# Patient Record
Sex: FEMALE | Race: OTHER | HISPANIC OR LATINO | URBAN - METROPOLITAN AREA
[De-identification: names, ages, dates, MRNs, and addresses within clinical notes are randomized per-mention and may not be internally consistent; named-entity substitution may affect disease eponyms.]

---

## 2018-09-01 ENCOUNTER — OUTPATIENT (OUTPATIENT)
Dept: OUTPATIENT SERVICES | Facility: HOSPITAL | Age: 63
LOS: 1 days | End: 2018-09-01
Payer: MEDICAID

## 2018-09-01 DIAGNOSIS — I77.0 ARTERIOVENOUS FISTULA, ACQUIRED: Chronic | ICD-10-CM

## 2018-09-01 DIAGNOSIS — Z90.49 ACQUIRED ABSENCE OF OTHER SPECIFIED PARTS OF DIGESTIVE TRACT: Chronic | ICD-10-CM

## 2018-09-01 PROCEDURE — G9001: CPT

## 2018-09-25 ENCOUNTER — INPATIENT (INPATIENT)
Facility: HOSPITAL | Age: 63
LOS: 1 days | Discharge: HOME | DRG: 640 | End: 2018-09-27
Attending: HOSPITALIST | Admitting: HOSPITALIST
Payer: COMMERCIAL

## 2018-09-25 VITALS
SYSTOLIC BLOOD PRESSURE: 204 MMHG | RESPIRATION RATE: 26 BRPM | OXYGEN SATURATION: 89 % | HEART RATE: 75 BPM | DIASTOLIC BLOOD PRESSURE: 84 MMHG | TEMPERATURE: 98 F | WEIGHT: 199.96 LBS | HEIGHT: 66 IN

## 2018-09-25 DIAGNOSIS — I77.0 ARTERIOVENOUS FISTULA, ACQUIRED: Chronic | ICD-10-CM

## 2018-09-25 DIAGNOSIS — I50.9 HEART FAILURE, UNSPECIFIED: ICD-10-CM

## 2018-09-25 DIAGNOSIS — Z90.49 ACQUIRED ABSENCE OF OTHER SPECIFIED PARTS OF DIGESTIVE TRACT: Chronic | ICD-10-CM

## 2018-09-25 LAB
ALBUMIN SERPL ELPH-MCNC: 4.3 G/DL — SIGNIFICANT CHANGE UP (ref 3.3–5.2)
ALBUMIN SERPL ELPH-MCNC: 4.5 G/DL — SIGNIFICANT CHANGE UP (ref 3.3–5.2)
ALP SERPL-CCNC: 110 U/L — SIGNIFICANT CHANGE UP (ref 40–120)
ALP SERPL-CCNC: 138 U/L — HIGH (ref 40–120)
ALT FLD-CCNC: 10 U/L — SIGNIFICANT CHANGE UP
ALT FLD-CCNC: 11 U/L — SIGNIFICANT CHANGE UP
ANION GAP SERPL CALC-SCNC: 21 MMOL/L — HIGH (ref 5–17)
ANION GAP SERPL CALC-SCNC: 21 MMOL/L — HIGH (ref 5–17)
ANION GAP SERPL CALC-SCNC: 22 MMOL/L — HIGH (ref 5–17)
APTT BLD: 33.6 SEC — SIGNIFICANT CHANGE UP (ref 27.5–37.4)
APTT BLD: 35.3 SEC — SIGNIFICANT CHANGE UP (ref 27.5–37.4)
AST SERPL-CCNC: 16 U/L — SIGNIFICANT CHANGE UP
AST SERPL-CCNC: 16 U/L — SIGNIFICANT CHANGE UP
BASE EXCESS BLDV CALC-SCNC: 0.1 MMOL/L — SIGNIFICANT CHANGE UP (ref -2–2)
BASOPHILS # BLD AUTO: 0 K/UL — SIGNIFICANT CHANGE UP (ref 0–0.2)
BASOPHILS # BLD AUTO: 0.1 K/UL — SIGNIFICANT CHANGE UP (ref 0–0.2)
BASOPHILS NFR BLD AUTO: 0.4 % — SIGNIFICANT CHANGE UP (ref 0–2)
BASOPHILS NFR BLD AUTO: 0.4 % — SIGNIFICANT CHANGE UP (ref 0–2)
BILIRUB SERPL-MCNC: 0.6 MG/DL — SIGNIFICANT CHANGE UP (ref 0.4–2)
BILIRUB SERPL-MCNC: 0.7 MG/DL — SIGNIFICANT CHANGE UP (ref 0.4–2)
BLD GP AB SCN SERPL QL: SIGNIFICANT CHANGE UP
BUN SERPL-MCNC: 22 MG/DL — HIGH (ref 8–20)
BUN SERPL-MCNC: 48 MG/DL — HIGH (ref 8–20)
BUN SERPL-MCNC: 52 MG/DL — HIGH (ref 8–20)
CALCIUM SERPL-MCNC: 10.1 MG/DL — SIGNIFICANT CHANGE UP (ref 8.6–10.2)
CALCIUM SERPL-MCNC: 10.7 MG/DL — HIGH (ref 8.6–10.2)
CALCIUM SERPL-MCNC: 11 MG/DL — HIGH (ref 8.6–10.2)
CHLORIDE SERPL-SCNC: 89 MMOL/L — LOW (ref 98–107)
CHLORIDE SERPL-SCNC: 97 MMOL/L — LOW (ref 98–107)
CHLORIDE SERPL-SCNC: 97 MMOL/L — LOW (ref 98–107)
CK SERPL-CCNC: 34 U/L — SIGNIFICANT CHANGE UP (ref 25–170)
CO2 SERPL-SCNC: 21 MMOL/L — LOW (ref 22–29)
CO2 SERPL-SCNC: 21 MMOL/L — LOW (ref 22–29)
CO2 SERPL-SCNC: 24 MMOL/L — SIGNIFICANT CHANGE UP (ref 22–29)
CREAT SERPL-MCNC: 4.7 MG/DL — HIGH (ref 0.5–1.3)
CREAT SERPL-MCNC: 7.72 MG/DL — HIGH (ref 0.5–1.3)
CREAT SERPL-MCNC: 8.13 MG/DL — HIGH (ref 0.5–1.3)
EOSINOPHIL # BLD AUTO: 0.1 K/UL — SIGNIFICANT CHANGE UP (ref 0–0.5)
EOSINOPHIL # BLD AUTO: 0.1 K/UL — SIGNIFICANT CHANGE UP (ref 0–0.5)
EOSINOPHIL NFR BLD AUTO: 0.9 % — SIGNIFICANT CHANGE UP (ref 0–6)
EOSINOPHIL NFR BLD AUTO: 1.3 % — SIGNIFICANT CHANGE UP (ref 0–6)
GAS PNL BLDV: SIGNIFICANT CHANGE UP
GLUCOSE BLDC GLUCOMTR-MCNC: 129 MG/DL — HIGH (ref 70–99)
GLUCOSE BLDC GLUCOMTR-MCNC: 153 MG/DL — HIGH (ref 70–99)
GLUCOSE BLDC GLUCOMTR-MCNC: 182 MG/DL — HIGH (ref 70–99)
GLUCOSE BLDC GLUCOMTR-MCNC: 203 MG/DL — HIGH (ref 70–99)
GLUCOSE SERPL-MCNC: 148 MG/DL — HIGH (ref 70–115)
GLUCOSE SERPL-MCNC: 179 MG/DL — HIGH (ref 70–115)
GLUCOSE SERPL-MCNC: 211 MG/DL — HIGH (ref 70–115)
HCO3 BLDV-SCNC: 24 MMOL/L — SIGNIFICANT CHANGE UP (ref 20–26)
HCT VFR BLD CALC: 37.1 % — SIGNIFICANT CHANGE UP (ref 37–47)
HCT VFR BLD CALC: 37.4 % — SIGNIFICANT CHANGE UP (ref 37–47)
HCT VFR BLD CALC: 40.7 % — SIGNIFICANT CHANGE UP (ref 37–47)
HGB BLD-MCNC: 11.3 G/DL — LOW (ref 12–16)
HGB BLD-MCNC: 11.4 G/DL — LOW (ref 12–16)
HGB BLD-MCNC: 12.2 G/DL — SIGNIFICANT CHANGE UP (ref 12–16)
INR BLD: 1.02 RATIO — SIGNIFICANT CHANGE UP (ref 0.88–1.16)
INR BLD: 1.09 RATIO — SIGNIFICANT CHANGE UP (ref 0.88–1.16)
LACTATE BLDV-MCNC: 1.8 MMOL/L — SIGNIFICANT CHANGE UP (ref 0.5–2)
LYMPHOCYTES # BLD AUTO: 1.2 K/UL — SIGNIFICANT CHANGE UP (ref 1–4.8)
LYMPHOCYTES # BLD AUTO: 1.6 K/UL — SIGNIFICANT CHANGE UP (ref 1–4.8)
LYMPHOCYTES # BLD AUTO: 16.2 % — LOW (ref 20–55)
LYMPHOCYTES # BLD AUTO: 7.9 % — LOW (ref 20–55)
MAGNESIUM SERPL-MCNC: 2.4 MG/DL — SIGNIFICANT CHANGE UP (ref 1.6–2.6)
MAGNESIUM SERPL-MCNC: 3.1 MG/DL — HIGH (ref 1.6–2.6)
MCHC RBC-ENTMCNC: 28.3 PG — SIGNIFICANT CHANGE UP (ref 27–31)
MCHC RBC-ENTMCNC: 28.5 PG — SIGNIFICANT CHANGE UP (ref 27–31)
MCHC RBC-ENTMCNC: 28.5 PG — SIGNIFICANT CHANGE UP (ref 27–31)
MCHC RBC-ENTMCNC: 30 G/DL — LOW (ref 32–36)
MCHC RBC-ENTMCNC: 30.5 G/DL — LOW (ref 32–36)
MCHC RBC-ENTMCNC: 30.5 G/DL — LOW (ref 32–36)
MCV RBC AUTO: 92.8 FL — SIGNIFICANT CHANGE UP (ref 81–99)
MCV RBC AUTO: 93.5 FL — SIGNIFICANT CHANGE UP (ref 81–99)
MCV RBC AUTO: 95.1 FL — SIGNIFICANT CHANGE UP (ref 81–99)
MONOCYTES # BLD AUTO: 0.8 K/UL — SIGNIFICANT CHANGE UP (ref 0–0.8)
MONOCYTES # BLD AUTO: 0.8 K/UL — SIGNIFICANT CHANGE UP (ref 0–0.8)
MONOCYTES NFR BLD AUTO: 5.2 % — SIGNIFICANT CHANGE UP (ref 3–10)
MONOCYTES NFR BLD AUTO: 8 % — SIGNIFICANT CHANGE UP (ref 3–10)
NEUTROPHILS # BLD AUTO: 13.6 K/UL — HIGH (ref 1.8–8)
NEUTROPHILS # BLD AUTO: 7.2 K/UL — SIGNIFICANT CHANGE UP (ref 1.8–8)
NEUTROPHILS NFR BLD AUTO: 74 % — HIGH (ref 37–73)
NEUTROPHILS NFR BLD AUTO: 85.3 % — HIGH (ref 37–73)
PCO2 BLDV: 38 MMHG — SIGNIFICANT CHANGE UP (ref 35–50)
PH BLDV: 7.42 — SIGNIFICANT CHANGE UP (ref 7.32–7.43)
PHOSPHATE SERPL-MCNC: 2.7 MG/DL — SIGNIFICANT CHANGE UP (ref 2.4–4.7)
PHOSPHATE SERPL-MCNC: 4.1 MG/DL — SIGNIFICANT CHANGE UP (ref 2.4–4.7)
PLATELET # BLD AUTO: 270 K/UL — SIGNIFICANT CHANGE UP (ref 150–400)
PLATELET # BLD AUTO: 294 K/UL — SIGNIFICANT CHANGE UP (ref 150–400)
PLATELET # BLD AUTO: 324 K/UL — SIGNIFICANT CHANGE UP (ref 150–400)
PO2 BLDV: 53 MMHG — HIGH (ref 25–45)
POTASSIUM SERPL-MCNC: 4.3 MMOL/L — SIGNIFICANT CHANGE UP (ref 3.5–5.3)
POTASSIUM SERPL-MCNC: 6.2 MMOL/L — CRITICAL HIGH (ref 3.5–5.3)
POTASSIUM SERPL-MCNC: 6.2 MMOL/L — CRITICAL HIGH (ref 3.5–5.3)
POTASSIUM SERPL-SCNC: 4.3 MMOL/L — SIGNIFICANT CHANGE UP (ref 3.5–5.3)
POTASSIUM SERPL-SCNC: 6.2 MMOL/L — CRITICAL HIGH (ref 3.5–5.3)
POTASSIUM SERPL-SCNC: 6.2 MMOL/L — CRITICAL HIGH (ref 3.5–5.3)
PROT SERPL-MCNC: 7.9 G/DL — SIGNIFICANT CHANGE UP (ref 6.6–8.7)
PROT SERPL-MCNC: 8.1 G/DL — SIGNIFICANT CHANGE UP (ref 6.6–8.7)
PROTHROM AB SERPL-ACNC: 11.2 SEC — SIGNIFICANT CHANGE UP (ref 9.8–12.7)
PROTHROM AB SERPL-ACNC: 12 SEC — SIGNIFICANT CHANGE UP (ref 9.8–12.7)
RBC # BLD: 3.97 M/UL — LOW (ref 4.4–5.2)
RBC # BLD: 4.03 M/UL — LOW (ref 4.4–5.2)
RBC # BLD: 4.28 M/UL — LOW (ref 4.4–5.2)
RBC # FLD: 16 % — HIGH (ref 11–15.6)
SAO2 % BLDV: 87 % — SIGNIFICANT CHANGE UP
SODIUM SERPL-SCNC: 134 MMOL/L — LOW (ref 135–145)
SODIUM SERPL-SCNC: 139 MMOL/L — SIGNIFICANT CHANGE UP (ref 135–145)
SODIUM SERPL-SCNC: 140 MMOL/L — SIGNIFICANT CHANGE UP (ref 135–145)
TROPONIN T SERPL-MCNC: 0.04 NG/ML — SIGNIFICANT CHANGE UP (ref 0–0.06)
TROPONIN T SERPL-MCNC: 0.05 NG/ML — SIGNIFICANT CHANGE UP (ref 0–0.06)
TSH SERPL-MCNC: 1.53 UIU/ML — SIGNIFICANT CHANGE UP (ref 0.27–4.2)
TYPE + AB SCN PNL BLD: SIGNIFICANT CHANGE UP
WBC # BLD: 13.4 K/UL — HIGH (ref 4.8–10.8)
WBC # BLD: 15.9 K/UL — HIGH (ref 4.8–10.8)
WBC # BLD: 9.8 K/UL — SIGNIFICANT CHANGE UP (ref 4.8–10.8)
WBC # FLD AUTO: 13.4 K/UL — HIGH (ref 4.8–10.8)
WBC # FLD AUTO: 15.9 K/UL — HIGH (ref 4.8–10.8)
WBC # FLD AUTO: 9.8 K/UL — SIGNIFICANT CHANGE UP (ref 4.8–10.8)

## 2018-09-25 PROCEDURE — 71045 X-RAY EXAM CHEST 1 VIEW: CPT | Mod: 26

## 2018-09-25 PROCEDURE — 12345: CPT | Mod: NC

## 2018-09-25 PROCEDURE — 99223 1ST HOSP IP/OBS HIGH 75: CPT | Mod: 25

## 2018-09-25 PROCEDURE — 71045 X-RAY EXAM CHEST 1 VIEW: CPT | Mod: 26,77

## 2018-09-25 PROCEDURE — 93010 ELECTROCARDIOGRAM REPORT: CPT | Mod: 77

## 2018-09-25 PROCEDURE — 93010 ELECTROCARDIOGRAM REPORT: CPT

## 2018-09-25 PROCEDURE — 99223 1ST HOSP IP/OBS HIGH 75: CPT

## 2018-09-25 PROCEDURE — 90937 HEMODIALYSIS REPEATED EVAL: CPT

## 2018-09-25 PROCEDURE — 99291 CRITICAL CARE FIRST HOUR: CPT

## 2018-09-25 RX ORDER — ASPIRIN/CALCIUM CARB/MAGNESIUM 324 MG
81 TABLET ORAL DAILY
Qty: 0 | Refills: 0 | Status: DISCONTINUED | OUTPATIENT
Start: 2018-09-25 | End: 2018-09-27

## 2018-09-25 RX ORDER — ASPIRIN/CALCIUM CARB/MAGNESIUM 324 MG
81 TABLET ORAL ONCE
Qty: 0 | Refills: 0 | Status: COMPLETED | OUTPATIENT
Start: 2018-09-25 | End: 2018-09-25

## 2018-09-25 RX ORDER — FUROSEMIDE 40 MG
80 TABLET ORAL ONCE
Qty: 0 | Refills: 0 | Status: COMPLETED | OUTPATIENT
Start: 2018-09-25 | End: 2018-09-25

## 2018-09-25 RX ORDER — PANTOPRAZOLE SODIUM 20 MG/1
40 TABLET, DELAYED RELEASE ORAL
Qty: 0 | Refills: 0 | Status: DISCONTINUED | OUTPATIENT
Start: 2018-09-25 | End: 2018-09-27

## 2018-09-25 RX ORDER — GLUCAGON INJECTION, SOLUTION 0.5 MG/.1ML
1 INJECTION, SOLUTION SUBCUTANEOUS ONCE
Qty: 0 | Refills: 0 | Status: DISCONTINUED | OUTPATIENT
Start: 2018-09-25 | End: 2018-09-27

## 2018-09-25 RX ORDER — INSULIN LISPRO 100/ML
VIAL (ML) SUBCUTANEOUS
Qty: 0 | Refills: 0 | Status: DISCONTINUED | OUTPATIENT
Start: 2018-09-25 | End: 2018-09-27

## 2018-09-25 RX ORDER — HEPARIN SODIUM 5000 [USP'U]/ML
INJECTION INTRAVENOUS; SUBCUTANEOUS
Qty: 25000 | Refills: 0 | Status: DISCONTINUED | OUTPATIENT
Start: 2018-09-25 | End: 2018-09-26

## 2018-09-25 RX ORDER — SODIUM POLYSTYRENE SULFONATE 4.1 MEQ/G
15 POWDER, FOR SUSPENSION ORAL ONCE
Qty: 0 | Refills: 0 | Status: COMPLETED | OUTPATIENT
Start: 2018-09-25 | End: 2018-09-25

## 2018-09-25 RX ORDER — SODIUM CHLORIDE 9 MG/ML
500 INJECTION INTRAMUSCULAR; INTRAVENOUS; SUBCUTANEOUS ONCE
Qty: 0 | Refills: 0 | Status: COMPLETED | OUTPATIENT
Start: 2018-09-25 | End: 2018-09-25

## 2018-09-25 RX ORDER — IPRATROPIUM/ALBUTEROL SULFATE 18-103MCG
3 AEROSOL WITH ADAPTER (GRAM) INHALATION EVERY 6 HOURS
Qty: 0 | Refills: 0 | Status: DISCONTINUED | OUTPATIENT
Start: 2018-09-25 | End: 2018-09-27

## 2018-09-25 RX ORDER — HYDRALAZINE HCL 50 MG
10 TABLET ORAL EVERY 6 HOURS
Qty: 0 | Refills: 0 | Status: DISCONTINUED | OUTPATIENT
Start: 2018-09-25 | End: 2018-09-25

## 2018-09-25 RX ORDER — DEXTROSE 50 % IN WATER 50 %
15 SYRINGE (ML) INTRAVENOUS ONCE
Qty: 0 | Refills: 0 | Status: DISCONTINUED | OUTPATIENT
Start: 2018-09-25 | End: 2018-09-27

## 2018-09-25 RX ORDER — ALBUTEROL 90 UG/1
2.5 AEROSOL, METERED ORAL ONCE
Qty: 0 | Refills: 0 | Status: COMPLETED | OUTPATIENT
Start: 2018-09-25 | End: 2018-09-25

## 2018-09-25 RX ORDER — LABETALOL HCL 100 MG
10 TABLET ORAL ONCE
Qty: 0 | Refills: 0 | Status: COMPLETED | OUTPATIENT
Start: 2018-09-25 | End: 2018-09-25

## 2018-09-25 RX ORDER — NITROGLYCERIN 6.5 MG
1 CAPSULE, EXTENDED RELEASE ORAL ONCE
Qty: 0 | Refills: 0 | Status: COMPLETED | OUTPATIENT
Start: 2018-09-25 | End: 2018-09-25

## 2018-09-25 RX ORDER — HEPARIN SODIUM 5000 [USP'U]/ML
5000 INJECTION INTRAVENOUS; SUBCUTANEOUS EVERY 8 HOURS
Qty: 0 | Refills: 0 | Status: DISCONTINUED | OUTPATIENT
Start: 2018-09-25 | End: 2018-09-25

## 2018-09-25 RX ORDER — SODIUM CHLORIDE 9 MG/ML
1000 INJECTION, SOLUTION INTRAVENOUS
Qty: 0 | Refills: 0 | Status: DISCONTINUED | OUTPATIENT
Start: 2018-09-25 | End: 2018-09-27

## 2018-09-25 RX ORDER — HYDRALAZINE HCL 50 MG
100 TABLET ORAL EVERY 8 HOURS
Qty: 0 | Refills: 0 | Status: DISCONTINUED | OUTPATIENT
Start: 2018-09-25 | End: 2018-09-27

## 2018-09-25 RX ORDER — ALPRAZOLAM 0.25 MG
0.25 TABLET ORAL THREE TIMES A DAY
Qty: 0 | Refills: 0 | Status: DISCONTINUED | OUTPATIENT
Start: 2018-09-25 | End: 2018-09-27

## 2018-09-25 RX ORDER — CALCIUM ACETATE 667 MG
667 TABLET ORAL
Qty: 0 | Refills: 0 | Status: DISCONTINUED | OUTPATIENT
Start: 2018-09-25 | End: 2018-09-27

## 2018-09-25 RX ORDER — LEVOTHYROXINE SODIUM 125 MCG
75 TABLET ORAL DAILY
Qty: 0 | Refills: 0 | Status: DISCONTINUED | OUTPATIENT
Start: 2018-09-25 | End: 2018-09-27

## 2018-09-25 RX ORDER — DEXTROSE 50 % IN WATER 50 %
12.5 SYRINGE (ML) INTRAVENOUS ONCE
Qty: 0 | Refills: 0 | Status: DISCONTINUED | OUTPATIENT
Start: 2018-09-25 | End: 2018-09-27

## 2018-09-25 RX ORDER — DEXTROSE 50 % IN WATER 50 %
25 SYRINGE (ML) INTRAVENOUS ONCE
Qty: 0 | Refills: 0 | Status: DISCONTINUED | OUTPATIENT
Start: 2018-09-25 | End: 2018-09-27

## 2018-09-25 RX ORDER — SIMVASTATIN 20 MG/1
20 TABLET, FILM COATED ORAL AT BEDTIME
Qty: 0 | Refills: 0 | Status: DISCONTINUED | OUTPATIENT
Start: 2018-09-25 | End: 2018-09-27

## 2018-09-25 RX ORDER — INSULIN HUMAN 100 [IU]/ML
10 INJECTION, SOLUTION SUBCUTANEOUS ONCE
Qty: 0 | Refills: 0 | Status: COMPLETED | OUTPATIENT
Start: 2018-09-25 | End: 2018-09-25

## 2018-09-25 RX ORDER — DEXTROSE 50 % IN WATER 50 %
50 SYRINGE (ML) INTRAVENOUS ONCE
Qty: 0 | Refills: 0 | Status: COMPLETED | OUTPATIENT
Start: 2018-09-25 | End: 2018-09-25

## 2018-09-25 RX ORDER — METOPROLOL TARTRATE 50 MG
5 TABLET ORAL ONCE
Qty: 0 | Refills: 0 | Status: COMPLETED | OUTPATIENT
Start: 2018-09-25 | End: 2018-09-25

## 2018-09-25 RX ORDER — METOCLOPRAMIDE HCL 10 MG
5 TABLET ORAL
Qty: 0 | Refills: 0 | Status: DISCONTINUED | OUTPATIENT
Start: 2018-09-25 | End: 2018-09-27

## 2018-09-25 RX ADMIN — Medication 0.1 MILLIGRAM(S): at 05:48

## 2018-09-25 RX ADMIN — Medication 3 MILLILITER(S): at 09:38

## 2018-09-25 RX ADMIN — Medication 80 MILLIGRAM(S): at 02:12

## 2018-09-25 RX ADMIN — Medication 81 MILLIGRAM(S): at 00:49

## 2018-09-25 RX ADMIN — PANTOPRAZOLE SODIUM 40 MILLIGRAM(S): 20 TABLET, DELAYED RELEASE ORAL at 08:29

## 2018-09-25 RX ADMIN — Medication 100 MILLIGRAM(S): at 21:46

## 2018-09-25 RX ADMIN — Medication 100 MILLIGRAM(S): at 05:48

## 2018-09-25 RX ADMIN — Medication 3 MILLILITER(S): at 20:57

## 2018-09-25 RX ADMIN — Medication 1 INCH(S): at 00:45

## 2018-09-25 RX ADMIN — ALBUTEROL 2.5 MILLIGRAM(S): 90 AEROSOL, METERED ORAL at 02:12

## 2018-09-25 RX ADMIN — Medication 0.1 MILLIGRAM(S): at 21:46

## 2018-09-25 RX ADMIN — Medication 10 MILLIGRAM(S): at 17:27

## 2018-09-25 RX ADMIN — Medication 667 MILLIGRAM(S): at 13:03

## 2018-09-25 RX ADMIN — Medication 2: at 08:30

## 2018-09-25 RX ADMIN — Medication 10 MILLIGRAM(S): at 08:32

## 2018-09-25 RX ADMIN — Medication 5 MILLIGRAM(S): at 19:58

## 2018-09-25 RX ADMIN — Medication 5 MILLIGRAM(S): at 17:27

## 2018-09-25 RX ADMIN — SODIUM POLYSTYRENE SULFONATE 15 GRAM(S): 4.1 POWDER, FOR SUSPENSION ORAL at 08:53

## 2018-09-25 RX ADMIN — SODIUM CHLORIDE 2000 MILLILITER(S): 9 INJECTION INTRAMUSCULAR; INTRAVENOUS; SUBCUTANEOUS at 17:48

## 2018-09-25 RX ADMIN — Medication 5 MILLIGRAM(S): at 05:46

## 2018-09-25 RX ADMIN — INSULIN HUMAN 10 UNIT(S): 100 INJECTION, SOLUTION SUBCUTANEOUS at 02:12

## 2018-09-25 RX ADMIN — Medication 667 MILLIGRAM(S): at 23:50

## 2018-09-25 RX ADMIN — Medication 667 MILLIGRAM(S): at 08:33

## 2018-09-25 RX ADMIN — HEPARIN SODIUM 1700 UNIT(S)/HR: 5000 INJECTION INTRAVENOUS; SUBCUTANEOUS at 22:21

## 2018-09-25 RX ADMIN — Medication 667 MILLIGRAM(S): at 17:27

## 2018-09-25 RX ADMIN — HEPARIN SODIUM 5000 UNIT(S): 5000 INJECTION INTRAVENOUS; SUBCUTANEOUS at 05:47

## 2018-09-25 RX ADMIN — Medication 50 MILLILITER(S): at 02:11

## 2018-09-25 RX ADMIN — SIMVASTATIN 20 MILLIGRAM(S): 20 TABLET, FILM COATED ORAL at 21:46

## 2018-09-25 RX ADMIN — Medication 10 MILLIGRAM(S): at 01:27

## 2018-09-25 RX ADMIN — Medication 75 MICROGRAM(S): at 05:47

## 2018-09-25 RX ADMIN — Medication 2: at 17:26

## 2018-09-25 NOTE — PROGRESS NOTE ADULT - SUBJECTIVE AND OBJECTIVE BOX
KIM LEE    [id number]    62y      Female    CC: sob    INTERVAL HPI/OVERNIGHT EVENTS:  Sob improved since last night. O2 was titrated off and currently on room air saturating 97%. K is still 6.2. discussed with nephrology Dr Yates for urgent HD. Denied chest pain, sob.    HPI:  Pt poor historian, unable to provide meaningful history, history obtained from pt's daughter at bedside and EMR. Briefly she is 63 y/o female with PMHx of ESRD on HD T/T/S last HD on Saturday, DM, HTN, Hypothyroidism, presented to ED with c/o SOB for 1 day. Per daughter Pt has last HD done on last Saturday and since then Pt having more liquid intake and she started having difficulty breathing and feeling congested and EMS was called. Per EMS reports Pt was hypoxic to 89% and is not home O2 dependent. Pt was due to HD today at 5AM but she decided to come to ED for SOB. she had chest pain on admission but no pain currently.  Pt denied any other complaints, no fever, chills, nausea, vomiting, headache, dizziness. (9/25)    REVIEW OF SYSTEMS:    CONSTITUTIONAL: No fever, weight loss, or fatigue  RESPIRATORY: No cough, wheezing, hemoptysis; No shortness of breath  CARDIOVASCULAR: No chest pain, palpitations  GASTROINTESTINAL: No abdominal or epigastric pain. No nausea, vomiting  NEUROLOGICAL: No headaches, memory loss, loss of strength.  MISCELLANEOUS:      Vital Signs Last 24 Hrs  T(C): 37.1 (25 Sep 2018 10:50), Max: 37.1 (25 Sep 2018 10:50)  T(F): 98.7 (25 Sep 2018 10:50), Max: 98.7 (25 Sep 2018 10:50)  HR: 68 (25 Sep 2018 10:50) (46 - 75)  BP: 163/72 (25 Sep 2018 10:50) (159/69 - 204/84)  BP(mean): --  RR: 18 (25 Sep 2018 10:50) (18 - 28)  SpO2: 99% (25 Sep 2018 10:50) (89% - 100%)    PHYSICAL EXAM:    GENERAL: NAD, well-groomed, left AV fistula  HEENT: PERRL, +EOMI  NECK: soft, Supple, No JVD,   CHEST/LUNG: Clear to auscultation bilaterally; No wheezing  HEART: S1S2+, bradycardiac  ABDOMEN: Soft, Nontender, Nondistended; Bowel sounds present  EXTREMITIES:  2+ Peripheral Pulses, No clubbing, cyanosis, or edema  SKIN: No rashes or lesions  NEURO: AAOX3, no focal deficits, no motor or  sensory loss        LABS:                        11.3   13.4  )-----------( 294      ( 25 Sep 2018 10:06 )             37.1     09-25    139  |  97<L>  |  52.0<H>  ----------------------------<  148<H>  6.2<HH>   |  21.0<L>  |  8.13<H>    Ca    10.7<H>      25 Sep 2018 10:06  Phos  4.1     09-25  Mg     3.1     09-25    TPro  8.1  /  Alb  4.5  /  TBili  0.6  /  DBili  x   /  AST  16  /  ALT  11  /  AlkPhos  138<H>  09-25    PT/INR - ( 25 Sep 2018 00:56 )   PT: 11.2 sec;   INR: 1.02 ratio         PTT - ( 25 Sep 2018 00:56 )  PTT:35.3 sec        MEDICATIONS  (STANDING):  ALBUTerol/ipratropium for Nebulization 3 milliLiter(s) Nebulizer every 6 hours  calcium acetate 667 milliGRAM(s) Oral four times a day with meals  cloNIDine 0.1 milliGRAM(s) Oral three times a day  dextrose 5%. 1000 milliLiter(s) (50 mL/Hr) IV Continuous <Continuous>  dextrose 50% Injectable 12.5 Gram(s) IV Push once  dextrose 50% Injectable 25 Gram(s) IV Push once  dextrose 50% Injectable 25 Gram(s) IV Push once  heparin  Injectable 5000 Unit(s) SubCutaneous every 8 hours  hydrALAZINE 100 milliGRAM(s) Oral every 8 hours  hydrALAZINE Injectable 10 milliGRAM(s) IV Push every 6 hours  insulin lispro (HumaLOG) corrective regimen sliding scale   SubCutaneous three times a day before meals  levothyroxine 75 MICROGram(s) Oral daily  metoclopramide 5 milliGRAM(s) Oral two times a day  pantoprazole    Tablet 40 milliGRAM(s) Oral before breakfast  simvastatin 20 milliGRAM(s) Oral at bedtime    MEDICATIONS  (PRN):  ALPRAZolam 0.25 milliGRAM(s) Oral three times a day PRN Anxiety  dextrose 40% Gel 15 Gram(s) Oral once PRN Blood Glucose LESS THAN 70 milliGRAM(s)/deciliter  glucagon  Injectable 1 milliGRAM(s) IntraMuscular once PRN Glucose LESS THAN 70 milligrams/deciliter      RADIOLOGY & ADDITIONAL TESTS:

## 2018-09-25 NOTE — ED PROVIDER NOTE - CARE PLAN
Principal Discharge DX:	Congestive heart failure  Secondary Diagnosis:	Respiratory failure with hypoxia  Secondary Diagnosis:	Hyperkalemia

## 2018-09-25 NOTE — ED ADULT NURSE REASSESSMENT NOTE - NS ED NURSE REASSESS COMMENT FT1
Pt. BP gradually improving, HR dropped to mid 40s, MD aware. will continue to monitor and maintain safety.
Pt. tolerating off Bi-PAP well. remains drowsy but awakens to voice. HR bradycardic mid 50s, denies any pain or discomfort. BP improved from arrival however remains elevated, nitro-paste in place. will continue to monitor and maintain safety.
pt in no apparent distress, resting conformably in bed, AOX4, denies any pain at the moment. plan of care explained to pt, pt verbalized understanding. IV patent and flushing without difficulty

## 2018-09-25 NOTE — CHART NOTE - NSCHARTNOTEFT_GEN_A_CORE
Rapid Response PGY 3 Note    [id number]  KIM LEE    Rapid Response was called on a 62y year old Female patient for tachycardia      Patient was seen and examined at the bedside by the rapid response team.  She c/o mild chest pain and chronic neck pain.  Denied SOB, palpitations, lightheadedness, dizziness.  She had recently arrived on unit after receiving HD, and family at bedside states that she has appeared "like this" after previous HD sessions when "they take off too much fluid" but that she does not have a known h/o afib.    Allergies    No Known Allergies    Intolerances        PAST MEDICAL & SURGICAL HISTORY:  Hypothyroidism, unspecified type  Hemodialysis patient: tues thursday saturday  Renal failure  Hypertension  Diabetes mellitus  A-V fistula  S/P cholecystectomy      Vital Signs Last 24 Hrs  T(C): 37.4 (25 Sep 2018 16:04), Max: 37.4 (25 Sep 2018 16:04)  T(F): 99.3 (25 Sep 2018 16:04), Max: 99.3 (25 Sep 2018 16:04)  HR: 74 (25 Sep 2018 16:04) (46 - 84)  BP: 153/76 (25 Sep 2018 16:04) (119/60 - 204/84)  BP(mean): --  RR: 18 (25 Sep 2018 16:04) (18 - 28)  SpO2: 94% (25 Sep 2018 16:04) (89% - 100%)          PHYSICAL EXAM:    GENERAL: adult female sitting upright in mild distress  HEAD:  Atraumatic, Normocephalic  EYES: EOMI, PERRLA, conjunctiva and sclera clear  ENMT: moist mucous membranes  NECK: Supple, No JVD, Normal thyroid  NERVOUS SYSTEM:  Alert & Oriented X3  CHEST/LUNG: CTABL  HEART: irregularly irregular, tachycardic, cap refill <2 sec  ABDOMEN: Soft, Nontender, Nondistended; Bowel sounds present, obese  EXTREMITIES: no calf tenderness      09-25 @ 07:01  -  09-25 @ 18:51  --------------------------------------------------------  IN: 0 mL / OUT: 3000 mL / NET: -3000 mL                              11.4   9.8   )-----------( 270      ( 25 Sep 2018 18:27 )             37.4     09-25    139  |  97<L>  |  52.0<H>  ----------------------------<  148<H>  6.2<HH>   |  21.0<L>  |  8.13<H>    Ca    10.7<H>      25 Sep 2018 10:06  Phos  4.1     09-25  Mg     3.1     09-25    TPro  8.1  /  Alb  4.5  /  TBili  0.6  /  DBili  x   /  AST  16  /  ALT  11  /  AlkPhos  138<H>  09-25         LIVER FUNCTIONS - ( 25 Sep 2018 00:56 )  Alb: 4.5 g/dL / Pro: 8.1 g/dL / ALK PHOS: 138 U/L / ALT: 11 U/L / AST: 16 U/L / GGT: x              PT/INR - ( 25 Sep 2018 18:27 )   PT: 12.0 sec;   INR: 1.09 ratio         PTT - ( 25 Sep 2018 00:56 )  PTT:35.3 sec    Vital Signs Last 24 Hrs*     EKG: afib with RVR to 140, inferior and lateral ST-segment depressions    Assessment- Rapid Response called for 62y year old Female for new onset afib with RVR.    Plan-  - lopressor 5mg IV and 500 cc NS bolus given with decrease of HR to , and immediate resolution of chest pain  - repeat EKG with reduction in severity of ST-segment depressions  - cardizem 30 mg PO q6h  - CBC, CMP, coags, mg, phos, cardiac enzymes  - CXR with improvement in vascular congestion  - CHADS-VASC = 4, ASA + heparin drip  - defer decision on long term A/C to primary team/cardiology in AM  - hope to maintain rate control with PO cardizem, but plan for drip overnight if she does not remain rate controlled  - transfer to telemetry unit  - covering attending  at bedside during RRT

## 2018-09-25 NOTE — CONSULT NOTE ADULT - SUBJECTIVE AND OBJECTIVE BOX
[place] [facility]                                                        Office: [address] [address]                                                       Telephone: [phone]. Fax:[phone]                                                              CARDIOLOGY CONSULTATION NOTE                                                                                             Consult requested by:  Dr Vasquez    PCP    Primary Cardiologist.    Reason for Consultation:     History obtained by: Patient and medical record     obtained: No    Chief complaint:    Patient is a 62y old  Female who presents with a chief complaint of SOB (25 Sep 2018 12:33)      HPI:  Pt poor historian, unable to provide meaningful history, history obtained from pt's daughter at bedside and EMR. Briefly she is 63 y/o female with PMHx of ESRD on HD T/T/S last HD on Saturday, DM, HTN, Hypothyroidism, presented to ED with c/o SOB for 1 day. Per daughter Pt has last HD done on last Saturday and since then Pt having more liquid intake and she started having difficulty breathing and feeling congested and EMS was called. Per EMS reports Pt was hypoxic to 89% and is not home O2 dependent. Pt was due to HD today at 5AM but she decided to come to ED for SOB. Pt denied any other complaints, no fever, chills, nausea, vomiting, headache, dizziness, chest pain. (25 Sep 2018 04:20)        ALLERGIES: Allergies    No Known Allergies    Intolerances          CURRENT MEDICATIONS:  MEDICATIONS  (STANDING):  ALBUTerol/ipratropium for Nebulization 3 milliLiter(s) Nebulizer every 6 hours  aspirin enteric coated 81 milliGRAM(s) Oral daily  calcium acetate 667 milliGRAM(s) Oral four times a day with meals  cloNIDine 0.1 milliGRAM(s) Oral three times a day  dextrose 5%. 1000 milliLiter(s) (50 mL/Hr) IV Continuous <Continuous>  dextrose 50% Injectable 12.5 Gram(s) IV Push once  dextrose 50% Injectable 25 Gram(s) IV Push once  dextrose 50% Injectable 25 Gram(s) IV Push once  diltiazem    Tablet 30 milliGRAM(s) Oral every 6 hours  heparin  Infusion.  Unit(s)/Hr (17 mL/Hr) IV Continuous <Continuous>  hydrALAZINE 100 milliGRAM(s) Oral every 8 hours  insulin lispro (HumaLOG) corrective regimen sliding scale   SubCutaneous three times a day before meals  levothyroxine 75 MICROGram(s) Oral daily  metoclopramide 5 milliGRAM(s) Oral two times a day  metoprolol tartrate Injectable 5 milliGRAM(s) IV Push once  pantoprazole    Tablet 40 milliGRAM(s) Oral before breakfast  simvastatin 20 milliGRAM(s) Oral at bedtime  sodium chloride 0.9% Bolus 500 milliLiter(s) IV Bolus once      HOME MEDICATIONS:  Home Medications:  alendronate 70 mg oral tablet: 1 tab(s) orally once a week (25 Sep 2018 04:15)  aspirin 81 mg oral delayed release tablet: 1 tab(s) orally once a day (24 Mar 2016 09:14)  Basaglar KwikPen:  (25 Sep 2018 04:14)  calcium acetate 667 mg oral tablet: 1 tab(s) orally 3 times a day (25 Sep 2018 04:14)  carvedilol 6.25 mg oral tablet: 1 tab(s) orally every 12 hours (24 Mar 2016 09:14)  cloNIDine 0.1 mg oral tablet: 1 tab(s) orally 3 times a day (25 Sep 2018 04:12)  folic acid 1 mg oral tablet: 1 tab(s) orally once a day (24 Mar 2016 09:14)  hydrALAZINE 100 mg oral tablet: 1 tab(s) orally 3 times a day (25 Sep 2018 04:13)  levothyroxine 75 mcg (0.075 mg) oral tablet: 1 tab(s) orally once a day (24 Mar 2016 09:14)  Nephplex Rx oral tablet: 1 tab(s) orally once a day (24 Mar 2016 09:14)  NIFEdipine 90 mg oral tablet, extended release: 1 tab(s) orally once a day (24 Mar 2016 09:14)  pantoprazole 40 mg oral delayed release tablet: 1 tab(s) orally once a day (before a meal) (24 Mar 2016 09:14)  Reglan 5 mg oral tablet: 1 tab(s) orally 3 times a day (25 Sep 2018 04:15)  simvastatin 20 mg oral tablet: 1 tab(s) orally once a day (at bedtime) (25 Sep 2018 04:12)  Xanax 0.25 mg oral tablet: 1 tab(s) orally 3 times a day, As Needed (25 Sep 2018 04:14)    PAST MEDICAL HISTORY  Hypothyroidism, unspecified type  Hemodialysis patient  Renal failure  Hypertension  Diabetes mellitus      PAST SURGICAL HISTORY  A-V fistula  S/P cholecystectomy      FAMILY HISTORY:  Family history of diabetes mellitus (Father)      SOCIAL HISTORY:       CIGARETTES:       ALCOHOL    DRUG ABUSE      REVIEW OF SYSTEMS:  CONSTITUTIONAL:  as per HPI  HEENT:  Eyes:  No diplopia or blurred vision. ENT:  No earache, sore throat or runny nose.  CARDIOVASCULAR:  No pressure, squeezing, strangling, tightness, heaviness or aching about the chest, neck, axilla or epigastrium.  RESPIRATORY:  No cough, shortness of breath, PND or orthopnea.  GASTROINTESTINAL:  No nausea, vomiting or diarrhea.  GENITOURINARY:  No dysuria, frequency or urgency. No Blood in urine  MUSCULOSKELETAL:  no joint aches, no muscle pain  SKIN:  No change in skin, hair or nails.  NEUROLOGIC:  No paresthesias, fasciculations, seizures or weakness.  PSYCHIATRIC:  No disorder of thought or mood.  ENDOCRINE:  No heat or cold intolerance, polyuria or polydipsia.  HEMATOLOGICAL:  No easy bruising or bleeding.           	        Vital Signs Last 24 Hrs  T(C): 37.4 (09-25-18 @ 16:04), Max: 37.4 (09-25-18 @ 16:04)  T(F): 99.3 (09-25-18 @ 16:04), Max: 99.3 (09-25-18 @ 16:04)  HR: 74 (09-25-18 @ 16:04) (46 - 84)  BP: 153/76 (09-25-18 @ 16:04) (119/60 - 204/84)  BP(mean): --  RR: 18 (09-25-18 @ 16:04) (18 - 28)  SpO2: 94% (09-25-18 @ 16:04) (89% - 100%)    PHYSICAL EXAM:  Constitutional: Comfortable . No acute distress.   HEENT: Atraumatic and normcephalic , neck is supple . no JVD. Unremarkable  CNS: Alert and awake, Grossly nonfocal.  Lymph Nodes: Cervical : Not palpable.  Respiratory: Bilateral clear breath sounds.  Cardiovascular: Normal S1S2. . No murmur/rubs or gallop.  Gastrointestinal: Soft non-tender and non distended . +Bowel sounds.   Extremities: No leg edema.   Psychiatric: Calm . no agitation.  Skin: No skin rash.    Intake and output:   09-25 @ 07:01  -  09-25 @ 18:55  --------------------------------------------------------  IN: 0 mL / OUT: 3000 mL / NET: -3000 mL        LABS:                        11.4   9.8   )-----------( 270      ( 25 Sep 2018 18:27 )             37.4     09-25    139  |  97<L>  |  52.0<H>  ----------------------------<  148<H>  6.2<HH>   |  21.0<L>  |  8.13<H>    Ca    10.7<H>      25 Sep 2018 10:06  Phos  4.1     09-25  Mg     3.1     09-25    TPro  8.1  /  Alb  4.5  /  TBili  0.6  /  DBili  x   /  AST  16  /  ALT  11  /  AlkPhos  138<H>  09-25    CARDIAC MARKERS ( 25 Sep 2018 00:56 )  x     / 0.04 ng/mL / x     / x     / x        ;p-BNP=  PT/INR - ( 25 Sep 2018 18:27 )   PT: 12.0 sec;   INR: 1.09 ratio         PTT - ( 25 Sep 2018 00:56 )  PTT:35.3 sec    LIVER FUNCTIONS - ( 25 Sep 2018 00:56 )  Alb: 4.5 g/dL / Pro: 8.1 g/dL / ALK PHOS: 138 U/L / ALT: 11 U/L / AST: 16 U/L / GGT: x           INTERPRETATION OF TELEMETRY: Reviewed by me.   ECG: Reviewed by me.     RADIOLOGY & ADDITIONAL STUDIES/ECHO/CARDIAC CATH:                   < from: TTE Echo w/Cont Complete (09.25.18 @ 15:04) >  Summary:   1. Left ventricular ejection fraction, by visual estimation, is 45 to   50%.   2. Technically difficult study.   3. Mildly decreased global left ventricular systolic function.   4. Basal inferolateral segment, mid anterolateral segment, and basal   inferior segment are abnormal as described above.   5. Mildly increased LV wall thickness.   6. Normal left ventricular internal cavity size.   7. There is no evidence of pericardial effusion.   8. Moderate mitral annular calcification.   9. Thickening and calcification of the posterior mitral valve leaflet.  10. Endocardial visualization was enhanced with intravenous echo contrast.  11. There is severe aortic root calcification.    [id number] Melissa Tracy MD, Electronically signed on 9/25/2018 at 5:50:43 PM       < end of copied text >    < from: Cardiac Cath Lab - Adult (03.18.16 @ 16:19) >  VENTRICLES: Global left ventricular function was normal. EF estimated was  60 %.  VALVES: MITRAL VALVE: The mitral valve exhibited no regurgitation.  CORONARY VESSELS: The coronary circulation is right dominant.  LM:   --  LM: Normal.  LAD:   --  LAD: Angiography showed minor luminal irregularities with no  flow limiting lesions.  CX:   --  Circumflex: Angiography showed minor luminal irregularities with  no flow limiting lesions.  --  Mid circumflex: There was a 40 % stenosis.  RCA:   --  RCA: Angiography showed minor luminal irregularities with no  flow limiting lesions.  COMPLICATIONS: No complications occurred during the cath lab visit.  DIAGNOSTIC IMPRESSIONS: No significant coronary artery disease with normal  left ventricular function.  DIAGNOSTIC RECOMMENDATIONS: Continue agressive risk factor modifications.  Prepared and signed by  Ozzie Elliott MD  Signed 03/18/2016 20:52:3    < end of copied text >

## 2018-09-25 NOTE — PROGRESS NOTE ADULT - SUBJECTIVE AND OBJECTIVE BOX
Patient was seen and evaluated on dialysis.   No c/o CP SOB NV  no F/C  no swelling    T(C): 37.2 (09-26-18 @ 00:00), Max: 37.4 (09-25-18 @ 16:04)  HR: 68 (09-26-18 @ 04:37) (58 - 153)  BP: 158/67 (09-26-18 @ 04:37) (119/60 - 177/71)  Wt(kg): --  PE ;  NAD  lungs - CTA  CV gr 1 murmer,  No gallop or rub  Abd : soft, NT BS +, No masses  Ext- No edema  Neuro : Grossly intact, moving extremities                             10.4   6.2   )-----------( 255      ( 26 Sep 2018 04:20 )             35.1        09-26    132<L>  |  91<L>  |  28.0<H>  ----------------------------<  156<H>  5.0   |  27.0  |  5.53<H>    Ca    10.0      26 Sep 2018 03:46  Phos  5.1     09-26  Mg     2.5     09-26    TPro  7.9  /  Alb  4.3  /  TBili  0.7  /  DBili  x   /  AST  16  /  ALT  10  /  AlkPhos  110  09-25      MEDICATIONS  (STANDING):  ALBUTerol/ipratropium for Nebulization  ALPRAZolam PRN  aspirin enteric coated  calcium acetate  cloNIDine  dextrose 40% Gel PRN  dextrose 5%.  dextrose 50% Injectable  dextrose 50% Injectable  dextrose 50% Injectable  glucagon  Injectable PRN  heparin  Infusion.  hydrALAZINE  insulin lispro (HumaLOG) corrective regimen sliding scale  levothyroxine  metoclopramide  pantoprazole    Tablet  simvastatin      Patient stable  [facility] HD easily  Continue

## 2018-09-25 NOTE — CHART NOTE - NSCHARTNOTEFT_GEN_A_CORE
I went to see the patient.  Patients son who is at bedside stated that her Cardiologist is Dr Yo Lee, [facility].  I spoke to Dr Ephraim Cavazos who is hospitalist covering , to call .  I did not see the patient in consultation.

## 2018-09-25 NOTE — ED PROVIDER NOTE - OBJECTIVE STATEMENT
63 y/o F pt BIBA with hx of renal failure, DM, HTN, hypothyroidism, and cholecystectomy presents to ED c/o SOB and difficulty breathing that began today. Pt is dialyzed Tu/Th/Sa and is due for dialysis at 5:00 this morning. Pt does not wear oxygen at home. denies fever. denies HA or neck pain. no abd pain. no n/v/d. no urinary f/u/d. no back pain. no motor or sensory deficits. denies illicit drug use. no recent travel. no rash. no other acute issues symptoms or concerns   Dialysis Center: [facility] [place] 61 y/o F pt BIBA with hx of renal failure, DM, HTN, hypothyroidism, and cholecystectomy presents to ED c/o SOB and difficulty breathing that began today. Per EMS, pt's O2 saturation was 89 on RA. Pt is dialyzed Tu/Th/Sa and is due for dialysis at 5:00 this morning. Pt does not wear oxygen at home. denies fever. denies HA or neck pain. no abd pain. no n/v/d. no urinary f/u/d. no back pain. no motor or sensory deficits. denies illicit drug use. no recent travel. no rash. no other acute issues symptoms or concerns   Dialysis Center: [facility] in [place]

## 2018-09-25 NOTE — H&P ADULT - NSHPPHYSICALEXAM_GEN_ALL_CORE
PHYSICAL EXAM:    Vital Signs Last 24 Hrs  T(C): 36.9 (25 Sep 2018 00:20), Max: 36.9 (25 Sep 2018 00:20)  T(F): 98.5 (25 Sep 2018 00:20), Max: 98.5 (25 Sep 2018 00:20)  HR: 54 (25 Sep 2018 04:20) (46 - 75)  BP: 177/78 (25 Sep 2018 04:20) (167/79 - 204/84)  BP(mean): --  RR: 22 (25 Sep 2018 04:20) (22 - 26)  SpO2: 96% (25 Sep 2018 04:20) (89% - 100%)    GENERAL: Pt lying comfortably, NAD.  ENMT: PERRL, +EOMI.  NECK: soft, Supple, No JVD,   CHEST/LUNG: Bibasilar rales, no wheezing.   HEART: S1S2+, Regular rate and rhythm; No murmurs.  ABDOMEN: Soft, Nontender, Nondistended; Bowel sounds present.  MUSCULOSKELETAL: Normal range of motion.  SKIN: No rashes or lesions.  Extremities: No LE edema, Left AVF with thrill.   NEURO: AAOX3, no focal deficits, no motor r sensory loss.  PSYCH: normal mood.

## 2018-09-25 NOTE — ED PROVIDER NOTE - MEDICAL DECISION MAKING DETAILS
need for NIPPV to prevent intubation work breathing improved able to take off bipap; high  potassium treated to prevent life threatening arrythmia

## 2018-09-25 NOTE — CONSULT NOTE ADULT - SUBJECTIVE AND OBJECTIVE BOX
[facility] DIVISION OF KIDNEY DISEASES AND HYPERTENSION -- INITIAL CONSULT NOTE  --------------------------------------------------------------------------------  HPI:    85 y/o female with h/o chronic a fib, pacemaker, DM II, Pulmonary HTN. Glaucoma, was brought in from [facility] to the Er because of AMS. patient was seen and examined.   patient opens her eyes and follows verbal commands.   right lower ext. is much bigger than left lower ext. distended abdomen.   chest X ray showed right pleural effusion and possible pericardial effusion.    PAST HISTORY  --------------------------------------------------------------------------------  PAST MEDICAL & SURGICAL HISTORY:  Chronic atrial fibrillation    FAMILY HISTORY: No ESRD    PAST SOCIAL HISTORY: No ETOH,    ALLERGIES & MEDICATIONS  --------------------------------------------------------------------------------  Allergies    Cortisone Acetate (Unknown)  Lyrica (Unknown)  quinidine (Unknown)    Standing Inpatient Medications  atorvastatin 40 milliGRAM(s) Oral at bedtime  chlorhexidine 2% Cloths 1 Application(s) Topical <User Schedule>  clopidogrel Tablet 75 milliGRAM(s) Oral daily  dextrose 5%. 1000 milliLiter(s) IV Continuous <Continuous>  dextrose 50% Injectable 12.5 Gram(s) IV Push once  dextrose 50% Injectable 25 Gram(s) IV Push once  dextrose 50% Injectable 25 Gram(s) IV Push once  DULoxetine 30 milliGRAM(s) Oral daily  furosemide   Injectable 20 milliGRAM(s) IV Push once  furosemide Infusion 10 mG/Hr IV Continuous <Continuous>  insulin lispro (HumaLOG) corrective regimen sliding scale   SubCutaneous three times a day before meals  iron sucrose IVPB 200 milliGRAM(s) IV Intermittent every 24 hours  metoprolol succinate ER 50 milliGRAM(s) Oral daily  pantoprazole    Tablet 40 milliGRAM(s) Oral daily  rOPINIRole 2 milliGRAM(s) Oral at bedtime  sodium chloride 0.9%. 1000 milliLiter(s) IV Continuous <Continuous>  tamsulosin 0.4 milliGRAM(s) Oral at bedtime    PRN Inpatient Medications  acetaminophen   Tablet .. 650 milliGRAM(s) Oral every 6 hours PRN  dextrose 40% Gel 15 Gram(s) Oral once PRN  diltiazem Injectable 10 milliGRAM(s) IV Push every 6 hours PRN  glucagon  Injectable 1 milliGRAM(s) IntraMuscular once PRN    REVIEW OF SYSTEMS  --------------------------------------------------------------------------------  Gen: + weight changes, fatigue, No  fevers/chills, weakness  Skin: No rashes  Head/Eyes/Ears/Mouth: No headache; Normal hearing; Normal vision w/o blurriness; No sinus pain/discomfort, sore throat  Respiratory: + dyspnea, cough, wheezing, No hemoptysis  CV: No chest pain, PND, + orthopnea  GI: No abdominal pain, diarrhea, constipation, nausea, vomiting, melena, hematochezia  : Anuric,  MSK: No joint pain/swelling; no back pain; no edema  Neuro: No dizziness/lightheadedness, weakness, seizures, + numbness, tingling  Heme: No easy bruising or bleeding  Endo: No heat/cold intolerance  Psych: + significant nervousness, anxiety, stress, depression    All other systems were reviewed and are negative, except as noted.    VITALS/PHYSICAL EXAM  --------------------------------------------------------------------------------  T(C): 36.1 (09-25-18 @ 09:58), Max: 36.5 (09-24-18 @ 15:39)  HR: 85 (09-25-18 @ 09:58) (72 - 93)  BP: 105/64 (09-25-18 @ 09:58) (88/56 - 111/53)  RR: 16 (09-25-18 @ 09:58) (16 - 17)  SpO2: 96% (09-25-18 @ 09:58) (92% - 98%)  Wt(kg): --    09-24-18 @ 07:01  -  09-25-18 @ 07:00  --------------------------------------------------------  IN: 2195 mL / OUT: 91607 mL / NET: -56390 mL      Physical Exam:  	Gen: In Acute Distress- Ill - appearing, Pale,  	HEENT: PERRL, supple neck, JVD,  	Pulm: Decreased d BS - R Base,  	CV: ST, S1S2; no rub  	Back: No spinal or CVA tenderness; + sacral edema  	Abd: +BS, soft, nontender/distended  	: No suprapubic tenderness  	UE: Warm, FROM, no clubbing, intact strength; no edema; no asterixis  	LE: Warm, FROM, no clubbing, intact strength; ++ edema  	Neuro: No focal deficits,   	Psych: Normal affect and mood  	Skin: Warm, without rashes  	Vascular access: AVF,    LABS/STUDIES  --------------------------------------------------------------------------------              7.6    8.5   >-----------<  86       [09-25-18 @ 05:55]              24.8     138  |  102  |  79.0  ----------------------------<  154      [09-25-18 @ 05:55]  4.3   |  22.0  |  2.99        Ca     7.8     [09-25-18 @ 05:55]      Mg     2.0     [09-24-18 @ 06:51]    TPro  5.6  /  Alb  2.9  /  TBili  3.5  /  DBili  x   /  AST  45  /  ALT  20  /  AlkPhos  93  [09-25-18 @ 05:55]    PT/INR: PT 60.8 , INR 5.34       [09-25-18 @ 05:55]  PTT: 46.2       [09-24-18 @ 06:51]      Creatinine Trend:  SCr 2.99 [09-25 @ 05:55]  SCr 2.47 [09-24 @ 06:51]  SCr 1.86 [09-23 @ 05:44]  SCr 1.24 [09-22 @ 04:24]  SCr 1.25 [09-21 @ 06:30]    Urinalysis - [09-17-18 @ 03:19]      Color Yellow / Appearance Slightly Turbid / SG 1.020 / pH 5.0      Gluc Negative / Ketone Negative  / Bili Negative / Urobili Negative       Blood Negative / Protein 30 / Leuk Est Negative / Nitrite Negative      RBC 0-2 / WBC 3-5 / Hyaline  / Gran  / Sq Epi  / Non Sq Epi Many / Bacteria Occasional      Iron 22, TIBC 286, %sat 8      [09-22-18 @ 04:24]  Ferritin 100      [09-22-18 @ 04:24]  HbA1c 6.6      [09-18-18 @ 07:30]  TSH 4.37      [09-17-18 @ 08:56]      DX : ESRD - HD    F.O    Anemia - Fe deficiency, Pericardial Effusion ?    P : Emergent HD - UF,    IV Fe , CARLOS,     TTE

## 2018-09-25 NOTE — H&P ADULT - ASSESSMENT
She is 63 y/o female with PMHx of ESRD on HD T/T/S last HD on Saturday, DM, HTN, HLD, Hypothyroidism, presented to ED with c/o SOB for 1 day, was hypoxic to 89% per EMS, In ED noted to have bibasilar rales with some respiratory distress, initially was placed on BIPAP and currently on NC, also noted to have hyperkalemia s/p albuterol and regular insulin in ED.  Pt admitted for fluid overload in setting of ESRD    Fluid overload in setting of HD:  - Likely from increased fluid intake as per daughter.  - S/p BIPAP for brief period of time in ED, currently on NC O2, NAD.   - Hyperkalemia s/p regular insulin/ albuterol in ED, Kayexalate to be given.   - Renal consulted for HD (Dr Choi service called)     Leukocytosis:  - Unclear etiology, no signs of infection.  - Check UA, CXR.  - Monitor WBC    >H/o HTN- BP elevated on arrival, currently improved. Resume home meds Nifedipine hydralazine, clonidine, Hold Coreg given bradycardia.     >H/o DM- Keep on LSS +FS monitoring for now.   >H/o HLD- Resume ZOcor  >H/o Hypothyroidism Resume home levothyroxine.   >DVT ppx- Heparin.     PS- Pt / daughter not sure about her home meds- meds reviewed online from pharmacy- daughter to bring meds list in AM- Please verify and adjust.

## 2018-09-25 NOTE — ED PROVIDER NOTE - PMH
Diabetes mellitus    Hemodialysis patient  tues thursday saturday  Hypertension    Hypothyroidism, unspecified type    Renal failure

## 2018-09-25 NOTE — ED PROVIDER NOTE - CARDIAC, MLM
Normal rate, regular rhythm.  Heart sounds S1, S2.  No murmurs, rubs or gallops. 2+ pitting edema to TIBURCIO lower extremities

## 2018-09-25 NOTE — PROGRESS NOTE ADULT - ASSESSMENT
Assessment and Plan:    She is 63 y/o female with PMHx of ESRD on HD T/T/S last HD on Saturday, DM, HTN, HLD, Hypothyroidism, presented to ED with c/o SOB for 1 day, was hypoxic to 89% per EMS, In ED noted to have bibasilar rales with some respiratory distress, initially was placed on BIPAP and currently on NC, also noted to have hyperkalemia s/p albuterol and regular insulin in ED.  Pt admitted for fluid overload in setting of ESRD. Needs emergent HD as K was not corrected with med;s and TTE and troponin    Hypertensive emergency with fluid overload esrd:  - Likely from increased fluid intake as per daughter.  - S/p BIPAP for brief period of time in ED, then NC O2 and currently not on oxygen  - Hyperkalemia s/p regular insulin/ albuterol in ED, Kayexalate to be given.   Went for HD in am. f/u BMP after wards for K  TTE to assess for EF  ekg, troponin *2  fluid restriction, daily weight    Leukocytosis:  - Unclear etiology, no signs of infection, could be secondary to stress  -  CXR no   UA is she is able to pee. as per daughter she make small amount of urine since being on HD  - Monitor WBC, and fever for now    >H/o HTN- BP elevated on arrival, currently improved. Resume home meds Nifedipine hydralazine, clonidine, Hold Coreg given bradycardia.     bradycardia  could be due to hyperkalemia vs coreg as home med's  ekg  hold coreg for now  went for HD in am    >H/o DM- Keep on LSS +FS monitoring for now.     >H/o HLD- Resume ZOcor    >H/o Hypothyroidism Resume home levothyroxine.     >DVT ppx- Heparin.     Dispo: pending TTE, troponin and K to be corrected.

## 2018-09-25 NOTE — H&P ADULT - HISTORY OF PRESENT ILLNESS
Pt poor historian, unable to provide meaningful history, history obtained from pt's daughter at bedside and EMR. Briefly she is 63 y/o female with PMHx of ESRD on HD T/T/S last HD on Saturday, DM, HTN, Hypothyroidism, presented to ED with c/o SOB for 1 day. Per daughter Pt has last HD done on last Saturday and since then Pt having more liquid intake and she started having difficulty breathing and feeling congested and EMS was called. Per EMS reports Pt was hypoxic to 89% and is not home O2 dependent. Pt was due to HD today at 5AM but she decided to come to ED for SOB. Pt denied any other complaints, no fever, chills, nausea, vomiting, headache, dizziness, chest pain.

## 2018-09-25 NOTE — ED ADULT NURSE NOTE - CHIEF COMPLAINT QUOTE
"I feel like I can't breathe" "I didn't make it to dialysis" c/o SOB starting last night, +Chest pressure +anxiety. HD schedule TuThurSat. Left AVF noted, +bruit +thrill. Arrives on 3L/min via NC by EMS, saO2 on RA 89%. Skin warm and dry, Able to speak to RN. Pt with labored breathing noted. RR 26. MD Beltran called to bedside

## 2018-09-25 NOTE — H&P ADULT - NSHPLABSRESULTS_GEN_ALL_CORE
LABS:                        12.2   15.9  )-----------( 324      ( 25 Sep 2018 00:56 )             40.7     09-25    140  |  97<L>  |  48.0<H>  ----------------------------<  211<H>  6.2<HH>   |  21.0<L>  |  7.72<H>    Ca    11.0<H>      25 Sep 2018 00:56  Phos  4.1     09-25  Mg     3.1     09-25    TPro  8.1  /  Alb  4.5  /  TBili  0.6  /  DBili  x   /  AST  16  /  ALT  11  /  AlkPhos  138<H>  09-25    PT/INR - ( 25 Sep 2018 00:56 )   PT: 11.2 sec;   INR: 1.02 ratio         PTT - ( 25 Sep 2018 00:56 )  PTT:35.3 sec    LIVER FUNCTIONS - ( 25 Sep 2018 00:56 )  Alb: 4.5 g/dL / Pro: 8.1 g/dL / ALK PHOS: 138 U/L / ALT: 11 U/L / AST: 16 U/L / GGT: x               CARDIAC MARKERS ( 25 Sep 2018 00:56 )  x     / 0.04 ng/mL / x     / x     / x

## 2018-09-25 NOTE — ED ADULT NURSE REASSESSMENT NOTE - GASTROINTESTINAL WDL
Abdomen soft, nontender, nondistended, bowel sounds present in all 4 quadrants. pt has clean dressing to RLQ in abdomen.

## 2018-09-25 NOTE — ED ADULT TRIAGE NOTE - CHIEF COMPLAINT QUOTE
"I feel like I can't breathe" "I didn't make it to dialysis" c/o SOB starting last night, +Chest pressure +anxiety. HD schedule TuThurSat. Left AVF noted, +bruit +thrill. Arrives on 3L/min via NC by EMS, saO2 on RA 89%. Skin warm and dry, Able to speak to RN. Pt with labored breathing noted. RR 26. MD eBltran called to bedside

## 2018-09-26 ENCOUNTER — TRANSCRIPTION ENCOUNTER (OUTPATIENT)
Age: 63
End: 2018-09-26

## 2018-09-26 DIAGNOSIS — Z71.89 OTHER SPECIFIED COUNSELING: ICD-10-CM

## 2018-09-26 LAB
ANION GAP SERPL CALC-SCNC: 14 MMOL/L — SIGNIFICANT CHANGE UP (ref 5–17)
APTT BLD: 118.7 SEC — HIGH (ref 27.5–37.4)
APTT BLD: 79.2 SEC — HIGH (ref 27.5–37.4)
BUN SERPL-MCNC: 28 MG/DL — HIGH (ref 8–20)
CALCIUM SERPL-MCNC: 10 MG/DL — SIGNIFICANT CHANGE UP (ref 8.6–10.2)
CHLORIDE SERPL-SCNC: 91 MMOL/L — LOW (ref 98–107)
CO2 SERPL-SCNC: 27 MMOL/L — SIGNIFICANT CHANGE UP (ref 22–29)
CREAT SERPL-MCNC: 5.53 MG/DL — HIGH (ref 0.5–1.3)
GLUCOSE BLDC GLUCOMTR-MCNC: 120 MG/DL — HIGH (ref 70–99)
GLUCOSE BLDC GLUCOMTR-MCNC: 156 MG/DL — HIGH (ref 70–99)
GLUCOSE BLDC GLUCOMTR-MCNC: 170 MG/DL — HIGH (ref 70–99)
GLUCOSE BLDC GLUCOMTR-MCNC: 178 MG/DL — HIGH (ref 70–99)
GLUCOSE BLDC GLUCOMTR-MCNC: 221 MG/DL — HIGH (ref 70–99)
GLUCOSE SERPL-MCNC: 156 MG/DL — HIGH (ref 70–115)
HBA1C BLD-MCNC: 6.4 % — HIGH (ref 4–5.6)
HCT VFR BLD CALC: 35.1 % — LOW (ref 37–47)
HGB BLD-MCNC: 10.4 G/DL — LOW (ref 12–16)
INR BLD: 1.14 RATIO — SIGNIFICANT CHANGE UP (ref 0.88–1.16)
INR BLD: 1.17 RATIO — HIGH (ref 0.88–1.16)
MAGNESIUM SERPL-MCNC: 2.5 MG/DL — SIGNIFICANT CHANGE UP (ref 1.6–2.6)
MCHC RBC-ENTMCNC: 28.1 PG — SIGNIFICANT CHANGE UP (ref 27–31)
MCHC RBC-ENTMCNC: 29.6 G/DL — LOW (ref 32–36)
MCV RBC AUTO: 94.9 FL — SIGNIFICANT CHANGE UP (ref 81–99)
PHOSPHATE SERPL-MCNC: 5.1 MG/DL — HIGH (ref 2.4–4.7)
PLATELET # BLD AUTO: 255 K/UL — SIGNIFICANT CHANGE UP (ref 150–400)
POTASSIUM SERPL-MCNC: 5 MMOL/L — SIGNIFICANT CHANGE UP (ref 3.5–5.3)
POTASSIUM SERPL-SCNC: 5 MMOL/L — SIGNIFICANT CHANGE UP (ref 3.5–5.3)
PROTHROM AB SERPL-ACNC: 12.6 SEC — SIGNIFICANT CHANGE UP (ref 9.8–12.7)
PROTHROM AB SERPL-ACNC: 12.9 SEC — HIGH (ref 9.8–12.7)
RBC # BLD: 3.7 M/UL — LOW (ref 4.4–5.2)
RBC # FLD: 15.8 % — HIGH (ref 11–15.6)
SODIUM SERPL-SCNC: 132 MMOL/L — LOW (ref 135–145)
TROPONIN T SERPL-MCNC: 0.11 NG/ML — HIGH (ref 0–0.06)
TROPONIN T SERPL-MCNC: 0.11 NG/ML — HIGH (ref 0–0.06)
TSH SERPL-MCNC: 2.31 UIU/ML — SIGNIFICANT CHANGE UP (ref 0.27–4.2)
WBC # BLD: 6.2 K/UL — SIGNIFICANT CHANGE UP (ref 4.8–10.8)
WBC # FLD AUTO: 6.2 K/UL — SIGNIFICANT CHANGE UP (ref 4.8–10.8)

## 2018-09-26 PROCEDURE — 99232 SBSQ HOSP IP/OBS MODERATE 35: CPT | Mod: GC

## 2018-09-26 PROCEDURE — 93010 ELECTROCARDIOGRAM REPORT: CPT

## 2018-09-26 PROCEDURE — 99233 SBSQ HOSP IP/OBS HIGH 50: CPT

## 2018-09-26 PROCEDURE — 12345: CPT | Mod: NC

## 2018-09-26 RX ORDER — METOPROLOL TARTRATE 50 MG
25 TABLET ORAL
Qty: 0 | Refills: 0 | Status: DISCONTINUED | OUTPATIENT
Start: 2018-09-26 | End: 2018-09-27

## 2018-09-26 RX ORDER — INFLUENZA VIRUS VACCINE 15; 15; 15; 15 UG/.5ML; UG/.5ML; UG/.5ML; UG/.5ML
0.5 SUSPENSION INTRAMUSCULAR ONCE
Qty: 0 | Refills: 0 | Status: COMPLETED | OUTPATIENT
Start: 2018-09-26 | End: 2018-09-26

## 2018-09-26 RX ORDER — APIXABAN 2.5 MG/1
2.5 TABLET, FILM COATED ORAL EVERY 12 HOURS
Qty: 0 | Refills: 0 | Status: DISCONTINUED | OUTPATIENT
Start: 2018-09-26 | End: 2018-09-27

## 2018-09-26 RX ORDER — APIXABAN 2.5 MG/1
2.5 TABLET, FILM COATED ORAL EVERY 12 HOURS
Qty: 0 | Refills: 0 | Status: DISCONTINUED | OUTPATIENT
Start: 2018-09-26 | End: 2018-09-26

## 2018-09-26 RX ORDER — WARFARIN SODIUM 2.5 MG/1
8 TABLET ORAL ONCE
Qty: 0 | Refills: 0 | Status: DISCONTINUED | OUTPATIENT
Start: 2018-09-26 | End: 2018-09-26

## 2018-09-26 RX ORDER — APIXABAN 2.5 MG/1
2.5 TABLET, FILM COATED ORAL
Qty: 30 | Refills: 0 | OUTPATIENT
Start: 2018-09-26

## 2018-09-26 RX ORDER — METOPROLOL TARTRATE 50 MG
25 TABLET ORAL
Qty: 0 | Refills: 0 | Status: DISCONTINUED | OUTPATIENT
Start: 2018-09-26 | End: 2018-09-26

## 2018-09-26 RX ADMIN — Medication 3 MILLILITER(S): at 14:48

## 2018-09-26 RX ADMIN — Medication 0.1 MILLIGRAM(S): at 21:13

## 2018-09-26 RX ADMIN — APIXABAN 2.5 MILLIGRAM(S): 2.5 TABLET, FILM COATED ORAL at 18:26

## 2018-09-26 RX ADMIN — Medication 667 MILLIGRAM(S): at 21:13

## 2018-09-26 RX ADMIN — SIMVASTATIN 20 MILLIGRAM(S): 20 TABLET, FILM COATED ORAL at 21:13

## 2018-09-26 RX ADMIN — Medication 3 MILLILITER(S): at 20:35

## 2018-09-26 RX ADMIN — Medication 2: at 08:33

## 2018-09-26 RX ADMIN — Medication 81 MILLIGRAM(S): at 12:25

## 2018-09-26 RX ADMIN — Medication 100 MILLIGRAM(S): at 06:59

## 2018-09-26 RX ADMIN — Medication 3 MILLILITER(S): at 09:53

## 2018-09-26 RX ADMIN — HEPARIN SODIUM 1500 UNIT(S)/HR: 5000 INJECTION INTRAVENOUS; SUBCUTANEOUS at 11:32

## 2018-09-26 RX ADMIN — Medication 100 MILLIGRAM(S): at 21:13

## 2018-09-26 RX ADMIN — HEPARIN SODIUM 1700 UNIT(S)/HR: 5000 INJECTION INTRAVENOUS; SUBCUTANEOUS at 04:39

## 2018-09-26 RX ADMIN — Medication 0.1 MILLIGRAM(S): at 06:59

## 2018-09-26 RX ADMIN — Medication 2: at 12:26

## 2018-09-26 RX ADMIN — Medication 667 MILLIGRAM(S): at 18:31

## 2018-09-26 RX ADMIN — Medication 25 MILLIGRAM(S): at 18:41

## 2018-09-26 RX ADMIN — Medication 667 MILLIGRAM(S): at 08:34

## 2018-09-26 RX ADMIN — Medication 5 MILLIGRAM(S): at 18:37

## 2018-09-26 RX ADMIN — Medication 100 MILLIGRAM(S): at 14:25

## 2018-09-26 RX ADMIN — Medication 667 MILLIGRAM(S): at 12:25

## 2018-09-26 RX ADMIN — INFLUENZA VIRUS VACCINE 0.5 MILLILITER(S): 15; 15; 15; 15 SUSPENSION INTRAMUSCULAR at 21:14

## 2018-09-26 RX ADMIN — Medication 75 MICROGRAM(S): at 07:48

## 2018-09-26 RX ADMIN — PANTOPRAZOLE SODIUM 40 MILLIGRAM(S): 20 TABLET, DELAYED RELEASE ORAL at 06:59

## 2018-09-26 RX ADMIN — Medication 0.1 MILLIGRAM(S): at 14:25

## 2018-09-26 RX ADMIN — Medication 5 MILLIGRAM(S): at 07:47

## 2018-09-26 NOTE — CHART NOTE - NSCHARTNOTEFT_GEN_A_CORE
Patient had a 3.49 second pause on tele. Discussed with RN.   Will hold Cardizem. Rapid response note read from earlier. Patient converted to NSR.   Continue heparin.   Patient asymptomatic. Draw electrolytes and AM labs.   Trop from approx 10:09 normal.     Consults: [facility]

## 2018-09-26 NOTE — PROGRESS NOTE ADULT - SUBJECTIVE AND OBJECTIVE BOX
KIM LEE    [id number]    62y      Female    CC: sob    INTERVAL HPI/OVERNIGHT EVENTS:  yesterday after HD she had RRT as she went to Afib with rvr with hr 116, c/o chest pain. As per the daughter it happens when too much fluid taken out during HD, was given cardizem 30 mg q 6 h and heparin drip. today early am she went to sinus bradycardia with sinus pauses of 3.49 sec. Cardizem was held, troponin went up to 0.11. cardiolgoy consulted.  In am she denied active chest pain, sob, was on oxygen nasal cannula sat 100 %.    HPI:  Pt poor historian, unable to provide meaningful history, history obtained from pt's daughter at bedside and EMR. Briefly she is 61 y/o female with PMHx of ESRD on HD T/T/S last HD on Saturday, DM, HTN, Hypothyroidism, presented to ED with c/o SOB for 1 day. Per daughter Pt has last HD done on last Saturday and since then Pt having more liquid intake and she started having difficulty breathing and feeling congested and EMS was called. Per EMS reports Pt was hypoxic to 89% and is not home O2 dependent. Pt was due to HD today at 5AM but she decided to come to ED for SOB. she had chest pain on admission but no pain currently.  Pt denied any other complaints, no fever, chills, nausea, vomiting, headache, dizziness. (9/25)    REVIEW OF SYSTEMS:    CONSTITUTIONAL: No fever, weight loss, or fatigue  RESPIRATORY: No cough, wheezing, hemoptysis; No shortness of breath  CARDIOVASCULAR: No chest pain, palpitations  GASTROINTESTINAL: No abdominal or epigastric pain. No nausea, vomiting  NEUROLOGICAL: No headaches, memory loss, loss of strength.  MISCELLANEOUS:        Vital Signs Last 24 Hrs  T(C): 36.9 (26 Sep 2018 08:17), Max: 37.4 (25 Sep 2018 16:04)  T(F): 98.5 (26 Sep 2018 08:17), Max: 99.4 (25 Sep 2018 20:27)  HR: 68 (26 Sep 2018 09:53) (68 - 769)  BP: 168/59 (26 Sep 2018 08:17) (119/60 - 168/59)  BP(mean): 95 (26 Sep 2018 04:37) (95 - 97)  RR: 17 (26 Sep 2018 08:17) (17 - 27)  SpO2: 97% (26 Sep 2018 09:53) (94% - 100%)    PHYSICAL EXAM:    GENERAL: NAD, well-groomed, left AV fistula  HEENT: PERRL, +EOMI  NECK: soft, Supple, No JVD,   CHEST/LUNG: Clear to auscultation bilaterally; No wheezing  HEART: S1S2+, bradycardiac  ABDOMEN: Soft, Nontender, Nondistended; Bowel sounds present  EXTREMITIES:  2+ Peripheral Pulses, No clubbing, cyanosis, or edema  SKIN: No rashes or lesions  NEURO: AAOX3, no focal deficits, no motor or  sensory loss        LABS:                        10.4   6.2   )-----------( 255      ( 26 Sep 2018 04:20 )             35.1     09-26    132<L>  |  91<L>  |  28.0<H>  ----------------------------<  156<H>  5.0   |  27.0  |  5.53<H>    Ca    10.0      26 Sep 2018 03:46  Phos  5.1     09-26  Mg     2.5     09-26    TPro  7.9  /  Alb  4.3  /  TBili  0.7  /  DBili  x   /  AST  16  /  ALT  10  /  AlkPhos  110  09-25    PT/INR - ( 26 Sep 2018 04:20 )   PT: 12.9 sec;   INR: 1.17 ratio         PTT - ( 26 Sep 2018 04:20 )  PTT:79.2 sec        MEDICATIONS  (STANDING):  ALBUTerol/ipratropium for Nebulization 3 milliLiter(s) Nebulizer every 6 hours  aspirin enteric coated 81 milliGRAM(s) Oral daily  calcium acetate 667 milliGRAM(s) Oral four times a day with meals  cloNIDine 0.1 milliGRAM(s) Oral three times a day  dextrose 5%. 1000 milliLiter(s) (50 mL/Hr) IV Continuous <Continuous>  dextrose 50% Injectable 12.5 Gram(s) IV Push once  dextrose 50% Injectable 25 Gram(s) IV Push once  dextrose 50% Injectable 25 Gram(s) IV Push once  heparin  Infusion.  Unit(s)/Hr (17 mL/Hr) IV Continuous <Continuous>  hydrALAZINE 100 milliGRAM(s) Oral every 8 hours  influenza   Vaccine 0.5 milliLiter(s) IntraMuscular once  insulin lispro (HumaLOG) corrective regimen sliding scale   SubCutaneous three times a day before meals  levothyroxine 75 MICROGram(s) Oral daily  metoclopramide 5 milliGRAM(s) Oral two times a day  metoprolol tartrate 25 milliGRAM(s) Oral two times a day  pantoprazole    Tablet 40 milliGRAM(s) Oral before breakfast  simvastatin 20 milliGRAM(s) Oral at bedtime    MEDICATIONS  (PRN):  ALPRAZolam 0.25 milliGRAM(s) Oral three times a day PRN Anxiety  dextrose 40% Gel 15 Gram(s) Oral once PRN Blood Glucose LESS THAN 70 milliGRAM(s)/deciliter  glucagon  Injectable 1 milliGRAM(s) IntraMuscular once PRN Glucose LESS THAN 70 milligrams/deciliter      RADIOLOGY & ADDITIONAL TESTS:

## 2018-09-26 NOTE — CONSULT NOTE ADULT - SUBJECTIVE AND OBJECTIVE BOX
62F  with SOB, cough, non-productive, feels better now.  Denies CP, palpitation, syncope.  Hx of DM, HTN, HL, RF on HD.  Noted to have hypoxia in ER requiring BIPAP.  Now feels well.    PREVIOUS DIAGNOSTIC TESTING:      ECHO  FINDINGS: Nl EF diastolic dysf. LVH    STRESS  FINDINGS: neg stress test 6/17      MEDICATIONS  (STANDING):  ALBUTerol/ipratropium for Nebulization 3 milliLiter(s) Nebulizer every 6 hours  aspirin enteric coated 81 milliGRAM(s) Oral daily  calcium acetate 667 milliGRAM(s) Oral four times a day with meals  cloNIDine 0.1 milliGRAM(s) Oral three times a day  dextrose 5%. 1000 milliLiter(s) (50 mL/Hr) IV Continuous <Continuous>  dextrose 50% Injectable 12.5 Gram(s) IV Push once  dextrose 50% Injectable 25 Gram(s) IV Push once  dextrose 50% Injectable 25 Gram(s) IV Push once  heparin  Infusion.  Unit(s)/Hr (17 mL/Hr) IV Continuous <Continuous>  hydrALAZINE 100 milliGRAM(s) Oral every 8 hours  influenza   Vaccine 0.5 milliLiter(s) IntraMuscular once  insulin lispro (HumaLOG) corrective regimen sliding scale   SubCutaneous three times a day before meals  levothyroxine 75 MICROGram(s) Oral daily  metoclopramide 5 milliGRAM(s) Oral two times a day  metoprolol tartrate 25 milliGRAM(s) Oral two times a day  pantoprazole    Tablet 40 milliGRAM(s) Oral before breakfast  simvastatin 20 milliGRAM(s) Oral at bedtime    MEDICATIONS  (PRN):  ALPRAZolam 0.25 milliGRAM(s) Oral three times a day PRN Anxiety  dextrose 40% Gel 15 Gram(s) Oral once PRN Blood Glucose LESS THAN 70 milliGRAM(s)/deciliter  glucagon  Injectable 1 milliGRAM(s) IntraMuscular once PRN Glucose LESS THAN 70 milligrams/deciliter      FAMILY HISTORY:  Family history of diabetes mellitus (Father)        REVIEW OF SYSTEMS:  CONSTITUTIONAL: No fever, weight loss, or fatigue  EYES: No eye pain, visual disturbances, or discharge  ENMT:  No difficulty hearing, tinnitus, vertigo; No sinus or throat pain  NECK: No pain or stiffness  RESPIRATORY: No cough, wheezing, chills or hemoptysis;  CARDIOVASCULAR: No chest pain, passing out, dizziness, or leg swelling  GASTROINTESTINAL: No abdominal or epigastric pain. No nausea, vomiting, or hematemesis; No diarrhea or constipation. No melena or hematochezia.  GENITOURINARY: No dysuria, frequency, hematuria, or incontinence  NEUROLOGICAL: No headaches, memory loss, loss of strength, numbness, or tremors  SKIN: No itching, burning, rashes, or lesions   MUSCULOSKELETAL: No joint pain or swelling; No muscle, back, or extremity pain    Vital Signs Last 24 Hrs  T(C): 36.9 (26 Sep 2018 08:17), Max: 37.4 (25 Sep 2018 16:04)  T(F): 98.5 (26 Sep 2018 08:17), Max: 99.4 (25 Sep 2018 20:27)  HR: 769 (26 Sep 2018 08:17) (68 - 769)  BP: 168/59 (26 Sep 2018 08:17) (119/60 - 177/71)  BP(mean): 95 (26 Sep 2018 04:37) (95 - 97)  RR: 17 (26 Sep 2018 08:17) (17 - 28)  SpO2: 96% (26 Sep 2018 08:17) (94% - 100%)      I&O's Detail    25 Sep 2018 07:01  -  26 Sep 2018 07:00  --------------------------------------------------------  IN:    heparin  Infusion.: 119 mL    Oral Fluid: 100 mL  Total IN: 219 mL    OUT:    Other: 3000 mL  Total OUT: 3000 mL    Total NET: -2781 mL          PHYSICAL EXAM:  Appearance: Normal, well nourished		  Cardiovascular: Normal S1 S2, No JVD, No cardiac murmurs, No carotid bruits, No peripheral edema  Respiratory: Lungs clear to auscultation	  Gastrointestinal:  Soft, Non-tender, + BS, no bruits	  Skin: No rashes, No ecchymoses, No cyanosis  Neurologic: Grossly non-focal with full strength in all four extremities  Extremities: Normal range of motion, No clubbing, cyanosis or edema  Vascular: Peripheral pulses palpable 2+ bilaterally      INTERPRETATION OF TELEMETRY: SR    ECG: initial ECG AF  Subsequently SR no acute st/t abn    LABS:                        10.4   6.2   )-----------( 255      ( 26 Sep 2018 04:20 )             35.1     09-26    132<L>  |  91<L>  |  28.0<H>  ----------------------------<  156<H>  5.0   |  27.0  |  5.53<H>    Ca    10.0      26 Sep 2018 03:46  Phos  5.1     09-26  Mg     2.5     09-26    TPro  7.9  /  Alb  4.3  /  TBili  0.7  /  DBili  x   /  AST  16  /  ALT  10  /  AlkPhos  110  09-25    CARDIAC MARKERS ( 26 Sep 2018 05:24 )  x     / 0.11 ng/mL / x     / x     / x      CARDIAC MARKERS ( 25 Sep 2018 22:09 )  x     / 0.05 ng/mL / 34 U/L / x     / x      CARDIAC MARKERS ( 25 Sep 2018 00:56 )  x     / 0.04 ng/mL / x     / x     / x          PT/INR - ( 26 Sep 2018 04:20 )   PT: 12.9 sec;   INR: 1.17 ratio         PTT - ( 26 Sep 2018 04:20 )  PTT:79.2 sec    CXR cardiomegaly, pulmonary congestion    25 Sep 2018 07:01  -  26 Sep 2018 07:00  --------------------------------------------------------  IN: 219 mL / OUT: 3000 mL / NET: -2781 mL        RADIOLOGY & ADDITIONAL STUDIES;

## 2018-09-26 NOTE — PROGRESS NOTE ADULT - SUBJECTIVE AND OBJECTIVE BOX
Health system DIVISION OF KIDNEY DISEASES AND HYPERTENSION -- FOLLOW UP NOTE  --------------------------------------------------------------------------------  Chief Complaint:  ESRD on HD    24 hour events/subjective:  Pt seen and examined  NAD  HD yesterday        PAST HISTORY  --------------------------------------------------------------------------------  No significant changes to PMH, PSH, FHx, SHx, unless otherwise noted    ALLERGIES & MEDICATIONS  --------------------------------------------------------------------------------  Allergies    No Known Allergies    Intolerances      Standing Inpatient Medications  ALBUTerol/ipratropium for Nebulization 3 milliLiter(s) Nebulizer every 6 hours  aspirin enteric coated 81 milliGRAM(s) Oral daily  calcium acetate 667 milliGRAM(s) Oral four times a day with meals  cloNIDine 0.1 milliGRAM(s) Oral three times a day  dextrose 5%. 1000 milliLiter(s) IV Continuous <Continuous>  dextrose 50% Injectable 12.5 Gram(s) IV Push once  dextrose 50% Injectable 25 Gram(s) IV Push once  dextrose 50% Injectable 25 Gram(s) IV Push once  heparin  Infusion.  Unit(s)/Hr IV Continuous <Continuous>  hydrALAZINE 100 milliGRAM(s) Oral every 8 hours  influenza   Vaccine 0.5 milliLiter(s) IntraMuscular once  insulin lispro (HumaLOG) corrective regimen sliding scale   SubCutaneous three times a day before meals  levothyroxine 75 MICROGram(s) Oral daily  metoclopramide 5 milliGRAM(s) Oral two times a day  metoprolol tartrate 25 milliGRAM(s) Oral two times a day  pantoprazole    Tablet 40 milliGRAM(s) Oral before breakfast  simvastatin 20 milliGRAM(s) Oral at bedtime    PRN Inpatient Medications  ALPRAZolam 0.25 milliGRAM(s) Oral three times a day PRN  dextrose 40% Gel 15 Gram(s) Oral once PRN  glucagon  Injectable 1 milliGRAM(s) IntraMuscular once PRN      REVIEW OF SYSTEMS  --------------------------------------------------------------------------------  Gen: + weight changes, fatigue, No  fevers/chills, weakness  Skin: No rashes  Head/Eyes/Ears/Mouth: No headache; Normal hearing; Normal vision w/o blurriness; No sinus pain/discomfort, sore throat  Respiratory: + dyspnea, cough, wheezing, No hemoptysis  CV: No chest pain, PND, + orthopnea  GI: No abdominal pain, diarrhea, constipation, nausea, vomiting, melena, hematochezia  : Anuric,  MSK: No joint pain/swelling; no back pain; no edema  Neuro: No dizziness/lightheadedness, weakness, seizures, + numbness, tingling  Heme: No easy bruising or bleeding  Endo: No heat/cold intolerance  Psych: + significant nervousness, anxiety, stress, depression    All other systems were reviewed and are negative, except as noted.        VITALS/PHYSICAL EXAM  --------------------------------------------------------------------------------  T(C): 36.9 (09-26-18 @ 08:17), Max: 37.4 (09-25-18 @ 16:04)  HR: 68 (09-26-18 @ 09:53) (68 - 769)  BP: 168/59 (09-26-18 @ 08:17) (119/60 - 168/59)  RR: 17 (09-26-18 @ 08:17) (17 - 27)  SpO2: 97% (09-26-18 @ 09:53) (94% - 100%)  Wt(kg): --  Height (cm): 167.64 (09-25-18 @ 00:20)  Weight (kg): 90.7 (09-25-18 @ 00:20)  BMI (kg/m2): 32.3 (09-25-18 @ 00:20)  BSA (m2): 2 (09-25-18 @ 00:20)      09-25-18 @ 07:01  -  09-26-18 @ 07:00  --------------------------------------------------------  IN: 219 mL / OUT: 3000 mL / NET: -2781 mL      Physical Exam:  	Gen: In Acute Distress- Ill - appearing, Pale  	HEENT: PERRL, supple neck, JVD  	Pulm: Decreased d BS - R Base  	CV: ST, S1S2; no rub  	Back: No spinal or CVA tenderness; + sacral edema  	Abd: +BS, soft, nontender/distended  	: No suprapubic tenderness  	UE: Warm, FROM, no clubbing, intact strength; no edema; no asterixis  	LE: Warm, FROM, no clubbing, intact strength; ++ edema  	Neuro: No focal deficits   	Psych: Normal affect and mood  	Skin: Warm, without rashes  	Vascular access: AVF ++        LABS/STUDIES  --------------------------------------------------------------------------------              10.4   6.2   >-----------<  255      [09-26-18 @ 04:20]              35.1     132  |  91  |  28.0  ----------------------------<  156      [09-26-18 @ 03:46]  5.0   |  27.0  |  5.53        Ca     10.0     [09-26-18 @ 03:46]      Mg     2.5     [09-26-18 @ 03:46]      Phos  5.1     [09-26-18 @ 03:46]    TPro  7.9  /  Alb  4.3  /  TBili  0.7  /  DBili  x   /  AST  16  /  ALT  10  /  AlkPhos  110  [09-25-18 @ 18:27]    PT/INR: PT 12.6 , INR 1.14       [09-26-18 @ 10:16]  PTT: 118.7      [09-26-18 @ 10:16]    Troponin 0.11      [09-26-18 @ 05:24]  CK 34      [09-25-18 @ 22:09]    Creatinine Trend:  SCr 5.53 [09-26 @ 03:46]  SCr 4.70 [09-25 @ 18:27]  SCr 8.13 [09-25 @ 10:06]  SCr 7.72 [09-25 @ 00:56]        HbA1c 6.4      [09-26-18 @ 05:24]  TSH 1.53      [09-25-18 @ 18:27]

## 2018-09-26 NOTE — PROGRESS NOTE ADULT - ASSESSMENT
1. ESRD ON HD  2. HTN  3. DM  4. ANEMIA OF  CHRONIC RENAL DISEASE  5. MBD    HD tomorrow  Continue clonidine, lopressor  On phosphorus binders  FS with SSI  will continue to follow

## 2018-09-26 NOTE — CHART NOTE - NSCHARTNOTEFT_GEN_A_CORE
Notified by RN of patients troponin increase to 0.11.     Patient evaluated at approximately 6:20 AM. Patient has no complaints. SOB that was present on admission has resolved. Denies nausea, vomiting, chest pain, palpitations, SOB.     T(C): 37.2 (09-26-18 @ 00:00), Max: 37.4 (09-25-18 @ 16:04)  HR: 68 (09-26-18 @ 04:37) (58 - 153)  BP: 158/67 (09-26-18 @ 04:37) (119/60 - 177/71)  RR: 24 (09-26-18 @ 04:37) (17 - 28)  SpO2: 100% (09-26-18 @ 04:37) (94% - 100%)  Wt(kg): --    Physical Exam:   GENERAL: well-groomed, well-developed, NAD  HEENT: head NC/AT; EOM intact, onjunctiva & sclera clear; hearing grossly intact, moist mucous membranes  NECK:  no JVD  RESPIRATORY: CTA B/L, no wheezing, rales, rhonchi or rubs  CARDIOVASCULAR: S1&S2, RRR, no murmurs or gallops  ABDOMEN: soft, non-tender, non-distended,  no guarding, rebound or rigidity  SKIN: warm and dry, color normal  Psych: Normal mood and affect, normal behavior    Plan:   Patient asymptomatic. EKG NSR without acute ST-T wave changes. No peaked T waves. Previous EKG reviewed and was A fib.   Cardiology was consulted earlier in the day.   Patient may benefit from an ischemic work up given her recent a fib and sinus pause on tele, Patient already on heparin gtt for A fib full anti-coag dosing.   F/u cardiology rec's.   Will order one more troponin.

## 2018-09-26 NOTE — DISCHARGE NOTE ADULT - NS AS DC FOLLOWUP STROKE INST
Influenza vaccination (VIS Pub Date: August 7, 2015) received flu vaccination this admission/Smoking Cessation/Influenza vaccination (VIS Pub Date: August 7, 2015)

## 2018-09-26 NOTE — DISCHARGE NOTE ADULT - CARE PLAN
Principal Discharge DX:	Hyperkalemia  Goal:	improved  Assessment and plan of treatment:	f/u with nephrology for HD  Secondary Diagnosis:	PAF (paroxysmal atrial fibrillation)  Goal:	improved hr  Assessment and plan of treatment:	she was found to have PAF in hospital after she had HD.  cardiology consulted and started on metoprolol 25 mgm bid, coreg discontinued  was initially on heparin drip, discuss with family regaridng option of warfarin or eliques, given high chadvasc score, family decided anusha  cardiology recommended outpatient ischemic work up  she will f/u with pcp and cardiology as an outpatient  Secondary Diagnosis:	Hemodialysis patient  Assessment and plan of treatment:	avoid excessive fluid intake, discussed with patient regarding complaince with fluid and medications  Secondary Diagnosis:	Hypothyroidism, unspecified type  Secondary Diagnosis:	Hypertension, unspecified type  Assessment and plan of treatment:	continue home medication  avoid drinking >1 L fluid intake

## 2018-09-26 NOTE — CHART NOTE - NSCHARTNOTEFT_GEN_A_CORE
spoke to patient son NICHOLE, [phone]. Advised on need to anticoagulate patient with either NOAC or Warfarin given findings of Afib. Son expressed desire to have mother taken Eliquis for ease of use and not requiring blood monitoring or prolonged stay in hospital. I counseled on importance of taking medication as prescribed and to ensure that patient does not miss any doses. He indicated that Eliquis would be best for his mother and approved starting her on it. All other questions addressed on telephone.

## 2018-09-26 NOTE — DISCHARGE NOTE ADULT - PLAN OF CARE
improved f/u with nephrology for HD improved hr she was found to have PAF in hospital after she had HD.  cardiology consulted and started on metoprolol 25 mgm bid, coreg discontinued  was initially on heparin drip, discuss with family regaridng option of warfarin or anusha, given high chadvasc score, family decided anusha  cardiology recommended outpatient ischemic work up  she will f/u with pcp and cardiology as an outpatient avoid excessive fluid intake, discussed with patient regarding complaince with fluid and medications continue home medication  avoid drinking >1 L fluid intake

## 2018-09-26 NOTE — DISCHARGE NOTE ADULT - MEDICATION SUMMARY - MEDICATIONS TO TAKE
I will START or STAY ON the medications listed below when I get home from the hospital:    aspirin 81 mg oral delayed release tablet  -- 1 tab(s) by mouth once a day  -- Indication: For Cad    cloNIDine 0.1 mg oral tablet  -- 1 tab(s) by mouth 3 times a day  -- Indication: For Htn    Eliquis 2.5 mg oral tablet  -- 2.5 tab(s) by mouth 2 times a day   -- Check with your doctor before becoming pregnant.  It is very important that you take or use this exactly as directed.  Do not skip doses or discontinue unless directed by your doctor.  Obtain medical advice before taking any non-prescription drugs as some may affect the action of this medication.    -- Indication: For paf    apixaban 2.5 mg oral tablet  -- 1 tab(s) by mouth every 12 hours  -- Indication: For paf    Basaglar KwikPen  -- Indication: For dm    Reglan 5 mg oral tablet  -- 1 tab(s) by mouth 3 times a day  -- Indication: For nausea vomiting    simvastatin 20 mg oral tablet  -- 1 tab(s) by mouth once a day (at bedtime)  -- Indication: For High cholesterol    Xanax 0.25 mg oral tablet  -- 1 tab(s) by mouth 3 times a day, As Needed  -- Indication: For anxiety    metoprolol tartrate 25 mg oral tablet  -- 1 tab(s) by mouth 2 times a day  -- Indication: For paf    alendronate 70 mg oral tablet  -- 1 tab(s) by mouth once a week  -- Indication: For bone resorption agents    ipratropium-albuterol 0.5 mg-2.5 mg/3 mLinhalation solution  -- 3 milliliter(s) inhaled every 6 hours  -- Indication: For sob    NIFEdipine 90 mg oral tablet, extended release  -- 1 tab(s) by mouth once a day  -- Indication: For Htn    calcium acetate 667 mg oral tablet  -- 1 tab(s) by mouth 3 times a day  -- Indication: For phosphate binders    pantoprazole 40 mg oral delayed release tablet  -- 1 tab(s) by mouth once a day (before a meal)  -- Indication: For gerd    levothyroxine 75 mcg (0.075 mg) oral tablet  -- 1 tab(s) by mouth once a day  -- Indication: For Hypothyroidism    hydrALAZINE 100 mg oral tablet  -- 1 tab(s) by mouth 3 times a day  -- Indication: For Htn    Nephplex Rx oral tablet  -- 1 tab(s) by mouth once a day  -- Indication: For multivitmains    folic acid 1 mg oral tablet  -- 1 tab(s) by mouth once a day  -- Indication: For multivitmains

## 2018-09-26 NOTE — DISCHARGE NOTE ADULT - SECONDARY DIAGNOSIS.
PAF (paroxysmal atrial fibrillation) Hemodialysis patient Hypothyroidism, unspecified type Hypertension, unspecified type

## 2018-09-26 NOTE — DISCHARGE NOTE ADULT - CARE PROVIDER_API CALL
pcp,   Phone: (   )    -  Fax: (   )    -    Yo Lee), Cardiovascular Disease; Internal Medicine  [address] [place] [address]  Phone: [phone]  Fax: [phone]

## 2018-09-26 NOTE — PROGRESS NOTE ADULT - ASSESSMENT
Assessment and Plan:     She is 61 y/o female with PMHx of ESRD on HD T/T/S last HD on Saturday, DM, HTN, HLD, Hypothyroidism, presented to ED with c/o SOB for 1 day, was hypoxic to 89% per EMS, In ED noted to have bibasilar rales with some respiratory distress, initially was placed on BIPAP and currently on NC, also noted to have hyperkalemia s/p albuterol and regular insulin in ED.  Pt admitted for fluid overload in setting of ESRD. underwent emergent HD and K was corrected. hospital course complicated by A fib with RVR after HD then sinus bradycardia with sinus pauses after Cardizem started. Cardiology evaluated and recommended anticoagulation, telemetry and bb 25 mg bid.      Afib with rvr later converted to NSR  cardiology note appreciated  continue telemetry monitoring for 24 hours  Metoprolol 25 mg bid started  CHADVASC score: 3 based on dm, htn and female, currently on heparin drip, will discuss with family members regarding switching to Eliques 2.5 mg bid or warfarin tonight for bridging with heparin.      Hypertensive emergency with fluid overload esrd:  - Likely from increased fluid intake as per daughter.  - S/p BIPAP for brief period of time in ED, then NC O2 and currently not on oxygen  - Hyperkalemia s/p regular insulin/ albuterol in ED, Kayexalate to be given.   Went for HD in am. f/u BMP after wards for K  TTE to assess for EF  ekg, troponin *2  fluid restriction, daily weight    Leukocytosis:  - Unclear etiology, no signs of infection, could be secondary to stress  -  CXR no   UA is she is able to pee. as per daughter she make small amount of urine since being on HD  - Monitor WBC, and fever for now    >H/o HTN- BP elevated on arrival, currently improved. Resume home meds Nifedipine hydralazine, clonidine, Hold Coreg given bradycardia.     bradycardia  could be due to hyperkalemia vs coreg as home med's  ekg  hold coreg for now  went for HD in am    >H/o DM- Keep on LSS +FS monitoring for now.     >H/o HLD- Resume ZOcor    >H/o Hypothyroidism Resume home levothyroxine.     >DVT ppx- Heparin.     Dispo: pending TTE, troponin and K to be corrected. Assessment and Plan:     She is 61 y/o female with PMHx of ESRD on HD T/T/S last HD on Saturday, DM, HTN, HLD, Hypothyroidism, presented to ED with c/o SOB for 1 day, was hypoxic to 89% per EMS, In ED noted to have bibasilar rales with some respiratory distress, initially was placed on BIPAP and currently on NC, also noted to have hyperkalemia s/p albuterol and regular insulin in ED.  Pt admitted for fluid overload in setting of ESRD. underwent emergent HD and K was corrected. hospital course complicated by A fib with RVR after HD then sinus bradycardia with sinus pauses after Cardizem started. Cardiology evaluated and recommended anticoagulation, telemetry and bb 25 mg bid. DC planning tomorrow      Afib with rvr later converted to NSR  cardiology note appreciated  continue telemetry monitoring for 24 hours   Hx of Echocardiogram with preserved EF in the past, negative nuclear stress test on 6/2017.  Likely out pt ischemic evaluation as per cardio.    Would check TSH if not accomplished recently.  Metoprolol 25 mg bid started  CHADVASC score: 3 based on dm, htn and female, currently on heparin drip, will discuss with family members regarding switching to Eliques 2.5 mg bid or warfarin tonight for bridging with heparin. Will discuss with patient family.      Hypertensive emergency with fluid overload ESRD:  - Likely from increased fluid intake as per daughter.  - K improved after HD  fluid restriction, daily weight  -BP better controlled today  titrate off oxygen as needed, discussed with RN.    Leukocytosis:  improved,  no signs of infection, could be secondary to stress,   CXR neg      >H/o HTN- BP elevated on arrival, currently improved.   Resume home meds Nifedipine hydralazine, clonidine  metoprolol 25 mg bid started        >H/o DM-   hbaic 6.4,   Keep on LSS +FS monitoring for now.     >H/o HLD- Resume ZOcor    >H/o Hypothyroidism Resume home levothyroxine.     >DVT ppx- Heparin.

## 2018-09-26 NOTE — DISCHARGE NOTE ADULT - MEDICATION SUMMARY - MEDICATIONS TO STOP TAKING
I will STOP taking the medications listed below when I get home from the hospital:    carvedilol 6.25 mg oral tablet  -- 1 tab(s) by mouth every 12 hours
Your home care services has been referred to [facility] ([phone]).  Please call them to inquire about  time of Registered Nurse visit.

## 2018-09-26 NOTE — DISCHARGE NOTE ADULT - PATIENT PORTAL LINK FT
You can access the [facility] Patient Portal, offered by [facility], by registering with the following website: [email or web address]

## 2018-09-26 NOTE — DISCHARGE NOTE ADULT - HOSPITAL COURSE
She is 63 y/o female with PMHx of ESRD on HD T/T/S last HD on Saturday, DM, HTN, HLD, Hypothyroidism, presented to ED with c/o SOB for 1 day, was hypoxic to 89% per EMS, In ED noted to have bibasilar rales with some respiratory distress, initially was placed on BIPAP and currently on NC, also noted to have hyperkalemia s/p albuterol and regular insulin in ED.  Pt admitted for fluid overload in setting of ESRD. Underwent emergent HD and K was corrected. hospital course complicated by A fib with RVR after HD then sinus bradycardia with sinus pauses after Cardizem started. Cardiology evaluated and recommended anticoagulation, telemetry and bb 25 mg bid. no more episodes of arrythmia after starting metoprolol. She will need outpatient cardiology work up as per cardiologist dr Jesus lucas. was evaluated by PT and recommended home with home PT and rolling walker, scripts of rolling walker given to sw.    Vital Signs Last 24 Hrs  T(C): 36.9 (26 Sep 2018 08:17), Max: 37.4 (25 Sep 2018 16:04)  T(F): 98.5 (26 Sep 2018 08:17), Max: 99.4 (25 Sep 2018 20:27)  HR: 68 (26 Sep 2018 09:53) (68 - 769)  BP: 168/59 (26 Sep 2018 08:17) (119/60 - 168/59)  BP(mean): 95 (26 Sep 2018 04:37) (95 - 97)  RR: 17 (26 Sep 2018 08:17) (17 - 27)  SpO2: 97% (26 Sep 2018 09:53) (94% - 100%)    PHYSICAL EXAM:    GENERAL: NAD, well-groomed, left AV fistula  HEENT: PERRL, +EOMI  NECK: soft, Supple, No JVD,   CHEST/LUNG: Clear to auscultation bilaterally; No wheezing  HEART: S1S2+, bradycardiac  ABDOMEN: Soft, Nontender, Nondistended; Bowel sounds present  EXTREMITIES:  2+ Peripheral Pulses, No clubbing, cyanosis, or edema  SKIN: No rashes or lesions  NEURO: AAOX3, no focal deficits, no motor or  sensory loss    A/P:  Afib with rvr later converted to NSR  cardiology note appreciated   Hx of Echocardiogram with preserved EF in the past, negative nuclear stress test on 6/2017.  Likely out pt ischemic evaluation as per cardio.    Metoprolol 25 mg bid started  CHADVASC score: 3 based on dm, htn and female, currently on heparin drip discussed with family members regarding switching to Eliques 2.5 mg bid or warfarin tonight for bridging with heparin. family decided eliques 2.5 mg bid. and compliance was discussed with family.    Hypertensive emergency with fluid overload ESRD:  - Likely from increased fluid intake as per daughter.  - K improved after HD  fluid restriction, daily weight  -BP better controlled today  not on oxygen any more, room air saturation improved to 97% after HD    Leukocytosis:  improved,  no signs of infection, could be secondary to stress,   CXR neg    >H/o HTN- BP elevated on arrival, currently improved.   Resume home meds Nifedipine hydralazine, clonidine  metoprolol 25 mg bid started    >H/o DM-   hbaic 6.4,   Keep on LSS +FS monitoring for now.   f/u with pcp as an outpatient, can hold insulin for now    >H/o HLD- Resume ZOcor    >H/o Hypothyroidism Resume home levothyroxine.

## 2018-09-26 NOTE — CONSULT NOTE ADULT - ASSESSMENT
62F  with SOB, cough, non-productive, feels better now.  Denies CP, palpitation, syncope.  Hx of DM, HTN, HL, RF on HD.  Noted to have hypoxia in ER requiring BIPAP.   Pt's rhythm was AF with RVR.  Now converted to SR spontaneously. Hx of Echocardiogram with preserved EF in the past, negative nuclear stress test on 6/2017.  Will need long term AC, AV blocking agents, Echocardiogram and telemetry.  Likely out pt ischemic evaluation.  Would check TSH if not accomplished recently.    Thank you, will follow pt with you.

## 2018-09-27 VITALS
DIASTOLIC BLOOD PRESSURE: 70 MMHG | RESPIRATION RATE: 19 BRPM | HEART RATE: 75 BPM | OXYGEN SATURATION: 98 % | SYSTOLIC BLOOD PRESSURE: 128 MMHG

## 2018-09-27 LAB
APTT BLD: 32.7 SEC — SIGNIFICANT CHANGE UP (ref 27.5–37.4)
GLUCOSE BLDC GLUCOMTR-MCNC: 140 MG/DL — HIGH (ref 70–99)
GLUCOSE BLDC GLUCOMTR-MCNC: 196 MG/DL — HIGH (ref 70–99)
GLUCOSE BLDC GLUCOMTR-MCNC: 199 MG/DL — HIGH (ref 70–99)
HCT VFR BLD CALC: 35.4 % — LOW (ref 37–47)
HGB BLD-MCNC: 10.5 G/DL — LOW (ref 12–16)
INR BLD: 1.11 RATIO — SIGNIFICANT CHANGE UP (ref 0.88–1.16)
MCHC RBC-ENTMCNC: 28 PG — SIGNIFICANT CHANGE UP (ref 27–31)
MCHC RBC-ENTMCNC: 29.7 G/DL — LOW (ref 32–36)
MCV RBC AUTO: 94.4 FL — SIGNIFICANT CHANGE UP (ref 81–99)
PLATELET # BLD AUTO: 247 K/UL — SIGNIFICANT CHANGE UP (ref 150–400)
PROTHROM AB SERPL-ACNC: 12.2 SEC — SIGNIFICANT CHANGE UP (ref 9.8–12.7)
RBC # BLD: 3.75 M/UL — LOW (ref 4.4–5.2)
RBC # FLD: 15.5 % — SIGNIFICANT CHANGE UP (ref 11–15.6)
WBC # BLD: 8.2 K/UL — SIGNIFICANT CHANGE UP (ref 4.8–10.8)
WBC # FLD AUTO: 8.2 K/UL — SIGNIFICANT CHANGE UP (ref 4.8–10.8)

## 2018-09-27 PROCEDURE — 71045 X-RAY EXAM CHEST 1 VIEW: CPT

## 2018-09-27 PROCEDURE — 80048 BASIC METABOLIC PNL TOTAL CA: CPT

## 2018-09-27 PROCEDURE — 94660 CPAP INITIATION&MGMT: CPT

## 2018-09-27 PROCEDURE — 36415 COLL VENOUS BLD VENIPUNCTURE: CPT

## 2018-09-27 PROCEDURE — 82803 BLOOD GASES ANY COMBINATION: CPT

## 2018-09-27 PROCEDURE — 85610 PROTHROMBIN TIME: CPT

## 2018-09-27 PROCEDURE — 84443 ASSAY THYROID STIM HORMONE: CPT

## 2018-09-27 PROCEDURE — 96375 TX/PRO/DX INJ NEW DRUG ADDON: CPT

## 2018-09-27 PROCEDURE — 82550 ASSAY OF CK (CPK): CPT

## 2018-09-27 PROCEDURE — 82962 GLUCOSE BLOOD TEST: CPT

## 2018-09-27 PROCEDURE — 90937 HEMODIALYSIS REPEATED EVAL: CPT

## 2018-09-27 PROCEDURE — 99291 CRITICAL CARE FIRST HOUR: CPT | Mod: 25

## 2018-09-27 PROCEDURE — 90686 IIV4 VACC NO PRSV 0.5 ML IM: CPT

## 2018-09-27 PROCEDURE — 84484 ASSAY OF TROPONIN QUANT: CPT

## 2018-09-27 PROCEDURE — 83735 ASSAY OF MAGNESIUM: CPT

## 2018-09-27 PROCEDURE — 80053 COMPREHEN METABOLIC PANEL: CPT

## 2018-09-27 PROCEDURE — 84100 ASSAY OF PHOSPHORUS: CPT

## 2018-09-27 PROCEDURE — 83036 HEMOGLOBIN GLYCOSYLATED A1C: CPT

## 2018-09-27 PROCEDURE — 99261: CPT

## 2018-09-27 PROCEDURE — 85027 COMPLETE CBC AUTOMATED: CPT

## 2018-09-27 PROCEDURE — 86900 BLOOD TYPING SEROLOGIC ABO: CPT

## 2018-09-27 PROCEDURE — C8929: CPT

## 2018-09-27 PROCEDURE — 83605 ASSAY OF LACTIC ACID: CPT

## 2018-09-27 PROCEDURE — 94640 AIRWAY INHALATION TREATMENT: CPT

## 2018-09-27 PROCEDURE — 99239 HOSP IP/OBS DSCHRG MGMT >30: CPT

## 2018-09-27 PROCEDURE — 86901 BLOOD TYPING SEROLOGIC RH(D): CPT

## 2018-09-27 PROCEDURE — 93005 ELECTROCARDIOGRAM TRACING: CPT

## 2018-09-27 PROCEDURE — 96374 THER/PROPH/DIAG INJ IV PUSH: CPT

## 2018-09-27 PROCEDURE — 85730 THROMBOPLASTIN TIME PARTIAL: CPT

## 2018-09-27 PROCEDURE — 86850 RBC ANTIBODY SCREEN: CPT

## 2018-09-27 PROCEDURE — T1013: CPT

## 2018-09-27 PROCEDURE — 97163 PT EVAL HIGH COMPLEX 45 MIN: CPT

## 2018-09-27 RX ORDER — ALPRAZOLAM 0.25 MG
1 TABLET ORAL
Qty: 0 | Refills: 0 | COMMUNITY

## 2018-09-27 RX ORDER — METOPROLOL TARTRATE 50 MG
5 TABLET ORAL ONCE
Qty: 0 | Refills: 0 | Status: COMPLETED | OUTPATIENT
Start: 2018-09-27 | End: 2018-09-27

## 2018-09-27 RX ORDER — IPRATROPIUM/ALBUTEROL SULFATE 18-103MCG
3 AEROSOL WITH ADAPTER (GRAM) INHALATION
Qty: 1 | Refills: 0
Start: 2018-09-27 | End: 2018-10-26

## 2018-09-27 RX ORDER — NIFEDIPINE 30 MG
30 TABLET, EXTENDED RELEASE 24 HR ORAL ONCE
Qty: 0 | Refills: 0 | Status: COMPLETED | OUTPATIENT
Start: 2018-09-27 | End: 2018-09-27

## 2018-09-27 RX ORDER — IPRATROPIUM/ALBUTEROL SULFATE 18-103MCG
3 AEROSOL WITH ADAPTER (GRAM) INHALATION
Qty: 0 | Refills: 0 | COMMUNITY
Start: 2018-09-27

## 2018-09-27 RX ORDER — ACETAMINOPHEN 500 MG
650 TABLET ORAL EVERY 6 HOURS
Qty: 0 | Refills: 0 | Status: DISCONTINUED | OUTPATIENT
Start: 2018-09-27 | End: 2018-09-27

## 2018-09-27 RX ORDER — APIXABAN 2.5 MG/1
1 TABLET, FILM COATED ORAL
Qty: 0 | Refills: 0 | COMMUNITY
Start: 2018-09-27

## 2018-09-27 RX ORDER — METOPROLOL TARTRATE 50 MG
1 TABLET ORAL
Qty: 60 | Refills: 0 | OUTPATIENT
Start: 2018-09-27 | End: 2018-10-26

## 2018-09-27 RX ORDER — ALPRAZOLAM 0.25 MG
1 TABLET ORAL
Qty: 30 | Refills: 0
Start: 2018-09-27 | End: 2018-10-06

## 2018-09-27 RX ORDER — APIXABAN 2.5 MG/1
2.5 TABLET, FILM COATED ORAL
Qty: 30 | Refills: 0 | OUTPATIENT
Start: 2018-09-27

## 2018-09-27 RX ORDER — APIXABAN 2.5 MG/1
1 TABLET, FILM COATED ORAL
Qty: 60 | Refills: 0 | OUTPATIENT
Start: 2018-09-27 | End: 2018-10-26

## 2018-09-27 RX ADMIN — Medication 5 MILLIGRAM(S): at 05:19

## 2018-09-27 RX ADMIN — APIXABAN 2.5 MILLIGRAM(S): 2.5 TABLET, FILM COATED ORAL at 05:23

## 2018-09-27 RX ADMIN — Medication 667 MILLIGRAM(S): at 08:11

## 2018-09-27 RX ADMIN — Medication 25 MILLIGRAM(S): at 17:23

## 2018-09-27 RX ADMIN — APIXABAN 2.5 MILLIGRAM(S): 2.5 TABLET, FILM COATED ORAL at 18:38

## 2018-09-27 RX ADMIN — Medication 667 MILLIGRAM(S): at 12:22

## 2018-09-27 RX ADMIN — Medication 30 MILLIGRAM(S): at 17:25

## 2018-09-27 RX ADMIN — Medication 650 MILLIGRAM(S): at 12:50

## 2018-09-27 RX ADMIN — Medication 0.1 MILLIGRAM(S): at 05:19

## 2018-09-27 RX ADMIN — Medication 3 MILLILITER(S): at 15:38

## 2018-09-27 RX ADMIN — Medication 2: at 08:10

## 2018-09-27 RX ADMIN — Medication 667 MILLIGRAM(S): at 17:22

## 2018-09-27 RX ADMIN — Medication 0.1 MILLIGRAM(S): at 13:39

## 2018-09-27 RX ADMIN — Medication 100 MILLIGRAM(S): at 13:39

## 2018-09-27 RX ADMIN — Medication 100 MILLIGRAM(S): at 05:19

## 2018-09-27 RX ADMIN — PANTOPRAZOLE SODIUM 40 MILLIGRAM(S): 20 TABLET, DELAYED RELEASE ORAL at 05:19

## 2018-09-27 RX ADMIN — Medication 81 MILLIGRAM(S): at 12:22

## 2018-09-27 RX ADMIN — Medication 0.25 MILLIGRAM(S): at 13:45

## 2018-09-27 RX ADMIN — Medication 650 MILLIGRAM(S): at 12:20

## 2018-09-27 RX ADMIN — Medication 5 MILLIGRAM(S): at 17:23

## 2018-09-27 RX ADMIN — Medication 5 MILLIGRAM(S): at 14:49

## 2018-09-27 RX ADMIN — Medication 2: at 17:23

## 2018-09-27 RX ADMIN — Medication 75 MICROGRAM(S): at 05:19

## 2018-09-27 RX ADMIN — Medication 25 MILLIGRAM(S): at 05:19

## 2018-09-27 NOTE — PHYSICAL THERAPY INITIAL EVALUATION ADULT - ASSISTIVE DEVICE FOR STAIR TRANSFER, REHAB EVAL
use of the step stool, pt. declined stairs negotiation training at the stair case due to fear of falling/bilateral rails

## 2018-09-27 NOTE — PROGRESS NOTE ADULT - SUBJECTIVE AND OBJECTIVE BOX
INTERVAL HISTORY: Denies CP,SOB, Palpitation  	  MEDICATIONS:  cloNIDine 0.1 milliGRAM(s) Oral three times a day  hydrALAZINE 100 milliGRAM(s) Oral every 8 hours  metoprolol tartrate 25 milliGRAM(s) Oral two times a day  ALBUTerol/ipratropium for Nebulization 3 milliLiter(s) Nebulizer every 6 hours  ALPRAZolam 0.25 milliGRAM(s) Oral three times a day PRN  metoclopramide 5 milliGRAM(s) Oral two times a day  pantoprazole    Tablet 40 milliGRAM(s) Oral before breakfast  dextrose 40% Gel 15 Gram(s) Oral once PRN  dextrose 50% Injectable 12.5 Gram(s) IV Push once  dextrose 50% Injectable 25 Gram(s) IV Push once  dextrose 50% Injectable 25 Gram(s) IV Push once  glucagon  Injectable 1 milliGRAM(s) IntraMuscular once PRN  insulin lispro (HumaLOG) corrective regimen sliding scale   SubCutaneous three times a day before meals  levothyroxine 75 MICROGram(s) Oral daily  simvastatin 20 milliGRAM(s) Oral at bedtime  apixaban 2.5 milliGRAM(s) Oral every 12 hours  aspirin enteric coated 81 milliGRAM(s) Oral daily  calcium acetate 667 milliGRAM(s) Oral four times a day with meals  dextrose 5%. 1000 milliLiter(s) IV Continuous <Continuous>        PHYSICAL EXAM:  T(C): 36.7 (09-27-18 @ 05:15), Max: 37.2 (09-26-18 @ 20:01)  HR: 69 (09-27-18 @ 04:00) (67 - 74)  BP: 183/70 (09-27-18 @ 04:00) (158/59 - 189/74)  RR: 18 (09-27-18 @ 04:00) (16 - 21)  SpO2: 100% (09-27-18 @ 04:00) (94% - 100%)  Wt(kg): --  I&O's Summary    26 Sep 2018 07:01  -  27 Sep 2018 07:00  --------------------------------------------------------  IN: 120 mL / OUT: 0 mL / NET: 120 mL          Appearance: Normal	  HEENT:   Normal oral mucosa  Cardiovascular: Normal S1 S2, No JVD, No murmurs, No edema  Respiratory: Lungs clear to auscultation	  Psychiatry: A & O x 3, Mood & affect appropriate  Gastrointestinal:  Soft, Non-tender, + BS	  Skin: No rashes, No ecchymoses, No cyanosis  Neurologic: Non-focal  Extremities: Normal range of motion, No clubbing, cyanosis or edema  Vascular: Peripheral pulses palpable 2+ bilaterally    TELEMETRY: 	SB, PVC    	    LABS:	 	                          10.4   6.2   )-----------( 255      ( 26 Sep 2018 04:20 )             35.1     09-26    132<L>  |  91<L>  |  28.0<H>  ----------------------------<  156<H>  5.0   |  27.0  |  5.53<H>    Ca    10.0      26 Sep 2018 03:46  Phos  5.1     09-26  Mg     2.5     09-26    TPro  7.9  /  Alb  4.3  /  TBili  0.7  /  DBili  x   /  AST  16  /  ALT  10  /  AlkPhos  110  09-25    TSH: Thyroid Stimulating Hormone, Serum: 2.31 uIU/mL (09-26 @ 15:24)

## 2018-09-27 NOTE — PHYSICAL THERAPY INITIAL EVALUATION ADULT - CRITERIA FOR SKILLED THERAPEUTIC INTERVENTIONS
anticipated discharge recommendation/therapy frequency/functional limitations in following categories/impairments found/rehab potential/anticipated equipment needs at discharge/risk reduction/prevention/predicted duration of therapy intervention

## 2018-09-27 NOTE — PHYSICAL THERAPY INITIAL EVALUATION ADULT - IMPAIRMENTS FOUND, PT EVAL
ROM/neuromotor development and sensory integration/gait, locomotion, and balance/aerobic capacity/endurance/muscle strength

## 2018-09-27 NOTE — PHYSICAL THERAPY INITIAL EVALUATION ADULT - ADDITIONAL COMMENTS
asper pt. and daughter: pt. lives in the private house with 4 steps to enter, (+) rail, ambulates with use of NBQC, owns the W/C that is being used for longer distances, needs supervision on the steps, and was modified Impaired Functional Mobility with walking

## 2018-09-27 NOTE — PROGRESS NOTE ADULT - ASSESSMENT
ASSESSMENT/PLAN: 62F  with SOB, cough, non-productive, feels better now.  Denies CP, palpitation, syncope.  Hx of DM, HTN, HL, RF on HD.  Noted to have hypoxia in ER requiring BIPAP.   Pt's rhythm was AF with RVR.  Now converted to SR spontaneously. Hx of Echocardiogram with preserved EF in the past, negative nuclear stress test on 6/2017.  Will need long term AC, now on Eliquis, AV blocking agents.  Echocardiogram revealed mild LV systolic dysfunction with EF of 50%. No evidence of regional WMA.  Likely out pt ischemic evaluation.

## 2018-09-27 NOTE — PROGRESS NOTE ADULT - SUBJECTIVE AND OBJECTIVE BOX
132<L>  |  91<L>  |  28.0<H>  ----------------------------<  156<H>  Ca:10.0  (26 Sep 2018 03:46)  5.0     |  27.0   |  5.53<H>        TPro  7.9    /  Alb  4.3    /  TBili  0.7    /  DBili  x      /  AST  16     /  ALT  10     /  AlkPhos  110    25 Sep 2018 18:27                        10.5<L>  8.2   )-----------( 247      ( 27 Sep 2018 06:16 )             35.4<L>    Phos:-- Mg:-- PTH:-- Uric acid:-- Serum Osm:--  Ferritin:-- Iron:-- TIBC:-- Tsat:--  B12:2.31 uIU/mL TSH:-- (09-26 @ 15:24)      Patient was seen and evaluated on dialysis.   Patient is tolerating the procedure well.   T(C): 36.6 (09-27-18 @ 13:15), Max: 37.2 (09-26-18 @ 20:01)  HR: 111 (09-27-18 @ 14:25) (65 - 113)  BP: 168/79 (09-27-18 @ 14:25) (154/77 - 189/74)  Continue dialysis: T Th S  Dialyzer: Revaclear 300       QB: 400 ml.,       QD: 500ml.,  Goal UF 0.5 L  over 3  Hours

## 2018-09-27 NOTE — PROGRESS NOTE ADULT - SUBJECTIVE AND OBJECTIVE BOX
62F  with SOB, cough, non-productive, feels better now.  Denies CP, palpitation, syncope.    Hx of DM, HTN, HL, RF on HD.  Noted to have hypoxia in ER requiring BIPAP.     Pt's rhythm was AF with RVR.  Now converted to SR spontaneously.   Hx of Echocardiogram with preserved EF in the past, negative nuclear stress test on 6/2017.  Will need long term AC, now on Eliquis, AV blocking agents.    Echocardiogram revealed mild LV systolic dysfunction with EF of 50%.   No evidence of regional WMA.  Likely out pt ischemic evaluation.      [facility] DIVISION OF KIDNEY DISEASES AND HYPERTENSION -- HEMODIALYSIS NOTE  --------------------------------------------------------------------------------  Chief Complaint: ESRD/Ongoing hemodialysis requirement    24 hour events/subjective: Post HD AF,    PAST HISTORY  --------------------------------------------------------------------------------  No significant changes to PMH, PSH, FHx, SHx, unless otherwise noted    ALLERGIES & MEDICATIONS  --------------------------------------------------------------------------------  Allergies    No Known Allergies    Standing Inpatient Medications  ALBUTerol/ipratropium for Nebulization 3 milliLiter(s) Nebulizer every 6 hours  apixaban 2.5 milliGRAM(s) Oral every 12 hours  aspirin enteric coated 81 milliGRAM(s) Oral daily  calcium acetate 667 milliGRAM(s) Oral four times a day with meals  cloNIDine 0.1 milliGRAM(s) Oral three times a day  dextrose 5%. 1000 milliLiter(s) IV Continuous <Continuous>  dextrose 50% Injectable 12.5 Gram(s) IV Push once  dextrose 50% Injectable 25 Gram(s) IV Push once  dextrose 50% Injectable 25 Gram(s) IV Push once  hydrALAZINE 100 milliGRAM(s) Oral every 8 hours  insulin lispro (HumaLOG) corrective regimen sliding scale   SubCutaneous three times a day before meals  levothyroxine 75 MICROGram(s) Oral daily  metoclopramide 5 milliGRAM(s) Oral two times a day  metoprolol tartrate 25 milliGRAM(s) Oral two times a day  pantoprazole    Tablet 40 milliGRAM(s) Oral before breakfast  simvastatin 20 milliGRAM(s) Oral at bedtime    PRN Inpatient Medications  acetaminophen   Tablet .. 650 milliGRAM(s) Oral every 6 hours PRN  ALPRAZolam 0.25 milliGRAM(s) Oral three times a day PRN  dextrose 40% Gel 15 Gram(s) Oral once PRN  glucagon  Injectable 1 milliGRAM(s) IntraMuscular once PRN      REVIEW OF SYSTEMS  --------------------------------------------------------------------------------  Gen: No weight changes, fatigue, fevers/chills, weakness  Skin: No rashes  Head/Eyes/Ears/Mouth: No headache; Normal hearing; Normal vision w/o blurriness; No sinus pain/discomfort, sore throat  Respiratory: No dyspnea,  +cough, No wheezing, hemoptysis  CV: As per HPI,  GI: No abdominal pain, diarrhea, constipation, nausea, vomiting, melena, hematochezia  : No increased frequency, dysuria, hematuria, nocturia  MSK: No joint pain/swelling; no back pain; no edema  Neuro: No dizziness/lightheadedness, weakness, seizures, numbness, tingling  Heme: No easy bruising or bleeding  Endo: No heat/cold intolerance  Psych: No significant nervousness, anxiety, stress, depression    All other systems were reviewed and are negative, except as noted.    VITALS/PHYSICAL EXAM  --------------------------------------------------------------------------------  T(C): 36.6 (09-27-18 @ 12:13), Max: 37.2 (09-26-18 @ 20:01)  HR: 107 (09-27-18 @ 12:00) (65 - 107)  BP: 154/77 (09-27-18 @ 12:00) (154/77 - 189/74)  RR: 24 (09-27-18 @ 12:00) (15 - 24)  SpO2: 100% (09-27-18 @ 12:00) (94% - 100%)  Wt(kg): --    09-26-18 @ 07:01  -  09-27-18 @ 07:00  --------------------------------------------------------  IN: 120 mL / OUT: 0 mL / NET: 120 mL    09-27-18 @ 07:01  -  09-27-18 @ 12:57  --------------------------------------------------------  IN: 240 mL / OUT: 0 mL / NET: 240 mL      Physical Exam:  	Gen: NAD, well-appearing  	HEENT: PERRL, supple neck,   	Pulm: CTA B/L  	CV: RRR, S1S2; no rub  	Back: No spinal or CVA tenderness; no sacral edema  	Abd: +BS, soft, nontender/nondistended  	: No suprapubic tenderness  	UE: Warm, FROM, no clubbing, intact strength; no edema; no asterixis  	LE: Warm, FROM, no clubbing, intact strength; no edema  	Neuro: No focal deficits, intact gait  	Psych: Normal affect and mood  	Skin: Warm, without rashes  	Vascular access: AVF,    LABS/STUDIES  --------------------------------------------------------------------------------              10.5   8.2   >-----------<  247      [09-27-18 @ 06:16]              35.4     132  |  91  |  28.0  ----------------------------<  156      [09-26-18 @ 03:46]  5.0   |  27.0  |  5.53        Ca     10.0     [09-26-18 @ 03:46]      Mg     2.5     [09-26-18 @ 03:46]      Phos  5.1     [09-26-18 @ 03:46]    TPro  7.9  /  Alb  4.3  /  TBili  0.7  /  DBili  x   /  AST  16  /  ALT  10  /  AlkPhos  110  [09-25-18 @ 18:27]    PT/INR: PT 12.2 , INR 1.11       [09-27-18 @ 06:16]  PTT: 32.7       [09-27-18 @ 06:16]    Troponin 0.11      [09-26-18 @ 15:24]  CK 34      [09-25-18 @ 22:09]    HbA1c 6.4      [09-26-18 @ 05:24]  TSH 2.31      [09-26-18 @ 15:24]    Cont. HD T Th S,

## 2018-09-27 NOTE — CHART NOTE - NSCHARTNOTEFT_GEN_A_CORE
Pt with HF, DM, and ESRD on HD requesting nutrition education prior to discharge. Spoke with Pt's daughter about nutrition recommendations, pt was resting. Spoke about DM recommendations of 3 meals/day, along with the Plate Model. Recommended Pt continue low sodium diet with fluid restriction for CHF. Pt's daughter stated Potassium and Phosphorus levels were high and wanted information on what foods to be cautious of. Provided Pt and her daughter with literature on Meal Planning with the Plate Model, HF recommendations and Dialysis diet in both english and Tristanian. Daughter seems willing to help mother with diet changes.

## 2018-09-27 NOTE — PROGRESS NOTE ADULT - SUBJECTIVE AND OBJECTIVE BOX
Vital Signs Last 24 Hrs,    T(C): 37.1 (27 Sep 2018 08:10), Max: 37.2 (26 Sep 2018 20:01)  T(F): 98.7 (27 Sep 2018 08:10), Max: 98.9 (26 Sep 2018 20:01)  HR: 67 (27 Sep 2018 08:13) (67 - 74)  BP: 175/83 (27 Sep 2018 08:13) (158/59 - 189/74)  BP(mean): 102 (27 Sep 2018 04:00) (102 - 108)  RR: 20 (27 Sep 2018 08:13) (16 - 21)  SpO2: 99% (27 Sep 2018 08:13) (94% - 100%)    132<L>  |  91<L>  |  28.0<H>  ----------------------------<  156<H>  Ca: 10.0  (26 Sep 2018 03:46)  5.0     |  27.0   |  5.53<H>    TPro  7.9    /  Alb  4.3    /  TBili  0.7    /  DBili  x      /  AST  16     /  ALT  10     /  AlkPhos  110    25 Sep 2018 18:27                        10.5<L>  8.2   )-----------( 247      ( 27 Sep 2018 06:16 )             35.4<L>    Phos:-- Mg:-- PTH:-- Uric acid:-- Serum Osm:--  Ferritin:-- Iron:-- TIBC:-- Tsat:--  B12:2.31 uIU/mL TSH:-- (09-26 @ 15:24)    Patient was seen and evaluated on dialysis.   Patient is tolerating the procedure well.   T(C): 37.1 (09-27-18 @ 08:10), Max: 37.2 (09-26-18 @ 20:01)  HR: 67 (09-27-18 @ 08:13) (67 - 74)  BP: 175/83 (09-27-18 @ 08:13) (158/59 - 189/74)  Continue dialysis: T Th S  Dialyzer: Revaclear 300          QB: 450 ml.,       QD: 500ml.,  Goal UF 0.5 - 1 L  over  3 Hours

## 2018-11-01 ENCOUNTER — OUTPATIENT (OUTPATIENT)
Dept: OUTPATIENT SERVICES | Facility: HOSPITAL | Age: 63
LOS: 1 days | End: 2018-11-01
Payer: MEDICAID

## 2018-11-01 DIAGNOSIS — I77.0 ARTERIOVENOUS FISTULA, ACQUIRED: Chronic | ICD-10-CM

## 2018-11-01 DIAGNOSIS — Z90.49 ACQUIRED ABSENCE OF OTHER SPECIFIED PARTS OF DIGESTIVE TRACT: Chronic | ICD-10-CM

## 2018-11-06 ENCOUNTER — INPATIENT (INPATIENT)
Facility: HOSPITAL | Age: 63
LOS: 14 days | Discharge: HOME HEALTH | DRG: 233 | End: 2018-11-21
Attending: THORACIC SURGERY (CARDIOTHORACIC VASCULAR SURGERY) | Admitting: FAMILY MEDICINE
Payer: COMMERCIAL

## 2018-11-06 VITALS
OXYGEN SATURATION: 98 % | RESPIRATION RATE: 20 BRPM | HEART RATE: 79 BPM | SYSTOLIC BLOOD PRESSURE: 133 MMHG | DIASTOLIC BLOOD PRESSURE: 71 MMHG | TEMPERATURE: 98 F

## 2018-11-06 DIAGNOSIS — I77.0 ARTERIOVENOUS FISTULA, ACQUIRED: Chronic | ICD-10-CM

## 2018-11-06 DIAGNOSIS — R07.9 CHEST PAIN, UNSPECIFIED: ICD-10-CM

## 2018-11-06 DIAGNOSIS — Z90.49 ACQUIRED ABSENCE OF OTHER SPECIFIED PARTS OF DIGESTIVE TRACT: Chronic | ICD-10-CM

## 2018-11-06 LAB
ALBUMIN SERPL ELPH-MCNC: 4.3 G/DL — SIGNIFICANT CHANGE UP (ref 3.3–5.2)
ALP SERPL-CCNC: 101 U/L — SIGNIFICANT CHANGE UP (ref 40–120)
ALT FLD-CCNC: 10 U/L — SIGNIFICANT CHANGE UP
ANION GAP SERPL CALC-SCNC: 16 MMOL/L — SIGNIFICANT CHANGE UP (ref 5–17)
APTT BLD: 36 SEC — SIGNIFICANT CHANGE UP (ref 27.5–36.3)
AST SERPL-CCNC: 25 U/L — SIGNIFICANT CHANGE UP
BILIRUB SERPL-MCNC: 0.4 MG/DL — SIGNIFICANT CHANGE UP (ref 0.4–2)
BUN SERPL-MCNC: 21 MG/DL — HIGH (ref 8–20)
CALCIUM SERPL-MCNC: 9.3 MG/DL — SIGNIFICANT CHANGE UP (ref 8.6–10.2)
CHLORIDE SERPL-SCNC: 90 MMOL/L — LOW (ref 98–107)
CO2 SERPL-SCNC: 26 MMOL/L — SIGNIFICANT CHANGE UP (ref 22–29)
CREAT SERPL-MCNC: 4.11 MG/DL — HIGH (ref 0.5–1.3)
GLUCOSE BLDC GLUCOMTR-MCNC: 168 MG/DL — HIGH (ref 70–99)
GLUCOSE SERPL-MCNC: 176 MG/DL — HIGH (ref 70–115)
HCT VFR BLD CALC: 39.5 % — SIGNIFICANT CHANGE UP (ref 37–47)
HGB BLD-MCNC: 12.5 G/DL — SIGNIFICANT CHANGE UP (ref 12–16)
INR BLD: 1.04 RATIO — SIGNIFICANT CHANGE UP (ref 0.88–1.16)
MCHC RBC-ENTMCNC: 28.9 PG — SIGNIFICANT CHANGE UP (ref 27–31)
MCHC RBC-ENTMCNC: 31.6 G/DL — LOW (ref 32–36)
MCV RBC AUTO: 91.2 FL — SIGNIFICANT CHANGE UP (ref 81–99)
NT-PROBNP SERPL-SCNC: HIGH PG/ML (ref 0–300)
PLATELET # BLD AUTO: 341 K/UL — SIGNIFICANT CHANGE UP (ref 150–400)
POTASSIUM SERPL-MCNC: 3.7 MMOL/L — SIGNIFICANT CHANGE UP (ref 3.5–5.3)
POTASSIUM SERPL-SCNC: 3.7 MMOL/L — SIGNIFICANT CHANGE UP (ref 3.5–5.3)
PROT SERPL-MCNC: 7.7 G/DL — SIGNIFICANT CHANGE UP (ref 6.6–8.7)
PROTHROM AB SERPL-ACNC: 12 SEC — SIGNIFICANT CHANGE UP (ref 10–12.9)
RBC # BLD: 4.33 M/UL — LOW (ref 4.4–5.2)
RBC # FLD: 17 % — HIGH (ref 11–15.6)
SODIUM SERPL-SCNC: 132 MMOL/L — LOW (ref 135–145)
TROPONIN T SERPL-MCNC: 0.07 NG/ML — HIGH (ref 0–0.06)
TROPONIN T SERPL-MCNC: 0.07 NG/ML — HIGH (ref 0–0.06)
TROPONIN T SERPL-MCNC: 0.08 NG/ML — HIGH (ref 0–0.06)
WBC # BLD: 7.9 K/UL — SIGNIFICANT CHANGE UP (ref 4.8–10.8)
WBC # FLD AUTO: 7.9 K/UL — SIGNIFICANT CHANGE UP (ref 4.8–10.8)

## 2018-11-06 PROCEDURE — 99285 EMERGENCY DEPT VISIT HI MDM: CPT

## 2018-11-06 PROCEDURE — 93010 ELECTROCARDIOGRAM REPORT: CPT

## 2018-11-06 PROCEDURE — 71046 X-RAY EXAM CHEST 2 VIEWS: CPT | Mod: 26

## 2018-11-06 RX ORDER — GLUCAGON INJECTION, SOLUTION 0.5 MG/.1ML
1 INJECTION, SOLUTION SUBCUTANEOUS ONCE
Qty: 0 | Refills: 0 | Status: DISCONTINUED | OUTPATIENT
Start: 2018-11-06 | End: 2018-11-13

## 2018-11-06 RX ORDER — IPRATROPIUM/ALBUTEROL SULFATE 18-103MCG
3 AEROSOL WITH ADAPTER (GRAM) INHALATION EVERY 6 HOURS
Qty: 0 | Refills: 0 | Status: DISCONTINUED | OUTPATIENT
Start: 2018-11-06 | End: 2018-11-13

## 2018-11-06 RX ORDER — NITROGLYCERIN 6.5 MG
0.4 CAPSULE, EXTENDED RELEASE ORAL
Qty: 0 | Refills: 0 | Status: DISCONTINUED | OUTPATIENT
Start: 2018-11-06 | End: 2018-11-10

## 2018-11-06 RX ORDER — DEXTROSE 50 % IN WATER 50 %
25 SYRINGE (ML) INTRAVENOUS ONCE
Qty: 0 | Refills: 0 | Status: DISCONTINUED | OUTPATIENT
Start: 2018-11-06 | End: 2018-11-13

## 2018-11-06 RX ORDER — ALBUTEROL 90 UG/1
1 AEROSOL, METERED ORAL EVERY 4 HOURS
Qty: 0 | Refills: 0 | Status: DISCONTINUED | OUTPATIENT
Start: 2018-11-06 | End: 2018-11-13

## 2018-11-06 RX ORDER — ASPIRIN/CALCIUM CARB/MAGNESIUM 324 MG
325 TABLET ORAL DAILY
Qty: 0 | Refills: 0 | Status: DISCONTINUED | OUTPATIENT
Start: 2018-11-06 | End: 2018-11-10

## 2018-11-06 RX ORDER — LEVOTHYROXINE SODIUM 125 MCG
75 TABLET ORAL DAILY
Qty: 0 | Refills: 0 | Status: DISCONTINUED | OUTPATIENT
Start: 2018-11-06 | End: 2018-11-13

## 2018-11-06 RX ORDER — CARVEDILOL PHOSPHATE 80 MG/1
12.5 CAPSULE, EXTENDED RELEASE ORAL EVERY 12 HOURS
Qty: 0 | Refills: 0 | Status: DISCONTINUED | OUTPATIENT
Start: 2018-11-06 | End: 2018-11-13

## 2018-11-06 RX ORDER — DEXTROSE 50 % IN WATER 50 %
12.5 SYRINGE (ML) INTRAVENOUS ONCE
Qty: 0 | Refills: 0 | Status: DISCONTINUED | OUTPATIENT
Start: 2018-11-06 | End: 2018-11-13

## 2018-11-06 RX ORDER — ATORVASTATIN CALCIUM 80 MG/1
20 TABLET, FILM COATED ORAL AT BEDTIME
Qty: 0 | Refills: 0 | Status: DISCONTINUED | OUTPATIENT
Start: 2018-11-06 | End: 2018-11-10

## 2018-11-06 RX ORDER — DEXTROSE 50 % IN WATER 50 %
15 SYRINGE (ML) INTRAVENOUS ONCE
Qty: 0 | Refills: 0 | Status: DISCONTINUED | OUTPATIENT
Start: 2018-11-06 | End: 2018-11-13

## 2018-11-06 RX ORDER — TIOTROPIUM BROMIDE 18 UG/1
1 CAPSULE ORAL; RESPIRATORY (INHALATION) DAILY
Qty: 0 | Refills: 0 | Status: DISCONTINUED | OUTPATIENT
Start: 2018-11-06 | End: 2018-11-13

## 2018-11-06 RX ORDER — PANTOPRAZOLE SODIUM 20 MG/1
40 TABLET, DELAYED RELEASE ORAL
Qty: 0 | Refills: 0 | Status: DISCONTINUED | OUTPATIENT
Start: 2018-11-06 | End: 2018-11-13

## 2018-11-06 RX ORDER — FOLIC ACID 0.8 MG
1 TABLET ORAL DAILY
Qty: 0 | Refills: 0 | Status: DISCONTINUED | OUTPATIENT
Start: 2018-11-06 | End: 2018-11-13

## 2018-11-06 RX ORDER — ACETAMINOPHEN 500 MG
650 TABLET ORAL ONCE
Qty: 0 | Refills: 0 | Status: COMPLETED | OUTPATIENT
Start: 2018-11-06 | End: 2018-11-06

## 2018-11-06 RX ORDER — SODIUM CHLORIDE 9 MG/ML
1000 INJECTION, SOLUTION INTRAVENOUS
Qty: 0 | Refills: 0 | Status: DISCONTINUED | OUTPATIENT
Start: 2018-11-06 | End: 2018-11-13

## 2018-11-06 RX ORDER — INSULIN LISPRO 100/ML
VIAL (ML) SUBCUTANEOUS AT BEDTIME
Qty: 0 | Refills: 0 | Status: DISCONTINUED | OUTPATIENT
Start: 2018-11-06 | End: 2018-11-10

## 2018-11-06 RX ORDER — ALPRAZOLAM 0.25 MG
0.25 TABLET ORAL EVERY 12 HOURS
Qty: 0 | Refills: 0 | Status: DISCONTINUED | OUTPATIENT
Start: 2018-11-06 | End: 2018-11-13

## 2018-11-06 RX ADMIN — Medication 325 MILLIGRAM(S): at 16:28

## 2018-11-06 RX ADMIN — CARVEDILOL PHOSPHATE 12.5 MILLIGRAM(S): 80 CAPSULE, EXTENDED RELEASE ORAL at 16:28

## 2018-11-06 RX ADMIN — ATORVASTATIN CALCIUM 20 MILLIGRAM(S): 80 TABLET, FILM COATED ORAL at 16:28

## 2018-11-06 RX ADMIN — Medication 650 MILLIGRAM(S): at 23:38

## 2018-11-06 RX ADMIN — Medication 0.1 MILLIGRAM(S): at 22:39

## 2018-11-06 NOTE — H&P ADULT - NSHPPHYSICALEXAM_GEN_ALL_CORE
General:  female who appears older than her stated age, sitting up in bed not in distress.   HEENT: AT, NC. PERRL. intact EOM. no throat erythema or exudate.   Neck: supple. no JVD.   Chest: CTA bilaterally  Heart: normal S1,S2. RRR. no heart murmur.  Abdomen: soft. non-tender. non-distended. + BS.   rectal : deferred by pt.   Ext: no C/C/E. no calf tenderness on either side.  LUE av fistula with thrill.   Neuro: AAO x3. no focal weakness. no speech disorder. CN II to XII intact.   skin : no warmth, no diaphoresis, no cyanosis.

## 2018-11-06 NOTE — ED ADULT NURSE NOTE - NSIMPLEMENTINTERV_GEN_ALL_ED
Implemented All Universal Safety Interventions:  [place] to call system. Call bell, personal items and telephone within reach. Instruct patient to call for assistance. Room bathroom lighting operational. Non-slip footwear when patient is off stretcher. Physically safe environment: no spills, clutter or unnecessary equipment. Stretcher in lowest position, wheels locked, appropriate side rails in place.

## 2018-11-06 NOTE — H&P ADULT - ASSESSMENT
pt. is admitted for     Other cp.  admit to telemetry, echo, trop,  trop 0.07 and 0.08,  b.blk, aspirin, hold AC tonight as per cardiology. cardiac cath as per cardio in am.     ESRD, will get nephrology Dr. Norwood consult.    Htn, unspecified, b.blk, clonidine, bp controlled.     hypothyroidism, un specified, continue synthroid.

## 2018-11-06 NOTE — ED PROVIDER NOTE - MEDICAL DECISION MAKING DETAILS
64 y/o female with pmhx of DM, hypothyroidism, ESRD currently on dialysis t/t/s, pneumonia 2 weeks prior with episode of afib on presentation that has since resolved presents to the ED BIBA c/o substernal chest pain, non radiating during dialysis this morning at approx 8:45 am.   Currently stable, symptoms have resolved.  CBC, CMP, trops x 2, BNP, EKG, Chest x ray, PT/PTT/INR  and reassess 64 y/o female with pmhx of DM, hypothyroidism, ESRD currently on dialysis t/t/s, pneumonia 2 weeks prior with episode of afib on presentation that has since resolved presents to the ED BIBA c/o substernal chest pain, non radiating during dialysis this morning at approx 8:45 am.   Currently stable, symptoms have resolved. EKG non diagnostic  CBC, CMP, trops x 2, BNP, EKG, Chest x ray, PT/PTT/INR  and reassess

## 2018-11-06 NOTE — ED ADULT NURSE REASSESSMENT NOTE - NS ED NURSE REASSESS COMMENT FT1
patient rec'd at this time patient alert and cooperative sl to right ac itnact and flushes well. av fistula to left upper arm intact with postivem bruit and thrill . patient awating to go to floor. will monitor and chart changes. family at bedside

## 2018-11-06 NOTE — ED ADULT NURSE NOTE - OBJECTIVE STATEMENT
pt care assumed at 1035, no apparent distress noted at this time. pt received Alert and Oriented to person, place, situation and time sitting in bed comfortably with granddaughter at bedside. pt c/o chest pain while at dialysis, goes T, R, Sat. pt states she was able to complete dialysis. pt care assumed at 1035, no apparent distress noted at this time. pt received Alert and Oriented to person, place, situation and time sitting in bed comfortably with granddaughter at bedside. pt c/o chest pain while at dialysis, goes T, R, Sat. pt states she was able to complete dialysis. pt denies any complaints at this time, denying shortness of breath, chest pain. pt has left AV fistula with +bruit and thrill. HR is regular, lung sounds are clear b/l, abd is soft and nontender with positive bowel sounds in all four quadrants, skin is warm, dry and appropriate for age and race. pt educated on plan of care, plan of care taught back to RN. proficency determined from successful pt teach back. will continue to educate pt throughout ED stay.

## 2018-11-06 NOTE — ED ADULT TRIAGE NOTE - CHIEF COMPLAINT QUOTE
Pt BIBA from home for intermittent chest pain and SOB. Pt currently denies chest pain, pt currently on supplemental O2. Pt appears to be in no distress. Denies cardiac history.

## 2018-11-06 NOTE — ED PROVIDER NOTE - ATTENDING CONTRIBUTION TO CARE
Pa care supervised by me and I examined patient who c/o chest pain for 20 min this am and now resolved during dialysis and ecg nondiagnostic pe nl will consult with pts cardiologist and dispo as per their recommendation

## 2018-11-06 NOTE — H&P ADULT - HISTORY OF PRESENT ILLNESS
62 y/o female who was getting her dialysis today and towards the end of dialysis she felt mid sternal pressure type cp, no radiation, lasted for about 10 minutes, + nausea, no diaphoresis no sob. no abd. pain. no cough, no fever. no diarrhea. pt. reports no cp at the time of admission. 64 y/o female who was getting her dialysis today and towards the end of dialysis she felt mid sternal pressure type cp, no radiation, lasted for about 10 minutes, + nausea, no diaphoresis no sob. no abd. pain. no cough, no fever. no diarrhea. pt. reports no cp at the time of admission.   As per daughter she is on Eliquis for her afib. Cardiologist plans to do cardiac cath in am and wants AC to be on hold.

## 2018-11-06 NOTE — PATIENT PROFILE ADULT - LANGUAGE ASSISTANCE NEEDED
Yes-Patient/Caregiver accepts free interpretation services... Yes-Patient/Caregiver accepts free interpretation services.../Daughter Martha at bedside translated

## 2018-11-06 NOTE — ED PROVIDER NOTE - OBJECTIVE STATEMENT
64 y/o female with pmhx of DM, hypothyroidism, ESRD currently on dialysis t/t/s, pneumonia 2 weeks prior with episode of afib on presentation that has since resolved presents to the ED BIBA c/o substernal chest pain, non radiating during dialysis this morning at approx 8:45 am. Pt felt nausea and blurry vision with associated chest pain. Chest pain lasted approx 20 minutes and resolved on its own. Chest pain was 8/10 in intensity pt described as pressure. All symptoms have since resolved upon being seen. Following Dr. Yo Lee as cardiologist.  Denies associated fevers, chills, shortness of breath, dyspnea upon exertion, abdominal pain, vomiting, pain radiaitn to left arm/neck, lightheadedness, dizziness, edema.  Daughter used as . 62 y/o female with pmhx of DM, hypothyroidism, ESRD currently on dialysis t/t/s, pneumonia 2 weeks prior with episode of afib on presentation that has since resolved presents to the ED BIBA c/o substernal chest pain, non radiating during dialysis this morning at approx 8:45 am. Pt felt nausea and blurry vision with associated chest pain. Chest pain lasted approx 20 minutes and resolved on its own. Chest pain was 8/10 in intensity pt described as pressure. All symptoms have since resolved upon being seen. Following cardiologist Dr. Yo Lee  Denies associated fevers, chills, shortness of breath, dyspnea upon exertion, abdominal pain, vomiting, pain radiaitn to left arm/neck, lightheadedness, dizziness, edema.  Daughter used as .

## 2018-11-06 NOTE — ED PROVIDER NOTE - PHYSICAL EXAMINATION
General- Well appearing female, nontoxic appearing, in no acute distress, cooperative  Eyes- PERRLA, EOMI, conjunctiva non injected,   Cardio- s1, s2 present, regular rate and rhythm, no peripheral edema  Chest- NT to palp  Respiratory- equal chest rise, CTA bilaterally anterior and posterior  Gastro- soft NT, ND, bowel sounds present in all 4 quadrants  Pulses- 2+ radial pulses bilaterally  Fistula observed in the left upper arm, currently covered with bandage after recent dialysis, bandage, non soiled, no bleeding observed  Neuro- Cranial nerves 2-12 intact, no decrease in senastion or strength noted in upper and lower extremities,

## 2018-11-06 NOTE — CONSULT NOTE ADULT - SUBJECTIVE AND OBJECTIVE BOX
[facility], [facility]                                                    [address], [address], [place] [address]                                                         PHONE: [phone]    FAX: [phone] [address], [address], [place] [address]                                                 PHONE: [phone]    FAX: [phone]  *******************************************************************************    Reason for Consult: Chest pain    HPI:  KIM LEE is a 63y Female p/w cp    PAST MEDICAL & SURGICAL HISTORY:  Hypothyroidism, unspecified type  Hemodialysis patient: tues thursday saturday  Renal failure  Hypertension  Diabetes mellitus  A-V fistula  S/P cholecystectomy      No Known Allergies      MEDICATIONS  (STANDING):    MEDICATIONS  (PRN):      Social History: no active tobacco / EtOH / IVDA    Family History: Family history of diabetes mellitus (Father)      ROS: As noted above, otherwise unremarkable.    Vital Signs Last 24 Hrs  T(C): 36.4 (06 Nov 2018 11:19), Max: 36.7 (06 Nov 2018 09:21)  T(F): 97.6 (06 Nov 2018 11:19), Max: 98 (06 Nov 2018 09:21)  HR: 73 (06 Nov 2018 11:19) (73 - 79)  BP: 148/71 (06 Nov 2018 11:19) (133/71 - 148/71)  BP(mean): --  RR: 20 (06 Nov 2018 11:19) (20 - 20)  SpO2: 99% (06 Nov 2018 11:19) (98% - 99%)    I&O's Detail    I&O's Summary          PHYSICAL EXAM:  General: Appears well developed, well nourished, no acute distress  HEENT: Head: normocephalic, atraumatic  Eyes: Pupils equal and reactive  Neck: Supple, no carotid bruit, no JVD, no HJR  CARDIOVASCULAR: Normal S1 and S2, no murmur, rub, or gallop  LUNGS: Clear to auscultation bilaterally, no rales, rhonchi or wheeze  ABDOMEN: Soft, nontender, non-distended, positive bowel sounds, no mass or bruit  EXTREMITIES: No edema, distal pulses WNL  SKIN: Warm and dry with normal turgor  NEURO: Alert & oriented x 3, grossly intact  PSYCH: normal mood and affect    LABS:                        12.5   7.9   )-----------( 341      ( 06 Nov 2018 11:24 )             39.5     11-06    132<L>  |  90<L>  |  21.0<H>  ----------------------------<  176<H>  3.7   |  26.0  |  4.11<H>    Ca    9.3      06 Nov 2018 11:24    TPro  7.7  /  Alb  4.3  /  TBili  0.4  /  DBili  x   /  AST  25  /  ALT  10  /  AlkPhos  101  11-06    CARDIAC MARKERS ( 06 Nov 2018 11:24 )  x     / 0.07 ng/mL / x     / x     / x          PT/INR - ( 06 Nov 2018 11:24 )   PT: 12.0 sec;   INR: 1.04 ratio         PTT - ( 06 Nov 2018 11:24 )  PTT:36.0 sec    RADIOLOGY & ADDITIONAL STUDIES:    ECG: sinus lvh st depression i, aVL    ECHO:    STRESS TEST:    CARDIAC CATHETERIZATION:    Assessment and Plan:  In summary, KIM LEE is a 63y Female with past medical history significant for ESRD (HD), DM, HL, HTN p/w cp while at HD.  now denies sx.    - Monitor on telemetry  - Check troponins x3  - Repeat EKGs  - Echocardiogram  - Cardiac Catheterization in am (NPO, hold all Lovenox/Heparin).  Risks, benefits & alternatives discussed with the patient and he agrees to proceed.  Nothing to suggest a contrast allergy.  - Rhythm/hemodynamics stable   - asa  - please cont additional outpt meds ( to be ordered by hospitalist)    We will follow with you.  Thank you for allowing me to participate in the care of your patient.      Sincerely,    Kane Rivas, DO

## 2018-11-07 DIAGNOSIS — I25.110 ATHEROSCLEROTIC HEART DISEASE OF NATIVE CORONARY ARTERY WITH UNSTABLE ANGINA PECTORIS: ICD-10-CM

## 2018-11-07 LAB
ALBUMIN SERPL ELPH-MCNC: 4 G/DL — SIGNIFICANT CHANGE UP (ref 3.3–5.2)
ALP SERPL-CCNC: 92 U/L — SIGNIFICANT CHANGE UP (ref 40–120)
ALT FLD-CCNC: 13 U/L — SIGNIFICANT CHANGE UP
ANION GAP SERPL CALC-SCNC: 16 MMOL/L — SIGNIFICANT CHANGE UP (ref 5–17)
ANION GAP SERPL CALC-SCNC: 20 MMOL/L — HIGH (ref 5–17)
APTT BLD: 34.3 SEC — SIGNIFICANT CHANGE UP (ref 27.5–36.3)
AST SERPL-CCNC: 20 U/L — SIGNIFICANT CHANGE UP
BASOPHILS # BLD AUTO: 0.1 K/UL — SIGNIFICANT CHANGE UP (ref 0–0.2)
BASOPHILS NFR BLD AUTO: 1.1 % — SIGNIFICANT CHANGE UP (ref 0–2)
BILIRUB SERPL-MCNC: 0.5 MG/DL — SIGNIFICANT CHANGE UP (ref 0.4–2)
BLD GP AB SCN SERPL QL: SIGNIFICANT CHANGE UP
BUN SERPL-MCNC: 36 MG/DL — HIGH (ref 8–20)
BUN SERPL-MCNC: 42 MG/DL — HIGH (ref 8–20)
CALCIUM SERPL-MCNC: 10.3 MG/DL — HIGH (ref 8.6–10.2)
CALCIUM SERPL-MCNC: 9.9 MG/DL — SIGNIFICANT CHANGE UP (ref 8.6–10.2)
CHLORIDE SERPL-SCNC: 89 MMOL/L — LOW (ref 98–107)
CHLORIDE SERPL-SCNC: 92 MMOL/L — LOW (ref 98–107)
CO2 SERPL-SCNC: 23 MMOL/L — SIGNIFICANT CHANGE UP (ref 22–29)
CO2 SERPL-SCNC: 25 MMOL/L — SIGNIFICANT CHANGE UP (ref 22–29)
CREAT SERPL-MCNC: 6.27 MG/DL — HIGH (ref 0.5–1.3)
CREAT SERPL-MCNC: 7.2 MG/DL — HIGH (ref 0.5–1.3)
EOSINOPHIL # BLD AUTO: 0.4 K/UL — SIGNIFICANT CHANGE UP (ref 0–0.5)
EOSINOPHIL NFR BLD AUTO: 6.1 % — HIGH (ref 0–6)
GLUCOSE BLDC GLUCOMTR-MCNC: 108 MG/DL — HIGH (ref 70–99)
GLUCOSE BLDC GLUCOMTR-MCNC: 134 MG/DL — HIGH (ref 70–99)
GLUCOSE SERPL-MCNC: 129 MG/DL — HIGH (ref 70–115)
GLUCOSE SERPL-MCNC: 156 MG/DL — HIGH (ref 70–115)
HBA1C BLD-MCNC: 6.5 % — HIGH (ref 4–5.6)
HCT VFR BLD CALC: 39 % — SIGNIFICANT CHANGE UP (ref 37–47)
HGB BLD-MCNC: 12.2 G/DL — SIGNIFICANT CHANGE UP (ref 12–16)
INR BLD: 0.99 RATIO — SIGNIFICANT CHANGE UP (ref 0.88–1.16)
LYMPHOCYTES # BLD AUTO: 1.1 K/UL — SIGNIFICANT CHANGE UP (ref 1–4.8)
LYMPHOCYTES # BLD AUTO: 17.7 % — LOW (ref 20–55)
MCHC RBC-ENTMCNC: 28.6 PG — SIGNIFICANT CHANGE UP (ref 27–31)
MCHC RBC-ENTMCNC: 31.3 G/DL — LOW (ref 32–36)
MCV RBC AUTO: 91.3 FL — SIGNIFICANT CHANGE UP (ref 81–99)
MONOCYTES # BLD AUTO: 0.4 K/UL — SIGNIFICANT CHANGE UP (ref 0–0.8)
MONOCYTES NFR BLD AUTO: 6.9 % — SIGNIFICANT CHANGE UP (ref 3–10)
MRSA PCR RESULT.: SIGNIFICANT CHANGE UP
NEUTROPHILS # BLD AUTO: 4.4 K/UL — SIGNIFICANT CHANGE UP (ref 1.8–8)
NEUTROPHILS NFR BLD AUTO: 68 % — SIGNIFICANT CHANGE UP (ref 37–73)
NT-PROBNP SERPL-SCNC: HIGH PG/ML (ref 0–300)
PLATELET # BLD AUTO: 313 K/UL — SIGNIFICANT CHANGE UP (ref 150–400)
POTASSIUM SERPL-MCNC: 4.8 MMOL/L — SIGNIFICANT CHANGE UP (ref 3.5–5.3)
POTASSIUM SERPL-MCNC: 4.8 MMOL/L — SIGNIFICANT CHANGE UP (ref 3.5–5.3)
POTASSIUM SERPL-SCNC: 4.8 MMOL/L — SIGNIFICANT CHANGE UP (ref 3.5–5.3)
POTASSIUM SERPL-SCNC: 4.8 MMOL/L — SIGNIFICANT CHANGE UP (ref 3.5–5.3)
PREALB SERPL-MCNC: 37 MG/DL — SIGNIFICANT CHANGE UP (ref 18–38)
PROT SERPL-MCNC: 7.3 G/DL — SIGNIFICANT CHANGE UP (ref 6.6–8.7)
PROTHROM AB SERPL-ACNC: 11.4 SEC — SIGNIFICANT CHANGE UP (ref 10–12.9)
RBC # BLD: 4.27 M/UL — LOW (ref 4.4–5.2)
RBC # FLD: 17.1 % — HIGH (ref 11–15.6)
S AUREUS DNA NOSE QL NAA+PROBE: SIGNIFICANT CHANGE UP
SODIUM SERPL-SCNC: 132 MMOL/L — LOW (ref 135–145)
SODIUM SERPL-SCNC: 133 MMOL/L — LOW (ref 135–145)
TSH SERPL-MCNC: 2.27 UIU/ML — SIGNIFICANT CHANGE UP (ref 0.27–4.2)
TYPE + AB SCN PNL BLD: SIGNIFICANT CHANGE UP
WBC # BLD: 6.4 K/UL — SIGNIFICANT CHANGE UP (ref 4.8–10.8)
WBC # FLD AUTO: 6.4 K/UL — SIGNIFICANT CHANGE UP (ref 4.8–10.8)

## 2018-11-07 PROCEDURE — 99233 SBSQ HOSP IP/OBS HIGH 50: CPT

## 2018-11-07 PROCEDURE — 93306 TTE W/DOPPLER COMPLETE: CPT | Mod: 26

## 2018-11-07 PROCEDURE — 99223 1ST HOSP IP/OBS HIGH 75: CPT | Mod: GC,25

## 2018-11-07 PROCEDURE — 90937 HEMODIALYSIS REPEATED EVAL: CPT

## 2018-11-07 PROCEDURE — 99223 1ST HOSP IP/OBS HIGH 75: CPT

## 2018-11-07 RX ORDER — CALCIUM CARBONATE 500(1250)
1 TABLET ORAL ONCE
Qty: 0 | Refills: 0 | Status: COMPLETED | OUTPATIENT
Start: 2018-11-07 | End: 2018-11-07

## 2018-11-07 RX ORDER — SODIUM CHLORIDE 9 MG/ML
3 INJECTION INTRAMUSCULAR; INTRAVENOUS; SUBCUTANEOUS EVERY 8 HOURS
Qty: 0 | Refills: 0 | Status: DISCONTINUED | OUTPATIENT
Start: 2018-11-07 | End: 2018-11-13

## 2018-11-07 RX ADMIN — Medication 1 MILLIGRAM(S): at 17:33

## 2018-11-07 RX ADMIN — SODIUM CHLORIDE 3 MILLILITER(S): 9 INJECTION INTRAMUSCULAR; INTRAVENOUS; SUBCUTANEOUS at 23:08

## 2018-11-07 RX ADMIN — Medication 325 MILLIGRAM(S): at 09:56

## 2018-11-07 RX ADMIN — Medication 0.1 MILLIGRAM(S): at 05:56

## 2018-11-07 RX ADMIN — ATORVASTATIN CALCIUM 20 MILLIGRAM(S): 80 TABLET, FILM COATED ORAL at 23:43

## 2018-11-07 RX ADMIN — Medication 1 TABLET(S): at 09:56

## 2018-11-07 RX ADMIN — Medication 0.1 MILLIGRAM(S): at 23:43

## 2018-11-07 RX ADMIN — PANTOPRAZOLE SODIUM 40 MILLIGRAM(S): 20 TABLET, DELAYED RELEASE ORAL at 05:56

## 2018-11-07 RX ADMIN — Medication 650 MILLIGRAM(S): at 00:20

## 2018-11-07 RX ADMIN — CARVEDILOL PHOSPHATE 12.5 MILLIGRAM(S): 80 CAPSULE, EXTENDED RELEASE ORAL at 17:33

## 2018-11-07 RX ADMIN — Medication 75 MICROGRAM(S): at 05:56

## 2018-11-07 RX ADMIN — Medication 0.1 MILLIGRAM(S): at 15:06

## 2018-11-07 RX ADMIN — CARVEDILOL PHOSPHATE 12.5 MILLIGRAM(S): 80 CAPSULE, EXTENDED RELEASE ORAL at 05:56

## 2018-11-07 RX ADMIN — Medication 1 TABLET(S): at 17:33

## 2018-11-07 NOTE — PROGRESS NOTE ADULT - SUBJECTIVE AND OBJECTIVE BOX
INTERVAL HISTORY: denies CP.  	  MEDICATIONS:  carvedilol 12.5 milliGRAM(s) Oral every 12 hours  cloNIDine 0.1 milliGRAM(s) Oral three times a day  nitroglycerin     SubLingual 0.4 milliGRAM(s) SubLingual every 5 minutes PRN  ALBUTerol    90 MICROgram(s) HFA Inhaler 1 Puff(s) Inhalation every 4 hours  ALBUTerol/ipratropium for Nebulization 3 milliLiter(s) Nebulizer every 6 hours PRN  tiotropium 18 MICROgram(s) Capsule 1 Capsule(s) Inhalation daily  ALPRAZolam 0.25 milliGRAM(s) Oral every 12 hours PRN  aspirin 325 milliGRAM(s) Oral daily  pantoprazole    Tablet 40 milliGRAM(s) Oral before breakfast  atorvastatin 20 milliGRAM(s) Oral at bedtime  dextrose 40% Gel 15 Gram(s) Oral once PRN  dextrose 50% Injectable 12.5 Gram(s) IV Push once  dextrose 50% Injectable 25 Gram(s) IV Push once  dextrose 50% Injectable 25 Gram(s) IV Push once  glucagon  Injectable 1 milliGRAM(s) IntraMuscular once PRN  insulin lispro (HumaLOG) corrective regimen sliding scale   SubCutaneous at bedtime  levothyroxine 75 MICROGram(s) Oral daily  dextrose 5%. 1000 milliLiter(s) IV Continuous <Continuous>  folic acid 1 milliGRAM(s) Oral daily  Nephro-heather 1 Tablet(s) Oral daily        PHYSICAL EXAM:  T(C): 36.9 (11-07-18 @ 05:53), Max: 36.9 (11-06-18 @ 18:45)  HR: 78 (11-07-18 @ 05:53) (60 - 78)  BP: 160/70 (11-07-18 @ 05:53) (134/60 - 160/70)  RR: 18 (11-07-18 @ 05:53) (16 - 20)  SpO2: 98% (11-06-18 @ 22:40) (95% - 99%)  Wt(kg): --  I&O's Summary    Height (cm): 162.56 (11-06 @ 23:33)  Weight (kg): 98.4 (11-06 @ 23:33)  BMI (kg/m2): 37.2 (11-06 @ 23:33)  BSA (m2): 2.02 (11-06 @ 23:33)    Appearance: Normal	  HEENT:   Normal oral mucosa  Cardiovascular: Normal S1 S2, No JVD, No murmurs, No edema  Respiratory: Lungs clear to auscultation	  Psychiatry: A & O x 3, Mood & affect appropriate  Gastrointestinal:  Soft, Non-tender, + BS	  Skin: No rashes, No ecchymoses, No cyanosis  Neurologic: Non-focal  Extremities: Normal range of motion, No clubbing, cyanosis or edema  Vascular: Peripheral pulses palpable 2+ bilaterally    TELEMETRY: SR PVCs	      LABS:	 	                        12.5   7.9   )-----------( 341      ( 06 Nov 2018 11:24 )             39.5     11-07    133<L>  |  92<L>  |  36.0<H>  ----------------------------<  129<H>  4.8   |  25.0  |  6.27<H>    Ca    9.9      07 Nov 2018 06:33    TPro  7.7  /  Alb  4.3  /  TBili  0.4  /  DBili  x   /  AST  25  /  ALT  10  /  AlkPhos  101  11-06    proBNP: Serum Pro-Brain Natriuretic Peptide: 05581 pg/mL (11-06 @ 11:23)      ASSESSMENT/PLAN: KIM LEE is a 63y Female with past medical history significant for ESRD (HD), DM, HL, HTN p/w cp while at HD.  Now pain free.    - Monitor on telemetry  - Repeat EKGs  - Echocardiogram  - Cardiac Catheterization today.   - Rhythm/hemodynamics stable

## 2018-11-07 NOTE — CONSULT NOTE ADULT - SUBJECTIVE AND OBJECTIVE BOX
[facility] DIVISION OF KIDNEY DISEASES AND HYPERTENSION -- INITIAL CONSULT NOTE  --------------------------------------------------------------------------------  HPI:  63 year old female ESRD on HD with substernal chest pain at end of HD on 11/6; sent to [facility], lasted approx 10 min with nausea; AFib on eliquis, seen by cardiology, planned cardiac cath today. No complaints; pt appears well; last HD 11/6.     PAST HISTORY  --------------------------------------------------------------------------------  PAST MEDICAL & SURGICAL HISTORY:  Hypothyroidism, unspecified type  Hemodialysis patient: tues thursday saturday  Renal failure  Hypertension  Diabetes mellitus  A-V fistula  S/P cholecystectomy    FAMILY HISTORY:  Family history of diabetes mellitus    PAST SOCIAL HISTORY:    ALLERGIES & MEDICATIONS  --------------------------------------------------------------------------------  Allergies    No Known Allergies    Intolerances      Standing Inpatient Medications  ALBUTerol    90 MICROgram(s) HFA Inhaler 1 Puff(s) Inhalation every 4 hours  aspirin 325 milliGRAM(s) Oral daily  atorvastatin 20 milliGRAM(s) Oral at bedtime  carvedilol 12.5 milliGRAM(s) Oral every 12 hours  cloNIDine 0.1 milliGRAM(s) Oral three times a day  dextrose 5%. 1000 milliLiter(s) IV Continuous <Continuous>  dextrose 50% Injectable 12.5 Gram(s) IV Push once  dextrose 50% Injectable 25 Gram(s) IV Push once  dextrose 50% Injectable 25 Gram(s) IV Push once  folic acid 1 milliGRAM(s) Oral daily  insulin lispro (HumaLOG) corrective regimen sliding scale   SubCutaneous at bedtime  levothyroxine 75 MICROGram(s) Oral daily  Nephro-heather 1 Tablet(s) Oral daily  pantoprazole    Tablet 40 milliGRAM(s) Oral before breakfast  tiotropium 18 MICROgram(s) Capsule 1 Capsule(s) Inhalation daily    PRN Inpatient Medications  ALBUTerol/ipratropium for Nebulization 3 milliLiter(s) Nebulizer every 6 hours PRN  ALPRAZolam 0.25 milliGRAM(s) Oral every 12 hours PRN  dextrose 40% Gel 15 Gram(s) Oral once PRN  glucagon  Injectable 1 milliGRAM(s) IntraMuscular once PRN  nitroglycerin     SubLingual 0.4 milliGRAM(s) SubLingual every 5 minutes PRN      REVIEW OF SYSTEMS  --------------------------------------------------------------------------------  Gen: No weight changes, fatigue, fevers/chills, weakness  Skin: No rashes  Head/Eyes/Ears/Mouth: No headache; Normal hearing; Normal vision w/o blurriness; No sinus pain/discomfort, sore throat  Respiratory: No dyspnea, cough, wheezing, hemoptysis  CV: No chest pain, PND, orthopnea  GI: No abdominal pain, diarrhea, constipation, nausea, vomiting, melena, hematochezia  : No increased frequency, dysuria, hematuria, nocturia  MSK: No joint pain/swelling; no back pain; no edema  Neuro: No dizziness/lightheadedness, weakness, seizures, numbness, tingling  Heme: No easy bruising or bleeding  Endo: No heat/cold intolerance  Psych: No significant nervousness, anxiety, stress, depression    All other systems were reviewed and are negative, except as noted.    VITALS/PHYSICAL EXAM  --------------------------------------------------------------------------------  T(C): 37 (11-07-18 @ 11:52), Max: 37 (11-07-18 @ 11:52)  HR: 75 (11-07-18 @ 11:52) (60 - 78)  BP: 167/67 (11-07-18 @ 11:52) (132/70 - 167/67)  RR: 16 (11-07-18 @ 11:52) (16 - 20)  SpO2: 98% (11-07-18 @ 11:52) (95% - 99%)  Wt(kg): --  Height (cm): 162.56 (11-06-18 @ 23:33)  Weight (kg): 98.4 (11-06-18 @ 23:33)  BMI (kg/m2): 37.2 (11-06-18 @ 23:33)  BSA (m2): 2.02 (11-06-18 @ 23:33)      Physical Exam:  	Gen: NAD, well-appearing  	HEENT: PERRL, supple neck, clear oropharynx  	Pulm: CTA B/L  	CV: RRR, S1S2; no rub  	Back: No spinal or CVA tenderness; no sacral edema  	Abd: +BS, soft, nontender/nondistended  	: No suprapubic tenderness  	UE: Warm, FROM, no clubbing, intact strength; no edema; no asterixis  	LE: Warm, FROM, no clubbing, intact strength; no edema  	Neuro: No focal deficits, intact gait  	Psych: Normal affect and mood  	Skin: Warm, without rashes  LABS/STUDIES  --------------------------------------------------------------------------------              12.5   7.9   >-----------<  341      [11-06-18 @ 11:24]              39.5     133  |  92  |  36.0  ----------------------------<  129      [11-07-18 @ 06:33]  4.8   |  25.0  |  6.27        Ca     9.9     [11-07-18 @ 06:33]    TPro  7.7  /  Alb  4.3  /  TBili  0.4  /  DBili  x   /  AST  25  /  ALT  10  /  AlkPhos  101  [11-06-18 @ 11:24]    PT/INR: PT 12.0 , INR 1.04       [11-06-18 @ 11:24]  PTT: 36.0       [11-06-18 @ 11:24]    Troponin 0.07      [11-06-18 @ 21:53]    Creatinine Trend:  SCr 6.27 [11-07 @ 06:33]  SCr 4.11 [11-06 @ 11:24]        HbA1c 6.4      [09-26-18 @ 05:24]  TSH 2.31      [09-26-18 @ 15:24]    HBsAb Nonreact      [12-12-15 @ 18:07]  HBsAg Nonreact      [12-12-15 @ 18:07]  HBcAb Nonreact      [12-12-15 @ 18:07]  HCV 0.07, Nonreact      [03-18-16 @ 18:19]

## 2018-11-07 NOTE — PROGRESS NOTE ADULT - SUBJECTIVE AND OBJECTIVE BOX
Nurse Practitioner Progress note:   s/p LHC revealing multi vessel disease. Dr Cavazos called by Dr Hernandez for CTS referral.    Patient A&O x3  Lungs CTA  S1S2  Patient feels well.  Denies chest pain, shortness of breath, dizziness or palpitations at this time  Right radial hemoband in place.  Procedure site CDI, no bleeding, no hematoma, able to move all digits with capillary refill < 3 seconds, fingers warm      T(C): 37 (11-07-18 @ 11:52), Max: 37 (11-07-18 @ 11:52)  HR: 62 (11-07-18 @ 15:10) (60 - 78)  BP: 143/60 (11-07-18 @ 15:10) (132/70 - 167/67)  RR: 14 (11-07-18 @ 15:10) (12 - 20)  SpO2: 97% (11-07-18 @ 15:10) (95% - 99%)    CBC Full  -  ( 06 Nov 2018 11:24 )  WBC Count : 7.9 K/uL  Hemoglobin : 12.5 g/dL  Hematocrit : 39.5 %  Platelet Count - Automated : 341 K/uL  Mean Cell Volume : 91.2 fl  Mean Cell Hemoglobin : 28.9 pg  Mean Cell Hemoglobin Concentration : 31.6 g/dL      11-07    133<L>  |  92<L>  |  36.0<H>  ----------------------------<  129<H>  4.8   |  25.0  |  6.27<H>    Ca    9.9      07 Nov 2018 06:33    TPro  7.7  /  Alb  4.3  /  TBili  0.4  /  DBili  x   /  AST  25  /  ALT  10  /  AlkPhos  101  11-06    CARDIAC MARKERS ( 06 Nov 2018 21:53 )  x     / 0.07 ng/mL / x     / x     / x      CARDIAC MARKERS ( 06 Nov 2018 15:27 )  x     / 0.08 ng/mL / x     / x     / x      CARDIAC MARKERS ( 06 Nov 2018 11:24 )  x     / 0.07 ng/mL / x     / x     / x              MEDICATIONS  (STANDING):  ALBUTerol    90 MICROgram(s) HFA Inhaler 1 Puff(s) Inhalation every 4 hours  aspirin 325 milliGRAM(s) Oral daily  atorvastatin 20 milliGRAM(s) Oral at bedtime  carvedilol 12.5 milliGRAM(s) Oral every 12 hours  cloNIDine 0.1 milliGRAM(s) Oral three times a day  dextrose 5%. 1000 milliLiter(s) (50 mL/Hr) IV Continuous <Continuous>  dextrose 50% Injectable 12.5 Gram(s) IV Push once  dextrose 50% Injectable 25 Gram(s) IV Push once  dextrose 50% Injectable 25 Gram(s) IV Push once  folic acid 1 milliGRAM(s) Oral daily  insulin lispro (HumaLOG) corrective regimen sliding scale   SubCutaneous at bedtime  levothyroxine 75 MICROGram(s) Oral daily  Nephro-heather 1 Tablet(s) Oral daily  pantoprazole    Tablet 40 milliGRAM(s) Oral before breakfast  tiotropium 18 MICROgram(s) Capsule 1 Capsule(s) Inhalation daily    MEDICATIONS  (PRN):  ALBUTerol/ipratropium for Nebulization 3 milliLiter(s) Nebulizer every 6 hours PRN Shortness of Breath and/or Wheezing  ALPRAZolam 0.25 milliGRAM(s) Oral every 12 hours PRN anxiety  dextrose 40% Gel 15 Gram(s) Oral once PRN Blood Glucose LESS THAN 70 milliGRAM(s)/deciliter  glucagon  Injectable 1 milliGRAM(s) IntraMuscular once PRN Glucose LESS THAN 70 milligrams/deciliter  nitroglycerin     SubLingual 0.4 milliGRAM(s) SubLingual every 5 minutes PRN Chest Pain        HPI:  62 y/o female who was getting her dialysis today and towards the end of dialysis she felt mid sternal pressure type cp, no radiation, lasted for about 10 minutes, + nausea, no diaphoresis no sob. no abd. pain. no cough, no fever. no diarrhea. pt. reports no cp at the time of admission.   As per daughter she is on Eliquis for her afib. Cardiologist plans to do cardiac cath in am and wants AC to be on hold. (06 Nov 2018 16:55)    now s/p [facility]  reveals TVD    ASSESSMENT/PLAN:    Coronary artery disease  -Return to 4 tower bed  -VS, labs, diet, activity as per CTS   -IV hydration  -May resume all pre cath medications

## 2018-11-07 NOTE — PROGRESS NOTE ADULT - SUBJECTIVE AND OBJECTIVE BOX
Patient is a 63y old  Female who presents with a chief complaint of chest pain (07 Nov 2018 12:10)      HEALTH ISSUES - PROBLEM Dx:  Chest pain  ESRD  HTN  Hypothyroidism     INTERVAL HPI/OVERNIGHT EVENTS:  Patient seen and examined at bedside. No acute events overnight. Patient states she is feeling better, no episodes of chest pain since she was home. Did feel uncomfortable when she had the episode bc it lasted about ten minutes. Son at bedside and d/w him and the patient in regards to the plan of care which includes cardiac cath. Both in agreement with cath and thereafter pt to have HD. Currently has slightly acid reflux for which the pt was given tums with relief of sx. Patient denies chest pain, SOB, abd pain, N/V, fever, chills, dysuria or any other complaints. All remainder ROS negative.     Vital Signs Last 24 Hrs  T(C): 37 (07 Nov 2018 11:52), Max: 37 (07 Nov 2018 11:52)  T(F): 98.6 (07 Nov 2018 11:52), Max: 98.6 (07 Nov 2018 11:52)  HR: 75 (07 Nov 2018 11:52) (60 - 78)  BP: 167/67 (07 Nov 2018 11:52) (132/70 - 167/67)  BP(mean): --  RR: 16 (07 Nov 2018 11:52) (16 - 20)  SpO2: 98% (07 Nov 2018 11:52) (95% - 99%)    CAPILLARY BLOOD GLUCOSE  POCT Blood Glucose.: 134 mg/dL (07 Nov 2018 06:21)  POCT Blood Glucose.: 168 mg/dL (06 Nov 2018 22:33)      CONSTITUTIONAL: Well appearing, well nourished, awake, alert and in no apparent distress  CARDIAC: Normal rate, regular rhythm.  Heart sounds S1, S2.  No murmurs, rubs or gallops   RESPIRATORY: Breath sounds clear and equal bilaterally. No wheezes, rhales or rhonchi  GASTROENTEROLOGY: Soft nt nd bs + normoactive   EXTREMITIES: No edema, cyanosis or deformity   L AVF +thrill   NEUROLOGICAL: Alert and oriented, no focal deficits, no motor or sensory deficits.  SKIN: No rash, skin turgor wnl    MEDICATIONS  (STANDING):  ALBUTerol    90 MICROgram(s) HFA Inhaler 1 Puff(s) Inhalation every 4 hours  aspirin 325 milliGRAM(s) Oral daily  atorvastatin 20 milliGRAM(s) Oral at bedtime  carvedilol 12.5 milliGRAM(s) Oral every 12 hours  cloNIDine 0.1 milliGRAM(s) Oral three times a day  dextrose 5%. 1000 milliLiter(s) (50 mL/Hr) IV Continuous <Continuous>  dextrose 50% Injectable 12.5 Gram(s) IV Push once  dextrose 50% Injectable 25 Gram(s) IV Push once  dextrose 50% Injectable 25 Gram(s) IV Push once  folic acid 1 milliGRAM(s) Oral daily  insulin lispro (HumaLOG) corrective regimen sliding scale   SubCutaneous at bedtime  levothyroxine 75 MICROGram(s) Oral daily  Nephro-heather 1 Tablet(s) Oral daily  pantoprazole    Tablet 40 milliGRAM(s) Oral before breakfast  tiotropium 18 MICROgram(s) Capsule 1 Capsule(s) Inhalation daily    MEDICATIONS  (PRN):  ALBUTerol/ipratropium for Nebulization 3 milliLiter(s) Nebulizer every 6 hours PRN Shortness of Breath and/or Wheezing  ALPRAZolam 0.25 milliGRAM(s) Oral every 12 hours PRN anxiety  dextrose 40% Gel 15 Gram(s) Oral once PRN Blood Glucose LESS THAN 70 milliGRAM(s)/deciliter  glucagon  Injectable 1 milliGRAM(s) IntraMuscular once PRN Glucose LESS THAN 70 milligrams/deciliter  nitroglycerin     SubLingual 0.4 milliGRAM(s) SubLingual every 5 minutes PRN Chest Pain      LABS:                        12.5   7.9   )-----------( 341      ( 06 Nov 2018 11:24 )             39.5     11-07    133<L>  |  92<L>  |  36.0<H>  ----------------------------<  129<H>  4.8   |  25.0  |  6.27<H>    Ca    9.9      07 Nov 2018 06:33    TPro  7.7  /  Alb  4.3  /  TBili  0.4  /  DBili  x   /  AST  25  /  ALT  10  /  AlkPhos  101  11-06    PT/INR - ( 06 Nov 2018 11:24 )   PT: 12.0 sec;   INR: 1.04 ratio         PTT - ( 06 Nov 2018 11:24 )  PTT:36.0 sec    LIVER FUNCTIONS - ( 06 Nov 2018 11:24 )  Alb: 4.3 g/dL / Pro: 7.7 g/dL / ALK PHOS: 101 U/L / ALT: 10 U/L / AST: 25 U/L / GGT: x             RADIOLOGY & ADDITIONAL TESTS:  No new imaging studies Patient is a 63y old  Female who presents with a chief complaint of chest pain (07 Nov 2018 12:10)      HEALTH ISSUES - PROBLEM Dx:  Chest pain  ESRD  HTN  Hypothyroidism     INTERVAL HPI/OVERNIGHT EVENTS:  Patient seen and examined at bedside. No acute events overnight. Patient states she is feeling better, no episodes of chest pain since she was home. Did feel uncomfortable when she had the episode bc it lasted about ten minutes. Son at bedside and d/w him and the patient in regards to the plan of care which includes cardiac cath. Both in agreement with cath and thereafter pt to have HD. Currently has slightly acid reflux for which the pt was given tums with relief of sx. Aside from this pt has no other complaints. Patient denies chest pain, SOB, abd pain, N/V, fever, chills, dysuria or any other complaints. All remainder ROS negative.     Vital Signs Last 24 Hrs  T(C): 37 (07 Nov 2018 11:52), Max: 37 (07 Nov 2018 11:52)  T(F): 98.6 (07 Nov 2018 11:52), Max: 98.6 (07 Nov 2018 11:52)  HR: 75 (07 Nov 2018 11:52) (60 - 78)  BP: 167/67 (07 Nov 2018 11:52) (132/70 - 167/67)  BP(mean): --  RR: 16 (07 Nov 2018 11:52) (16 - 20)  SpO2: 98% (07 Nov 2018 11:52) (95% - 99%)    CAPILLARY BLOOD GLUCOSE  POCT Blood Glucose.: 134 mg/dL (07 Nov 2018 06:21)  POCT Blood Glucose.: 168 mg/dL (06 Nov 2018 22:33)      CONSTITUTIONAL: Well appearing, well nourished, awake, alert and in no apparent distress  CARDIAC: Normal rate, regular rhythm.  Heart sounds S1, S2.  No murmurs, rubs or gallops   RESPIRATORY: Breath sounds clear and equal bilaterally. No wheezes, rhales or rhonchi  GASTROENTEROLOGY: Soft nt nd bs + normoactive   EXTREMITIES: No edema, cyanosis or deformity   L AVF +thrill   NEUROLOGICAL: Alert and oriented, no focal deficits, no motor or sensory deficits.  SKIN: No rash, skin turgor wnl    MEDICATIONS  (STANDING):  ALBUTerol    90 MICROgram(s) HFA Inhaler 1 Puff(s) Inhalation every 4 hours  aspirin 325 milliGRAM(s) Oral daily  atorvastatin 20 milliGRAM(s) Oral at bedtime  carvedilol 12.5 milliGRAM(s) Oral every 12 hours  cloNIDine 0.1 milliGRAM(s) Oral three times a day  dextrose 5%. 1000 milliLiter(s) (50 mL/Hr) IV Continuous <Continuous>  dextrose 50% Injectable 12.5 Gram(s) IV Push once  dextrose 50% Injectable 25 Gram(s) IV Push once  dextrose 50% Injectable 25 Gram(s) IV Push once  folic acid 1 milliGRAM(s) Oral daily  insulin lispro (HumaLOG) corrective regimen sliding scale   SubCutaneous at bedtime  levothyroxine 75 MICROGram(s) Oral daily  Nephro-heather 1 Tablet(s) Oral daily  pantoprazole    Tablet 40 milliGRAM(s) Oral before breakfast  tiotropium 18 MICROgram(s) Capsule 1 Capsule(s) Inhalation daily    MEDICATIONS  (PRN):  ALBUTerol/ipratropium for Nebulization 3 milliLiter(s) Nebulizer every 6 hours PRN Shortness of Breath and/or Wheezing  ALPRAZolam 0.25 milliGRAM(s) Oral every 12 hours PRN anxiety  dextrose 40% Gel 15 Gram(s) Oral once PRN Blood Glucose LESS THAN 70 milliGRAM(s)/deciliter  glucagon  Injectable 1 milliGRAM(s) IntraMuscular once PRN Glucose LESS THAN 70 milligrams/deciliter  nitroglycerin     SubLingual 0.4 milliGRAM(s) SubLingual every 5 minutes PRN Chest Pain      LABS:                        12.5   7.9   )-----------( 341      ( 06 Nov 2018 11:24 )             39.5     11-07    133<L>  |  92<L>  |  36.0<H>  ----------------------------<  129<H>  4.8   |  25.0  |  6.27<H>    Ca    9.9      07 Nov 2018 06:33    TPro  7.7  /  Alb  4.3  /  TBili  0.4  /  DBili  x   /  AST  25  /  ALT  10  /  AlkPhos  101  11-06    PT/INR - ( 06 Nov 2018 11:24 )   PT: 12.0 sec;   INR: 1.04 ratio         PTT - ( 06 Nov 2018 11:24 )  PTT:36.0 sec    LIVER FUNCTIONS - ( 06 Nov 2018 11:24 )  Alb: 4.3 g/dL / Pro: 7.7 g/dL / ALK PHOS: 101 U/L / ALT: 10 U/L / AST: 25 U/L / GGT: x             RADIOLOGY & ADDITIONAL TESTS:  No new imaging studies

## 2018-11-07 NOTE — CONSULT NOTE ADULT - SUBJECTIVE AND OBJECTIVE BOX
History of Present Illness:  HPI:  64 y/o female who was getting her dialysis today and towards the end of dialysis she felt mid sternal pressure type cp, no radiation, lasted for about 10 minutes, + nausea, no diaphoresis no sob. no abd. pain. no cough, no fever. no diarrhea. pt. reports no cp at the time of admission.   As per daughter she is on Eliquis for her afib. Cardiologist plans to do cardiac cath in am and wants AC to be on hold. (06 Nov 2018 16:55)      Current Subjective Assessment:    Past Medical History  Hypothyroidism, unspecified type  Hemodialysis patient: tues thursday saturday  Renal failure  Hypertension  Diabetes mellitus      Past Surgical History  A-V fistula  S/P cholecystectomy      MEDICATIONS  (STANDING):  ALBUTerol    90 MICROgram(s) HFA Inhaler 1 Puff(s) Inhalation every 4 hours  aspirin 325 milliGRAM(s) Oral daily  atorvastatin 20 milliGRAM(s) Oral at bedtime  carvedilol 12.5 milliGRAM(s) Oral every 12 hours  cloNIDine 0.1 milliGRAM(s) Oral three times a day  dextrose 5%. 1000 milliLiter(s) (50 mL/Hr) IV Continuous <Continuous>  dextrose 50% Injectable 12.5 Gram(s) IV Push once  dextrose 50% Injectable 25 Gram(s) IV Push once  dextrose 50% Injectable 25 Gram(s) IV Push once  folic acid 1 milliGRAM(s) Oral daily  insulin lispro (HumaLOG) corrective regimen sliding scale   SubCutaneous at bedtime  levothyroxine 75 MICROGram(s) Oral daily  Nephro-heather 1 Tablet(s) Oral daily  pantoprazole    Tablet 40 milliGRAM(s) Oral before breakfast  sodium chloride 0.9% lock flush 3 milliLiter(s) IV Push every 8 hours  tiotropium 18 MICROgram(s) Capsule 1 Capsule(s) Inhalation daily    MEDICATIONS  (PRN):  ALBUTerol/ipratropium for Nebulization 3 milliLiter(s) Nebulizer every 6 hours PRN Shortness of Breath and/or Wheezing  ALPRAZolam 0.25 milliGRAM(s) Oral every 12 hours PRN anxiety  dextrose 40% Gel 15 Gram(s) Oral once PRN Blood Glucose LESS THAN 70 milliGRAM(s)/deciliter  glucagon  Injectable 1 milliGRAM(s) IntraMuscular once PRN Glucose LESS THAN 70 milligrams/deciliter  nitroglycerin     SubLingual 0.4 milliGRAM(s) SubLingual every 5 minutes PRN Chest Pain    Antiplatelet therapy:            Aspirin               Last dose/amt: 325mg    Allergies: No Known Allergies      SOCIAL HISTORY:  Smoker: [ ] Yes  [ ] No        PACK YEARS:                         WHEN QUIT?  ETOH use: [ ] Yes  [ ] No              FREQUENCY / QUANTITY:  Ilicit Drug use:  [ ] Yes  [ ] No  Occupation:  Live with:  Assist device use:    PMD:  Referring Cardiologist:    Relevant Family History  FAMILY HISTORY:  Family history of diabetes mellitus      Review of Systems  GENERAL:  Fevers[] chills[] sweats[] fatigue[] weight loss[] weight gain []                                      [ ] NEGATIVE  NEURO:  parathesias[] seizures []  syncope []  confusion []                                                                [ ] NEGATIVE                 EYES: glasses[]  blurry vision[]  discharge[] pain[] glaucoma []                                                           [ ] NEGATIVE                 ENMT:  difficulty hearing []  vertigo[]  dysphagia[] epistaxis[] recent dental work []                     [ ] NEGATIVE                 CV:  chest pain[] palpitations[] HOYOS [] diaphoresis [] edema[]                                                           [ ] NEGATIVE                                 RESPIRATORY:  wheezing[] SOB[] cough [] sputum[] hemoptysis[]                                                   [ ] NEGATIVE               GI:  nausea[]  vomiting []  diarrhea[] constipation [] melena []                                                          [ ] NEGATIVE            : hematuria[ ]  dysuria[ ] urgency[] incontinence[]                                                                          [ ] NEGATIVE                    MUSKULOSKELETAL:  arthritis[ ]  joint swelling [ ] muscle weakness [ ]                                            [ ] NEGATIVE                     SKIN/BREAST:  rash[ ] itching [ ]  hair loss[ ] masses[ ]                                                                          [ ] NEGATIVE                     PSYCH:  dementia [ ] depression [ ] anxiety[ ]                                                                                          [ ] NEGATIVE                        HEME/LYMPH:  bruises easily[ ] enlarged lymph nodes[ ] tender lymph nodes[ ]                           [ ] NEGATIVE                      ENDOCRINE:  cold intolerance[ ] heat intolerance[ ] polydipsia[ ]                                                      [ ] NEGATIVE                        Telemetry: SR    EKG < from: 12 Lead ECG (11.06.18 @ 09:21) >    Diagnosis Line Normal sinus rhythm  Left ventricular hypertrophy with repolarization abnormality  Prolonged QT  Abnormal ECG    < end of copied text >      PHYSICAL EXAM  Vital Signs Last 24 Hrs  T(C): 37 (07 Nov 2018 11:52), Max: 37 (07 Nov 2018 11:52)  T(F): 98.6 (07 Nov 2018 11:52), Max: 98.6 (07 Nov 2018 11:52)  HR: 62 (07 Nov 2018 15:10) (60 - 78)  BP: 143/60 (07 Nov 2018 15:10) (132/70 - 167/67)  BP(mean): --  RR: 14 (07 Nov 2018 15:10) (12 - 20)  SpO2: 97% (07 Nov 2018 15:10) (95% - 99%)    General: Well nourished, well developed, no acute distress.                                                         Neuro: Normal exam oriented to person/place & time with no focal motor or sensory  deficits.                    Eyes: Normal exam of conjunctiva & lids, pupils equally reactive.   ENT: Normal exam of nasal/oral mucosa with absence of cyanosis.   Neck: Normal exam of jugular veins, trachea & thyroid.   Chest: Normal lung exam with good air movement absence of wheezes, rales, or rhonchi:                                                                          CV:  Auscultation: normal [ ] S3[ ] S4[ ] Irregular [ ] Rub[ ] Clicks[ ]  Murmurs none:[ ]systolic [ ]  diastolic [ ] holosystolic [ ]  Carotids: No Bruits[ ] Other____________ Abdominal Aorta: normal [ ] nonpalpable[ ]                                                                         GI: Normal exam of abdomen, liver & spleen with no noted masses or tenderness.                                                                                              Extremities: Normal no evidence of cyanosis or deformity Edema: none[ ]trace[ ]1+[ ]2+[ ]3+[ ]4+[ ]  Lower Extremity Pulses: Right[ ] Left[ ]Varicosities[ ]  SKIN : Normal exam to inspection & palpation.                                                           LABS:                        12.5   7.9   )-----------( 341      ( 06 Nov 2018 11:24 )             39.5     11-07    133<L>  |  92<L>  |  36.0<H>  ----------------------------<  129<H>  4.8   |  25.0  |  6.27<H>    Ca    9.9      07 Nov 2018 06:33    TPro  7.7  /  Alb  4.3  /  TBili  0.4  /  DBili  x   /  AST  25  /  ALT  10  /  AlkPhos  101  11-06    PT/INR - ( 06 Nov 2018 11:24 )   PT: 12.0 sec;   INR: 1.04 ratio         PTT - ( 06 Nov 2018 11:24 )  PTT:36.0 sec    CARDIAC MARKERS ( 06 Nov 2018 21:53 )  x     / 0.07 ng/mL / x     / x     / x      CARDIAC MARKERS ( 06 Nov 2018 15:27 )  x     / 0.08 ng/mL / x     / x     / x      CARDIAC MARKERS ( 06 Nov 2018 11:24 )  x     / 0.07 ng/mL / x     / x     / x              Cardiac Cath: < from: Cardiac Cath Lab - Adult (11.07.18 @ 13:34) >  VENTRICLES: Analysis of regional contractile function demonstrated severe  diaphragmatic hypokinesis. EF estimated was 40 %.  CORONARY VESSELS: The coronary circulation is co-dominant.  LM:   --  LM: Normal.  LAD:   --  Proximal LAD: There was a discrete 60 % stenosis justafter D1.  --  Distal LAD: There was a tubular 80 % stenosis in the proximal third of  the vessel segment. The lesion was irregularly contoured and eccentric.  There was AIYANA grade 3 flow through the vessel (brisk flow).  --  D1: There was a [id number] % stenosis in the proximal third of the  vessel segment. The lesion was eccentric. There was AIYANA grade 3 flow  through the vessel (brisk flow).  CX:   --  Proximal circumflex: There was a discrete 60 % stenosis just  after OM1.  --  OM1: There was a discrete 80 % stenosis in the proximal third of the  vessel segment. There was AIYANA grade 3 flow through the vessel (brisk  flow).  --  OM2: There was a 100 % stenosis. This lesion is a chronic total  occlusion.  RCA:   --  Mid RCA: There was a discrete 80 % stenosis. The lesion was hazy  and eccentric. There was AIYANA grade 3 flow through the vessel (brisk  flow).  COMPLICATIONS: No complications occurred during the cath lab visit.  DIAGNOSTIC IMPRESSIONS: - Severe 3 vessel CAD  - OM2 is chronically occluded  - Moderate LV systolic dysfunction with regional wall motion abnormality  - Mildly elevated LVEDP  DIAGNOSTIC RECOMMENDATIONS: - CTS evaluation with Dr. Cavazos for CABG  - Aggressive medical management and risk factor modification    < end of copied text >      TTE : < from: TTE Echo w/Cont Complete (09.25.18 @ 15:04) >  Summary:   1. Left ventricular ejection fraction, by visual estimation, is 45 to   50%.   2. Technically difficult study.   3. Mildly decreased global left ventricular systolic function.   4. Basal inferolateral segment, mid anterolateral segment, and basal   inferior segment are abnormal as described above.   5. Mildly increased LV wall thickness.   6. Normal left ventricular internal cavity size.   7. There is no evidence of pericardial effusion.   8. Moderate mitral annular calcification.   9. Thickening and calcification of the posterior mitral valve leaflet.  10. Endocardial visualization was enhanced with intravenous echo contrast.  11. There is severe aortic root calcification.    < end of copied text > History of Present Illness:  HPI:  62 y/o female who was getting her dialysis today and towards the end of dialysis she felt mid sternal pressure type cp, no radiation, lasted for about 10 minutes, + nausea, no diaphoresis no sob. no abd. pain. no cough, no fever. no diarrhea. pt. reports no cp at the time of admission.   As per daughter she is on Eliquis for her afib. Cardiologist plans to do cardiac cath in am and wants AC to be on hold. (06 Nov 2018 16:55)      Current Subjective Assessment: No current chest pain. Patient seen and examined with the  at the bedside.     Past Medical History  Hypothyroidism, unspecified type  Hemodialysis patient: tues thursday saturday  Renal failure  Hypertension  Diabetes mellitus      Past Surgical History  A-V fistula  S/P cholecystectomy      MEDICATIONS  (STANDING):  ALBUTerol    90 MICROgram(s) HFA Inhaler 1 Puff(s) Inhalation every 4 hours  aspirin 325 milliGRAM(s) Oral daily  atorvastatin 20 milliGRAM(s) Oral at bedtime  carvedilol 12.5 milliGRAM(s) Oral every 12 hours  cloNIDine 0.1 milliGRAM(s) Oral three times a day  dextrose 5%. 1000 milliLiter(s) (50 mL/Hr) IV Continuous <Continuous>  dextrose 50% Injectable 12.5 Gram(s) IV Push once  dextrose 50% Injectable 25 Gram(s) IV Push once  dextrose 50% Injectable 25 Gram(s) IV Push once  folic acid 1 milliGRAM(s) Oral daily  insulin lispro (HumaLOG) corrective regimen sliding scale   SubCutaneous at bedtime  levothyroxine 75 MICROGram(s) Oral daily  Nephro-heather 1 Tablet(s) Oral daily  pantoprazole    Tablet 40 milliGRAM(s) Oral before breakfast  sodium chloride 0.9% lock flush 3 milliLiter(s) IV Push every 8 hours  tiotropium 18 MICROgram(s) Capsule 1 Capsule(s) Inhalation daily    MEDICATIONS  (PRN):  ALBUTerol/ipratropium for Nebulization 3 milliLiter(s) Nebulizer every 6 hours PRN Shortness of Breath and/or Wheezing  ALPRAZolam 0.25 milliGRAM(s) Oral every 12 hours PRN anxiety  dextrose 40% Gel 15 Gram(s) Oral once PRN Blood Glucose LESS THAN 70 milliGRAM(s)/deciliter  glucagon  Injectable 1 milliGRAM(s) IntraMuscular once PRN Glucose LESS THAN 70 milligrams/deciliter  nitroglycerin     SubLingual 0.4 milliGRAM(s) SubLingual every 5 minutes PRN Chest Pain    Antiplatelet therapy:            Aspirin               Last dose/amt: 325mg    Allergies: No Known Allergies      SOCIAL HISTORY:  Smoker: [ ] Yes  [x ] No        PACK YEARS:                         WHEN QUIT?  ETOH use: [ ] Yes  [ x] No              FREQUENCY / QUANTITY:  Ilicit Drug use:  [ ] Yes  [x ] No  Occupation: none  Live with: family  Assist device use: walker, but wheelchair for distances    PMD: In-Raheel Johnson  Referring Cardiologist: Yo Lee    Relevant Family History  FAMILY HISTORY:  Family history of diabetes mellitus      Review of Systems  GENERAL:  Fevers[] chills[] sweats[] fatigue[x] weight loss[x] weight gain []                                      [ ] NEGATIVE  NEURO:  parathesias[] seizures []  syncope []  confusion []                                                                [x ] NEGATIVE                 EYES: glasses[]  blurry vision[]  discharge[] pain[] glaucoma []                                                           [ x] NEGATIVE                 ENMT:  difficulty hearing []  vertigo[]  dysphagia[] epistaxis[] recent dental work []                            [x ] NEGATIVE                 CV:  chest pain[x] palpitations[] HOYOS [] diaphoresis [] edema[x]                                                           [ ] NEGATIVE                                 RESPIRATORY:  wheezing[] SOB[x] cough [] sputum[] hemoptysis[]                                                   [ ] NEGATIVE               GI:  nausea[x]  vomiting []  diarrhea[] constipation [] melena []                                                          [ ] NEGATIVE            : hematuria[ ]  dysuria[ ] urgency[] incontinence[]                                                                          [x ] NEGATIVE                    MUSKULOSKELETAL:  arthritis[ ]  joint swelling [ ] muscle weakness [x ]                                            [ ] NEGATIVE                     SKIN/BREAST:  rash[ ] itching [ ]  hair loss[ ] masses[ ]                                                                          [ x] NEGATIVE                     PSYCH:  dementia [ ] depression [ ] anxiety[ ]                                                                                          [ x] NEGATIVE                        HEME/LYMPH:  bruises easily[ ] enlarged lymph nodes[ ] tender lymph nodes[ ]                           [ x] NEGATIVE                      ENDOCRINE:  cold intolerance[ ] heat intolerance[ ] polydipsia[ ]                                                      [x ] NEGATIVE                        Telemetry: SR    EKG < from: 12 Lead ECG (11.06.18 @ 09:21) >    Diagnosis Line Normal sinus rhythm  Left ventricular hypertrophy with repolarization abnormality  Prolonged QT  Abnormal ECG    < end of copied text >      PHYSICAL EXAM  Vital Signs Last 24 Hrs  T(C): 37 (07 Nov 2018 11:52), Max: 37 (07 Nov 2018 11:52)  T(F): 98.6 (07 Nov 2018 11:52), Max: 98.6 (07 Nov 2018 11:52)  HR: 62 (07 Nov 2018 15:10) (60 - 78)  BP: 143/60 (07 Nov 2018 15:10) (132/70 - 167/67)  BP(mean): --  RR: 14 (07 Nov 2018 15:10) (12 - 20)  SpO2: 97% (07 Nov 2018 15:10) (95% - 99%)    General: Obese, well developed, no acute distress.                                                         Neuro: Normal exam oriented to person/place & time with no focal motor or sensory  deficits.                    Eyes: Normal exam of conjunctiva & lids, pupils equally reactive.   ENT: Normal exam of nasal/oral mucosa with absence of cyanosis.   Neck: Normal exam of jugular veins, trachea & thyroid.   Chest: few crackles b/l bases                                                                          CV:  Auscultation: normal [ x] S3[ ] S4[ ] Irregular [ ] Rub[ ] Clicks[ ]  Murmurs none:[x ]systolic [ ]  diastolic [ ] holosystolic [ ]  Carotids: No Bruits[x ] Other____________ Abdominal Aorta: normal [ ] nonpalpable[ ]                                                                         GI: Normal exam of abdomen, liver & spleen with no noted masses or tenderness.                                                                                              Extremities: Normal no evidence of cyanosis or deformity Edema: none[x ]trace[ ]1+[ ]2+[ ]3+[ ]4+[ ] L forearm av fistula  Lower Extremity Pulses: Right[ +] Left[+ ]Varicosities[none ]  SKIN : Normal exam to inspection & palpation.                                                           LABS:                        12.5   7.9   )-----------( 341      ( 06 Nov 2018 11:24 )             39.5     11-07    133<L>  |  92<L>  |  36.0<H>  ----------------------------<  129<H>  4.8   |  25.0  |  6.27<H>    Ca    9.9      07 Nov 2018 06:33    TPro  7.7  /  Alb  4.3  /  TBili  0.4  /  DBili  x   /  AST  25  /  ALT  10  /  AlkPhos  101  11-06    PT/INR - ( 06 Nov 2018 11:24 )   PT: 12.0 sec;   INR: 1.04 ratio         PTT - ( 06 Nov 2018 11:24 )  PTT:36.0 sec    CARDIAC MARKERS ( 06 Nov 2018 21:53 )  x     / 0.07 ng/mL / x     / x     / x      CARDIAC MARKERS ( 06 Nov 2018 15:27 )  x     / 0.08 ng/mL / x     / x     / x      CARDIAC MARKERS ( 06 Nov 2018 11:24 )  x     / 0.07 ng/mL / x     / x     / x              Cardiac Cath: < from: Cardiac Cath Lab - Adult (11.07.18 @ 13:34) >  VENTRICLES: Analysis of regional contractile function demonstrated severe  diaphragmatic hypokinesis. EF estimated was 40 %.  CORONARY VESSELS: The coronary circulation is co-dominant.  LM:   --  LM: Normal.  LAD:   --  Proximal LAD: There was a discrete 60 % stenosis justafter D1.  --  Distal LAD: There was a tubular 80 % stenosis in the proximal third of  the vessel segment. The lesion was irregularly contoured and eccentric.  There was AIYANA grade 3 flow through the vessel (brisk flow).  --  D1: There was a [id number] % stenosis in the proximal third of the  vessel segment. The lesion was eccentric. There was AIYANA grade 3 flow  through the vessel (brisk flow).  CX:   --  Proximal circumflex: There was a discrete 60 % stenosis just  after OM1.  --  OM1: There was a discrete 80 % stenosis in the proximal third of the  vessel segment. There was AIYANA grade 3 flow through the vessel (brisk  flow).  --  OM2: There was a 100 % stenosis. This lesion is a chronic total  occlusion.  RCA:   --  Mid RCA: There was a discrete 80 % stenosis. The lesion was hazy  and eccentric. There was AIYANA grade 3 flow through the vessel (brisk  flow).  COMPLICATIONS: No complications occurred during the cath lab visit.  DIAGNOSTIC IMPRESSIONS: - Severe 3 vessel CAD  - OM2 is chronically occluded  - Moderate LV systolic dysfunction with regional wall motion abnormality  - Mildly elevated LVEDP  DIAGNOSTIC RECOMMENDATIONS: - CTS evaluation with Dr. Cavazos for CABG  - Aggressive medical management and risk factor modification    < end of copied text >      TTE : < from: TTE Echo w/Cont Complete (09.25.18 @ 15:04) >  Summary:   1. Left ventricular ejection fraction, by visual estimation, is 45 to   50%.   2. Technically difficult study.   3. Mildly decreased global left ventricular systolic function.   4. Basal inferolateral segment, mid anterolateral segment, and basal   inferior segment are abnormal as described above.   5. Mildly increased LV wall thickness.   6. Normal left ventricular internal cavity size.   7. There is no evidence of pericardial effusion.   8. Moderate mitral annular calcification.   9. Thickening and calcification of the posterior mitral valve leaflet.  10. Endocardial visualization was enhanced with intravenous echo contrast.  11. There is severe aortic root calcification.    < end of copied text >

## 2018-11-07 NOTE — PROGRESS NOTE ADULT - ASSESSMENT
63 F from home with hx of ESRD tu th sat L AVF, DM2, HTN, hypothyroidism who presented with chest pain with concern for ACS.     Chest pain with concern for ACS  - pt currently asx, but had episode of transient chest pain at home at rest  - slight troponin elevation 0.07-0.08   - f/u TTE   - pt NPO for cardiac cath today  - cardio f/u noted and appreciated     ESRD  - pt has L AVF +thrill   - will receive HD post cath  - receives HD in the community tu th sat   - c/w nephro heather po qd   - nephrology consulted     DM2- HBA1C: 6.4   -FS stable  -c/w accuchecks ACHS TID   - c/w HSS  -c/w hypoglycemia protocol     HTN  -BP Stable  - c/w clonidine 0.1mg po tid   - c/w coreg 12.5mg po bid     Hypothyroidism  - c/w synthroid 75mcg po qd    DVT ppx   -c/w heparin sq     Dispo: Patient to go home when medically stable. 63 F from home with hx of ESRD tu th sat L AVF, DM2, HTN, hypothyroidism who presented with chest pain with concern for ACS.     Chest pain with concern for ACS  - pt currently asx, but had episode of transient chest pain at home at rest  - slight troponin elevation 0.07-0.08   - f/u TTE   - pt NPO for cardiac cath today  - cardio f/u noted and appreciated     ESRD  - pt has L AVF +thrill   - will receive HD post cath  - receives HD in the community tu th sat   - c/w nephro heather po qd   - nephrology consulted     DM2- HBA1C: 6.4   -FS stable  -c/w accuchecks ACHS TID   - c/w HSS  -c/w hypoglycemia protocol     HTN  -BP Stable  - c/w clonidine 0.1mg po tid   - c/w coreg 12.5mg po bid     Anxiety  -c/w xanax prn     Hypothyroidism  - c/w synthroid 75mcg po qd    DVT ppx   -c/w heparin sq     Dispo: Patient to go home when medically stable. 63 F from home with hx of ESRD tu th sat L AVF, DM2, HTN, hypothyroidism who presented with chest pain with concern for ACS.     Chest pain with concern for ACS  - pt currently asx, but had episode of transient chest pain at home at rest  - slight troponin elevation 0.07-0.08   - f/u TTE   - pt NPO for cardiac cath today  - c/w asa, atorvastatin and coreg   - cardio f/u noted and appreciated     ESRD  - pt has L AVF +thrill   - will receive HD post cath  - receives HD in the community tu th sat   - c/w nephro heather po qd   - nephrology consulted     DM2- HBA1C: 6.4   -FS stable  -c/w accuchecks ACHS TID   - c/w HSS  -c/w hypoglycemia protocol     HTN  -BP Stable  - c/w clonidine 0.1mg po tid   - c/w coreg 12.5mg po bid     Anxiety  -c/w xanax prn     Hypothyroidism  - c/w synthroid 75mcg po qd    DVT ppx   -c/w heparin sq     Dispo: Patient to go home when medically stable.

## 2018-11-08 ENCOUNTER — APPOINTMENT (OUTPATIENT)
Dept: CARDIOTHORACIC SURGERY | Facility: HOSPITAL | Age: 63
End: 2018-11-08

## 2018-11-08 DIAGNOSIS — Z01.811 ENCOUNTER FOR PREPROCEDURAL RESPIRATORY EXAMINATION: ICD-10-CM

## 2018-11-08 DIAGNOSIS — J18.9 PNEUMONIA, UNSPECIFIED ORGANISM: ICD-10-CM

## 2018-11-08 DIAGNOSIS — E66.9 OBESITY, UNSPECIFIED: ICD-10-CM

## 2018-11-08 LAB
ALBUMIN SERPL ELPH-MCNC: 3.6 G/DL — SIGNIFICANT CHANGE UP (ref 3.3–5.2)
ALP SERPL-CCNC: 81 U/L — SIGNIFICANT CHANGE UP (ref 40–120)
ALT FLD-CCNC: 14 U/L — SIGNIFICANT CHANGE UP
ANION GAP SERPL CALC-SCNC: 16 MMOL/L — SIGNIFICANT CHANGE UP (ref 5–17)
AST SERPL-CCNC: 31 U/L — SIGNIFICANT CHANGE UP
BASOPHILS # BLD AUTO: 0 K/UL — SIGNIFICANT CHANGE UP (ref 0–0.2)
BASOPHILS NFR BLD AUTO: 0.9 % — SIGNIFICANT CHANGE UP (ref 0–2)
BILIRUB SERPL-MCNC: 0.5 MG/DL — SIGNIFICANT CHANGE UP (ref 0.4–2)
BUN SERPL-MCNC: 26 MG/DL — HIGH (ref 8–20)
CALCIUM SERPL-MCNC: 9.9 MG/DL — SIGNIFICANT CHANGE UP (ref 8.6–10.2)
CHLORIDE SERPL-SCNC: 93 MMOL/L — LOW (ref 98–107)
CO2 SERPL-SCNC: 25 MMOL/L — SIGNIFICANT CHANGE UP (ref 22–29)
CREAT SERPL-MCNC: 5.25 MG/DL — HIGH (ref 0.5–1.3)
EOSINOPHIL # BLD AUTO: 0.4 K/UL — SIGNIFICANT CHANGE UP (ref 0–0.5)
EOSINOPHIL NFR BLD AUTO: 7.5 % — HIGH (ref 0–6)
GLUCOSE BLDC GLUCOMTR-MCNC: 126 MG/DL — HIGH (ref 70–99)
GLUCOSE BLDC GLUCOMTR-MCNC: 136 MG/DL — HIGH (ref 70–99)
GLUCOSE BLDC GLUCOMTR-MCNC: 140 MG/DL — HIGH (ref 70–99)
GLUCOSE BLDC GLUCOMTR-MCNC: 143 MG/DL — HIGH (ref 70–99)
GLUCOSE SERPL-MCNC: 110 MG/DL — SIGNIFICANT CHANGE UP (ref 70–115)
HCT VFR BLD CALC: 36.8 % — LOW (ref 37–47)
HGB BLD-MCNC: 11.6 G/DL — LOW (ref 12–16)
LYMPHOCYTES # BLD AUTO: 1.1 K/UL — SIGNIFICANT CHANGE UP (ref 1–4.8)
LYMPHOCYTES # BLD AUTO: 18.1 % — LOW (ref 20–55)
MCHC RBC-ENTMCNC: 28.9 PG — SIGNIFICANT CHANGE UP (ref 27–31)
MCHC RBC-ENTMCNC: 31.5 G/DL — LOW (ref 32–36)
MCV RBC AUTO: 91.5 FL — SIGNIFICANT CHANGE UP (ref 81–99)
MONOCYTES # BLD AUTO: 0.6 K/UL — SIGNIFICANT CHANGE UP (ref 0–0.8)
MONOCYTES NFR BLD AUTO: 10.6 % — HIGH (ref 3–10)
NEUTROPHILS # BLD AUTO: 3.7 K/UL — SIGNIFICANT CHANGE UP (ref 1.8–8)
NEUTROPHILS NFR BLD AUTO: 62.7 % — SIGNIFICANT CHANGE UP (ref 37–73)
PLATELET # BLD AUTO: 276 K/UL — SIGNIFICANT CHANGE UP (ref 150–400)
POTASSIUM SERPL-MCNC: 4.7 MMOL/L — SIGNIFICANT CHANGE UP (ref 3.5–5.3)
POTASSIUM SERPL-SCNC: 4.7 MMOL/L — SIGNIFICANT CHANGE UP (ref 3.5–5.3)
PROT SERPL-MCNC: 6.7 G/DL — SIGNIFICANT CHANGE UP (ref 6.6–8.7)
RBC # BLD: 4.02 M/UL — LOW (ref 4.4–5.2)
RBC # FLD: 17 % — HIGH (ref 11–15.6)
SODIUM SERPL-SCNC: 134 MMOL/L — LOW (ref 135–145)
T4 FREE SERPL-MCNC: 1.6 NG/DL — SIGNIFICANT CHANGE UP (ref 0.9–1.8)
WBC # BLD: 5.9 K/UL — SIGNIFICANT CHANGE UP (ref 4.8–10.8)
WBC # FLD AUTO: 5.9 K/UL — SIGNIFICANT CHANGE UP (ref 4.8–10.8)

## 2018-11-08 PROCEDURE — 93880 EXTRACRANIAL BILAT STUDY: CPT | Mod: 26

## 2018-11-08 PROCEDURE — 71250 CT THORAX DX C-: CPT | Mod: 26

## 2018-11-08 PROCEDURE — 90937 HEMODIALYSIS REPEATED EVAL: CPT

## 2018-11-08 PROCEDURE — 99497 ADVNCD CARE PLAN 30 MIN: CPT

## 2018-11-08 PROCEDURE — 99233 SBSQ HOSP IP/OBS HIGH 50: CPT

## 2018-11-08 PROCEDURE — 99222 1ST HOSP IP/OBS MODERATE 55: CPT

## 2018-11-08 RX ORDER — HEPARIN SODIUM 5000 [USP'U]/ML
INJECTION INTRAVENOUS; SUBCUTANEOUS
Qty: 25000 | Refills: 0 | Status: DISCONTINUED | OUTPATIENT
Start: 2018-11-08 | End: 2018-11-13

## 2018-11-08 RX ADMIN — Medication 1 TABLET(S): at 17:19

## 2018-11-08 RX ADMIN — CARVEDILOL PHOSPHATE 12.5 MILLIGRAM(S): 80 CAPSULE, EXTENDED RELEASE ORAL at 17:25

## 2018-11-08 RX ADMIN — Medication 1 MILLIGRAM(S): at 17:19

## 2018-11-08 RX ADMIN — SODIUM CHLORIDE 3 MILLILITER(S): 9 INJECTION INTRAMUSCULAR; INTRAVENOUS; SUBCUTANEOUS at 05:18

## 2018-11-08 RX ADMIN — SODIUM CHLORIDE 3 MILLILITER(S): 9 INJECTION INTRAMUSCULAR; INTRAVENOUS; SUBCUTANEOUS at 12:52

## 2018-11-08 RX ADMIN — CARVEDILOL PHOSPHATE 12.5 MILLIGRAM(S): 80 CAPSULE, EXTENDED RELEASE ORAL at 05:24

## 2018-11-08 RX ADMIN — Medication 325 MILLIGRAM(S): at 17:19

## 2018-11-08 RX ADMIN — PANTOPRAZOLE SODIUM 40 MILLIGRAM(S): 20 TABLET, DELAYED RELEASE ORAL at 05:24

## 2018-11-08 RX ADMIN — Medication 0.1 MILLIGRAM(S): at 05:24

## 2018-11-08 RX ADMIN — Medication 75 MICROGRAM(S): at 05:24

## 2018-11-08 RX ADMIN — HEPARIN SODIUM 1000 UNIT(S)/HR: 5000 INJECTION INTRAVENOUS; SUBCUTANEOUS at 18:20

## 2018-11-08 RX ADMIN — Medication 0.1 MILLIGRAM(S): at 14:19

## 2018-11-08 RX ADMIN — SODIUM CHLORIDE 3 MILLILITER(S): 9 INJECTION INTRAMUSCULAR; INTRAVENOUS; SUBCUTANEOUS at 23:38

## 2018-11-08 NOTE — PROGRESS NOTE ADULT - SUBJECTIVE AND OBJECTIVE BOX
Patient is a 63y old  Female who presents with a chief complaint of chest pain (08 Nov 2018 11:02)      HEALTH ISSUES - PROBLEM Dx:  Coronary artery disease involving native coronary artery of native heart with unstable angina pectoris: Coronary artery disease involving native coronary artery of native heart with unstable angina pectoris      INTERVAL HPI/OVERNIGHT EVENTS: Gabonese speaking   Patient seen and examined at bedside. No acute events overnight. Patient states she is feeling well, had no episodes of chest pain or sob overnight or this am. Overall her morale is low b/c she was told that she may need cardiac sx. Son at bedside who is translating and wanted an update on the plan of care. In agreement with all measures to help his mother get better. Aside from this the pt is ambulating with her cane, has no other complaints. Patient denies chest pain, SOB, abd pain, N/V, fever, chills, dysuria or any other complaints. All remainder ROS negative.       Advanced Care planning took 31 minutes  D/w the patient and her son in regards to who is the patients HCP, he reports that it would be likely both him and his sister. Also he reports the family has discussed this in the past and all agree that their mother would remain a full code. They would want all aggressive measures and if and when it would come to the point where she may not have a reversibility to an illness then the family at that time would decide in regards to further management based on her clinical state. MOLST form provided for the family to review.     Vital Signs Last 24 Hrs  T(C): 36.8 (08 Nov 2018 11:25), Max: 37.1 (07 Nov 2018 23:39)  T(F): 98.3 (08 Nov 2018 11:25), Max: 98.8 (08 Nov 2018 05:23)  HR: 77 (08 Nov 2018 11:25) (59 - 77)  BP: 158/83 (08 Nov 2018 11:25) (133/61 - 161/80)  BP(mean): --  RR: 18 (08 Nov 2018 11:25) (12 - 18)  SpO2: 99% (08 Nov 2018 11:25) (95% - 99%)    CAPILLARY BLOOD GLUCOSE  POCT Blood Glucose.: 143 mg/dL (08 Nov 2018 12:13)  POCT Blood Glucose.: 136 mg/dL (08 Nov 2018 08:00)  POCT Blood Glucose.: 108 mg/dL (07 Nov 2018 21:55)      I&O's Summary    07 Nov 2018 07:01  -  08 Nov 2018 07:00  --------------------------------------------------------  IN: 360 mL / OUT: 600 mL / NET: -240 mL    08 Nov 2018 07:01  -  08 Nov 2018 13:36  --------------------------------------------------------  IN: 395 mL / OUT: 0 mL / NET: 395 mL    CONSTITUTIONAL: Well appearing, well nourished, awake, alert and in no apparent distress  CARDIAC: Normal rate, regular rhythm.  Heart sounds S1, S2.  No murmurs, rubs or gallops   RESPIRATORY: Breath sounds clear and equal bilaterally. No wheezes, rhales or rhonchi  GASTROENTEROLOGY: Soft nt nd bs + normoactive   EXTREMITIES: No edema, cyanosis or deformity   L AVF +thrill      NEUROLOGICAL: Alert and oriented, no focal deficits, no motor or sensory deficits.  SKIN: No rash, skin turgor wnl    MEDICATIONS  (STANDING):  ALBUTerol    90 MICROgram(s) HFA Inhaler 1 Puff(s) Inhalation every 4 hours  aspirin 325 milliGRAM(s) Oral daily  atorvastatin 20 milliGRAM(s) Oral at bedtime  carvedilol 12.5 milliGRAM(s) Oral every 12 hours  cloNIDine 0.1 milliGRAM(s) Oral three times a day  dextrose 5%. 1000 milliLiter(s) (50 mL/Hr) IV Continuous <Continuous>  dextrose 50% Injectable 12.5 Gram(s) IV Push once  dextrose 50% Injectable 25 Gram(s) IV Push once  dextrose 50% Injectable 25 Gram(s) IV Push once  folic acid 1 milliGRAM(s) Oral daily  insulin lispro (HumaLOG) corrective regimen sliding scale   SubCutaneous at bedtime  levothyroxine 75 MICROGram(s) Oral daily  Nephro-heather 1 Tablet(s) Oral daily  pantoprazole    Tablet 40 milliGRAM(s) Oral before breakfast  sodium chloride 0.9% lock flush 3 milliLiter(s) IV Push every 8 hours  tiotropium 18 MICROgram(s) Capsule 1 Capsule(s) Inhalation daily    MEDICATIONS  (PRN):  ALBUTerol/ipratropium for Nebulization 3 milliLiter(s) Nebulizer every 6 hours PRN Shortness of Breath and/or Wheezing  ALPRAZolam 0.25 milliGRAM(s) Oral every 12 hours PRN anxiety  dextrose 40% Gel 15 Gram(s) Oral once PRN Blood Glucose LESS THAN 70 milliGRAM(s)/deciliter  glucagon  Injectable 1 milliGRAM(s) IntraMuscular once PRN Glucose LESS THAN 70 milligrams/deciliter  nitroglycerin     SubLingual 0.4 milliGRAM(s) SubLingual every 5 minutes PRN Chest Pain      LABS:                        11.6   5.9   )-----------( 276      ( 08 Nov 2018 05:54 )             36.8     11-08    134<L>  |  93<L>  |  26.0<H>  ----------------------------<  110  4.7   |  25.0  |  5.25<H>    Ca    9.9      08 Nov 2018 05:54    TPro  6.7  /  Alb  3.6  /  TBili  0.5  /  DBili  x   /  AST  31  /  ALT  14  /  AlkPhos  81  11-08    PT/INR - ( 07 Nov 2018 18:50 )   PT: 11.4 sec;   INR: 0.99 ratio         PTT - ( 07 Nov 2018 18:50 )  PTT:34.3 sec    LIVER FUNCTIONS - ( 08 Nov 2018 05:54 )  Alb: 3.6 g/dL / Pro: 6.7 g/dL / ALK PHOS: 81 U/L / ALT: 14 U/L / AST: 31 U/L / GGT: x             RADIOLOGY & ADDITIONAL TESTS:  No new imaging studies

## 2018-11-08 NOTE — PROGRESS NOTE ADULT - SUBJECTIVE AND OBJECTIVE BOX
INTERVAL HISTORY: s/p cardiac catherization, severe multi-vessel disease.  	  MEDICATIONS:  carvedilol 12.5 milliGRAM(s) Oral every 12 hours  cloNIDine 0.1 milliGRAM(s) Oral three times a day  nitroglycerin     SubLingual 0.4 milliGRAM(s) SubLingual every 5 minutes PRN  ALBUTerol    90 MICROgram(s) HFA Inhaler 1 Puff(s) Inhalation every 4 hours  ALBUTerol/ipratropium for Nebulization 3 milliLiter(s) Nebulizer every 6 hours PRN  tiotropium 18 MICROgram(s) Capsule 1 Capsule(s) Inhalation daily  ALPRAZolam 0.25 milliGRAM(s) Oral every 12 hours PRN  aspirin 325 milliGRAM(s) Oral daily  pantoprazole    Tablet 40 milliGRAM(s) Oral before breakfast  atorvastatin 20 milliGRAM(s) Oral at bedtime  dextrose 40% Gel 15 Gram(s) Oral once PRN  dextrose 50% Injectable 12.5 Gram(s) IV Push once  dextrose 50% Injectable 25 Gram(s) IV Push once  dextrose 50% Injectable 25 Gram(s) IV Push once  glucagon  Injectable 1 milliGRAM(s) IntraMuscular once PRN  insulin lispro (HumaLOG) corrective regimen sliding scale   SubCutaneous at bedtime  levothyroxine 75 MICROGram(s) Oral daily  dextrose 5%. 1000 milliLiter(s) IV Continuous <Continuous>  folic acid 1 milliGRAM(s) Oral daily  Nephro-heather 1 Tablet(s) Oral daily  sodium chloride 0.9% lock flush 3 milliLiter(s) IV Push every 8 hours        PHYSICAL EXAM:  T(C): 37.1 (11-08-18 @ 05:23), Max: 37.1 (11-07-18 @ 23:39)  HR: 77 (11-08-18 @ 05:23) (59 - 77)  BP: 161/80 (11-08-18 @ 05:23) (132/70 - 167/67)  RR: 18 (11-08-18 @ 05:23) (12 - 18)  SpO2: 98% (11-08-18 @ 05:23) (95% - 99%)  Wt(kg): --  I&O's Summary    07 Nov 2018 07:01  -  08 Nov 2018 07:00  --------------------------------------------------------  IN: 360 mL / OUT: 600 mL / NET: -240 mL          Appearance: Normal	  HEENT:   Normal oral mucosa  Cardiovascular: Normal S1 S2, No JVD, No murmurs, No edema  Respiratory: Lungs clear to auscultation	  Psychiatry: A & O x 3, Mood & affect appropriate  Gastrointestinal:  Soft, Non-tender, + BS	  Skin: No rashes, No ecchymoses, No cyanosis  Neurologic: Non-focal  Extremities: Normal range of motion, No clubbing, cyanosis or edema. cath site clean, distal perfusion preserved.  Vascular: Peripheral pulses palpable 2+ bilaterally    TELEMETRY: SR PVC	    	  LABS:	 	                        11.6   5.9   )-----------( 276      ( 08 Nov 2018 05:54 )             36.8     11-08    134<L>  |  93<L>  |  26.0<H>  ----------------------------<  110  4.7   |  25.0  |  5.25<H>    Ca    9.9      08 Nov 2018 05:54    TPro  6.7  /  Alb  3.6  /  TBili  0.5  /  DBili  x   /  AST  31  /  ALT  14  /  AlkPhos  81  11-08    proBNP: Serum Pro-Brain Natriuretic Peptide: 21849 pg/mL (11-07 @ 18:50)    Lipid Profile:   HgA1c: Hemoglobin A1C, Whole Blood: 6.5 % (11-07 @ 18:50)    TSH: Thyroid Stimulating Hormone, Serum: 2.27 uIU/mL (11-07 @ 18:50)      ASSESSMENT/PLAN: 	62F with severe multivessel CAD.  DM, ESRD, HTN, HL. Will benefit most from CABG rather than PCI.  Surgical evaluation in progress.

## 2018-11-08 NOTE — PROGRESS NOTE ADULT - SUBJECTIVE AND OBJECTIVE BOX
Subjective: 64 y/o female, Hebrew speaking, with son at bedside in NAD.     In depth discussion was had at bedside with patient, pt's son (Raheem) and Dr. Cavazos. Risks and benefits to surgery and options were discussed and explained. After reviewing all options, the family and patient decided that for now we will continue with all routine pre-op testing and plan for open heart surgery at some point next week, but it was also discussed this is pending all results including CT chest.      VITAL SIGNS  Vital Signs Last 24 Hrs  T(C): 36.8 (11-08-18 @ 10:04), Max: 37.1 (11-07-18 @ 23:39)  T(F): 98.2 (11-08-18 @ 10:04), Max: 98.8 (11-08-18 @ 05:23)  HR: 74 (11-08-18 @ 10:04) (59 - 77)  BP: 160/75 (11-08-18 @ 10:04) (133/61 - 167/67)  RR: 18 (11-08-18 @ 10:04) (12 - 18)  SpO2: 96% (11-08-18 @ 10:04) (95% - 99%)  on (O2)              MEDICATIONS  ALBUTerol    90 MICROgram(s) HFA Inhaler 1 Puff(s) Inhalation every 4 hours  ALBUTerol/ipratropium for Nebulization 3 milliLiter(s) Nebulizer every 6 hours PRN  ALPRAZolam 0.25 milliGRAM(s) Oral every 12 hours PRN  aspirin 325 milliGRAM(s) Oral daily  atorvastatin 20 milliGRAM(s) Oral at bedtime  carvedilol 12.5 milliGRAM(s) Oral every 12 hours  cloNIDine 0.1 milliGRAM(s) Oral three times a day  dextrose 40% Gel 15 Gram(s) Oral once PRN  dextrose 5%. 1000 milliLiter(s) IV Continuous <Continuous>  dextrose 50% Injectable 12.5 Gram(s) IV Push once  dextrose 50% Injectable 25 Gram(s) IV Push once  dextrose 50% Injectable 25 Gram(s) IV Push once  folic acid 1 milliGRAM(s) Oral daily  glucagon  Injectable 1 milliGRAM(s) IntraMuscular once PRN  insulin lispro (HumaLOG) corrective regimen sliding scale   SubCutaneous at bedtime  levothyroxine 75 MICROGram(s) Oral daily  Nephro-heather 1 Tablet(s) Oral daily  nitroglycerin     SubLingual 0.4 milliGRAM(s) SubLingual every 5 minutes PRN  pantoprazole    Tablet 40 milliGRAM(s) Oral before breakfast  sodium chloride 0.9% lock flush 3 milliLiter(s) IV Push every 8 hours  tiotropium 18 MICROgram(s) Capsule 1 Capsule(s) Inhalation daily      PHYSICAL EXAM  Neuro: A+O x 3, non-focal, speech clear and intact  CV: irregular rate, regular rhythm, +S1S2, no murmurs or rub  Pulm/chest: lung sounds CTA and equal bilaterally, no accessory muscle use noted  Abd: soft, NT, ND, +BS  Skin: warm, well perfused, no rashes      LABS  11-08    134<L>  |  93<L>  |  26.0<H>  ----------------------------<  110  4.7   |  25.0  |  5.25<H>    Ca    9.9      08 Nov 2018 05:54    TPro  6.7  /  Alb  3.6  /  TBili  0.5  /  DBili  x   /  AST  31  /  ALT  14  /  AlkPhos  81  11-08                                 11.6   5.9   )-----------( 276      ( 08 Nov 2018 05:54 )             36.8          PT/INR - ( 07 Nov 2018 18:50 )   PT: 11.4 sec;   INR: 0.99 ratio         PTT - ( 07 Nov 2018 18:50 )  PTT:34.3 sec  Bilirubin Total, Serum: 0.5 mg/dL (11-08 @ 05:54)  Bilirubin Total, Serum: 0.5 mg/dL (11-07 @ 18:50)    CAPILLARY BLOOD GLUCOSE  POCT Blood Glucose.: 136 mg/dL (08 Nov 2018 08:00)  POCT Blood Glucose.: 108 mg/dL (07 Nov 2018 21:55)        PAST MEDICAL & SURGICAL HISTORY:  Hypothyroidism, unspecified type  Hemodialysis patient: tues thursday saturday  Renal failure  Hypertension  Diabetes mellitus  A-V fistula  S/P cholecystectomy

## 2018-11-08 NOTE — CONSULT NOTE ADULT - PROBLEM SELECTOR PROBLEM 1
Coronary artery disease involving native coronary artery of native heart with unstable angina pectoris
Preoperative respiratory examination

## 2018-11-08 NOTE — CONSULT NOTE ADULT - SUBJECTIVE AND OBJECTIVE BOX
PULMONARY CONSULT NOTE      KIM LEERHIANNON-[id number]    Patient is a 63y old  Female who presents with a chief complaint of chest pain (08 Nov 2018 15:41)  64 y/o female who was getting her dialysis today and towards the end of dialysis she felt mid sternal pressure type cp, no radiation, lasted for about 10 minutes, + nausea, no diaphoresis no sob. no abd. pain. no cough, no fever. no diarrhea. pt. reports no cp at the time of admission.   As per daughter she is on Eliquis for her afib.  cath with triple vessel disease, CABG planned    HISTORY OF PRESENT ILLNESS:    MEDICATIONS  (STANDING):  ALBUTerol    90 MICROgram(s) HFA Inhaler 1 Puff(s) Inhalation every 4 hours  aspirin 325 milliGRAM(s) Oral daily  atorvastatin 20 milliGRAM(s) Oral at bedtime  carvedilol 12.5 milliGRAM(s) Oral every 12 hours  cloNIDine 0.1 milliGRAM(s) Oral three times a day  dextrose 5%. 1000 milliLiter(s) (50 mL/Hr) IV Continuous <Continuous>  dextrose 50% Injectable 12.5 Gram(s) IV Push once  dextrose 50% Injectable 25 Gram(s) IV Push once  dextrose 50% Injectable 25 Gram(s) IV Push once  folic acid 1 milliGRAM(s) Oral daily  insulin lispro (HumaLOG) corrective regimen sliding scale   SubCutaneous at bedtime  levothyroxine 75 MICROGram(s) Oral daily  Nephro-heather 1 Tablet(s) Oral daily  pantoprazole    Tablet 40 milliGRAM(s) Oral before breakfast  sodium chloride 0.9% lock flush 3 milliLiter(s) IV Push every 8 hours  tiotropium 18 MICROgram(s) Capsule 1 Capsule(s) Inhalation daily      MEDICATIONS  (PRN):  ALBUTerol/ipratropium for Nebulization 3 milliLiter(s) Nebulizer every 6 hours PRN Shortness of Breath and/or Wheezing  ALPRAZolam 0.25 milliGRAM(s) Oral every 12 hours PRN anxiety  dextrose 40% Gel 15 Gram(s) Oral once PRN Blood Glucose LESS THAN 70 milliGRAM(s)/deciliter  glucagon  Injectable 1 milliGRAM(s) IntraMuscular once PRN Glucose LESS THAN 70 milligrams/deciliter  nitroglycerin     SubLingual 0.4 milliGRAM(s) SubLingual every 5 minutes PRN Chest Pain      Allergies    No Known Allergies    Intolerances        PAST MEDICAL & SURGICAL HISTORY:  Hypothyroidism, unspecified type  Hemodialysis patient: tues thursday saturday  Renal failure  Hypertension  Diabetes mellitus  A-V fistula  S/P cholecystectomy      FAMILY HISTORY:  Family history of diabetes mellitus      SOCIAL HISTORY  Smoking History:     REVIEW OF SYSTEMS:    CONSTITUTIONAL:  No fevers, chills, sweats    HEENT:  Eyes:  No diplopia or blurred vision. ENT:  No earache, sore throat or runny nose.    CARDIOVASCULAR:  No pressure, squeezing, tightness, or heaviness about the chest; no palpitations.    RESPIRATORY:  No cough, shortness of breath, PND or orthopnea. Mild SOBOE    GASTROINTESTINAL:  No abdominal pain, nausea, vomiting or diarrhea.    GENITOURINARY:  No dysuria, frequency or urgency.    NEUROLOGIC:  No paresthesias, fasciculations, seizures or weakness.    PSYCHIATRIC:  No disorder of thought or mood.    Vital Signs Last 24 Hrs  T(C): 36.7 (08 Nov 2018 14:25), Max: 37.1 (07 Nov 2018 23:39)  T(F): 98 (08 Nov 2018 14:25), Max: 98.8 (08 Nov 2018 05:23)  HR: 74 (08 Nov 2018 14:25) (59 - 77)  BP: 156/70 (08 Nov 2018 14:25) (150/62 - 161/80)  BP(mean): --  RR: 18 (08 Nov 2018 14:25) (17 - 18)  SpO2: 99% (08 Nov 2018 14:25) (96% - 99%)    PHYSICAL EXAMINATION:    GENERAL: The patient is a well-developed, well-nourished _____in no apparent distress.     HEENT: Head is normocephalic and atraumatic. Extraocular muscles are intact. Mucous membranes are moist.     NECK: Supple.     LUNGS: Clear to auscultation without wheezing, rales, or rhonchi. Respirations unlabored    HEART: Regular rate and rhythm without murmur.    ABDOMEN: Soft, nontender, and nondistended.  No hepatosplenomegaly is noted.    EXTREMITIES: Without any cyanosis, clubbing, rash, lesions or edema.    NEUROLOGIC: Grossly intact.      LABS:                        11.6   5.9   )-----------( 276      ( 08 Nov 2018 05:54 )             36.8     11-08    134<L>  |  93<L>  |  26.0<H>  ----------------------------<  110  4.7   |  25.0  |  5.25<H>    Ca    9.9      08 Nov 2018 05:54    TPro  6.7  /  Alb  3.6  /  TBili  0.5  /  DBili  x   /  AST  31  /  ALT  14  /  AlkPhos  81  11-08    PT/INR - ( 07 Nov 2018 18:50 )   PT: 11.4 sec;   INR: 0.99 ratio         PTT - ( 07 Nov 2018 18:50 )  PTT:34.3 sec      CARDIAC MARKERS ( 06 Nov 2018 21:53 )  x     / 0.07 ng/mL / x     / x     / x            Serum Pro-Brain Natriuretic Peptide: 12488 pg/mL (11-07-18 @ 18:50)  Serum Pro-Brain Natriuretic Peptide: 46259 pg/mL (11-06-18 @ 11:23)          MICROBIOLOGY:    RADIOLOGY & ADDITIONAL STUDIES: PULMONARY CONSULT NOTE      KIM LEEGIAN-[id number]    Patient is a 63y old  Female who presents with a chief complaint of chest pain (08 Nov 2018 15:41)  62 y/o female who was getting her dialysis today and towards the end of dialysis she felt mid sternal pressure type cp, no radiation, lasted for about 10 minutes, + nausea, no diaphoresis no sob. no abd. pain. no cough, no fever. no diarrhea. pt. reports no cp at the time of admission.   As per daughter she is on Eliquis for her afib.  cath with triple vessel disease, CABG planned    HISTORY OF PRESENT ILLNESS:  had URI symptoms few weeks ago  CXR told pna  given abx and neb  now at baseline  never smoker, no hx asthma or lung disease in past  no heavy snoring reported or hx SHASHANK  MEDICATIONS  (STANDING):  ALBUTerol    90 MICROgram(s) HFA Inhaler 1 Puff(s) Inhalation every 4 hours  aspirin 325 milliGRAM(s) Oral daily  atorvastatin 20 milliGRAM(s) Oral at bedtime  carvedilol 12.5 milliGRAM(s) Oral every 12 hours  cloNIDine 0.1 milliGRAM(s) Oral three times a day  dextrose 5%. 1000 milliLiter(s) (50 mL/Hr) IV Continuous <Continuous>  dextrose 50% Injectable 12.5 Gram(s) IV Push once  dextrose 50% Injectable 25 Gram(s) IV Push once  dextrose 50% Injectable 25 Gram(s) IV Push once  folic acid 1 milliGRAM(s) Oral daily  insulin lispro (HumaLOG) corrective regimen sliding scale   SubCutaneous at bedtime  levothyroxine 75 MICROGram(s) Oral daily  Nephro-heather 1 Tablet(s) Oral daily  pantoprazole    Tablet 40 milliGRAM(s) Oral before breakfast  sodium chloride 0.9% lock flush 3 milliLiter(s) IV Push every 8 hours  tiotropium 18 MICROgram(s) Capsule 1 Capsule(s) Inhalation daily      MEDICATIONS  (PRN):  ALBUTerol/ipratropium for Nebulization 3 milliLiter(s) Nebulizer every 6 hours PRN Shortness of Breath and/or Wheezing  ALPRAZolam 0.25 milliGRAM(s) Oral every 12 hours PRN anxiety  dextrose 40% Gel 15 Gram(s) Oral once PRN Blood Glucose LESS THAN 70 milliGRAM(s)/deciliter  glucagon  Injectable 1 milliGRAM(s) IntraMuscular once PRN Glucose LESS THAN 70 milligrams/deciliter  nitroglycerin     SubLingual 0.4 milliGRAM(s) SubLingual every 5 minutes PRN Chest Pain      Allergies    No Known Allergies    Intolerances        PAST MEDICAL & SURGICAL HISTORY:  Hypothyroidism, unspecified type  Hemodialysis patient: tues thursday saturday  Renal failure  Hypertension  Diabetes mellitus  A-V fistula  S/P cholecystectomy      FAMILY HISTORY:  Family history of diabetes mellitus      SOCIAL HISTORY  Smoking History:     REVIEW OF SYSTEMS:    CONSTITUTIONAL:  No fevers, chills, sweats    HEENT:  Eyes:  No diplopia or blurred vision. ENT:  No earache, sore throat or runny nose.    CARDIOVASCULAR:  No pressure, squeezing, tightness, or heaviness about the chest; no palpitations.    RESPIRATORY:  see HPI    GASTROINTESTINAL:  No abdominal pain, nausea, vomiting or diarrhea.    GENITOURINARY:  No dysuria, frequency or urgency.    NEUROLOGIC:  No paresthesias, fasciculations, seizures or weakness.    PSYCHIATRIC:  No disorder of thought or mood.    Vital Signs Last 24 Hrs  T(C): 36.7 (08 Nov 2018 14:25), Max: 37.1 (07 Nov 2018 23:39)  T(F): 98 (08 Nov 2018 14:25), Max: 98.8 (08 Nov 2018 05:23)  HR: 74 (08 Nov 2018 14:25) (59 - 77)  BP: 156/70 (08 Nov 2018 14:25) (150/62 - 161/80)  BP(mean): --  RR: 18 (08 Nov 2018 14:25) (17 - 18)  SpO2: 99% (08 Nov 2018 14:25) (96% - 99%)    PHYSICAL EXAMINATION:    GENERAL: The patient is a well-developed, well-nourished _in no apparent distress.     HEENT: Head is normocephalic and atraumatic. Extraocular muscles are intact. Mucous membranes are moist.     NECK: Supple.     LUNGS: moderate air entry  without wheezing, rales, or rhonchi. Respirations unlabored    HEART: Regular rate and rhythm without murmur.    ABDOMEN: Soft, nontender, and nondistended.  No hepatosplenomegaly is noted.    EXTREMITIES: Without any cyanosis, clubbing, rash, lesions or edema.    NEUROLOGIC: Grossly intact.      LABS:                        11.6   5.9   )-----------( 276      ( 08 Nov 2018 05:54 )             36.8     11-08    134<L>  |  93<L>  |  26.0<H>  ----------------------------<  110  4.7   |  25.0  |  5.25<H>    Ca    9.9      08 Nov 2018 05:54    TPro  6.7  /  Alb  3.6  /  TBili  0.5  /  DBili  x   /  AST  31  /  ALT  14  /  AlkPhos  81  11-08    PT/INR - ( 07 Nov 2018 18:50 )   PT: 11.4 sec;   INR: 0.99 ratio         PTT - ( 07 Nov 2018 18:50 )  PTT:34.3 sec      CARDIAC MARKERS ( 06 Nov 2018 21:53 )  x     / 0.07 ng/mL / x     / x     / x            Serum Pro-Brain Natriuretic Peptide: 61949 pg/mL (11-07-18 @ 18:50)  Serum Pro-Brain Natriuretic Peptide: 54639 pg/mL (11-06-18 @ 11:23)          MICROBIOLOGY:    RADIOLOGY & ADDITIONAL STUDIES:  CXR reviewed

## 2018-11-08 NOTE — PROGRESS NOTE ADULT - SUBJECTIVE AND OBJECTIVE BOX
134<L>  |  93<L>  |  26.0<H>  ----------------------------<  110    Ca: 9.9   (08 Nov 2018 05:54)  4.7     |  25.0   |  5.25<H>      eGFR if Non : 8 <L>  eGFR if : 9 <L>    TPro  6.7    /  Alb  3.6    /  TBili  0.5    /  DBili  x      /  AST  31     /  ALT  14     /  AlkPhos  81     08 Nov 2018 05:54                        11.6<L>  5.9   )-----------( 276      ( 08 Nov 2018 05:54 )             36.8<L>    Phos:-- Mg:-- PTH:-- Uric acid:-- Serum Osm:--  Ferritin:-- Iron:-- TIBC:-- Tsat:--  B12:2.27 uIU/mL TSH:-- (11-07 @ 18:50)      Patient was seen and evaluated on dialysis.   Patient is tolerating the procedure well.   T(C): 36.8 (11-08-18 @ 10:04), Max: 37.1 (11-07-18 @ 23:39)  HR: 74 (11-08-18 @ 10:04) (59 - 77)  BP: 160/75 (11-08-18 @ 10:04) (133/61 - 167/67)  Continue dialysis: T Th S  Dialyzer: Revaclear 300         QB: 450 ml.,        QD: 500ml.,  Goal UF 1-1.5 L As Camron.,  over  3.5  Hours

## 2018-11-08 NOTE — PROGRESS NOTE ADULT - ASSESSMENT
63 F from home with hx of ESRD tu th sat L AVF, DM2, HTN, hypothyroidism who presented with chest pain with concern for ACS. Noted elevation in troponins, s/p cardiac cath 11/7/18 with noted severe triple vessel dz. CT sx consulted and pre op work up in process for potential surgical intervention next week vs PCI.     Severe 3V CAD   - pt currently asx   - slight troponin elevation 0.07-0.08   - TTE with mildly reduced EF; Mid and apical inferior wall, mid inferolateral segment, mid anterolateral segment, and apical lateral segment. Grade 3 DD and moderate pulm htn.   - c/w asa, atorvastatin and coreg   - pre op work up in process pending ct chest and US carotid   - PT evaluation completed for functional status   - cardio f/u noted and appreciated   - CT sx f/u noted and appreciated and d/w PA will proceed with all pre op work up and dependent on results will proceed with sx next week vs PCI.     ESRD  - pt has L AVF +thrill   - c/w regularly scheduled HD   - receives HD in the community tu th sat   - hyponatremia in ESRD - ~134 stable   - Anemic in chronic kidney dz with stable h/h  - c/w nephro heather po qd   - nephrology consult noted and appreciated and d/w Dr. Mcdonald the plan of care     DM2- HBA1C: 6.4   -FS stable  ranging ~100s   -c/w accuchecks ACHS TID   - c/w HSS  -c/w hypoglycemia protocol     HTN  -BP Stable  - c/w clonidine 0.1mg po tid   - c/w coreg 12.5mg po bid     Anxiety  -c/w xanax prn     Hypothyroidism  - c/w synthroid 75mcg po qd    DVT ppx   -c/w heparin sq    Advanced Directives: Full Code  HCP: Son Narayan and daughter Martha.      Dispo: Unclear at this time dependent upon if sx vs PCI completed. 63 F from home with hx of ESRD tu th sat L AVF, DM2, HTN, hypothyroidism who presented with chest pain with concern for ACS. Noted elevation in troponins, s/p cardiac cath 11/7/18 with noted severe triple vessel dz. CT sx consulted and pre op work up in process for potential surgical intervention next week vs PCI.     Severe 3V CAD   - pt currently asx   - slight troponin elevation 0.07-0.08   - TTE with mildly reduced EF; Mid and apical inferior wall, mid inferolateral segment, mid anterolateral segment, and apical lateral segment. Grade 3 DD and moderate pulm htn.   - c/w asa, atorvastatin and coreg   - pre op work up in process pending ct chest (given recent pna) and US carotid   - PT evaluation completed for functional status   - cardio f/u noted and appreciated   - CT sx f/u noted and appreciated and d/w PA will proceed with all pre op work up and dependent on results will proceed with sx next week vs PCI.   - pulm consulted as per ct sx     ESRD  - pt has L AVF +thrill   - c/w regularly scheduled HD   - receives HD in the community tu th sat   - hyponatremia in ESRD - ~134 stable   - Anemic in chronic kidney dz with stable h/h  - c/w nephro heather po qd   - nephrology consult noted and appreciated and d/w Dr. Mcdonald the plan of care     DM2- HBA1C: 6.4   -FS stable  ranging ~100s   -c/w accuchecks ACHS TID   - c/w HSS  -c/w hypoglycemia protocol     HTN  -BP Stable  - c/w clonidine 0.1mg po tid   - c/w coreg 12.5mg po bid     Anxiety  -c/w xanax prn     Hypothyroidism  - c/w synthroid 75mcg po qd    DVT ppx   -c/w heparin sq    Advanced Directives: Full Code  HCP: Son Narayan and daughter Martha.      Dispo: Unclear at this time dependent upon if sx vs PCI completed.

## 2018-11-08 NOTE — PROGRESS NOTE ADULT - SUBJECTIVE AND OBJECTIVE BOX
SUBJECTIVE:  Cardiology NP F/U note:  SP: [facility] which revealed: Severe 3V CAD  denies complaints of chest pain/sob/dizziness/palps overnight   tolerated diet/ambulating      	  MEDICATIONS:  carvedilol 12.5 milliGRAM(s) Oral every 12 hours  cloNIDine 0.1 milliGRAM(s) Oral three times a day  nitroglycerin     SubLingual 0.4 milliGRAM(s) SubLingual every 5 minutes PRN  ALBUTerol    90 MICROgram(s) HFA Inhaler 1 Puff(s) Inhalation every 4 hours  ALBUTerol/ipratropium for Nebulization 3 milliLiter(s) Nebulizer every 6 hours PRN  tiotropium 18 MICROgram(s) Capsule 1 Capsule(s) Inhalation daily  ALPRAZolam 0.25 milliGRAM(s) Oral every 12 hours PRN  aspirin 325 milliGRAM(s) Oral daily  pantoprazole    Tablet 40 milliGRAM(s) Oral before breakfast  atorvastatin 20 milliGRAM(s) Oral at bedtime  dextrose 40% Gel 15 Gram(s) Oral once PRN  dextrose 50% Injectable 12.5 Gram(s) IV Push once  dextrose 50% Injectable 25 Gram(s) IV Push once  dextrose 50% Injectable 25 Gram(s) IV Push once  glucagon  Injectable 1 milliGRAM(s) IntraMuscular once PRN  insulin lispro (HumaLOG) corrective regimen sliding scale   SubCutaneous at bedtime  levothyroxine 75 MICROGram(s) Oral daily  dextrose 5%. 1000 milliLiter(s) IV Continuous <Continuous>  folic acid 1 milliGRAM(s) Oral daily  Nephro-heather 1 Tablet(s) Oral daily  sodium chloride 0.9% lock flush 3 milliLiter(s) IV Push every 8 hours        PHYSICAL EXAM:    T(C): 37.1 (18 @ 05:23), Max: 37.1 (18 @ 23:39)  HR: 77 (18 @ 05:23) (59 - 77)  BP: 161/80 (18 @ 05:23) (132/70 - 167/67)  RR: 18 (18 @ 05:23) (12 - 18)  SpO2: 98% (18 @ 05:23) (95% - 99%)  Wt(kg): --    I&O's Summary    2018 07:01  -  2018 07:00  --------------------------------------------------------  IN: 360 mL / OUT: 600 mL / NET: -240 mL        Daily     Daily Weight in k.3 (2018 06:18)    Appearance: Normal	  HEENT:   Normal oral mucosa,   Lymphatic: No lymphadenopathy  Cardiovascular: Normal S1 S2,RRR  No JVD, No murmurs, No edema  Respiratory: Lungs clear to auscultation	  Psychiatry: A & O x 3, Mood & affect appropriate  Gastrointestinal:  Soft, Non-tender, + BS	  Skin: No rashes, No ecchymoses, No cyanosis  Neurologic: Non-focal  Extremities: Normal range of motion, No clubbing, cyanosis or edema Right radial site good pulses and cap refill/ dressing removed  Vascular: Peripheral pulses palpable 2+ bilaterally    TELEMETRY: 	    ECG:  	  RADIOLOGY:   DIAGNOSTIC TESTING:  [X ] Echocardiogram:  < from: TTE Echo Complete w/Doppler (18 @ 15:59) >  Left ventricular ejection fraction, by visual estimation, is 40 to   45%.   2. Mildly decreased global left ventricular systolic function.   3. Mid and apical inferior wall, mid inferolateral segment, mid   anterolateral segment, and apical lateral segment are abnormal as   described above.   4. Spectral Doppler shows restrictive pattern of left ventricular   myocardial filling (Grade III diastolic dysfunction).   5. There is no evidence of pericardial effusion.   6. Mild mitral annular calcification.   7. Moderate mitral valve regurgitation.   8. Thickening and calcification of the anterior and posterior mitral   valve leaflets.   9. Mild tricuspid regurgitation.  10. Sclerotic aortic valve with normal opening.  11. Trace pulmonic valve regurgitation.  12. Estimated pulmonary artery systolic pressure is 57.0 mmHg assuming a   right atrial pressure of 8 mmHg, which is consistent with moderate   pulmonary hypertension.    < end of copied text >    [X ]  Catheterization:  < from: Cardiac Cath Lab - Adult (18 @ 13:34) >  DIAGNOSTIC IMPRESSIONS: - Severe 3 vessel CAD  - OM2 is chronically occluded  - Moderate LV systolic dysfunction with regional wall motion abnormality  - Mildly elevated LVEDP  DIAGNOSTIC RECOMMENDATIONS: - CTS evaluation with Dr. Cavazos for CABG  - Aggressive medical management and risk factor modification  INTERVENTIONAL IMPRESSIONS: - Severe 3 vessel CAD  - OM2 is chronically occluded  - Moderate LV systolic dysfunction with regional wall motion abnormality  - Mildly elevated LVEDP  INTERVENTIONAL RECOMMENDATIONS: - CTS evaluation with Dr. Cavazos for  CABG  - Aggressive medical management and risk factor modification    < end of copied text >    [ ] Stress Test:    OTHER: 	    LABS:	 	    CARDIAC MARKERS:                                  11.6   5.9   )-----------( 276      ( 2018 05:54 )             36.8         134<L>  |  93<L>  |  26.0<H>  ----------------------------<  110  4.7   |  25.0  |  5.25<H>    Ca    9.9      2018 05:54    TPro  6.7  /  Alb  3.6  /  TBili  0.5  /  DBili  x   /  AST  31  /  ALT  14  /  AlkPhos  81      proBNP: Serum Pro-Brain Natriuretic Peptide: 55037 pg/mL ( @ 18:50)    Lipid Profile:   HgA1c: Hemoglobin A1C, Whole Blood: 6.5 % ( @ 18:50)    TSH: Thyroid Stimulating Hormone, Serum: 2.27 uIU/mL ( @ 18:50)      ASSESSMENT:  63F presents with C/O chest pain during HD   HX: DM2/ESRD on HD/ AF on AC/hypothyroid  SP LHC: 18 : Severe 3V CAD with mildly reduced LVF 40-45%  Hemodynamically stable overnight     Plan  Continue current meds and monitoring ASA/ statin/Coreg/ Eliquis on hold   CTS eval noted/ further plans per CTS team.  Radial care discussed with patient and son at bedside.

## 2018-11-08 NOTE — PROGRESS NOTE ADULT - SUBJECTIVE AND OBJECTIVE BOX
[facility] DIVISION OF KIDNEY DISEASES AND HYPERTENSION -- HEMODIALYSIS NOTE  --------------------------------------------------------------------------------  Chief Complaint: ESRD/Ongoing hemodialysis requirement    24 hour events/subjective: Tolerated HD     Achieved Euvolemic state,    PAST HISTORY  --------------------------------------------------------------------------------  No significant changes to PMH, PSH, FHx, SHx, unless otherwise noted    ALLERGIES & MEDICATIONS  --------------------------------------------------------------------------------  Allergies    No Known Allergies    Standing Inpatient Medications  ALBUTerol    90 MICROgram(s) HFA Inhaler 1 Puff(s) Inhalation every 4 hours  aspirin 325 milliGRAM(s) Oral daily  atorvastatin 20 milliGRAM(s) Oral at bedtime  carvedilol 12.5 milliGRAM(s) Oral every 12 hours  cloNIDine 0.1 milliGRAM(s) Oral three times a day  dextrose 5%. 1000 milliLiter(s) IV Continuous <Continuous>  dextrose 50% Injectable 12.5 Gram(s) IV Push once  dextrose 50% Injectable 25 Gram(s) IV Push once  dextrose 50% Injectable 25 Gram(s) IV Push once  folic acid 1 milliGRAM(s) Oral daily  insulin lispro (HumaLOG) corrective regimen sliding scale   SubCutaneous at bedtime  levothyroxine 75 MICROGram(s) Oral daily  Nephro-heather 1 Tablet(s) Oral daily  pantoprazole    Tablet 40 milliGRAM(s) Oral before breakfast  sodium chloride 0.9% lock flush 3 milliLiter(s) IV Push every 8 hours  tiotropium 18 MICROgram(s) Capsule 1 Capsule(s) Inhalation daily    REVIEW OF SYSTEMS  --------------------------------------------------------------------------------  Gen: No weight changes, fatigue, fevers/chills, weakness  Skin: No rashes  Head/Eyes/Ears/Mouth: No headache; Normal hearing; Normal vision w/o blurriness; No sinus pain/discomfort, sore throat  Respiratory: No dyspnea, cough, wheezing, hemoptysis  CV: No chest pain, PND, orthopnea  GI: No abdominal pain, diarrhea, constipation, nausea, vomiting, melena, hematochezia  : No increased frequency, dysuria, hematuria, nocturia  MSK: No joint pain/swelling; no back pain; no edema  Neuro: No dizziness/lightheadedness, weakness, seizures, numbness, tingling  Heme: No easy bruising or bleeding  Endo: No heat/cold intolerance  Psych: No significant nervousness, anxiety, stress, depression    All other systems were reviewed and are negative, except as noted.    VITALS/PHYSICAL EXAM  --------------------------------------------------------------------------------  T(C): 36.7 (11-08-18 @ 14:25), Max: 37.1 (11-07-18 @ 23:39)  HR: 74 (11-08-18 @ 14:25) (59 - 77)  BP: 156/70 (11-08-18 @ 14:25) (150/62 - 161/80)  RR: 18 (11-08-18 @ 14:25) (17 - 18)  SpO2: 99% (11-08-18 @ 14:25) (96% - 99%)  Wt(kg): --  Height (cm): 162.56 (11-06-18 @ 23:33)  Weight (kg): 98.4 (11-06-18 @ 23:33)  BMI (kg/m2): 37.2 (11-06-18 @ 23:33)  BSA (m2): 2.02 (11-06-18 @ 23:33)      11-07-18 @ 07:01  -  11-08-18 @ 07:00  --------------------------------------------------------  IN: 360 mL / OUT: 600 mL / NET: -240 mL    11-08-18 @ 07:01  -  11-08-18 @ 15:41  --------------------------------------------------------  IN: 515 mL / OUT: 1000 mL / NET: -485 mL      Physical Exam:  	Gen: NAD, well-appearing , Pale,  	HEENT: PERRL, supple neck, clear oropharynx  	Pulm: CTA B/L  	CV: RRR, S1S2; no rub  	Back: No spinal or CVA tenderness; no sacral edema  	Abd: +BS, soft, nontender/nondistended  	: No suprapubic tenderness  	UE: Warm, FROM, no clubbing, intact strength; no edema; no asterixis  	LE: Warm, FROM, no clubbing, intact strength; no edema  	Neuro: No focal deficits, intact gait  	Psych: Normal affect and mood  	Skin: Warm, without rashes  	Vascular access: AVF,    LABS/STUDIES  --------------------------------------------------------------------------------              11.6   5.9   >-----------<  276      [11-08-18 @ 05:54]              36.8     134  |  93  |  26.0  ----------------------------<  110      [11-08-18 @ 05:54]  4.7   |  25.0  |  5.25        Ca     9.9     [11-08-18 @ 05:54]    TPro  6.7  /  Alb  3.6  /  TBili  0.5  /  DBili  x   /  AST  31  /  ALT  14  /  AlkPhos  81  [11-08-18 @ 05:54]    PT/INR: PT 11.4 , INR 0.99       [11-07-18 @ 18:50]  PTT: 34.3       [11-07-18 @ 18:50]    Troponin 0.07      [11-06-18 @ 21:53]    HbA1c 6.5      [11-07-18 @ 18:50]  TSH 2.27      [11-07-18 @ 18:50]

## 2018-11-08 NOTE — PROGRESS NOTE ADULT - ASSESSMENT
63F ESRD on HD, hypothyroidism, HTN, DM, presents with mild troponin elevation, Cath + TVD    11/8 Plan for CT chest today r/o pneumonia    In depth discussion was had at bedside with patient, pt's son (Raheem) and Dr. Cavazos. Risks and benefits to surgery and options were discussed and explained. After reviewing all options, the family and patient decided that for now we will continue with all routine pre-op testing and plan for open heart surgery at some point next week, but it was also discussed this is pending all results including CT chest.

## 2018-11-08 NOTE — PROGRESS NOTE ADULT - PROBLEM SELECTOR PLAN 1
Continue with preop workup   Pending cartoids and CT chest   Pulmonology consult appreciated   Possible OR next week pending results from CT chest   Plan discussed with Dr Cavazos Continue with preop workup   Pending cartoids and CT chest   Pulmonology consult appreciated   Hold eliquis for Afib, Rec heparin gtt for anticoagulation   Possible OR next week pending results from CT chest   Plan discussed with Dr Cavazos

## 2018-11-09 DIAGNOSIS — E03.9 HYPOTHYROIDISM, UNSPECIFIED: ICD-10-CM

## 2018-11-09 DIAGNOSIS — E11.9 TYPE 2 DIABETES MELLITUS WITHOUT COMPLICATIONS: ICD-10-CM

## 2018-11-09 DIAGNOSIS — N18.6 END STAGE RENAL DISEASE: ICD-10-CM

## 2018-11-09 LAB
APTT BLD: 65.4 SEC — HIGH (ref 27.5–36.3)
APTT BLD: 66.3 SEC — HIGH (ref 27.5–36.3)
GLUCOSE BLDC GLUCOMTR-MCNC: 127 MG/DL — HIGH (ref 70–99)
GLUCOSE BLDC GLUCOMTR-MCNC: 145 MG/DL — HIGH (ref 70–99)
GLUCOSE BLDC GLUCOMTR-MCNC: 155 MG/DL — HIGH (ref 70–99)
GLUCOSE BLDC GLUCOMTR-MCNC: 224 MG/DL — HIGH (ref 70–99)
HCT VFR BLD CALC: 37.3 % — SIGNIFICANT CHANGE UP (ref 37–47)
HCT VFR BLD CALC: 38.5 % — SIGNIFICANT CHANGE UP (ref 37–47)
HGB BLD-MCNC: 11.4 G/DL — LOW (ref 12–16)
HGB BLD-MCNC: 11.8 G/DL — LOW (ref 12–16)
MCHC RBC-ENTMCNC: 28.3 PG — SIGNIFICANT CHANGE UP (ref 27–31)
MCHC RBC-ENTMCNC: 28.4 PG — SIGNIFICANT CHANGE UP (ref 27–31)
MCHC RBC-ENTMCNC: 30.6 G/DL — LOW (ref 32–36)
MCHC RBC-ENTMCNC: 30.6 G/DL — LOW (ref 32–36)
MCV RBC AUTO: 92.5 FL — SIGNIFICANT CHANGE UP (ref 81–99)
MCV RBC AUTO: 92.6 FL — SIGNIFICANT CHANGE UP (ref 81–99)
PLATELET # BLD AUTO: 239 K/UL — SIGNIFICANT CHANGE UP (ref 150–400)
PLATELET # BLD AUTO: 246 K/UL — SIGNIFICANT CHANGE UP (ref 150–400)
RBC # BLD: 4.03 M/UL — LOW (ref 4.4–5.2)
RBC # BLD: 4.16 M/UL — LOW (ref 4.4–5.2)
RBC # FLD: 16.9 % — HIGH (ref 11–15.6)
RBC # FLD: 17 % — HIGH (ref 11–15.6)
WBC # BLD: 5.4 K/UL — SIGNIFICANT CHANGE UP (ref 4.8–10.8)
WBC # BLD: 5.6 K/UL — SIGNIFICANT CHANGE UP (ref 4.8–10.8)
WBC # FLD AUTO: 5.4 K/UL — SIGNIFICANT CHANGE UP (ref 4.8–10.8)
WBC # FLD AUTO: 5.6 K/UL — SIGNIFICANT CHANGE UP (ref 4.8–10.8)

## 2018-11-09 PROCEDURE — 99232 SBSQ HOSP IP/OBS MODERATE 35: CPT

## 2018-11-09 PROCEDURE — 99233 SBSQ HOSP IP/OBS HIGH 50: CPT

## 2018-11-09 RX ORDER — ACETAMINOPHEN 500 MG
650 TABLET ORAL EVERY 6 HOURS
Qty: 0 | Refills: 0 | Status: DISCONTINUED | OUTPATIENT
Start: 2018-11-09 | End: 2018-11-13

## 2018-11-09 RX ADMIN — CARVEDILOL PHOSPHATE 12.5 MILLIGRAM(S): 80 CAPSULE, EXTENDED RELEASE ORAL at 06:43

## 2018-11-09 RX ADMIN — Medication 325 MILLIGRAM(S): at 11:15

## 2018-11-09 RX ADMIN — SODIUM CHLORIDE 3 MILLILITER(S): 9 INJECTION INTRAMUSCULAR; INTRAVENOUS; SUBCUTANEOUS at 21:45

## 2018-11-09 RX ADMIN — Medication 75 MICROGRAM(S): at 06:43

## 2018-11-09 RX ADMIN — HEPARIN SODIUM 1000 UNIT(S)/HR: 5000 INJECTION INTRAVENOUS; SUBCUTANEOUS at 14:13

## 2018-11-09 RX ADMIN — Medication 1 MILLIGRAM(S): at 11:15

## 2018-11-09 RX ADMIN — SODIUM CHLORIDE 3 MILLILITER(S): 9 INJECTION INTRAMUSCULAR; INTRAVENOUS; SUBCUTANEOUS at 14:12

## 2018-11-09 RX ADMIN — HEPARIN SODIUM 1000 UNIT(S)/HR: 5000 INJECTION INTRAVENOUS; SUBCUTANEOUS at 05:50

## 2018-11-09 RX ADMIN — Medication 650 MILLIGRAM(S): at 00:50

## 2018-11-09 RX ADMIN — Medication 0.1 MILLIGRAM(S): at 21:44

## 2018-11-09 RX ADMIN — CARVEDILOL PHOSPHATE 12.5 MILLIGRAM(S): 80 CAPSULE, EXTENDED RELEASE ORAL at 17:58

## 2018-11-09 RX ADMIN — Medication 1 TABLET(S): at 11:15

## 2018-11-09 RX ADMIN — ATORVASTATIN CALCIUM 20 MILLIGRAM(S): 80 TABLET, FILM COATED ORAL at 00:20

## 2018-11-09 RX ADMIN — ATORVASTATIN CALCIUM 20 MILLIGRAM(S): 80 TABLET, FILM COATED ORAL at 21:44

## 2018-11-09 RX ADMIN — Medication 650 MILLIGRAM(S): at 00:20

## 2018-11-09 RX ADMIN — PANTOPRAZOLE SODIUM 40 MILLIGRAM(S): 20 TABLET, DELAYED RELEASE ORAL at 06:43

## 2018-11-09 RX ADMIN — Medication 0.1 MILLIGRAM(S): at 00:20

## 2018-11-09 RX ADMIN — Medication 650 MILLIGRAM(S): at 21:43

## 2018-11-09 RX ADMIN — Medication 0.1 MILLIGRAM(S): at 06:43

## 2018-11-09 RX ADMIN — SODIUM CHLORIDE 3 MILLILITER(S): 9 INJECTION INTRAMUSCULAR; INTRAVENOUS; SUBCUTANEOUS at 06:36

## 2018-11-09 RX ADMIN — Medication 0.1 MILLIGRAM(S): at 14:12

## 2018-11-09 NOTE — PROGRESS NOTE ADULT - ASSESSMENT
CAD; for CABG next week  H/o recent pneumonia but now without symptoms  Chest CT without significant infiltrate; old calcified nodule  ESRD on HD  Morbidly obese  No significant sleep complaints and no known h/o SHASHANK  Denies smoking hx, asthma, COPD, or other lung disease  CHF with mildly reduced EF    Rec:    Optimize cardiac function  Diurese as needed  Weight loss is imperative  No pulmonary contraindication for upcoming CABG though increased risk due to MO, ESRD  Post op pain control, incentive spirometry, early ambulation  Empiric BiPAP at 16/12 if needed post-op and with sleep as obesity places pt at risk for SHASHANK  Albuterol prn  On heparin for DVT prophylaxis  Pulm f/u after d/c   Little else to add - Recall if needed post-op    D/w CTS NP

## 2018-11-09 NOTE — PROGRESS NOTE ADULT - SUBJECTIVE AND OBJECTIVE BOX
PULMONARY PROGRESS NOTE      KIM LEE  MRN-[id number]    Patient is a 63y old  Female who presents with a chief complaint of chest pain (09 Nov 2018 08:59)      INTERVAL HPI/OVERNIGHT EVENTS:    Patient is awake and alert  Comfortable  Daughter translated  Pt without respiratory complaints    MEDICATIONS  (STANDING):  ALBUTerol    90 MICROgram(s) HFA Inhaler 1 Puff(s) Inhalation every 4 hours  aspirin 325 milliGRAM(s) Oral daily  atorvastatin 20 milliGRAM(s) Oral at bedtime  carvedilol 12.5 milliGRAM(s) Oral every 12 hours  cloNIDine 0.1 milliGRAM(s) Oral three times a day  dextrose 5%. 1000 milliLiter(s) (50 mL/Hr) IV Continuous <Continuous>  dextrose 50% Injectable 12.5 Gram(s) IV Push once  dextrose 50% Injectable 25 Gram(s) IV Push once  dextrose 50% Injectable 25 Gram(s) IV Push once  folic acid 1 milliGRAM(s) Oral daily  heparin  Infusion.  Unit(s)/Hr (10 mL/Hr) IV Continuous <Continuous>  insulin lispro (HumaLOG) corrective regimen sliding scale   SubCutaneous at bedtime  levothyroxine 75 MICROGram(s) Oral daily  Nephro-heather 1 Tablet(s) Oral daily  pantoprazole    Tablet 40 milliGRAM(s) Oral before breakfast  sodium chloride 0.9% lock flush 3 milliLiter(s) IV Push every 8 hours  tiotropium 18 MICROgram(s) Capsule 1 Capsule(s) Inhalation daily      MEDICATIONS  (PRN):  acetaminophen   Tablet .. 650 milliGRAM(s) Oral every 6 hours PRN Temp greater or equal to 38C (100.4F), Moderate Pain (4 - 6)  ALBUTerol/ipratropium for Nebulization 3 milliLiter(s) Nebulizer every 6 hours PRN Shortness of Breath and/or Wheezing  ALPRAZolam 0.25 milliGRAM(s) Oral every 12 hours PRN anxiety  dextrose 40% Gel 15 Gram(s) Oral once PRN Blood Glucose LESS THAN 70 milliGRAM(s)/deciliter  glucagon  Injectable 1 milliGRAM(s) IntraMuscular once PRN Glucose LESS THAN 70 milligrams/deciliter  nitroglycerin     SubLingual 0.4 milliGRAM(s) SubLingual every 5 minutes PRN Chest Pain      Allergies    No Known Allergies    Intolerances        PAST MEDICAL & SURGICAL HISTORY:  Hypothyroidism, unspecified type  Hemodialysis patient: tues thursday saturday  Renal failure  Hypertension  Diabetes mellitus  A-V fistula  S/P cholecystectomy        REVIEW OF SYSTEMS:    CONSTITUTIONAL:  No distress    HEENT:  Eyes:  No diplopia or blurred vision. ENT:  No earache, sore throat or runny nose.    CARDIOVASCULAR:  No pressure, squeezing, tightness, heaviness or aching about the chest; no palpitations.    RESPIRATORY:  No cough, shortness of breath, PND or orthopnea. Mild SOBOE    GASTROINTESTINAL:  No nausea, vomiting or diarrhea.    GENITOURINARY:  No dysuria, frequency or urgency.    NEUROLOGIC:  No paresthesias, fasciculations, seizures or weakness.    PSYCHIATRIC:  No disorder of thought or mood.    Vital Signs Last 24 Hrs  T(C): 36.7 (09 Nov 2018 09:48), Max: 36.9 (08 Nov 2018 16:27)  T(F): 98.1 (09 Nov 2018 09:48), Max: 98.5 (08 Nov 2018 16:27)  HR: 74 (09 Nov 2018 10:09) (70 - 83)  BP: 154/70 (09 Nov 2018 10:09) (140/70 - 179/76)  BP(mean): --  RR: 19 (09 Nov 2018 09:48) (18 - 19)  SpO2: 99% (09 Nov 2018 09:48) (98% - 99%)    PHYSICAL EXAMINATION:    GENERAL: The patient is awake and alert in no apparent distress. Morbidly obese    HEENT: Head is normocephalic and atraumatic. Extraocular muscles are intact. Mucous membranes are moist.    NECK: Supple.    LUNGS: Clear to auscultation without wheezing, rales or rhonchi; respirations unlabored    HEART: Regular rate and rhythm without murmur.    ABDOMEN: Soft, nontender, and nondistended.      EXTREMITIES: Without any cyanosis, clubbing, rash, lesions or edema.    NEUROLOGIC: Grossly intact.    LABS:                        11.8   5.4   )-----------( 239      ( 09 Nov 2018 05:19 )             38.5     11-08    134<L>  |  93<L>  |  26.0<H>  ----------------------------<  110  4.7   |  25.0  |  5.25<H>    Ca    9.9      08 Nov 2018 05:54    TPro  6.7  /  Alb  3.6  /  TBili  0.5  /  DBili  x   /  AST  31  /  ALT  14  /  AlkPhos  81  11-08    PT/INR - ( 07 Nov 2018 18:50 )   PT: 11.4 sec;   INR: 0.99 ratio         PTT - ( 09 Nov 2018 05:19 )  PTT:65.4 sec            Serum Pro-Brain Natriuretic Peptide: 53649 pg/mL (11-07-18 @ 18:50)        RADIOLOGY & ADDITIONAL STUDIES:       EXAM:  CT CHEST                          PROCEDURE DATE:  11/08/2018          INTERPRETATION:  HISTORY: Shortness of breath and chest pain.    Date and Time of Exam: 11/8/2018 7:26 PM    TECHNIQUE:  Sections were obtained from the apices to the diaphragm   without intravenous contrast.    COMPARISON EXAMINATION:   No prior exam.    FINDINGS: No evidence of mediastinal or hilar lymphadenopathy. Extensive   coronary artery calcifications.    No evidence of pleural or pericardial effusion.    No evidence of axillary adenopathy.    Images obtained in the upper abdomen demonstrate somewhat atrophic   kidneys. In the left suprarenal region there is a large heterogeneous   solid mass containing fat consistent with a large adrenal myelolipoma   measuring 7 x 7 cm.    There is a calcified granuloma in the left upper lobe. The lungs are   otherwise clear.    There are degenerative changes in the spine.      IMPRESSION:     Large left adrenal myelolipoma.    Evidence of coronary artery disease. Cardiomegaly. Clear lungs..         SOLOMON BROOKS M.D., ATTENDING RADIOLOGIST  This document has been electronically signed. Nov 8 2018  8:05PM                ECHO:      Summary:   1. Left ventricular ejection fraction, by visual estimation, is 40 to   45%.   2. Mildly decreased global left ventricular systolic function.   3. Mid and apical inferior wall, mid inferolateral segment, mid   anterolateral segment, and apical lateral segment are abnormal as   described above.   4. Spectral Doppler shows restrictive pattern of left ventricular   myocardial filling (Grade III diastolic dysfunction).   5. There is no evidence of pericardial effusion.   6. Mild mitral annular calcification.   7. Moderate mitral valve regurgitation.   8. Thickening and calcification of the anterior and posterior mitral   valve leaflets.   9. Mild tricuspid regurgitation.  10. Sclerotic aortic valve with normal opening.  11. Trace pulmonic valve regurgitation.  12. Estimated pulmonary artery systolic pressure is 57.0 mmHg assuming a   right atrial pressure of 8 mmHg, which is consistent with moderate   pulmonary hypertension.    [id number] Jeronimo Arellano MD, Electronically signed on 11/7/2018 at 6:08:29   PM

## 2018-11-09 NOTE — PROGRESS NOTE ADULT - SUBJECTIVE AND OBJECTIVE BOX
Patient is a 63y old  Female who presents with a chief complaint of chest pain   follow up for CAD , pt is seen her son was at the bedside this am , she prefers him to translate [place] Islander speaking   she is c/o weakness and poor appetite   no chest pain reported     Vital Signs Last 24 Hrs  T(C): 36.7 (09 Nov 2018 09:48), Max: 36.9 (08 Nov 2018 16:27)  T(F): 98.1 (09 Nov 2018 09:48), Max: 98.5 (08 Nov 2018 16:27)  HR: 74 (09 Nov 2018 10:09) (70 - 83)  BP: 154/70 (09 Nov 2018 10:09) (140/70 - 179/76)  BP(mean): --  RR: 19 (09 Nov 2018 09:48) (18 - 19)  SpO2: 99% (09 Nov 2018 09:48) (98% - 99%)  General : awake alert no distress   Lungs : clear to auscultation        Heart : regular rate rythm  S1 /S2   Gastro : soft no tenderness , BS positive   Ext : no pretibial edema        POCT Blood Glucose.: 224 mg/dL (09 Nov 2018 12:08)  POCT Blood Glucose.: 145 mg/dL (09 Nov 2018 07:59)  POCT Blood Glucose.: 140 mg/dL (08 Nov 2018 21:25)  POCT Blood Glucose.: 126 mg/dL (08 Nov 2018 17:16)                          11.8   5.4   )-----------( 239      ( 09 Nov 2018 05:19 )             38.5   11-08    134<L>  |  93<L>  |  26.0<H>< from: CT Chest No Cont (11.08.18 @ 19:53) >    IMPRESSION:     Large left adrenal myelolipoma.    Evidence of coronary artery disease. Cardiomegaly. Clear lungs..             < end of copied text >    ----------------------------<  110  4.7   |  25.0  |  5.25<H>    Ca    9.9      08 Nov 2018 05:54    TPro  6.7  /  Alb  3.6  /  TBili  0.5  /  DBili  x   /  AST  31  /  ALT  14  /  AlkPhos  81  11-08

## 2018-11-09 NOTE — PROGRESS NOTE ADULT - SUBJECTIVE AND OBJECTIVE BOX
INTERVAL HISTORY: denies CP,SOB, PALPITATION, SYNCOPE  	  MEDICATIONS:  carvedilol 12.5 milliGRAM(s) Oral every 12 hours  cloNIDine 0.1 milliGRAM(s) Oral three times a day  nitroglycerin     SubLingual 0.4 milliGRAM(s) SubLingual every 5 minutes PRN  ALBUTerol    90 MICROgram(s) HFA Inhaler 1 Puff(s) Inhalation every 4 hours  ALBUTerol/ipratropium for Nebulization 3 milliLiter(s) Nebulizer every 6 hours PRN  tiotropium 18 MICROgram(s) Capsule 1 Capsule(s) Inhalation daily  acetaminophen   Tablet .. 650 milliGRAM(s) Oral every 6 hours PRN  ALPRAZolam 0.25 milliGRAM(s) Oral every 12 hours PRN  aspirin 325 milliGRAM(s) Oral daily  pantoprazole    Tablet 40 milliGRAM(s) Oral before breakfast  atorvastatin 20 milliGRAM(s) Oral at bedtime  dextrose 40% Gel 15 Gram(s) Oral once PRN  dextrose 50% Injectable 12.5 Gram(s) IV Push once  dextrose 50% Injectable 25 Gram(s) IV Push once  dextrose 50% Injectable 25 Gram(s) IV Push once  glucagon  Injectable 1 milliGRAM(s) IntraMuscular once PRN  insulin lispro (HumaLOG) corrective regimen sliding scale   SubCutaneous at bedtime  levothyroxine 75 MICROGram(s) Oral daily  dextrose 5%. 1000 milliLiter(s) IV Continuous <Continuous>  folic acid 1 milliGRAM(s) Oral daily  heparin  Infusion.  Unit(s)/Hr IV Continuous <Continuous>  Nephro-heather 1 Tablet(s) Oral daily  sodium chloride 0.9% lock flush 3 milliLiter(s) IV Push every 8 hours        PHYSICAL EXAM:  T(C): 36.9 (11-09-18 @ 06:47), Max: 36.9 (11-08-18 @ 16:27)  HR: 70 (11-09-18 @ 06:47) (70 - 83)  BP: 142/64 (11-09-18 @ 06:47) (140/70 - 162/69)  RR: 18 (11-09-18 @ 06:47) (18 - 18)  SpO2: 98% (11-09-18 @ 06:47) (96% - 99%)  Wt(kg): --  I&O's Summary    08 Nov 2018 07:01  -  09 Nov 2018 07:00  --------------------------------------------------------  IN: 865 mL / OUT: 1400 mL / NET: -535 mL          Appearance: Normal	  HEENT:   Normal oral mucosa  Cardiovascular: Normal S1 S2, No JVD, No murmurs, No edema  Respiratory: Lungs clear to auscultation	  Psychiatry: A & O x 3, Mood & affect appropriate  Gastrointestinal:  Soft, Non-tender, + BS	  Skin: No rashes, No ecchymoses, No cyanosis  Neurologic: Non-focal  Extremities: Normal range of motion, No clubbing, cyanosis or edema  Vascular: Peripheral pulses palpable 2+ bilaterally    TELEMETRY: No significant arrythmia.  	                            11.8   5.4   )-----------( 239      ( 09 Nov 2018 05:19 )             38.5     11-08    134<L>  |  93<L>  |  26.0<H>  ----------------------------<  110  4.7   |  25.0  |  5.25<H>    Ca    9.9      08 Nov 2018 05:54    TPro  6.7  /  Alb  3.6  /  TBili  0.5  /  DBili  x   /  AST  31  /  ALT  14  /  AlkPhos  81  11-08      ASSESSMENT/PLAN: 62F with severe multivessel CAD.  DM, ESRD, HTN, HL. Will benefit most from CABG rather than PCI.  Surgical evaluation in progress.

## 2018-11-09 NOTE — PROGRESS NOTE ADULT - ASSESSMENT
63 F from home with hx of ESRD tu th sat L AVF, DM2, HTN, hypothyroidism who presented with chest pain with concern for ACS. Noted elevation in troponins, s/p cardiac cath 11/7/18 with noted severe triple vessel dz. CT sx consulted and pre op work up in process for potential surgical intervention next week vs PCI.

## 2018-11-09 NOTE — PROGRESS NOTE ADULT - SUBJECTIVE AND OBJECTIVE BOX
[facility] DIVISION OF KIDNEY DISEASES AND HYPERTENSION -- FOLLOW UP NOTE  --------------------------------------------------------------------------------  Chief Complaint:   ESRD HD  24 hour events/subjective:    Pt seen and examined  HD due tomorrow    PAST HISTORY  --------------------------------------------------------------------------------  No significant changes to PMH, PSH, FHx, SHx, unless otherwise noted    ALLERGIES & MEDICATIONS  --------------------------------------------------------------------------------  Allergies    No Known Allergies    Intolerances      Standing Inpatient Medications  ALBUTerol    90 MICROgram(s) HFA Inhaler 1 Puff(s) Inhalation every 4 hours  aspirin 325 milliGRAM(s) Oral daily  atorvastatin 20 milliGRAM(s) Oral at bedtime  carvedilol 12.5 milliGRAM(s) Oral every 12 hours  cloNIDine 0.1 milliGRAM(s) Oral three times a day  dextrose 5%. 1000 milliLiter(s) IV Continuous <Continuous>  dextrose 50% Injectable 12.5 Gram(s) IV Push once  dextrose 50% Injectable 25 Gram(s) IV Push once  dextrose 50% Injectable 25 Gram(s) IV Push once  folic acid 1 milliGRAM(s) Oral daily  heparin  Infusion.  Unit(s)/Hr IV Continuous <Continuous>  insulin lispro (HumaLOG) corrective regimen sliding scale   SubCutaneous at bedtime  levothyroxine 75 MICROGram(s) Oral daily  Nephro-heather 1 Tablet(s) Oral daily  pantoprazole    Tablet 40 milliGRAM(s) Oral before breakfast  sodium chloride 0.9% lock flush 3 milliLiter(s) IV Push every 8 hours  tiotropium 18 MICROgram(s) Capsule 1 Capsule(s) Inhalation daily    PRN Inpatient Medications  acetaminophen   Tablet .. 650 milliGRAM(s) Oral every 6 hours PRN  ALBUTerol/ipratropium for Nebulization 3 milliLiter(s) Nebulizer every 6 hours PRN  ALPRAZolam 0.25 milliGRAM(s) Oral every 12 hours PRN  dextrose 40% Gel 15 Gram(s) Oral once PRN  glucagon  Injectable 1 milliGRAM(s) IntraMuscular once PRN  nitroglycerin     SubLingual 0.4 milliGRAM(s) SubLingual every 5 minutes PRN      REVIEW OF SYSTEMS  --------------------------------------------------------------------------------  Gen: No weight changes, fatigue, fevers/chills, weakness  Skin: No rashes  Head/Eyes/Ears/Mouth: No headache; Normal hearing; Normal vision w/o blurriness; No sinus pain/discomfort, sore throat  Respiratory: No dyspnea, cough, wheezing, hemoptysis  CV: No chest pain, PND, orthopnea  GI: No abdominal pain, diarrhea, constipation, nausea, vomiting, melena, hematochezia  : No increased frequency, dysuria, hematuria, nocturia  MSK: No joint pain/swelling; no back pain; no edema  Neuro: No dizziness/lightheadedness, weakness, seizures, numbness, tingling  Heme: No easy bruising or bleeding  Endo: No heat/cold intolerance  Psych: No significant nervousness, anxiety, stress, depression    All other systems were reviewed and are negative, except as noted.    VITALS/PHYSICAL EXAM  --------------------------------------------------------------------------------  T(C): 36.7 (11-09-18 @ 09:48), Max: 36.9 (11-08-18 @ 16:27)  HR: 74 (11-09-18 @ 10:09) (70 - 83)  BP: 154/70 (11-09-18 @ 10:09) (140/70 - 179/76)  RR: 19 (11-09-18 @ 09:48) (18 - 19)  SpO2: 99% (11-09-18 @ 09:48) (98% - 99%)  Wt(kg): --        11-08-18 @ 07:01  -  11-09-18 @ 07:00  --------------------------------------------------------  IN: 865 mL / OUT: 1400 mL / NET: -535 mL    11-09-18 @ 07:01  -  11-09-18 @ 12:41  --------------------------------------------------------  IN: 365 mL / OUT: 0 mL / NET: 365 mL      Physical Exam:  	Gen: NAD, well-appearing , Pale,  	HEENT: PERRL, supple neck, clear oropharynx  	Pulm: CTA B/L  	CV: RRR, S1S2; no rub  	Back: No spinal or CVA tenderness; no sacral edema  	Abd: +BS, soft, nontender/nondistended  	: No suprapubic tenderness  	UE: Warm, FROM, no clubbing, intact strength; no edema; no asterixis  	LE: Warm, FROM, no clubbing, intact strength; no edema  	Neuro: No focal deficits, intact gait  	Psych: Normal affect and mood  	Skin: Warm, without rashes  	Vascular access: AVF,    LABS/STUDIES  --------------------------------------------------------------------------------              11.8   5.4   >-----------<  239      [11-09-18 @ 05:19]              38.5     134  |  93  |  26.0  ----------------------------<  110      [11-08-18 @ 05:54]  4.7   |  25.0  |  5.25        Ca     9.9     [11-08-18 @ 05:54]    TPro  6.7  /  Alb  3.6  /  TBili  0.5  /  DBili  x   /  AST  31  /  ALT  14  /  AlkPhos  81  [11-08-18 @ 05:54]    PT/INR: PT 11.4 , INR 0.99       [11-07-18 @ 18:50]  PTT: 65.4       [11-09-18 @ 05:19]      Creatinine Trend:  SCr 5.25 [11-08 @ 05:54]  SCr 7.20 [11-07 @ 18:50]  SCr 6.27 [11-07 @ 06:33]  SCr 4.11 [11-06 @ 11:24]        HbA1c 6.5      [11-07-18 @ 18:50]  TSH 2.27      [11-07-18 @ 18:50]

## 2018-11-10 DIAGNOSIS — E27.9 DISORDER OF ADRENAL GLAND, UNSPECIFIED: ICD-10-CM

## 2018-11-10 DIAGNOSIS — I10 ESSENTIAL (PRIMARY) HYPERTENSION: ICD-10-CM

## 2018-11-10 LAB
ANION GAP SERPL CALC-SCNC: 19 MMOL/L — HIGH (ref 5–17)
APTT BLD: 69.7 SEC — HIGH (ref 27.5–36.3)
APTT BLD: 69.9 SEC — HIGH (ref 27.5–36.3)
APTT BLD: 74.6 SEC — HIGH (ref 27.5–36.3)
BUN SERPL-MCNC: 41 MG/DL — HIGH (ref 8–20)
CALCIUM SERPL-MCNC: 9.8 MG/DL — SIGNIFICANT CHANGE UP (ref 8.6–10.2)
CHLORIDE SERPL-SCNC: 92 MMOL/L — LOW (ref 98–107)
CO2 SERPL-SCNC: 24 MMOL/L — SIGNIFICANT CHANGE UP (ref 22–29)
CREAT SERPL-MCNC: 6.94 MG/DL — HIGH (ref 0.5–1.3)
GLUCOSE BLDC GLUCOMTR-MCNC: 162 MG/DL — HIGH (ref 70–99)
GLUCOSE BLDC GLUCOMTR-MCNC: 168 MG/DL — HIGH (ref 70–99)
GLUCOSE BLDC GLUCOMTR-MCNC: 193 MG/DL — HIGH (ref 70–99)
GLUCOSE BLDC GLUCOMTR-MCNC: 210 MG/DL — HIGH (ref 70–99)
GLUCOSE SERPL-MCNC: 234 MG/DL — HIGH (ref 70–115)
HCT VFR BLD CALC: 37.4 % — SIGNIFICANT CHANGE UP (ref 37–47)
HGB BLD-MCNC: 11.3 G/DL — LOW (ref 12–16)
MCHC RBC-ENTMCNC: 27.5 PG — SIGNIFICANT CHANGE UP (ref 27–31)
MCHC RBC-ENTMCNC: 30.2 G/DL — LOW (ref 32–36)
MCV RBC AUTO: 91 FL — SIGNIFICANT CHANGE UP (ref 81–99)
PHOSPHATE SERPL-MCNC: 5.8 MG/DL — HIGH (ref 2.4–4.7)
PLATELET # BLD AUTO: 269 K/UL — SIGNIFICANT CHANGE UP (ref 150–400)
POTASSIUM SERPL-MCNC: 5.1 MMOL/L — SIGNIFICANT CHANGE UP (ref 3.5–5.3)
POTASSIUM SERPL-SCNC: 5.1 MMOL/L — SIGNIFICANT CHANGE UP (ref 3.5–5.3)
RBC # BLD: 4.11 M/UL — LOW (ref 4.4–5.2)
RBC # FLD: 17 % — HIGH (ref 11–15.6)
SODIUM SERPL-SCNC: 135 MMOL/L — SIGNIFICANT CHANGE UP (ref 135–145)
WBC # BLD: 6.8 K/UL — SIGNIFICANT CHANGE UP (ref 4.8–10.8)
WBC # FLD AUTO: 6.8 K/UL — SIGNIFICANT CHANGE UP (ref 4.8–10.8)

## 2018-11-10 PROCEDURE — 90937 HEMODIALYSIS REPEATED EVAL: CPT

## 2018-11-10 PROCEDURE — 99232 SBSQ HOSP IP/OBS MODERATE 35: CPT

## 2018-11-10 RX ORDER — PETROLATUM,WHITE
1 JELLY (GRAM) TOPICAL
Qty: 0 | Refills: 0 | Status: DISCONTINUED | OUTPATIENT
Start: 2018-11-10 | End: 2018-11-13

## 2018-11-10 RX ORDER — CHLORHEXIDINE GLUCONATE 213 G/1000ML
1 SOLUTION TOPICAL
Qty: 0 | Refills: 0 | Status: DISCONTINUED | OUTPATIENT
Start: 2018-11-11 | End: 2018-11-13

## 2018-11-10 RX ORDER — ASPIRIN/CALCIUM CARB/MAGNESIUM 324 MG
81 TABLET ORAL DAILY
Qty: 0 | Refills: 0 | Status: DISCONTINUED | OUTPATIENT
Start: 2018-11-11 | End: 2018-11-13

## 2018-11-10 RX ORDER — INSULIN LISPRO 100/ML
VIAL (ML) SUBCUTANEOUS
Qty: 0 | Refills: 0 | Status: DISCONTINUED | OUTPATIENT
Start: 2018-11-10 | End: 2018-11-13

## 2018-11-10 RX ORDER — CHLORHEXIDINE GLUCONATE 213 G/1000ML
15 SOLUTION TOPICAL
Qty: 0 | Refills: 0 | Status: DISCONTINUED | OUTPATIENT
Start: 2018-11-10 | End: 2018-11-10

## 2018-11-10 RX ORDER — CHLORHEXIDINE GLUCONATE 213 G/1000ML
15 SOLUTION TOPICAL
Qty: 0 | Refills: 0 | Status: DISCONTINUED | OUTPATIENT
Start: 2018-11-10 | End: 2018-11-13

## 2018-11-10 RX ORDER — ATORVASTATIN CALCIUM 80 MG/1
40 TABLET, FILM COATED ORAL AT BEDTIME
Qty: 0 | Refills: 0 | Status: DISCONTINUED | OUTPATIENT
Start: 2018-11-10 | End: 2018-11-13

## 2018-11-10 RX ADMIN — CHLORHEXIDINE GLUCONATE 15 MILLILITER(S): 213 SOLUTION TOPICAL at 18:00

## 2018-11-10 RX ADMIN — Medication 75 MICROGRAM(S): at 05:33

## 2018-11-10 RX ADMIN — CARVEDILOL PHOSPHATE 12.5 MILLIGRAM(S): 80 CAPSULE, EXTENDED RELEASE ORAL at 05:33

## 2018-11-10 RX ADMIN — Medication 1 MILLIGRAM(S): at 11:51

## 2018-11-10 RX ADMIN — HEPARIN SODIUM 900 UNIT(S)/HR: 5000 INJECTION INTRAVENOUS; SUBCUTANEOUS at 07:35

## 2018-11-10 RX ADMIN — Medication 650 MILLIGRAM(S): at 23:15

## 2018-11-10 RX ADMIN — SODIUM CHLORIDE 3 MILLILITER(S): 9 INJECTION INTRAMUSCULAR; INTRAVENOUS; SUBCUTANEOUS at 13:41

## 2018-11-10 RX ADMIN — Medication 0.1 MILLIGRAM(S): at 21:13

## 2018-11-10 RX ADMIN — Medication 0.1 MILLIGRAM(S): at 05:33

## 2018-11-10 RX ADMIN — Medication 650 MILLIGRAM(S): at 01:32

## 2018-11-10 RX ADMIN — HEPARIN SODIUM 900 UNIT(S)/HR: 5000 INJECTION INTRAVENOUS; SUBCUTANEOUS at 14:08

## 2018-11-10 RX ADMIN — CARVEDILOL PHOSPHATE 12.5 MILLIGRAM(S): 80 CAPSULE, EXTENDED RELEASE ORAL at 18:00

## 2018-11-10 RX ADMIN — Medication 325 MILLIGRAM(S): at 11:51

## 2018-11-10 RX ADMIN — PANTOPRAZOLE SODIUM 40 MILLIGRAM(S): 20 TABLET, DELAYED RELEASE ORAL at 05:33

## 2018-11-10 RX ADMIN — Medication 650 MILLIGRAM(S): at 21:14

## 2018-11-10 RX ADMIN — Medication 0.1 MILLIGRAM(S): at 13:00

## 2018-11-10 RX ADMIN — SODIUM CHLORIDE 3 MILLILITER(S): 9 INJECTION INTRAMUSCULAR; INTRAVENOUS; SUBCUTANEOUS at 21:29

## 2018-11-10 RX ADMIN — Medication 1 TABLET(S): at 11:51

## 2018-11-10 RX ADMIN — ATORVASTATIN CALCIUM 40 MILLIGRAM(S): 80 TABLET, FILM COATED ORAL at 21:14

## 2018-11-10 RX ADMIN — SODIUM CHLORIDE 3 MILLILITER(S): 9 INJECTION INTRAMUSCULAR; INTRAVENOUS; SUBCUTANEOUS at 05:34

## 2018-11-10 NOTE — PROGRESS NOTE ADULT - PROBLEM SELECTOR PLAN 1
Continue with preop workup   Pulmonology consult appreciated , cleared for surgery   Possible OR on Tuesday   Plan discussed with Dr Cavazos

## 2018-11-10 NOTE — PROGRESS NOTE ADULT - ASSESSMENT
63F ESRD on HD, hypothyroidism, HTN, DM, presents with mild troponin elevation, Cath + TVD      In depth discussion was had at bedside with patient, pt's son (Raheem) and Dr. Cavazos. Risks and benefits to surgery and options were discussed and explained. After reviewing all options, the family and patient decided that for now we will continue with all routine pre-op testing and plan for open heart surgery planned for Tuesday.

## 2018-11-10 NOTE — PROGRESS NOTE ADULT - SUBJECTIVE AND OBJECTIVE BOX
follow up for CAD ,DM type 2    pt is seen her daughter at the bedside prefers to translate   Pt denies chest pain , no shortness of breath   appetite better     Vital Signs Last 24 Hrs  T(C): 36.9 (10 Nov 2018 11:25), Max: 37.1 (10 Nov 2018 05:28)  T(F): 98.4 (10 Nov 2018 11:25), Max: 98.7 (10 Nov 2018 05:28)  HR: 66 (10 Nov 2018 13:01) (57 - 66)  BP: 175/85 (10 Nov 2018 13:01) (110/64 - 175/85)  BP(mean): --  RR: 17 (10 Nov 2018 13:01) (16 - 20)  SpO2: 100% (10 Nov 2018 13:01) (97% - 100%)    General : awake alert no distress   Lungs : clear to auscultation        Heart : regular rate rythm  S1 /S2   Gastro : soft no tenderness , BS positive   Ext : no pretibial edema      CAPILLARY BLOOD GLUCOSE      POCT Blood Glucose.: 162 mg/dL (10 Nov 2018 12:35)  POCT Blood Glucose.: 210 mg/dL (10 Nov 2018 08:29)  POCT Blood Glucose.: 155 mg/dL (09 Nov 2018 21:01)  POCT Blood Glucose.: 127 mg/dL (09 Nov 2018 17:33)                        11.3   6.8   )-----------( 269      ( 10 Nov 2018 05:49 )             37.4   11-10    135  |  92<L>  |  41.0<H>  ----------------------------<  234<H>  5.1   |  24.0  |  6.94<H>    Ca    9.8      10 Nov 2018 12:25  Phos  5.8     11-10

## 2018-11-10 NOTE — PROGRESS NOTE ADULT - SUBJECTIVE AND OBJECTIVE BOX
Vital Signs Last 24 Hrs  T(C): 36.8 (10 Nov 2018 09:58), Max: 37.1 (10 Nov 2018 05:28)  T(F): 98.2 (10 Nov 2018 09:58), Max: 98.7 (10 Nov 2018 05:28)  HR: 58 (10 Nov 2018 09:58) (58 - 59)  BP: 146/70 (10 Nov 2018 09:58) (110/64 - 152/64)  BP(mean): --  RR: 16 (10 Nov 2018 09:58) (16 - 20)  SpO2: 99% (10 Nov 2018 09:58) (97% - 99%)        TPro  6.7    /  Alb  3.6    /  TBili  0.5    /  DBili  x      /  AST  31     /  ALT  14     /  AlkPhos  81     08 Nov 2018 05:54                        11.3<L>  6.8   )-----------( 269      ( 10 Nov 2018 05:49 )             37.4     Phos:-- Mg:-- PTH:-- Uric acid:-- Serum Osm:--  Ferritin:-- Iron:-- TIBC:-- Tsat:--  B12:2.27 uIU/mL TSH:-- (11-07 @ 18:50)    [facility] DIVISION OF KIDNEY DISEASES AND HYPERTENSION -- FOLLOW UP NOTE  --------------------------------------------------------------------------------  Chief Complaint:     ESRD -  HD  24 hour events/subjective: W.U for CABG Next week in progress,    Pt seen and examined    PAST HISTORY  --------------------------------------------------------------------------------  No significant changes to PMH, PSH, FHx, SHx, unless otherwise noted    ALLERGIES & MEDICATIONS  --------------------------------------------------------------------------------  Allergies    No Known Allergies    Standing Inpatient Medications  ALBUTerol    90 MICROgram(s) HFA Inhaler 1 Puff(s) Inhalation every 4 hours  aspirin 325 milliGRAM(s) Oral daily  atorvastatin 20 milliGRAM(s) Oral at bedtime  carvedilol 12.5 milliGRAM(s) Oral every 12 hours  cloNIDine 0.1 milliGRAM(s) Oral three times a day  dextrose 5%. 1000 milliLiter(s) IV Continuous <Continuous>  dextrose 50% Injectable 12.5 Gram(s) IV Push once  dextrose 50% Injectable 25 Gram(s) IV Push once  dextrose 50% Injectable 25 Gram(s) IV Push once  folic acid 1 milliGRAM(s) Oral daily  heparin  Infusion.  Unit(s)/Hr IV Continuous <Continuous>  insulin lispro (HumaLOG) corrective regimen sliding scale   SubCutaneous at bedtime  levothyroxine 75 MICROGram(s) Oral daily  Nephro-heather 1 Tablet(s) Oral daily  pantoprazole    Tablet 40 milliGRAM(s) Oral before breakfast  sodium chloride 0.9% lock flush 3 milliLiter(s) IV Push every 8 hours  tiotropium 18 MICROgram(s) Capsule 1 Capsule(s) Inhalation daily    REVIEW OF SYSTEMS  --------------------------------------------------------------------------------  Gen: No weight changes, fatigue, fevers/chills, weakness  Skin: No rashes  Head/Eyes/Ears/Mouth: No headache; Normal hearing; Normal vision w/o blurriness; No sinus pain/discomfort, sore throat  Respiratory: No dyspnea, cough, wheezing, hemoptysis  CV: No chest pain, PND, orthopnea  GI: No abdominal pain, diarrhea, constipation, nausea, vomiting, melena, hematochezia  : No increased frequency, dysuria, hematuria, nocturia  MSK: No joint pain/swelling; no back pain; no edema  Neuro: No dizziness/lightheadedness, weakness, seizures, numbness, tingling  Heme: No easy bruising or bleeding  Endo: No heat/cold intolerance  Psych: No significant nervousness, anxiety, stress, depression    All other systems were reviewed and are negative, except as noted.    VITALS/PHYSICAL EXAM  --------------------------------------------------------------------------------  T(C): 36.7 (11-09-18 @ 09:48), Max: 36.9 (11-08-18 @ 16:27)  HR: 74 (11-09-18 @ 10:09) (70 - 83)  BP: 154/70 (11-09-18 @ 10:09) (140/70 - 179/76)  RR: 19 (11-09-18 @ 09:48) (18 - 19)  SpO2: 99% (11-09-18 @ 09:48) (98% - 99%)  Wt(kg): --    11-08-18 @ 07:01  -  11-09-18 @ 07:00  --------------------------------------------------------  IN: 865 mL / OUT: 1400 mL / NET: -535 mL    11-09-18 @ 07:01  -  11-09-18 @ 12:41  --------------------------------------------------------  IN: 365 mL / OUT: 0 mL / NET: 365 mL    Physical Exam:  	Gen: NAD, well-appearing , Pale,  	HEENT: PERRL, supple neck, clear oropharynx  	Pulm: CTA B/L  	CV: RRR, S1S2; no rub  	Back: No spinal or CVA tenderness; no sacral edema  	Abd: +BS, soft, nontender/nondistended  	: No suprapubic tenderness  	UE: Warm, FROM, no clubbing, intact strength; no edema; no asterixis  	LE: Warm, FROM, no clubbing, intact strength; no edema  	Neuro: No focal deficits, intact gait  	Psych: Normal affect and mood  	Skin: Warm, without rashes  	Vascular access: AVF,    LABS/STUDIES  --------------------------------------------------------------------------------              11.8   5.4   >-----------<  239      [11-09-18 @ 05:19]              38.5     134  |  93  |  26.0  ----------------------------<  110      [11-08-18 @ 05:54]  4.7   |  25.0  |  5.25        Ca     9.9     [11-08-18 @ 05:54]    TPro  6.7  /  Alb  3.6  /  TBili  0.5  /  DBili  x   /  AST  31  /  ALT  14  /  AlkPhos  81  [11-08-18 @ 05:54]    PT/INR: PT 11.4 , INR 0.99       [11-07-18 @ 18:50]  PTT: 65.4       [11-09-18 @ 05:19]    Creatinine Trend:  SCr 5.25 [11-08 @ 05:54]  SCr 7.20 [11-07 @ 18:50]  SCr 6.27 [11-07 @ 06:33]  SCr 4.11 [11-06 @ 11:24]    HbA1c 6.5      [11-07-18 @ 18:50]  TSH 2.27      [11-07-18 @ 18:50]    1) ESRD on HD  2) MBD of renal dx  3) Anemia of renal dx  4) Vol/HTN    On HD TTS  HD This PM,

## 2018-11-10 NOTE — PROGRESS NOTE ADULT - ASSESSMENT
63 F from home with hx of ESRD tu th sat L AVF, DM2, HTN, hypothyroidism who presented with chest pain with concern for ACS. Noted elevation in troponins, s/p cardiac cath 11/7/18 with noted severe triple vessel dz. CT sx consulted and pre op work up in process for potential surgical intervention next week

## 2018-11-10 NOTE — PROGRESS NOTE ADULT - SUBJECTIVE AND OBJECTIVE BOX
[facility], [facility]                                                    [address], [address], [place] [address]                                                         PHONE: [phone]    FAX: [phone] [address], [address], [place] [address]                                                 PHONE: [phone]    FAX: [phone]  *******************************************************************************  cc: chest pain    HPI: Pt is a 62yr old female who was sent to ER due to complaint of chest pain centrally located without radiation that occurred with HD. Hx of HTN, HL and PAF. No associated SOB, palpitations, PND or orthopnea. Pt underwent cardiac catheterization with documented multivessel CAD for which CABG is planned.    Overnight events/Subjective Assessment: currently asymptomatic. Son at bedside.    INTERPRETATION OF TELEMETRY (personally reviewed): sr    No Known Allergies    Patient History:    Past Medical History:  Diabetes mellitus    Hemodialysis patient  tues thursday saturday  Hypertension    Hypothyroidism, unspecified type    Renal failure.     Past Surgical History:  A-V fistula    S/P cholecystectomy.     Family History:  Father  Still living? Unknown  Family history of diabetes mellitus, Age at diagnosis: Age Unknown.     Social History:  Social History (marital status, living situation, occupation, tobacco use, alcohol and drug use, and sexual history): no smoking. no etoh.    Review of systems: as above. No other pertinent ROS    MEDICATIONS  (STANDING):  ALBUTerol    90 MICROgram(s) HFA Inhaler 1 Puff(s) Inhalation every 4 hours  aspirin 325 milliGRAM(s) Oral daily  atorvastatin 20 milliGRAM(s) Oral at bedtime  carvedilol 12.5 milliGRAM(s) Oral every 12 hours  cloNIDine 0.1 milliGRAM(s) Oral three times a day  dextrose 5%. 1000 milliLiter(s) (50 mL/Hr) IV Continuous <Continuous>  dextrose 50% Injectable 12.5 Gram(s) IV Push once  dextrose 50% Injectable 25 Gram(s) IV Push once  dextrose 50% Injectable 25 Gram(s) IV Push once  folic acid 1 milliGRAM(s) Oral daily  heparin  Infusion.  Unit(s)/Hr (10 mL/Hr) IV Continuous <Continuous>  insulin lispro (HumaLOG) corrective regimen sliding scale   SubCutaneous at bedtime  levothyroxine 75 MICROGram(s) Oral daily  Nephro-heather 1 Tablet(s) Oral daily  pantoprazole    Tablet 40 milliGRAM(s) Oral before breakfast  sodium chloride 0.9% lock flush 3 milliLiter(s) IV Push every 8 hours  tiotropium 18 MICROgram(s) Capsule 1 Capsule(s) Inhalation daily    MEDICATIONS  (PRN):  acetaminophen   Tablet .. 650 milliGRAM(s) Oral every 6 hours PRN Temp greater or equal to 38C (100.4F), Moderate Pain (4 - 6)  ALBUTerol/ipratropium for Nebulization 3 milliLiter(s) Nebulizer every 6 hours PRN Shortness of Breath and/or Wheezing  ALPRAZolam 0.25 milliGRAM(s) Oral every 12 hours PRN anxiety  dextrose 40% Gel 15 Gram(s) Oral once PRN Blood Glucose LESS THAN 70 milliGRAM(s)/deciliter  glucagon  Injectable 1 milliGRAM(s) IntraMuscular once PRN Glucose LESS THAN 70 milligrams/deciliter  nitroglycerin     SubLingual 0.4 milliGRAM(s) SubLingual every 5 minutes PRN Chest Pain      Vital Signs Last 24 Hrs  T(C): 36.8 (10 Nov 2018 09:58), Max: 37.1 (10 Nov 2018 05:28)  T(F): 98.2 (10 Nov 2018 09:58), Max: 98.7 (10 Nov 2018 05:28)  HR: 58 (10 Nov 2018 09:58) (58 - 59)  BP: 146/70 (10 Nov 2018 09:58) (110/64 - 152/64)  BP(mean): --  RR: 16 (10 Nov 2018 09:58) (16 - 20)  SpO2: 99% (10 Nov 2018 09:58) (97% - 99%)    I&O's Detail    09 Nov 2018 07:01  -  10 Nov 2018 07:00  --------------------------------------------------------  IN:    heparin  Infusion.: 220 mL    Oral Fluid: 830 mL  Total IN: 1050 mL    OUT:    Voided: 200 mL  Total OUT: 200 mL    Total NET: 850 mL        I&O's Summary    09 Nov 2018 07:01  -  10 Nov 2018 07:00  --------------------------------------------------------  IN: 1050 mL / OUT: 200 mL / NET: 850 mL            PHYSICAL EXAM:  General: Appears well developed, well nourished, no acute distress  HEENT: Head: normocephalic, atraumatic  Eyes: Pupils equal and reactive  Neck: Supple, no carotid bruit, no JVD, no HJR  CARDIOVASCULAR: Normal S1 and S2, no murmur, rub, or gallop  LUNGS: Clear to auscultation bilaterally, no rales, rhonchi or wheeze  ABDOMEN: Soft, nontender, non-distended, positive bowel sounds, no mass or bruit  EXTREMITIES: No edema, distal pulses WNL  SKIN: Warm and dry with normal turgor  NEURO: Alert & oriented x 3, grossly intact  PSYCH: normal mood and affect          LABS:                        11.3   6.8   )-----------( 269      ( 10 Nov 2018 05:49 )             37.4               PTT - ( 10 Nov 2018 05:49 )  PTT:74.6 sec  Serum Pro-Brain Natriuretic Peptide: 56447 pg/mL (11-07 @ 18:50)  Serum Pro-Brain Natriuretic Peptide: 13126 pg/mL (11-06 @ 11:23)  serum  Lipids:   Hemoglobin A1C, Whole Blood: 6.5 % (11-07 @ 18:50)    Thyroid Stimulating Hormone, Serum: 2.27 uIU/mL (11-07 @ 18:50)      RADIOLOGY & ADDITIONAL STUDIES:      ECHO: < from: TTE Echo Complete w/Doppler (11.07.18 @ 15:59) >  Summary:   1. Left ventricular ejection fraction, by visual estimation, is 40 to   45%.   2. Mildly decreased global left ventricular systolic function.   3. Mid and apical inferior wall, mid inferolateral segment, mid   anterolateral segment, and apical lateral segment are abnormal as   described above.   4. Spectral Doppler shows restrictive pattern of left ventricular   myocardial filling (Grade III diastolic dysfunction).   5. There is no evidence of pericardial effusion.   6. Mild mitral annular calcification.   7. Moderate mitral valve regurgitation.   8. Thickening and calcification of the anterior and posterior mitral   valve leaflets.   9. Mild tricuspid regurgitation.  10. Sclerotic aortic valve with normal opening.  11. Trace pulmonic valve regurgitation.  12. Estimated pulmonary artery systolic pressure is 57.0 mmHg assuming a   right atrial pressure of 8 mmHg, which is consistent with moderate   pulmonary hypertension.    < end of copied text >        CARDIAC CATHETERIZATION: < from: Cardiac Cath Lab - Adult (11.07.18 @ 13:34) >  VENTRICLES: Analysis of regional contractile function demonstrated severe  diaphragmatic hypokinesis. EF estimated was 40 %.  CORONARY VESSELS: The coronary circulation is co-dominant.  LM:   --  LM: Normal.  LAD:   --  Proximal LAD: There was a discrete 60 % stenosis justafter D1.  --  Distal LAD: There was a tubular 80 % stenosis in the proximal third of  the vessel segment. The lesion was irregularly contoured and eccentric.  There was AIYANA grade 3 flow through the vessel (brisk flow).  --  D1: There was a [id number] % stenosis in the proximal third of the  vessel segment. The lesion was eccentric. There was AIYANA grade 3 flow  through the vessel (brisk flow).  CX:   --  Proximal circumflex: There was a discrete 60 % stenosis just  after OM1.  --  OM1: There was a discrete 80 % stenosis in the proximal third of the  vessel segment. There was AIYANA grade 3 flow through the vessel (brisk  flow).  --  OM2: There was a 100 % stenosis. This lesion is a chronic total  occlusion.  RCA:   --  Mid RCA: There was a discrete 80 % stenosis. The lesion was hazy  and eccentric. There was AIYANA grade 3 flow through the vessel (brisk  flow).  COMPLICATIONS: No complications occurred during the cath lab visit.  DIAGNOSTIC IMPRESSIONS: - Severe 3 vessel CAD  - OM2 is chronically occluded  - Moderate LV systolic dysfunction with regional wall motion abnormality  - Mildly elevated LVEDP  DIAGNOSTIC RECOMMENDATIONS: - CTS evaluation with Dr. Cavazos for CABG  - Aggressive medical management and risk factor modification  INTERVENTIONAL IMPRESSIONS: - Severe 3 vessel CAD  - OM2 is chronically occluded  - Moderate LV systolic dysfunction with regional wall motion abnormality  - Mildly elevated LVEDP  INTERVENT    < end of copied text >      ASSESSMENT AND PLAN:  In summary, KIM LEE is a 63y Female with past medical history significant for presentation to ER due to complaint of chest pain centrally located without radiation that occurred with HD. Hx of HTN, HL and PAF. No associated SOB, palpitations, PND or orthopnea. Pt underwent cardiac catheterization with documented multivessel CAD for which CABG is planned.    - CP/unstable angina. Mild troponin increase cw NSTEMI and with severe 3V CAD. Plan for CABG this week.  DW son who reports they are awaiting other family members to arrive in anticipation of CABG. On heparin and ASA.    - Mild CM. Pt with mildly reduced EF cw ICM. Maintain coreg. Defer ACEI due to impending CABG and ESRD    - HL. Maintain atorvastatin 20mg daily    - HTN. meds as above    - PAF. Maintaining SR.    - ESRD. Dialysis as per renal    We will follow with you    Kristi Hamm MD

## 2018-11-10 NOTE — PROGRESS NOTE ADULT - ASSESSMENT
CT Chest No Contrast :     PROCEDURE DATE:  11/08/2018      INTERPRETATION:  HISTORY: Shortness of breath and chest pain.    Date and Time of Exam: 11/8/2018 7:26 PM    TECHNIQUE:  Sections were obtained from the apices to the diaphragm   without intravenous contrast.    COMPARISON EXAMINATION:   No prior exam.    FINDINGS: No evidence of mediastinal or hilar lymphadenopathy.     Extensive coronary artery calcifications.    No evidence of pleural or pericardial effusion.    No evidence of axillary adenopathy.    Images obtained in the upper abdomen demonstrate somewhat atrophic   kidneys. In the left suprarenal region there is a large heterogeneous   solid mass containing fat consistent with a large adrenal myelolipoma   measuring 7 x 7 cm.    There is a calcified granuloma in the left upper lobe. The lungs are   otherwise clear.    There are degenerative changes in the spine.      IMPRESSION:     Large left adrenal myelolipoma.    Evidence of coronary artery disease. Cardiomegaly. Clear lungs.      SOLOMON BROOKS M.D., ATTENDING RADIOLOGIST  This document has been electronically signed. Nov 8 2018  8:05PM

## 2018-11-10 NOTE — PROGRESS NOTE ADULT - SUBJECTIVE AND OBJECTIVE BOX
TTE Echo Complete w/Doppler :    Summary:   1. Left ventricular ejection fraction, by visual estimation, is 40 to   45%.   2. Mildly decreased global left ventricular systolic function.   3. Mid and apical inferior wall, mid inferolateral segment, mid   anterolateral segment, and apical lateral segment are abnormal as   described above.   4. Spectral Doppler shows restrictive pattern of left ventricular   myocardial filling (Grade III diastolic dysfunction).   5. There is no evidence of pericardial effusion.   6. Mild mitral annular calcification.   7. Moderate mitral valve regurgitation.   8. Thickening and calcification of the anterior and posterior mitral   valve leaflets.   9. Mild tricuspid regurgitation.  10. Sclerotic aortic valve with normal opening.  11. Trace pulmonic valve regurgitation.  12. Estimated pulmonary artery systolic pressure is 57.0 mmHg assuming a   right atrial pressure of 8 mmHg, which is consistent with moderate   pulmonary hypertension.    CARDIAC CATHETERIZATION: < from: Cardiac Cath Lab - Adult (11.07.18 @ 13:34) >  VENTRICLES: Analysis of regional contractile function demonstrated severe  diaphragmatic hypokinesis. EF estimated was 40 %.  CORONARY VESSELS: The coronary circulation is co-dominant.  LM:   --  LM: Normal.  LAD:   --  Proximal LAD: There was a discrete 60 % stenosis justafter D1.  --  Distal LAD: There was a tubular 80 % stenosis in the proximal third of  the vessel segment. The lesion was irregularly contoured and eccentric.  There was AIYANA grade 3 flow through the vessel (brisk flow).  --  D1: There was a jdoapyeg32 % stenosis in the proximal third of the  vessel segment. The lesion was eccentric. There was AIYANA grade 3 flow  through the vessel (brisk flow).  CX:   --  Proximal circumflex: There was a discrete 60 % stenosis just  after OM1.  --  OM1: There was a discrete 80 % stenosis in the proximal third of the  vessel segment. There was AIYANA grade 3 flow through the vessel (brisk  flow).  --  OM2: There was a 100 % stenosis. This lesion is a chronic total  occlusion.  RCA:   --  Mid RCA: There was a discrete 80 % stenosis. The lesion was hazy  and eccentric. There was AIYANA grade 3 flow through the vessel (brisk  flow).  COMPLICATIONS: No complications occurred during the cath lab visit.  DIAGNOSTIC IMPRESSIONS: - Severe 3 vessel CAD  - OM2 is chronically occluded  - Moderate LV systolic dysfunction with regional wall motion abnormality  - Mildly elevated LVEDP    DIAGNOSTIC RECOMMENDATIONS: - CTS evaluation with Dr. Cavazos for CABG  - Aggressive medical management and risk factor modification  INTERVENTIONAL IMPRESSIONS: - Severe 3 vessel CAD  - OM2 is chronically occluded  - Moderate LV systolic dysfunction with regional wall motion abnormality  - Mildly elevated LVEDP    ASSESSMENT AND PLAN:    In summary, KIM LEE is a 63y Female with past medical history significant for presentation to ER due to complaint of chest pain centrally located without radiation that occurred with HD. Hx of HTN, HL and PAF. No associated SOB, palpitations, PND or orthopnea. Pt underwent cardiac catheterization with documented multivessel CAD for which CABG is planned.    - CP/unstable angina. Mild troponin increase cw NSTEMI and with severe 3V CAD.               Plans  for CABG this week.  On heparin and ASA.    - Mild CM. Pt with mildly reduced EF cw ICM. Maintain coreg. No Contra Indication for ACEi from Nephrology,             On Statin, Anti Hypertensives,    - PAF. Now in SR.    - ESRD. Dialysis - T - Th - S & Pre - Surgery,

## 2018-11-10 NOTE — PROGRESS NOTE ADULT - SUBJECTIVE AND OBJECTIVE BOX
Subjective: PT in chair, Family at bedside     VITAL SIGNS  T(C): 36.8 (11-10-18 @ 14:35), Max: 37.1 (11-10-18 @ 05:28)  HR: 66 (11-10-18 @ 14:35) (57 - 66)  BP: 156/60 (11-10-18 @ 14:35) (110/64 - 175/85)  RR: 18 (11-10-18 @ 14:35) (16 - 20)  SpO2: 100% (11-10-18 @ 14:35) (97% - 100%)RA          Daily     Daily Weight in k.6 (10 Nov 2018 14:35)  Admit Wt: Drug Dosing Weight  Height (cm): 162.56 (2018 23:33)  Weight (kg): 98.4 (2018 23:33)    Telemetry: Afib    LVEF:  45%      MEDICATIONS  acetaminophen   Tablet .. 650 milliGRAM(s) Oral every 6 hours PRN  ALBUTerol    90 MICROgram(s) HFA Inhaler 1 Puff(s) Inhalation every 4 hours  ALBUTerol/ipratropium for Nebulization 3 milliLiter(s) Nebulizer every 6 hours PRN  ALPRAZolam 0.25 milliGRAM(s) Oral every 12 hours PRN  atorvastatin 40 milliGRAM(s) Oral at bedtime  carvedilol 12.5 milliGRAM(s) Oral every 12 hours  cloNIDine 0.1 milliGRAM(s) Oral three times a day  dextrose 40% Gel 15 Gram(s) Oral once PRN  dextrose 5%. 1000 milliLiter(s) IV Continuous <Continuous>  dextrose 50% Injectable 12.5 Gram(s) IV Push once  dextrose 50% Injectable 25 Gram(s) IV Push once  dextrose 50% Injectable 25 Gram(s) IV Push once  folic acid 1 milliGRAM(s) Oral daily  glucagon  Injectable 1 milliGRAM(s) IntraMuscular once PRN  heparin  Infusion.  Unit(s)/Hr IV Continuous <Continuous>  insulin lispro (HumaLOG) corrective regimen sliding scale   SubCutaneous three times a day before meals  levothyroxine 75 MICROGram(s) Oral daily  Nephro-heather 1 Tablet(s) Oral daily  pantoprazole    Tablet 40 milliGRAM(s) Oral before breakfast  sodium chloride 0.9% lock flush 3 milliLiter(s) IV Push every 8 hours  tiotropium 18 MICROgram(s) Capsule 1 Capsule(s) Inhalation daily    MEDICATIONS  (PRN):  acetaminophen   Tablet .. 650 milliGRAM(s) Oral every 6 hours PRN Temp greater or equal to 38C (100.4F), Moderate Pain (4 - 6)  ALBUTerol/ipratropium for Nebulization 3 milliLiter(s) Nebulizer every 6 hours PRN Shortness of Breath and/or Wheezing  ALPRAZolam 0.25 milliGRAM(s) Oral every 12 hours PRN anxiety  dextrose 40% Gel 15 Gram(s) Oral once PRN Blood Glucose LESS THAN 70 milliGRAM(s)/deciliter  glucagon  Injectable 1 milliGRAM(s) IntraMuscular once PRN Glucose LESS THAN 70 milligrams/deciliter        PHYSICAL EXAM  General:A & O x 3   Respiratory:  decreased at bases b/l  CV: Irreg  S1 S2   Abdomen:  soft NT ND + BS   Extremities:  trace edema b/l LE     CT CHEST   < from: CT Chest No Cont (18 @ 19:53) >  FINDINGS: No evidence of mediastinal or hilar lymphadenopathy. Extensive   coronary artery calcifications.    No evidence of pleural or pericardial effusion.    No evidence of axillary adenopathy.    Images obtained in the upper abdomen demonstrate somewhat atrophic   kidneys. In the left suprarenal region there is a large heterogeneous   solid mass containing fat consistent with a large adrenal myelolipoma   measuring 7 x 7 cm.    There is a calcified granuloma in the left upper lobe. The lungs are   otherwise clear.    There are degenerative changes in the spine.      IMPRESSION:     Large left adrenal myelolipoma.    Evidence of coronary artery disease. Cardiomegaly. Clear lungs..     < end of copied text >      I&O's Detail    2018 07:  -  10 Nov 2018 07:00  --------------------------------------------------------  IN:    heparin  Infusion.: 220 mL    Oral Fluid: 830 mL  Total IN: 1050 mL    OUT:    Voided: 200 mL  Total OUT: 200 mL    Total NET: 850 mL      10 Nov 2018 07:  -  10 Nov 2018 15:18  --------------------------------------------------------  IN:  Total IN: 0 mL    OUT:    Other: 1400 mL  Total OUT: 1400 mL    Total NET: -1400 mL          LABS  11-10    135  |  92<L>  |  41.0<H>  ----------------------------<  234<H>  5.1   |  24.0  |  6.94<H>    Ca    9.8      10 Nov 2018 12:25  Phos  5.8     11-10                                   11.3   6.8   )-----------( 269      ( 10 Nov 2018 05:49 )             37.4          PTT - ( 10 Nov 2018 13:45 )  PTT:69.9 sec           CAPILLARY BLOOD GLUCOSE      POCT Blood Glucose.: 162 mg/dL (10 Nov 2018 12:35)  POCT Blood Glucose.: 210 mg/dL (10 Nov 2018 08:29)  POCT Blood Glucose.: 155 mg/dL (2018 21:01)  POCT Blood Glucose.: 127 mg/dL (2018 17:33)             PAST MEDICAL & SURGICAL HISTORY:  Hypothyroidism, unspecified type  Hemodialysis patient:   Renal failure  Hypertension  Diabetes mellitus  A-V fistula  S/P cholecystectomy

## 2018-11-10 NOTE — PROGRESS NOTE ADULT - ASSESSMENT
Patient was seen and evaluated on dialysis.     Patient is tolerating the procedure well.   T(C): 36.9 (11-10-18 @ 11:25), Max: 37.1 (11-10-18 @ 05:28)  HR: 57 (11-10-18 @ 11:25) (57 - 59)  BP: 145/60 (11-10-18 @ 11:25) (110/64 - 152/64)  Continue dialysis: T Th S  Dialyzer:  Revaclear 300        QB: 450 ml.,       QD: 500ml.,  Goal UF  1-1.5 L  over 3.5  Hours

## 2018-11-11 DIAGNOSIS — E11.9 TYPE 2 DIABETES MELLITUS WITHOUT COMPLICATIONS: ICD-10-CM

## 2018-11-11 DIAGNOSIS — E78.5 HYPERLIPIDEMIA, UNSPECIFIED: ICD-10-CM

## 2018-11-11 LAB
ANION GAP SERPL CALC-SCNC: 15 MMOL/L — SIGNIFICANT CHANGE UP (ref 5–17)
APTT BLD: 53.2 SEC — HIGH (ref 27.5–36.3)
BUN SERPL-MCNC: 26 MG/DL — HIGH (ref 8–20)
CALCIUM SERPL-MCNC: 9.9 MG/DL — SIGNIFICANT CHANGE UP (ref 8.6–10.2)
CHLORIDE SERPL-SCNC: 96 MMOL/L — LOW (ref 98–107)
CO2 SERPL-SCNC: 27 MMOL/L — SIGNIFICANT CHANGE UP (ref 22–29)
CREAT SERPL-MCNC: 4.97 MG/DL — HIGH (ref 0.5–1.3)
GLUCOSE BLDC GLUCOMTR-MCNC: 143 MG/DL — HIGH (ref 70–99)
GLUCOSE BLDC GLUCOMTR-MCNC: 155 MG/DL — HIGH (ref 70–99)
GLUCOSE BLDC GLUCOMTR-MCNC: 162 MG/DL — HIGH (ref 70–99)
GLUCOSE BLDC GLUCOMTR-MCNC: 162 MG/DL — HIGH (ref 70–99)
GLUCOSE SERPL-MCNC: 153 MG/DL — HIGH (ref 70–115)
HCT VFR BLD CALC: 37.3 % — SIGNIFICANT CHANGE UP (ref 37–47)
HGB BLD-MCNC: 11.5 G/DL — LOW (ref 12–16)
MAGNESIUM SERPL-MCNC: 1.9 MG/DL — SIGNIFICANT CHANGE UP (ref 1.6–2.6)
MCHC RBC-ENTMCNC: 28.7 PG — SIGNIFICANT CHANGE UP (ref 27–31)
MCHC RBC-ENTMCNC: 30.8 G/DL — LOW (ref 32–36)
MCV RBC AUTO: 93 FL — SIGNIFICANT CHANGE UP (ref 81–99)
PLATELET # BLD AUTO: 234 K/UL — SIGNIFICANT CHANGE UP (ref 150–400)
POTASSIUM SERPL-MCNC: 4.4 MMOL/L — SIGNIFICANT CHANGE UP (ref 3.5–5.3)
POTASSIUM SERPL-SCNC: 4.4 MMOL/L — SIGNIFICANT CHANGE UP (ref 3.5–5.3)
RBC # BLD: 4.01 M/UL — LOW (ref 4.4–5.2)
RBC # FLD: 17.1 % — HIGH (ref 11–15.6)
SODIUM SERPL-SCNC: 138 MMOL/L — SIGNIFICANT CHANGE UP (ref 135–145)
WBC # BLD: 5.2 K/UL — SIGNIFICANT CHANGE UP (ref 4.8–10.8)
WBC # FLD AUTO: 5.2 K/UL — SIGNIFICANT CHANGE UP (ref 4.8–10.8)

## 2018-11-11 PROCEDURE — 99233 SBSQ HOSP IP/OBS HIGH 50: CPT

## 2018-11-11 PROCEDURE — 99232 SBSQ HOSP IP/OBS MODERATE 35: CPT

## 2018-11-11 PROCEDURE — 71045 X-RAY EXAM CHEST 1 VIEW: CPT | Mod: 26

## 2018-11-11 RX ORDER — MAGNESIUM SULFATE 500 MG/ML
1 VIAL (ML) INJECTION ONCE
Qty: 0 | Refills: 0 | Status: COMPLETED | OUTPATIENT
Start: 2018-11-11 | End: 2018-11-11

## 2018-11-11 RX ORDER — ERYTHROPOIETIN 10000 [IU]/ML
10000 INJECTION, SOLUTION INTRAVENOUS; SUBCUTANEOUS ONCE
Qty: 0 | Refills: 0 | Status: COMPLETED | OUTPATIENT
Start: 2018-11-12 | End: 2018-11-12

## 2018-11-11 RX ORDER — CHLORHEXIDINE GLUCONATE 213 G/1000ML
1 SOLUTION TOPICAL
Qty: 0 | Refills: 0 | Status: DISCONTINUED | OUTPATIENT
Start: 2018-11-11 | End: 2018-11-11

## 2018-11-11 RX ADMIN — Medication 0.1 MILLIGRAM(S): at 05:34

## 2018-11-11 RX ADMIN — CARVEDILOL PHOSPHATE 12.5 MILLIGRAM(S): 80 CAPSULE, EXTENDED RELEASE ORAL at 19:44

## 2018-11-11 RX ADMIN — Medication 100 GRAM(S): at 05:33

## 2018-11-11 RX ADMIN — CARVEDILOL PHOSPHATE 12.5 MILLIGRAM(S): 80 CAPSULE, EXTENDED RELEASE ORAL at 05:34

## 2018-11-11 RX ADMIN — CHLORHEXIDINE GLUCONATE 1 APPLICATION(S): 213 SOLUTION TOPICAL at 17:56

## 2018-11-11 RX ADMIN — Medication 75 MICROGRAM(S): at 05:34

## 2018-11-11 RX ADMIN — SODIUM CHLORIDE 3 MILLILITER(S): 9 INJECTION INTRAMUSCULAR; INTRAVENOUS; SUBCUTANEOUS at 05:39

## 2018-11-11 RX ADMIN — Medication 650 MILLIGRAM(S): at 22:23

## 2018-11-11 RX ADMIN — Medication 0.1 MILLIGRAM(S): at 15:11

## 2018-11-11 RX ADMIN — PANTOPRAZOLE SODIUM 40 MILLIGRAM(S): 20 TABLET, DELAYED RELEASE ORAL at 05:34

## 2018-11-11 RX ADMIN — Medication 650 MILLIGRAM(S): at 22:32

## 2018-11-11 RX ADMIN — Medication 1 TABLET(S): at 22:22

## 2018-11-11 RX ADMIN — CHLORHEXIDINE GLUCONATE 15 MILLILITER(S): 213 SOLUTION TOPICAL at 19:44

## 2018-11-11 RX ADMIN — HEPARIN SODIUM 900 UNIT(S)/HR: 5000 INJECTION INTRAVENOUS; SUBCUTANEOUS at 09:03

## 2018-11-11 RX ADMIN — Medication 650 MILLIGRAM(S): at 08:30

## 2018-11-11 RX ADMIN — SODIUM CHLORIDE 3 MILLILITER(S): 9 INJECTION INTRAMUSCULAR; INTRAVENOUS; SUBCUTANEOUS at 22:23

## 2018-11-11 RX ADMIN — ATORVASTATIN CALCIUM 40 MILLIGRAM(S): 80 TABLET, FILM COATED ORAL at 22:22

## 2018-11-11 RX ADMIN — Medication 1 MILLIGRAM(S): at 11:52

## 2018-11-11 RX ADMIN — Medication 81 MILLIGRAM(S): at 11:51

## 2018-11-11 RX ADMIN — CHLORHEXIDINE GLUCONATE 15 MILLILITER(S): 213 SOLUTION TOPICAL at 05:34

## 2018-11-11 RX ADMIN — SODIUM CHLORIDE 3 MILLILITER(S): 9 INJECTION INTRAMUSCULAR; INTRAVENOUS; SUBCUTANEOUS at 14:32

## 2018-11-11 NOTE — PROGRESS NOTE ADULT - SUBJECTIVE AND OBJECTIVE BOX
[employer] [facility], [facility]                                                    [address], [address], [place] [address]                                                         PHONE: [phone]    FAX: [phone] [address], [address], [place] [address]                                                 PHONE: [phone]    FAX: [phone]  *******************************************************************************  cc: chest pain    HPI: Pt is a 62yr old female who was sent to ER due to complaint of chest pain centrally located without radiation that occurred with HD. Hx of HTN, HL and PAF. No associated SOB, palpitations, PND or orthopnea. Pt underwent cardiac catheterization with documented multivessel CAD for which CABG is planned.    No Known Allergies    Patient History:    Past Medical History:  Diabetes mellitus    Hemodialysis patient  tues thursday saturday  Hypertension    Hypothyroidism, unspecified type    Renal failure.     Past Surgical History:  A-V fistula    S/P cholecystectomy.     Family History:  Father  Still living? Unknown  Family history of diabetes mellitus, Age at diagnosis: Age Unknown.     Social History:  Social History (marital status, living situation, occupation, tobacco use, alcohol and drug use, and sexual history): no smoking. no etoh.    Review of systems: as above. No other pertinent ROS    Overnight events/Subjective Assessment: no CP. no SOB    INTERPRETATION OF TELEMETRY (personally reviewed): SR    No Known Allergies    MEDICATIONS  (STANDING):  ALBUTerol    90 MICROgram(s) HFA Inhaler 1 Puff(s) Inhalation every 4 hours  aspirin enteric coated 81 milliGRAM(s) Oral daily  atorvastatin 40 milliGRAM(s) Oral at bedtime  carvedilol 12.5 milliGRAM(s) Oral every 12 hours  chlorhexidine 0.12% Liquid 15 milliLiter(s) Swish and Spit two times a day  chlorhexidine 4% Liquid 1 Application(s) Topical two times a day  cloNIDine 0.1 milliGRAM(s) Oral three times a day  dextrose 5%. 1000 milliLiter(s) (50 mL/Hr) IV Continuous <Continuous>  dextrose 50% Injectable 12.5 Gram(s) IV Push once  dextrose 50% Injectable 25 Gram(s) IV Push once  dextrose 50% Injectable 25 Gram(s) IV Push once  folic acid 1 milliGRAM(s) Oral daily  heparin  Infusion.  Unit(s)/Hr (10 mL/Hr) IV Continuous <Continuous>  insulin lispro (HumaLOG) corrective regimen sliding scale   SubCutaneous three times a day before meals  levothyroxine 75 MICROGram(s) Oral daily  Nephro-heather 1 Tablet(s) Oral daily  pantoprazole    Tablet 40 milliGRAM(s) Oral before breakfast  sodium chloride 0.9% lock flush 3 milliLiter(s) IV Push every 8 hours  tiotropium 18 MICROgram(s) Capsule 1 Capsule(s) Inhalation daily    MEDICATIONS  (PRN):  acetaminophen   Tablet .. 650 milliGRAM(s) Oral every 6 hours PRN Temp greater or equal to 38C (100.4F), Moderate Pain (4 - 6)  ALBUTerol/ipratropium for Nebulization 3 milliLiter(s) Nebulizer every 6 hours PRN Shortness of Breath and/or Wheezing  ALPRAZolam 0.25 milliGRAM(s) Oral every 12 hours PRN anxiety  dextrose 40% Gel 15 Gram(s) Oral once PRN Blood Glucose LESS THAN 70 milliGRAM(s)/deciliter  glucagon  Injectable 1 milliGRAM(s) IntraMuscular once PRN Glucose LESS THAN 70 milligrams/deciliter  petrolatum white Ointment 1 Application(s) Topical four times a day PRN dry lips      Vital Signs Last 24 Hrs  T(C): 36.9 (11 Nov 2018 05:30), Max: 37 (10 Nov 2018 21:10)  T(F): 98.4 (11 Nov 2018 05:30), Max: 98.6 (10 Nov 2018 21:10)  HR: 78 (11 Nov 2018 05:30) (57 - 78)  BP: 146/78 (11 Nov 2018 05:30) (132/60 - 175/85)  BP(mean): --  RR: 18 (11 Nov 2018 05:30) (16 - 18)  SpO2: 97% (11 Nov 2018 05:30) (97% - 100%)    I&O's Detail    10 Nov 2018 07:01  -  11 Nov 2018 07:00  --------------------------------------------------------  IN:    heparin  Infusion.: 225 mL  Total IN: 225 mL    OUT:    Other: 1400 mL  Total OUT: 1400 mL    Total NET: -1175 mL        I&O's Summary    10 Nov 2018 07:01  -  11 Nov 2018 07:00  --------------------------------------------------------  IN: 225 mL / OUT: 1400 mL / NET: -1175 mL            PHYSICAL EXAM:  General: Appears well developed, well nourished, no acute distress  HEENT: Head: normocephalic, atraumatic  Eyes: Pupils equal and reactive  Neck: Supple, no carotid bruit, no JVD, no HJR  CARDIOVASCULAR: Normal S1 and S2, no murmur, rub, or gallop  LUNGS: Clear to auscultation bilaterally, no rales, rhonchi or wheeze  ABDOMEN: Soft, nontender, non-distended, positive bowel sounds, no mass or bruit  EXTREMITIES: No edema, distal pulses WNL  SKIN: Warm and dry with normal turgor  NEURO: Alert & oriented x 3, grossly intact  PSYCH: normal mood and affect          LABS:                        11.5   5.2   )-----------( 234      ( 11 Nov 2018 05:45 )             37.3     11-11    138  |  96<L>  |  26.0<H>  ----------------------------<  153<H>  4.4   |  27.0  |  4.97<H>    Ca    9.9      11 Nov 2018 03:13  Phos  5.8     11-10  Mg     1.9     11-11          PTT - ( 11 Nov 2018 08:25 )  PTT:53.2 sec  Serum Pro-Brain Natriuretic Peptide: 67772 pg/mL (11-07 @ 18:50)  Serum Pro-Brain Natriuretic Peptide: 34947 pg/mL (11-06 @ 11:23)  serum  Lipids:   Hemoglobin A1C, Whole Blood: 6.5 % (11-07 @ 18:50)    Thyroid Stimulating Hormone, Serum: 2.27 uIU/mL (11-07 @ 18:50)      RADIOLOGY & ADDITIONAL STUDIES:  ECHO: < from: TTE Echo Complete w/Doppler (11.07.18 @ 15:59) >  Summary:   1. Left ventricular ejection fraction, by visual estimation, is 40 to   45%.   2. Mildly decreased global left ventricular systolic function.   3. Mid and apical inferior wall, mid inferolateral segment, mid   anterolateral segment, and apical lateral segment are abnormal as   described above.   4. Spectral Doppler shows restrictive pattern of left ventricular   myocardial filling (Grade III diastolic dysfunction).   5. There is no evidence of pericardial effusion.   6. Mild mitral annular calcification.   7. Moderate mitral valve regurgitation.   8. Thickening and calcification of the anterior and posterior mitral   valve leaflets.   9. Mild tricuspid regurgitation.  10. Sclerotic aortic valve with normal opening.  11. Trace pulmonic valve regurgitation.  12. Estimated pulmonary artery systolic pressure is 57.0 mmHg assuming a   right atrial pressure of 8 mmHg, which is consistent with moderate   pulmonary hypertension.    < end of copied text >        CARDIAC CATHETERIZATION: < from: Cardiac Cath Lab - Adult (11.07.18 @ 13:34) >  VENTRICLES: Analysis of regional contractile function demonstrated severe  diaphragmatic hypokinesis. EF estimated was 40 %.  CORONARY VESSELS: The coronary circulation is co-dominant.  LM:   --  LM: Normal.  LAD:   --  Proximal LAD: There was a discrete 60 % stenosis justafter D1.  --  Distal LAD: There was a tubular 80 % stenosis in the proximal third of  the vessel segment. The lesion was irregularly contoured and eccentric.  There was AIYANA grade 3 flow through the vessel (brisk flow).  --  D1: There was a [id number] % stenosis in the proximal third of the  vessel segment. The lesion was eccentric. There was AIYANA grade 3 flow  through the vessel (brisk flow).  CX:   --  Proximal circumflex: There was a discrete 60 % stenosis just  after OM1.  --  OM1: There was a discrete 80 % stenosis in the proximal third of the  vessel segment. There was AIYANA grade 3 flow through the vessel (brisk  flow).  --  OM2: There was a 100 % stenosis. This lesion is a chronic total  occlusion.  RCA:   --  Mid RCA: There was a discrete 80 % stenosis. The lesion was hazy  and eccentric. There was AIYANA grade 3 flow through the vessel (brisk  flow).  COMPLICATIONS: No complications occurred during the cath lab visit.  DIAGNOSTIC IMPRESSIONS: - Severe 3 vessel CAD  - OM2 is chronically occluded  - Moderate LV systolic dysfunction with regional wall motion abnormality  - Mildly elevated LVEDP  DIAGNOSTIC RECOMMENDATIONS: - CTS evaluation with Dr. Cavazos for CABG  - Aggressive medical management and risk factor modification  INTERVENTIONAL IMPRESSIONS: - Severe 3 vessel CAD  - OM2 is chronically occluded  - Moderate LV systolic dysfunction with regional wall motion abnormality  - Mildly elevated LVEDP  INTERVENT    < end of copied text >    CARDIAC CATHETERIZATION:    ASSESSMENT AND PLAN:  In summary, IKM LEE is a 63y Female with past medical history significant for presentation to ER due to complaint of chest pain centrally located without radiation that occurred with HD. Hx of HTN, HL and PAF. No associated SOB, palpitations, PND or orthopnea. Pt underwent cardiac catheterization with documented multivessel CAD for which CABG is planned.    - CP/unstable angina. Mild troponin increase cw NSTEMI and with severe 3V CAD. Plan for CABG this week with Dr Cavazos.  DW son at bedside. On heparin and ASA.    - Mild CM. Pt with mildly reduced EF cw ICM. Maintain coreg. Defer ACEI due to impending CABG and ESRD    - HL. Maintain atorvastatin 20mg daily    - HTN. meds as above    - PAF. Maintaining SR.    - Left adrenal mass. Currently being assessed    - ESRD. Dialysis as per renal    We will follow with arnie Hamm MD

## 2018-11-11 NOTE — PROGRESS NOTE ADULT - SUBJECTIVE AND OBJECTIVE BOX
HD - Scheduled  in AM ,    R :  D.C Clonidine,    BB, ACEi or ARBs ,  CCB As Needed & Indicated Post - Surgery, Per CTS & Cardiology,    Maintain Euvolemia w. HD , Pre - surgey ( 4 Hours , Na+ Modeling  - @ Noon Time , closer to Surgery  )    EPO, No Heparin w. HD ( Patient on Heparin Infusion )    Control P + , iPTH & Optimize Vitamin D Levels,     Post CABG HD on Thursday (Pure  UF on Wednesday as Needed )    CHRISTOPHER Huang ( [facility] )

## 2018-11-11 NOTE — PROGRESS NOTE ADULT - PROBLEM SELECTOR PLAN 1
Continue with Pre-op workup for OHS  Seen by Pulmonology & cleared for open heart surgery  Possible OR on Tuesday 11/13/18.  Plan discussed with Dr. Uribe.

## 2018-11-11 NOTE — PROGRESS NOTE ADULT - SUBJECTIVE AND OBJECTIVE BOX
Subjective:  at the bedside, pt. seen & examined. "Son at the bedside, pt. would like to know what time surgery is scheduled for Tuesday, pt. denies any SOB or chest pain, NAD noted.    VITAL SIGNS  Vital Signs Last 24 Hrs  T(C): 36.9 (18 @ 05:30), Max: 37 (11-10-18 @ 21:10)  T(F): 98.4 (18 @ 05:30), Max: 98.6 (11-10-18 @ 21:10)  HR: 78 (18 @ 05:30) (57 - 78)  BP: 146/78 (18 @ 05:30) (132/60 - 175/85)  RR: 18 (18 @ 05:30) (17 - 18)  SpO2: 97% (18 @ 05:30) (97% - 100%)  on (O2)              Telemetry:  Sinus bradycardia  LVEF: 45%    MEDICATIONS  acetaminophen   Tablet .. 650 milliGRAM(s) Oral every 6 hours PRN  ALBUTerol    90 MICROgram(s) HFA Inhaler 1 Puff(s) Inhalation every 4 hours  ALBUTerol/ipratropium for Nebulization 3 milliLiter(s) Nebulizer every 6 hours PRN  ALPRAZolam 0.25 milliGRAM(s) Oral every 12 hours PRN  aspirin enteric coated 81 milliGRAM(s) Oral daily  atorvastatin 40 milliGRAM(s) Oral at bedtime  carvedilol 12.5 milliGRAM(s) Oral every 12 hours  chlorhexidine 0.12% Liquid 15 milliLiter(s) Swish and Spit two times a day  chlorhexidine 4% Liquid 1 Application(s) Topical two times a day  cloNIDine 0.1 milliGRAM(s) Oral three times a day  dextrose 40% Gel 15 Gram(s) Oral once PRN  dextrose 5%. 1000 milliLiter(s) IV Continuous <Continuous>  dextrose 50% Injectable 12.5 Gram(s) IV Push once  dextrose 50% Injectable 25 Gram(s) IV Push once  dextrose 50% Injectable 25 Gram(s) IV Push once  folic acid 1 milliGRAM(s) Oral daily  glucagon  Injectable 1 milliGRAM(s) IntraMuscular once PRN  heparin  Infusion.  Unit(s)/Hr IV Continuous <Continuous>  insulin lispro (HumaLOG) corrective regimen sliding scale   SubCutaneous three times a day before meals  levothyroxine 75 MICROGram(s) Oral daily  Nephro-heather 1 Tablet(s) Oral daily  pantoprazole    Tablet 40 milliGRAM(s) Oral before breakfast  petrolatum white Ointment 1 Application(s) Topical four times a day PRN  sodium chloride 0.9% lock flush 3 milliLiter(s) IV Push every 8 hours  tiotropium 18 MICROgram(s) Capsule 1 Capsule(s) Inhalation daily      PHYSICAL EXAM  General: well nourished, well developed, no acute distress  Neurology: alert and oriented x 3, nonfocal, no gross deficits  Respiratory: Clear to auscultation bilaterally  CV: regular rate and rhythm, normal S1, S2  Abdomen: soft, obese, nontender, nondistended, positive bowel sounds, last bowel movement 11/10/18  Extremities: warm, well perfused. no edema. + DP pulses, LAVF +thrill/bruit      I&O's Detail    10 Nov 2018 07:01  -  2018 07:00  --------------------------------------------------------  IN:    heparin  Infusion.: 225 mL  Total IN: 225 mL    OUT:    Other: 1400 mL  Total OUT: 1400 mL    Total NET: -1175 mL          Weights:  Daily     Daily Weight in k.5 (2018 06:44)  Admit Wt: Drug Dosing Weight  Height (cm): 162.56 (2018 23:33)  Weight (kg): 98.4 (2018 23:33)  BMI (kg/m2): 37.2 (2018 23:33)  BSA (m2): 2.02 (2018 23:33)    LABS      138  |  96<L>  |  26.0<H>  ----------------------------<  153<H>  4.4   |  27.0  |  4.97<H>    Ca    9.9      2018 03:13  Phos  5.8     11-10  Mg     1.9                                        11.5   5.2   )-----------( 234      ( 2018 05:45 )             37.3          PTT - ( 2018 08:25 )  PTT:53.2 sec        CAPILLARY BLOOD GLUCOSE      POCT Blood Glucose.: 162 mg/dL (2018 08:37)  POCT Blood Glucose.: 193 mg/dL (10 Nov 2018 21:01)  POCT Blood Glucose.: 168 mg/dL (10 Nov 2018 17:04)  POCT Blood Glucose.: 162 mg/dL (10 Nov 2018 12:35)           Today's CXR:< from: Xray Chest 2 Views PA/Lat (18 @ 10:45) >   EXAM:  XR CHEST PA LAT 2V                          PROCEDURE DATE:  2018          INTERPRETATION:  PA and lateral chest radiographs     COMPARISON: 2018.    CLINICAL INFORMATION: Chest Pain.    FINDINGS:    The airway is midline.  Thereare no airspace consolidations.  There is no pleural effusion or pneumothorax.   The  heart is enlarged in transverse diameter. No hilar mass. Trachea   midline.        The visualized osseus structures are intact.     IMPRESSION:    No acute radiographic cardiopulmonary pathology.          < end of copied text >      Today's EKG:    PAST MEDICAL & SURGICAL HISTORY:  Hypothyroidism, unspecified type  Hemodialysis patient:   Renal failure  Hypertension  Diabetes mellitus  A-V fistula  S/P cholecystectomy Subjective:  at the bedside, pt. seen & examined. "Son at the bedside, pt. would like to know what time surgery is scheduled for Tuesday, pt. denies any SOB or chest pain, NAD noted.    VITAL SIGNS  Vital Signs Last 24 Hrs  T(C): 36.9 (18 @ 05:30), Max: 37 (11-10-18 @ 21:10)  T(F): 98.4 (18 @ 05:30), Max: 98.6 (11-10-18 @ 21:10)  HR: 78 (18 @ 05:30) (57 - 78)  BP: 146/78 (18 @ 05:30) (132/60 - 175/85)  RR: 18 (18 @ 05:30) (17 - 18)  SpO2: 97% (18 @ 05:30) (97% - 100%)  on (O2)              Telemetry:  Sinus bradycardia  LVEF: 45%    MEDICATIONS  acetaminophen   Tablet .. 650 milliGRAM(s) Oral every 6 hours PRN  ALBUTerol    90 MICROgram(s) HFA Inhaler 1 Puff(s) Inhalation every 4 hours  ALBUTerol/ipratropium for Nebulization 3 milliLiter(s) Nebulizer every 6 hours PRN  ALPRAZolam 0.25 milliGRAM(s) Oral every 12 hours PRN  aspirin enteric coated 81 milliGRAM(s) Oral daily  atorvastatin 40 milliGRAM(s) Oral at bedtime  carvedilol 12.5 milliGRAM(s) Oral every 12 hours  chlorhexidine 0.12% Liquid 15 milliLiter(s) Swish and Spit two times a day  chlorhexidine 4% Liquid 1 Application(s) Topical two times a day  cloNIDine 0.1 milliGRAM(s) Oral three times a day  dextrose 40% Gel 15 Gram(s) Oral once PRN  dextrose 5%. 1000 milliLiter(s) IV Continuous <Continuous>  dextrose 50% Injectable 12.5 Gram(s) IV Push once  dextrose 50% Injectable 25 Gram(s) IV Push once  dextrose 50% Injectable 25 Gram(s) IV Push once  folic acid 1 milliGRAM(s) Oral daily  glucagon  Injectable 1 milliGRAM(s) IntraMuscular once PRN  heparin  Infusion.  Unit(s)/Hr IV Continuous <Continuous>  insulin lispro (HumaLOG) corrective regimen sliding scale   SubCutaneous three times a day before meals  levothyroxine 75 MICROGram(s) Oral daily  Nephro-heather 1 Tablet(s) Oral daily  pantoprazole    Tablet 40 milliGRAM(s) Oral before breakfast  petrolatum white Ointment 1 Application(s) Topical four times a day PRN  sodium chloride 0.9% lock flush 3 milliLiter(s) IV Push every 8 hours  tiotropium 18 MICROgram(s) Capsule 1 Capsule(s) Inhalation daily      PHYSICAL EXAM  General: well nourished, well developed, no acute distress  Neurology: alert and oriented x 3, nonfocal, no gross deficits  Respiratory: Clear to auscultation bilaterally  CV: regular rate and rhythm, normal S1, S2  Abdomen: soft, obese, nontender, nondistended, positive bowel sounds, last bowel movement 11/10/18  Extremities: warm, well perfused. no edema. + DP pulses, LAVF +thrill/bruit      I&O's Detail    10 Nov 2018 07:01  -  2018 07:00  --------------------------------------------------------  IN:    heparin  Infusion.: 225 mL  Total IN: 225 mL    OUT:    Other: 1400 mL  Total OUT: 1400 mL    Total NET: -1175 mL          Weights:  Daily     Daily Weight in k.5 (2018 06:44)  Admit Wt: Drug Dosing Weight  Height (cm): 162.56 (2018 23:33)  Weight (kg): 98.4 (2018 23:33)  BMI (kg/m2): 37.2 (2018 23:33)  BSA (m2): 2.02 (2018 23:33)    LABS      138  |  96<L>  |  26.0<H>  ----------------------------<  153<H>  4.4   |  27.0  |  4.97<H>    Ca    9.9      2018 03:13  Phos  5.8     11-10  Mg     1.9                                        11.5   5.2   )-----------( 234      ( 2018 05:45 )             37.3          PTT - ( 2018 08:25 )  PTT:53.2 sec        CAPILLARY BLOOD GLUCOSE      POCT Blood Glucose.: 162 mg/dL (2018 08:37)  POCT Blood Glucose.: 193 mg/dL (10 Nov 2018 21:01)  POCT Blood Glucose.: 168 mg/dL (10 Nov 2018 17:04)  POCT Blood Glucose.: 162 mg/dL (10 Nov 2018 12:35)           Today's CXR:< from: Xray Chest 2 Views PA/Lat (18 @ 10:45) >   EXAM:  XR CHEST PA LAT 2V                          PROCEDURE DATE:  2018          INTERPRETATION:  PA and lateral chest radiographs     COMPARISON: 2018.    CLINICAL INFORMATION: Chest Pain.    FINDINGS:    The airway is midline.  Thereare no airspace consolidations.  There is no pleural effusion or pneumothorax.   The  heart is enlarged in transverse diameter. No hilar mass. Trachea   midline.        The visualized osseus structures are intact.     IMPRESSION:    No acute radiographic cardiopulmonary pathology.          < end of copied text >      CT Scan:< from: CT Chest No Cont (18 @ 19:53) >  EXAM:  CT CHEST                          PROCEDURE DATE:  2018          INTERPRETATION:  HISTORY: Shortness of breath and chest pain.    Date and Time of Exam: 2018 7:26 PM    TECHNIQUE:  Sections were obtained from the apices to the diaphragm   without intravenous contrast.    COMPARISON EXAMINATION:   No prior exam.    FINDINGS: No evidence of mediastinal or hilar lymphadenopathy. Extensive   coronary artery calcifications.    No evidence of pleural or pericardial effusion.    No evidence of axillary adenopathy.    Images obtained in the upper abdomen demonstrate somewhat atrophic   kidneys. In the left suprarenal region there is a large heterogeneous   solid mass containing fat consistent with a large adrenal myelolipoma   measuring 7 x 7 cm.    There is a calcified granuloma in the left upper lobe. The lungs are   otherwise clear.    There are degenerative changes in the spine.      IMPRESSION:     Large left adrenal myelolipoma.    < end of copied text >  < from: CT Chest No Cont (18 @ 19:53) >  Evidence of coronary artery disease. Cardiomegaly. Clear lungs..     < end of copied text >      PAST MEDICAL & SURGICAL HISTORY:  Hypothyroidism, unspecified type  Hemodialysis patient:   Renal failure  Hypertension  Diabetes mellitus  A-V fistula  S/P cholecystectomy

## 2018-11-11 NOTE — PROGRESS NOTE ADULT - ASSESSMENT
63 year old [place] speaking female with a PMH of ESRD on HD, hypothyroidism, HTN, DM, presents to [facility] on 11/6 with mild troponin elevation. Cardiac cath on 11/7 indicated + TVD.  Risks and benefits to surgery and options were discussed and explained to patient & pt's son (Raheem) with Dr. Cavazos.  After reviewing all options, patient & family agreed for open heart surgery.      Problem/Plan - 1:  ·  Problem: Coronary artery disease involving native coronary artery of native heart with unstable angina pectoris.      Plan: OR on Tuesday 11/13/18.      Problem/Plan - 2:  ·  Problem: Adrenal mass, left.      Plan: Dr Cavazos to review scan on Monday.     Problem/Plan - 3:  ·  Problem: ESRD (end stage renal disease) on dialysis.      Plan: Continue hemodialysis, Pre Surgery,      Problem/Plan - 7:  ·  Problem: Hyperlipidemia.  Plan: Continue Lipitor.

## 2018-11-11 NOTE — PROGRESS NOTE ADULT - SUBJECTIVE AND OBJECTIVE BOX
follow up for CAD ,DM type 2    seen examined , no complaints denies chest pain   no overnight events , chart reviewed     Vital Signs Last 24 Hrs  T(C): 36.9 (11 Nov 2018 05:30), Max: 37 (10 Nov 2018 21:10)  T(F): 98.4 (11 Nov 2018 05:30), Max: 98.6 (10 Nov 2018 21:10)  HR: 78 (11 Nov 2018 05:30) (57 - 78)  BP: 146/78 (11 Nov 2018 05:30) (132/60 - 175/85)  BP(mean): --  RR: 18 (11 Nov 2018 05:30) (16 - 18)  SpO2: 97% (11 Nov 2018 05:30) (97% - 100%)    General : awake alert no distress   Lungs : clear to auscultation        Heart : regular rate rythm  S1 /S2   Gastro : soft no tenderness , BS positive   Ext : no pretibial edema      CAPILLARY BLOOD GLUCOSE      POCT Blood Glucose.: 162 mg/dL (11 Nov 2018 08:37)  POCT Blood Glucose.: 193 mg/dL (10 Nov 2018 21:01)  POCT Blood Glucose.: 168 mg/dL (10 Nov 2018 17:04)  POCT Blood Glucose.: 162 mg/dL (10 Nov 2018 12:35)                                   11.5   5.2   )-----------( 234      ( 11 Nov 2018 05:45 )             37.3   11-11    138  |  96<L>  |  26.0<H>  ----------------------------<  153<H>  4.4   |  27.0  |  4.97<H>    Ca    9.9      11 Nov 2018 03:13  Phos  5.8     11-10  Mg     1.9     11-11

## 2018-11-11 NOTE — PROGRESS NOTE ADULT - ASSESSMENT
63 year old [place]ian speaking female with a PMH of ESRD on HD, hypothyroidism, HTN, DM, presents to [facility] on 11/6 with mild troponin elevation. Cardiac cath on 11/7 indicated + TVD.  Risks and benefits to surgery and options were discussed and explained to patient & pt's son (Raheem) with Dr. Cavazos.  After reviewing all options, patient & family agreed for open heart surgery.

## 2018-11-11 NOTE — PROGRESS NOTE ADULT - SUBJECTIVE AND OBJECTIVE BOX
Patient seen and examined    I&O's Summary    10 Nov 2018 07:01  -  11 Nov 2018 07:00  --------------------------------------------------------  IN: 225 mL / OUT: 1400 mL / NET: -1175 mL    REVIEW OF SYSTEMS:    CONSTITUTIONAL: No F/C  RESPIRATORY: No cough or SOB  CARDIOVASCULAR: No CP/palpitations,    GASTROINTESTINAL: No abdominal pain , NVD   GENITOURINARY: Anuric,  NEUROLOGICAL: No headaches/wk/numbness  MUSCULOSKELETAL:  No joint pain/swelling; No LBP  EXTREMITIES : no swelling,    Vital Signs Last 24 Hrs  T(C): 36.9 (11 Nov 2018 10:15), Max: 37 (10 Nov 2018 21:10)  T(F): 98.4 (11 Nov 2018 10:15), Max: 98.6 (10 Nov 2018 21:10)  HR: 54 (11 Nov 2018 10:15) (54 - 78)  BP: 132/78 (11 Nov 2018 10:15) (132/60 - 175/60)  BP(mean): --  RR: 18 (11 Nov 2018 10:15) (18 - 18)  SpO2: 98% (11 Nov 2018 10:15) (97% - 100%)    PHYSICAL EXAM:    GENERAL: NAD, Pale,  EYES:  conjunctiva and sclera clear  NECK: Supple, No JVD/Bruit  NERVOUS SYSTEM:  A/O x3,   CHEST:  CTA ,No rales or rhonchi  HEART:  RRR, No murmurs  ABDOMEN: Soft, NT/ND BS+  EXTREMITIES:  No Edema;  SKIN: No rashes    LABS:                        11.5   5.2   )-----------( 234      ( 11 Nov 2018 05:45 )             37.3   11-11    138  |  96<L>  |  26.0<H>  ----------------------------<  153<H>  4.4   |  27.0  |  4.97<H>    Ca    9.9      11 Nov 2018 03:13  Phos  5.8     11-10  Mg     1.9     11-11    MEDICATIONS  (STANDING):  acetaminophen   Tablet .. PRN  ALBUTerol    90 MICROgram(s) HFA Inhaler  ALBUTerol/ipratropium for Nebulization PRN  ALPRAZolam PRN  aspirin enteric coated  atorvastatin  carvedilol  chlorhexidine 0.12% Liquid  chlorhexidine 4% Liquid  cloNIDine  dextrose 40% Gel PRN  dextrose 5%.  dextrose 50% Injectable  dextrose 50% Injectable  dextrose 50% Injectable  folic acid  glucagon  Injectable PRN  heparin  Infusion.  insulin lispro (HumaLOG) corrective regimen sliding scale  levothyroxine  Nephro-heather  pantoprazole    Tablet  petrolatum white Ointment PRN  sodium chloride 0.9% lock flush  tiotropium 18 MICROgram(s) Capsule

## 2018-11-12 ENCOUNTER — TRANSCRIPTION ENCOUNTER (OUTPATIENT)
Age: 63
End: 2018-11-12

## 2018-11-12 LAB
ALBUMIN SERPL ELPH-MCNC: 3.8 G/DL — SIGNIFICANT CHANGE UP (ref 3.3–5.2)
ANION GAP SERPL CALC-SCNC: 20 MMOL/L — HIGH (ref 5–17)
APTT BLD: 57.2 SEC — HIGH (ref 27.5–36.3)
BLD GP AB SCN SERPL QL: SIGNIFICANT CHANGE UP
BUN SERPL-MCNC: 37 MG/DL — HIGH (ref 8–20)
CALCIUM SERPL-MCNC: 10.2 MG/DL — SIGNIFICANT CHANGE UP (ref 8.6–10.2)
CHLORIDE SERPL-SCNC: 95 MMOL/L — LOW (ref 98–107)
CO2 SERPL-SCNC: 24 MMOL/L — SIGNIFICANT CHANGE UP (ref 22–29)
CREAT SERPL-MCNC: 6.53 MG/DL — HIGH (ref 0.5–1.3)
GLUCOSE BLDC GLUCOMTR-MCNC: 137 MG/DL — HIGH (ref 70–99)
GLUCOSE BLDC GLUCOMTR-MCNC: 147 MG/DL — HIGH (ref 70–99)
GLUCOSE BLDC GLUCOMTR-MCNC: 155 MG/DL — HIGH (ref 70–99)
GLUCOSE SERPL-MCNC: 130 MG/DL — HIGH (ref 70–115)
PHOSPHATE SERPL-MCNC: 6 MG/DL — HIGH (ref 2.4–4.7)
POTASSIUM SERPL-MCNC: 4.7 MMOL/L — SIGNIFICANT CHANGE UP (ref 3.5–5.3)
POTASSIUM SERPL-SCNC: 4.7 MMOL/L — SIGNIFICANT CHANGE UP (ref 3.5–5.3)
SODIUM SERPL-SCNC: 139 MMOL/L — SIGNIFICANT CHANGE UP (ref 135–145)
TYPE + AB SCN PNL BLD: SIGNIFICANT CHANGE UP

## 2018-11-12 PROCEDURE — 90937 HEMODIALYSIS REPEATED EVAL: CPT

## 2018-11-12 PROCEDURE — 99232 SBSQ HOSP IP/OBS MODERATE 35: CPT

## 2018-11-12 RX ORDER — VANCOMYCIN HCL 1 G
750 VIAL (EA) INTRAVENOUS ONCE
Qty: 0 | Refills: 0 | Status: COMPLETED | OUTPATIENT
Start: 2018-11-13 | End: 2018-11-13

## 2018-11-12 RX ORDER — CEFUROXIME AXETIL 250 MG
750 TABLET ORAL ONCE
Qty: 0 | Refills: 0 | Status: COMPLETED | OUTPATIENT
Start: 2018-11-13 | End: 2018-11-13

## 2018-11-12 RX ADMIN — SODIUM CHLORIDE 3 MILLILITER(S): 9 INJECTION INTRAMUSCULAR; INTRAVENOUS; SUBCUTANEOUS at 05:58

## 2018-11-12 RX ADMIN — SODIUM CHLORIDE 3 MILLILITER(S): 9 INJECTION INTRAMUSCULAR; INTRAVENOUS; SUBCUTANEOUS at 12:49

## 2018-11-12 RX ADMIN — CARVEDILOL PHOSPHATE 12.5 MILLIGRAM(S): 80 CAPSULE, EXTENDED RELEASE ORAL at 05:57

## 2018-11-12 RX ADMIN — CHLORHEXIDINE GLUCONATE 15 MILLILITER(S): 213 SOLUTION TOPICAL at 21:17

## 2018-11-12 RX ADMIN — CARVEDILOL PHOSPHATE 12.5 MILLIGRAM(S): 80 CAPSULE, EXTENDED RELEASE ORAL at 17:35

## 2018-11-12 RX ADMIN — HEPARIN SODIUM 900 UNIT(S)/HR: 5000 INJECTION INTRAVENOUS; SUBCUTANEOUS at 07:49

## 2018-11-12 RX ADMIN — Medication 1 TABLET(S): at 11:54

## 2018-11-12 RX ADMIN — PANTOPRAZOLE SODIUM 40 MILLIGRAM(S): 20 TABLET, DELAYED RELEASE ORAL at 05:57

## 2018-11-12 RX ADMIN — Medication 81 MILLIGRAM(S): at 11:54

## 2018-11-12 RX ADMIN — Medication 1 MILLIGRAM(S): at 11:54

## 2018-11-12 RX ADMIN — ERYTHROPOIETIN 10000 UNIT(S): 10000 INJECTION, SOLUTION INTRAVENOUS; SUBCUTANEOUS at 13:08

## 2018-11-12 RX ADMIN — ATORVASTATIN CALCIUM 40 MILLIGRAM(S): 80 TABLET, FILM COATED ORAL at 21:16

## 2018-11-12 RX ADMIN — CHLORHEXIDINE GLUCONATE 15 MILLILITER(S): 213 SOLUTION TOPICAL at 05:57

## 2018-11-12 RX ADMIN — CHLORHEXIDINE GLUCONATE 1 APPLICATION(S): 213 SOLUTION TOPICAL at 20:28

## 2018-11-12 RX ADMIN — Medication 75 MICROGRAM(S): at 05:57

## 2018-11-12 RX ADMIN — SODIUM CHLORIDE 3 MILLILITER(S): 9 INJECTION INTRAMUSCULAR; INTRAVENOUS; SUBCUTANEOUS at 21:15

## 2018-11-12 NOTE — PROGRESS NOTE ADULT - SUBJECTIVE AND OBJECTIVE BOX
[facility] DIVISION OF KIDNEY DISEASES AND HYPERTENSION -- FOLLOW UP NOTE  --------------------------------------------------------------------------------  Chief Complaint: ESRD HD    24 hour events/subjective:    Pt seen and examined  on HD today  OR tomorrow    PAST HISTORY  --------------------------------------------------------------------------------  No significant changes to PMH, PSH, FHx, SHx, unless otherwise noted    ALLERGIES & MEDICATIONS  --------------------------------------------------------------------------------  Allergies    No Known Allergies    Intolerances      Standing Inpatient Medications  ALBUTerol    90 MICROgram(s) HFA Inhaler 1 Puff(s) Inhalation every 4 hours  aspirin enteric coated 81 milliGRAM(s) Oral daily  atorvastatin 40 milliGRAM(s) Oral at bedtime  carvedilol 12.5 milliGRAM(s) Oral every 12 hours  chlorhexidine 0.12% Liquid 15 milliLiter(s) Swish and Spit two times a day  chlorhexidine 4% Liquid 1 Application(s) Topical two times a day  dextrose 5%. 1000 milliLiter(s) IV Continuous <Continuous>  dextrose 50% Injectable 12.5 Gram(s) IV Push once  dextrose 50% Injectable 25 Gram(s) IV Push once  dextrose 50% Injectable 25 Gram(s) IV Push once  epoetin jack Injectable 49612 Unit(s) IV Push once  folic acid 1 milliGRAM(s) Oral daily  heparin  Infusion.  Unit(s)/Hr IV Continuous <Continuous>  insulin lispro (HumaLOG) corrective regimen sliding scale   SubCutaneous three times a day before meals  levothyroxine 75 MICROGram(s) Oral daily  Nephro-heather 1 Tablet(s) Oral daily  pantoprazole    Tablet 40 milliGRAM(s) Oral before breakfast  sodium chloride 0.9% lock flush 3 milliLiter(s) IV Push every 8 hours  tiotropium 18 MICROgram(s) Capsule 1 Capsule(s) Inhalation daily    PRN Inpatient Medications  acetaminophen   Tablet .. 650 milliGRAM(s) Oral every 6 hours PRN  ALBUTerol/ipratropium for Nebulization 3 milliLiter(s) Nebulizer every 6 hours PRN  ALPRAZolam 0.25 milliGRAM(s) Oral every 12 hours PRN  dextrose 40% Gel 15 Gram(s) Oral once PRN  glucagon  Injectable 1 milliGRAM(s) IntraMuscular once PRN  petrolatum white Ointment 1 Application(s) Topical four times a day PRN      REVIEW OF SYSTEMS  --------------------------------------------------------------------------------  Gen: No weight changes, fatigue, fevers/chills, weakness  Skin: No rashes  Head/Eyes/Ears/Mouth: No headache; Normal hearing; Normal vision w/o blurriness; No sinus pain/discomfort, sore throat  Respiratory: No dyspnea, cough, wheezing, hemoptysis  CV: No chest pain, PND, orthopnea  GI: No abdominal pain, diarrhea, constipation, nausea, vomiting, melena, hematochezia  : No increased frequency, dysuria, hematuria, nocturia  MSK: No joint pain/swelling; no back pain; no edema  Neuro: No dizziness/lightheadedness, weakness, seizures, numbness, tingling  Heme: No easy bruising or bleeding  Endo: No heat/cold intolerance  Psych: No significant nervousness, anxiety, stress, depression    All other systems were reviewed and are negative, except as noted.    VITALS/PHYSICAL EXAM  --------------------------------------------------------------------------------  T(C): 36.7 (11-12-18 @ 05:53), Max: 37 (11-11-18 @ 16:17)  HR: 55 (11-12-18 @ 05:53) (55 - 57)  BP: 140/62 (11-12-18 @ 05:53) (140/62 - 171/66)  RR: 18 (11-12-18 @ 05:53) (18 - 18)  SpO2: 98% (11-12-18 @ 05:53) (98% - 100%)  Wt(kg): --        11-11-18 @ 07:01  -  11-12-18 @ 07:00  --------------------------------------------------------  IN: 428 mL / OUT: 0 mL / NET: 428 mL      Physical Exam:  	Gen: NAD, well-appearing  	HEENT: PERRL, supple neck, clear oropharynx  	Pulm: CTA B/L  	CV: RRR, S1S2; no rub  	Back: No spinal or CVA tenderness; no sacral edema  	Abd: +BS, soft, nontender/nondistended  	: No suprapubic tenderness  	UE: Warm, FROM, no clubbing, intact strength; no edema; no asterixis  	LE: Warm, FROM, no clubbing, intact strength; no edema  	Neuro: No focal deficits, intact gait  	Psych: Normal affect and mood  	Skin: Warm, without rashes  	Vascular access:    LABS/STUDIES  --------------------------------------------------------------------------------              11.5   5.2   >-----------<  234      [11-11-18 @ 05:45]              37.3     139  |  95  |  37.0  ----------------------------<  130      [11-12-18 @ 06:01]  4.7   |  24.0  |  6.53        Ca     10.2     [11-12-18 @ 06:01]      Mg     1.9     [11-11-18 @ 03:13]      Phos  6.0     [11-12-18 @ 06:01]    TPro  x   /  Alb  3.8  /  TBili  x   /  DBili  x   /  AST  x   /  ALT  x   /  AlkPhos  x   [11-12-18 @ 06:01]      PTT: 57.2       [11-12-18 @ 06:01]      Creatinine Trend:  SCr 6.53 [11-12 @ 06:01]  SCr 4.97 [11-11 @ 03:13]  SCr 6.94 [11-10 @ 12:25]  SCr 5.25 [11-08 @ 05:54]  SCr 7.20 [11-07 @ 18:50]        HbA1c 6.5      [11-07-18 @ 18:50]  TSH 2.27      [11-07-18 @ 18:50]

## 2018-11-12 NOTE — PROGRESS NOTE ADULT - ASSESSMENT
1) ESRD on HD  2) MBD of renal dx  3) Anemia of renal dx  4) Vol/HTN    On HD TTS  Will dialyze today pre op ; going to OR tomorrow  Planned OR tomorrow with CTS for CABG  d/w 4TWR CTU team and CTICU

## 2018-11-12 NOTE — PROGRESS NOTE ADULT - SUBJECTIVE AND OBJECTIVE BOX
[facility], [facility]                                                    [address], [address], [place] [address]                                                         PHONE: [phone]    FAX: [phone] [address], [address], [place] [address]                                                 PHONE: [phone]    FAX: [phone]  *******************************************************************************  Overnight events/Subjective Assessment: Feeling well; no chest pain, palpitations, shortness of breath, syncope or near syncope.    INTERPRETATION OF TELEMETRY (personally reviewed): SR    No Known Allergies    MEDICATIONS  (STANDING):  ALBUTerol    90 MICROgram(s) HFA Inhaler 1 Puff(s) Inhalation every 4 hours  aspirin enteric coated 81 milliGRAM(s) Oral daily  atorvastatin 40 milliGRAM(s) Oral at bedtime  carvedilol 12.5 milliGRAM(s) Oral every 12 hours  chlorhexidine 0.12% Liquid 15 milliLiter(s) Swish and Spit two times a day  chlorhexidine 4% Liquid 1 Application(s) Topical two times a day  cloNIDine 0.1 milliGRAM(s) Oral three times a day  dextrose 5%. 1000 milliLiter(s) (50 mL/Hr) IV Continuous <Continuous>  dextrose 50% Injectable 12.5 Gram(s) IV Push once  dextrose 50% Injectable 25 Gram(s) IV Push once  dextrose 50% Injectable 25 Gram(s) IV Push once  folic acid 1 milliGRAM(s) Oral daily  heparin  Infusion.  Unit(s)/Hr (10 mL/Hr) IV Continuous <Continuous>  insulin lispro (HumaLOG) corrective regimen sliding scale   SubCutaneous three times a day before meals  levothyroxine 75 MICROGram(s) Oral daily  Nephro-heather 1 Tablet(s) Oral daily  pantoprazole    Tablet 40 milliGRAM(s) Oral before breakfast  sodium chloride 0.9% lock flush 3 milliLiter(s) IV Push every 8 hours  tiotropium 18 MICROgram(s) Capsule 1 Capsule(s) Inhalation daily    MEDICATIONS  (PRN):  acetaminophen   Tablet .. 650 milliGRAM(s) Oral every 6 hours PRN Temp greater or equal to 38C (100.4F), Moderate Pain (4 - 6)  ALBUTerol/ipratropium for Nebulization 3 milliLiter(s) Nebulizer every 6 hours PRN Shortness of Breath and/or Wheezing  ALPRAZolam 0.25 milliGRAM(s) Oral every 12 hours PRN anxiety  dextrose 40% Gel 15 Gram(s) Oral once PRN Blood Glucose LESS THAN 70 milliGRAM(s)/deciliter  glucagon  Injectable 1 milliGRAM(s) IntraMuscular once PRN Glucose LESS THAN 70 milligrams/deciliter  petrolatum white Ointment 1 Application(s) Topical four times a day PRN dry lips      Vital Signs Last 24 Hrs  T(C): 36.7 (12 Nov 2018 05:53), Max: 37 (11 Nov 2018 16:17)  T(F): 98 (12 Nov 2018 05:53), Max: 98.6 (11 Nov 2018 16:17)  HR: 55 (12 Nov 2018 05:53) (54 - 57)  BP: 140/62 (12 Nov 2018 05:53) (132/78 - 171/66)  RR: 18 (12 Nov 2018 05:53) (18 - 18)  SpO2: 98% (12 Nov 2018 05:53) (98% - 100%)    PHYSICAL EXAM:  General: Appears well developed, well nourished, no acute distress  HEENT: Head: normocephalic, atraumatic  Eyes: Pupils equal and reactive  Neck: Supple, no carotid bruit, no JVD, no HJR  CARDIOVASCULAR: Normal S1 and S2, no murmur, rub, or gallop  LUNGS: Clear to auscultation bilaterally, no rales, rhonchi or wheeze  ABDOMEN: Soft, nontender, non-distended, positive bowel sounds, no mass or bruit  EXTREMITIES: No edema, distal pulses WNL  SKIN: Warm and dry with normal turgor  NEURO: Alert & oriented x 3, grossly intact  PSYCH: normal mood and affect          LABS:                          11.5   5.2   )-----------( 234      ( 11 Nov 2018 05:45 )             37.3       11-12    139  |  95<L>  |  37.0<H>  ----------------------------<  130<H>  4.7   |  24.0  |  6.53<H>    Ca    10.2      12 Nov 2018 06:01  Phos  6.0     11-12  Mg     1.9     11-11    TPro  x   /  Alb  3.8  /  TBili  x   /  DBili  x   /  AST  x   /  ALT  x   /  AlkPhos  x   11-12        PTT - ( 11 Nov 2018 08:25 )  PTT:53.2 sec  Serum Pro-Brain Natriuretic Peptide: 45012 pg/mL (11-07 @ 18:50)  Serum Pro-Brain Natriuretic Peptide: 90975 pg/mL (11-06 @ 11:23)  serum  Lipids:   Hemoglobin A1C, Whole Blood: 6.5 % (11-07 @ 18:50)    Thyroid Stimulating Hormone, Serum: 2.27 uIU/mL (11-07 @ 18:50)      RADIOLOGY & ADDITIONAL STUDIES:  ECHO: < from: TTE Echo Complete w/Doppler (11.07.18 @ 15:59) >  Summary:   1. Left ventricular ejection fraction, by visual estimation, is 40 to 45%.   2. Mildly decreased global left ventricular systolic function.   3. Mid and apical inferior wall, mid inferolateral segment, mid   anterolateral segment, and apical lateral segment are abnormal as   described above.   4. Spectral Doppler shows restrictive pattern of left ventricular   myocardial filling (Grade III diastolic dysfunction).   5. There is no evidence of pericardial effusion.   6. Mild mitral annular calcification.   7. Moderate mitral valve regurgitation.   8. Thickening and calcification of the anterior and posterior mitral   valve leaflets.   9. Mild tricuspid regurgitation.  10. Sclerotic aortic valve with normal opening.  11. Trace pulmonic valve regurgitation.  12. Estimated pulmonary artery systolic pressure is 57.0 mmHg assuming a   right atrial pressure of 8 mmHg, which is consistent with moderate   pulmonary hypertension.      CARDIAC CATHETERIZATION: < from: Cardiac Cath Lab - Adult (11.07.18 @ 13:34) >  VENTRICLES: Analysis of regional contractile function demonstrated severe  diaphragmatic hypokinesis. EF estimated was 40 %.  CORONARY VESSELS: The coronary circulation is co-dominant.  LM:   --  LM: Normal.  LAD:   --  Proximal LAD: There was a discrete 60 % stenosis justafter D1.  --  Distal LAD: There was a tubular 80 % stenosis in the proximal third of  the vessel segment. The lesion was irregularly contoured and eccentric.  There was AIYANA grade 3 flow through the vessel (brisk flow).  --  D1: There was a [id number] % stenosis in the proximal third of the  vessel segment. The lesion was eccentric. There was AIYANA grade 3 flow  through the vessel (brisk flow).  CX:   --  Proximal circumflex: There was a discrete 60 % stenosis just  after OM1.  --  OM1: There was a discrete 80 % stenosis in the proximal third of the  vessel segment. There was AIYANA grade 3 flow through the vessel (brisk  flow).  --  OM2: There was a 100 % stenosis. This lesion is a chronic total  occlusion.  RCA:   --  Mid RCA: There was a discrete 80 % stenosis. The lesion was hazy  and eccentric. There was AIYANA grade 3 flow through the vessel (brisk  flow).  COMPLICATIONS: No complications occurred during the cath lab visit.  DIAGNOSTIC IMPRESSIONS: - Severe 3 vessel CAD  - OM2 is chronically occluded  - Moderate LV systolic dysfunction with regional wall motion abnormality  - Mildly elevated LVEDP  DIAGNOSTIC RECOMMENDATIONS: - CTS evaluation with Dr. Cavazos for CABG  - Aggressive medical management and risk factor modification  INTERVENTIONAL IMPRESSIONS: - Severe 3 vessel CAD  - OM2 is chronically occluded  - Moderate LV systolic dysfunction with regional wall motion abnormality  - Mildly elevated LVEDP    ASSESSMENT AND PLAN:  In summary, KIM LEE is a 63y woman with significant history of HTN, DMII, HLD, paroxsymal afib (on eliquis), and ESRD on HD, admitted with CP, NSTEMI, found to have severe multivessel CAD now awaiting CABG.     - NSTEMI with severe, multivessel CAD: has been chest pain free: plan for CABG tomorrow (11/13/2018) with Dr Cavazos.  DW son at bedside. On heparin and ASA.    - Echocardiogram reviewed: Mild-moderately reduced LVEF; diastolic dysfunction; moderate MR. Stable without evidence of acute decompensation.  Continue HD as scheduled.    - HL. Maintain atorvastatin 20mg daily    - Rhythm/hemodynamics stable: BP well controlled on current regimen.  Maintaining sinus rhythm.    - Paroxsymal afib with elevated CHADSVASC score: maintained on eliquis as outpatient.  On heparin drip in house.     - Left adrenal mass. Currently being assessed    - ESRD. Dialysis as per renal

## 2018-11-12 NOTE — CHART NOTE - NSCHARTNOTEFT_GEN_A_CORE
Upon Nutritional Assessment by the Registered Dietitian your patient was determined to meet criteria / has evidence of the following diagnosis/diagnoses:          [ ]  Mild Protein Calorie Malnutrition        [x ]  Moderate Protein Calorie Malnutrition        [ ] Severe Protein Calorie Malnutrition        [ ] Unspecified Protein Calorie Malnutrition        [ ] Underweight / BMI <19        [ ] Morbid Obesity / BMI > 40      Findings as based on:  •  Comprehensive nutrition assessment and consultation  •  Calorie counts (nutrient intake analysis)  •  Food acceptance and intake status from observations by staff  •  Follow up  •  Patient education  •  Intervention secondary to interdisciplinary rounds  •   concerns      Treatment:    The following diet has been recommended:  Add 8 oz Nepro BID    PROVIDER Section:     By signing this assessment you are acknowledging and agree with the diagnosis/diagnoses assigned by the Registered Dietitian    Comments: Upon Nutritional Assessment by the Registered Dietitian your patient was determined to meet criteria / has evidence of the following diagnosis/diagnoses:          [ ]  Mild Protein Calorie Malnutrition        [x ]  Moderate Protein Calorie Malnutrition        [ ] Severe Protein Calorie Malnutrition        [ ] Unspecified Protein Calorie Malnutrition        [ ] Underweight / BMI <19        [ ] Morbid Obesity / BMI > 40      Findings as based on:  •  Comprehensive nutrition assessment and consultation  •  Calorie counts (nutrient intake analysis)  •  Food acceptance and intake status from observations by staff  •  Follow up  •  Patient education  •  Intervention secondary to interdisciplinary rounds  •   concerns      Treatment:    The following diet has been recommended:  Add 8 oz Nepro BID    PROVIDER Section:     By signing this assessment you are acknowledging and agree with the diagnosis/diagnoses assigned by the Registered Dietitian    Comments  agree

## 2018-11-12 NOTE — DIETITIAN INITIAL EVALUATION ADULT. - OTHER INFO
Pt speaks [place]- son was able to interpret.  Pt with fair/good po intake at meals, appetite improving since admission.  Pt with decreased po intake at home due to poor appetite and possible depression per son.  Pt also with wt loss pta- mostly occurring while pt was in hospital with heart problems and pneumonia.  Pt used to consume a lot of salt, but has since removed from her diet.

## 2018-11-12 NOTE — PROGRESS NOTE ADULT - SUBJECTIVE AND OBJECTIVE BOX
follow up for CAD ,DM type 2   seen in am her son was at the bedside , pt is feeling well , no complaints     Vital Signs Last 24 Hrs  T(C): 36.6 (12 Nov 2018 12:14), Max: 37 (11 Nov 2018 16:17)  T(F): 97.9 (12 Nov 2018 12:14), Max: 98.6 (11 Nov 2018 16:17)  HR: 55 (12 Nov 2018 12:14) (55 - 57)  BP: 168/73 (12 Nov 2018 12:14) (140/62 - 171/66)  BP(mean): --  RR: 18 (12 Nov 2018 12:14) (18 - 18)  SpO2: 98% (12 Nov 2018 12:14) (98% - 100%)    General : awake alert no distress   Lungs : clear to auscultation        Heart : regular rate rythm  S1 /S2   Gastro : soft no tenderness , BS positive   Ext : no pretibial edema      CAPILLARY BLOOD GLUCOSE    CAPILLARY BLOOD GLUCOSE      POCT Blood Glucose.: 147 mg/dL (12 Nov 2018 08:40)  POCT Blood Glucose.: 155 mg/dL (11 Nov 2018 21:36)  POCT Blood Glucose.: 162 mg/dL (11 Nov 2018 17:27)  POCT Blood Glucose.: 143 mg/dL (11 Nov 2018 12:30)                                              11.5   5.2   )-----------( 234      ( 11 Nov 2018 05:45 )             37.3   11-12    139  |  95<L>  |  37.0<H>  ----------------------------<  130<H>  4.7   |  24.0  |  6.53<H>    Ca    10.2      12 Nov 2018 06:01  Phos  6.0     11-12  Mg     1.9     11-11    TPro  x   /  Alb  3.8  /  TBili  x   /  DBili  x   /  AST  x   /  ALT  x   /  AlkPhos  x   11-12

## 2018-11-12 NOTE — PROGRESS NOTE ADULT - SUBJECTIVE AND OBJECTIVE BOX
Cardiac Surgery Pre-op Note:  63 year old Romanian speaking female with a PMH of ESRD on HD, hypothyroidism, HTN, DM, presents to [facility] on  with mild troponin elevation. Cardiac cath on  indicated + TVD.  Risks and benefits to surgery and options were discussed and explained to patient & pt's son (Raheem) with Dr. Cavazos.  After reviewing all options, patient & family agreed for open heart surgery.      Surgeon: Dr. Cavazos    Procedure: CABG x3    Allergies    No Known Allergies    Intolerances        HPI:    PAST MEDICAL & SURGICAL HISTORY:  Hypothyroidism, unspecified type  Hemodialysis patient:   Renal failure  Hypertension  Diabetes mellitus  A-V fistula  S/P cholecystectomy      MEDICATIONS  (STANDING):  ALBUTerol    90 MICROgram(s) HFA Inhaler 1 Puff(s) Inhalation every 4 hours  aspirin enteric coated 81 milliGRAM(s) Oral daily  atorvastatin 40 milliGRAM(s) Oral at bedtime  carvedilol 12.5 milliGRAM(s) Oral every 12 hours  chlorhexidine 0.12% Liquid 15 milliLiter(s) Swish and Spit two times a day  chlorhexidine 4% Liquid 1 Application(s) Topical two times a day  dextrose 5%. 1000 milliLiter(s) (50 mL/Hr) IV Continuous <Continuous>  dextrose 50% Injectable 12.5 Gram(s) IV Push once  dextrose 50% Injectable 25 Gram(s) IV Push once  dextrose 50% Injectable 25 Gram(s) IV Push once  folic acid 1 milliGRAM(s) Oral daily  heparin  Infusion.  Unit(s)/Hr (10 mL/Hr) IV Continuous <Continuous>  insulin lispro (HumaLOG) corrective regimen sliding scale   SubCutaneous three times a day before meals  levothyroxine 75 MICROGram(s) Oral daily  Nephro-heather 1 Tablet(s) Oral daily  pantoprazole    Tablet 40 milliGRAM(s) Oral before breakfast  sodium chloride 0.9% lock flush 3 milliLiter(s) IV Push every 8 hours  tiotropium 18 MICROgram(s) Capsule 1 Capsule(s) Inhalation daily    MEDICATIONS  (PRN):  acetaminophen   Tablet .. 650 milliGRAM(s) Oral every 6 hours PRN Temp greater or equal to 38C (100.4F), Moderate Pain (4 - 6)  ALBUTerol/ipratropium for Nebulization 3 milliLiter(s) Nebulizer every 6 hours PRN Shortness of Breath and/or Wheezing  ALPRAZolam 0.25 milliGRAM(s) Oral every 12 hours PRN anxiety  dextrose 40% Gel 15 Gram(s) Oral once PRN Blood Glucose LESS THAN 70 milliGRAM(s)/deciliter  glucagon  Injectable 1 milliGRAM(s) IntraMuscular once PRN Glucose LESS THAN 70 milligrams/deciliter  petrolatum white Ointment 1 Application(s) Topical four times a day PRN dry lips        Labs:                        11.5   5.2   )-----------( 234      ( 2018 05:45 )             37.3     11-12    139  |  95<L>  |  37.0<H>  ----------------------------<  130<H>  4.7   |  24.0  |  6.53<H>    Ca    10.2      2018 06:01  Phos  6.0     -  Mg     1.9         TPro  x   /  Alb  3.8  /  TBili  x   /  DBili  x   /  AST  x   /  ALT  x   /  AlkPhos  x   11-12    PTT - ( 2018 06:01 )  PTT:57.2 sec  LIVER FUNCTIONS - ( 2018 06:01 )  Alb: 3.8 g/dL / Pro: x     / ALK PHOS: x     / ALT: x     / AST: x     / GGT: x            CXR:< from: Xray Chest 1 View- PORTABLE-Routine (18 @ 05:07) >  EXAM:  XR CHEST PORTABLE ROUTINE 1V                          PROCEDURE DATE:  2018          INTERPRETATION:  CLINICAL INFORMATION: CHF.    TECHNIQUE: Frontal view of the chest   COMPARISON: 2018.    FINDINGS:    LUNGS/PLEURA: No focal consolidation, pleural effusion, or pneumothorax.  MEDIASTINUM: Cardiac silhouette is enlarged.  OTHER: None.    IMPRESSION:     No focal consolidation or pleural effusion..    < end of copied text >      EKG:< from: 12 Lead ECG (18 @ 09:21) >  Ventricular Rate 79 BPM    Atrial Rate 79 BPM    P-R Interval 186 ms    QRS Duration 98 ms    Q-T Interval 438 ms    QTC Calculation(Bezet) 502 ms    P Axis 37 degrees    R Axis 16 degrees    T Axis 127 degrees    Diagnosis Line Normal sinus rhythm  Left ventricular hypertrophy with repolarization abnormality  Prolonged QT  Abnormal ECG    Carotid Duplex:< from: US Duplex Carotid Arteries Complete, Bilateral (18 @ 19:17) >  EXAM:  US DPLX CAROTIDS COMPL BI                          PROCEDURE DATE:  2018          INTERPRETATION:  VASCULAR CAROTID DUPLEX:    History: Preoperative evaluation prior to surgery for coronary artery   disease..    Date and time of exam:2018 7:12 PM.    Technique: Real time, color flow, and pulsed Doppler study of the carotid   arteries were performed.  Peak systolic velocities were obtained and   compared with tables establishing criteria for carotid stenosis.    Findings:   Peak systolic velocities:  Right CCA 78.1 cm/sec  Right internal carotid artery 94.7 cm/sec  Right external carotid artery 78.5 cm/sec  Left CCA 81.7 cm/sec  Left internal carotid artery 99.9 cm/sec  Left external carotid artery 88.6 cm/sec  Internal carotid/common carotid ratio: 1.2 on the right and 1.2 on the   left.  Vertebral artery flow is antegrade bilaterally.    Impression:  No evidence of hemodynamically significant carotid stenosis..      Echo:< from: TTE Echo Complete w/Doppler (18 @ 15:59) >  EXAM:  ECHO TRANSTHORACIC COMP W DOPP      PROCEDURE DATE:  2018   .      INTERPRETATION:  REPORT:    TRANSTHORACIC ECHOCARDIOGRAM REPORT         Patient Name:   KIM LEE Patient Location: Inpatient  Medical Rec #:  [id number]       Accession #:      [id number]  Account #:      [id number]      Height:           66.1 in 168.0 cm  YOB: 1955      Weight:           200.6 lb 91.00 kg  Patient Age:    63 years        BSA:              2.01 m²  Patient Gender: F  BP:               160/70 mmHg       Date of Exam:        2018 3:59:37 PM  Sonographer:         Kay Weaver  Referring Physician: Marisa Moore MD    Procedure:     2D Echo/Doppler/Color Doppler Complete.  Indications:   Chest pain, unspecified - R07.9  Diagnosis:     Chest pain, unspecified - R07.9  Study Details: Technically adequate study. Study quality was adversely   affected due to body habitus and lung interference.  2D AND M-MODE MEASUREMENTS (normal ranges within parentheses):  Left                Normal    Aorta/Left           Normal  Ventricle:                    Atrium:  IVSd (2D):    1.14  (0.7-1.1) Aortic Root  3.21 cm (2.4-3.7)                cm              (2D):  LVPWd (2D):   1.24  (0.7-1.1) Left Atrium  5.26 cm (1.9-4.0)                cm              (2D):  LVIDd (2D):   5.28  (3.4-5.7) LA Volume    54.4                cm              Index        ml/m²  LVIDs (2D):   3.85                cm  LV FS (2D):   27.1  (>25%)                %  Relative Wall 0.47  (<0.42)  Thickness    LV SYSTOLIC FUNCTION BY 2D PLANIMETRY (MOD):  EF-Biplane: 50 %    LV DIASTOLIC FUNCTION:  MV Peak E: 1.33 m/s e', MV Kristal: 0.02 m/s  MV Peak A: 0.49 m/s E/e' Ratio: 66.50  E/A Ratio: 2.73    SPECTRAL DOPPLER ANALYSIS (where applicable):  Mitral Insufficiency by PISA:  MR Volume: 30.34 ml MR Flow Rate: 73.98 ml/s MR EROA: 16.05 mm²  Aortic Valve: AoV Max Jj: 1.53 m/s AoV Peak P.3 mmHg AoV Mean P.4 mmHg    LVOT Vmax: 0.59 m/s LVOT VTI: 0.141 m LVOT Diameter: 2.00 cm    AoV Area, Vmax: 1.21 cm² AoV Area, VTI: 1.25 cm² AoV Area, Vmn: 1.25 cm²  Ao VTI: 0.355  Tricuspid Valve and PA/RV Systolic Pressure: TR Max Velocity: 3.50 m/s RA   Pressure: 8 mmHg RVSP/PASP: 57.0 mmHg       PHYSICIAN INTERPRETATION:  Left Ventricle: The left ventricular internal cavity size is normal.  Global LV systolic function was mildly decreased. Left ventricular   ejection fraction, by visual estimation, is 40 to 45%. Spectral Doppler   shows restrictive pattern of left ventricular myocardial filling (Grade   III diastolic dysfunction).  LV Wall Scoring:  The mid and apical inferior wall, mid inferolateral segment, mid   anterolateral  segment, and apical lateral segment are hypokinetic.    Right Ventricle: The right ventricular size is normal. RV systolic   function is normal.  Left Atrium: Severely enlarged left atrium.  Right Atrium: Normal right atrial size.  Pericardium: There is no evidence of pericardial effusion. There is a   significant pericardial fat pad present.  Mitral Valve: Thickening and calcification of the anterior and posterior   mitral valve leaflets. There is mild mitral annular calcification.   Moderate mitral valve regurgitation is seen.  Tricuspid Valve: The tricuspid valve is normal. Mild tricuspid   regurgitation is visualized. Estimated pulmonary artery systolic pressure   is 57.0 mmHg assuming a right atrial pressure of 8 mmHg, which is   consistent with moderate pulmonary hypertension.  Aortic Valve: The aortic valve istrileaflet. Sclerotic aortic valve with   normal opening. Peak Av velocity is 1.53 m/s, mean transaortic gradient   equals 4.4 mmHg. Trivial aortic valve regurgitation is seen.  Pulmonic Valve: The pulmonic valve is normal. Trace pulmonic valve   regurgitation.  Aorta: The aortic root and ascending aorta are structurally normal, with   no evidence of dilitation.  Pulmonary Artery: The pulmonary artery is of normal size and origin.  Venous: A normal flow pattern is recorded from the right upper pulmonary   vein. The inferior vena cava was normal sized, with respiratory size   variation greater than 50%.       Summary:   1. Left ventricular ejection fraction, by visual estimation, is 40 to   45%.   2. Mildly decreased global left ventricular systolic function.   3. Mid and apical inferior wall, mid inferolateral segment, mid   anterolateral segment, and apical lateral segment are abnormal as   described above.   4. Spectral Doppler shows restrictive pattern of left ventricular   myocardial filling (Grade III diastolic dysfunction).  5. There is no evidence of pericardial effusion.   6. Mild mitral annular calcification.   7. Moderate mitral valve regurgitation.   8. Thickening and calcification of the anterior and posterior mitral   valve leaflets.   9. Mild tricuspid regurgitation.  10. Sclerotic aortic valve with normal opening.  11. Trace pulmonic valve regurgitation.  12. Estimated pulmonary artery systolic pressure is 57.0 mmHg assuming a   right atrial pressure of 8 mmHg, which is consistent with moderate   pulmonary hypertension.    PHYSICAL EXAM  General: well nourished, well developed, no acute distress  Neurology: alert and oriented x 3, nonfocal, no gross deficits  Respiratory: Clear to auscultation bilaterally  CV: regular rate and rhythm, normal S1, S2  Abdomen: soft, obese, nontender, nondistended, positive bowel sounds, last bowel movement 11/10/18  Extremities: warm, well perfused. no edema. + DP pulses, LAVF +thrill/bruit          Cath report:< from: Cardiac Cath Lab - Adult (18 @ 13:34) >  [facility]  Department of Cardiology  [address] [place] [address] [phone]  Cath Lab Report -- Comprehensive Report  Patient: KIM LEE  Study date: 2018  Account number: [id number]  MR number: [id number]  :1955  Gender: Female  Race: O  Case Physician(s):  Yo Hernandez MD  Fellow:  Monica Dupont DO  Referring Physician:  INDICATIONS: Unstable angina - CCS4. Angina at hemodialysis. Mild troponin  elevation.  HISTORY: There was no prior cardiac history. The patient has hypertension,  oral hypoglycemic-treated diabetes, dialysis-treated renal failure, and  medication-treated dyslipidemia. Paroxysmal AF on Eliquis  PROCEDURE:  --  Left coronary angiography.  --  Right coronary angiography.  --  Left heart catheterization with ventriculography.  TECHNIQUE: The risks and alternatives of the procedures and conscious  sedation were explained to the patient and informed consent was obtained.  Cardiac catheterization performed urgently.  Local anesthetic given. Right radial artery access. The puncture site was  infiltrated with lidocaine. A 6F X 7CM RADIAL SHEATH was inserted in the  vessel, utilizing the modified Seldinger technique. Left coronary artery  angiography. The vessel was injected utilizing a 5FR FL3.5 EXPO catheter.  Right coronary artery angiography. The vessel was injected utilizing a 5FR  FR4 IMPULSE catheter. Left heart catheterization. Ventriculography was  performed. A 5FR ANG PIG IMPULSE catheter was utilized. RADIATION  EXPOSURE: 5 min.  CONTRAST GIVEN: Omnipaque 57 ml.  MEDICATIONS GIVEN: Fentanyl, 25 mcg, IV. Midazolam, 1 mg, IV. Verapamil  (Isoptin, Calan, Covera), 2.5 mg, IA. Heparin, 3000 units, IA. 1%  Lidocaine, 10 ml, subcutaneously.  VENTRICLES: Analysis of regional contractile function demonstrated severe  diaphragmatic hypokinesis. EF estimated was 40 %.  CORONARY VESSELS: The coronary circulation is co-dominant.  LM:   --  LM: Normal.  LAD:   --  Proximal LAD: There was a discrete 60 % stenosis justafter D1.  --  Distal LAD: There was a tubular 80 % stenosis in the proximal third of  the vessel segment. The lesion was irregularly contoured and eccentric.  There was AIYANA grade 3 flow through the vessel (brisk flow).  --  D1: There was a [id number] % stenosis in the proximal third of the  vessel segment. The lesion was eccentric. There was AIYANA grade 3 flow  through the vessel (brisk flow).  CX:   --  Proximal circumflex: There was a discrete 60 % stenosis just  after OM1.  --  OM1: There was a discrete 80 % stenosis in the proximal third of the  vessel segment. There was AIYANA grade 3 flow through the vessel (brisk  flow).  --  OM2: There was a 100 % stenosis. This lesion is a chronic total  < from: Cardiac Cath Lab - Adult (18 @ 13:34) >  occlusion.  RCA:   --  Mid RCA: There was a discrete 80 % stenosis. The lesion was hazy  and eccentric. There was AIYANA grade 3 flow through the vessel (brisk  flow).  COMPLICATIONS: No complications occurred during the cath lab visit.  DIAGNOSTIC IMPRESSIONS: - Severe 3 vessel CAD  - OM2 is chronically occluded  - Moderate LV systolic dysfunction with regional wall motion abnormality  - Mildly elevated LVEDP  DIAGNOSTIC RECOMMENDATIONS: - CTS evaluation with Dr. Cavazos for CABG  - Aggressive medical management and risk factor modification  INTERVENTIONAL IMPRESSIONS: - Severe 3 vessel CAD  - OM2 is chronically occluded  - Moderate LV systolic dysfunction with regional wall motion abnormality  - Mildly elevated LVEDP  INTERVENTIONAL RECOMMENDATIONS: - CTS evaluation with Dr. Cavazos for  CABG  Aggressive medical management and risk factor modification  Prepared and signed by  Yo Hernandez MD  Signed 2018 14:35:26  HEMODYNAMIC TABLES  Pressures:  Baseline  Pressures:  - HR: 62  Pressures:  - Rhythm:  Pressures:  -- AorticPressure (S/D/M): 179/62/103  Pressures:  -- Left Ventricle (s/edp): 177/20/--  Outputs:  Baseline  Outputs:  -- CALCULATIONS[age]  Outputs:  -- CALCULATIONS: Body Surface Area: 2.03  Outputs:  -- CALCULATIONS: Height in cm: 163.00  Outputs:  -- CALCULATIONS: Sex: Female  Outputs:  -- CALCULATIONS: Weight in k.40  Outputs:  -- OUTPUTS: O2 consumption: 253.59  Outputs:  -- OUTPUTS: Vo2 Indexed: 125.00        Pt has AICD/PPM [ ]Yes [X] NO     Brand Name:  Pre-op Beta Blocker ordered within 24 hrs of surgery?  [X]Yes  [ ] No  If not, Why?  Pre-op Hibiclens & Peridex (BID x 2 days preferred) [X]Yes  [ ] No  Type & Cross  [X]Yes  [ ] No  NPO after Midnight [x]Yes  [ ] No  Pre-op ABX ordered, to be taped on chart:  [x]Yes  [ ] No   ( Vanco only if Anaphylaxis, or MRSA)  Consent obtained  [x]Yes  [ ] No

## 2018-11-12 NOTE — PROGRESS NOTE ADULT - ASSESSMENT
63 F from home with hx of ESRD tu th sat L AVF, DM2, HTN, hypothyroidism who presented with chest pain with concern for ACS. Noted elevation in troponins, s/p cardiac cath 11/7/18 with noted severe triple vessel dz. CT sx consulted and for or in am

## 2018-11-12 NOTE — DIETITIAN INITIAL EVALUATION ADULT. - PHYSICAL APPEARANCE
BMI 37.2.  Mild muscle wasting noted- temple, shoulder, clavicle.  Moderate muscle wasting- interosseous

## 2018-11-12 NOTE — DIETITIAN INITIAL EVALUATION ADULT. - NUTRITIONGOAL OUTCOME1
97.8 Pt will maintain po intake >50% at meals, consume supplement 1-2 times daily and prevent wt loss

## 2018-11-13 ENCOUNTER — APPOINTMENT (OUTPATIENT)
Age: 63
End: 2018-11-13

## 2018-11-13 DIAGNOSIS — I25.10 ATHEROSCLEROTIC HEART DISEASE OF NATIVE CORONARY ARTERY W/OUT ANGINA PECTORIS: ICD-10-CM

## 2018-11-13 DIAGNOSIS — Z76.89 PERSONS ENCOUNTERING HEALTH SERVICES IN OTHER SPECIFIED CIRCUMSTANCES: ICD-10-CM

## 2018-11-13 DIAGNOSIS — R06.02 SHORTNESS OF BREATH: ICD-10-CM

## 2018-11-13 DIAGNOSIS — Z71.89 OTHER SPECIFIED COUNSELING: ICD-10-CM

## 2018-11-13 LAB
24R-OH-CALCIDIOL SERPL-MCNC: 25 NG/ML — LOW (ref 30–80)
ALBUMIN SERPL ELPH-MCNC: 3.8 G/DL — SIGNIFICANT CHANGE UP (ref 3.3–5.2)
ALP SERPL-CCNC: 59 U/L — SIGNIFICANT CHANGE UP (ref 40–120)
ALT FLD-CCNC: 27 U/L — SIGNIFICANT CHANGE UP
ANION GAP SERPL CALC-SCNC: 18 MMOL/L — HIGH (ref 5–17)
APTT BLD: 31 SEC — SIGNIFICANT CHANGE UP (ref 27.5–36.3)
APTT BLD: 41.5 SEC — HIGH (ref 27.5–36.3)
AST SERPL-CCNC: 69 U/L — HIGH
BASOPHILS # BLD AUTO: 0 K/UL — SIGNIFICANT CHANGE UP (ref 0–0.2)
BASOPHILS NFR BLD AUTO: 0.2 % — SIGNIFICANT CHANGE UP (ref 0–2)
BILIRUB SERPL-MCNC: 0.7 MG/DL — SIGNIFICANT CHANGE UP (ref 0.4–2)
BUN SERPL-MCNC: 22 MG/DL — HIGH (ref 8–20)
CALCIUM SERPL-MCNC: 10 MG/DL — SIGNIFICANT CHANGE UP (ref 8.6–10.2)
CALCIUM SERPL-MCNC: 9.8 MG/DL — SIGNIFICANT CHANGE UP (ref 8.4–10.5)
CHLORIDE SERPL-SCNC: 98 MMOL/L — SIGNIFICANT CHANGE UP (ref 98–107)
CK MB CFR SERPL CALC: 28.8 NG/ML — HIGH (ref 0–6.7)
CK SERPL-CCNC: 315 U/L — HIGH (ref 25–170)
CO2 SERPL-SCNC: 24 MMOL/L — SIGNIFICANT CHANGE UP (ref 22–29)
CREAT SERPL-MCNC: 4.66 MG/DL — HIGH (ref 0.5–1.3)
EOSINOPHIL # BLD AUTO: 0.1 K/UL — SIGNIFICANT CHANGE UP (ref 0–0.5)
EOSINOPHIL NFR BLD AUTO: 0.5 % — SIGNIFICANT CHANGE UP (ref 0–6)
GAS PNL BLDA: SIGNIFICANT CHANGE UP
GAS PNL BLDA: SIGNIFICANT CHANGE UP
GLUCOSE BLDC GLUCOMTR-MCNC: 144 MG/DL — HIGH (ref 70–99)
GLUCOSE BLDC GLUCOMTR-MCNC: 197 MG/DL — HIGH (ref 70–99)
GLUCOSE BLDC GLUCOMTR-MCNC: 216 MG/DL — HIGH (ref 70–99)
GLUCOSE BLDC GLUCOMTR-MCNC: 240 MG/DL — HIGH (ref 70–99)
GLUCOSE BLDC GLUCOMTR-MCNC: 245 MG/DL — HIGH (ref 70–99)
GLUCOSE BLDC GLUCOMTR-MCNC: 247 MG/DL — HIGH (ref 70–99)
GLUCOSE BLDC GLUCOMTR-MCNC: 258 MG/DL — HIGH (ref 70–99)
GLUCOSE BLDC GLUCOMTR-MCNC: 260 MG/DL — HIGH (ref 70–99)
GLUCOSE SERPL-MCNC: 188 MG/DL — HIGH (ref 70–115)
HCT VFR BLD CALC: 32.9 % — LOW (ref 37–47)
HCT VFR BLD CALC: 38.9 % — SIGNIFICANT CHANGE UP (ref 37–47)
HGB BLD-MCNC: 10.3 G/DL — LOW (ref 12–16)
HGB BLD-MCNC: 11.7 G/DL — LOW (ref 12–16)
INR BLD: 1.09 RATIO — SIGNIFICANT CHANGE UP (ref 0.88–1.16)
LYMPHOCYTES # BLD AUTO: 1.3 K/UL — SIGNIFICANT CHANGE UP (ref 1–4.8)
LYMPHOCYTES # BLD AUTO: 6.4 % — LOW (ref 20–55)
MAGNESIUM SERPL-MCNC: 2.9 MG/DL — HIGH (ref 1.6–2.6)
MCHC RBC-ENTMCNC: 27.7 PG — SIGNIFICANT CHANGE UP (ref 27–31)
MCHC RBC-ENTMCNC: 28.7 PG — SIGNIFICANT CHANGE UP (ref 27–31)
MCHC RBC-ENTMCNC: 30.1 G/DL — LOW (ref 32–36)
MCHC RBC-ENTMCNC: 31.3 G/DL — LOW (ref 32–36)
MCV RBC AUTO: 91.6 FL — SIGNIFICANT CHANGE UP (ref 81–99)
MCV RBC AUTO: 92.2 FL — SIGNIFICANT CHANGE UP (ref 81–99)
MONOCYTES # BLD AUTO: 0.9 K/UL — HIGH (ref 0–0.8)
MONOCYTES NFR BLD AUTO: 4.5 % — SIGNIFICANT CHANGE UP (ref 3–10)
NEUTROPHILS # BLD AUTO: 17.6 K/UL — HIGH (ref 1.8–8)
NEUTROPHILS NFR BLD AUTO: 87.8 % — HIGH (ref 37–73)
PLATELET # BLD AUTO: 232 K/UL — SIGNIFICANT CHANGE UP (ref 150–400)
PLATELET # BLD AUTO: 238 K/UL — SIGNIFICANT CHANGE UP (ref 150–400)
POTASSIUM SERPL-MCNC: 4.6 MMOL/L — SIGNIFICANT CHANGE UP (ref 3.5–5.3)
POTASSIUM SERPL-SCNC: 4.6 MMOL/L — SIGNIFICANT CHANGE UP (ref 3.5–5.3)
PROT SERPL-MCNC: 6.1 G/DL — LOW (ref 6.6–8.7)
PROTHROM AB SERPL-ACNC: 12.6 SEC — SIGNIFICANT CHANGE UP (ref 10–12.9)
PTH-INTACT FLD-MCNC: 551 PG/ML — HIGH (ref 15–65)
RBC # BLD: 3.59 M/UL — LOW (ref 4.4–5.2)
RBC # BLD: 4.22 M/UL — LOW (ref 4.4–5.2)
RBC # FLD: 16.5 % — HIGH (ref 11–15.6)
RBC # FLD: 16.9 % — HIGH (ref 11–15.6)
SODIUM SERPL-SCNC: 140 MMOL/L — SIGNIFICANT CHANGE UP (ref 135–145)
TROPONIN T SERPL-MCNC: 1 NG/ML — HIGH (ref 0–0.06)
WBC # BLD: 20 K/UL — HIGH (ref 4.8–10.8)
WBC # BLD: 5.8 K/UL — SIGNIFICANT CHANGE UP (ref 4.8–10.8)
WBC # FLD AUTO: 20 K/UL — HIGH (ref 4.8–10.8)
WBC # FLD AUTO: 5.8 K/UL — SIGNIFICANT CHANGE UP (ref 4.8–10.8)

## 2018-11-13 PROCEDURE — 71045 X-RAY EXAM CHEST 1 VIEW: CPT | Mod: 26

## 2018-11-13 PROCEDURE — 33533 CABG ARTERIAL SINGLE: CPT

## 2018-11-13 PROCEDURE — 12345: CPT | Mod: NC

## 2018-11-13 PROCEDURE — 33533 CABG ARTERIAL SINGLE: CPT | Mod: AS

## 2018-11-13 PROCEDURE — 33519 CABG ARTERY-VEIN THREE: CPT | Mod: AS

## 2018-11-13 PROCEDURE — 33519 CABG ARTERY-VEIN THREE: CPT

## 2018-11-13 PROCEDURE — 93010 ELECTROCARDIOGRAM REPORT: CPT

## 2018-11-13 RX ORDER — HYDROMORPHONE HYDROCHLORIDE 2 MG/ML
0.5 INJECTION INTRAMUSCULAR; INTRAVENOUS; SUBCUTANEOUS ONCE
Qty: 0 | Refills: 0 | Status: DISCONTINUED | OUTPATIENT
Start: 2018-11-13 | End: 2018-11-13

## 2018-11-13 RX ORDER — VANCOMYCIN HCL 1 G
1000 VIAL (EA) INTRAVENOUS EVERY 24 HOURS
Qty: 0 | Refills: 0 | Status: DISCONTINUED | OUTPATIENT
Start: 2018-11-14 | End: 2018-11-15

## 2018-11-13 RX ORDER — IPRATROPIUM/ALBUTEROL SULFATE 18-103MCG
3 AEROSOL WITH ADAPTER (GRAM) INHALATION EVERY 6 HOURS
Qty: 0 | Refills: 0 | Status: DISCONTINUED | OUTPATIENT
Start: 2018-11-13 | End: 2018-11-17

## 2018-11-13 RX ORDER — DOCUSATE SODIUM 100 MG
100 CAPSULE ORAL THREE TIMES A DAY
Qty: 0 | Refills: 0 | Status: DISCONTINUED | OUTPATIENT
Start: 2018-11-13 | End: 2018-11-21

## 2018-11-13 RX ORDER — PANTOPRAZOLE SODIUM 20 MG/1
40 TABLET, DELAYED RELEASE ORAL DAILY
Qty: 0 | Refills: 0 | Status: DISCONTINUED | OUTPATIENT
Start: 2018-11-14 | End: 2018-11-21

## 2018-11-13 RX ORDER — SIMVASTATIN 20 MG/1
20 TABLET, FILM COATED ORAL AT BEDTIME
Qty: 0 | Refills: 0 | Status: DISCONTINUED | OUTPATIENT
Start: 2018-11-13 | End: 2018-11-21

## 2018-11-13 RX ORDER — PANTOPRAZOLE SODIUM 20 MG/1
40 TABLET, DELAYED RELEASE ORAL ONCE
Qty: 0 | Refills: 0 | Status: COMPLETED | OUTPATIENT
Start: 2018-11-13 | End: 2018-11-13

## 2018-11-13 RX ORDER — EPINEPHRINE 0.3 MG/.3ML
0.01 INJECTION INTRAMUSCULAR; SUBCUTANEOUS
Qty: 4 | Refills: 0 | Status: DISCONTINUED | OUTPATIENT
Start: 2018-11-13 | End: 2018-11-13

## 2018-11-13 RX ORDER — CLOPIDOGREL BISULFATE 75 MG/1
75 TABLET, FILM COATED ORAL DAILY
Qty: 0 | Refills: 0 | Status: DISCONTINUED | OUTPATIENT
Start: 2018-11-14 | End: 2018-11-21

## 2018-11-13 RX ORDER — POLYETHYLENE GLYCOL 3350 17 G/17G
17 POWDER, FOR SOLUTION ORAL DAILY
Qty: 0 | Refills: 0 | Status: DISCONTINUED | OUTPATIENT
Start: 2018-11-14 | End: 2018-11-21

## 2018-11-13 RX ORDER — MEPERIDINE HYDROCHLORIDE 50 MG/ML
25 INJECTION INTRAMUSCULAR; INTRAVENOUS; SUBCUTANEOUS ONCE
Qty: 0 | Refills: 0 | Status: DISCONTINUED | OUTPATIENT
Start: 2018-11-13 | End: 2018-11-13

## 2018-11-13 RX ORDER — NITROGLYCERIN 6.5 MG
100 CAPSULE, EXTENDED RELEASE ORAL
Qty: 50 | Refills: 0 | Status: DISCONTINUED | OUTPATIENT
Start: 2018-11-13 | End: 2018-11-13

## 2018-11-13 RX ORDER — ASPIRIN/CALCIUM CARB/MAGNESIUM 324 MG
325 TABLET ORAL ONCE
Qty: 0 | Refills: 0 | Status: COMPLETED | OUTPATIENT
Start: 2018-11-13 | End: 2018-11-13

## 2018-11-13 RX ORDER — DEXTROSE 50 % IN WATER 50 %
25 SYRINGE (ML) INTRAVENOUS
Qty: 0 | Refills: 0 | Status: DISCONTINUED | OUTPATIENT
Start: 2018-11-13 | End: 2018-11-16

## 2018-11-13 RX ORDER — PROPOFOL 10 MG/ML
10 INJECTION, EMULSION INTRAVENOUS
Qty: 1000 | Refills: 0 | Status: DISCONTINUED | OUTPATIENT
Start: 2018-11-13 | End: 2018-11-13

## 2018-11-13 RX ORDER — MILRINONE LACTATE 1 MG/ML
0.2 INJECTION, SOLUTION INTRAVENOUS
Qty: 20 | Refills: 0 | Status: DISCONTINUED | OUTPATIENT
Start: 2018-11-13 | End: 2018-11-15

## 2018-11-13 RX ORDER — LEVOTHYROXINE SODIUM 125 MCG
75 TABLET ORAL DAILY
Qty: 0 | Refills: 0 | Status: DISCONTINUED | OUTPATIENT
Start: 2018-11-14 | End: 2018-11-21

## 2018-11-13 RX ORDER — CHLORHEXIDINE GLUCONATE 213 G/1000ML
5 SOLUTION TOPICAL EVERY 4 HOURS
Qty: 0 | Refills: 0 | Status: DISCONTINUED | OUTPATIENT
Start: 2018-11-13 | End: 2018-11-14

## 2018-11-13 RX ORDER — FENTANYL CITRATE 50 UG/ML
12.5 INJECTION INTRAVENOUS ONCE
Qty: 0 | Refills: 0 | Status: DISCONTINUED | OUTPATIENT
Start: 2018-11-13 | End: 2018-11-13

## 2018-11-13 RX ORDER — ALBUMIN HUMAN 25 %
250 VIAL (ML) INTRAVENOUS ONCE
Qty: 0 | Refills: 0 | Status: COMPLETED | OUTPATIENT
Start: 2018-11-13 | End: 2018-11-13

## 2018-11-13 RX ORDER — CLOPIDOGREL BISULFATE 75 MG/1
75 TABLET, FILM COATED ORAL ONCE
Qty: 0 | Refills: 0 | Status: COMPLETED | OUTPATIENT
Start: 2018-11-13 | End: 2018-11-13

## 2018-11-13 RX ORDER — ACETAMINOPHEN 500 MG
1000 TABLET ORAL ONCE
Qty: 0 | Refills: 0 | Status: COMPLETED | OUTPATIENT
Start: 2018-11-13 | End: 2018-11-13

## 2018-11-13 RX ORDER — CEFUROXIME AXETIL 250 MG
1500 TABLET ORAL EVERY 24 HOURS
Qty: 0 | Refills: 0 | Status: COMPLETED | OUTPATIENT
Start: 2018-11-14 | End: 2018-11-15

## 2018-11-13 RX ORDER — SODIUM CHLORIDE 9 MG/ML
1000 INJECTION INTRAMUSCULAR; INTRAVENOUS; SUBCUTANEOUS
Qty: 0 | Refills: 0 | Status: DISCONTINUED | OUTPATIENT
Start: 2018-11-13 | End: 2018-11-18

## 2018-11-13 RX ORDER — ASPIRIN/CALCIUM CARB/MAGNESIUM 324 MG
325 TABLET ORAL DAILY
Qty: 0 | Refills: 0 | Status: DISCONTINUED | OUTPATIENT
Start: 2018-11-14 | End: 2018-11-21

## 2018-11-13 RX ORDER — INSULIN HUMAN 100 [IU]/ML
2 INJECTION, SOLUTION SUBCUTANEOUS
Qty: 250 | Refills: 0 | Status: DISCONTINUED | OUTPATIENT
Start: 2018-11-13 | End: 2018-11-16

## 2018-11-13 RX ORDER — DEXTROSE 50 % IN WATER 50 %
50 SYRINGE (ML) INTRAVENOUS
Qty: 0 | Refills: 0 | Status: DISCONTINUED | OUTPATIENT
Start: 2018-11-13 | End: 2018-11-16

## 2018-11-13 RX ORDER — NOREPINEPHRINE BITARTRATE/D5W 8 MG/250ML
0.05 PLASTIC BAG, INJECTION (ML) INTRAVENOUS
Qty: 8 | Refills: 0 | Status: DISCONTINUED | OUTPATIENT
Start: 2018-11-13 | End: 2018-11-16

## 2018-11-13 RX ADMIN — FENTANYL CITRATE 12.5 MICROGRAM(S): 50 INJECTION INTRAVENOUS at 21:33

## 2018-11-13 RX ADMIN — CLOPIDOGREL BISULFATE 75 MILLIGRAM(S): 75 TABLET, FILM COATED ORAL at 22:33

## 2018-11-13 RX ADMIN — HYDROMORPHONE HYDROCHLORIDE 0.5 MILLIGRAM(S): 2 INJECTION INTRAMUSCULAR; INTRAVENOUS; SUBCUTANEOUS at 22:33

## 2018-11-13 RX ADMIN — SODIUM CHLORIDE 3 MILLILITER(S): 9 INJECTION INTRAMUSCULAR; INTRAVENOUS; SUBCUTANEOUS at 06:02

## 2018-11-13 RX ADMIN — Medication 0.25 MILLIGRAM(S): at 06:29

## 2018-11-13 RX ADMIN — Medication 400 MILLIGRAM(S): at 20:20

## 2018-11-13 RX ADMIN — MILRINONE LACTATE 5.68 MICROGRAM(S)/KG/MIN: 1 INJECTION, SOLUTION INTRAVENOUS at 17:23

## 2018-11-13 RX ADMIN — CHLORHEXIDINE GLUCONATE 5 MILLILITER(S): 213 SOLUTION TOPICAL at 17:22

## 2018-11-13 RX ADMIN — SIMVASTATIN 20 MILLIGRAM(S): 20 TABLET, FILM COATED ORAL at 22:33

## 2018-11-13 RX ADMIN — Medication 125 MILLILITER(S): at 17:22

## 2018-11-13 RX ADMIN — PANTOPRAZOLE SODIUM 40 MILLIGRAM(S): 20 TABLET, DELAYED RELEASE ORAL at 17:24

## 2018-11-13 RX ADMIN — Medication 3 MILLILITER(S): at 20:17

## 2018-11-13 RX ADMIN — Medication 75 MICROGRAM(S): at 06:06

## 2018-11-13 RX ADMIN — Medication 325 MILLIGRAM(S): at 22:33

## 2018-11-13 RX ADMIN — PANTOPRAZOLE SODIUM 40 MILLIGRAM(S): 20 TABLET, DELAYED RELEASE ORAL at 06:06

## 2018-11-13 RX ADMIN — Medication 1000 MILLIGRAM(S): at 20:59

## 2018-11-13 RX ADMIN — FENTANYL CITRATE 12.5 MICROGRAM(S): 50 INJECTION INTRAVENOUS at 22:00

## 2018-11-13 RX ADMIN — Medication 100 MILLIGRAM(S): at 22:33

## 2018-11-13 RX ADMIN — EPINEPHRINE 5 MICROGRAM(S)/KG/MIN: 0.3 INJECTION INTRAMUSCULAR; SUBCUTANEOUS at 17:23

## 2018-11-13 RX ADMIN — CHLORHEXIDINE GLUCONATE 15 MILLILITER(S): 213 SOLUTION TOPICAL at 06:02

## 2018-11-13 RX ADMIN — CHLORHEXIDINE GLUCONATE 1 APPLICATION(S): 213 SOLUTION TOPICAL at 06:06

## 2018-11-13 RX ADMIN — CARVEDILOL PHOSPHATE 12.5 MILLIGRAM(S): 80 CAPSULE, EXTENDED RELEASE ORAL at 06:06

## 2018-11-13 RX ADMIN — HYDROMORPHONE HYDROCHLORIDE 0.5 MILLIGRAM(S): 2 INJECTION INTRAMUSCULAR; INTRAVENOUS; SUBCUTANEOUS at 22:59

## 2018-11-13 NOTE — CHART NOTE - NSCHARTNOTEFT_GEN_A_CORE
Hospitalist   Pt is seen this eralier before going to OR , she was crying emotional about surgery , anxious her son and her daughter at the bedside   Pt is for OR today CABG   post surgery tx to CICU under cardiothoracic  surgery team will no longer follow   call as needed

## 2018-11-13 NOTE — BRIEF OPERATIVE NOTE - PROCEDURE
<<-----Click on this checkbox to enter Procedure CABG  11/13/2018  C3l ( LIMA-LAD, Vein PDA, Sequential -Diag/OM)  Active  [id number] CABG  11/13/2018  c4Ll ( LIMA-LAD, Vein PDA, Sequential -Diag/OM)  Active  Connor Charles

## 2018-11-13 NOTE — PROCEDURE NOTE - ADDITIONAL PROCEDURE DETAILS
ultrasound guided puncture of right IJ - unable to pass wire, despite excellent venous flow through angiocath (hx. of permacath RIJ), uneventful/sterile placement of left IJ line as per note

## 2018-11-14 DIAGNOSIS — Z29.9 ENCOUNTER FOR PROPHYLACTIC MEASURES, UNSPECIFIED: ICD-10-CM

## 2018-11-14 DIAGNOSIS — R00.1 BRADYCARDIA, UNSPECIFIED: ICD-10-CM

## 2018-11-14 PROBLEM — I25.10 CAD (CORONARY ARTERY DISEASE): Status: ACTIVE | Noted: 2018-11-14

## 2018-11-14 PROBLEM — R06.02 SOB (SHORTNESS OF BREATH): Status: ACTIVE | Noted: 2018-11-14

## 2018-11-14 LAB
ALBUMIN SERPL ELPH-MCNC: 3.7 G/DL — SIGNIFICANT CHANGE UP (ref 3.3–5.2)
ALP SERPL-CCNC: 57 U/L — SIGNIFICANT CHANGE UP (ref 40–120)
ALT FLD-CCNC: 27 U/L — SIGNIFICANT CHANGE UP
ANION GAP SERPL CALC-SCNC: 20 MMOL/L — HIGH (ref 5–17)
AST SERPL-CCNC: 62 U/L — HIGH
BILIRUB SERPL-MCNC: 0.4 MG/DL — SIGNIFICANT CHANGE UP (ref 0.4–2)
BUN SERPL-MCNC: 31 MG/DL — HIGH (ref 8–20)
CALCIUM SERPL-MCNC: 10.7 MG/DL — HIGH (ref 8.6–10.2)
CHLORIDE SERPL-SCNC: 99 MMOL/L — SIGNIFICANT CHANGE UP (ref 98–107)
CK MB CFR SERPL CALC: 17 NG/ML — HIGH (ref 0–6.7)
CK SERPL-CCNC: 249 U/L — HIGH (ref 25–170)
CO2 SERPL-SCNC: 24 MMOL/L — SIGNIFICANT CHANGE UP (ref 22–29)
CREAT SERPL-MCNC: 5.4 MG/DL — HIGH (ref 0.5–1.3)
GAS PNL BLDA: SIGNIFICANT CHANGE UP
GLUCOSE BLDC GLUCOMTR-MCNC: 112 MG/DL — HIGH (ref 70–99)
GLUCOSE BLDC GLUCOMTR-MCNC: 116 MG/DL — HIGH (ref 70–99)
GLUCOSE BLDC GLUCOMTR-MCNC: 120 MG/DL — HIGH (ref 70–99)
GLUCOSE BLDC GLUCOMTR-MCNC: 126 MG/DL — HIGH (ref 70–99)
GLUCOSE BLDC GLUCOMTR-MCNC: 140 MG/DL — HIGH (ref 70–99)
GLUCOSE BLDC GLUCOMTR-MCNC: 142 MG/DL — HIGH (ref 70–99)
GLUCOSE BLDC GLUCOMTR-MCNC: 144 MG/DL — HIGH (ref 70–99)
GLUCOSE BLDC GLUCOMTR-MCNC: 149 MG/DL — HIGH (ref 70–99)
GLUCOSE BLDC GLUCOMTR-MCNC: 158 MG/DL — HIGH (ref 70–99)
GLUCOSE BLDC GLUCOMTR-MCNC: 161 MG/DL — HIGH (ref 70–99)
GLUCOSE BLDC GLUCOMTR-MCNC: 164 MG/DL — HIGH (ref 70–99)
GLUCOSE BLDC GLUCOMTR-MCNC: 165 MG/DL — HIGH (ref 70–99)
GLUCOSE BLDC GLUCOMTR-MCNC: 168 MG/DL — HIGH (ref 70–99)
GLUCOSE BLDC GLUCOMTR-MCNC: 173 MG/DL — HIGH (ref 70–99)
GLUCOSE BLDC GLUCOMTR-MCNC: 173 MG/DL — HIGH (ref 70–99)
GLUCOSE BLDC GLUCOMTR-MCNC: 175 MG/DL — HIGH (ref 70–99)
GLUCOSE BLDC GLUCOMTR-MCNC: 182 MG/DL — HIGH (ref 70–99)
GLUCOSE BLDC GLUCOMTR-MCNC: 186 MG/DL — HIGH (ref 70–99)
GLUCOSE BLDC GLUCOMTR-MCNC: 191 MG/DL — HIGH (ref 70–99)
GLUCOSE BLDC GLUCOMTR-MCNC: 200 MG/DL — HIGH (ref 70–99)
GLUCOSE SERPL-MCNC: 124 MG/DL — HIGH (ref 70–115)
HCT VFR BLD CALC: 30.7 % — LOW (ref 37–47)
HGB BLD-MCNC: 9.4 G/DL — LOW (ref 12–16)
MAGNESIUM SERPL-MCNC: 2.9 MG/DL — HIGH (ref 1.8–2.6)
MCHC RBC-ENTMCNC: 28 PG — SIGNIFICANT CHANGE UP (ref 27–31)
MCHC RBC-ENTMCNC: 30.6 G/DL — LOW (ref 32–36)
MCV RBC AUTO: 91.4 FL — SIGNIFICANT CHANGE UP (ref 81–99)
PHOSPHATE SERPL-MCNC: 3.9 MG/DL — SIGNIFICANT CHANGE UP (ref 2.4–4.7)
PLATELET # BLD AUTO: 195 K/UL — SIGNIFICANT CHANGE UP (ref 150–400)
POTASSIUM SERPL-MCNC: 5.5 MMOL/L — HIGH (ref 3.5–5.3)
POTASSIUM SERPL-SCNC: 5.5 MMOL/L — HIGH (ref 3.5–5.3)
PROT SERPL-MCNC: 6.1 G/DL — LOW (ref 6.6–8.7)
RBC # BLD: 3.36 M/UL — LOW (ref 4.4–5.2)
RBC # FLD: 16.9 % — HIGH (ref 11–15.6)
SODIUM SERPL-SCNC: 143 MMOL/L — SIGNIFICANT CHANGE UP (ref 135–145)
TROPONIN T SERPL-MCNC: 0.97 NG/ML — HIGH (ref 0–0.06)
VANCOMYCIN TROUGH SERPL-MCNC: 14.8 UG/ML — SIGNIFICANT CHANGE UP (ref 10–20)
WBC # BLD: 12.1 K/UL — HIGH (ref 4.8–10.8)
WBC # FLD AUTO: 12.1 K/UL — HIGH (ref 4.8–10.8)

## 2018-11-14 PROCEDURE — 71045 X-RAY EXAM CHEST 1 VIEW: CPT | Mod: 26

## 2018-11-14 PROCEDURE — 90937 HEMODIALYSIS REPEATED EVAL: CPT

## 2018-11-14 PROCEDURE — 93010 ELECTROCARDIOGRAM REPORT: CPT

## 2018-11-14 RX ORDER — FENTANYL CITRATE 50 UG/ML
25 INJECTION INTRAVENOUS ONCE
Qty: 0 | Refills: 0 | Status: DISCONTINUED | OUTPATIENT
Start: 2018-11-14 | End: 2018-11-14

## 2018-11-14 RX ORDER — HYDROMORPHONE HYDROCHLORIDE 2 MG/ML
0.5 INJECTION INTRAMUSCULAR; INTRAVENOUS; SUBCUTANEOUS ONCE
Qty: 0 | Refills: 0 | Status: DISCONTINUED | OUTPATIENT
Start: 2018-11-14 | End: 2018-11-14

## 2018-11-14 RX ORDER — OXYCODONE AND ACETAMINOPHEN 5; 325 MG/1; MG/1
1 TABLET ORAL EVERY 6 HOURS
Qty: 0 | Refills: 0 | Status: DISCONTINUED | OUTPATIENT
Start: 2018-11-14 | End: 2018-11-15

## 2018-11-14 RX ORDER — SIMETHICONE 80 MG/1
80 TABLET, CHEWABLE ORAL ONCE
Qty: 0 | Refills: 0 | Status: COMPLETED | OUTPATIENT
Start: 2018-11-14 | End: 2018-11-14

## 2018-11-14 RX ORDER — MIDODRINE HYDROCHLORIDE 2.5 MG/1
5 TABLET ORAL EVERY 8 HOURS
Qty: 0 | Refills: 0 | Status: DISCONTINUED | OUTPATIENT
Start: 2018-11-14 | End: 2018-11-15

## 2018-11-14 RX ORDER — HEPARIN SODIUM 5000 [USP'U]/ML
5000 INJECTION INTRAVENOUS; SUBCUTANEOUS EVERY 12 HOURS
Qty: 0 | Refills: 0 | Status: DISCONTINUED | OUTPATIENT
Start: 2018-11-14 | End: 2018-11-18

## 2018-11-14 RX ORDER — ONDANSETRON 8 MG/1
4 TABLET, FILM COATED ORAL ONCE
Qty: 0 | Refills: 0 | Status: COMPLETED | OUTPATIENT
Start: 2018-11-14 | End: 2018-11-14

## 2018-11-14 RX ORDER — FOLIC ACID 0.8 MG
1 TABLET ORAL DAILY
Qty: 0 | Refills: 0 | Status: DISCONTINUED | OUTPATIENT
Start: 2018-11-14 | End: 2018-11-21

## 2018-11-14 RX ORDER — OXYCODONE AND ACETAMINOPHEN 5; 325 MG/1; MG/1
2 TABLET ORAL EVERY 6 HOURS
Qty: 0 | Refills: 0 | Status: DISCONTINUED | OUTPATIENT
Start: 2018-11-14 | End: 2018-11-15

## 2018-11-14 RX ORDER — ALBUMIN HUMAN 25 %
250 VIAL (ML) INTRAVENOUS ONCE
Qty: 0 | Refills: 0 | Status: DISCONTINUED | OUTPATIENT
Start: 2018-11-14 | End: 2018-11-15

## 2018-11-14 RX ADMIN — Medication 1 TABLET(S): at 14:33

## 2018-11-14 RX ADMIN — HYDROMORPHONE HYDROCHLORIDE 0.5 MILLIGRAM(S): 2 INJECTION INTRAMUSCULAR; INTRAVENOUS; SUBCUTANEOUS at 02:30

## 2018-11-14 RX ADMIN — HYDROMORPHONE HYDROCHLORIDE 0.5 MILLIGRAM(S): 2 INJECTION INTRAMUSCULAR; INTRAVENOUS; SUBCUTANEOUS at 06:40

## 2018-11-14 RX ADMIN — SIMETHICONE 80 MILLIGRAM(S): 80 TABLET, CHEWABLE ORAL at 15:22

## 2018-11-14 RX ADMIN — POLYETHYLENE GLYCOL 3350 17 GRAM(S): 17 POWDER, FOR SOLUTION ORAL at 14:32

## 2018-11-14 RX ADMIN — HYDROMORPHONE HYDROCHLORIDE 0.5 MILLIGRAM(S): 2 INJECTION INTRAMUSCULAR; INTRAVENOUS; SUBCUTANEOUS at 02:03

## 2018-11-14 RX ADMIN — OXYCODONE AND ACETAMINOPHEN 1 TABLET(S): 5; 325 TABLET ORAL at 11:20

## 2018-11-14 RX ADMIN — FENTANYL CITRATE 25 MICROGRAM(S): 50 INJECTION INTRAVENOUS at 17:15

## 2018-11-14 RX ADMIN — CLOPIDOGREL BISULFATE 75 MILLIGRAM(S): 75 TABLET, FILM COATED ORAL at 14:32

## 2018-11-14 RX ADMIN — Medication 100 MILLIGRAM(S): at 14:32

## 2018-11-14 RX ADMIN — FENTANYL CITRATE 25 MICROGRAM(S): 50 INJECTION INTRAVENOUS at 11:20

## 2018-11-14 RX ADMIN — OXYCODONE AND ACETAMINOPHEN 1 TABLET(S): 5; 325 TABLET ORAL at 00:00

## 2018-11-14 RX ADMIN — Medication 1 MILLIGRAM(S): at 14:32

## 2018-11-14 RX ADMIN — HYDROMORPHONE HYDROCHLORIDE 0.5 MILLIGRAM(S): 2 INJECTION INTRAMUSCULAR; INTRAVENOUS; SUBCUTANEOUS at 06:01

## 2018-11-14 RX ADMIN — Medication 150 MILLIGRAM(S): at 14:31

## 2018-11-14 RX ADMIN — MIDODRINE HYDROCHLORIDE 5 MILLIGRAM(S): 2.5 TABLET ORAL at 21:32

## 2018-11-14 RX ADMIN — FENTANYL CITRATE 25 MICROGRAM(S): 50 INJECTION INTRAVENOUS at 13:24

## 2018-11-14 RX ADMIN — OXYCODONE AND ACETAMINOPHEN 1 TABLET(S): 5; 325 TABLET ORAL at 09:54

## 2018-11-14 RX ADMIN — OXYCODONE AND ACETAMINOPHEN 2 TABLET(S): 5; 325 TABLET ORAL at 21:27

## 2018-11-14 RX ADMIN — Medication 100 MILLIGRAM(S): at 07:01

## 2018-11-14 RX ADMIN — Medication 100 MILLIGRAM(S): at 14:31

## 2018-11-14 RX ADMIN — FENTANYL CITRATE 25 MICROGRAM(S): 50 INJECTION INTRAVENOUS at 17:30

## 2018-11-14 RX ADMIN — PANTOPRAZOLE SODIUM 40 MILLIGRAM(S): 20 TABLET, DELAYED RELEASE ORAL at 14:32

## 2018-11-14 RX ADMIN — Medication 3 MILLILITER(S): at 20:08

## 2018-11-14 RX ADMIN — Medication 75 MICROGRAM(S): at 07:01

## 2018-11-14 RX ADMIN — FENTANYL CITRATE 25 MICROGRAM(S): 50 INJECTION INTRAVENOUS at 10:53

## 2018-11-14 RX ADMIN — INSULIN HUMAN 2 UNIT(S)/HR: 100 INJECTION, SOLUTION SUBCUTANEOUS at 21:33

## 2018-11-14 RX ADMIN — FENTANYL CITRATE 25 MICROGRAM(S): 50 INJECTION INTRAVENOUS at 14:33

## 2018-11-14 RX ADMIN — Medication 3 MILLILITER(S): at 04:10

## 2018-11-14 RX ADMIN — SIMVASTATIN 20 MILLIGRAM(S): 20 TABLET, FILM COATED ORAL at 21:27

## 2018-11-14 RX ADMIN — Medication 100 MILLIGRAM(S): at 21:27

## 2018-11-14 RX ADMIN — Medication 3 MILLILITER(S): at 08:34

## 2018-11-14 RX ADMIN — Medication 3 MILLILITER(S): at 15:11

## 2018-11-14 RX ADMIN — ONDANSETRON 4 MILLIGRAM(S): 8 TABLET, FILM COATED ORAL at 02:20

## 2018-11-14 RX ADMIN — OXYCODONE AND ACETAMINOPHEN 1 TABLET(S): 5; 325 TABLET ORAL at 15:20

## 2018-11-14 RX ADMIN — Medication 325 MILLIGRAM(S): at 14:32

## 2018-11-14 RX ADMIN — HEPARIN SODIUM 5000 UNIT(S): 5000 INJECTION INTRAVENOUS; SUBCUTANEOUS at 17:27

## 2018-11-14 RX ADMIN — MIDODRINE HYDROCHLORIDE 5 MILLIGRAM(S): 2.5 TABLET ORAL at 11:18

## 2018-11-14 NOTE — PROGRESS NOTE ADULT - ASSESSMENT
63 year old female with PMH ESRD on HD, hypothyroidism, HTN, DM, chronic combined systolic and diastolic HF presents to [facility] on 11/6 with mild troponin elevation. Cardiac cath on 11/7 indicated multi-vessel CAD. 11/13 s/p CABG x 4 (LIMA-LAD, Vein PDA, Sequential -Diag/OM). Pt to OR with a-fib, asystole post pump, SB on arrival to CTICU which improved to SR. Pt with episode of bradycardia (SB vs CHB) requiring pacing but now back to NSR 80s.

## 2018-11-14 NOTE — PROGRESS NOTE ADULT - PROBLEM SELECTOR PLAN 1
neurologically intact  hemodynamics maintained with low dose norepinephrine (titrate to keep MAP 65-75)  continue aspirin, plavix, and statin  extubated yesterday, oxygenating well, C+DBE/IS instructed and encouraged  dysphagia screen passed, clear liquid diet and advance as tolerated  OOB to chair and PT consult this morning  acetaminophen and dilaudid PRN for analgesia  continue with supportive ICU care as needed  above plan of care to be discussed with CT surgical team on am rounds

## 2018-11-14 NOTE — CONSULT NOTE ADULT - SUBJECTIVE AND OBJECTIVE BOX
HPI:  Ms. Walter is a 64 y/o  female with a complex medical h/o ESRD on HD, hypothyroidism, HTN, IDDM, chronic combined systolic and diastolic HF who presented to [facility] on  with mild troponin elevation. Cardiac cath on  indicated multi-vessel CAD. She is now POD#1 from 4v-CABG (LIMA-LAD, Vein PDA, Sequential -Diag/OM). Pt to OR with a-fib, asystole post pump, SB on arrival to CTICU which improved to SR. Pt with episode of bradycardia (SB vs CHB) requiring pacing. She remains VVI 70 through her sternal V-wire.     Electrophysiology consultation was requested to determine the necessity for permanent pacing.       PAST MEDICAL & SURGICAL HISTORY:  Hypothyroidism, unspecified type  Hemodialysis patient:   Renal failure  Hypertension  Diabetes mellitus  A-V fistula  S/P cholecystectomy      FAMILY HISTORY:  Family history of diabetes mellitus  Negative for any inherited arrhythmias, cardiomyopathies, or SCD      SOCIAL HISTORY:  Denies tobacco/EtOH/IVDU      REVIEW OF SYSTEMS:  Constitutional- No fever, weight loss, or fatigue  HEENT- No eye pain, visual disturbances, or discharge; no difficulty hearing, tinnitus, vertigo; No sinus or throat pain; no pain or stiffness  Breasts- No pain, masses, or nipple discharge  Pulmonary- No cough, wheezing, chills or hemoptysis  Cardiovascular- incisional chest pain  Gastrointestinal- No abdominal or epigastric pain. No nausea, vomiting, or hematemesis; No diarrhea or constipation. No melena or hematochezia.  Genitourinary- No dysuria, frequency, hematuria, or incontinence  Neurological- No headaches, memory loss, loss of strength, numbness, or tremors  Dermatological- No itching, burning, rashes, or lesions   Lymphatics- No enlarged glands  Endocrine- No heat or cold intolerance; No hair loss  Musculoskeletal- No joint pain or swelling; No muscle, back, or extremity pain  Psychiatric- No depression, anxiety, mood swings, or difficulty sleeping  Hematologic- No easy bruising, or bleeding gums  Allergy & Immunology- No hives or eczema      ALLERGIES:  No Known Allergies      MEDICATIONS  (STANDING):  albumin human  5% IVPB 250 milliLiter(s) IV Intermittent once  ALBUTerol/ipratropium for Nebulization 3 milliLiter(s) Nebulizer every 6 hours  aspirin enteric coated 325 milliGRAM(s) Oral daily  cefuroxime  IVPB 1500 milliGRAM(s) IV Intermittent every 24 hours  clopidogrel Tablet 75 milliGRAM(s) Oral daily  dextrose 50% Injectable 50 milliLiter(s) IV Push every 15 minutes  dextrose 50% Injectable 25 milliLiter(s) IV Push every 15 minutes  docusate sodium 100 milliGRAM(s) Oral three times a day  folic acid 1 milliGRAM(s) Oral daily  heparin  Injectable 5000 Unit(s) SubCutaneous every 12 hours  insulin Infusion 2 Unit(s)/Hr (2 mL/Hr) IV Continuous <Continuous>  levothyroxine 75 MICROGram(s) Oral daily  midodrine 5 milliGRAM(s) Oral every 8 hours  milrinone Infusion 0.2 MICROgram(s)/kG/Min (5.676 mL/Hr) IV Continuous <Continuous>  Nephro-heather 1 Tablet(s) Oral daily  norepinephrine Infusion 0.05 MICROgram(s)/kG/Min (8.869 mL/Hr) IV Continuous <Continuous>  pantoprazole    Tablet 40 milliGRAM(s) Oral daily  polyethylene glycol 3350 17 Gram(s) Oral daily  simvastatin 20 milliGRAM(s) Oral at bedtime  sodium chloride 0.9%. 1000 milliLiter(s) (10 mL/Hr) IV Continuous <Continuous>  sodium chloride 0.9%. 1000 milliLiter(s) (5 mL/Hr) IV Continuous <Continuous>  vancomycin  IVPB 1000 milliGRAM(s) IV Intermittent every 24 hours    MEDICATIONS  (PRN):  oxyCODONE    5 mG/acetaminophen 325 mG 1 Tablet(s) Oral every 6 hours PRN Moderate Pain (4 - 6)  oxyCODONE    5 mG/acetaminophen 325 mG 2 Tablet(s) Oral every 6 hours PRN Severe Pain (7 - 10)      VITAL SIGNS:  T(C): 37.2 (2018 14:00), Max: 37.2 (2018 00:00)  T(F): 98.9 (2018 14:00), Max: 99 (2018 00:00)  HR: 79 (2018 16:30) (48 - 84)  BP: --  BP(mean): --  RR: 19 (2018 16:30) (10 - 41)  SpO2: 99% (2018 16:30) (92% - 100%)      PHYSICAL EXAM:  Constitutional- AAOx3, appears moderately uncomfortable  Neck: supple, No JVD  Cardiovascular: +S1S2 RRR, no murmurs, rubs, gallops   Pulmonary: coarse and diminished BS b/l, mildly labored, no wheezes, rales, or rhonchi  Abdomen: +BS, soft NTND  Extremities: no edema b/l, +distal pulses b/l  Neuro: non focal, speech clear, LAU x 4  Psych: appropriate mood & affect      LABS:                        9.4    12.1  )-----------( 195      ( 2018 04:16 )             30.7     11-    143  |  99  |  31.0<H>  ----------------------------<  124<H>  5.5<H>   |  24.0  |  5.40<H>    Ca    10.7<H>      2018 04:16  Phos  3.9     11-  Mg     2.9       TPro  6.1<L>  /  Alb  3.7  /  TBili  0.4  /  DBili  x   /  AST  62<H>  /  ALT  27  /  AlkPhos  57  11-14    LIVER FUNCTIONS - ( 2018 04:16 )  Alb: 3.7 g/dL / Pro: 6.1 g/dL / ALK PHOS: 57 U/L / ALT: 27 U/L / AST: 62 U/L / GGT: x         PT/INR - ( 2018 16:27 )   PT: 12.6 sec;   INR: 1.09 ratio    PTT - ( 2018 16:27 )  PTT:31.0 sec    CARDIAC MARKERS ( 2018 04:16 )  x     / 0.97 ng/mL / 249 U/L / x     / 17.0 ng/mL  CARDIAC MARKERS ( 2018 16:27 )  x     / 1.00 ng/mL / 315 U/L / x     / 28.8 ng/mL      CARDIAC IMAGING/TESTIN18 Summary:   1. Left ventricular ejection fraction, by visual estimation, is 40 to 45%.   2. Mildly decreased global left ventricular systolic function.   3. Mid and apical inferior wall, mid inferolateral segment, mid anterolateral segment, and apical lateral segment are abnormal as described above.   4. Spectral Doppler shows restrictive pattern of left ventricular myocardial filling (Grade III diastolic dysfunction).   5. There is no evidence of pericardial effusion.   6. Mild mitral annular calcification.   7. Moderate mitral valve regurgitation.   8. Thickening and calcification of the anterior and posterior mitral valve leaflets.   9. Mild tricuspid regurgitation.  10. Sclerotic aortic valve with normal opening.  11. Trace pulmonic valve regurgitation.  12. Estimated pulmonary artery systolic pressure is 57.0 mmHg assuming a right atrial pressure of 8 mmHg, which is consistent with moderate pulmonary hypertension.      EP DATA:  Telemetry revealed V-pacing at 70 bpm  Preop EKG w/NSR w/narrow QRS and normal JEMMA  EKG when temp pacer lowered to VVI 50 revealed underlying NSR at 70 bpm with no IVCD or prolonged JEMMA

## 2018-11-14 NOTE — PROGRESS NOTE ADULT - SUBJECTIVE AND OBJECTIVE BOX
[facility] DIVISION OF KIDNEY DISEASES AND HYPERTENSION -- HEMODIALYSIS NOTE  --------------------------------------------------------------------------------  Chief Complaint: ESRD/Ongoing hemodialysis requirement    24 hour events/subjective:  Pt seen and examined on dialysis this AM in CTICU  s/p OR yesterday  Awake; fatigued ; family at bedside  Extubated ; on nasal cannula      PAST HISTORY  --------------------------------------------------------------------------------  No significant changes to PMH, PSH, FHx, SHx, unless otherwise noted    ALLERGIES & MEDICATIONS  --------------------------------------------------------------------------------  Allergies    No Known Allergies    Intolerances    OHS (Unknown)    Standing Inpatient Medications  albumin human  5% IVPB 250 milliLiter(s) IV Intermittent once  ALBUTerol/ipratropium for Nebulization 3 milliLiter(s) Nebulizer every 6 hours  aspirin enteric coated 325 milliGRAM(s) Oral daily  cefuroxime  IVPB 1500 milliGRAM(s) IV Intermittent every 24 hours  clopidogrel Tablet 75 milliGRAM(s) Oral daily  dextrose 50% Injectable 50 milliLiter(s) IV Push every 15 minutes  dextrose 50% Injectable 25 milliLiter(s) IV Push every 15 minutes  docusate sodium 100 milliGRAM(s) Oral three times a day  folic acid 1 milliGRAM(s) Oral daily  heparin  Injectable 5000 Unit(s) SubCutaneous every 12 hours  insulin Infusion 2 Unit(s)/Hr IV Continuous <Continuous>  levothyroxine 75 MICROGram(s) Oral daily  midodrine 5 milliGRAM(s) Oral every 8 hours  milrinone Infusion 0.2 MICROgram(s)/kG/Min IV Continuous <Continuous>  Nephro-heather 1 Tablet(s) Oral daily  norepinephrine Infusion 0.05 MICROgram(s)/kG/Min IV Continuous <Continuous>  pantoprazole    Tablet 40 milliGRAM(s) Oral daily  polyethylene glycol 3350 17 Gram(s) Oral daily  simvastatin 20 milliGRAM(s) Oral at bedtime  sodium chloride 0.9%. 1000 milliLiter(s) IV Continuous <Continuous>  sodium chloride 0.9%. 1000 milliLiter(s) IV Continuous <Continuous>  vancomycin  IVPB 1000 milliGRAM(s) IV Intermittent every 24 hours    PRN Inpatient Medications  oxyCODONE    5 mG/acetaminophen 325 mG 1 Tablet(s) Oral every 6 hours PRN  oxyCODONE    5 mG/acetaminophen 325 mG 2 Tablet(s) Oral every 6 hours PRN      REVIEW OF SYSTEMS  --------------------------------------------------------------------------------  Unable to obtain    VITALS/PHYSICAL EXAM  --------------------------------------------------------------------------------  T(C): 36.9 (11-14-18 @ 10:45), Max: 37.2 (11-14-18 @ 00:00)  HR: 71 (11-14-18 @ 12:30) (58 - 84)  BP: --  RR: 40 (11-14-18 @ 12:30) (10 - 41)  SpO2: 98% (11-14-18 @ 12:30) (92% - 100%)  Wt(kg): --  Height (cm): 162.56 (11-13-18 @ 08:48)  Weight (kg): 94.6 (11-13-18 @ 06:16)  BMI (kg/m2): 35.8 (11-13-18 @ 08:48)  BSA (m2): 1.99 (11-13-18 @ 08:48)      11-13-18 @ 07:01  -  11-14-18 @ 07:00  --------------------------------------------------------  IN: 1044.2 mL / OUT: 295 mL / NET: 749.2 mL    11-14-18 @ 07:01  -  11-14-18 @ 13:40  --------------------------------------------------------  IN: 130 mL / OUT: 30 mL / NET: 100 mL      Physical Exam:              Gen: NAD  	HEENT: nasal cannula  	Pulm: dec BS  	CV: RRR, S1S2; no rub  	Back: No spinal or CVA tenderness; no sacral edema  	Abd: +BS, soft, nontender/nondistended  	: No suprapubic tenderness  	UE: Warm, FROM, no clubbing, intact strength; no edema; no asterixis  	LE: Warm, FROM, no clubbing, intact strength; no edema  	Neuro: No focal deficits, intact gait  	Psych: Normal affect and mood  	Skin: Warm, without rashes    LABS/STUDIES  --------------------------------------------------------------------------------              9.4    12.1  >-----------<  195      [11-14-18 @ 04:16]              30.7     143  |  99  |  31.0  ----------------------------<  124      [11-14-18 @ 04:16]  5.5   |  24.0  |  5.40        Ca     10.7     [11-14-18 @ 04:16]      Mg     2.9     [11-14-18 @ 04:16]      Phos  3.9     [11-14-18 @ 04:16]    TPro  6.1  /  Alb  3.7  /  TBili  0.4  /  DBili  x   /  AST  62  /  ALT  27  /  AlkPhos  57  [11-14-18 @ 04:16]    PT/INR: PT 12.6 , INR 1.09       [11-13-18 @ 16:27]  PTT: 31.0       [11-13-18 @ 16:27]    Troponin 0.97      [11-14-18 @ 04:16]        [11-14-18 @ 04:16]    PTH -- (Ca 9.8)      [11-13-18 @ 16:19]   551  Vitamin D (25OH) 25.0      [11-13-18 @ 16:38]  HbA1c 6.5      [11-07-18 @ 18:50]  TSH 2.27      [11-07-18 @ 18:50]

## 2018-11-14 NOTE — PROGRESS NOTE ADULT - ASSESSMENT
1) ESRD on HD  2) MBD of renal dx  3) Anemia of renal dx  4) Vol/HTN    On HD TTS  Was planning to hold off on HD today and do UF session; however in light of hyperkalemia and post op cardiac surgery would rather optimize with 3hr HD session to control K+  Seen on dialysis; 1L UF planned    d/w CTICU this AM

## 2018-11-14 NOTE — PROGRESS NOTE ADULT - SUBJECTIVE AND OBJECTIVE BOX
INTERVAL HISTORY: s/p bypass surgery  	  MEDICATIONS:  milrinone Infusion 0.2 MICROgram(s)/kG/Min IV Continuous <Continuous>  norepinephrine Infusion 0.05 MICROgram(s)/kG/Min IV Continuous <Continuous>  cefuroxime  IVPB 1500 milliGRAM(s) IV Intermittent every 24 hours  vancomycin  IVPB 1000 milliGRAM(s) IV Intermittent every 24 hours  ALBUTerol/ipratropium for Nebulization 3 milliLiter(s) Nebulizer every 6 hours  docusate sodium 100 milliGRAM(s) Oral three times a day  pantoprazole    Tablet 40 milliGRAM(s) Oral daily  polyethylene glycol 3350 17 Gram(s) Oral daily  dextrose 50% Injectable 50 milliLiter(s) IV Push every 15 minutes  dextrose 50% Injectable 25 milliLiter(s) IV Push every 15 minutes  insulin Infusion 2 Unit(s)/Hr IV Continuous <Continuous>  levothyroxine 75 MICROGram(s) Oral daily  simvastatin 20 milliGRAM(s) Oral at bedtime  albumin human  5% IVPB 250 milliLiter(s) IV Intermittent once  aspirin enteric coated 325 milliGRAM(s) Oral daily  clopidogrel Tablet 75 milliGRAM(s) Oral daily  folic acid 1 milliGRAM(s) Oral daily  Nephro-heather 1 Tablet(s) Oral daily  sodium chloride 0.9%. 1000 milliLiter(s) IV Continuous <Continuous>  sodium chloride 0.9%. 1000 milliLiter(s) IV Continuous <Continuous>        PHYSICAL EXAM:  T(C): 36.9 (11-14-18 @ 05:00), Max: 37.2 (11-14-18 @ 00:00)  HR: 70 (11-14-18 @ 08:49) (58 - 84)  BP: --  RR: 16 (11-14-18 @ 08:00) (10 - 41)  SpO2: 98% (11-14-18 @ 08:49) (92% - 100%)  Wt(kg): --  I&O's Summary    13 Nov 2018 07:01  -  14 Nov 2018 07:00  --------------------------------------------------------  IN: 1044.2 mL / OUT: 295 mL / NET: 749.2 mL    14 Nov 2018 07:01  -  14 Nov 2018 09:23  --------------------------------------------------------  IN: 61.4 mL / OUT: 0 mL / NET: 61.4 mL          Appearance: Normal	  Cardiovascular: Normal S1 S2, No JVD, No murmurs  Respiratory: Lungs clear to auscultation	  Gastrointestinal:  Soft, Non-tender, + BS	  Skin: No rashes, No ecchymoses, No cyanosis  Neurologic: Non-focal  Extremities: Normal range of motion, No clubbing, cyanosis or edema  Vascular: Peripheral pulses palpable 2+ bilaterally    TELEMETRY: Paced	    	    LABS:	 	                        9.4    12.1  )-----------( 195      ( 14 Nov 2018 04:16 )             30.7     11-14    143  |  99  |  31.0<H>  ----------------------------<  124<H>  5.5<H>   |  24.0  |  5.40<H>    Ca    10.7<H>      14 Nov 2018 04:16  Phos  3.9     11-14  Mg     2.9     11-14    TPro  6.1<L>  /  Alb  3.7  /  TBili  0.4  /  DBili  x   /  AST  62<H>  /  ALT  27  /  AlkPhos  57  11-14      ASSESSMENT/PLAN: KIM LEE is a 63y woman with significant history of HTN, DMII, HLD, paroxsymal afib (on eliquis), and ESRD on HD  Admitted with CP, NSTEMI, found to have severe multivessel CAD.  Echocardiogram Mild-moderately reduced LVEF; diastolic dysfunction; moderate MR.   NSTEMI with severe, multivessel CAD: s/p CABG  (11/13/2018) with Dr Cavazos.  ESRD. Dialysis as per renal

## 2018-11-14 NOTE — CHART NOTE - NSCHARTNOTEFT_GEN_A_CORE
Source: Patient     Current Diet: cons cho clear liquid     PO intake:  100% - total feed     Current Weight:   (11/14) 103kg  (11/10) 94kg  (11/16) 98kg    % Weight Change     Pertinent Medications: MEDICATIONS  (STANDING):  albumin human  5% IVPB 250 milliLiter(s) IV Intermittent once  ALBUTerol/ipratropium for Nebulization 3 milliLiter(s) Nebulizer every 6 hours  aspirin enteric coated 325 milliGRAM(s) Oral daily  cefuroxime  IVPB 1500 milliGRAM(s) IV Intermittent every 24 hours  clopidogrel Tablet 75 milliGRAM(s) Oral daily  dextrose 50% Injectable 50 milliLiter(s) IV Push every 15 minutes  dextrose 50% Injectable 25 milliLiter(s) IV Push every 15 minutes  docusate sodium 100 milliGRAM(s) Oral three times a day  folic acid 1 milliGRAM(s) Oral daily  heparin  Injectable 5000 Unit(s) SubCutaneous every 12 hours  insulin Infusion 2 Unit(s)/Hr (2 mL/Hr) IV Continuous <Continuous>  levothyroxine 75 MICROGram(s) Oral daily  midodrine 5 milliGRAM(s) Oral every 8 hours  milrinone Infusion 0.2 MICROgram(s)/kG/Min (5.676 mL/Hr) IV Continuous <Continuous>  Nephro-heather 1 Tablet(s) Oral daily  norepinephrine Infusion 0.05 MICROgram(s)/kG/Min (8.869 mL/Hr) IV Continuous <Continuous>  pantoprazole    Tablet 40 milliGRAM(s) Oral daily  polyethylene glycol 3350 17 Gram(s) Oral daily  simvastatin 20 milliGRAM(s) Oral at bedtime  sodium chloride 0.9%. 1000 milliLiter(s) (10 mL/Hr) IV Continuous <Continuous>  sodium chloride 0.9%. 1000 milliLiter(s) (5 mL/Hr) IV Continuous <Continuous>  vancomycin  IVPB 1000 milliGRAM(s) IV Intermittent every 24 hours    MEDICATIONS  (PRN):  oxyCODONE    5 mG/acetaminophen 325 mG 1 Tablet(s) Oral every 6 hours PRN Moderate Pain (4 - 6)  oxyCODONE    5 mG/acetaminophen 325 mG 2 Tablet(s) Oral every 6 hours PRN Severe Pain (7 - 10)    Pertinent Labs: CBC Full  -  ( 14 Nov 2018 04:16 )  WBC Count : 12.1 K/uL  Hemoglobin : 9.4 g/dL  Hematocrit : 30.7 %  Platelet Count - Automated : 195 K/uL  Mean Cell Volume : 91.4 fl  Mean Cell Hemoglobin : 28.0 pg  Mean Cell Hemoglobin Concentration : 30.6 g/dl      Skin: intact     Nutrition focused physical exam previously conducted - found signs of malnutrition [ ]absent [ x]present    Subcutaneous fat loss: [ ] Orbital fat pads region, [ ]Buccal fat region, [ ]Triceps region,  [ ]Ribs region    Muscle wasting: [x ]Temples region, [x ]Clavicle region, [x ]Shoulder region, [ ]Scapula region, [x ]Interosseous region,  [ ]thigh region, [ ]Calf region    Estimated Needs:   [ x] no change since previous assessment  [ ] recalculated:     Current Nutrition Diagnosis:· Malnutrition; moderate (chronic) related to inadequate protein energy intake with poor appetite as evidenced by pt meeting <75% est needs > 1 month, 24# wt loss (10%) x 3-4 months      Recommendations: Add Nepro TID- once medically feasible / diet is advanced - cons cho, renal restrictions     Monitoring and Evaluation:   [x ] PO intake [x ] Tolerance to diet prescription [X] Weights  [X] Follow up per protocol [X] Labs:

## 2018-11-14 NOTE — PROGRESS NOTE ADULT - SUBJECTIVE AND OBJECTIVE BOX
Subjective: thirsty, minimal incisional pain. Denies CP, SOB, palpitations, N/V, other c/o.    T(C): 37.2 (11-14-18 @ 00:00), Max: 37.2 (11-14-18 @ 00:00)  HR: 70 (11-14-18 @ 00:45) (68 - 84), paced  BP: 184/61 (11-13-18 @ 08:48) (124/68 - 184/61)  ABP: 125/48 (11-14-18 @ 00:45) (110/50 - 192/68)  ABP(mean): 68 (11-14-18 @ 00:45) (60 - 101)  RR: 30 (11-14-18 @ 00:45) (10 - 41)  SpO2: 99% (11-14-18 @ 00:45) (93% - 100%) on 4L NC  CVP(mm Hg): 2 (11-14-18 @ 00:45) (1 - 11)  CO: 6.1 (11-14-18 @ 00:00) (5.6 - 7.8)  CI: 3 (11-14-18 @ 00:00) (2.8 - 3.9)      I&O's Detail    12 Nov 2018 07:01  -  13 Nov 2018 07:00  --------------------------------------------------------  Total IN: 708 mL  Total OUT: 2000 mL  Total NET: -1292 mL    13 Nov 2018 07:01  -  14 Nov 2018 00:48  --------------------------------------------------------  IN:    Albumin 5%  - 250 mL: 250 mL    EPINEPHrine Infusion: 38 mL    insulin Infusion: 42 mL    milrinone  Infusion: 45.6 mL    nitroglycerin  Infusion: 81 mL    norepinephrine Infusion: 31 mL    propofol Infusion: 5.7 mL    sodium chloride 0.9%.: 45 mL    sodium chloride 0.9%.: 90 mL    Solution: 100 mL  Total IN: 728.3 mL    OUT:    Chest Tube: 55 mL    Chest Tube: 115 mL    Indwelling Catheter - Urethral: 25 mL  Total OUT: 195 mL    Total NET: 533.3 mL      LABS: All Lab data reviewed and analyzed                        10.3   20.0  )-----------( 232      ( 13 Nov 2018 16:27 )             32.9     11-13    140  |  98  |  22.0<H>  ----------------------------<  188<H>  4.6   |  24.0  |  4.66<H>    Ca    10.0      13 Nov 2018 16:27  Mg     2.9     11-13  TPro  6.1<L>  /  Alb  3.8  /  TBili  0.7  /  DBili  x   /  AST  69<H>  /  ALT  27  /  AlkPhos  59  11-13    PT/INR - ( 13 Nov 2018 16:27 )   PT: 12.6 sec;   INR: 1.09 ratio    PTT - ( 13 Nov 2018 16:27 )  PTT:31.0 sec    CARDIAC MARKERS ( 13 Nov 2018 16:27 )   U/L / CKMB28.8 ng/mL / Troponin T1.00 ng/mL /    ABG - ( 13 Nov 2018 22:10 )  pH, Arterial: 7.37  /  pCO2: 44    /  pO2: 77    / HCO3: 24    / Base Excess: -0.2  /  SaO2: 95        POCT Blood Glucose.: 182 mg/dL (13 Nov 2018 23:56)  POCT Blood Glucose.: 197 mg/dL (13 Nov 2018 22:57)  POCT Blood Glucose.: 216 mg/dL (13 Nov 2018 22:01)  POCT Blood Glucose.: 240 mg/dL (13 Nov 2018 20:52)  POCT Blood Glucose.: 247 mg/dL (13 Nov 2018 20:00)  POCT Blood Glucose.: 260 mg/dL (13 Nov 2018 18:52)  POCT Blood Glucose.: 258 mg/dL (13 Nov 2018 17:58)  POCT Blood Glucose.: 245 mg/dL (13 Nov 2018 17:00)  POCT Blood Glucose.: 144 mg/dL (13 Nov 2018 08:18)           RADIOLOGY: - Reviewed and analyzed   < from: Xray Chest 1 View AP/PA. (11.13.18 @ 16:27) >  FINDINGS / IMPRESSION:   Patient is status post open heart surgery. Endotracheal tube and nasogastric tube and left IJ catheter are present. There is severe left-sided chest tube. There is no sizable pneumothorax or effusion. Cardiomegaly. Probable mild congestive changes.    < end of copied text >    11/14/18 CXR: pending      HOSPITAL MEDICATIONS: All medications reviewed and analyzed  MEDICATIONS  (STANDING):  ALBUTerol/ipratropium for Nebulization 3 milliLiter(s) Nebulizer every 6 hours  aspirin enteric coated 325 milliGRAM(s) Oral daily  cefuroxime  IVPB 1500 milliGRAM(s) IV Intermittent every 24 hours  clopidogrel Tablet 75 milliGRAM(s) Oral daily  docusate sodium 100 milliGRAM(s) Oral three times a day  insulin Infusion 2 Unit(s)/Hr (2 mL/Hr) IV Continuous <Continuous>  levothyroxine 75 MICROGram(s) Oral daily  milrinone Infusion 0.2 MICROgram(s)/kG/Min (5.676 mL/Hr) IV Continuous   norepinephrine Infusion 0.05 MICROgram(s)/kG/Min (8.869 mL/Hr) IV Continuous   pantoprazole    Tablet 40 milliGRAM(s) Oral daily  polyethylene glycol 3350 17 Gram(s) Oral daily  simvastatin 20 milliGRAM(s) Oral at bedtime  sodium chloride 0.9%. 1000 milliLiter(s) (10 mL/Hr) IV Continuous   sodium chloride 0.9%. 1000 milliLiter(s) (5 mL/Hr) IV Continuous   vancomycin  IVPB 1000 milliGRAM(s) IV Intermittent every 24 hours      Physical Exam  Neuro: A+O x 3, non-focal, speech clear and intact  HEENT:  NCAT, PERRL, EOMI. No conjuctival edema or icterus, no thrush.  No ETT or NGT/OGT  Neck:  RIJ introducer with site C/D/I, no JVD, supple, trachea midline, no tracheostomy  Pulm: CTA, good air entry, equal bilaterally, no rales/rhonchi/wheezing, no accessory muscle use noted  Chest:   mediastinal CT and left pleural CT all with dressings intact and no air leak, no subQ emphysema       +PW (settings: rate: 70, mA: 9, threshold: 3, sensitivity: 0.8)  CV: regular, +S1S2, no murmur or rub noted  Abd: soft, NT, ND  : delarosa in situ to bedside drainage  Ext: LAU x 4, trace edema, no cyanosis or clubbing, distal motor/neuro/circ intact. LUE AVF + bruit/+ thrill.  Skin: warm, dry, no rashes  Incisions: midsternal C/D/I/stable w/ provena dressing, LLE C/D/I w/ dressing and Ace wrap    ==============================================================================================================  Does the patient have a history of heart failure? Yes  < from: TTE Echo Complete w/Doppler (11.07.18 @ 15:59) >  Global LV systolic function was mildly decreased. Left ventricular ejection fraction, by visual estimation, is 40 to 45%.   Spectral Doppler shows restrictive pattern of left ventricular myocardial filling (Grade III diastolic dysfunction).  < end of copied text >  Does the patient currently have heart failure:  as above  Extubation within 24 hours of CTICU admission: Yes  Beta-blockers within 24 hours of CTICU admission: pending       Contraindications: currently on low dose norepinephrine  Was Beta-blocker given Preop within 24hrs of OR? Yes   Does the patient have a history of kidney disease: Yes       If yes, describe (CKD stage): ESRD on HD  Does the patient currently have renal failure? Yes, as above  Did the patient receive blood and/or products transfusion: Yes          acute blood loss anemia Yes        coagulopathy Yes   Did the patient have SIRS postoperatively? No  DVT Prophylaxis within 24 hours: Yes       Type/Side: SCDs  Lacey-op antibiotics discontinued within 48 hours? Yes  Acute MI during admission or prior to transfer (within 8 weeks)? No  ==============================================================================================================

## 2018-11-15 DIAGNOSIS — R57.0 CARDIOGENIC SHOCK: ICD-10-CM

## 2018-11-15 DIAGNOSIS — I95.81 POSTPROCEDURAL HYPOTENSION: ICD-10-CM

## 2018-11-15 DIAGNOSIS — I34.0 NONRHEUMATIC MITRAL (VALVE) INSUFFICIENCY: ICD-10-CM

## 2018-11-15 DIAGNOSIS — Z95.1 PRESENCE OF AORTOCORONARY BYPASS GRAFT: ICD-10-CM

## 2018-11-15 LAB
ANION GAP SERPL CALC-SCNC: 15 MMOL/L — SIGNIFICANT CHANGE UP (ref 5–17)
BUN SERPL-MCNC: 25 MG/DL — HIGH (ref 8–20)
CALCIUM SERPL-MCNC: 10.3 MG/DL — HIGH (ref 8.6–10.2)
CHLORIDE SERPL-SCNC: 98 MMOL/L — SIGNIFICANT CHANGE UP (ref 98–107)
CO2 SERPL-SCNC: 27 MMOL/L — SIGNIFICANT CHANGE UP (ref 22–29)
CREAT SERPL-MCNC: 4.41 MG/DL — HIGH (ref 0.5–1.3)
GLUCOSE BLDC GLUCOMTR-MCNC: 104 MG/DL — HIGH (ref 70–99)
GLUCOSE BLDC GLUCOMTR-MCNC: 108 MG/DL — HIGH (ref 70–99)
GLUCOSE BLDC GLUCOMTR-MCNC: 112 MG/DL — HIGH (ref 70–99)
GLUCOSE BLDC GLUCOMTR-MCNC: 116 MG/DL — HIGH (ref 70–99)
GLUCOSE BLDC GLUCOMTR-MCNC: 121 MG/DL — HIGH (ref 70–99)
GLUCOSE BLDC GLUCOMTR-MCNC: 128 MG/DL — HIGH (ref 70–99)
GLUCOSE BLDC GLUCOMTR-MCNC: 130 MG/DL — HIGH (ref 70–99)
GLUCOSE BLDC GLUCOMTR-MCNC: 139 MG/DL — HIGH (ref 70–99)
GLUCOSE BLDC GLUCOMTR-MCNC: 143 MG/DL — HIGH (ref 70–99)
GLUCOSE BLDC GLUCOMTR-MCNC: 158 MG/DL — HIGH (ref 70–99)
GLUCOSE BLDC GLUCOMTR-MCNC: 166 MG/DL — HIGH (ref 70–99)
GLUCOSE SERPL-MCNC: 124 MG/DL — HIGH (ref 70–115)
HCT VFR BLD CALC: 29.6 % — LOW (ref 37–47)
HGB BLD-MCNC: 9.1 G/DL — LOW (ref 12–16)
MAGNESIUM SERPL-MCNC: 2.5 MG/DL — SIGNIFICANT CHANGE UP (ref 1.6–2.6)
MCHC RBC-ENTMCNC: 28.3 PG — SIGNIFICANT CHANGE UP (ref 27–31)
MCHC RBC-ENTMCNC: 30.7 G/DL — LOW (ref 32–36)
MCV RBC AUTO: 92.2 FL — SIGNIFICANT CHANGE UP (ref 81–99)
PHOSPHATE SERPL-MCNC: 6 MG/DL — HIGH (ref 2.4–4.7)
PLATELET # BLD AUTO: 207 K/UL — SIGNIFICANT CHANGE UP (ref 150–400)
POTASSIUM SERPL-MCNC: 5.2 MMOL/L — SIGNIFICANT CHANGE UP (ref 3.5–5.3)
POTASSIUM SERPL-SCNC: 5.2 MMOL/L — SIGNIFICANT CHANGE UP (ref 3.5–5.3)
RBC # BLD: 3.21 M/UL — LOW (ref 4.4–5.2)
RBC # FLD: 17 % — HIGH (ref 11–15.6)
SODIUM SERPL-SCNC: 140 MMOL/L — SIGNIFICANT CHANGE UP (ref 135–145)
WBC # BLD: 17.6 K/UL — HIGH (ref 4.8–10.8)
WBC # FLD AUTO: 17.6 K/UL — HIGH (ref 4.8–10.8)

## 2018-11-15 PROCEDURE — 71045 X-RAY EXAM CHEST 1 VIEW: CPT | Mod: 26

## 2018-11-15 PROCEDURE — 99233 SBSQ HOSP IP/OBS HIGH 50: CPT

## 2018-11-15 PROCEDURE — 93010 ELECTROCARDIOGRAM REPORT: CPT

## 2018-11-15 RX ORDER — INSULIN LISPRO 100/ML
3 VIAL (ML) SUBCUTANEOUS
Qty: 0 | Refills: 0 | Status: DISCONTINUED | OUTPATIENT
Start: 2018-11-15 | End: 2018-11-16

## 2018-11-15 RX ORDER — ACETAMINOPHEN 500 MG
975 TABLET ORAL EVERY 8 HOURS
Qty: 0 | Refills: 0 | Status: DISCONTINUED | OUTPATIENT
Start: 2018-11-15 | End: 2018-11-16

## 2018-11-15 RX ORDER — SODIUM BICARBONATE 1 MEQ/ML
50 SYRINGE (ML) INTRAVENOUS ONCE
Qty: 0 | Refills: 0 | Status: DISCONTINUED | OUTPATIENT
Start: 2018-11-15 | End: 2018-11-15

## 2018-11-15 RX ORDER — SODIUM CHLORIDE 9 MG/ML
1000 INJECTION, SOLUTION INTRAVENOUS
Qty: 0 | Refills: 0 | Status: DISCONTINUED | OUTPATIENT
Start: 2018-11-15 | End: 2018-11-18

## 2018-11-15 RX ORDER — OXYCODONE HYDROCHLORIDE 5 MG/1
5 TABLET ORAL EVERY 4 HOURS
Qty: 0 | Refills: 0 | Status: DISCONTINUED | OUTPATIENT
Start: 2018-11-15 | End: 2018-11-15

## 2018-11-15 RX ORDER — MIDODRINE HYDROCHLORIDE 2.5 MG/1
5 TABLET ORAL ONCE
Qty: 0 | Refills: 0 | Status: COMPLETED | OUTPATIENT
Start: 2018-11-15 | End: 2018-11-15

## 2018-11-15 RX ORDER — DEXTROSE 50 % IN WATER 50 %
12.5 SYRINGE (ML) INTRAVENOUS ONCE
Qty: 0 | Refills: 0 | Status: DISCONTINUED | OUTPATIENT
Start: 2018-11-15 | End: 2018-11-16

## 2018-11-15 RX ORDER — ACETAMINOPHEN 500 MG
1000 TABLET ORAL ONCE
Qty: 0 | Refills: 0 | Status: COMPLETED | OUTPATIENT
Start: 2018-11-15 | End: 2018-11-16

## 2018-11-15 RX ORDER — OXYCODONE HYDROCHLORIDE 5 MG/1
10 TABLET ORAL EVERY 4 HOURS
Qty: 0 | Refills: 0 | Status: DISCONTINUED | OUTPATIENT
Start: 2018-11-15 | End: 2018-11-15

## 2018-11-15 RX ORDER — DEXTROSE 50 % IN WATER 50 %
25 SYRINGE (ML) INTRAVENOUS ONCE
Qty: 0 | Refills: 0 | Status: DISCONTINUED | OUTPATIENT
Start: 2018-11-15 | End: 2018-11-16

## 2018-11-15 RX ORDER — ACETAMINOPHEN 500 MG
1000 TABLET ORAL ONCE
Qty: 0 | Refills: 0 | Status: COMPLETED | OUTPATIENT
Start: 2018-11-15 | End: 2018-11-15

## 2018-11-15 RX ORDER — MIDODRINE HYDROCHLORIDE 2.5 MG/1
10 TABLET ORAL EVERY 8 HOURS
Qty: 0 | Refills: 0 | Status: DISCONTINUED | OUTPATIENT
Start: 2018-11-15 | End: 2018-11-16

## 2018-11-15 RX ORDER — OXYCODONE HYDROCHLORIDE 5 MG/1
5 TABLET ORAL ONCE
Qty: 0 | Refills: 0 | Status: DISCONTINUED | OUTPATIENT
Start: 2018-11-15 | End: 2018-11-15

## 2018-11-15 RX ORDER — FENTANYL CITRATE 50 UG/ML
25 INJECTION INTRAVENOUS ONCE
Qty: 0 | Refills: 0 | Status: DISCONTINUED | OUTPATIENT
Start: 2018-11-15 | End: 2018-11-15

## 2018-11-15 RX ORDER — GLUCAGON INJECTION, SOLUTION 0.5 MG/.1ML
1 INJECTION, SOLUTION SUBCUTANEOUS ONCE
Qty: 0 | Refills: 0 | Status: DISCONTINUED | OUTPATIENT
Start: 2018-11-15 | End: 2018-11-16

## 2018-11-15 RX ORDER — INSULIN GLARGINE 100 [IU]/ML
8 INJECTION, SOLUTION SUBCUTANEOUS AT BEDTIME
Qty: 0 | Refills: 0 | Status: DISCONTINUED | OUTPATIENT
Start: 2018-11-15 | End: 2018-11-20

## 2018-11-15 RX ORDER — HUMAN INSULIN 100 [IU]/ML
5 INJECTION, SUSPENSION SUBCUTANEOUS ONCE
Qty: 0 | Refills: 0 | Status: COMPLETED | OUTPATIENT
Start: 2018-11-15 | End: 2018-11-15

## 2018-11-15 RX ORDER — DEXTROSE 50 % IN WATER 50 %
15 SYRINGE (ML) INTRAVENOUS ONCE
Qty: 0 | Refills: 0 | Status: DISCONTINUED | OUTPATIENT
Start: 2018-11-15 | End: 2018-11-16

## 2018-11-15 RX ORDER — INSULIN LISPRO 100/ML
2 VIAL (ML) SUBCUTANEOUS
Qty: 0 | Refills: 0 | Status: DISCONTINUED | OUTPATIENT
Start: 2018-11-15 | End: 2018-11-15

## 2018-11-15 RX ADMIN — OXYCODONE HYDROCHLORIDE 10 MILLIGRAM(S): 5 TABLET ORAL at 10:20

## 2018-11-15 RX ADMIN — MIDODRINE HYDROCHLORIDE 5 MILLIGRAM(S): 2.5 TABLET ORAL at 06:54

## 2018-11-15 RX ADMIN — Medication 3 MILLILITER(S): at 08:45

## 2018-11-15 RX ADMIN — HEPARIN SODIUM 5000 UNIT(S): 5000 INJECTION INTRAVENOUS; SUBCUTANEOUS at 06:54

## 2018-11-15 RX ADMIN — HEPARIN SODIUM 5000 UNIT(S): 5000 INJECTION INTRAVENOUS; SUBCUTANEOUS at 17:55

## 2018-11-15 RX ADMIN — Medication 400 MILLIGRAM(S): at 04:00

## 2018-11-15 RX ADMIN — Medication 100 MILLIGRAM(S): at 21:43

## 2018-11-15 RX ADMIN — Medication 100 MILLIGRAM(S): at 06:54

## 2018-11-15 RX ADMIN — OXYCODONE HYDROCHLORIDE 10 MILLIGRAM(S): 5 TABLET ORAL at 09:22

## 2018-11-15 RX ADMIN — Medication 1 TABLET(S): at 11:07

## 2018-11-15 RX ADMIN — OXYCODONE AND ACETAMINOPHEN 2 TABLET(S): 5; 325 TABLET ORAL at 00:00

## 2018-11-15 RX ADMIN — CLOPIDOGREL BISULFATE 75 MILLIGRAM(S): 75 TABLET, FILM COATED ORAL at 11:04

## 2018-11-15 RX ADMIN — SIMVASTATIN 20 MILLIGRAM(S): 20 TABLET, FILM COATED ORAL at 21:43

## 2018-11-15 RX ADMIN — Medication 975 MILLIGRAM(S): at 15:30

## 2018-11-15 RX ADMIN — OXYCODONE HYDROCHLORIDE 10 MILLIGRAM(S): 5 TABLET ORAL at 05:30

## 2018-11-15 RX ADMIN — OXYCODONE HYDROCHLORIDE 10 MILLIGRAM(S): 5 TABLET ORAL at 04:14

## 2018-11-15 RX ADMIN — Medication 975 MILLIGRAM(S): at 16:30

## 2018-11-15 RX ADMIN — Medication 75 MICROGRAM(S): at 06:55

## 2018-11-15 RX ADMIN — Medication 100 MILLIGRAM(S): at 14:14

## 2018-11-15 RX ADMIN — FENTANYL CITRATE 25 MICROGRAM(S): 50 INJECTION INTRAVENOUS at 22:07

## 2018-11-15 RX ADMIN — Medication 3 UNIT(S): at 17:55

## 2018-11-15 RX ADMIN — MIDODRINE HYDROCHLORIDE 10 MILLIGRAM(S): 2.5 TABLET ORAL at 17:35

## 2018-11-15 RX ADMIN — Medication 100 MILLIGRAM(S): at 11:07

## 2018-11-15 RX ADMIN — Medication 3 MILLILITER(S): at 20:45

## 2018-11-15 RX ADMIN — Medication 325 MILLIGRAM(S): at 11:04

## 2018-11-15 RX ADMIN — OXYCODONE HYDROCHLORIDE 5 MILLIGRAM(S): 5 TABLET ORAL at 21:15

## 2018-11-15 RX ADMIN — FENTANYL CITRATE 25 MICROGRAM(S): 50 INJECTION INTRAVENOUS at 00:30

## 2018-11-15 RX ADMIN — POLYETHYLENE GLYCOL 3350 17 GRAM(S): 17 POWDER, FOR SOLUTION ORAL at 11:07

## 2018-11-15 RX ADMIN — Medication 3 MILLILITER(S): at 03:54

## 2018-11-15 RX ADMIN — Medication 3 UNIT(S): at 12:03

## 2018-11-15 RX ADMIN — HUMAN INSULIN 5 UNIT(S): 100 INJECTION, SUSPENSION SUBCUTANEOUS at 11:04

## 2018-11-15 RX ADMIN — Medication 1 MILLIGRAM(S): at 11:04

## 2018-11-15 RX ADMIN — OXYCODONE HYDROCHLORIDE 5 MILLIGRAM(S): 5 TABLET ORAL at 20:15

## 2018-11-15 RX ADMIN — MIDODRINE HYDROCHLORIDE 10 MILLIGRAM(S): 2.5 TABLET ORAL at 21:42

## 2018-11-15 RX ADMIN — PANTOPRAZOLE SODIUM 40 MILLIGRAM(S): 20 TABLET, DELAYED RELEASE ORAL at 11:07

## 2018-11-15 RX ADMIN — INSULIN GLARGINE 8 UNIT(S): 100 INJECTION, SOLUTION SUBCUTANEOUS at 22:03

## 2018-11-15 RX ADMIN — Medication 3 MILLILITER(S): at 14:46

## 2018-11-15 RX ADMIN — MIDODRINE HYDROCHLORIDE 5 MILLIGRAM(S): 2.5 TABLET ORAL at 10:10

## 2018-11-15 RX ADMIN — Medication 2 UNIT(S): at 08:40

## 2018-11-15 NOTE — PHYSICAL THERAPY INITIAL EVALUATION ADULT - PERTINENT HX OF CURRENT PROBLEM, REHAB EVAL
62 y/o female who was getting her dialysis today and towards the end of dialysis she felt mid sternal pressure type cp, no radiation, lasted for about 10 minutes, + nausea, no diaphoresis no sob. no abd. pain. no cough, no fever. no diarrhea. pt. reports no cp at the time of admission. Pt is s/p [facility] on 11/7, + CAD noted, pt is now pre-op for CABG
Pt. received in the chair next to bed, NAD.

## 2018-11-15 NOTE — PHYSICAL THERAPY INITIAL EVALUATION ADULT - DIAGNOSIS, PT EVAL
deconditioning
Impaired Functional Mobility due to Coronary artery disease of native artery of native heart with stable angina pectoris

## 2018-11-15 NOTE — PROGRESS NOTE ADULT - ASSESSMENT
63 year old female with significant PMH of ESRD on HD, hypothyroidism, HTN, DM II A1C 6.5, chronic combined systolic and diastolic heart failure, PAF (on Eliquis),  initially presented to [facility] on 11/6 with complaints of chest pain found to have NSTEMI. Cardiac cath on 11/7 indicated multi-vessel CAD. While inpatient, she has been seen by Nephrology team for HD and Cardiology. While awaiting surgery, CT done during work up showed a left adrenal mass that was assessed by Dr. Cavazos. Ultimately, she completed work up and had a four-vessel CABG (LIMA-LAD, Vein-PDA, Sequential -Diag/OM) on 11/13/18. Preop, patient was in A-fib and she was asystolic once off cardiopulmonary bypass requiring V-pacing with epicardial wires. Upon arrival to CTICU, she was sinus bradycardic which had improved to sinus rhythm. Post-operative issues include episodes of bradycardia (SB vs CHB) requiring pacing with return to NSR. She also had cardiogenic shock requiring Primacor / Levophed, as well as vasoplegia now started on Midodrine. Primacor was weaned yesterday, will likely discontinue today.

## 2018-11-15 NOTE — PHYSICAL THERAPY INITIAL EVALUATION ADULT - CRITERIA FOR SKILLED THERAPEUTIC INTERVENTIONS
anticipated equipment needs at discharge/risk reduction/prevention/anticipated discharge recommendation/therapy frequency/functional limitations in following categories/rehab potential/predicted duration of therapy intervention/impairments found
impairments found/Home with home PT, RW vs [facility]/anticipated discharge recommendation

## 2018-11-15 NOTE — PROGRESS NOTE ADULT - SUBJECTIVE AND OBJECTIVE BOX
24-Hour Events:  Patient required V-pacing with epicardial wires for bradycardia.   She continues requiring pressor support, now being bridged with Midodrine.   Otherwise, remains HD stable with stable pain control.     Past Medical History:    ·	Hypertension  ·	Coronary artery disease of native artery of native heart with stable angina pectoris  ·	Adrenal mass, left  ·	Hypothyroidism, unspecified type  ·	ESRD (end stage renal disease) on dialysis  ·	Type 2 diabetes mellitus without complication, without long-term current use of insulin  ·	  ·	CABG  ·	A-V fistula  ·	S/P cholecystectomy      MEDICATIONS:  ·	ALBUTerol/ipratropium for Nebulization 3 milliLiter(s) Nebulizer every 6 hours  ·	aspirin enteric coated 325 milliGRAM(s) Oral daily  ·	cefuroxime  IVPB 1500 milliGRAM(s) IV Intermittent every 24 hours  ·	clopidogrel Tablet 75 milliGRAM(s) Oral daily  ·	docusate sodium 100 milliGRAM(s) Oral three times a day  ·	folic acid 1 milliGRAM(s) Oral daily  ·	heparin  Injectable 5000 Unit(s) SubCutaneous every 12 hours  ·	insulin Infusion 2 Unit(s)/Hr IV Continuous <Continuous>  ·	insulin lispro Injectable (HumaLOG) 2 Unit(s) SubCutaneous three times a day before meals  ·	levothyroxine 75 MICROGram(s) Oral daily  ·	midodrine 5 milliGRAM(s) Oral every 8 hours  ·	milrinone Infusion 0.2 MICROgram(s)/kG/Min IV Continuous <Continuous>  ·	Nephro-heather 1 Tablet(s) Oral daily  ·	norepinephrine Infusion 0.05 MICROgram(s)/kG/Min IV Continuous <Continuous>  ·	pantoprazole    Tablet 40 milliGRAM(s) Oral daily  ·	polyethylene glycol 3350 17 Gram(s) Oral daily  ·	simvastatin 20 milliGRAM(s) Oral at bedtime  ·	vancomycin  IVPB 1000 milliGRAM(s) IV Intermittent every 24 hours    Daily Weight in k.2 (2018 10:45)      ABG - ( 2018 10:35 )  pH, Arterial: 7.36  pH, Blood: x     /  pCO2: 44    /  pO2: 84    / HCO3: 24    / Base Excess: -0.8  /  SaO2: 97                            9.4    12.1  )-----------( 195      ( 2018 04:16 )             30.7   11-14    143  |  99  |  31.0<H>  ----------------------------<  124<H>  5.5<H>   |  24.0  |  5.40<H>    Ca    10.7<H>      2018 04:16  Phos  3.9       Mg     2.9     14    TPro  6.1<L>  /  Alb  3.7  /  TBili  0.4  /  DBili  x   /  AST  62<H>  /  ALT  27  /  AlkPhos  57  11-14    CARDIAC MARKERS ( 2018 04:16 )  x     / 0.97 ng/mL / 249 U/L / x     / 17.0 ng/mL  CARDIAC MARKERS ( 2018 16:27 )  x     / 1.00 ng/mL / 315 U/L / x     / 28.8 ng/mL    PT/INR - ( 2018 16:27 )   PT: 12.6 sec;   INR: 1.09 ratio         PTT - ( 2018 16:27 )  PTT:31.0 sec    Objective:  T(C): 37.5 (18 @ 20:00), Max: 37.5 (18 @ 20:00)  HR: 79 (11-15-18 @ 01:00) (48 - 84)  BP: 129/60 (18 @ 20:15) (129/60 - 129/60)  RR: 16 (11-15-18 @ 01:00) (0 - 40)  SpO2: 100% (11-15-18 @ 01:00) (93% - 100%)  Wt(kg): --CAPILLARY BLOOD GLUCOSE      POCT Blood Glucose.: 139 mg/dL (15 Nov 2018 01:24)  POCT Blood Glucose.: 166 mg/dL (15 Nov 2018 00:22)  POCT Blood Glucose.: 175 mg/dL (2018 23:20)  POCT Blood Glucose.: 165 mg/dL (2018 21:18)  POCT Blood Glucose.: 161 mg/dL (2018 20:06)  POCT Blood Glucose.: 173 mg/dL (2018 18:50)  POCT Blood Glucose.: 186 mg/dL (2018 18:04)  POCT Blood Glucose.: 200 mg/dL (2018 17:06)  POCT Blood Glucose.: 191 mg/dL (2018 16:11)  POCT Blood Glucose.: 149 mg/dL (2018 13:51)  POCT Blood Glucose.: 144 mg/dL (2018 13:24)  POCT Blood Glucose.: 112 mg/dL (2018 12:16)  POCT Blood Glucose.: 142 mg/dL (2018 10:24)  POCT Blood Glucose.: 158 mg/dL (2018 08:33)  POCT Blood Glucose.: 168 mg/dL (2018 07:48)  POCT Blood Glucose.: 173 mg/dL (2018 06:40)  POCT Blood Glucose.: 116 mg/dL (2018 04:55)  POCT Blood Glucose.: 120 mg/dL (2018 04:00)  POCT Blood Glucose.: 126 mg/dL (2018 02:59)  POCT Blood Glucose.: 140 mg/dL (2018 02:00)    I&O's Summary    2018 07:01  -  2018 07:00  --------------------------------------------------------  IN: 1044.2 mL / OUT: 295 mL / NET: 749.2 mL    2018 07:01  -  15 Nov 2018 01:54  --------------------------------------------------------  IN: 737.2 mL / OUT: 1090 mL / NET: -352.8 mL    Physical Exam  Neuro: A+O x 3, non-focal, speech clear and intact, [place] speaking  Pulm: CTA, equal bilaterally  CV: V-Paced, nor murmurs, +S1S2  Abd: soft, NT, ND, hypoactive BS  Ext: +DP Pulses b/l, +1 pedal edema  Inc: MSI C/D/I/stable w/ pervena, left EVH site C/D/I w/ dsg/Ace wrap        63 year old female with significant PMH of ESRD on HD, hypothyroidism, HTN, DM II A1C 6.5, chronic combined systolic and diastolic heart failure, PAF (on Eliquis),  initially presented to [facility] on  with complaints of chest pain found to have NSTEMI. Cardiac cath on  indicated multi-vessel CAD. While inpatient, she has been seen by Nephrology team for HD and Cardiology. While awaiting surgery, CT done during work up showed a left adrenal mass that was assessed by Dr. Cavazos. Ultimately, she completed work up and had a four-vessel CABG (LIMA-LAD, Vein-PDA, Sequential -Diag/OM) on 18. Preop, patient was in A-fib and she was asystolic once off cardiopulmonary bypass requiring V-pacing with epicardial wires. Upon arrival to CTICU, she was sinus bradycardic which had improved to sinus rhythm. Post-operative issues include episodes of bradycardia (SB vs CHB) requiring pacing with return to NSR. She also had cardiogenic shock requiring Primacor / Levophed, as well as vasoplegia now started on Midodrine. Primacor was weaned yesterday, will likely discontinue today.    Problem/Plan - 1:  ·  Problem: Cardiogenic shock.      Plan: now improving, Off Primacor,     Problem/Plan - 2:  ·  Problem: S/P CABG x 4.      Plan: Per CTS - Wean pressor as tolerated  Hold BB at this time due to Primacor requirement and bradycardia  Lipitor for chronic graft patency prophylaxis  Plavix for acute graft closure prophylaxis  Aspirin for acute graft closure prophylaxis      Problem/Plan - 3:  ·  Problem: Non-rheumatic mitral regurgitation.    Plan: as above.     Problem/Plan - 4:  ·  Problem: Bradycardia.      Plan: Per CTS. now V pacing       Problem/Plan - 5:  ·  Problem: Hypothyroidism, unspecified type.  Plan: Continue Synthroid.     Problem/Plan - 6:  Problem: ESRD (end stage renal disease) on dialysis.     Plan: HD in AM,    Problem/Plan - 7:  ·  Problem: Type 2 diabetes mellitus without complication, without long-term current use of insulin.      Plan:Per CTS,    Problem/Plan - 8:  ·  Problem: Postprocedural hypotension.      Plan: Continue to wean Levophed to MAP 65-70 mm., + Midodrine.        Problem/Plan - 10:  Problem: Adrenal mass, left.     Plan; will need outpatient follow up.

## 2018-11-15 NOTE — PHYSICAL THERAPY INITIAL EVALUATION ADULT - PREDICTED DURATION OF THERAPY (DAYS/WKS), PT EVAL
1 week
2-3 days, Pt will be re-evaluated post op, goals and PT recommendations will be updated at that time

## 2018-11-15 NOTE — PHYSICAL THERAPY INITIAL EVALUATION ADULT - ADDITIONAL COMMENTS
Pt lives in a private home with her 2 sons (both work full time but opposing hours) 4 steps to enter with handrails, no steps inside. Pt was independent PTA with nbqc. Pt owns quad cane and RW.
as per Son at the bedside, pt. lives in the private house with 4 steps with rails to enter, household ambulation with RW, vs cane with supervision, stairs negotiation with use of the rail and Supervision, outdoor primary mode of locomotion W/C

## 2018-11-15 NOTE — PHYSICAL THERAPY INITIAL EVALUATION ADULT - ACTIVE RANGE OF MOTION EXAMINATION, REHAB EVAL
bilateral upper extremity Active ROM was WFL (within functional limits)/bilateral  lower extremity Active ROM was WFL (within functional limits)/assessed during functional mobility, resistance not applied

## 2018-11-15 NOTE — PHYSICAL THERAPY INITIAL EVALUATION ADULT - PLANNED THERAPY INTERVENTIONS, PT EVAL
bed mobility training/gait training/transfer training
bed mobility training/balance training/strengthening/gait training/transfer training

## 2018-11-15 NOTE — PROGRESS NOTE ADULT - SUBJECTIVE AND OBJECTIVE BOX
24-Hour Events:  Patient required V-pacing with epicardial wires for bradycardia. She continues requiring pressor support, now being bridged with Midodrine. Otherwise, remains HD stable with stable pain control.     Past Medical History:  ·	Hypothyroidism, unspecified type  ·	Hemodialysis patient  ·	Renal failure  ·	Hypertension  ·	Diabetes mellitus  ·	Coronary artery disease of native artery of native heart with stable angina pectoris  ·	Chest pain, unspecified type  ·	Prophylactic measure  ·	Bradycardia  ·	Hyperlipidemia  ·	Diabetes mellitus  ·	Hypertension  ·	Adrenal mass, left  ·	Hypothyroidism, unspecified type  ·	ESRD (end stage renal disease) on dialysis  ·	Type 2 diabetes mellitus without complication, without long-term current use of insulin  ·	Obesity  ·	Pneumonia  ·	Preoperative respiratory examination  ·	Coronary artery disease involving native coronary artery of native heart with unstable angina pectoris  ·	CABG  ·	A-V fistula  ·	S/P cholecystectomy  ·	SOB/CHEST PAIN    MEDICATIONS:  ·	ALBUTerol/ipratropium for Nebulization 3 milliLiter(s) Nebulizer every 6 hours  ·	aspirin enteric coated 325 milliGRAM(s) Oral daily  ·	cefuroxime  IVPB 1500 milliGRAM(s) IV Intermittent every 24 hours  ·	clopidogrel Tablet 75 milliGRAM(s) Oral daily  ·	docusate sodium 100 milliGRAM(s) Oral three times a day  ·	folic acid 1 milliGRAM(s) Oral daily  ·	heparin  Injectable 5000 Unit(s) SubCutaneous every 12 hours  ·	insulin Infusion 2 Unit(s)/Hr IV Continuous <Continuous>  ·	insulin lispro Injectable (HumaLOG) 2 Unit(s) SubCutaneous three times a day before meals  ·	levothyroxine 75 MICROGram(s) Oral daily  ·	midodrine 5 milliGRAM(s) Oral every 8 hours  ·	milrinone Infusion 0.2 MICROgram(s)/kG/Min IV Continuous <Continuous>  ·	Nephro-heather 1 Tablet(s) Oral daily  ·	norepinephrine Infusion 0.05 MICROgram(s)/kG/Min IV Continuous <Continuous>  ·	pantoprazole    Tablet 40 milliGRAM(s) Oral daily  ·	polyethylene glycol 3350 17 Gram(s) Oral daily  ·	simvastatin 20 milliGRAM(s) Oral at bedtime  ·	vancomycin  IVPB 1000 milliGRAM(s) IV Intermittent every 24 hours    MEDICATIONS  (PRN):  oxyCODONE    5 mG/acetaminophen 325 mG 1 Tablet(s) Oral every 6 hours PRN Moderate Pain (4 - 6)  oxyCODONE    5 mG/acetaminophen 325 mG 2 Tablet(s) Oral every 6 hours PRN Severe Pain (7 - 10)      Daily Weight in k.2 (2018 10:45)      ABG - ( 2018 10:35 )  pH, Arterial: 7.36  pH, Blood: x     /  pCO2: 44    /  pO2: 84    / HCO3: 24    / Base Excess: -0.8  /  SaO2: 97                              9.4    12.1  )-----------( 195      ( 2018 04:16 )             30.7       143  |  99  |  31.0<H>  ----------------------------<  124<H>  5.5<H>   |  24.0  |  5.40<H>    Ca    10.7<H>      2018 04:16  Phos  3.9       Mg     2.9         TPro  6.1<L>  /  Alb  3.7  /  TBili  0.4  /  DBili  x   /  AST  62<H>  /  ALT  27  /  AlkPhos  57  1114    CARDIAC MARKERS ( 2018 04:16 )  x     / 0.97 ng/mL / 249 U/L / x     / 17.0 ng/mL  CARDIAC MARKERS ( 2018 16:27 )  x     / 1.00 ng/mL / 315 U/L / x     / 28.8 ng/mL    PT/INR - ( 2018 16:27 )   PT: 12.6 sec;   INR: 1.09 ratio         PTT - ( 2018 16:27 )  PTT:31.0 sec      Objective:  T(C): 37.5 (18 @ 20:00), Max: 37.5 (18 @ 20:00)  HR: 79 (11-15-18 @ 01:00) (48 - 84)  BP: 129/60 (18 @ 20:15) (129/60 - 129/60)  RR: 16 (11-15-18 @ 01:00) (0 - 40)  SpO2: 100% (11-15-18 @ 01:00) (93% - 100%)  Wt(kg): --CAPILLARY BLOOD GLUCOSE      POCT Blood Glucose.: 139 mg/dL (15 Nov 2018 01:24)  POCT Blood Glucose.: 166 mg/dL (15 Nov 2018 00:22)  POCT Blood Glucose.: 175 mg/dL (2018 23:20)  POCT Blood Glucose.: 165 mg/dL (2018 21:18)  POCT Blood Glucose.: 161 mg/dL (2018 20:06)  POCT Blood Glucose.: 173 mg/dL (2018 18:50)  POCT Blood Glucose.: 186 mg/dL (2018 18:04)  POCT Blood Glucose.: 200 mg/dL (2018 17:06)  POCT Blood Glucose.: 191 mg/dL (2018 16:11)  POCT Blood Glucose.: 149 mg/dL (2018 13:51)  POCT Blood Glucose.: 144 mg/dL (2018 13:24)  POCT Blood Glucose.: 112 mg/dL (2018 12:16)  POCT Blood Glucose.: 142 mg/dL (2018 10:24)  POCT Blood Glucose.: 158 mg/dL (2018 08:33)  POCT Blood Glucose.: 168 mg/dL (2018 07:48)  POCT Blood Glucose.: 173 mg/dL (2018 06:40)  POCT Blood Glucose.: 116 mg/dL (2018 04:55)  POCT Blood Glucose.: 120 mg/dL (2018 04:00)  POCT Blood Glucose.: 126 mg/dL (2018 02:59)  POCT Blood Glucose.: 140 mg/dL (2018 02:00)    I&O's Summary    2018 07:  -  2018 07:00  --------------------------------------------------------  IN: 1044.2 mL / OUT: 295 mL / NET: 749.2 mL    2018 07:01  -  15 Nov 2018 01:54  --------------------------------------------------------  IN: 737.2 mL / OUT: 1090 mL / NET: -352.8 mL        Physical Exam  Neuro: A+O x 3, non-focal, speech clear and intact  Pulm: CTA, equal bilaterally  CV: RRR, irregularly irregular, +S1S2  Abd: soft, NT, ND, +BS  Ext: +DP Pulses b/l, +PT pulses, no edema  Inc: MSI C/D/I/stable w/ dsg, ___ C/D/I w/ dsg/Ace wrap 24-Hour Events:  Patient required V-pacing with epicardial wires for bradycardia. She continues requiring pressor support, now being bridged with Midodrine. Otherwise, remains HD stable with stable pain control.     Past Medical History:  ·	Hypothyroidism, unspecified type  ·	Hemodialysis patient  ·	Renal failure  ·	Hypertension  ·	Diabetes mellitus  ·	Coronary artery disease of native artery of native heart with stable angina pectoris  ·	Chest pain, unspecified type  ·	Prophylactic measure  ·	Bradycardia  ·	Hyperlipidemia  ·	Diabetes mellitus  ·	Hypertension  ·	Adrenal mass, left  ·	Hypothyroidism, unspecified type  ·	ESRD (end stage renal disease) on dialysis  ·	Type 2 diabetes mellitus without complication, without long-term current use of insulin  ·	Obesity  ·	Pneumonia  ·	Preoperative respiratory examination  ·	Coronary artery disease involving native coronary artery of native heart with unstable angina pectoris  ·	CABG  ·	A-V fistula  ·	S/P cholecystectomy  ·	SOB/CHEST PAIN    MEDICATIONS:  ·	ALBUTerol/ipratropium for Nebulization 3 milliLiter(s) Nebulizer every 6 hours  ·	aspirin enteric coated 325 milliGRAM(s) Oral daily  ·	cefuroxime  IVPB 1500 milliGRAM(s) IV Intermittent every 24 hours  ·	clopidogrel Tablet 75 milliGRAM(s) Oral daily  ·	docusate sodium 100 milliGRAM(s) Oral three times a day  ·	folic acid 1 milliGRAM(s) Oral daily  ·	heparin  Injectable 5000 Unit(s) SubCutaneous every 12 hours  ·	insulin Infusion 2 Unit(s)/Hr IV Continuous <Continuous>  ·	insulin lispro Injectable (HumaLOG) 2 Unit(s) SubCutaneous three times a day before meals  ·	levothyroxine 75 MICROGram(s) Oral daily  ·	midodrine 5 milliGRAM(s) Oral every 8 hours  ·	milrinone Infusion 0.2 MICROgram(s)/kG/Min IV Continuous <Continuous>  ·	Nephro-heather 1 Tablet(s) Oral daily  ·	norepinephrine Infusion 0.05 MICROgram(s)/kG/Min IV Continuous <Continuous>  ·	pantoprazole    Tablet 40 milliGRAM(s) Oral daily  ·	polyethylene glycol 3350 17 Gram(s) Oral daily  ·	simvastatin 20 milliGRAM(s) Oral at bedtime  ·	vancomycin  IVPB 1000 milliGRAM(s) IV Intermittent every 24 hours    MEDICATIONS  (PRN):  oxyCODONE    5 mG/acetaminophen 325 mG 1 Tablet(s) Oral every 6 hours PRN Moderate Pain (4 - 6)  oxyCODONE    5 mG/acetaminophen 325 mG 2 Tablet(s) Oral every 6 hours PRN Severe Pain (7 - 10)      Daily Weight in k.2 (2018 10:45)      ABG - ( 2018 10:35 )  pH, Arterial: 7.36  pH, Blood: x     /  pCO2: 44    /  pO2: 84    / HCO3: 24    / Base Excess: -0.8  /  SaO2: 97                              9.4    12.1  )-----------( 195      ( 2018 04:16 )             30.7       143  |  99  |  31.0<H>  ----------------------------<  124<H>  5.5<H>   |  24.0  |  5.40<H>    Ca    10.7<H>      2018 04:16  Phos  3.9       Mg     2.9         TPro  6.1<L>  /  Alb  3.7  /  TBili  0.4  /  DBili  x   /  AST  62<H>  /  ALT  27  /  AlkPhos  57  1114    CARDIAC MARKERS ( 2018 04:16 )  x     / 0.97 ng/mL / 249 U/L / x     / 17.0 ng/mL  CARDIAC MARKERS ( 2018 16:27 )  x     / 1.00 ng/mL / 315 U/L / x     / 28.8 ng/mL    PT/INR - ( 2018 16:27 )   PT: 12.6 sec;   INR: 1.09 ratio         PTT - ( 2018 16:27 )  PTT:31.0 sec      Objective:  T(C): 37.5 (18 @ 20:00), Max: 37.5 (18 @ 20:00)  HR: 79 (11-15-18 @ 01:00) (48 - 84)  BP: 129/60 (18 @ 20:15) (129/60 - 129/60)  RR: 16 (11-15-18 @ 01:00) (0 - 40)  SpO2: 100% (11-15-18 @ 01:00) (93% - 100%)  Wt(kg): --CAPILLARY BLOOD GLUCOSE      POCT Blood Glucose.: 139 mg/dL (15 Nov 2018 01:24)  POCT Blood Glucose.: 166 mg/dL (15 Nov 2018 00:22)  POCT Blood Glucose.: 175 mg/dL (2018 23:20)  POCT Blood Glucose.: 165 mg/dL (2018 21:18)  POCT Blood Glucose.: 161 mg/dL (2018 20:06)  POCT Blood Glucose.: 173 mg/dL (2018 18:50)  POCT Blood Glucose.: 186 mg/dL (2018 18:04)  POCT Blood Glucose.: 200 mg/dL (2018 17:06)  POCT Blood Glucose.: 191 mg/dL (2018 16:11)  POCT Blood Glucose.: 149 mg/dL (2018 13:51)  POCT Blood Glucose.: 144 mg/dL (2018 13:24)  POCT Blood Glucose.: 112 mg/dL (2018 12:16)  POCT Blood Glucose.: 142 mg/dL (2018 10:24)  POCT Blood Glucose.: 158 mg/dL (2018 08:33)  POCT Blood Glucose.: 168 mg/dL (2018 07:48)  POCT Blood Glucose.: 173 mg/dL (2018 06:40)  POCT Blood Glucose.: 116 mg/dL (2018 04:55)  POCT Blood Glucose.: 120 mg/dL (2018 04:00)  POCT Blood Glucose.: 126 mg/dL (2018 02:59)  POCT Blood Glucose.: 140 mg/dL (2018 02:00)    I&O's Summary    2018 07:  -  2018 07:00  --------------------------------------------------------  IN: 1044.2 mL / OUT: 295 mL / NET: 749.2 mL    2018 07:01  -  15 Nov 2018 01:54  --------------------------------------------------------  IN: 737.2 mL / OUT: 1090 mL / NET: -352.8 mL        Physical Exam  Neuro: A+O x 3, non-focal, speech clear and intact, Urdu speaking  Pulm: CTA, equal bilaterally  CV: V-Paced, nor murmurs, +S1S2  Abd: soft, NT, ND, hypoactive BS  Ext: +DP Pulses b/l, +1 pedal edema  Inc: MSI C/D/I/stable w/ pervena, left EVH site C/D/I w/ dsg/Ace wrap

## 2018-11-15 NOTE — PROGRESS NOTE ADULT - SUBJECTIVE AND OBJECTIVE BOX
INTERVAL HISTORY: No new complaints  	  MEDICATIONS:  midodrine 10 milliGRAM(s) Oral every 8 hours  midodrine 5 milliGRAM(s) Oral once  norepinephrine Infusion 0.05 MICROgram(s)/kG/Min IV Continuous <Continuous>  cefuroxime  IVPB 1500 milliGRAM(s) IV Intermittent every 24 hours  ALBUTerol/ipratropium for Nebulization 3 milliLiter(s) Nebulizer every 6 hours  oxyCODONE    IR 5 milliGRAM(s) Oral every 4 hours PRN  oxyCODONE    IR 10 milliGRAM(s) Oral every 4 hours PRN  docusate sodium 100 milliGRAM(s) Oral three times a day  pantoprazole    Tablet 40 milliGRAM(s) Oral daily  polyethylene glycol 3350 17 Gram(s) Oral daily  dextrose 50% Injectable 50 milliLiter(s) IV Push every 15 minutes  dextrose 50% Injectable 25 milliLiter(s) IV Push every 15 minutes  insulin Infusion 2 Unit(s)/Hr IV Continuous <Continuous>  insulin lispro Injectable (HumaLOG) 2 Unit(s) SubCutaneous three times a day before meals  levothyroxine 75 MICROGram(s) Oral daily  simvastatin 20 milliGRAM(s) Oral at bedtime  aspirin enteric coated 325 milliGRAM(s) Oral daily  clopidogrel Tablet 75 milliGRAM(s) Oral daily  folic acid 1 milliGRAM(s) Oral daily  heparin  Injectable 5000 Unit(s) SubCutaneous every 12 hours  Nephro-heather 1 Tablet(s) Oral daily  sodium chloride 0.9%. 1000 milliLiter(s) IV Continuous <Continuous>  sodium chloride 0.9%. 1000 milliLiter(s) IV Continuous <Continuous>        PHYSICAL EXAM:  T(C): 37.5 (11-14-18 @ 20:00), Max: 37.5 (11-14-18 @ 20:00)  HR: 79 (11-15-18 @ 08:45) (46 - 79)  BP: 108/52 (11-15-18 @ 08:15) (108/52 - 129/60)  RR: 26 (11-15-18 @ 08:30) (0 - 44)  SpO2: 98% (11-15-18 @ 08:45) (96% - 100%)  Wt(kg): --  I&O's Summary    14 Nov 2018 07:01  -  15 Nov 2018 07:00  --------------------------------------------------------  IN: 962.7 mL / OUT: 1115 mL / NET: -152.3 mL    15 Nov 2018 07:01  -  15 Nov 2018 08:57  --------------------------------------------------------  IN: 19.5 mL / OUT: 0 mL / NET: 19.5 mL          Appearance: Normal	  HEENT:   Normal oral mucosa  Cardiovascular: Normal S1 S2, No JVD, No murmurs, No edema  Respiratory: Lungs clear to auscultation	  Psychiatry: A & O x 3, Mood & affect appropriate  Gastrointestinal:  Soft, Non-tender, + BS	  Skin: No rashes, No ecchymoses, No cyanosis  Neurologic: Non-focal  Extremities: Normal range of motion, No clubbing, cyanosis or edema  Vascular: Peripheral pulses palpable 2+ bilaterally    TELEMETRY: paced	      LABS:	 	                        9.1    17.6  )-----------( 207      ( 15 Nov 2018 03:19 )             29.6     11-15    140  |  98  |  25.0<H>  ----------------------------<  124<H>  5.2   |  27.0  |  4.41<H>    Ca    10.3<H>      15 Nov 2018 03:19  Phos  6.0     11-15  Mg     2.5     11-15    TPro  6.1<L>  /  Alb  3.7  /  TBili  0.4  /  DBili  x   /  AST  62<H>  /  ALT  27  /  AlkPhos  57  11-14        ASSESSMENT/PLAN: 62F DM, ESRD, HTN, HL, with severe CAD, S/P CABG 11/13/18. Stable CV status.  Management per CTS.

## 2018-11-15 NOTE — PROGRESS NOTE ADULT - SUBJECTIVE AND OBJECTIVE BOX
Completed  3 hrs of dialysis with the support of Levo,    net fluid loss 1000 cc.      Vital signs stable post dialysis.     Report given to CIARAN Ramos.    Patient was seen and evaluated on dialysis.   No c/o CP SOB NV  no F/C  no swelling    T(C): 36.7 (11-15-18 @ 21:00), Max: 37 (11-15-18 @ 16:29)  HR: 79 (11-16-18 @ 02:00) (79 - 80)  BP: 108/52 (11-15-18 @ 08:15) (108/52 - 108/52)  Wt(kg): --  PE ;  NAD  lungs - CTA  CV gr 1 murmer,  No gallop or rub  Abd : soft, NT BS +, No masses  Ext- No edema  Neuro : Grossly intact, moving extremities                             9.2    14.4  )-----------( 210      ( 16 Nov 2018 03:55 )             30.4        11-16    136  |  96<L>  |  39.0<H>  ----------------------------<  126<H>  5.3   |  23.0  |  6.39<H>    Ca    9.4      16 Nov 2018 03:55  Phos  7.0     11-16  Mg     2.4     11-16        MEDICATIONS  (STANDING):  acetaminophen   Tablet .. PRN  ALBUTerol/ipratropium for Nebulization  aspirin enteric coated  clopidogrel Tablet  dextrose 40% Gel PRN  dextrose 5%.  dextrose 50% Injectable  dextrose 50% Injectable  dextrose 50% Injectable  dextrose 50% Injectable  dextrose 50% Injectable  docusate sodium  folic acid  glucagon  Injectable PRN  heparin  Injectable  insulin glargine Injectable (LANTUS)  insulin Infusion  insulin lispro Injectable (HumaLOG)  insulin lispro Injectable (HumaLOG)  insulin lispro Injectable (HumaLOG)  levothyroxine  midodrine  Nephro-heather  niCARdipine Infusion  norepinephrine Infusion  pantoprazole    Tablet  polyethylene glycol 3350  simvastatin  sodium chloride 0.9%.  sodium chloride 0.9%.      Patient stable  Camron HD easily  Continue Patient was seen and evaluated,  No c/o CP SOB NV  no F/C  no swelling    T(C): 36.7 (11-15-18 @ 21:00), Max: 37 (11-15-18 @ 16:29)  HR: 79 (11-16-18 @ 02:00) (79 - 80)  BP: 108/52 (11-15-18 @ 08:15) (108/52 - 108/52)  Wt(kg): --  PE ;  NAD  lungs - CTA  CV gr 1 murmer,  No gallop or rub  Abd : soft, NT BS +, No masses  Ext- No edema  Neuro : Grossly intact, moving extremities                        9.2    14.4  )-----------( 210      ( 16 Nov 2018 03:55 )             30.4        11-16    136  |  96<L>  |  39.0<H>  ----------------------------<  126<H>  5.3   |  23.0  |  6.39<H>    Ca    9.4      16 Nov 2018 03:55  Phos  7.0     11-16  Mg     2.4     11-16        MEDICATIONS  (STANDING):  acetaminophen   Tablet .. PRN  ALBUTerol/ipratropium for Nebulization  aspirin enteric coated  clopidogrel Tablet  dextrose 40% Gel PRN  dextrose 5%.  dextrose 50% Injectable  dextrose 50% Injectable  dextrose 50% Injectable  dextrose 50% Injectable  dextrose 50% Injectable  docusate sodium  folic acid  glucagon  Injectable PRN  heparin  Injectable  insulin glargine Injectable (LANTUS)  insulin Infusion  insulin lispro Injectable (HumaLOG)  insulin lispro Injectable (HumaLOG)  insulin lispro Injectable (HumaLOG)  levothyroxine  midodrine  Nephro-heather  niCARdipine Infusion  norepinephrine Infusion  pantoprazole    Tablet  polyethylene glycol 3350  simvastatin  sodium chloride 0.9%.  sodium chloride 0.9%.      Patient stable  HD in AM,   Continue Current Management,

## 2018-11-15 NOTE — PHYSICAL THERAPY INITIAL EVALUATION ADULT - IMPAIRMENTS FOUND, PT EVAL
aerobic capacity/endurance/muscle strength/neuromotor development and sensory integration/gait, locomotion, and balance
gait, locomotion, and balance/muscle strength/ventilation and respiration/gas exchange/aerobic capacity/endurance/anthropometric characteristics

## 2018-11-15 NOTE — PHYSICAL THERAPY INITIAL EVALUATION ADULT - GENERAL OBSERVATIONS, REHAB EVAL
Pt received supine in bed + telemetry + Venous Compression Boots, No c/o pain, pt agreeable to PT
generalized weakness

## 2018-11-16 LAB
ANION GAP SERPL CALC-SCNC: 17 MMOL/L — SIGNIFICANT CHANGE UP (ref 5–17)
BLD GP AB SCN SERPL QL: SIGNIFICANT CHANGE UP
BUN SERPL-MCNC: 39 MG/DL — HIGH (ref 8–20)
CALCIUM SERPL-MCNC: 9.4 MG/DL — SIGNIFICANT CHANGE UP (ref 8.6–10.2)
CHLORIDE SERPL-SCNC: 96 MMOL/L — LOW (ref 98–107)
CO2 SERPL-SCNC: 23 MMOL/L — SIGNIFICANT CHANGE UP (ref 22–29)
CREAT SERPL-MCNC: 6.39 MG/DL — HIGH (ref 0.5–1.3)
GAS PNL BLDA: SIGNIFICANT CHANGE UP
GLUCOSE BLDC GLUCOMTR-MCNC: 110 MG/DL — HIGH (ref 70–99)
GLUCOSE BLDC GLUCOMTR-MCNC: 121 MG/DL — HIGH (ref 70–99)
GLUCOSE BLDC GLUCOMTR-MCNC: 162 MG/DL — HIGH (ref 70–99)
GLUCOSE BLDC GLUCOMTR-MCNC: 164 MG/DL — HIGH (ref 70–99)
GLUCOSE SERPL-MCNC: 126 MG/DL — HIGH (ref 70–115)
HCT VFR BLD CALC: 29.9 % — LOW (ref 37–47)
HCT VFR BLD CALC: 30.4 % — LOW (ref 37–47)
HGB BLD-MCNC: 8.9 G/DL — LOW (ref 12–16)
HGB BLD-MCNC: 9.2 G/DL — LOW (ref 12–16)
MAGNESIUM SERPL-MCNC: 2.4 MG/DL — SIGNIFICANT CHANGE UP (ref 1.6–2.6)
MCHC RBC-ENTMCNC: 27.7 PG — SIGNIFICANT CHANGE UP (ref 27–31)
MCHC RBC-ENTMCNC: 28.5 PG — SIGNIFICANT CHANGE UP (ref 27–31)
MCHC RBC-ENTMCNC: 29.8 G/DL — LOW (ref 32–36)
MCHC RBC-ENTMCNC: 30.3 G/DL — LOW (ref 32–36)
MCV RBC AUTO: 93.1 FL — SIGNIFICANT CHANGE UP (ref 81–99)
MCV RBC AUTO: 94.1 FL — SIGNIFICANT CHANGE UP (ref 81–99)
PHOSPHATE SERPL-MCNC: 7 MG/DL — HIGH (ref 2.4–4.7)
PLATELET # BLD AUTO: 210 K/UL — SIGNIFICANT CHANGE UP (ref 150–400)
PLATELET # BLD AUTO: 215 K/UL — SIGNIFICANT CHANGE UP (ref 150–400)
POTASSIUM SERPL-MCNC: 5.3 MMOL/L — SIGNIFICANT CHANGE UP (ref 3.5–5.3)
POTASSIUM SERPL-SCNC: 5.3 MMOL/L — SIGNIFICANT CHANGE UP (ref 3.5–5.3)
RBC # BLD: 3.21 M/UL — LOW (ref 4.4–5.2)
RBC # BLD: 3.23 M/UL — LOW (ref 4.4–5.2)
RBC # FLD: 16.8 % — HIGH (ref 11–15.6)
RBC # FLD: 17.1 % — HIGH (ref 11–15.6)
SODIUM SERPL-SCNC: 136 MMOL/L — SIGNIFICANT CHANGE UP (ref 135–145)
TYPE + AB SCN PNL BLD: SIGNIFICANT CHANGE UP
WBC # BLD: 14.4 K/UL — HIGH (ref 4.8–10.8)
WBC # BLD: 14.5 K/UL — HIGH (ref 4.8–10.8)
WBC # FLD AUTO: 14.4 K/UL — HIGH (ref 4.8–10.8)
WBC # FLD AUTO: 14.5 K/UL — HIGH (ref 4.8–10.8)

## 2018-11-16 PROCEDURE — 71045 X-RAY EXAM CHEST 1 VIEW: CPT | Mod: 26

## 2018-11-16 PROCEDURE — 93308 TTE F-UP OR LMTD: CPT | Mod: 26

## 2018-11-16 PROCEDURE — 99223 1ST HOSP IP/OBS HIGH 75: CPT

## 2018-11-16 PROCEDURE — 90937 HEMODIALYSIS REPEATED EVAL: CPT

## 2018-11-16 RX ORDER — CALCIUM CARBONATE 500(1250)
3 TABLET ORAL ONCE
Qty: 0 | Refills: 0 | Status: COMPLETED | OUTPATIENT
Start: 2018-11-16 | End: 2018-11-16

## 2018-11-16 RX ORDER — DOBUTAMINE HCL 250MG/20ML
1 VIAL (ML) INTRAVENOUS
Qty: 500 | Refills: 0 | Status: DISCONTINUED | OUTPATIENT
Start: 2018-11-16 | End: 2018-11-17

## 2018-11-16 RX ORDER — INSULIN LISPRO 100/ML
VIAL (ML) SUBCUTANEOUS
Qty: 0 | Refills: 0 | Status: DISCONTINUED | OUTPATIENT
Start: 2018-11-16 | End: 2018-11-21

## 2018-11-16 RX ORDER — ERYTHROPOIETIN 10000 [IU]/ML
10000 INJECTION, SOLUTION INTRAVENOUS; SUBCUTANEOUS
Qty: 0 | Refills: 0 | Status: DISCONTINUED | OUTPATIENT
Start: 2018-11-16 | End: 2018-11-21

## 2018-11-16 RX ORDER — INSULIN LISPRO 100/ML
3 VIAL (ML) SUBCUTANEOUS
Qty: 0 | Refills: 0 | Status: DISCONTINUED | OUTPATIENT
Start: 2018-11-16 | End: 2018-11-20

## 2018-11-16 RX ORDER — ACETAMINOPHEN 500 MG
1000 TABLET ORAL ONCE
Qty: 0 | Refills: 0 | Status: COMPLETED | OUTPATIENT
Start: 2018-11-16 | End: 2018-11-16

## 2018-11-16 RX ORDER — NICARDIPINE HYDROCHLORIDE 30 MG/1
5 CAPSULE, EXTENDED RELEASE ORAL
Qty: 40 | Refills: 0 | Status: DISCONTINUED | OUTPATIENT
Start: 2018-11-16 | End: 2018-11-17

## 2018-11-16 RX ADMIN — Medication 400 MILLIGRAM(S): at 00:30

## 2018-11-16 RX ADMIN — Medication 3 MILLILITER(S): at 14:28

## 2018-11-16 RX ADMIN — Medication 2.84 MICROGRAM(S)/KG/MIN: at 11:07

## 2018-11-16 RX ADMIN — Medication 1000 MILLIGRAM(S): at 01:00

## 2018-11-16 RX ADMIN — HEPARIN SODIUM 5000 UNIT(S): 5000 INJECTION INTRAVENOUS; SUBCUTANEOUS at 06:36

## 2018-11-16 RX ADMIN — Medication 3 UNIT(S): at 17:00

## 2018-11-16 RX ADMIN — Medication 3 MILLILITER(S): at 03:55

## 2018-11-16 RX ADMIN — Medication 1000 MILLIGRAM(S): at 05:00

## 2018-11-16 RX ADMIN — Medication 2: at 22:14

## 2018-11-16 RX ADMIN — Medication 3 MILLILITER(S): at 08:38

## 2018-11-16 RX ADMIN — Medication 400 MILLIGRAM(S): at 09:17

## 2018-11-16 RX ADMIN — CLOPIDOGREL BISULFATE 75 MILLIGRAM(S): 75 TABLET, FILM COATED ORAL at 12:37

## 2018-11-16 RX ADMIN — Medication 3 MILLILITER(S): at 20:21

## 2018-11-16 RX ADMIN — SIMVASTATIN 20 MILLIGRAM(S): 20 TABLET, FILM COATED ORAL at 22:14

## 2018-11-16 RX ADMIN — ERYTHROPOIETIN 10000 UNIT(S): 10000 INJECTION, SOLUTION INTRAVENOUS; SUBCUTANEOUS at 11:28

## 2018-11-16 RX ADMIN — HEPARIN SODIUM 5000 UNIT(S): 5000 INJECTION INTRAVENOUS; SUBCUTANEOUS at 17:29

## 2018-11-16 RX ADMIN — Medication 100 MILLIGRAM(S): at 22:14

## 2018-11-16 RX ADMIN — Medication 325 MILLIGRAM(S): at 12:37

## 2018-11-16 RX ADMIN — Medication 3 TABLET(S): at 23:00

## 2018-11-16 RX ADMIN — NICARDIPINE HYDROCHLORIDE 25 MG/HR: 30 CAPSULE, EXTENDED RELEASE ORAL at 06:51

## 2018-11-16 RX ADMIN — INSULIN GLARGINE 8 UNIT(S): 100 INJECTION, SOLUTION SUBCUTANEOUS at 22:15

## 2018-11-16 RX ADMIN — Medication 1000 MILLIGRAM(S): at 10:17

## 2018-11-16 NOTE — CONSULT NOTE ADULT - ASSESSMENT
1) ESRD on HD  2) MBD of renal dx  3) Anemia of renal dx  4) Vol/HTN    On HD TTS  Planned cardiac cath today  Consented for HD  Will plan for short treatment post cath  c/w current medications  Will follow    d/w Dr Addison
62 y/o female withsignificant PMH of ESRD on HD, hypothyroidism, HTN, DM II A1C 6.5, chronic combined systolic and diastolic heart failure, PAF (on Eliquis),  initially presented to [facility] on 11/6 with complaints of chest pain found to have NSTEMI. Cardiac cath on 11/7 indicated multi-vessel CAD. While inpatient, she has been seen by Nephrology team for HD and Cardiology. While awaiting surgery, CT done during work up showed a left adrenal mass that was assessed by Dr. Cavazos. Ultimately, she completed work up and had a four-vessel CABG (LIMA-LAD, Vein-PDA, Sequential -Diag/OM) on 11/13/18. Preop, patient was in A-fib and she was asystolic once off cardiopulmonary bypass requiring V-pacing with epicardial wires. Upon arrival to CTICU, she was sinus bradycardic which had improved to sinus rhythm. Post-operative issues include episodes of bradycardia (SB vs CHB) requiring pacing with return to NSR and planning for pacemaker placement today; she is NPO  1. T2DM comp by ESRD, CAD - glucoses well controlled off insulin drip  - cont Lantus 8 units, restart low dose Humalog premeal when she eating meals  - will follow    2. Hypothyroid - euthyroid, cont current LT4 dose  3. 7 cm Left adrenal mass c/w myelolipoma which is typically benign - rec outpatient work up
63 year old  female with PMH ESRD on HD, hypothyroidism, HTN, DM, chronic combined systolic and diastolic HF presents to [facility] on 11/6 with mild troponin elevation. Cardiac cath on 11/7 indicated multi-vessel CAD. 11/13 s/p CABG x 4 (LIMA-LAD, Vein PDA, Sequential -Diag/OM). Pt to OR with a-fib, asystole post pump, SB on arrival to CTICU which improved to SR. Pt with episode of bradycardia (SB vs CHB) requiring pacing but now back to NSR in the 70s.    Favorable prognostic sign that she has recovered normal sinus node function with no e/o AV conduction delays. Currently does not meet indications for implantation of a permanent pacemaker.     Recommendations include:  1. Observe at VVI 40 for 24h  2. If pt does not pace, then would disconnect pacer but leave V-wire in place for another 24h  3. If no further pacing requirements thereafter, can remove pacing wire per primary team's discretion    Thank you for the pleasure of this consultation and allowing us to participate in the care of this patient. Please do not hesitate to contact us should any further questions/issues arise.
63F ESRD on HD, hypothyroidism, HTN, DM, presents with mild troponin elevation, Cath + TVD        Overall Plan  Preop workup  PT eval for activity status  Vein Map  Recent complaints of PNA > eval with CT chest  Possible OR next week  D/W Dr Cavazos
obesity, no heavy snoring or hx SHASHANK per family  never smoker, no hx asthma or lung disease  hx of pna recently, now cough resolved, no fevers reported, at baseline per family  CXR with mild fluid overload, on HD, 3 vessel CAD, CABG planned  currently resting comfortably, not coughing, not hypoxic or ill looking  no fever or leucocytosis  no pulmonary contraindications to proposed procedure  at increased risk of post operative pulmonary complications secondary to obesity, ESRD  risk/benefit favors  incentive spirometry, DVT prophylaxis, albuteral neb prn  SHASHANK component not excluded, follow nocturnal desaturations, empiric bipap if needed  await CT scan for any further input

## 2018-11-16 NOTE — PROGRESS NOTE ADULT - SUBJECTIVE AND OBJECTIVE BOX
[facility] DIVISION OF KIDNEY DISEASES AND HYPERTENSION -- FOLLOW UP NOTE  --------------------------------------------------------------------------------  Chief Complaint: ESRD HD    24 hour events/subjective:  Pt seen and examined on HD this AM  Tolerating; extending to 4 hours for UF      PAST HISTORY  --------------------------------------------------------------------------------  No significant changes to PMH, PSH, FHx, SHx, unless otherwise noted    ALLERGIES & MEDICATIONS  --------------------------------------------------------------------------------  Allergies    No Known Allergies    Intolerances    OHS (Unknown)    Standing Inpatient Medications  acetaminophen  IVPB .. 1000 milliGRAM(s) IV Intermittent once  ALBUTerol/ipratropium for Nebulization 3 milliLiter(s) Nebulizer every 6 hours  aspirin enteric coated 325 milliGRAM(s) Oral daily  clopidogrel Tablet 75 milliGRAM(s) Oral daily  dextrose 5%. 1000 milliLiter(s) IV Continuous <Continuous>  dextrose 50% Injectable 50 milliLiter(s) IV Push every 15 minutes  dextrose 50% Injectable 25 milliLiter(s) IV Push every 15 minutes  dextrose 50% Injectable 12.5 Gram(s) IV Push once  dextrose 50% Injectable 25 Gram(s) IV Push once  dextrose 50% Injectable 25 Gram(s) IV Push once  DOBUTamine Infusion 1 MICROgram(s)/kG/Min IV Continuous <Continuous>  docusate sodium 100 milliGRAM(s) Oral three times a day  epoetin jack Injectable 65092 Unit(s) IV Push <User Schedule>  folic acid 1 milliGRAM(s) Oral daily  heparin  Injectable 5000 Unit(s) SubCutaneous every 12 hours  insulin glargine Injectable (LANTUS) 8 Unit(s) SubCutaneous at bedtime  insulin Infusion 2 Unit(s)/Hr IV Continuous <Continuous>  levothyroxine 75 MICROGram(s) Oral daily  Nephro-heather 1 Tablet(s) Oral daily  niCARdipine Infusion 5 mG/Hr IV Continuous <Continuous>  pantoprazole    Tablet 40 milliGRAM(s) Oral daily  polyethylene glycol 3350 17 Gram(s) Oral daily  simvastatin 20 milliGRAM(s) Oral at bedtime  sodium chloride 0.9%. 1000 milliLiter(s) IV Continuous <Continuous>  sodium chloride 0.9%. 1000 milliLiter(s) IV Continuous <Continuous>    PRN Inpatient Medications  dextrose 40% Gel 15 Gram(s) Oral once PRN  glucagon  Injectable 1 milliGRAM(s) IntraMuscular once PRN      REVIEW OF SYSTEMS  --------------------------------------------------------------------------------  Gen: No weight changes, fatigue, fevers/chills, weakness  Skin: No rashes  Head/Eyes/Ears/Mouth: No headache; Normal hearing; Normal vision w/o blurriness; No sinus pain/discomfort, sore throat  Respiratory: No dyspnea, cough, wheezing, hemoptysis  CV: No chest pain, PND, orthopnea  GI: No abdominal pain, diarrhea, constipation, nausea, vomiting, melena, hematochezia  : No increased frequency, dysuria, hematuria, nocturia  MSK: No joint pain/swelling; no back pain; no edema  Neuro: No dizziness/lightheadedness, weakness, seizures, numbness, tingling  Heme: No easy bruising or bleeding  Endo: No heat/cold intolerance  Psych: No significant nervousness, anxiety, stress, depression    All other systems were reviewed and are negative, except as noted.    VITALS/PHYSICAL EXAM  --------------------------------------------------------------------------------  T(C): 36.4 (11-16-18 @ 10:00), Max: 37 (11-15-18 @ 16:29)  HR: 64 (11-16-18 @ 11:38) (0 - 88)  BP: 144/65 (11-16-18 @ 11:00) (129/60 - 144/65)  RR: 27 (11-16-18 @ 11:38) (16 - 45)  SpO2: 98% (11-16-18 @ 11:38) (93% - 100%)  Wt(kg): --        11-15-18 @ 07:01  -  11-16-18 @ 07:00  --------------------------------------------------------  IN: 271 mL / OUT: 10 mL / NET: 261 mL    11-16-18 @ 07:01  -  11-16-18 @ 11:39  --------------------------------------------------------  IN: 250 mL / OUT: 0 mL / NET: 250 mL      Physical Exam:  	 Gen: NAD  	HEENT: nasal cannula  	Pulm: dec BS  	CV: RRR, S1S2; no rub  	Back: No spinal or CVA tenderness; no sacral edema  	Abd: +BS, soft, nontender/nondistended  	: No suprapubic tenderness  	UE: Warm, FROM, no clubbing, intact strength; no edema; no asterixis  	LE: Warm, FROM, no clubbing, intact strength; no edema  	Neuro: No focal deficits, intact gait  	Psych: Normal affect and mood  	Skin: Warm, without rashes    LABS/STUDIES  --------------------------------------------------------------------------------              8.9    14.5  >-----------<  215      [11-16-18 @ 11:14]              29.9     136  |  96  |  39.0  ----------------------------<  126      [11-16-18 @ 03:55]  5.3   |  23.0  |  6.39        Ca     9.4     [11-16-18 @ 03:55]      Mg     2.4     [11-16-18 @ 03:55]      Phos  7.0     [11-16-18 @ 03:55]            Creatinine Trend:  SCr 6.39 [11-16 @ 03:55]  SCr 4.41 [11-15 @ 03:19]  SCr 5.40 [11-14 @ 04:16]  SCr 4.66 [11-13 @ 16:27]  SCr 6.53 [11-12 @ 06:01]        PTH -- (Ca 9.8)      [11-13-18 @ 16:19]   551  Vitamin D (25OH) 25.0      [11-13-18 @ 16:38]  HbA1c 6.5      [11-07-18 @ 18:50]  TSH 2.27      [11-07-18 @ 18:50]

## 2018-11-16 NOTE — PROGRESS NOTE ADULT - SUBJECTIVE AND OBJECTIVE BOX
[facility], [facility]                                                    [address] , [address], [place] [address]                                                         PHONE: [phone]    FAX: [phone] [address], [address], [place] [address]                                                 PHONE: [phone]    FAX: [phone]  *******************************************************************************  cc: chest pain    HPI: Pt is a 62yr old female who was sent to ER due to complaint of chest pain centrally located without radiation that occurred with HD. Hx of HTN, HL and PAF. No associated SOB, palpitations, PND or orthopnea. Pt underwent cardiac catheterization with documented multivessel CAD now s/p CABG 3 days ago. Pt has had recurrent bradycardic events with lowering of post op PM rate. Now with poor capture due to dislodgement. As pt continues to have bradyarrhythmias pt will require PPM. DW daughter at bedside.    No Known Allergies    Patient History:    Past Medical History:  Diabetes mellitus    Hemodialysis patient  tues thursday saturday  Hypertension    Hypothyroidism, unspecified type    Renal failure.     Past Surgical History:  A-V fistula    S/P cholecystectomy.     Family History:  Father  Still living? Unknown  Family history of diabetes mellitus, Age at diagnosis: Age Unknown.     Social History:  Social History (marital status, living situation, occupation, tobacco use, alcohol and drug use, and sexual history): no smoking. no etoh.    Review of systems: as above. No other pertinent ROS    Overnight events/Subjective Assessment:  no new complaints    INTERPRETATION OF TELEMETRY (personally reviewed): recurrent bradyarrhythmias      MEDICATIONS  (STANDING):  ALBUTerol/ipratropium for Nebulization 3 milliLiter(s) Nebulizer every 6 hours  aspirin enteric coated 325 milliGRAM(s) Oral daily  clopidogrel Tablet 75 milliGRAM(s) Oral daily  dextrose 5%. 1000 milliLiter(s) (50 mL/Hr) IV Continuous <Continuous>  dextrose 50% Injectable 50 milliLiter(s) IV Push every 15 minutes  dextrose 50% Injectable 25 milliLiter(s) IV Push every 15 minutes  dextrose 50% Injectable 12.5 Gram(s) IV Push once  dextrose 50% Injectable 25 Gram(s) IV Push once  dextrose 50% Injectable 25 Gram(s) IV Push once  docusate sodium 100 milliGRAM(s) Oral three times a day  folic acid 1 milliGRAM(s) Oral daily  heparin  Injectable 5000 Unit(s) SubCutaneous every 12 hours  insulin glargine Injectable (LANTUS) 8 Unit(s) SubCutaneous at bedtime  insulin Infusion 2 Unit(s)/Hr (2 mL/Hr) IV Continuous <Continuous>  insulin lispro Injectable (HumaLOG) 3 Unit(s) SubCutaneous before breakfast  insulin lispro Injectable (HumaLOG) 3 Unit(s) SubCutaneous before lunch  insulin lispro Injectable (HumaLOG) 3 Unit(s) SubCutaneous before dinner  levothyroxine 75 MICROGram(s) Oral daily  midodrine 10 milliGRAM(s) Oral every 8 hours  Nephro-heather 1 Tablet(s) Oral daily  niCARdipine Infusion 5 mG/Hr (25 mL/Hr) IV Continuous <Continuous>  norepinephrine Infusion 0.05 MICROgram(s)/kG/Min (8.869 mL/Hr) IV Continuous <Continuous>  pantoprazole    Tablet 40 milliGRAM(s) Oral daily  polyethylene glycol 3350 17 Gram(s) Oral daily  simvastatin 20 milliGRAM(s) Oral at bedtime  sodium chloride 0.9%. 1000 milliLiter(s) (10 mL/Hr) IV Continuous <Continuous>  sodium chloride 0.9%. 1000 milliLiter(s) (5 mL/Hr) IV Continuous <Continuous>    MEDICATIONS  (PRN):  acetaminophen   Tablet .. 975 milliGRAM(s) Oral every 8 hours PRN Moderate Pain (4 - 6)  dextrose 40% Gel 15 Gram(s) Oral once PRN Blood Glucose LESS THAN 70 milliGRAM(s)/deciLiter  glucagon  Injectable 1 milliGRAM(s) IntraMuscular once PRN Glucose <70 milliGRAM(s)/deciLiter      Vital Signs Last 24 Hrs  T(C): 36.7 (15 Nov 2018 21:00), Max: 37 (15 Nov 2018 16:29)  T(F): 98.1 (15 Nov 2018 21:00), Max: 98.6 (15 Nov 2018 16:29)  HR: 83 (16 Nov 2018 07:30) (0 - 88)  BP: 129/60 (16 Nov 2018 07:30) (129/60 - 129/60)  BP(mean): 86 (16 Nov 2018 07:30) (86 - 86)  RR: 25 (16 Nov 2018 07:30) (16 - 45)  SpO2: 96% (16 Nov 2018 07:30) (94% - 100%)    I&O's Detail    15 Nov 2018 07:01  -  16 Nov 2018 07:00  --------------------------------------------------------  IN:    insulin Infusion: 3 mL    norepinephrine Infusion: 3 mL    sodium chloride 0.9%.: 110 mL    sodium chloride 0.9%.: 55 mL    Solution: 100 mL  Total IN: 271 mL    OUT:    Chest Tube: 10 mL  Total OUT: 10 mL    Total NET: 261 mL        I&O's Summary    15 Nov 2018 07:01  -  16 Nov 2018 07:00  --------------------------------------------------------  IN: 271 mL / OUT: 10 mL / NET: 261 mL            PHYSICAL EXAM:  General: Appears well developed, well nourished, no acute distress  HEENT: Head: normocephalic, atraumatic  Eyes: Pupils equal and reactive  Neck: Supple, no carotid bruit, no JVD, no HJR  CARDIOVASCULAR: Normal S1 and S2, no murmur, rub, or gallop  LUNGS: Clear to auscultation bilaterally, no rales, rhonchi or wheeze  ABDOMEN: Soft, nontender, non-distended, positive bowel sounds, no mass or bruit  EXTREMITIES: No edema, distal pulses WNL  SKIN: Warm and dry with normal turgor  NEURO: Alert & oriented x 3, grossly intact  PSYCH: normal mood and affect            LABS:                        9.2    14.4  )-----------( 210      ( 16 Nov 2018 03:55 )             30.4     11-16    136  |  96<L>  |  39.0<H>  ----------------------------<  126<H>  5.3   |  23.0  |  6.39<H>    Ca    9.4      16 Nov 2018 03:55  Phos  7.0     11-16  Mg     2.4     11-16            serum  Lipids:   Hemoglobin A1C, Whole Blood: 6.5 % (11-07 @ 18:50)    Thyroid Stimulating Hormone, Serum: 2.27 uIU/mL (11-07 @ 18:50)      RADIOLOGY & ADDITIONAL STUDIES:        ASSESSMENT AND PLAN:  In summary, KIM LEE is a 63y Female with past medical history significant for recurrent bradyarrhythmias s/p CABG d3   Hx of HTN, HL and PAF. CKD/HD.  No associated SOB, palpitations, PND or orthopnea. Pt underwent cardiac catheterization with documented multivessel CAD now s/p CABG 3 days ago. Pt has had recurrent bradycardic events with lowering of post op PM rate. Now with poor capture due to dislodgement. As pt continues to have bradyarrhythmias pt will require PPM. CLARK daughter at bedside.    - Conduction system disease. Will require PPM. CLARK Gudino who will arrange    - CAD. s/p CABG. Maintain ASA and plavix    - Will require eventual statin    - Defer AV salma blocking meds for now due to bradyarrhythmias and poor capture of temporary PM    - Dialysis for CKD as per renal    - Keep NPO for PPM today.  CLARK daughter at bedside.      Kristi Hamm MD

## 2018-11-16 NOTE — PROGRESS NOTE ADULT - ASSESSMENT
1) ESRD on HD  2) MBD of renal dx  3) Anemia of renal dx  4) Vol/HTN    On HD TTS  HD today off cycle; was unable to get yesterday  Seen on dialysis; 1L UF planned    d/w CTICU this AM

## 2018-11-16 NOTE — CONSULT NOTE ADULT - CONSULT REQUESTED DATE/TIME
06-Nov-2018 15:42
07-Nov-2018 16:07
08-Nov-2018 16:23
14-Nov-2018 16:32
16-Nov-2018 13:09
07-Nov-2018 13:44

## 2018-11-16 NOTE — CONSULT NOTE ADULT - SUBJECTIVE AND OBJECTIVE BOX
HPI:  62 y/o female withsignificant PMH of ESRD on HD, hypothyroidism, HTN, DM II A1C 6.5, chronic combined systolic and diastolic heart failure, PAF (on Eliquis),  initially presented to [facility] on 11/6 with complaints of chest pain found to have NSTEMI. Cardiac cath on 11/7 indicated multi-vessel CAD. While inpatient, she has been seen by Nephrology team for HD and Cardiology. While awaiting surgery, CT done during work up showed a left adrenal mass that was assessed by Dr. Cavazos. Ultimately, she completed work up and had a four-vessel CABG (LIMA-LAD, Vein-PDA, Sequential -Diag/OM) on 11/13/18. Preop, patient was in A-fib and she was asystolic once off cardiopulmonary bypass requiring V-pacing with epicardial wires. Upon arrival to CTICU, she was sinus bradycardic which had improved to sinus rhythm. Post-operative issues include episodes of bradycardia (SB vs CHB) requiring pacing with return to NSR and planning for pacemaker placement today; she is NPO    She stated that she was diagnosed with diabetes 30 years ago and has been on basaglar insulin daily with good control of blood sugars. She has no complaints at this time.      PAST MEDICAL & SURGICAL HISTORY:  Hypothyroidism, unspecified type  Hemodialysis patient: tues thursday saturday  Renal failure  Hypertension  Diabetes mellitus  A-V fistula  S/P cholecystectomy      FAMILY HISTORY:  Family history of diabetes mellitus      SOCIAL HISTORY: denies EtOH/illicit drugs/tobacco    REVIEW OF SYSTEMS:  Constitutional: No fever, no chills, no change in weight.  Eyes: No  blurry vision  Lungs: No shortness of breath, no wheezing, no cough  CV: No chest pain, no palpitations  GI: + nausea, no vomiting, no constipation, no diarrhea, no abdominal pain  : No urinary frequency,  Psych: No depression, no anxiety, no trouble concentrating    MEDICATIONS  (STANDING):  ALBUTerol/ipratropium for Nebulization 3 milliLiter(s) Nebulizer every 6 hours  aspirin enteric coated 325 milliGRAM(s) Oral daily  clopidogrel Tablet 75 milliGRAM(s) Oral daily  dextrose 5%. 1000 milliLiter(s) (50 mL/Hr) IV Continuous <Continuous>  dextrose 50% Injectable 50 milliLiter(s) IV Push every 15 minutes  dextrose 50% Injectable 25 milliLiter(s) IV Push every 15 minutes  dextrose 50% Injectable 12.5 Gram(s) IV Push once  dextrose 50% Injectable 25 Gram(s) IV Push once  dextrose 50% Injectable 25 Gram(s) IV Push once  DOBUTamine Infusion 1 MICROgram(s)/kG/Min (2.838 mL/Hr) IV Continuous <Continuous>  docusate sodium 100 milliGRAM(s) Oral three times a day  epoetin jack Injectable 43085 Unit(s) IV Push <User Schedule>  folic acid 1 milliGRAM(s) Oral daily  heparin  Injectable 5000 Unit(s) SubCutaneous every 12 hours  insulin glargine Injectable (LANTUS) 8 Unit(s) SubCutaneous at bedtime  insulin Infusion 2 Unit(s)/Hr (2 mL/Hr) IV Continuous <Continuous>  levothyroxine 75 MICROGram(s) Oral daily  Nephro-heather 1 Tablet(s) Oral daily  niCARdipine Infusion 5 mG/Hr (25 mL/Hr) IV Continuous <Continuous>  pantoprazole    Tablet 40 milliGRAM(s) Oral daily  polyethylene glycol 3350 17 Gram(s) Oral daily  simvastatin 20 milliGRAM(s) Oral at bedtime  sodium chloride 0.9%. 1000 milliLiter(s) (10 mL/Hr) IV Continuous <Continuous>  sodium chloride 0.9%. 1000 milliLiter(s) (5 mL/Hr) IV Continuous <Continuous>    MEDICATIONS  (PRN):  dextrose 40% Gel 15 Gram(s) Oral once PRN Blood Glucose LESS THAN 70 milliGRAM(s)/deciLiter  glucagon  Injectable 1 milliGRAM(s) IntraMuscular once PRN Glucose <70 milliGRAM(s)/deciLiter      Allergies  No Known Allergies    PHYSICAL EXAM:    Vital Signs Last 24 Hrs  T(C): 36 (16 Nov 2018 11:00), Max: 37 (15 Nov 2018 16:29)  T(F): 96.8 (16 Nov 2018 11:00), Max: 98.6 (15 Nov 2018 16:29)  HR: 64 (16 Nov 2018 12:00) (0 - 88)  BP: 149/68 (16 Nov 2018 12:00) (129/60 - 149/68)  BP(mean): 98 (16 Nov 2018 12:00) (86 - 98)  RR: 18 (16 Nov 2018 12:00) (16 - 45)  SpO2: 99% (16 Nov 2018 12:00) (93% - 100%)    General appearance: obese female, NAD  Eyes: Pupils equal and reactive to light. EOMI  Lungs: Normal respiratory excursion. Lungs clear.  CV: Regular cardiac rhythm. No murmur.   Abdomen: Soft, non tender, nondistended  Skin: Warm and moist.  Neuro: Cranial nerves intact.   Psych: Normal affect, good judgement.            LABS:                        8.9    14.5  )-----------( 215      ( 16 Nov 2018 11:14 )             29.9     11-16    136  |  96<L>  |  39.0<H>  ----------------------------<  126<H>  5.3   |  23.0  |  6.39<H>    Ca    9.4      16 Nov 2018 03:55  Phos  7.0     11-16  Mg     2.4     11-16    Hemoglobin A1C, Whole Blood: 6.5 % <H> [4.0 - 5.6] (11-07-18 @ 18:50)  Hemoglobin A1C, Whole Blood: 6.4 % <H> [4.0 - 5.6] (09-26-18 @ 05:24)    Free Thyroxine, Serum: 1.6 ng/dL (11-08-18 @ 18:00)  Thyroid Stimulating Hormone, Serum: 2.27 uIU/mL (11-07-18 @ 18:50)    CAPILLARY BLOOD GLUCOSE  POCT Blood Glucose.: 121 mg/dL (16 Nov 2018 12:39)  POCT Blood Glucose.: 162 mg/dL (16 Nov 2018 08:31)  POCT Blood Glucose.: 158 mg/dL (15 Nov 2018 21:36)  POCT Blood Glucose.: 143 mg/dL (15 Nov 2018 17:49)        < from: CT Chest No Cont (11.08.18 @ 19:53) >      < end of copied text >  < from: CT Chest No Cont (11.08.18 @ 19:53) >  FINDINGS: No evidence of mediastinal or hilar lymphadenopathy. Extensive   coronary artery calcifications.    No evidence of pleural or pericardial effusion.    No evidence of axillary adenopathy.    Images obtained in the upper abdomen demonstrate somewhat atrophic   kidneys. In the left suprarenal region there is a large heterogeneous   solid mass containing fat consistent with a large adrenal myelolipoma   measuring 7 x 7 cm.    There is a calcified granuloma in the left upper lobe. The lungs are   otherwise clear.    There are degenerative changes in the spine.      IMPRESSION:     Large left adrenal myelolipoma.    < end of copied text >

## 2018-11-16 NOTE — PROGRESS NOTE ADULT - SUBJECTIVE AND OBJECTIVE BOX
24-Hour Events:  Patient required V-pacing with epicardial wires for bradycardia. She was weaned from pressor support, on Midodrine. Otherwise, remains HD stable with stable pain control.     Past Medical History:  ·	Hypothyroidism, unspecified type  ·	Hemodialysis patient  ·	Renal failure  ·	Hypertension  ·	Diabetes mellitus  ·	Coronary artery disease of native artery of native heart with stable angina pectoris  ·	Chest pain, unspecified type  ·	Prophylactic measure  ·	Bradycardia  ·	Hyperlipidemia  ·	Diabetes mellitus  ·	Hypertension  ·	Adrenal mass, left  ·	Hypothyroidism, unspecified type  ·	ESRD (end stage renal disease) on dialysis  ·	Type 2 diabetes mellitus without complication, without long-term current use of insulin  ·	Obesity  ·	Pneumonia  ·	Preoperative respiratory examination  ·	Coronary artery disease involving native coronary artery of native heart with unstable angina pectoris  ·	CABG  ·	A-V fistula  ·	S/P cholecystectomy    Medications:  ·	acetaminophen   Tablet .. 975 milliGRAM(s) Oral every 8 hours PRN  ·	ALBUTerol/ipratropium for Nebulization 3 milliLiter(s) Nebulizer every 6 hours  ·	aspirin enteric coated 325 milliGRAM(s) Oral daily  ·	clopidogrel Tablet 75 milliGRAM(s) Oral daily  ·	docusate sodium 100 milliGRAM(s) Oral three times a day  ·	folic acid 1 milliGRAM(s) Oral daily  ·	glucagon  Injectable 1 milliGRAM(s) IntraMuscular once PRN  ·	heparin  Injectable 5000 Unit(s) SubCutaneous every 12 hours  ·	insulin glargine Injectable (LANTUS) 8 Unit(s) SubCutaneous at bedtime  ·	insulin Infusion 2 Unit(s)/Hr IV Continuous <Continuous>  ·	insulin lispro Injectable (HumaLOG) 3 Unit(s) SubCutaneous before breakfast  ·	insulin lispro Injectable (HumaLOG) 3 Unit(s) SubCutaneous before lunch  ·	insulin lispro Injectable (HumaLOG) 3 Unit(s) SubCutaneous before dinner  ·	levothyroxine 75 MICROGram(s) Oral daily  ·	midodrine 10 milliGRAM(s) Oral every 8 hours  ·	Nephro-heather 1 Tablet(s) Oral daily  ·	pantoprazole    Tablet 40 milliGRAM(s) Oral daily  ·	polyethylene glycol 3350 17 Gram(s) Oral daily  ·	simvastatin 20 milliGRAM(s) Oral at bedtime    MEDICATIONS  (PRN):  ·	acetaminophen   Tablet .. 975 milliGRAM(s) Oral every 8 hours PRN Moderate Pain (4 - 6)  ·	dextrose 40% Gel 15 Gram(s) Oral once PRN Blood Glucose LESS THAN 70 milliGRAM(s)/deciLiter  ·	glucagon  Injectable 1 milliGRAM(s) IntraMuscular once PRN Glucose <70 milliGRAM(s)/deciLiter      Daily     Daily       ABG - ( 14 Nov 2018 10:35 )  pH, Arterial: 7.36  pH, Blood: x     /  pCO2: 44    /  pO2: 84    / HCO3: 24    / Base Excess: -0.8  /  SaO2: 97                                      9.1    17.6  )-----------( 207      ( 15 Nov 2018 03:19 )             29.6   11-15    140  |  98  |  25.0<H>  ----------------------------<  124<H>  5.2   |  27.0  |  4.41<H>    Ca    10.3<H>      15 Nov 2018 03:19  Phos  6.0     11-15  Mg     2.5     11-15    TPro  6.1<L>  /  Alb  3.7  /  TBili  0.4  /  DBili  x   /  AST  62<H>  /  ALT  27  /  AlkPhos  57  11-14    CARDIAC MARKERS ( 14 Nov 2018 04:16 )  x     / 0.97 ng/mL / 249 U/L / x     / 17.0 ng/mL          Objective:  T(C): 36.7 (11-15-18 @ 21:00), Max: 37 (11-15-18 @ 16:29)  HR: 79 (11-16-18 @ 02:00) (46 - 80)  BP: 108/52 (11-15-18 @ 08:15) (108/52 - 108/52)  RR: 18 (11-16-18 @ 02:00) (8 - 45)  SpO2: 100% (11-16-18 @ 02:00) (96% - 100%)  Wt(kg): --CAPILLARY BLOOD GLUCOSE      POCT Blood Glucose.: 158 mg/dL (15 Nov 2018 21:36)  POCT Blood Glucose.: 143 mg/dL (15 Nov 2018 17:49)  POCT Blood Glucose.: 108 mg/dL (15 Nov 2018 12:00)  POCT Blood Glucose.: 116 mg/dL (15 Nov 2018 11:14)  POCT Blood Glucose.: 130 mg/dL (15 Nov 2018 08:01)  POCT Blood Glucose.: 128 mg/dL (15 Nov 2018 06:49)  POCT Blood Glucose.: 104 mg/dL (15 Nov 2018 04:51)  POCT Blood Glucose.: 112 mg/dL (15 Nov 2018 03:24)  I&O's Summary    14 Nov 2018 07:01  -  15 Nov 2018 07:00  --------------------------------------------------------  IN: 962.7 mL / OUT: 1115 mL / NET: -152.3 mL    15 Nov 2018 07:01  -  16 Nov 2018 02:56  --------------------------------------------------------  IN: 271 mL / OUT: 10 mL / NET: 261 mL        Physical Exam  Neuro: A+O x 3, non-focal, speech clear and intact, Faroese speaking, lethargic  Pulm: CTA, equal bilaterally  CV: V paced, +S1S2, no murmurs  Abd: soft, NT, ND, +BS  Ext: +DP Pulses b/l, +PT pulses, no edema  Inc: MSI C/D/I/stable w/ dsg, left C/D/I w/ dsg/Ace wrap

## 2018-11-16 NOTE — PROGRESS NOTE ADULT - ASSESSMENT
63 year old female with significant PMH of ESRD on HD, hypothyroidism, HTN, DM II A1C 6.5, chronic combined systolic and diastolic heart failure, PAF (on Eliquis),  initially presented to [facility] on 11/6 with complaints of chest pain found to have NSTEMI. Cardiac cath on 11/7 indicated multi-vessel CAD. While inpatient, she has been seen by Nephrology team for HD and Cardiology. While awaiting surgery, CT done during work up showed a left adrenal mass that was assessed by Dr. Cavazos. Ultimately, she completed work up and had a four-vessel CABG (LIMA-LAD, Vein-PDA, Sequential -Diag/OM) on 11/13/18. Preop, patient was in A-fib and she was asystolic once off cardiopulmonary bypass requiring V-pacing with epicardial wires. Upon arrival to CTICU, she was sinus bradycardic which had improved to sinus rhythm. Post-operative issues include episodes of bradycardia (SB vs CHB) requiring pacing with return to NSR. She also had cardiogenic shock requiring Primacor / Levophed, as well as vasoplegia now started on Midodrine. Primacor was weaned yesterday, will likely discontinue today.

## 2018-11-17 DIAGNOSIS — I10 ESSENTIAL (PRIMARY) HYPERTENSION: ICD-10-CM

## 2018-11-17 LAB
ANION GAP SERPL CALC-SCNC: 13 MMOL/L — SIGNIFICANT CHANGE UP (ref 5–17)
BUN SERPL-MCNC: 28 MG/DL — HIGH (ref 8–20)
CALCIUM SERPL-MCNC: 8.9 MG/DL — SIGNIFICANT CHANGE UP (ref 8.6–10.2)
CHLORIDE SERPL-SCNC: 97 MMOL/L — LOW (ref 98–107)
CO2 SERPL-SCNC: 27 MMOL/L — SIGNIFICANT CHANGE UP (ref 22–29)
CREAT SERPL-MCNC: 4.72 MG/DL — HIGH (ref 0.5–1.3)
GLUCOSE BLDC GLUCOMTR-MCNC: 107 MG/DL — HIGH (ref 70–99)
GLUCOSE BLDC GLUCOMTR-MCNC: 136 MG/DL — HIGH (ref 70–99)
GLUCOSE BLDC GLUCOMTR-MCNC: 139 MG/DL — HIGH (ref 70–99)
GLUCOSE BLDC GLUCOMTR-MCNC: 171 MG/DL — HIGH (ref 70–99)
GLUCOSE SERPL-MCNC: 140 MG/DL — HIGH (ref 70–115)
HCT VFR BLD CALC: 27.3 % — LOW (ref 37–47)
HGB BLD-MCNC: 8.3 G/DL — LOW (ref 12–16)
MAGNESIUM SERPL-MCNC: 2.1 MG/DL — SIGNIFICANT CHANGE UP (ref 1.6–2.6)
MCHC RBC-ENTMCNC: 28.4 PG — SIGNIFICANT CHANGE UP (ref 27–31)
MCHC RBC-ENTMCNC: 30.4 G/DL — LOW (ref 32–36)
MCV RBC AUTO: 93.5 FL — SIGNIFICANT CHANGE UP (ref 81–99)
PHOSPHATE SERPL-MCNC: 3.9 MG/DL — SIGNIFICANT CHANGE UP (ref 2.4–4.7)
PLATELET # BLD AUTO: 190 K/UL — SIGNIFICANT CHANGE UP (ref 150–400)
POTASSIUM SERPL-MCNC: 3.8 MMOL/L — SIGNIFICANT CHANGE UP (ref 3.5–5.3)
POTASSIUM SERPL-SCNC: 3.8 MMOL/L — SIGNIFICANT CHANGE UP (ref 3.5–5.3)
RBC # BLD: 2.92 M/UL — LOW (ref 4.4–5.2)
RBC # FLD: 16.8 % — HIGH (ref 11–15.6)
SODIUM SERPL-SCNC: 137 MMOL/L — SIGNIFICANT CHANGE UP (ref 135–145)
WBC # BLD: 9.3 K/UL — SIGNIFICANT CHANGE UP (ref 4.8–10.8)
WBC # FLD AUTO: 9.3 K/UL — SIGNIFICANT CHANGE UP (ref 4.8–10.8)

## 2018-11-17 PROCEDURE — 71045 X-RAY EXAM CHEST 1 VIEW: CPT | Mod: 26

## 2018-11-17 PROCEDURE — 99233 SBSQ HOSP IP/OBS HIGH 50: CPT

## 2018-11-17 PROCEDURE — 93010 ELECTROCARDIOGRAM REPORT: CPT

## 2018-11-17 RX ORDER — KETOROLAC TROMETHAMINE 30 MG/ML
15 SYRINGE (ML) INJECTION ONCE
Qty: 0 | Refills: 0 | Status: DISCONTINUED | OUTPATIENT
Start: 2018-11-17 | End: 2018-11-17

## 2018-11-17 RX ORDER — ALPRAZOLAM 0.25 MG
0.25 TABLET ORAL ONCE
Qty: 0 | Refills: 0 | Status: DISCONTINUED | OUTPATIENT
Start: 2018-11-17 | End: 2018-11-17

## 2018-11-17 RX ORDER — MAGNESIUM SULFATE 500 MG/ML
2 VIAL (ML) INJECTION ONCE
Qty: 0 | Refills: 0 | Status: COMPLETED | OUTPATIENT
Start: 2018-11-17 | End: 2018-11-17

## 2018-11-17 RX ORDER — AMIODARONE HYDROCHLORIDE 400 MG/1
400 TABLET ORAL EVERY 12 HOURS
Qty: 0 | Refills: 0 | Status: DISCONTINUED | OUTPATIENT
Start: 2018-11-17 | End: 2018-11-17

## 2018-11-17 RX ORDER — DOPAMINE HYDROCHLORIDE 40 MG/ML
4 INJECTION, SOLUTION, CONCENTRATE INTRAVENOUS
Qty: 400 | Refills: 0 | Status: DISCONTINUED | OUTPATIENT
Start: 2018-11-17 | End: 2018-11-19

## 2018-11-17 RX ORDER — ACETAMINOPHEN 500 MG
1000 TABLET ORAL ONCE
Qty: 0 | Refills: 0 | Status: COMPLETED | OUTPATIENT
Start: 2018-11-17 | End: 2018-11-17

## 2018-11-17 RX ORDER — AMIODARONE HYDROCHLORIDE 400 MG/1
400 TABLET ORAL EVERY 8 HOURS
Qty: 0 | Refills: 0 | Status: DISCONTINUED | OUTPATIENT
Start: 2018-11-17 | End: 2018-11-17

## 2018-11-17 RX ORDER — CALCIUM CARBONATE 500(1250)
3 TABLET ORAL ONCE
Qty: 0 | Refills: 0 | Status: COMPLETED | OUTPATIENT
Start: 2018-11-17 | End: 2018-11-17

## 2018-11-17 RX ORDER — AMLODIPINE BESYLATE 2.5 MG/1
5 TABLET ORAL DAILY
Qty: 0 | Refills: 0 | Status: DISCONTINUED | OUTPATIENT
Start: 2018-11-17 | End: 2018-11-21

## 2018-11-17 RX ADMIN — Medication 325 MILLIGRAM(S): at 11:05

## 2018-11-17 RX ADMIN — Medication 50 GRAM(S): at 12:49

## 2018-11-17 RX ADMIN — PANTOPRAZOLE SODIUM 40 MILLIGRAM(S): 20 TABLET, DELAYED RELEASE ORAL at 11:05

## 2018-11-17 RX ADMIN — Medication 1000 MILLIGRAM(S): at 07:40

## 2018-11-17 RX ADMIN — AMIODARONE HYDROCHLORIDE 400 MILLIGRAM(S): 400 TABLET ORAL at 12:58

## 2018-11-17 RX ADMIN — Medication 100 MILLIGRAM(S): at 21:16

## 2018-11-17 RX ADMIN — Medication 1 TABLET(S): at 11:05

## 2018-11-17 RX ADMIN — AMLODIPINE BESYLATE 5 MILLIGRAM(S): 2.5 TABLET ORAL at 06:10

## 2018-11-17 RX ADMIN — Medication 75 MICROGRAM(S): at 06:10

## 2018-11-17 RX ADMIN — Medication 3 UNIT(S): at 16:25

## 2018-11-17 RX ADMIN — Medication 3 MILLILITER(S): at 09:07

## 2018-11-17 RX ADMIN — Medication 1 MILLIGRAM(S): at 11:05

## 2018-11-17 RX ADMIN — Medication 0.25 MILLIGRAM(S): at 01:00

## 2018-11-17 RX ADMIN — SIMVASTATIN 20 MILLIGRAM(S): 20 TABLET, FILM COATED ORAL at 21:16

## 2018-11-17 RX ADMIN — Medication 15 MILLIGRAM(S): at 11:04

## 2018-11-17 RX ADMIN — Medication 100 MILLIGRAM(S): at 11:04

## 2018-11-17 RX ADMIN — HEPARIN SODIUM 5000 UNIT(S): 5000 INJECTION INTRAVENOUS; SUBCUTANEOUS at 17:11

## 2018-11-17 RX ADMIN — Medication 400 MILLIGRAM(S): at 06:53

## 2018-11-17 RX ADMIN — Medication 3 TABLET(S): at 22:49

## 2018-11-17 RX ADMIN — Medication 3 MILLILITER(S): at 03:39

## 2018-11-17 RX ADMIN — Medication 400 MILLIGRAM(S): at 20:30

## 2018-11-17 RX ADMIN — Medication 3 UNIT(S): at 07:41

## 2018-11-17 RX ADMIN — Medication 2: at 11:05

## 2018-11-17 RX ADMIN — HEPARIN SODIUM 5000 UNIT(S): 5000 INJECTION INTRAVENOUS; SUBCUTANEOUS at 06:09

## 2018-11-17 RX ADMIN — Medication 3 UNIT(S): at 11:05

## 2018-11-17 RX ADMIN — Medication 100 MILLIGRAM(S): at 06:09

## 2018-11-17 RX ADMIN — POLYETHYLENE GLYCOL 3350 17 GRAM(S): 17 POWDER, FOR SOLUTION ORAL at 11:04

## 2018-11-17 RX ADMIN — Medication 15 MILLIGRAM(S): at 11:49

## 2018-11-17 RX ADMIN — Medication 3 MILLILITER(S): at 20:18

## 2018-11-17 RX ADMIN — Medication 3 MILLILITER(S): at 15:17

## 2018-11-17 RX ADMIN — Medication 1000 MILLIGRAM(S): at 21:00

## 2018-11-17 RX ADMIN — INSULIN GLARGINE 8 UNIT(S): 100 INJECTION, SOLUTION SUBCUTANEOUS at 21:16

## 2018-11-17 RX ADMIN — CLOPIDOGREL BISULFATE 75 MILLIGRAM(S): 75 TABLET, FILM COATED ORAL at 11:04

## 2018-11-17 NOTE — PROGRESS NOTE ADULT - SUBJECTIVE AND OBJECTIVE BOX
Subjective:  63yFemale POD#4 C4L  postop pacing requirement for bradycardia now resolved    + itchyness overnight  denies fever/chills, chest pain sob    Past Medical History:  Chest pain  H/o or current diagnosis of HF- ACEI/ARB contraindication unknown  H/o or current diagnosis of HF- Contraindication to ACEI/ARBs  H/o or current diagnosis of HF- ACEI/ARB contraindication unknown  Family history of diabetes mellitus  Handoff  MEWS Score  Hypothyroidism, unspecified type  Hemodialysis patient  Renal failure  Hypertension  Diabetes mellitus  Coronary artery disease of native artery of native heart with stable angina pectoris  Coronary artery disease involving native coronary artery of native heart without angina pectoris  CAD (coronary artery disease)  Coronary artery disease involving native coronary artery of native heart without angina pectoris  Chest pain, unspecified type  Postprocedural hypotension  Non-rheumatic mitral regurgitation  S/P CABG x 4  Cardiogenic shock  Prophylactic measure  Bradycardia  Hyperlipidemia  Diabetes mellitus  Hypertension  Adrenal mass, left  Hypothyroidism, unspecified type  ESRD (end stage renal disease) on dialysis  Type 2 diabetes mellitus without complication, without long-term current use of insulin  Obesity  Pneumonia  Preoperative respiratory examination  Coronary artery disease involving native coronary artery of native heart with unstable angina pectoris  CABG  A-V fistula  S/P cholecystectomy  SOB/CHEST PAIN  39        ALBUTerol/ipratropium for Nebulization 3 milliLiter(s) Nebulizer every 6 hours  ALPRAZolam 0.25 milliGRAM(s) Oral once  aspirin enteric coated 325 milliGRAM(s) Oral daily  clopidogrel Tablet 75 milliGRAM(s) Oral daily  dextrose 5%. 1000 milliLiter(s) IV Continuous <Continuous>  DOBUTamine Infusion 1 MICROgram(s)/kG/Min IV Continuous <Continuous>  docusate sodium 100 milliGRAM(s) Oral three times a day  epoetin jack Injectable 46114 Unit(s) IV Push <User Schedule>  folic acid 1 milliGRAM(s) Oral daily  heparin  Injectable 5000 Unit(s) SubCutaneous every 12 hours  insulin glargine Injectable (LANTUS) 8 Unit(s) SubCutaneous at bedtime  insulin lispro (HumaLOG) corrective regimen sliding scale   SubCutaneous Before meals and at bedtime  insulin lispro Injectable (HumaLOG) 3 Unit(s) SubCutaneous three times a day before meals  levothyroxine 75 MICROGram(s) Oral daily  Nephro-heather 1 Tablet(s) Oral daily  niCARdipine Infusion 5 mG/Hr IV Continuous <Continuous>  pantoprazole    Tablet 40 milliGRAM(s) Oral daily  polyethylene glycol 3350 17 Gram(s) Oral daily  simvastatin 20 milliGRAM(s) Oral at bedtime  sodium chloride 0.9%. 1000 milliLiter(s) IV Continuous <Continuous>  sodium chloride 0.9%. 1000 milliLiter(s) IV Continuous <Continuous>  MEDICATIONS  (PRN):      Daily     Daily         ABG - ( 16 Nov 2018 05:36 )  pH, Arterial: 7.38  pH, Blood: x     /  pCO2: 43    /  pO2: 61    / HCO3: 24    / Base Excess: -0.3  /  SaO2: 90                                      8.9    14.5  )-----------( 215      ( 16 Nov 2018 11:14 )             29.9   11-16    136  |  96<L>  |  39.0<H>  ----------------------------<  126<H>  5.3   |  23.0  |  6.39<H>    Ca    9.4      16 Nov 2018 03:55  Phos  7.0     11-16  Mg     2.4     11-16              Objective:  T(C): 37.1 (11-16-18 @ 22:27), Max: 37.1 (11-16-18 @ 14:00)  HR: 70 (11-17-18 @ 01:00) (0 - 88)  BP: 122/60 (11-17-18 @ 01:00) (122/60 - 167/69)  RR: 22 (11-17-18 @ 01:00) (15 - 43)  SpO2: 95% (11-17-18 @ 01:00) (88% - 100%)  Wt(kg): --CAPILLARY BLOOD GLUCOSE      POCT Blood Glucose.: 164 mg/dL (16 Nov 2018 22:11)  POCT Blood Glucose.: 110 mg/dL (16 Nov 2018 17:09)  POCT Blood Glucose.: 121 mg/dL (16 Nov 2018 12:39)  POCT Blood Glucose.: 162 mg/dL (16 Nov 2018 08:31)  I&O's Summary    15 Nov 2018 07:01  -  16 Nov 2018 07:00  --------------------------------------------------------  IN: 271 mL / OUT: 10 mL / NET: 261 mL    16 Nov 2018 07:01  -  17 Nov 2018 02:26  --------------------------------------------------------  IN: 920.8 mL / OUT: 1900 mL / NET: -979.2 mL        Lines: PIV    Kim: No    Physical Exam:  Neuro: A0X3, non focal  Pulm: CtA b/l Unlabored  CV: s1/s2   reg  Abd: soft nt/nd  Ext: warm, no edema, well perfused  Inc: c/d/i

## 2018-11-17 NOTE — PROGRESS NOTE ADULT - SUBJECTIVE AND OBJECTIVE BOX
[facility], [facility]                                                    [address], [address], [place] [address]                                                         PHONE: [phone]    FAX: [phone] [address], [address], [place] [address]                                                 PHONE: [phone]    FAX: [phone]  *******************************************************************************    Overnight events/Subjective Assessment:    INTERPRETATION OF TELEMETRY (personally reviewed):    No Known Allergies  OHS (Unknown)    MEDICATIONS  (STANDING):  ALBUTerol/ipratropium for Nebulization 3 milliLiter(s) Nebulizer every 6 hours  amLODIPine   Tablet 5 milliGRAM(s) Oral daily  aspirin enteric coated 325 milliGRAM(s) Oral daily  clopidogrel Tablet 75 milliGRAM(s) Oral daily  dextrose 5%. 1000 milliLiter(s) (50 mL/Hr) IV Continuous <Continuous>  DOBUTamine Infusion 1 MICROgram(s)/kG/Min (2.838 mL/Hr) IV Continuous <Continuous>  docusate sodium 100 milliGRAM(s) Oral three times a day  epoetin jack Injectable 00217 Unit(s) IV Push <User Schedule>  folic acid 1 milliGRAM(s) Oral daily  heparin  Injectable 5000 Unit(s) SubCutaneous every 12 hours  insulin glargine Injectable (LANTUS) 8 Unit(s) SubCutaneous at bedtime  insulin lispro (HumaLOG) corrective regimen sliding scale   SubCutaneous Before meals and at bedtime  insulin lispro Injectable (HumaLOG) 3 Unit(s) SubCutaneous three times a day before meals  levothyroxine 75 MICROGram(s) Oral daily  Nephro-heather 1 Tablet(s) Oral daily  pantoprazole    Tablet 40 milliGRAM(s) Oral daily  polyethylene glycol 3350 17 Gram(s) Oral daily  simvastatin 20 milliGRAM(s) Oral at bedtime  sodium chloride 0.9%. 1000 milliLiter(s) (10 mL/Hr) IV Continuous <Continuous>  sodium chloride 0.9%. 1000 milliLiter(s) (5 mL/Hr) IV Continuous <Continuous>    MEDICATIONS  (PRN):      Vital Signs Last 24 Hrs  T(C): 37.1 (16 Nov 2018 22:27), Max: 37.1 (16 Nov 2018 14:00)  T(F): 98.8 (16 Nov 2018 22:27), Max: 98.8 (16 Nov 2018 22:27)  HR: 73 (17 Nov 2018 06:45) (61 - 83)  BP: 139/59 (17 Nov 2018 06:45) (122/60 - 167/69)  BP(mean): 85 (17 Nov 2018 06:45) (85 - 103)  RR: 28 (17 Nov 2018 06:45) (15 - 43)  SpO2: 92% (17 Nov 2018 06:45) (88% - 100%)    I&O's Detail    16 Nov 2018 07:01  -  17 Nov 2018 07:00  --------------------------------------------------------  IN:    DOBUTamine Infusion: 50.4 mL    niCARdipine Infusion: 435 mL    Oral Fluid: 240 mL    sodium chloride 0.9%.: 15 mL    sodium chloride 0.9%.: 160 mL    Solution: 300 mL  Total IN: 1200.4 mL    OUT:    Other: 1900 mL  Total OUT: 1900 mL    Total NET: -699.6 mL        I&O's Summary    16 Nov 2018 07:01  -  17 Nov 2018 07:00  --------------------------------------------------------  IN: 1200.4 mL / OUT: 1900 mL / NET: -699.6 mL            PHYSICAL EXAM:  General: Appears well developed, well nourished, no acute distress  HEENT: Head: normocephalic, atraumatic  Eyes: Pupils equal and reactive  Neck: Supple, no carotid bruit, no JVD, no HJR  CARDIOVASCULAR: Normal S1 and S2, no murmur, rub, or gallop  LUNGS: Clear to auscultation bilaterally, no rales, rhonchi or wheeze  ABDOMEN: Soft, nontender, non-distended, positive bowel sounds, no mass or bruit  EXTREMITIES: No edema, distal pulses WNL  SKIN: Warm and dry with normal turgor  NEURO: Alert & oriented x 3, grossly intact  PSYCH: normal mood and affect        LABS:                        8.3    9.3   )-----------( 190      ( 17 Nov 2018 03:31 )             27.3     11-17    137  |  97<L>  |  28.0<H>  ----------------------------<  140<H>  3.8   |  27.0  |  4.72<H>    Ca    8.9      17 Nov 2018 03:31  Phos  3.9     11-17  Mg     2.1     11-17            serum  Lipids:   Hemoglobin A1C, Whole Blood: 6.5 % (11-07 @ 18:50)    Thyroid Stimulating Hormone, Serum: 2.27 uIU/mL (11-07 @ 18:50)      RADIOLOGY & ADDITIONAL STUDIES:    ECG:    ECHO:    STRESS TEST:    CARDIAC CATHETERIZATION:    ASSESSMENT AND PLAN:  In summary, KIM LEE is a 63y Female with past medical history significant for CABG, PPM, no new cv complaints now  - cont current management  - PT  - meds reviewed      Kane Rivas DO

## 2018-11-17 NOTE — PROGRESS NOTE ADULT - ASSESSMENT
63F  ESRD ON HD, CAD, DM now POD#4 C4L.  Postop cardiogenoic shock requiring iontropic support now resolved,  CHB requiring epicardial pacing now resolved

## 2018-11-17 NOTE — PROGRESS NOTE ADULT - ASSESSMENT
1) ESRD on HD  2) MBD of renal dx  3) Anemia of renal dx  4) Vol/HTN    On HD TTS  Plan HD for tomorrow; holiday schedule  No need for HD today    d/w CTICU this AM

## 2018-11-17 NOTE — CHART NOTE - NSCHARTNOTEFT_GEN_A_CORE
Episode of 9 second pause with previous rhythm being afib in the 100s  Did not loss conciueness, blood pressure maintained 110s, quick return to afib in the 70s  pt placed on pacer pads, started on dopamine @3mcg  make NPO  possible PPM in AM

## 2018-11-17 NOTE — PROGRESS NOTE ADULT - SUBJECTIVE AND OBJECTIVE BOX
[facility] DIVISION OF KIDNEY DISEASES AND HYPERTENSION -- FOLLOW UP NOTE  --------------------------------------------------------------------------------  Chief Complaint: ESRD HD    24 hour events/subjective:  Pt seen and examined  Tolerated HD yesterday  Sitting up in chair in NAD      PAST HISTORY  --------------------------------------------------------------------------------  No significant changes to PMH, PSH, FHx, SHx, unless otherwise noted    ALLERGIES & MEDICATIONS  --------------------------------------------------------------------------------  Allergies    No Known Allergies    Intolerances    OHS (Unknown)    Standing Inpatient Medications  ALBUTerol/ipratropium for Nebulization 3 milliLiter(s) Nebulizer every 6 hours  amiodarone    Tablet 400 milliGRAM(s) Oral every 8 hours  amLODIPine   Tablet 5 milliGRAM(s) Oral daily  aspirin enteric coated 325 milliGRAM(s) Oral daily  clopidogrel Tablet 75 milliGRAM(s) Oral daily  dextrose 5%. 1000 milliLiter(s) IV Continuous <Continuous>  DOBUTamine Infusion 1 MICROgram(s)/kG/Min IV Continuous <Continuous>  docusate sodium 100 milliGRAM(s) Oral three times a day  epoetin jack Injectable 53227 Unit(s) IV Push <User Schedule>  folic acid 1 milliGRAM(s) Oral daily  heparin  Injectable 5000 Unit(s) SubCutaneous every 12 hours  insulin glargine Injectable (LANTUS) 8 Unit(s) SubCutaneous at bedtime  insulin lispro (HumaLOG) corrective regimen sliding scale   SubCutaneous Before meals and at bedtime  insulin lispro Injectable (HumaLOG) 3 Unit(s) SubCutaneous three times a day before meals  levothyroxine 75 MICROGram(s) Oral daily  Nephro-heather 1 Tablet(s) Oral daily  pantoprazole    Tablet 40 milliGRAM(s) Oral daily  polyethylene glycol 3350 17 Gram(s) Oral daily  simvastatin 20 milliGRAM(s) Oral at bedtime  sodium chloride 0.9%. 1000 milliLiter(s) IV Continuous <Continuous>  sodium chloride 0.9%. 1000 milliLiter(s) IV Continuous <Continuous>    PRN Inpatient Medications      REVIEW OF SYSTEMS  --------------------------------------------------------------------------------  Gen: No weight changes, fatigue, fevers/chills, weakness  Skin: No rashes  Head/Eyes/Ears/Mouth: No headache; Normal hearing; Normal vision w/o blurriness; No sinus pain/discomfort, sore throat  Respiratory: No dyspnea, cough, wheezing, hemoptysis  CV: No chest pain, PND, orthopnea  GI: No abdominal pain, diarrhea, constipation, nausea, vomiting, melena, hematochezia  : No increased frequency, dysuria, hematuria, nocturia  MSK: No joint pain/swelling; no back pain; no edema  Neuro: No dizziness/lightheadedness, weakness, seizures, numbness, tingling  Heme: No easy bruising or bleeding  Endo: No heat/cold intolerance  Psych: No significant nervousness, anxiety, stress, depression    All other systems were reviewed and are negative, except as noted.    VITALS/PHYSICAL EXAM  --------------------------------------------------------------------------------  T(C): 36.8 (11-17-18 @ 10:00), Max: 37.1 (11-16-18 @ 14:00)  HR: 112 (11-17-18 @ 11:35) (61 - 112)  BP: 162/77 (11-17-18 @ 11:35) (122/60 - 167/69)  RR: 32 (11-17-18 @ 11:35) (15 - 43)  SpO2: 95% (11-17-18 @ 11:35) (88% - 100%)  Wt(kg): --        11-16-18 @ 07:01  -  11-17-18 @ 07:00  --------------------------------------------------------  IN: 1200.4 mL / OUT: 1900 mL / NET: -699.6 mL    11-17-18 @ 07:01  -  11-17-18 @ 12:50  --------------------------------------------------------  IN: 2.8 mL / OUT: 0 mL / NET: 2.8 mL      Physical Exam:  	 Gen: NAD  	HEENT: nasal cannula  	Pulm: dec BS  	CV: RRR, S1S2; no rub  	Back: No spinal or CVA tenderness; no sacral edema  	Abd: +BS, soft, nontender/nondistended  	: No suprapubic tenderness  	UE: Warm, FROM, no clubbing, intact strength; no edema; no asterixis  	LE: Warm, FROM, no clubbing, intact strength; no edema  	Neuro: No focal deficits, intact gait  	Psych: Normal affect and mood  	Skin: Warm, without rashes    LABS/STUDIES  --------------------------------------------------------------------------------              8.3    9.3   >-----------<  190      [11-17-18 @ 03:31]              27.3     137  |  97  |  28.0  ----------------------------<  140      [11-17-18 @ 03:31]  3.8   |  27.0  |  4.72        Ca     8.9     [11-17-18 @ 03:31]      Mg     2.1     [11-17-18 @ 03:31]      Phos  3.9     [11-17-18 @ 03:31]            Creatinine Trend:  SCr 4.72 [11-17 @ 03:31]  SCr 6.39 [11-16 @ 03:55]  SCr 4.41 [11-15 @ 03:19]  SCr 5.40 [11-14 @ 04:16]  SCr 4.66 [11-13 @ 16:27]        PTH -- (Ca 9.8)      [11-13-18 @ 16:19]   551  Vitamin D (25OH) 25.0      [11-13-18 @ 16:38]  HbA1c 6.5      [11-07-18 @ 18:50]  TSH 2.27      [11-07-18 @ 18:50]

## 2018-11-18 DIAGNOSIS — I48.91 UNSPECIFIED ATRIAL FIBRILLATION: ICD-10-CM

## 2018-11-18 LAB
ANION GAP SERPL CALC-SCNC: 17 MMOL/L — SIGNIFICANT CHANGE UP (ref 5–17)
BUN SERPL-MCNC: 37 MG/DL — HIGH (ref 8–20)
CALCIUM SERPL-MCNC: 9.7 MG/DL — SIGNIFICANT CHANGE UP (ref 8.6–10.2)
CHLORIDE SERPL-SCNC: 93 MMOL/L — LOW (ref 98–107)
CO2 SERPL-SCNC: 25 MMOL/L — SIGNIFICANT CHANGE UP (ref 22–29)
CREAT SERPL-MCNC: 5.94 MG/DL — HIGH (ref 0.5–1.3)
GLUCOSE BLDC GLUCOMTR-MCNC: 124 MG/DL — HIGH (ref 70–99)
GLUCOSE BLDC GLUCOMTR-MCNC: 142 MG/DL — HIGH (ref 70–99)
GLUCOSE BLDC GLUCOMTR-MCNC: 177 MG/DL — HIGH (ref 70–99)
GLUCOSE BLDC GLUCOMTR-MCNC: 96 MG/DL — SIGNIFICANT CHANGE UP (ref 70–99)
GLUCOSE SERPL-MCNC: 150 MG/DL — HIGH (ref 70–115)
HCT VFR BLD CALC: 30.3 % — LOW (ref 37–47)
HGB BLD-MCNC: 9.2 G/DL — LOW (ref 12–16)
MAGNESIUM SERPL-MCNC: 2.7 MG/DL — HIGH (ref 1.6–2.6)
MCHC RBC-ENTMCNC: 28.3 PG — SIGNIFICANT CHANGE UP (ref 27–31)
MCHC RBC-ENTMCNC: 30.4 G/DL — LOW (ref 32–36)
MCV RBC AUTO: 93.2 FL — SIGNIFICANT CHANGE UP (ref 81–99)
PHOSPHATE SERPL-MCNC: 4.3 MG/DL — SIGNIFICANT CHANGE UP (ref 2.4–4.7)
PLATELET # BLD AUTO: 203 K/UL — SIGNIFICANT CHANGE UP (ref 150–400)
POTASSIUM SERPL-MCNC: 4.3 MMOL/L — SIGNIFICANT CHANGE UP (ref 3.5–5.3)
POTASSIUM SERPL-SCNC: 4.3 MMOL/L — SIGNIFICANT CHANGE UP (ref 3.5–5.3)
RBC # BLD: 3.25 M/UL — LOW (ref 4.4–5.2)
RBC # FLD: 16.8 % — HIGH (ref 11–15.6)
SODIUM SERPL-SCNC: 135 MMOL/L — SIGNIFICANT CHANGE UP (ref 135–145)
WBC # BLD: 10.4 K/UL — SIGNIFICANT CHANGE UP (ref 4.8–10.8)
WBC # FLD AUTO: 10.4 K/UL — SIGNIFICANT CHANGE UP (ref 4.8–10.8)

## 2018-11-18 PROCEDURE — 71045 X-RAY EXAM CHEST 1 VIEW: CPT | Mod: 26

## 2018-11-18 PROCEDURE — 93010 ELECTROCARDIOGRAM REPORT: CPT

## 2018-11-18 PROCEDURE — 90937 HEMODIALYSIS REPEATED EVAL: CPT

## 2018-11-18 RX ORDER — CALCIUM CARBONATE 500(1250)
1 TABLET ORAL THREE TIMES A DAY
Qty: 0 | Refills: 0 | Status: DISCONTINUED | OUTPATIENT
Start: 2018-11-18 | End: 2018-11-21

## 2018-11-18 RX ORDER — ONDANSETRON 8 MG/1
4 TABLET, FILM COATED ORAL EVERY 6 HOURS
Qty: 0 | Refills: 0 | Status: DISCONTINUED | OUTPATIENT
Start: 2018-11-18 | End: 2018-11-21

## 2018-11-18 RX ORDER — HEPARIN SODIUM 5000 [USP'U]/ML
5000 INJECTION INTRAVENOUS; SUBCUTANEOUS ONCE
Qty: 0 | Refills: 0 | Status: COMPLETED | OUTPATIENT
Start: 2018-11-18 | End: 2018-11-18

## 2018-11-18 RX ORDER — ONDANSETRON 8 MG/1
4 TABLET, FILM COATED ORAL ONCE
Qty: 0 | Refills: 0 | Status: COMPLETED | OUTPATIENT
Start: 2018-11-18 | End: 2018-11-18

## 2018-11-18 RX ORDER — ACETAMINOPHEN 500 MG
1000 TABLET ORAL ONCE
Qty: 0 | Refills: 0 | Status: COMPLETED | OUTPATIENT
Start: 2018-11-18 | End: 2018-11-18

## 2018-11-18 RX ADMIN — Medication 1 TABLET(S): at 11:43

## 2018-11-18 RX ADMIN — INSULIN GLARGINE 8 UNIT(S): 100 INJECTION, SOLUTION SUBCUTANEOUS at 21:22

## 2018-11-18 RX ADMIN — Medication 1 TABLET(S): at 21:41

## 2018-11-18 RX ADMIN — Medication 100 MILLIGRAM(S): at 11:43

## 2018-11-18 RX ADMIN — HEPARIN SODIUM 5000 UNIT(S): 5000 INJECTION INTRAVENOUS; SUBCUTANEOUS at 17:40

## 2018-11-18 RX ADMIN — AMLODIPINE BESYLATE 5 MILLIGRAM(S): 2.5 TABLET ORAL at 06:42

## 2018-11-18 RX ADMIN — Medication 3 UNIT(S): at 06:42

## 2018-11-18 RX ADMIN — Medication 325 MILLIGRAM(S): at 11:53

## 2018-11-18 RX ADMIN — PANTOPRAZOLE SODIUM 40 MILLIGRAM(S): 20 TABLET, DELAYED RELEASE ORAL at 11:43

## 2018-11-18 RX ADMIN — Medication 100 MILLIGRAM(S): at 21:21

## 2018-11-18 RX ADMIN — CLOPIDOGREL BISULFATE 75 MILLIGRAM(S): 75 TABLET, FILM COATED ORAL at 11:43

## 2018-11-18 RX ADMIN — SIMVASTATIN 20 MILLIGRAM(S): 20 TABLET, FILM COATED ORAL at 21:21

## 2018-11-18 RX ADMIN — ONDANSETRON 4 MILLIGRAM(S): 8 TABLET, FILM COATED ORAL at 15:40

## 2018-11-18 RX ADMIN — Medication 1000 MILLIGRAM(S): at 21:48

## 2018-11-18 RX ADMIN — Medication 400 MILLIGRAM(S): at 21:21

## 2018-11-18 RX ADMIN — ERYTHROPOIETIN 10000 UNIT(S): 10000 INJECTION, SOLUTION INTRAVENOUS; SUBCUTANEOUS at 14:06

## 2018-11-18 RX ADMIN — Medication 1 MILLIGRAM(S): at 11:43

## 2018-11-18 RX ADMIN — Medication 75 MICROGRAM(S): at 06:39

## 2018-11-18 RX ADMIN — POLYETHYLENE GLYCOL 3350 17 GRAM(S): 17 POWDER, FOR SOLUTION ORAL at 11:43

## 2018-11-18 RX ADMIN — Medication 100 MILLIGRAM(S): at 06:39

## 2018-11-18 RX ADMIN — Medication 2: at 11:44

## 2018-11-18 RX ADMIN — Medication 3 UNIT(S): at 11:43

## 2018-11-18 RX ADMIN — DOPAMINE HYDROCHLORIDE 10.64 MICROGRAM(S)/KG/MIN: 40 INJECTION, SOLUTION, CONCENTRATE INTRAVENOUS at 23:31

## 2018-11-18 NOTE — PROGRESS NOTE ADULT - SUBJECTIVE AND OBJECTIVE BOX
[facility], [facility]                                                    [address], [address], [place] [address]                                                         PHONE: [phone]    FAX: [phone] [address], [address], [place] [address]                                                 PHONE: [phone]    FAX: [phone]  *******************************************************************************    Overnight events/Subjective Assessment:    INTERPRETATION OF TELEMETRY (personally reviewed):    No Known Allergies  OHS (Unknown)    MEDICATIONS  (STANDING):  amLODIPine   Tablet 5 milliGRAM(s) Oral daily  aspirin enteric coated 325 milliGRAM(s) Oral daily  clopidogrel Tablet 75 milliGRAM(s) Oral daily  docusate sodium 100 milliGRAM(s) Oral three times a day  DOPamine Infusion 3 MICROgram(s)/kG/Min (10.642 mL/Hr) IV Continuous <Continuous>  epoetin jack Injectable 68420 Unit(s) IV Push <User Schedule>  folic acid 1 milliGRAM(s) Oral daily  insulin glargine Injectable (LANTUS) 8 Unit(s) SubCutaneous at bedtime  insulin lispro (HumaLOG) corrective regimen sliding scale   SubCutaneous Before meals and at bedtime  insulin lispro Injectable (HumaLOG) 3 Unit(s) SubCutaneous three times a day before meals  levothyroxine 75 MICROGram(s) Oral daily  Nephro-heather 1 Tablet(s) Oral daily  pantoprazole    Tablet 40 milliGRAM(s) Oral daily  polyethylene glycol 3350 17 Gram(s) Oral daily  simvastatin 20 milliGRAM(s) Oral at bedtime    MEDICATIONS  (PRN):      Vital Signs Last 24 Hrs  T(C): 37.1 (18 Nov 2018 10:00), Max: 37.2 (17 Nov 2018 14:00)  T(F): 98.7 (18 Nov 2018 10:00), Max: 98.9 (17 Nov 2018 14:00)  HR: 64 (18 Nov 2018 09:00) (42 - 120)  BP: 147/112 (18 Nov 2018 09:00) (102/56 - 175/77)  BP(mean): 126 (18 Nov 2018 09:00) (75 - 126)  RR: 22 (18 Nov 2018 09:00) (14 - 32)  SpO2: 97% (18 Nov 2018 09:00) (86% - 100%)    I&O's Detail    17 Nov 2018 07:01  -  18 Nov 2018 07:00  --------------------------------------------------------  IN:    DOBUTamine Infusion: 2.8 mL    DOPamine Infusion: 84.8 mL    Oral Fluid: 480 mL    Solution: 50 mL  Total IN: 617.6 mL    OUT:    Voided: 100 mL  Total OUT: 100 mL    Total NET: 517.6 mL      18 Nov 2018 07:01  -  18 Nov 2018 10:53  --------------------------------------------------------  IN:    DOPamine Infusion: 10.6 mL    Oral Fluid: 240 mL  Total IN: 250.6 mL    OUT:  Total OUT: 0 mL    Total NET: 250.6 mL        I&O's Summary    17 Nov 2018 07:01  -  18 Nov 2018 07:00  --------------------------------------------------------  IN: 617.6 mL / OUT: 100 mL / NET: 517.6 mL    18 Nov 2018 07:01  -  18 Nov 2018 10:53  --------------------------------------------------------  IN: 250.6 mL / OUT: 0 mL / NET: 250.6 mL            PHYSICAL EXAM:  General: Appears well developed, well nourished, no acute distress  HEENT: Head: normocephalic, atraumatic  Eyes: Pupils equal and reactive  Neck: Supple, no carotid bruit, no JVD, no HJR  CARDIOVASCULAR: Normal S1 and S2, no murmur, rub, or gallop  LUNGS: Clear to auscultation bilaterally, no rales, rhonchi or wheeze  ABDOMEN: Soft, nontender, non-distended, positive bowel sounds, no mass or bruit  EXTREMITIES: No edema, distal pulses WNL  SKIN: Warm and dry with normal turgor  NEURO: Alert & oriented x 3, grossly intact  PSYCH: normal mood and affect        LABS:                        9.2    10.4  )-----------( 203      ( 18 Nov 2018 03:06 )             30.3     11-18    135  |  93<L>  |  37.0<H>  ----------------------------<  150<H>  4.3   |  25.0  |  5.94<H>    Ca    9.7      18 Nov 2018 03:06  Phos  4.3     11-18  Mg     2.7     11-18            serum  Lipids:   Hemoglobin A1C, Whole Blood: 6.5 % (11-07 @ 18:50)    Thyroid Stimulating Hormone, Serum: 2.27 uIU/mL (11-07 @ 18:50)      RADIOLOGY & ADDITIONAL STUDIES:    ECG:    ECHO:    STRESS TEST:    CARDIAC CATHETERIZATION:    ASSESSMENT AND PLAN:  In summary, KIM LEE is a 63y Female with past medical history significant for s/p CABG bradycardia, d/w Dr. Gudino no need for PPM now  - d/w family at bedside  - cont current meds      Kane Rivas,

## 2018-11-18 NOTE — PROGRESS NOTE ADULT - SUBJECTIVE AND OBJECTIVE BOX
[facility] DIVISION OF KIDNEY DISEASES AND HYPERTENSION -- FOLLOW UP NOTE  --------------------------------------------------------------------------------  Chief Complaint:  ESRD HD    24 hour events/subjective:  Pt seen and examined  HD today    PAST HISTORY  --------------------------------------------------------------------------------  No significant changes to PMH, PSH, FHx, SHx, unless otherwise noted    ALLERGIES & MEDICATIONS  --------------------------------------------------------------------------------  Allergies    No Known Allergies    Intolerances    OHS (Unknown)    Standing Inpatient Medications  amLODIPine   Tablet 5 milliGRAM(s) Oral daily  aspirin enteric coated 325 milliGRAM(s) Oral daily  clopidogrel Tablet 75 milliGRAM(s) Oral daily  docusate sodium 100 milliGRAM(s) Oral three times a day  DOPamine Infusion 3 MICROgram(s)/kG/Min IV Continuous <Continuous>  epoetin jack Injectable 57413 Unit(s) IV Push <User Schedule>  folic acid 1 milliGRAM(s) Oral daily  insulin glargine Injectable (LANTUS) 8 Unit(s) SubCutaneous at bedtime  insulin lispro (HumaLOG) corrective regimen sliding scale   SubCutaneous Before meals and at bedtime  insulin lispro Injectable (HumaLOG) 3 Unit(s) SubCutaneous three times a day before meals  levothyroxine 75 MICROGram(s) Oral daily  Nephro-heather 1 Tablet(s) Oral daily  pantoprazole    Tablet 40 milliGRAM(s) Oral daily  polyethylene glycol 3350 17 Gram(s) Oral daily  simvastatin 20 milliGRAM(s) Oral at bedtime    PRN Inpatient Medications      REVIEW OF SYSTEMS  --------------------------------------------------------------------------------  Gen: No weight changes, fatigue, fevers/chills, weakness  Skin: No rashes  Head/Eyes/Ears/Mouth: No headache; Normal hearing; Normal vision w/o blurriness; No sinus pain/discomfort, sore throat  Respiratory: No dyspnea, cough, wheezing, hemoptysis  CV: No chest pain, PND, orthopnea  GI: No abdominal pain, diarrhea, constipation, nausea, vomiting, melena, hematochezia  : No increased frequency, dysuria, hematuria, nocturia  MSK: No joint pain/swelling; no back pain; no edema  Neuro: No dizziness/lightheadedness, weakness, seizures, numbness, tingling  Heme: No easy bruising or bleeding  Endo: No heat/cold intolerance  Psych: No significant nervousness, anxiety, stress, depression    All other systems were reviewed and are negative, except as noted.    VITALS/PHYSICAL EXAM  --------------------------------------------------------------------------------  T(C): 37.1 (11-18-18 @ 10:00), Max: 37.2 (11-17-18 @ 14:00)  HR: 61 (11-18-18 @ 13:00) (42 - 109)  BP: 141/66 (11-18-18 @ 13:00) (102/56 - 191/79)  RR: 19 (11-18-18 @ 13:00) (12 - 31)  SpO2: 96% (11-18-18 @ 13:00) (86% - 100%)  Wt(kg): --        11-17-18 @ 07:01  -  11-18-18 @ 07:00  --------------------------------------------------------  IN: 617.6 mL / OUT: 100 mL / NET: 517.6 mL    11-18-18 @ 07:01  -  11-18-18 @ 13:54  --------------------------------------------------------  IN: 543.6 mL / OUT: 0 mL / NET: 543.6 mL      Physical Exam:  	Gen: NAD, well-appearing  	HEENT: PERRL, supple neck, clear oropharynx  	Pulm: CTA B/L  	CV: RRR, S1S2; no rub  	Back: No spinal or CVA tenderness; no sacral edema  	Abd: +BS, soft, nontender/nondistended  	: No suprapubic tenderness  	UE: Warm, FROM, no clubbing, intact strength; no edema; no asterixis  	LE: Warm, FROM, no clubbing, intact strength; no edema  	Neuro: No focal deficits, intact gait  	Psych: Normal affect and mood  	Skin: Warm, without rashes  	Vascular access:    LABS/STUDIES  --------------------------------------------------------------------------------              9.2    10.4  >-----------<  203      [11-18-18 @ 03:06]              30.3     135  |  93  |  37.0  ----------------------------<  150      [11-18-18 @ 03:06]  4.3   |  25.0  |  5.94        Ca     9.7     [11-18-18 @ 03:06]      Mg     2.7     [11-18-18 @ 03:06]      Phos  4.3     [11-18-18 @ 03:06]            Creatinine Trend:  SCr 5.94 [11-18 @ 03:06]  SCr 4.72 [11-17 @ 03:31]  SCr 6.39 [11-16 @ 03:55]  SCr 4.41 [11-15 @ 03:19]  SCr 5.40 [11-14 @ 04:16]        PTH -- (Ca 9.8)      [11-13-18 @ 16:19]   551  Vitamin D (25OH) 25.0      [11-13-18 @ 16:38]  HbA1c 6.5      [11-07-18 @ 18:50]  TSH 2.27      [11-07-18 @ 18:50]

## 2018-11-18 NOTE — PROGRESS NOTE ADULT - ASSESSMENT
63F  ESRD ON HD, CAD, DM now POD#5C4L.  Postop cardiogenoic shock requiring iontropic support now resolved,  CHB requiring epicardial pacing since resolved, Afib w/ RVR with conversion pause of 9 seconds now on dopamine infusion.

## 2018-11-18 NOTE — PROGRESS NOTE ADULT - SUBJECTIVE AND OBJECTIVE BOX
Subjective:  63yFemale POD#5 C4L  postop pacing requirement, Rapid afib, now with 9 second conversion pause    past 24hr: Rapid afib yest, started on amiodarone 200mg PO, overnight conversion pause of 9 seconds.  Stt on dopamine, placed on backup pacer pads    Past Medical History:  Chest pain  H/o or current diagnosis of HF- ACEI/ARB contraindication unknown  H/o or current diagnosis of HF- Contraindication to ACEI/ARBs  H/o or current diagnosis of HF- ACEI/ARB contraindication unknown  Family history of diabetes mellitus  Handoff  MEWS Score  Hypothyroidism, unspecified type  Hemodialysis patient  Renal failure  Hypertension  Diabetes mellitus  Coronary artery disease of native artery of native heart with stable angina pectoris  Coronary artery disease involving native coronary artery of native heart without angina pectoris  CAD (coronary artery disease)  Coronary artery disease involving native coronary artery of native heart without angina pectoris  Chest pain, unspecified type  Essential hypertension  Postprocedural hypotension  Non-rheumatic mitral regurgitation  S/P CABG x 4  Cardiogenic shock  Prophylactic measure  Bradycardia  Hyperlipidemia  Diabetes mellitus  Hypertension  Adrenal mass, left  Hypothyroidism, unspecified type  ESRD (end stage renal disease) on dialysis  Type 2 diabetes mellitus without complication, without long-term current use of insulin  Obesity  Pneumonia  Preoperative respiratory examination  Coronary artery disease involving native coronary artery of native heart with unstable angina pectoris  CABG  A-V fistula  S/P cholecystectomy  SOB/CHEST PAIN  37        amLODIPine   Tablet 5 milliGRAM(s) Oral daily  aspirin enteric coated 325 milliGRAM(s) Oral daily  clopidogrel Tablet 75 milliGRAM(s) Oral daily  dextrose 5%. 1000 milliLiter(s) IV Continuous <Continuous>  docusate sodium 100 milliGRAM(s) Oral three times a day  DOPamine Infusion 3 MICROgram(s)/kG/Min IV Continuous <Continuous>  epoetin jack Injectable 75979 Unit(s) IV Push <User Schedule>  folic acid 1 milliGRAM(s) Oral daily  heparin  Injectable 5000 Unit(s) SubCutaneous every 12 hours  insulin glargine Injectable (LANTUS) 8 Unit(s) SubCutaneous at bedtime  insulin lispro (HumaLOG) corrective regimen sliding scale   SubCutaneous Before meals and at bedtime  insulin lispro Injectable (HumaLOG) 3 Unit(s) SubCutaneous three times a day before meals  levothyroxine 75 MICROGram(s) Oral daily  Nephro-heather 1 Tablet(s) Oral daily  pantoprazole    Tablet 40 milliGRAM(s) Oral daily  polyethylene glycol 3350 17 Gram(s) Oral daily  simvastatin 20 milliGRAM(s) Oral at bedtime  sodium chloride 0.9%. 1000 milliLiter(s) IV Continuous <Continuous>  sodium chloride 0.9%. 1000 milliLiter(s) IV Continuous <Continuous>  MEDICATIONS  (PRN):      Daily     Daily         ABG - ( 16 Nov 2018 05:36 )  pH, Arterial: 7.38  pH, Blood: x     /  pCO2: 43    /  pO2: 61    / HCO3: 24    / Base Excess: -0.3  /  SaO2: 90                                      9.2    10.4  )-----------( 203      ( 18 Nov 2018 03:06 )             30.3   11-17    137  |  97<L>  |  28.0<H>  ----------------------------<  140<H>  3.8   |  27.0  |  4.72<H>    Ca    8.9      17 Nov 2018 03:31  Phos  3.9     11-17  Mg     2.1     11-17              Objective:  T(C): 36.7 (11-17-18 @ 18:05), Max: 37.2 (11-17-18 @ 14:00)  HR: 67 (11-18-18 @ 00:05) (42 - 120)  BP: 175/77 (11-18-18 @ 00:05) (102/56 - 175/77)  RR: 22 (11-18-18 @ 00:05) (14 - 33)  SpO2: 86% (11-18-18 @ 00:05) (86% - 100%)  Wt(kg): --CAPILLARY BLOOD GLUCOSE      POCT Blood Glucose.: 136 mg/dL (17 Nov 2018 21:16)  POCT Blood Glucose.: 107 mg/dL (17 Nov 2018 16:11)  POCT Blood Glucose.: 171 mg/dL (17 Nov 2018 11:02)  POCT Blood Glucose.: 139 mg/dL (17 Nov 2018 07:27)  I&O's Summary    16 Nov 2018 07:01  -  17 Nov 2018 07:00  --------------------------------------------------------  IN: 1200.4 mL / OUT: 1900 mL / NET: -699.6 mL    17 Nov 2018 07:01  -  18 Nov 2018 03:57  --------------------------------------------------------  IN: 532.8 mL / OUT: 100 mL / NET: 432.8 mL        Lines: PIV    Kim: No    Physical Exam:  Neuro: A0X3, non focal  Pulm: CtA b/l Unlabored  CV: s1/s2   reg  Abd: soft nt/nd  Ext: warm, no edema, well perfused  Inc: c/d/i

## 2018-11-18 NOTE — PROGRESS NOTE ADULT - ASSESSMENT
1) ESRD on HD  2) MBD of renal dx  3) Anemia of renal dx  4) Vol/HTN    HD today;    d/w CTICU this AM

## 2018-11-19 ENCOUNTER — TRANSCRIPTION ENCOUNTER (OUTPATIENT)
Age: 63
End: 2018-11-19

## 2018-11-19 LAB
ANION GAP SERPL CALC-SCNC: 15 MMOL/L — SIGNIFICANT CHANGE UP (ref 5–17)
BLD GP AB SCN SERPL QL: SIGNIFICANT CHANGE UP
BUN SERPL-MCNC: 21 MG/DL — HIGH (ref 8–20)
CALCIUM SERPL-MCNC: 9.5 MG/DL — SIGNIFICANT CHANGE UP (ref 8.6–10.2)
CHLORIDE SERPL-SCNC: 93 MMOL/L — LOW (ref 98–107)
CO2 SERPL-SCNC: 26 MMOL/L — SIGNIFICANT CHANGE UP (ref 22–29)
CREAT SERPL-MCNC: 4.34 MG/DL — HIGH (ref 0.5–1.3)
GLUCOSE BLDC GLUCOMTR-MCNC: 132 MG/DL — HIGH (ref 70–99)
GLUCOSE BLDC GLUCOMTR-MCNC: 137 MG/DL — HIGH (ref 70–99)
GLUCOSE BLDC GLUCOMTR-MCNC: 158 MG/DL — HIGH (ref 70–99)
GLUCOSE BLDC GLUCOMTR-MCNC: 176 MG/DL — HIGH (ref 70–99)
GLUCOSE SERPL-MCNC: 193 MG/DL — HIGH (ref 70–115)
HCT VFR BLD CALC: 29.6 % — LOW (ref 37–47)
HGB BLD-MCNC: 9 G/DL — LOW (ref 12–16)
MAGNESIUM SERPL-MCNC: 2.5 MG/DL — SIGNIFICANT CHANGE UP (ref 1.6–2.6)
MCHC RBC-ENTMCNC: 28.4 PG — SIGNIFICANT CHANGE UP (ref 27–31)
MCHC RBC-ENTMCNC: 30.4 G/DL — LOW (ref 32–36)
MCV RBC AUTO: 93.4 FL — SIGNIFICANT CHANGE UP (ref 81–99)
PHOSPHATE SERPL-MCNC: 3 MG/DL — SIGNIFICANT CHANGE UP (ref 2.4–4.7)
PLATELET # BLD AUTO: 269 K/UL — SIGNIFICANT CHANGE UP (ref 150–400)
POTASSIUM SERPL-MCNC: 3.7 MMOL/L — SIGNIFICANT CHANGE UP (ref 3.5–5.3)
POTASSIUM SERPL-SCNC: 3.7 MMOL/L — SIGNIFICANT CHANGE UP (ref 3.5–5.3)
PREALB SERPL-MCNC: 19 MG/DL — SIGNIFICANT CHANGE UP (ref 18–38)
RBC # BLD: 3.17 M/UL — LOW (ref 4.4–5.2)
RBC # FLD: 16.8 % — HIGH (ref 11–15.6)
SODIUM SERPL-SCNC: 134 MMOL/L — LOW (ref 135–145)
TYPE + AB SCN PNL BLD: SIGNIFICANT CHANGE UP
WBC # BLD: 8.7 K/UL — SIGNIFICANT CHANGE UP (ref 4.8–10.8)
WBC # FLD AUTO: 8.7 K/UL — SIGNIFICANT CHANGE UP (ref 4.8–10.8)

## 2018-11-19 PROCEDURE — 71045 X-RAY EXAM CHEST 1 VIEW: CPT | Mod: 26

## 2018-11-19 PROCEDURE — 93010 ELECTROCARDIOGRAM REPORT: CPT | Mod: 76

## 2018-11-19 PROCEDURE — 71045 X-RAY EXAM CHEST 1 VIEW: CPT | Mod: 26,77

## 2018-11-19 PROCEDURE — 99233 SBSQ HOSP IP/OBS HIGH 50: CPT

## 2018-11-19 RX ORDER — CEFAZOLIN SODIUM 1 G
2000 VIAL (EA) INJECTION
Qty: 0 | Refills: 0 | Status: COMPLETED | OUTPATIENT
Start: 2018-11-20 | End: 2018-11-20

## 2018-11-19 RX ORDER — ACETAMINOPHEN 500 MG
650 TABLET ORAL EVERY 6 HOURS
Qty: 0 | Refills: 0 | Status: DISCONTINUED | OUTPATIENT
Start: 2018-11-19 | End: 2018-11-21

## 2018-11-19 RX ORDER — SODIUM CHLORIDE 9 MG/ML
3 INJECTION INTRAMUSCULAR; INTRAVENOUS; SUBCUTANEOUS EVERY 8 HOURS
Qty: 0 | Refills: 0 | Status: DISCONTINUED | OUTPATIENT
Start: 2018-11-19 | End: 2018-11-21

## 2018-11-19 RX ORDER — OXYCODONE AND ACETAMINOPHEN 5; 325 MG/1; MG/1
1 TABLET ORAL EVERY 6 HOURS
Qty: 0 | Refills: 0 | Status: DISCONTINUED | OUTPATIENT
Start: 2018-11-19 | End: 2018-11-21

## 2018-11-19 RX ADMIN — PANTOPRAZOLE SODIUM 40 MILLIGRAM(S): 20 TABLET, DELAYED RELEASE ORAL at 22:04

## 2018-11-19 RX ADMIN — Medication 1 TABLET(S): at 06:07

## 2018-11-19 RX ADMIN — Medication 1 TABLET(S): at 22:04

## 2018-11-19 RX ADMIN — OXYCODONE AND ACETAMINOPHEN 1 TABLET(S): 5; 325 TABLET ORAL at 20:29

## 2018-11-19 RX ADMIN — Medication 1 TABLET(S): at 22:03

## 2018-11-19 RX ADMIN — Medication 100 MILLIGRAM(S): at 05:57

## 2018-11-19 RX ADMIN — ONDANSETRON 4 MILLIGRAM(S): 8 TABLET, FILM COATED ORAL at 07:37

## 2018-11-19 RX ADMIN — SODIUM CHLORIDE 3 MILLILITER(S): 9 INJECTION INTRAMUSCULAR; INTRAVENOUS; SUBCUTANEOUS at 22:13

## 2018-11-19 RX ADMIN — AMLODIPINE BESYLATE 5 MILLIGRAM(S): 2.5 TABLET ORAL at 05:57

## 2018-11-19 RX ADMIN — Medication 2: at 07:34

## 2018-11-19 RX ADMIN — Medication 75 MICROGRAM(S): at 05:57

## 2018-11-19 RX ADMIN — SIMVASTATIN 20 MILLIGRAM(S): 20 TABLET, FILM COATED ORAL at 22:04

## 2018-11-19 RX ADMIN — CLOPIDOGREL BISULFATE 75 MILLIGRAM(S): 75 TABLET, FILM COATED ORAL at 11:27

## 2018-11-19 RX ADMIN — OXYCODONE AND ACETAMINOPHEN 1 TABLET(S): 5; 325 TABLET ORAL at 20:59

## 2018-11-19 RX ADMIN — Medication 2: at 22:12

## 2018-11-19 RX ADMIN — INSULIN GLARGINE 8 UNIT(S): 100 INJECTION, SOLUTION SUBCUTANEOUS at 22:12

## 2018-11-19 RX ADMIN — Medication 1 MILLIGRAM(S): at 22:03

## 2018-11-19 RX ADMIN — Medication 325 MILLIGRAM(S): at 11:27

## 2018-11-19 RX ADMIN — Medication 100 MILLIGRAM(S): at 22:03

## 2018-11-19 NOTE — PROGRESS NOTE ADULT - SUBJECTIVE AND OBJECTIVE BOX
PROCEDURE(S): Dual Chamber Pacemaker Implant to [facility] via subclavian access    ELECTROPHYSIOLOGIST(S): Yoseph uGdino MD         COMPLICATIONS:  none        DISPOSITION:  observation     CONDITION: stable      Pt doing well s/p dual chamber pacemaker implant to [facility] via subclavian access.  Denies complaint.     MEDICATIONS  (STANDING):  amLODIPine   Tablet 5 milliGRAM(s) Oral daily  aspirin enteric coated 325 milliGRAM(s) Oral daily  clopidogrel Tablet 75 milliGRAM(s) Oral daily  docusate sodium 100 milliGRAM(s) Oral three times a day  epoetin jack Injectable 70729 Unit(s) IV Push <User Schedule>  folic acid 1 milliGRAM(s) Oral daily  insulin glargine Injectable (LANTUS) 8 Unit(s) SubCutaneous at bedtime  insulin lispro (HumaLOG) corrective regimen sliding scale   SubCutaneous Before meals and at bedtime  insulin lispro Injectable (HumaLOG) 3 Unit(s) SubCutaneous three times a day before meals  levothyroxine 75 MICROGram(s) Oral daily  Nephro-heather 1 Tablet(s) Oral daily  pantoprazole    Tablet 40 milliGRAM(s) Oral daily  polyethylene glycol 3350 17 Gram(s) Oral daily  simvastatin 20 milliGRAM(s) Oral at bedtime  sodium chloride 0.9% lock flush 3 milliLiter(s) IV Push every 8 hours    MEDICATIONS  (PRN):  acetaminophen   Tablet .. 650 milliGRAM(s) Oral every 6 hours PRN Mild Pain (1 - 3)  calcium carbonate    500 mG (Tums) Chewable 1 Tablet(s) Chew three times a day PRN Dyspepsia  ondansetron Injectable 4 milliGRAM(s) IV Push every 6 hours PRN Nausea and/or Vomiting  oxyCODONE    5 mG/acetaminophen 325 mG 1 Tablet(s) Oral every 6 hours PRN Moderate Pain (4 - 6)      Exam:   T(C): 36.8 (11-19-18 @ 08:00), Max: 37.5 (11-18-18 @ 22:00)  HR: 69 (11-19-18 @ 16:35) (54 - 81)  BP: 154/66 (11-19-18 @ 16:35) (153/70 - 190/83)  RR: 16 (11-19-18 @ 16:35) (9 - 28)  SpO2: 100% (11-19-18 @ 11:00) (92% - 100%)      Constitutional: VSS, NAD, awake, alert  Card: S1/S2, RRR, no m/g/r, Midsternal dsg c/d/i  RACW PPM site: +dsg c/d/i without bleeding, swelling, hematoma +radial pulse  Resp: lungs CTA b/l  Abd: S/NT/ND    ECG: atrial paced ventricular sensed, Twave inversion I, AVL   CXR:     A/P: 64 yo female  female with h/o ESRD on HD via left AV fistula, hypothyroidism, HTN, IDDM, who presented to [facility] on 11/6 with mild troponin elevation. Cardiac cath on 11/7 indicated multi-vessel CAD. She is now s/p 4v-CABG (LIMA-LAD, Vein PDA, Sequential -Diag/OM) with post op bradycardia and sinus pauses (9seconds) now s/p DC BSCI PPM to RACW, DDD 70-120bpm.    Plan:   Port CXR now - r/o PTX or effusion, verify lead locations.   Bedrest x 4 hours post implant, then OOB w/ assist & progress as tolerated.    Continued observation on telemetry overnight.   Cont Ancef 2gm IV q 24 hours x 1 additional dose (ESRD on HD)  Pain control with PO analgesia PRN.   Can add AV salma blocking agents as needed  PA/Lat CXR and device check in AM.   AM Labs, ECG and device interrogation   NO HEPARIN OR LOVENOX, INCLUDING PROPHYLACTIC/SUBCUT DOSING, UNTIL OTHERWISE ADVISED BY EP.   No lifting right arm over shoulder x 6 weeks  Keep site dry x 1 week  Outpt follow up site and device check 1-2 weeks, sooner if needed PROCEDURE(S): Dual Chamber Pacemaker Implant to [facility] via subclavian access    ELECTROPHYSIOLOGIST(S): Yoseph Gudino MD         COMPLICATIONS:  none        DISPOSITION:  observation     CONDITION: stable      Pt doing well s/p dual chamber pacemaker implant to [facility] via subclavian access.  Denies complaint.     MEDICATIONS  (STANDING):  amLODIPine   Tablet 5 milliGRAM(s) Oral daily  aspirin enteric coated 325 milliGRAM(s) Oral daily  clopidogrel Tablet 75 milliGRAM(s) Oral daily  docusate sodium 100 milliGRAM(s) Oral three times a day  epoetin jack Injectable 52942 Unit(s) IV Push <User Schedule>  folic acid 1 milliGRAM(s) Oral daily  insulin glargine Injectable (LANTUS) 8 Unit(s) SubCutaneous at bedtime  insulin lispro (HumaLOG) corrective regimen sliding scale   SubCutaneous Before meals and at bedtime  insulin lispro Injectable (HumaLOG) 3 Unit(s) SubCutaneous three times a day before meals  levothyroxine 75 MICROGram(s) Oral daily  Nephro-heather 1 Tablet(s) Oral daily  pantoprazole    Tablet 40 milliGRAM(s) Oral daily  polyethylene glycol 3350 17 Gram(s) Oral daily  simvastatin 20 milliGRAM(s) Oral at bedtime  sodium chloride 0.9% lock flush 3 milliLiter(s) IV Push every 8 hours    MEDICATIONS  (PRN):  acetaminophen   Tablet .. 650 milliGRAM(s) Oral every 6 hours PRN Mild Pain (1 - 3)  calcium carbonate    500 mG (Tums) Chewable 1 Tablet(s) Chew three times a day PRN Dyspepsia  ondansetron Injectable 4 milliGRAM(s) IV Push every 6 hours PRN Nausea and/or Vomiting  oxyCODONE    5 mG/acetaminophen 325 mG 1 Tablet(s) Oral every 6 hours PRN Moderate Pain (4 - 6)      Exam:   T(C): 36.8 (11-19-18 @ 08:00), Max: 37.5 (11-18-18 @ 22:00)  HR: 69 (11-19-18 @ 16:35) (54 - 81)  BP: 154/66 (11-19-18 @ 16:35) (153/70 - 190/83)  RR: 16 (11-19-18 @ 16:35) (9 - 28)  SpO2: 100% (11-19-18 @ 11:00) (92% - 100%)      Constitutional: VSS, NAD, awake, alert  Card: S1/S2, RRR, no m/g/r, Midsternal dsg c/d/i  RACW PPM site: +dsg c/d/i without bleeding, swelling, hematoma +radial pulse  Resp: lungs CTA b/l  Abd: S/NT/ND    ECG: atrial paced ventricular sensed, Twave inversion I, AVL   CXR: prelim no gross PTX, +RA/RV pacing leads    A/P: 62 yo female  female with h/o ESRD on HD via left AV fistula, hypothyroidism, HTN, IDDM, who presented to [facility] on 11/6 with mild troponin elevation. Cardiac cath on 11/7 indicated multi-vessel CAD. She is now s/p 4v-CABG (LIMA-LAD, Vein PDA, Sequential -Diag/OM) with post op bradycardia and sinus pauses (9seconds) now s/p DC BSCI PPM to RACW, DDD 70-120bpm.    Plan:   Port CXR now - r/o PTX or effusion, verify lead locations.   Bedrest x 4 hours post implant, then OOB w/ assist & progress as tolerated.    Continued observation on telemetry overnight.   Cont Ancef 2gm IV q 24 hours x 1 additional dose (ESRD on HD)  Pain control with PO analgesia PRN.   Can add AV salma blocking agents as needed  PA/Lat CXR and device check in AM.   AM Labs, ECG and device interrogation   NO HEPARIN OR LOVENOX, INCLUDING PROPHYLACTIC/SUBCUT DOSING, UNTIL OTHERWISE ADVISED BY EP.   No lifting right arm over shoulder x 6 weeks  Keep site dry x 1 week  Outpt follow up site and device check 1-2 weeks, sooner if needed PROCEDURE(S): Dual Chamber Pacemaker Implant to [facility] via subclavian access    ELECTROPHYSIOLOGIST(S): Yoseph Gudino MD         COMPLICATIONS:  none        DISPOSITION:  observation     CONDITION: stable      Pt doing well s/p dual chamber pacemaker implant to [facility] via subclavian access.  Denies complaint.     MEDICATIONS  (STANDING):  amLODIPine   Tablet 5 milliGRAM(s) Oral daily  aspirin enteric coated 325 milliGRAM(s) Oral daily  clopidogrel Tablet 75 milliGRAM(s) Oral daily  docusate sodium 100 milliGRAM(s) Oral three times a day  epoetin jack Injectable 98708 Unit(s) IV Push <User Schedule>  folic acid 1 milliGRAM(s) Oral daily  insulin glargine Injectable (LANTUS) 8 Unit(s) SubCutaneous at bedtime  insulin lispro (HumaLOG) corrective regimen sliding scale   SubCutaneous Before meals and at bedtime  insulin lispro Injectable (HumaLOG) 3 Unit(s) SubCutaneous three times a day before meals  levothyroxine 75 MICROGram(s) Oral daily  Nephro-heather 1 Tablet(s) Oral daily  pantoprazole    Tablet 40 milliGRAM(s) Oral daily  polyethylene glycol 3350 17 Gram(s) Oral daily  simvastatin 20 milliGRAM(s) Oral at bedtime  sodium chloride 0.9% lock flush 3 milliLiter(s) IV Push every 8 hours    MEDICATIONS  (PRN):  acetaminophen   Tablet .. 650 milliGRAM(s) Oral every 6 hours PRN Mild Pain (1 - 3)  calcium carbonate    500 mG (Tums) Chewable 1 Tablet(s) Chew three times a day PRN Dyspepsia  ondansetron Injectable 4 milliGRAM(s) IV Push every 6 hours PRN Nausea and/or Vomiting  oxyCODONE    5 mG/acetaminophen 325 mG 1 Tablet(s) Oral every 6 hours PRN Moderate Pain (4 - 6)      Exam:   T(C): 36.8 (11-19-18 @ 08:00), Max: 37.5 (11-18-18 @ 22:00)  HR: 69 (11-19-18 @ 16:35) (54 - 81)  BP: 154/66 (11-19-18 @ 16:35) (153/70 - 190/83)  RR: 16 (11-19-18 @ 16:35) (9 - 28)  SpO2: 100% (11-19-18 @ 11:00) (92% - 100%)      Constitutional: VSS, NAD, awake, alert  Card: S1/S2, RRR, no m/g/r, Midsternal dsg c/d/i  RACW PPM site: +dsg c/d/i without bleeding, swelling, hematoma +radial pulse  Resp: lungs CTA b/l  Abd: S/NT/ND    ECG: atrial paced ventricular sensed, Twave inversion I, AVL   CXR: prelim no gross PTX, +RA/RV pacing leads    A/P: 64 yo female  female with h/o ESRD on HD via left AV fistula, hypothyroidism, HTN, IDDM, who presented to [facility] on 11/6 with mild troponin elevation. Cardiac cath on 11/7 indicated multi-vessel CAD. She is now s/p 4v-CABG (LIMA-LAD, Vein PDA, Sequential -Diag/OM) with post op bradycardia and sinus pauses (9seconds) now s/p DC BSCI PPM to RACW, DDD 70-120bpm.    Plan:   Port CXR now - r/o PTX or effusion, verify lead locations.   Bedrest x 4 hours post implant, then OOB w/ assist & progress as tolerated.    Continued observation on telemetry overnight.   Cont Ancef 2gm IV q 24 hours x 1 additional dose (ESRD on HD)  Pain control with PO analgesia PRN.   Can add AV salma blocking agents as needed  PA/Lat CXR and device check in AM.   AM Labs, ECG and device interrogation   NO HEPARIN OR LOVENOX, INCLUDING PROPHYLACTIC/SUBCUT DOSING, UNTIL OTHERWISE ADVISED BY EP.   No lifting right arm over shoulder x 6 weeks  Keep site dry x 1 week  Outpt follow up site and device check 1-2 weeks, sooner if needed  Continued plan and disposition per CTSx team

## 2018-11-19 NOTE — DISCHARGE NOTE ADULT - COMMUNITY RESOURCES
Resume Dialysis at [facility]-[address] [place] [phone]  Tues/Thurs/Sat at 5 am- resume 11/22/18 Resume Dialysis at [facility]-[address] [place] [phone]     MADELYN HERNANDEZ NP (CARE NAVIGATOR) [phone]  Tues/Thurs/Sat at 5 am- resume 11/22/18

## 2018-11-19 NOTE — PROGRESS NOTE ADULT - SUBJECTIVE AND OBJECTIVE BOX
SUBJECTIVE   daughter translated   without acute concerns at this [facility]     INTERIM HISTORY SIGNIFICANT FOR   9 second pause overnight   now NPO for PPM in AM     Patient is a 63y old  Female who presents with a chief complaint of chest pain (18 Nov 2018 13:53)    HPI:  62 y/o female who was getting her dialysis today and towards the end of dialysis she felt mid sternal pressure type cp, no radiation, lasted for about 10 minutes, + nausea, no diaphoresis no sob. no abd. pain. no cough, no fever. no diarrhea. pt. reports no cp at the time of admission.   As per daughter she is on Eliquis for her afib. Cardiologist plans to do cardiac cath in am and wants AC to be on hold. (06 Nov 2018 16:55)    OBJECTIVE  PAST MEDICAL & SURGICAL HISTORY:  Hypothyroidism, unspecified type  Hemodialysis patient: tues thursday saturday  Renal failure  Hypertension  Diabetes mellitus  A-V fistula  S/P cholecystectomy    No Known Allergies  OHS (Unknown)    Home Medications:  alendronate 70 mg oral tablet: 1 tab(s) orally once a week (25 Sep 2018 04:15)  aspirin 81 mg oral delayed release tablet: 1 tab(s) orally once a day (24 Mar 2016 09:14)  Basaglar KwikPen:  (25 Sep 2018 04:14)  calcium acetate 667 mg oral tablet: 1 tab(s) orally 3 times a day (25 Sep 2018 04:14)  cloNIDine 0.1 mg oral tablet: 1 tab(s) orally 3 times a day (25 Sep 2018 04:12)  folic acid 1 mg oral tablet: 1 tab(s) orally once a day (24 Mar 2016 09:14)  hydrALAZINE 100 mg oral tablet: 1 tab(s) orally 3 times a day (25 Sep 2018 04:13)  levothyroxine 75 mcg (0.075 mg) oral tablet: 1 tab(s) orally once a day (24 Mar 2016 09:14)  Nephplex Rx oral tablet: 1 tab(s) orally once a day (24 Mar 2016 09:14)  NIFEdipine 90 mg oral tablet, extended release: 1 tab(s) orally once a day (24 Mar 2016 09:14)  pantoprazole 40 mg oral delayed release tablet: 1 tab(s) orally once a day (before a meal) (24 Mar 2016 09:14)  Reglan 5 mg oral tablet: 1 tab(s) orally 3 times a day (25 Sep 2018 04:15)  simvastatin 20 mg oral tablet: 1 tab(s) orally once a day (at bedtime) (25 Sep 2018 04:12)    VITALS  Currently BRADYCARDIA  ICU Vital Signs Last 24 Hrs  T(C): 37.5 (18 Nov 2018 22:00), Max: 37.5 (18 Nov 2018 22:00)  T(F): 99.5 (18 Nov 2018 22:00), Max: 99.5 (18 Nov 2018 22:00)  HR: 62 (19 Nov 2018 00:00) (54 - 77)  BP: 172/78 (19 Nov 2018 00:00) (120/89 - 197/113)  BP(mean): 112 (19 Nov 2018 00:00) (87 - 150)  ABP: --  ABP(mean): --  RR: 17 (19 Nov 2018 00:00) (12 - 31)  SpO2: 100% (19 Nov 2018 00:00) (92% - 100%)    Adult Advanced Hemodynamics Last 24 Hrs  CVP(mm Hg): --  CVP(cm H2O): --  CO: --  CI: --  PA: --  PA(mean): --  PCWP: --  SVR: --  SVRI: --  PVR: --  PVRI: --  LABS                        9.2    10.4  )-----------( 203      ( 18 Nov 2018 03:06 )             30.3   11-18    135  |  93<L>  |  37.0<H>  ----------------------------<  150<H>  4.3   |  25.0  |  5.94<H>    Ca    9.7      18 Nov 2018 03:06  Phos  4.3     11-18  Mg     2.7     11-18    CAPILLARY BLOOD GLUCOSE      POCT Blood Glucose.: 142 mg/dL (18 Nov 2018 21:17)          IN/OUT    11-17-18 @ 07:01  -  11-18-18 @ 07:00  --------------------------------------------------------  IN: 617.6 mL / OUT: 100 mL / NET: 517.6 mL    11-18-18 @ 07:01  -  11-19-18 @ 02:07  --------------------------------------------------------  IN: 1240.2 mL / OUT: 1600 mL / NET: -359.8 mL      IMAGING  personally reviewed imaging   Xray Chest 1 View- PORTABLE-Routine:    EXAM:  XR CHEST PORTABLE ROUTINE 1V                          PROCEDURE DATE:  11/18/2018          INTERPRETATION:  Clinical information: Chest pain. Post cardiac surgery.    Portable AP radiograph of the chest was performed.    Comparison: 11/17/2018 at 6:43 AM    There has been median sternotomy. The heart is enlarged. There is   pulmonary vascular congestion and small bilateral pleural effusions again   noted. There is linear atelectasis in the right mid lung again noted.      Impression:    No interval change.                        ROBIN MAZA M.D., ATTENDING RADIOLOGIST  This document has been electronically signed. Nov 18 2018 12:52PM               (11-18-18 @ 07:39)    CURRENT MEDICATIONS  MEDICATIONS  (STANDING):  amLODIPine   Tablet 5 milliGRAM(s) Oral daily  aspirin enteric coated 325 milliGRAM(s) Oral daily  clopidogrel Tablet 75 milliGRAM(s) Oral daily  docusate sodium 100 milliGRAM(s) Oral three times a day  DOPamine Infusion 3 MICROgram(s)/kG/Min (10.642 mL/Hr) IV Continuous <Continuous>  epoetin jack Injectable 76395 Unit(s) IV Push <User Schedule>  folic acid 1 milliGRAM(s) Oral daily  insulin glargine Injectable (LANTUS) 8 Unit(s) SubCutaneous at bedtime  insulin lispro (HumaLOG) corrective regimen sliding scale   SubCutaneous Before meals and at bedtime  insulin lispro Injectable (HumaLOG) 3 Unit(s) SubCutaneous three times a day before meals  levothyroxine 75 MICROGram(s) Oral daily  Nephro-heather 1 Tablet(s) Oral daily  pantoprazole    Tablet 40 milliGRAM(s) Oral daily  polyethylene glycol 3350 17 Gram(s) Oral daily  simvastatin 20 milliGRAM(s) Oral at bedtime    MEDICATIONS  (PRN):  calcium carbonate    500 mG (Tums) Chewable 1 Tablet(s) Chew three times a day PRN Dyspepsia  ondansetron Injectable 4 milliGRAM(s) IV Push every 6 hours PRN Nausea and/or Vomiting      Invasive Lines & Indwelling Catheters  none

## 2018-11-19 NOTE — DISCHARGE NOTE ADULT - OTHER SIGNIFICANT FINDINGS
Patient assessment, examination, discharge planning, coordination of care, patient and family education and counseling took me 45 minutes to complete.

## 2018-11-19 NOTE — DISCHARGE NOTE ADULT - PLAN OF CARE
rest and recover Please come to ED or Call Cardio thoracic office at [phone] if Chest pain, Shortness of Breath,  Nausea & vomiting, oozing from wounds, 2 lb increase in weight in 24 hours.   Shower daily, no bathing swimming in a pool or the ocean until instructed by MD.  We advise that you do not drive until instructed by MD. You may not return to work until instructed by MD. Please eat a low fat low salt diet. Weigh yourself daily and record it in the weight log in your red folder.  No Ointments creams lotions to wounds DM As per endocrinology, hold off on any insulin and check your sugars before each meal.  If they are consistently > 200 then resume your lantus at 10 units at night

## 2018-11-19 NOTE — DISCHARGE NOTE ADULT - CARE PLAN
Principal Discharge DX:	Coronary artery disease involving native coronary artery of native heart with unstable angina pectoris  Goal:	rest and recover  Assessment and plan of treatment:	Please come to ED or Call Cardio thoracic office at [phone] if Chest pain, Shortness of Breath,  Nausea & vomiting, oozing from wounds, 2 lb increase in weight in 24 hours.   Shower daily, no bathing swimming in a pool or the ocean until instructed by MD.  We advise that you do not drive until instructed by MD. You may not return to work until instructed by MD. Please eat a low fat low salt diet. Weigh yourself daily and record it in the weight log in your red folder.  No Ointments creams lotions to wounds  Goal:	DM  Assessment and plan of treatment:	As per endocrinology, hold off on any insulin and check your sugars before each meal.  If they are consistently > 200 then resume your lantus at 10 units at night

## 2018-11-19 NOTE — DISCHARGE NOTE ADULT - HOSPITAL COURSE
11/13  C4L (LIMA to LAD, SVG-PDA, SVG-Seq Diag/OM), OR with A-Fib, Asystole post pump, SB on arrival to CTICU-improve to SR. 11/14: Overnight episodes of bradycardia (40’s) req pacing. EP consult> monitor off pacing ; CHB / hypotension à V pacing.   11/16 epicardial PW out with CT; emergent TVP placed; non capturing; eval for PPM by Dym decision made to watch  11/17 MEERA 120s, amio low dose started.  9 sec pause, started on dopamine   11/19 dual chamber BS PPM DDD   11/20 HD, insulin d/c’d per endo  ***d/c home on nothing and pt told to check fingersticks and if consistently >200 start lantus 10 (that’s was pt was on preop)

## 2018-11-19 NOTE — DISCHARGE NOTE ADULT - CARE PROVIDERS DIRECT ADDRESSES
,DirectAddress_Unknown ,DirectAddress_Unknown,[email or web address],DirectAddress_Unknown ,DirectAddress_Unknown,[email or web address],DirectAddress_Unknown,[email or web address]

## 2018-11-19 NOTE — PROGRESS NOTE ADULT - SUBJECTIVE AND OBJECTIVE BOX
[facility] DIVISION OF KIDNEY DISEASES AND HYPERTENSION -- FOLLOW UP NOTE  --------------------------------------------------------------------------------  Chief Complaint:  ESRD on HD    24 hour events/subjective:  Pt seen and examined in ICU today  NAD  HD in AM (11/20)      PAST HISTORY  --------------------------------------------------------------------------------  No significant changes to PMH, PSH, FHx, SHx, unless otherwise noted    ALLERGIES & MEDICATIONS  --------------------------------------------------------------------------------  Allergies    No Known Allergies    Intolerances    OHS (Unknown)    Standing Inpatient Medications  amLODIPine   Tablet 5 milliGRAM(s) Oral daily  aspirin enteric coated 325 milliGRAM(s) Oral daily  clopidogrel Tablet 75 milliGRAM(s) Oral daily  docusate sodium 100 milliGRAM(s) Oral three times a day  DOPamine Infusion 4 MICROgram(s)/kG/Min IV Continuous <Continuous>  epoetin jack Injectable 39560 Unit(s) IV Push <User Schedule>  folic acid 1 milliGRAM(s) Oral daily  insulin glargine Injectable (LANTUS) 8 Unit(s) SubCutaneous at bedtime  insulin lispro (HumaLOG) corrective regimen sliding scale   SubCutaneous Before meals and at bedtime  insulin lispro Injectable (HumaLOG) 3 Unit(s) SubCutaneous three times a day before meals  levothyroxine 75 MICROGram(s) Oral daily  Nephro-heather 1 Tablet(s) Oral daily  pantoprazole    Tablet 40 milliGRAM(s) Oral daily  polyethylene glycol 3350 17 Gram(s) Oral daily  simvastatin 20 milliGRAM(s) Oral at bedtime    PRN Inpatient Medications  calcium carbonate    500 mG (Tums) Chewable 1 Tablet(s) Chew three times a day PRN  ondansetron Injectable 4 milliGRAM(s) IV Push every 6 hours PRN      REVIEW OF SYSTEMS  --------------------------------------------------------------------------------  Gen: No weight changes, fatigue, fevers/chills, weakness  Skin: No rashes  Head/Eyes/Ears/Mouth: No headache; Normal hearing; Normal vision w/o blurriness; No sinus pain/discomfort, sore throat  Respiratory: No dyspnea, cough, wheezing, hemoptysis  CV: No chest pain, PND, orthopnea  GI: No abdominal pain, diarrhea, constipation, nausea, vomiting, melena, hematochezia  : No increased frequency, dysuria, hematuria, nocturia  MSK: No joint pain/swelling; no back pain; no edema  Neuro: No dizziness/lightheadedness, weakness, seizures, numbness, tingling  Heme: No easy bruising or bleeding  Endo: No heat/cold intolerance  Psych: No significant nervousness, anxiety, stress, depression    All other systems were reviewed and are negative, except as noted.    VITALS/PHYSICAL EXAM  --------------------------------------------------------------------------------  T(C): 36.8 (11-19-18 @ 08:00), Max: 37.5 (11-18-18 @ 22:00)  HR: 68 (11-19-18 @ 11:00) (54 - 81)  BP: 181/75 (11-19-18 @ 11:00) (120/89 - 197/113)  RR: 22 (11-19-18 @ 11:00) (9 - 28)  SpO2: 100% (11-19-18 @ 11:00) (92% - 100%)  Wt(kg): --  Height (cm): 162.56 (11-19-18 @ 09:57)  Weight (kg): 94.6 (11-19-18 @ 09:57)  BMI (kg/m2): 35.8 (11-19-18 @ 09:57)  BSA (m2): 1.99 (11-19-18 @ 09:57)      11-18-18 @ 07:01  -  11-19-18 @ 07:00  --------------------------------------------------------  IN: 1335.9 mL / OUT: 1600 mL / NET: -264.1 mL    11-19-18 @ 07:01  -  11-19-18 @ 12:35  --------------------------------------------------------  IN: 71 mL / OUT: 0 mL / NET: 71 mL      Physical Exam:  	Gen: NAD, well-appearing  	HEENT: PERRL, supple neck, clear oropharynx  	Pulm: CTA B/L  	CV: RRR, S1S2; no rub  	Back: No spinal or CVA tenderness; no sacral edema  	Abd: +BS, soft, nontender/nondistended  	: No suprapubic tenderness  	UE: Warm, FROM, no clubbing, intact strength; no edema; no asterixis  	LE: Warm, FROM, no clubbing, intact strength; no edema  	Neuro: No focal deficits, intact gait  	Psych: Normal affect and mood  	Skin: Warm, without rashes  	Vascular access:    LABS/STUDIES  --------------------------------------------------------------------------------              9.0    8.7   >-----------<  269      [11-19-18 @ 02:30]              29.6     134  |  93  |  21.0  ----------------------------<  193      [11-19-18 @ 02:30]  3.7   |  26.0  |  4.34        Ca     9.5     [11-19-18 @ 02:30]      Mg     2.5     [11-19-18 @ 02:30]      Phos  3.0     [11-19-18 @ 02:30]            Creatinine Trend:  SCr 4.34 [11-19 @ 02:30]  SCr 5.94 [11-18 @ 03:06]  SCr 4.72 [11-17 @ 03:31]  SCr 6.39 [11-16 @ 03:55]  SCr 4.41 [11-15 @ 03:19]        PTH -- (Ca 9.8)      [11-13-18 @ 16:19]   551  Vitamin D (25OH) 25.0      [11-13-18 @ 16:38]  HbA1c 6.5      [11-07-18 @ 18:50]  TSH 2.27      [11-07-18 @ 18:50]

## 2018-11-19 NOTE — PROGRESS NOTE ADULT - ASSESSMENT
1) ESRD on HD  2) MBD of renal dx  3) Anemia of renal dx  4) Vol/HTN    HD in AM - 11/20/18  Continue current management as per CICU  Will continue to follow

## 2018-11-19 NOTE — DISCHARGE NOTE ADULT - PATIENT PORTAL LINK FT
You can access the [facility] Patient Portal, offered by [facility], by registering with the following website: [email or web address]

## 2018-11-19 NOTE — DISCHARGE NOTE ADULT - MEDICATION SUMMARY - MEDICATIONS TO TAKE
I will START or STAY ON the medications listed below when I get home from the hospital:    acetaminophen 325 mg oral tablet  -- 2 tab(s) by mouth every 6 hours, As needed, Mild Pain (1 - 3)  -- Indication: For Pain    oxyCODONE-acetaminophen 5 mg-325 mg oral tablet  -- 1 tab(s) by mouth every 6 hours, As needed, Moderate Pain (4 - 6) MDD:4  -- Indication: For Pain    aspirin 325 mg oral delayed release tablet  -- 1 tab(s) by mouth once a day  -- Indication: For Cad    calcium carbonate 500 mg (200 mg elemental calcium) oral tablet, chewable  -- 1 tab(s) by mouth 3 times a day, As needed, Dyspepsia  -- Indication: For GERD    Reglan 5 mg oral tablet  -- 1 tab(s) by mouth 3 times a day  -- Indication: For GI    simvastatin 20 mg oral tablet  -- 1 tab(s) by mouth once a day (at bedtime)  -- Indication: For HLD    clopidogrel 75 mg oral tablet  -- 1 tab(s) by mouth once a day  -- Indication: For CaD    Xanax 0.25 mg oral tablet  -- 1 tab(s) by mouth 3 times a day, As Needed MDD:0.75mg daily  -- Indication: For Anxiety    metoprolol tartrate 25 mg oral tablet  -- 0.5 tab(s) by mouth 2 times a day   -- Indication: For HTN    alendronate 70 mg oral tablet  -- 1 tab(s) by mouth once a week  -- Indication: For Bone    ipratropium-albuterol 0.5 mg-2.5 mg/3 mLinhalation solution  -- 3 milliliter(s) inhaled every 6 hours  -- Indication: For CoPD    amLODIPine 5 mg oral tablet  -- 1 tab(s) by mouth once a day  -- Indication: For HTN    docusate sodium 100 mg oral capsule  -- 1 cap(s) by mouth 3 times a day  -- Indication: For Stool softener    calcium acetate 667 mg oral tablet  -- 1 tab(s) by mouth 3 times a day  -- Indication: For Calcium    pantoprazole 40 mg oral delayed release tablet  -- 1 tab(s) by mouth once a day (before a meal)  -- Indication: For GERD    levothyroxine 75 mcg (0.075 mg) oral tablet  -- 1 tab(s) by mouth once a day  -- Indication: For Hypothyroidism, unspecified type    Nephplex Rx oral tablet  -- 1 tab(s) by mouth once a day  -- Indication: For vitamin    folic acid 1 mg oral tablet  -- 1 tab(s) by mouth once a day  -- Indication: For folic acid

## 2018-11-19 NOTE — PROGRESS NOTE ADULT - ASSESSMENT
63F  ESRD ON HD, CAD, DM now POD#5C4L.  Postop cardiogenoic shock requiring iontropic support now resolved,  CHB requiring epicardial pacing since resolved, Afib w/ RVR with conversion pause of 9 seconds now on dopamine infusion.    -NPO   -PPM in AM

## 2018-11-19 NOTE — DISCHARGE NOTE ADULT - PROVIDER TOKENS
TOKDARIANA:'[id number]' TOKEN:'[id number]',TOKEN:'[id number]:'[id number]' TOKEN:'[id number]',TOKEN:'[id number]:'[id number]',TOKEN:'[id number]'

## 2018-11-19 NOTE — DISCHARGE NOTE ADULT - ADDITIONAL INSTRUCTIONS
Post Operative Pacemaker Device Instructions  -please call [facility] to schedule a 2 week post pacemaker follow up, sooner if needed  - Bruising around the implant site or over the chest, side or arm near the incision is normal, and will take a few weeks to resolve.  -Do not lift the affected arm higher than 90 degrees (shoulder height) in any direction for 6 weeks.   - Do not push, pull or lift anything heavier than 10 lbs (about a gallon of milk) with the affected arm for 4 weeks.     -keep site dry for one week  - Do not touch the incision until it is completely healed.   - There are Steristrips (white strips of tape) on your incision, which will start to peel off on their own over the next 2-3 weeks. Do not pick at or peel off the Steristrips.   - Do not apply soaps, creams, lotions, ointments or powders to the incision until it is completely healed.  You should call the doctor if:   - you notice redness, drainage, swelling, increased tenderness, hot sensation around the  incision, bleeding or incision edges pulling apart.  - your temperature is greater than 100 degress F for more than 24 hours.  - you notice swelling or bulging at the incision or around the device that was not there when you left the hospital or is increasing in size.  -you experience increased difficulty breathing.  - you notice new/worsening swelling in your legs and ankles.  - you faint or have dizzy spells.  -you have any questions or concerns regarding your device or the procedure.

## 2018-11-19 NOTE — DISCHARGE NOTE ADULT - MEDICATION SUMMARY - MEDICATIONS TO CHANGE
I will SWITCH the dose or number of times a day I take the medications listed below when I get home from the hospital:    aspirin 81 mg oral delayed release tablet  -- 1 tab(s) by mouth once a day    metoprolol tartrate 25 mg oral tablet  -- 1 tab(s) by mouth 2 times a day

## 2018-11-19 NOTE — DISCHARGE NOTE ADULT - INSTRUCTIONS
A registered dietician can help you create a personalized plan for healthy eating. Make your calories count by choosin. Healthy carbohydrates such as fruits, vegetables, whole grains, legumes (beans, peas and lentils) and low-fat dairy products.  2. Fiber-rich foods such as vegetables, fruits, nuts, legumes, whole-wheat flour and wheat bran.  3. Heart-healthy fish at least twice a week such as cod, tuna and halibut, which are low-fat options as well as salmon, mackerel, tuna, sardines and bluefish, which are rich in omega-3 fatty acids. Avoid fish with high levels of mercury.  4. "Good" fats such as avocados, almonds, pecans, walnuts, olives and canola, olive and peanut oils.     Minimize foods with high saturated fats (beef, hot dogs, sausage and zamarripa), trans fats (processed snacks, baked goods, shortening and stick margarines), high cholesterol foods (high fat dairy products and animal proteins, fried food) and high sodium foods (fast food, many frozen foods, processed food).

## 2018-11-19 NOTE — DISCHARGE NOTE ADULT - CARE PROVIDER_API CALL
Yoseph Gudino), Cardiac Electrophysiology; Cardiovascular Disease  [address]  [address]  [place] [address]  Phone: [phone]  Fax: [phone] Yoseph Gudino), Cardiac Electrophysiology; Cardiovascular Disease  [address]  [address]  [place] [address]  Phone: [phone]  Fax: [phone]    Jovan Cavazos (MD), Surgery; Thoracic and Cardiac Surgery  [address] [place] [address]  Phone: [phone]  Fax: [phone]    Venancio Orourke), EndocrinologyMetabDiabetes; Internal Medicine  [address] [place] [address]  Phone: [phone]  Fax: [phone] Yoseph Gudino), Cardiac Electrophysiology; Cardiovascular Disease  [address] [facility]  [address]  [place] [address]  Phone: [phone]  Fax: [phone]    Jovan Cavazos (MD), Surgery; Thoracic and Cardiac Surgery  [address] [place] [address]  Phone: [phone]  Fax: [phone]    Venancio Orourke), EndocrinologyMetabDiabetes; Internal Medicine  [address] [place] [address]  Phone: [phone]  Fax: [phone]    Son Wilkins (DO), Internal Medicine  [address] [place] [address]  Phone: [phone]  Fax: [phone]

## 2018-11-20 LAB
ANION GAP SERPL CALC-SCNC: 15 MMOL/L — SIGNIFICANT CHANGE UP (ref 5–17)
BUN SERPL-MCNC: 30 MG/DL — HIGH (ref 8–20)
CALCIUM SERPL-MCNC: 9.6 MG/DL — SIGNIFICANT CHANGE UP (ref 8.6–10.2)
CHLORIDE SERPL-SCNC: 97 MMOL/L — LOW (ref 98–107)
CO2 SERPL-SCNC: 26 MMOL/L — SIGNIFICANT CHANGE UP (ref 22–29)
CREAT SERPL-MCNC: 6.25 MG/DL — HIGH (ref 0.5–1.3)
GLUCOSE BLDC GLUCOMTR-MCNC: 100 MG/DL — HIGH (ref 70–99)
GLUCOSE BLDC GLUCOMTR-MCNC: 115 MG/DL — HIGH (ref 70–99)
GLUCOSE BLDC GLUCOMTR-MCNC: 175 MG/DL — HIGH (ref 70–99)
GLUCOSE BLDC GLUCOMTR-MCNC: 176 MG/DL — HIGH (ref 70–99)
GLUCOSE SERPL-MCNC: 63 MG/DL — LOW (ref 70–115)
HCT VFR BLD CALC: 28.7 % — LOW (ref 37–47)
HGB BLD-MCNC: 8.7 G/DL — LOW (ref 12–16)
MAGNESIUM SERPL-MCNC: 2.5 MG/DL — SIGNIFICANT CHANGE UP (ref 1.6–2.6)
MCHC RBC-ENTMCNC: 28.3 PG — SIGNIFICANT CHANGE UP (ref 27–31)
MCHC RBC-ENTMCNC: 30.3 G/DL — LOW (ref 32–36)
MCV RBC AUTO: 93.5 FL — SIGNIFICANT CHANGE UP (ref 81–99)
PHOSPHATE SERPL-MCNC: 4 MG/DL — SIGNIFICANT CHANGE UP (ref 2.4–4.7)
PLATELET # BLD AUTO: 265 K/UL — SIGNIFICANT CHANGE UP (ref 150–400)
POTASSIUM SERPL-MCNC: 3.8 MMOL/L — SIGNIFICANT CHANGE UP (ref 3.5–5.3)
POTASSIUM SERPL-SCNC: 3.8 MMOL/L — SIGNIFICANT CHANGE UP (ref 3.5–5.3)
RBC # BLD: 3.07 M/UL — LOW (ref 4.4–5.2)
RBC # FLD: 17.2 % — HIGH (ref 11–15.6)
SODIUM SERPL-SCNC: 138 MMOL/L — SIGNIFICANT CHANGE UP (ref 135–145)
WBC # BLD: 8.2 K/UL — SIGNIFICANT CHANGE UP (ref 4.8–10.8)
WBC # FLD AUTO: 8.2 K/UL — SIGNIFICANT CHANGE UP (ref 4.8–10.8)

## 2018-11-20 PROCEDURE — 99232 SBSQ HOSP IP/OBS MODERATE 35: CPT

## 2018-11-20 PROCEDURE — 71045 X-RAY EXAM CHEST 1 VIEW: CPT | Mod: 26

## 2018-11-20 PROCEDURE — 93010 ELECTROCARDIOGRAM REPORT: CPT

## 2018-11-20 PROCEDURE — 90937 HEMODIALYSIS REPEATED EVAL: CPT

## 2018-11-20 PROCEDURE — 71046 X-RAY EXAM CHEST 2 VIEWS: CPT | Mod: 26

## 2018-11-20 RX ORDER — SORBITOL SOLUTION 70 %
30 SOLUTION, ORAL MISCELLANEOUS ONCE
Qty: 0 | Refills: 0 | Status: COMPLETED | OUTPATIENT
Start: 2018-11-20 | End: 2018-11-20

## 2018-11-20 RX ORDER — METOPROLOL TARTRATE 50 MG
12.5 TABLET ORAL
Qty: 0 | Refills: 0 | Status: DISCONTINUED | OUTPATIENT
Start: 2018-11-20 | End: 2018-11-21

## 2018-11-20 RX ADMIN — Medication 325 MILLIGRAM(S): at 11:06

## 2018-11-20 RX ADMIN — PANTOPRAZOLE SODIUM 40 MILLIGRAM(S): 20 TABLET, DELAYED RELEASE ORAL at 11:17

## 2018-11-20 RX ADMIN — Medication 2: at 17:14

## 2018-11-20 RX ADMIN — Medication 650 MILLIGRAM(S): at 00:29

## 2018-11-20 RX ADMIN — Medication 1 TABLET(S): at 22:18

## 2018-11-20 RX ADMIN — Medication 650 MILLIGRAM(S): at 12:00

## 2018-11-20 RX ADMIN — OXYCODONE AND ACETAMINOPHEN 1 TABLET(S): 5; 325 TABLET ORAL at 06:09

## 2018-11-20 RX ADMIN — Medication 650 MILLIGRAM(S): at 00:59

## 2018-11-20 RX ADMIN — SODIUM CHLORIDE 3 MILLILITER(S): 9 INJECTION INTRAMUSCULAR; INTRAVENOUS; SUBCUTANEOUS at 12:50

## 2018-11-20 RX ADMIN — OXYCODONE AND ACETAMINOPHEN 1 TABLET(S): 5; 325 TABLET ORAL at 07:00

## 2018-11-20 RX ADMIN — Medication 1 TABLET(S): at 11:07

## 2018-11-20 RX ADMIN — Medication 30 MILLILITER(S): at 14:28

## 2018-11-20 RX ADMIN — ERYTHROPOIETIN 10000 UNIT(S): 10000 INJECTION, SOLUTION INTRAVENOUS; SUBCUTANEOUS at 09:43

## 2018-11-20 RX ADMIN — Medication 75 MICROGRAM(S): at 06:09

## 2018-11-20 RX ADMIN — Medication 100 MILLIGRAM(S): at 06:09

## 2018-11-20 RX ADMIN — OXYCODONE AND ACETAMINOPHEN 1 TABLET(S): 5; 325 TABLET ORAL at 14:28

## 2018-11-20 RX ADMIN — Medication 650 MILLIGRAM(S): at 11:08

## 2018-11-20 RX ADMIN — CLOPIDOGREL BISULFATE 75 MILLIGRAM(S): 75 TABLET, FILM COATED ORAL at 11:06

## 2018-11-20 RX ADMIN — POLYETHYLENE GLYCOL 3350 17 GRAM(S): 17 POWDER, FOR SOLUTION ORAL at 11:05

## 2018-11-20 RX ADMIN — Medication 1 TABLET(S): at 11:06

## 2018-11-20 RX ADMIN — SODIUM CHLORIDE 3 MILLILITER(S): 9 INJECTION INTRAMUSCULAR; INTRAVENOUS; SUBCUTANEOUS at 21:49

## 2018-11-20 RX ADMIN — OXYCODONE AND ACETAMINOPHEN 1 TABLET(S): 5; 325 TABLET ORAL at 22:17

## 2018-11-20 RX ADMIN — Medication 100 MILLIGRAM(S): at 14:29

## 2018-11-20 RX ADMIN — Medication 1 TABLET(S): at 06:12

## 2018-11-20 RX ADMIN — Medication 1 MILLIGRAM(S): at 11:07

## 2018-11-20 RX ADMIN — Medication 100 MILLIGRAM(S): at 16:36

## 2018-11-20 RX ADMIN — Medication 12.5 MILLIGRAM(S): at 11:16

## 2018-11-20 RX ADMIN — Medication 2: at 22:18

## 2018-11-20 RX ADMIN — ONDANSETRON 4 MILLIGRAM(S): 8 TABLET, FILM COATED ORAL at 05:59

## 2018-11-20 RX ADMIN — SIMVASTATIN 20 MILLIGRAM(S): 20 TABLET, FILM COATED ORAL at 22:17

## 2018-11-20 RX ADMIN — OXYCODONE AND ACETAMINOPHEN 1 TABLET(S): 5; 325 TABLET ORAL at 14:56

## 2018-11-20 RX ADMIN — AMLODIPINE BESYLATE 5 MILLIGRAM(S): 2.5 TABLET ORAL at 06:09

## 2018-11-20 RX ADMIN — SODIUM CHLORIDE 3 MILLILITER(S): 9 INJECTION INTRAMUSCULAR; INTRAVENOUS; SUBCUTANEOUS at 06:13

## 2018-11-20 RX ADMIN — Medication 12.5 MILLIGRAM(S): at 22:23

## 2018-11-20 NOTE — PROGRESS NOTE ADULT - ASSESSMENT
AM labs stable, pt at HD now,    Per EPS,    ECG and device interrogation wnl   Pain control with PO analgesia PRN.   Can add AV salma blocking agents as needed  PA/Lat CXR prior to dc, CTSx will follow up  NO HEPARIN OR LOVENOX, INCLUDING PROPHYLACTIC/ SUBCUTANEOUS  DOSING,  No lifting right arm over shoulder x 6 weeks  Keep site dry x 1 week  OP  follow up -  site and device Evaln.,  1-2 weeks, or sooner if needed.     Device care, restrictions, follow up and booklet reviewed with daughter,

## 2018-11-20 NOTE — PROGRESS NOTE ADULT - ASSESSMENT
64 y/o female withsignificant PMH of ESRD on HD, hypothyroidism, HTN, DM II A1C 6.5, chronic combined systolic and diastolic heart failure, PAF (on Eliquis),  initially presented to [facility] on 11/6 with complaints of chest pain found to have NSTEMI. Cardiac cath on 11/7 indicated multi-vessel CAD. While inpatient, she has been seen by Nephrology team for HD and Cardiology. While awaiting surgery, CT done during work up showed a left adrenal mass that was assessed by Dr. Cavazos. Ultimately, she completed work up and had a four-vessel CABG (LIMA-LAD, Vein-PDA, Sequential -Diag/OM) on 11/13/18. Preop, patient was in A-fib and she was asystolic once off cardiopulmonary bypass requiring V-pacing with epicardial wires. Upon arrival to CTICU, she was sinus bradycardic which had improved to sinus rhythm. Post-operative issues include episodes of bradycardia (SB vs CHB) requiring pacing with return to NSR. s/p pacemaker placement     1. T2DM comp by ESRD, CAD - glucoses well controlled off insulin drip  - cont Lantus 8 units, restart low dose Humalog premeal when she eating meals  - will follow    2. Hypothyroid - euthyroid, cont current LT4 dose    3. 7 cm Left adrenal mass c/w myelolipoma which is typically benign - rec outpatient work up 64 y/o female withsignificant PMH of ESRD on HD, hypothyroidism, HTN, DM II A1C 6.5, chronic combined systolic and diastolic heart failure, PAF (on Eliquis),  initially presented to [facility] on 11/6 with complaints of chest pain found to have NSTEMI. Cardiac cath on 11/7 indicated multi-vessel CAD. While inpatient, she has been seen by Nephrology team for HD and Cardiology. While awaiting surgery, CT done during work up showed a left adrenal mass that was assessed by Dr. Cavazos. Ultimately, she completed work up and had a four-vessel CABG (LIMA-LAD, Vein-PDA, Sequential -Diag/OM) on 11/13/18. Preop, patient was in A-fib and she was asystolic once off cardiopulmonary bypass requiring V-pacing with epicardial wires. Upon arrival to CTICU, she was sinus bradycardic which had improved to sinus rhythm. Post-operative issues include episodes of bradycardia (SB vs CHB) requiring pacing with return to NSR. s/p pacemaker placement     1. T2DM comp by ESRD, CAD - glucoses well controlled off insulin drip  - hypoglycemia on chemistry  - aim is not tight control in elderly patient with ESRD  - dc lantus/lispro  - patient eats more in the hospital than at home  - if FS >200 consistent, patient can restart basaglar 10 units at home (discussed with daughter)  - patient should follow up with us in the clinic (given patient [facility] and [facility] phone numbers)    2. Hypothyroid - euthyroid, cont current LT4 dose    3. 7 cm Left adrenal mass c/w myelolipoma which is typically benign - rec outpatient work up

## 2018-11-20 NOTE — PROGRESS NOTE ADULT - SUBJECTIVE AND OBJECTIVE BOX
Patient was seen and evaluated on dialysis.   No c/o CP SOB NV  no F/C  no swelling    T(C): 36.4 (11-20-18 @ 10:15), Max: 37 (11-20-18 @ 05:00)  HR: 70 (11-20-18 @ 10:15) (69 - 71)  BP: 144/59 (11-20-18 @ 10:15) (133/59 - 160/70)  Wt(kg): --  PE ;  NAD, Pale,  lungs - CTA  CV gr 1 murmer,  No gallop or rub, PPM,  Abd : soft, NT BS +, No masses  Ext- No edema  Neuro : Grossly intact, moving extremities                         8.7    8.2   )-----------( 265      ( 20 Nov 2018 06:07 )             28.7        11-20    138  |  97<L>  |  30.0<H>  ----------------------------<  63<L>  3.8   |  26.0  |  6.25<H>    Ca    9.6      20 Nov 2018 06:05  Phos  4.0     11-20  Mg     2.5     11-20        MEDICATIONS  (STANDING):  acetaminophen   Tablet .. PRN  amLODIPine   Tablet  aspirin enteric coated  calcium carbonate    500 mG (Tums) Chewable PRN  ceFAZolin   IVPB  clopidogrel Tablet  docusate sodium  epoetin jack Injectable  folic acid  insulin glargine Injectable (LANTUS)  insulin lispro (HumaLOG) corrective regimen sliding scale  insulin lispro Injectable (HumaLOG)  levothyroxine  metoprolol tartrate  Nephro-heather  ondansetron Injectable PRN  oxyCODONE    5 mG/acetaminophen 325 mG PRN  pantoprazole    Tablet  polyethylene glycol 3350  simvastatin  sodium chloride 0.9% lock flush      Patient stable  [facility] HD easily ( No Heparin )  Continue

## 2018-11-20 NOTE — PROGRESS NOTE ADULT - SUBJECTIVE AND OBJECTIVE BOX
Interval Events:  no overnight events  follow up on T2DM, ESRD    patient seen and examined at bedside.      REVIEW OF SYSTEMS:    CONSTITUTIONAL: No fever, weight loss, or fatigue  EYES: No eye pain, visual disturbances, or discharge  ENMT:  No difficulty hearing, tinnitus, vertigo; No sinus or throat pain  NECK: No pain or stiffness  RESPIRATORY: No cough, wheezing, chills or hemoptysis; No shortness of breath  CARDIOVASCULAR: No chest pain, palpitations, dizziness, or leg swelling  GASTROINTESTINAL: No abdominal or epigastric pain. No nausea, vomiting, or hematemesis; No diarrhea or constipation. No melena or hematochezia.  NEUROLOGICAL: No headaches, memory loss, loss of strength, numbness, or tremors  SKIN: No itching, burning, rashes, or lesions   MUSCULOSKELETAL: No joint pain or swelling; No muscle, back, or extremity pain  PSYCHIATRIC: No depression, anxiety, mood swings, or difficulty sleeping        No Known Allergies  OHS (Unknown)      MEDICATIONS  (STANDING):  amLODIPine   Tablet 5 milliGRAM(s) Oral daily  aspirin enteric coated 325 milliGRAM(s) Oral daily  clopidogrel Tablet 75 milliGRAM(s) Oral daily  docusate sodium 100 milliGRAM(s) Oral three times a day  epoetin jack Injectable 01509 Unit(s) IV Push <User Schedule>  folic acid 1 milliGRAM(s) Oral daily  insulin lispro (HumaLOG) corrective regimen sliding scale   SubCutaneous Before meals and at bedtime  levothyroxine 75 MICROGram(s) Oral daily  metoprolol tartrate 12.5 milliGRAM(s) Oral two times a day  Nephro-heather 1 Tablet(s) Oral daily  pantoprazole    Tablet 40 milliGRAM(s) Oral daily  polyethylene glycol 3350 17 Gram(s) Oral daily  simvastatin 20 milliGRAM(s) Oral at bedtime  sodium chloride 0.9% lock flush 3 milliLiter(s) IV Push every 8 hours    MEDICATIONS  (PRN):  acetaminophen   Tablet .. 650 milliGRAM(s) Oral every 6 hours PRN Mild Pain (1 - 3)  calcium carbonate    500 mG (Tums) Chewable 1 Tablet(s) Chew three times a day PRN Dyspepsia  ondansetron Injectable 4 milliGRAM(s) IV Push every 6 hours PRN Nausea and/or Vomiting  oxyCODONE    5 mG/acetaminophen 325 mG 1 Tablet(s) Oral every 6 hours PRN Moderate Pain (4 - 6)      Vital Signs Last 24 Hrs  T(C): 36.9 (20 Nov 2018 15:15), Max: 37 (20 Nov 2018 05:00)  T(F): 98.5 (20 Nov 2018 15:15), Max: 98.6 (20 Nov 2018 05:00)  HR: 72 (20 Nov 2018 15:15) (69 - 73)  BP: 144/50 (20 Nov 2018 15:15) (133/59 - 160/70)  BP(mean): --  RR: 17 (20 Nov 2018 15:15) (16 - 18)  SpO2: 98% (20 Nov 2018 15:15) (96% - 100%)    PHYSICAL EXAM:    General appearance: obese female, NAD  Eyes: Pupils equal and reactive to light. EOMI  Lungs: Normal respiratory excursion. Lungs clear.  CV: Regular cardiac rhythm. No murmur.   Abdomen: Soft, non tender, nondistended  Skin: Warm and moist.  Neuro: Cranial nerves intact.   Psych: Normal affect, good judgement.        LABS  11-20    138  |  97<L>  |  30.0<H>  ----------------------------<  63<L>  3.8   |  26.0  |  6.25<H>    Ca    9.6      20 Nov 2018 06:05  Phos  4.0     11-20  Mg     2.5     11-20                            8.7    8.2   )-----------( 265      ( 20 Nov 2018 06:07 )             28.7           CAPILLARY BLOOD GLUCOSE      POCT Blood Glucose.: 115 mg/dL (20 Nov 2018 12:23)  POCT Blood Glucose.: 100 mg/dL (20 Nov 2018 07:45)  POCT Blood Glucose.: 176 mg/dL (19 Nov 2018 21:18)  POCT Blood Glucose.: 137 mg/dL (19 Nov 2018 17:52) Interval Events:  no overnight events  follow up on T2DM, ESRD    patient seen and examined at bedside.  daughter at bedside translating  no complaints  probably going home tomorrow    REVIEW OF SYSTEMS:    CONSTITUTIONAL: No fever, weight loss, or fatigue  EYES: No eye pain, visual disturbances, or discharge  ENMT:  No difficulty hearing, tinnitus, vertigo; No sinus or throat pain  NECK: No pain or stiffness  RESPIRATORY: No cough, wheezing, chills or hemoptysis; No shortness of breath  CARDIOVASCULAR: No chest pain, palpitations, dizziness, or leg swelling  GASTROINTESTINAL: No abdominal or epigastric pain. No nausea, vomiting, or hematemesis; No diarrhea or constipation. No melena or hematochezia.  NEUROLOGICAL: No headaches, memory loss, loss of strength, numbness, or tremors  SKIN: No itching, burning, rashes, or lesions   MUSCULOSKELETAL: No joint pain or swelling; No muscle, back, or extremity pain  PSYCHIATRIC: No depression, anxiety, mood swings, or difficulty sleeping        No Known Allergies  OHS (Unknown)      MEDICATIONS  (STANDING):  amLODIPine   Tablet 5 milliGRAM(s) Oral daily  aspirin enteric coated 325 milliGRAM(s) Oral daily  clopidogrel Tablet 75 milliGRAM(s) Oral daily  docusate sodium 100 milliGRAM(s) Oral three times a day  epoetin jack Injectable 41499 Unit(s) IV Push <User Schedule>  folic acid 1 milliGRAM(s) Oral daily  insulin lispro (HumaLOG) corrective regimen sliding scale   SubCutaneous Before meals and at bedtime  levothyroxine 75 MICROGram(s) Oral daily  metoprolol tartrate 12.5 milliGRAM(s) Oral two times a day  Nephro-heather 1 Tablet(s) Oral daily  pantoprazole    Tablet 40 milliGRAM(s) Oral daily  polyethylene glycol 3350 17 Gram(s) Oral daily  simvastatin 20 milliGRAM(s) Oral at bedtime  sodium chloride 0.9% lock flush 3 milliLiter(s) IV Push every 8 hours    MEDICATIONS  (PRN):  acetaminophen   Tablet .. 650 milliGRAM(s) Oral every 6 hours PRN Mild Pain (1 - 3)  calcium carbonate    500 mG (Tums) Chewable 1 Tablet(s) Chew three times a day PRN Dyspepsia  ondansetron Injectable 4 milliGRAM(s) IV Push every 6 hours PRN Nausea and/or Vomiting  oxyCODONE    5 mG/acetaminophen 325 mG 1 Tablet(s) Oral every 6 hours PRN Moderate Pain (4 - 6)      Vital Signs Last 24 Hrs  T(C): 36.9 (20 Nov 2018 15:15), Max: 37 (20 Nov 2018 05:00)  T(F): 98.5 (20 Nov 2018 15:15), Max: 98.6 (20 Nov 2018 05:00)  HR: 72 (20 Nov 2018 15:15) (69 - 73)  BP: 144/50 (20 Nov 2018 15:15) (133/59 - 160/70)  BP(mean): --  RR: 17 (20 Nov 2018 15:15) (16 - 18)  SpO2: 98% (20 Nov 2018 15:15) (96% - 100%)    PHYSICAL EXAM:    General appearance: obese female, NAD, appears older than stated age  Eyes: EOMI  Lungs: Normal respiratory excursion. Lungs clear.  CV: Regular cardiac rhythm. No murmur. healing anterior chest scar   Abdomen: Soft, non tender, nondistended  Skin: Warm and moist.  Neuro: Cranial nerves intact. Alert and oriented  Psych: Normal affect, normal mood        LABS  11-20    138  |  97<L>  |  30.0<H>  ----------------------------<  63<L>  3.8   |  26.0  |  6.25<H>    Ca    9.6      20 Nov 2018 06:05  Phos  4.0     11-20  Mg     2.5     11-20                            8.7    8.2   )-----------( 265      ( 20 Nov 2018 06:07 )             28.7           CAPILLARY BLOOD GLUCOSE      POCT Blood Glucose.: 115 mg/dL (20 Nov 2018 12:23)  POCT Blood Glucose.: 100 mg/dL (20 Nov 2018 07:45)  POCT Blood Glucose.: 176 mg/dL (19 Nov 2018 21:18)  POCT Blood Glucose.: 137 mg/dL (19 Nov 2018 17:52)

## 2018-11-20 NOTE — PROGRESS NOTE ADULT - SUBJECTIVE AND OBJECTIVE BOX
POD#1 s/p DC BSCI PPM to RACW, seen in HD this am.  No complaints, no overnight events    ECG: SR 61bpm, nml axis/intervals, t wave inversion I, AVL  TELE: AV paced on demand  CXR PA/LAT: pending  CXR post op: no PTX  device interrogation: BSCI DDD 70-120bpm, device battery, sensing, impedance and threshold are stable and wnl    MEDICATIONS  (STANDING):  amLODIPine   Tablet 5 milliGRAM(s) Oral daily  aspirin enteric coated 325 milliGRAM(s) Oral daily  ceFAZolin   IVPB 2000 milliGRAM(s) IV Intermittent <User Schedule>  clopidogrel Tablet 75 milliGRAM(s) Oral daily  docusate sodium 100 milliGRAM(s) Oral three times a day  epoetin jack Injectable 34108 Unit(s) IV Push <User Schedule>  folic acid 1 milliGRAM(s) Oral daily  insulin glargine Injectable (LANTUS) 8 Unit(s) SubCutaneous at bedtime  insulin lispro (HumaLOG) corrective regimen sliding scale   SubCutaneous Before meals and at bedtime  insulin lispro Injectable (HumaLOG) 3 Unit(s) SubCutaneous three times a day before meals  levothyroxine 75 MICROGram(s) Oral daily  metoprolol tartrate 12.5 milliGRAM(s) Oral two times a day  Nephro-heather 1 Tablet(s) Oral daily  pantoprazole    Tablet 40 milliGRAM(s) Oral daily  polyethylene glycol 3350 17 Gram(s) Oral daily  simvastatin 20 milliGRAM(s) Oral at bedtime  sodium chloride 0.9% lock flush 3 milliLiter(s) IV Push every 8 hours    MEDICATIONS  (PRN):  acetaminophen   Tablet .. 650 milliGRAM(s) Oral every 6 hours PRN Mild Pain (1 - 3)  calcium carbonate    500 mG (Tums) Chewable 1 Tablet(s) Chew three times a day PRN Dyspepsia  ondansetron Injectable 4 milliGRAM(s) IV Push every 6 hours PRN Nausea and/or Vomiting  oxyCODONE    5 mG/acetaminophen 325 mG 1 Tablet(s) Oral every 6 hours PRN Moderate Pain (4 - 6)    PAST MEDICAL & SURGICAL HISTORY:  Hypothyroidism, unspecified type  Hemodialysis patient: tues thursday saturday  Renal failure  Hypertension  Diabetes mellitus  A-V fistula  S/P cholecystectomy    Vital Signs Last 24 Hrs  T(C): 36.6 (20 Nov 2018 07:15), Max: 37 (20 Nov 2018 05:00)  T(F): 97.9 (20 Nov 2018 07:15), Max: 98.6 (20 Nov 2018 05:00)  HR: 70 (20 Nov 2018 07:19) (68 - 71)  BP: 149/72 (20 Nov 2018 07:15) (133/59 - 181/75)  BP(mean): 108 (19 Nov 2018 11:00) (108 - 108)  RR: 18 (20 Nov 2018 07:15) (16 - 22)  SpO2: 100% (20 Nov 2018 07:15) (96% - 100%)    Physical Exam:  Constitutional: NAD, awaker, alert  Cardiovascular: +S1S2 RRR, midsternal dsg, c/d/i  RACW: pressure dsg removed +dermabond incision well approximated without bleeding, swelling, hematoma. +radial pulse  Pulmonary: CTA b/l, unlabored  GI: soft NTND +BS  Extremities: no pedal edema,   Neuro: non focal    LABS:                        8.7    8.2   )-----------( 265      ( 20 Nov 2018 06:07 )             28.7     11-20    138  |  97<L>  |  30.0<H>  ----------------------------<  63<L>  3.8   |  26.0  |  6.25<H>    Ca    9.6      20 Nov 2018 06:05  Phos  4.0     11-20  Mg     2.5     11-20    A/P:  62 yo female  female with h/o ESRD on HD via left AV fistula, hypothyroidism, HTN, IDDM, who presented to [facility] on 11/6 with mild troponin elevation. Cardiac cath on 11/7 indicated multi-vessel CAD. She is now s/p 4v-CABG (LIMA-LAD, Vein PDA, Sequential -Diag/OM) with post op bradycardia and sinus pauses (9seconds) now POD#1 s/p DC BSCI PPM to RACW, DDD 70-120bpm.     Plan:   AM labs stable, pt at HD now  ECG and device interrogation wnl   Pain control with PO analgesia PRN.   Can add AV salma blocking agents as needed  PA/Lat CXR prior to dc, CTSx will follow up  NO HEPARIN OR LOVENOX, INCLUDING PROPHYLACTIC/SUBCUT DOSING, UNTIL OTHERWISE ADVISED BY EP.   No lifting right arm over shoulder x 6 weeks  Keep site dry x 1 week  Outpt follow up site and device check 1-2 weeks, sooner if needed. device care, restrictions, follow up and booklet reviewed with daughter post op.  Continued plan and disposition per CTSx team

## 2018-11-20 NOTE — PROGRESS NOTE ADULT - SUBJECTIVE AND OBJECTIVE BOX
POD 7 s/p C4L, postop course c/b bradycardia/heart block requiring pacing, AF RVR, now s/p [employer] PPM 11/19    Subjective: c/o incisional pain at PPM site, relieved with percocet, also reports no BM in 9 days  denies CP, palpitations, cough, fever, chills, HA, dizziness, lightheadedness, + constipation, + nausea no vomitting, + poor appetite     T(C): 36.4 (11-20-18 @ 10:15), Max: 37 (11-20-18 @ 05:00)  HR: 73 (11-20-18 @ 14:26) (69 - 73)  BP: 147/63 (11-20-18 @ 14:26) (133/59 - 160/70)  ABP: --  ABP(mean): --  RR: 18 (11-20-18 @ 14:26) (16 - 18)  SpO2: 100% (11-20-18 @ 14:26) (96% - 100%)    11-20    138  |  97<L>  |  30.0<H>  ----------------------------<  63<L>  3.8   |  26.0  |  6.25<H>    Ca    9.6      20 Nov 2018 06:05  Phos  4.0     11-20  Mg     2.5     11-20                                 8.7    8.2   )-----------( 265      ( 20 Nov 2018 06:07 )             28.7     CAPILLARY BLOOD GLUCOSE  POCT Blood Glucose.: 115 mg/dL (20 Nov 2018 12:23)  POCT Blood Glucose.: 100 mg/dL (20 Nov 2018 07:45)  POCT Blood Glucose.: 176 mg/dL (19 Nov 2018 21:18)  POCT Blood Glucose.: 137 mg/dL (19 Nov 2018 17:52)       CXR:    I&O's Detail    19 Nov 2018 07:01  -  20 Nov 2018 07:00  --------------------------------------------------------  IN:    DOPamine Infusion: 71 mL    Oral Fluid: 240 mL  Total IN: 311 mL    OUT:  Total OUT: 0 mL    Total NET: 311 mL    20 Nov 2018 07:01  -  20 Nov 2018 15:19  --------------------------------------------------------  IN:  Total IN: 0 mL    OUT:    Other: 1500 mL  Total OUT: 1500 mL    Total NET: -1500 mL  Drug Dosing Weight  Height (cm): 162.56 (19 Nov 2018 09:57)  Weight (kg): 94.6 (19 Nov 2018 09:57)  BMI (kg/m2): 35.8 (19 Nov 2018 09:57)  BSA (m2): 1.99 (19 Nov 2018 09:57)    MEDICATIONS  (STANDING):  amLODIPine   Tablet 5 milliGRAM(s) Oral daily  aspirin enteric coated 325 milliGRAM(s) Oral daily  ceFAZolin   IVPB 2000 milliGRAM(s) IV Intermittent <User Schedule>  clopidogrel Tablet 75 milliGRAM(s) Oral daily  docusate sodium 100 milliGRAM(s) Oral three times a day  epoetin jack Injectable 97132 Unit(s) IV Push <User Schedule>  folic acid 1 milliGRAM(s) Oral daily  insulin glargine Injectable (LANTUS) 8 Unit(s) SubCutaneous at bedtime  insulin lispro (HumaLOG) corrective regimen sliding scale   SubCutaneous Before meals and at bedtime  insulin lispro Injectable (HumaLOG) 3 Unit(s) SubCutaneous three times a day before meals  levothyroxine 75 MICROGram(s) Oral daily  metoprolol tartrate 12.5 milliGRAM(s) Oral two times a day  Nephro-heather 1 Tablet(s) Oral daily  pantoprazole    Tablet 40 milliGRAM(s) Oral daily  polyethylene glycol 3350 17 Gram(s) Oral daily  simvastatin 20 milliGRAM(s) Oral at bedtime  sodium chloride 0.9% lock flush 3 milliLiter(s) IV Push every 8 hours    MEDICATIONS  (PRN):  acetaminophen   Tablet .. 650 milliGRAM(s) Oral every 6 hours PRN Mild Pain (1 - 3)  calcium carbonate    500 mG (Tums) Chewable 1 Tablet(s) Chew three times a day PRN Dyspepsia  ondansetron Injectable 4 milliGRAM(s) IV Push every 6 hours PRN Nausea and/or Vomiting  oxyCODONE    5 mG/acetaminophen 325 mG 1 Tablet(s) Oral every 6 hours PRN Moderate Pain (4 - 6)    Physical Exam  Neuro: A&Ox3, LAU, non focal  Pulm: decreased breath sounds b/l bases  CV: S1S2 RRR  Abd: + BS soft NT ND obese  Extrem/MS: no edema, 2 DP pulses b/l, LUE AVF + thrill  Incision(s): mid sternal inc C/D/I, 2 CT sites open with serosang drainage, LLE SVG inc C/D/I

## 2018-11-20 NOTE — PROGRESS NOTE ADULT - ASSESSMENT
63F  ESRD ON HD, CAD, DM, s/p c4L 11/13,  Postop cardiogenic shock requiring inotropic support now resolved,  CHB requiring epicardial pacing since resolved, Afib w/ RVR with conversion pause of 9 seconds requiring dopamine infusion, now s/p [employer] dual chamber PPM 11/19;

## 2018-11-21 VITALS
SYSTOLIC BLOOD PRESSURE: 130 MMHG | OXYGEN SATURATION: 93 % | DIASTOLIC BLOOD PRESSURE: 60 MMHG | HEART RATE: 72 BPM | RESPIRATION RATE: 18 BRPM

## 2018-11-21 LAB
ANION GAP SERPL CALC-SCNC: 15 MMOL/L — SIGNIFICANT CHANGE UP (ref 5–17)
BUN SERPL-MCNC: 23 MG/DL — HIGH (ref 8–20)
CALCIUM SERPL-MCNC: 9.6 MG/DL — SIGNIFICANT CHANGE UP (ref 8.6–10.2)
CHLORIDE SERPL-SCNC: 96 MMOL/L — LOW (ref 98–107)
CO2 SERPL-SCNC: 28 MMOL/L — SIGNIFICANT CHANGE UP (ref 22–29)
CREAT SERPL-MCNC: 4.73 MG/DL — HIGH (ref 0.5–1.3)
GLUCOSE BLDC GLUCOMTR-MCNC: 108 MG/DL — HIGH (ref 70–99)
GLUCOSE BLDC GLUCOMTR-MCNC: 152 MG/DL — HIGH (ref 70–99)
GLUCOSE BLDC GLUCOMTR-MCNC: 197 MG/DL — HIGH (ref 70–99)
GLUCOSE SERPL-MCNC: 98 MG/DL — SIGNIFICANT CHANGE UP (ref 70–115)
HCT VFR BLD CALC: 31.7 % — LOW (ref 37–47)
HGB BLD-MCNC: 9.3 G/DL — LOW (ref 12–16)
MAGNESIUM SERPL-MCNC: 2.3 MG/DL — SIGNIFICANT CHANGE UP (ref 1.8–2.6)
MCHC RBC-ENTMCNC: 27.8 PG — SIGNIFICANT CHANGE UP (ref 27–31)
MCHC RBC-ENTMCNC: 29.3 G/DL — LOW (ref 32–36)
MCV RBC AUTO: 94.6 FL — SIGNIFICANT CHANGE UP (ref 81–99)
PHOSPHATE SERPL-MCNC: 2.9 MG/DL — SIGNIFICANT CHANGE UP (ref 2.4–4.7)
PLATELET # BLD AUTO: 271 K/UL — SIGNIFICANT CHANGE UP (ref 150–400)
POTASSIUM SERPL-MCNC: 3.9 MMOL/L — SIGNIFICANT CHANGE UP (ref 3.5–5.3)
POTASSIUM SERPL-SCNC: 3.9 MMOL/L — SIGNIFICANT CHANGE UP (ref 3.5–5.3)
RBC # BLD: 3.35 M/UL — LOW (ref 4.4–5.2)
RBC # FLD: 18.1 % — HIGH (ref 11–15.6)
SODIUM SERPL-SCNC: 139 MMOL/L — SIGNIFICANT CHANGE UP (ref 135–145)
WBC # BLD: 7.4 K/UL — SIGNIFICANT CHANGE UP (ref 4.8–10.8)
WBC # FLD AUTO: 7.4 K/UL — SIGNIFICANT CHANGE UP (ref 4.8–10.8)

## 2018-11-21 PROCEDURE — 99238 HOSP IP/OBS DSCHRG MGMT 30/<: CPT | Mod: 24

## 2018-11-21 PROCEDURE — 93010 ELECTROCARDIOGRAM REPORT: CPT

## 2018-11-21 PROCEDURE — 99233 SBSQ HOSP IP/OBS HIGH 50: CPT

## 2018-11-21 PROCEDURE — 71045 X-RAY EXAM CHEST 1 VIEW: CPT | Mod: 26

## 2018-11-21 RX ORDER — INSULIN GLARGINE 100 [IU]/ML
0 INJECTION, SOLUTION SUBCUTANEOUS
Qty: 0 | Refills: 0 | COMMUNITY

## 2018-11-21 RX ORDER — CLOPIDOGREL BISULFATE 75 MG/1
1 TABLET, FILM COATED ORAL
Qty: 30 | Refills: 1
Start: 2018-11-21

## 2018-11-21 RX ORDER — AMLODIPINE BESYLATE 2.5 MG/1
1 TABLET ORAL
Qty: 30 | Refills: 1
Start: 2018-11-21

## 2018-11-21 RX ORDER — CALCIUM CARBONATE 500(1250)
1 TABLET ORAL
Qty: 0 | Refills: 0 | DISCHARGE
Start: 2018-11-21

## 2018-11-21 RX ORDER — PANTOPRAZOLE SODIUM 20 MG/1
1 TABLET, DELAYED RELEASE ORAL
Qty: 30 | Refills: 1
Start: 2018-11-21

## 2018-11-21 RX ORDER — ASPIRIN/CALCIUM CARB/MAGNESIUM 324 MG
1 TABLET ORAL
Qty: 0 | Refills: 0 | COMMUNITY
Start: 2018-11-21

## 2018-11-21 RX ORDER — ACETAMINOPHEN 500 MG
2 TABLET ORAL
Qty: 0 | Refills: 0 | COMMUNITY
Start: 2018-11-21

## 2018-11-21 RX ORDER — METOPROLOL TARTRATE 50 MG
0.5 TABLET ORAL
Qty: 30 | Refills: 0 | OUTPATIENT
Start: 2018-11-21 | End: 2018-12-20

## 2018-11-21 RX ORDER — DOCUSATE SODIUM 100 MG
1 CAPSULE ORAL
Qty: 0 | Refills: 0 | DISCHARGE
Start: 2018-11-21

## 2018-11-21 RX ORDER — CLOPIDOGREL BISULFATE 75 MG/1
1 TABLET, FILM COATED ORAL
Qty: 0 | Refills: 0 | COMMUNITY
Start: 2018-11-21

## 2018-11-21 RX ORDER — AMLODIPINE BESYLATE 2.5 MG/1
1 TABLET ORAL
Qty: 0 | Refills: 0 | COMMUNITY
Start: 2018-11-21

## 2018-11-21 RX ORDER — SIMVASTATIN 20 MG/1
1 TABLET, FILM COATED ORAL
Qty: 0 | Refills: 0 | COMMUNITY

## 2018-11-21 RX ORDER — LEVOTHYROXINE SODIUM 125 MCG
1 TABLET ORAL
Qty: 30 | Refills: 1
Start: 2018-11-21

## 2018-11-21 RX ORDER — SIMVASTATIN 20 MG/1
1 TABLET, FILM COATED ORAL
Qty: 30 | Refills: 0
Start: 2018-11-21

## 2018-11-21 RX ORDER — HYDRALAZINE HCL 50 MG
1 TABLET ORAL
Qty: 0 | Refills: 0 | COMMUNITY

## 2018-11-21 RX ADMIN — Medication 12.5 MILLIGRAM(S): at 10:15

## 2018-11-21 RX ADMIN — Medication 1 TABLET(S): at 12:24

## 2018-11-21 RX ADMIN — Medication 75 MICROGRAM(S): at 05:31

## 2018-11-21 RX ADMIN — Medication 650 MILLIGRAM(S): at 16:37

## 2018-11-21 RX ADMIN — Medication 2: at 08:54

## 2018-11-21 RX ADMIN — Medication 2: at 12:23

## 2018-11-21 RX ADMIN — OXYCODONE AND ACETAMINOPHEN 1 TABLET(S): 5; 325 TABLET ORAL at 05:32

## 2018-11-21 RX ADMIN — SODIUM CHLORIDE 3 MILLILITER(S): 9 INJECTION INTRAMUSCULAR; INTRAVENOUS; SUBCUTANEOUS at 12:23

## 2018-11-21 RX ADMIN — Medication 1 TABLET(S): at 11:59

## 2018-11-21 RX ADMIN — AMLODIPINE BESYLATE 5 MILLIGRAM(S): 2.5 TABLET ORAL at 05:32

## 2018-11-21 RX ADMIN — Medication 1 MILLIGRAM(S): at 11:59

## 2018-11-21 RX ADMIN — OXYCODONE AND ACETAMINOPHEN 1 TABLET(S): 5; 325 TABLET ORAL at 12:01

## 2018-11-21 RX ADMIN — Medication 1 TABLET(S): at 05:32

## 2018-11-21 RX ADMIN — SODIUM CHLORIDE 3 MILLILITER(S): 9 INJECTION INTRAMUSCULAR; INTRAVENOUS; SUBCUTANEOUS at 05:35

## 2018-11-21 RX ADMIN — CLOPIDOGREL BISULFATE 75 MILLIGRAM(S): 75 TABLET, FILM COATED ORAL at 11:59

## 2018-11-21 RX ADMIN — Medication 325 MILLIGRAM(S): at 11:59

## 2018-11-21 RX ADMIN — PANTOPRAZOLE SODIUM 40 MILLIGRAM(S): 20 TABLET, DELAYED RELEASE ORAL at 11:59

## 2018-11-21 NOTE — PROGRESS NOTE ADULT - REASON FOR ADMISSION
chest pain
"chest pain"
CAD
chest pain

## 2018-11-21 NOTE — PROGRESS NOTE ADULT - SUBJECTIVE AND OBJECTIVE BOX
[facility] DIVISION OF KIDNEY DISEASES AND HYPERTENSION -- FOLLOW UP NOTE  --------------------------------------------------------------------------------  Chief Complaint: ESRD HD    24 hour events/subjective:  Pt seen and examined  HD on 23rd; tolerated yesterday; holiday schedule      PAST HISTORY  --------------------------------------------------------------------------------  No significant changes to PMH, PSH, FHx, SHx, unless otherwise noted    ALLERGIES & MEDICATIONS  --------------------------------------------------------------------------------  Allergies    No Known Allergies    Intolerances    OHS (Unknown)    Standing Inpatient Medications  amLODIPine   Tablet 5 milliGRAM(s) Oral daily  aspirin enteric coated 325 milliGRAM(s) Oral daily  clopidogrel Tablet 75 milliGRAM(s) Oral daily  docusate sodium 100 milliGRAM(s) Oral three times a day  epoetin jack Injectable 98030 Unit(s) IV Push <User Schedule>  folic acid 1 milliGRAM(s) Oral daily  insulin lispro (HumaLOG) corrective regimen sliding scale   SubCutaneous Before meals and at bedtime  levothyroxine 75 MICROGram(s) Oral daily  metoprolol tartrate 12.5 milliGRAM(s) Oral two times a day  Nephro-heather 1 Tablet(s) Oral daily  pantoprazole    Tablet 40 milliGRAM(s) Oral daily  polyethylene glycol 3350 17 Gram(s) Oral daily  simvastatin 20 milliGRAM(s) Oral at bedtime  sodium chloride 0.9% lock flush 3 milliLiter(s) IV Push every 8 hours    PRN Inpatient Medications  acetaminophen   Tablet .. 650 milliGRAM(s) Oral every 6 hours PRN  calcium carbonate    500 mG (Tums) Chewable 1 Tablet(s) Chew three times a day PRN  ondansetron Injectable 4 milliGRAM(s) IV Push every 6 hours PRN  oxyCODONE    5 mG/acetaminophen 325 mG 1 Tablet(s) Oral every 6 hours PRN      REVIEW OF SYSTEMS  --------------------------------------------------------------------------------  Gen: No weight changes, fatigue, fevers/chills, weakness  Skin: No rashes  Head/Eyes/Ears/Mouth: No headache; Normal hearing; Normal vision w/o blurriness; No sinus pain/discomfort, sore throat  Respiratory: No dyspnea, cough, wheezing, hemoptysis  CV: No chest pain, PND, orthopnea  GI: No abdominal pain, diarrhea, constipation, nausea, vomiting, melena, hematochezia  : No increased frequency, dysuria, hematuria, nocturia  MSK: No joint pain/swelling; no back pain; no edema  Neuro: No dizziness/lightheadedness, weakness, seizures, numbness, tingling  Heme: No easy bruising or bleeding  Endo: No heat/cold intolerance  Psych: No significant nervousness, anxiety, stress, depression    All other systems were reviewed and are negative, except as noted.    VITALS/PHYSICAL EXAM  --------------------------------------------------------------------------------  T(C): 37 (11-21-18 @ 09:45), Max: 37 (11-21-18 @ 09:45)  HR: 69 (11-21-18 @ 10:11) (69 - 73)  BP: 138/60 (11-21-18 @ 10:11) (138/60 - 151/62)  RR: 18 (11-21-18 @ 10:11) (17 - 18)  SpO2: 98% (11-21-18 @ 10:11) (97% - 98%)  Wt(kg): --        11-20-18 @ 07:01  -  11-21-18 @ 07:00  --------------------------------------------------------  IN: 240 mL / OUT: 1500 mL / NET: -1260 mL    11-21-18 @ 07:01  -  11-21-18 @ 14:57  --------------------------------------------------------  IN: 240 mL / OUT: 0 mL / NET: 240 mL      Physical Exam:  	Gen: NAD, well-appearing  	HEENT: PERRL, supple neck, clear oropharynx  	Pulm: CTA B/L  	CV: RRR, S1S2; no rub  	Back: No spinal or CVA tenderness; no sacral edema  	Abd: +BS, soft, nontender/nondistended  	: No suprapubic tenderness  	UE: Warm, FROM, no clubbing, intact strength; no edema; no asterixis  	LE: Warm, FROM, no clubbing, intact strength; no edema  	Neuro: No focal deficits, intact gait  	Psych: Normal affect and mood  	Skin: Warm, without rashes  	Vascular access:    LABS/STUDIES  --------------------------------------------------------------------------------              9.3    7.4   >-----------<  271      [11-21-18 @ 05:53]              31.7     139  |  96  |  23.0  ----------------------------<  98      [11-21-18 @ 05:53]  3.9   |  28.0  |  4.73        Ca     9.6     [11-21-18 @ 05:53]      Mg     2.3     [11-21-18 @ 05:53]      Phos  2.9     [11-21-18 @ 05:53]            Creatinine Trend:  SCr 4.73 [11-21 @ 05:53]  SCr 6.25 [11-20 @ 06:05]  SCr 4.34 [11-19 @ 02:30]  SCr 5.94 [11-18 @ 03:06]  SCr 4.72 [11-17 @ 03:31]        PTH -- (Ca 9.8)      [11-13-18 @ 16:19]   551  Vitamin D (25OH) 25.0      [11-13-18 @ 16:38]  HbA1c 6.5      [11-07-18 @ 18:50]  TSH 2.27      [11-07-18 @ 18:50]

## 2018-11-21 NOTE — PROGRESS NOTE ADULT - PROBLEM SELECTOR PROBLEM 9
Adrenal mass, left
Prophylactic measure
Adrenal mass, left

## 2018-11-21 NOTE — CHART NOTE - NSCHARTNOTEFT_GEN_A_CORE
Source: Patient [x ]  Family [x ]   other [ ]    Current Diet: Diet, DASH/TLC:   Sodium & Cholesterol Restricted  Consistent Carbohydrate {Evening Snacks}  For patients receiving Renal Replacement - No Protein Restr, No Conc K, No Conc Phos, Low  Sodium (RENAL) (11-16-18 @ 15:01)    PO intake:  Pt with good po intake at meals, much improved    Current Weight:   (11/21) 213#  (11/19) 208#  (11/16) 215#  (11/10) 207#    % Weight Change - wt fluctuations noted, pt seems to be maintaining wt    Pertinent Medications: MEDICATIONS  (STANDING):  amLODIPine   Tablet 5 milliGRAM(s) Oral daily  aspirin enteric coated 325 milliGRAM(s) Oral daily  clopidogrel Tablet 75 milliGRAM(s) Oral daily  docusate sodium 100 milliGRAM(s) Oral three times a day  epoetin jack Injectable 10104 Unit(s) IV Push <User Schedule>  folic acid 1 milliGRAM(s) Oral daily  insulin lispro (HumaLOG) corrective regimen sliding scale   SubCutaneous Before meals and at bedtime  levothyroxine 75 MICROGram(s) Oral daily  metoprolol tartrate 12.5 milliGRAM(s) Oral two times a day  Nephro-heather 1 Tablet(s) Oral daily  pantoprazole    Tablet 40 milliGRAM(s) Oral daily  polyethylene glycol 3350 17 Gram(s) Oral daily  simvastatin 20 milliGRAM(s) Oral at bedtime  sodium chloride 0.9% lock flush 3 milliLiter(s) IV Push every 8 hours    MEDICATIONS  (PRN):  acetaminophen   Tablet .. 650 milliGRAM(s) Oral every 6 hours PRN Mild Pain (1 - 3)  calcium carbonate    500 mG (Tums) Chewable 1 Tablet(s) Chew three times a day PRN Dyspepsia  ondansetron Injectable 4 milliGRAM(s) IV Push every 6 hours PRN Nausea and/or Vomiting  oxyCODONE    5 mG/acetaminophen 325 mG 1 Tablet(s) Oral every 6 hours PRN Moderate Pain (4 - 6)    Pertinent Labs: CBC Full  -  ( 21 Nov 2018 05:53 )  WBC Count : 7.4 K/uL  Hemoglobin : 9.3 g/dL  Hematocrit : 31.7 %  Platelet Count - Automated : 271 K/uL  Mean Cell Volume : 94.6 fl  Mean Cell Hemoglobin : 27.8 pg  Mean Cell Hemoglobin Concentration : 29.3 g/dL    Skin: sx incision MSI, left leg    Nutrition focused physical exam conducted - found signs of malnutrition [ ]absent [x ]present    Subcutaneous fat loss: [x ] Orbital fat pads region, [ ]Buccal fat region, [ ]Triceps region,  [ ]Ribs region    Muscle wasting: [x ]Temples region, [x ]Clavicle region, [x ]Shoulder region, [ ]Scapula region, [ ]Interosseous region,  [ ]thigh region, [ ]Calf region    Estimated Needs:   [x ] no change since previous assessment  [ ] recalculated:     Current Nutrition Diagnosis: Pt remains at nutrition risk secondary to moderate protein calorie malnutrition (chronic) related to inadequate protein energy intake with poor appetite prior to admission as evidenced by pt meeting <75% estimated nutrition needs > 1 month, 24# unintentional wt loss (10%) x 3-4 months pta and moderate muscle wasting/fat loss.    Pt eating much better at this time.  Provided additional nutrition literature and sample meal plan as per family request.  Pt/family with good understanding.    Recommendations:   Continue with diet rx  Daily wts  Continue with Neprovite and Folic Acid daily    Monitoring and Evaluation:   [ x] PO intake [x ] Tolerance to diet prescription [X] Weights  [X] Follow up per protocol [X] Labs:

## 2018-11-21 NOTE — PROGRESS NOTE ADULT - PROVIDER SPECIALTY LIST ADULT
Anesthesia
Anesthesia
CT Surgery
Cardiology
Electrophysiology
Endocrinology
Hospitalist
Nephrology
Pulmonology
CT Surgery
Cardiology
Electrophysiology
CT Surgery

## 2018-11-21 NOTE — PROGRESS NOTE ADULT - PROBLEM SELECTOR PLAN 2
Dr Cavazos will review scan on Monday
Dr Cavazos to review scan on Monday.
Hold BB at this time due to bradycardia  Lipitor for chronic graft patency prophylaxis  Plavix for acute graft closure prophylaxis  Aspirin for acute graft closure prophylaxis  Encourage PO intake  Encourage OOB to chair and ambulation with PT  Encourage deep breathing exercised and coughing  Chest PT and IS use with bedside nurse
OOB ambulate, PT following rec home with walker (pt has already at home per daughter)  cont asa and Plavix for graft patency  d/w dr. pugh if and when to resume eliquis (on preop for h/o AF)   cont lipitor for graft patency  start lopressor now that PPM in place  diet as tolerated  sorbitol today for BM  cont miralax, colace, protonix  dispo; likely d/c home tomorrow
OOB ambulate, PT following rec home with walker (pt has already at home per daughter)  cont asa and Plavix for graft patency  d/w dr. pugh if and when to resume eliquis (on preop for h/o AF)   cont lipitor for graft patency  start lopressor now that PPM in place  diet as tolerated  sorbitol today for BM  cont miralax, colace, protonix  dispo; likely d/c home tomorrow
Wean pressor as tolerated  Hold BB at this time due to Primacor requirement and bradycardia  Lipitor for chronic graft patency prophylaxis  Plavix for acute graft closure prophylaxis  Aspirin for acute graft closure prophylaxis  Encourage PO intake  Encourage OOB to chair and ambulation with PT  Encourage deep breathing exercised and coughing  Chest PT and IS use with bedside nurse
controlled on current regimen   cont sliding scale insulin coverage
controlled on current regimen   cont sliding scale insulin coverage
controlled on current regimen   sliding scale insulin coverage
controlled on current regimen , monitor closely
avoid AV salma blocking agents
Hold BB at this time due to bradycardia  Lipitor for chronic graft patency prophylaxis  Plavix for acute graft closure prophylaxis  Aspirin for acute graft closure prophylaxis  Encourage PO intake  Encourage OOB to chair and ambulation with PT  Encourage deep breathing exercised and coughing  Chest PT and IS use with bedside nurse

## 2018-11-21 NOTE — PROGRESS NOTE ADULT - PROBLEM SELECTOR PROBLEM 6
Hypothyroidism, unspecified type
ESRD (end stage renal disease) on dialysis
Hypertension
Hypertension
ESRD (end stage renal disease) on dialysis

## 2018-11-21 NOTE — PROGRESS NOTE ADULT - PROBLEM SELECTOR PROBLEM 5
Hypertension
Diabetes mellitus
Hypothyroidism, unspecified type
Diabetes mellitus
Hypothyroidism, unspecified type

## 2018-11-21 NOTE — PROGRESS NOTE ADULT - SUBJECTIVE AND OBJECTIVE BOX
Subjective:  Pt seen at bedside  No issues Family at bedside     VITAL SIGNS  T(C): 36.7 (18 @ 15:37), Max: 37 (18 @ 09:45)  HR: 73 (18 @ 15:37) (69 - 73)  BP: 128/58 (18 @ 15:37) (128/58 - 151/62)  RR: 18 (18 @ 15:37) (18 - 18)  SpO2: 94% (18 @ 15:37) (94% - 98%) RA         Daily     Daily Weight in k.8 (2018 06:54)  Admit Wt: Drug Dosing Weight  Height (cm): 162.56 (2018 09:57)  Weight (kg): 94.6 (2018 09:57)    Telemetry:   SR   LVEF:  45%    MEDICATIONS  acetaminophen   Tablet .. 650 milliGRAM(s) Oral every 6 hours PRN  amLODIPine   Tablet 5 milliGRAM(s) Oral daily  aspirin enteric coated 325 milliGRAM(s) Oral daily  calcium carbonate    500 mG (Tums) Chewable 1 Tablet(s) Chew three times a day PRN  clopidogrel Tablet 75 milliGRAM(s) Oral daily  docusate sodium 100 milliGRAM(s) Oral three times a day  epoetin jack Injectable 30544 Unit(s) IV Push <User Schedule>  folic acid 1 milliGRAM(s) Oral daily  insulin lispro (HumaLOG) corrective regimen sliding scale   SubCutaneous Before meals and at bedtime  levothyroxine 75 MICROGram(s) Oral daily  metoprolol tartrate 12.5 milliGRAM(s) Oral two times a day  Nephro-heather 1 Tablet(s) Oral daily  ondansetron Injectable 4 milliGRAM(s) IV Push every 6 hours PRN  oxyCODONE    5 mG/acetaminophen 325 mG 1 Tablet(s) Oral every 6 hours PRN  pantoprazole    Tablet 40 milliGRAM(s) Oral daily  polyethylene glycol 3350 17 Gram(s) Oral daily  simvastatin 20 milliGRAM(s) Oral at bedtime  sodium chloride 0.9% lock flush 3 milliLiter(s) IV Push every 8 hours    MEDICATIONS  (PRN):  acetaminophen   Tablet .. 650 milliGRAM(s) Oral every 6 hours PRN Mild Pain (1 - 3)  calcium carbonate    500 mG (Tums) Chewable 1 Tablet(s) Chew three times a day PRN Dyspepsia  ondansetron Injectable 4 milliGRAM(s) IV Push every 6 hours PRN Nausea and/or Vomiting  oxyCODONE    5 mG/acetaminophen 325 mG 1 Tablet(s) Oral every 6 hours PRN Moderate Pain (4 - 6)        PHYSICAL EXAM  Neuro: A&Ox3, LAU, non focal  Pulm: decreased breath sounds b/l bases  CV: S1S2 RRR  Abd: + BS soft NT ND obese  Extrem/MS: no edema, 2 DP pulses b/l, LUE AVF + thrill  Incision(s): mid sternal inc C/D/I, 2 CT sites open with serosang drainage, LLE SVG inc C/D/I      I&O's Detail    2018 07:01  -  2018 07:00  --------------------------------------------------------  IN:    Oral Fluid: 240 mL  Total IN: 240 mL    OUT:    Other: 1500 mL  Total OUT: 1500 mL    Total NET: -1260 mL      2018 07:01  -  2018 16:32  --------------------------------------------------------  IN:    Oral Fluid: 240 mL  Total IN: 240 mL    OUT:  Total OUT: 0 mL    Total NET: 240 mL          LABS      139  |  96<L>  |  23.0<H>  ----------------------------<  98  3.9   |  28.0  |  4.73<H>    Ca    9.6      2018 05:53  Phos  2.9       Mg     2.3                                        9.3    7.4   )-----------( 271      ( 2018 05:53 )             31.7                     CAPILLARY BLOOD GLUCOSE      POCT Blood Glucose.: 197 mg/dL (2018 12:07)  POCT Blood Glucose.: 152 mg/dL (2018 08:13)  POCT Blood Glucose.: 175 mg/dL (2018 22:16)  POCT Blood Glucose.: 176 mg/dL (2018 17:06)           Today's CXR:  < from: Xray Chest 1 View- PORTABLE-Urgent (11.21.18 @ 06:46) >  Single frontal view of the chest demonstrates mild congestive changes and   small bilateral pleural effusions. Right-sided pacemaker. Mediastinal   sternotomy wires. The cardiomediastinal silhouette is enlarged. No acute   osseous abnormalities. Overlying EKG leads and wires are noted.    IMPRESSION: Mild CHF and small bilateral pleural effusions. These   findings are unchanged.    < end of copied text >      PAST MEDICAL & SURGICAL HISTORY:  Hypothyroidism, unspecified type  Hemodialysis patient:   Renal failure  Hypertension  Diabetes mellitus  A-V fistula  S/P cholecystectomy

## 2018-11-21 NOTE — PROGRESS NOTE ADULT - PROBLEM SELECTOR PLAN 8
Continue GI ppx with Protonix and Colace, DVT ppx with Lovenox
Continue to wean Levophed to MAP 65-70  Continue Midodrine
now stable  Continue Midodrine
Dr Cavazos to review scan on Monday.
Continue GI ppx with Protonix and Colace, DVT ppx with Lovenox

## 2018-11-21 NOTE — PROGRESS NOTE ADULT - PROBLEM SELECTOR PLAN 5
Coreg, clonidine
Continue AC/HS & LIONEL.  Continue low consistent carb diet.  May need endo consult.
Continue Synthroid
insulin infusion with FS [facility]H [facility]) for glycemic control
Continue Synthroid

## 2018-11-21 NOTE — PROGRESS NOTE ADULT - PROBLEM SELECTOR PROBLEM 10
Adrenal mass, left
Adrenal mass, left
Essential hypertension

## 2018-11-21 NOTE — PROGRESS NOTE ADULT - PROBLEM SELECTOR PLAN 3
Cont HD As per renal   T/TH/Sat schedule
Continue hemodialysis as per nephrology.  Avoid nephrotoxic medications.
as above
dopamine @3mcg  NPO  PPM in AM?
on hd
on hd due today
on hd per schedule
on hd per schedule
s/p PPM  low dose lopressor started today
s/p PPM  low dose lopressor started today
Continue hemodialysis as per nephrology.  Avoid nephrotoxic medications  replete lytes carefully if needed  d/c delarosa this am, pt normally anuric
dopamine @4mcg  NPO  PPM in AM?

## 2018-11-21 NOTE — PROGRESS NOTE ADULT - PROBLEM SELECTOR PLAN 10
Convert cardene IV to PO in AM
Convert cardene IV to PO in AM
currently tolerating norvasc  low dose lopressor started, cont to monitor
currently tolerating norvasc  low dose lopressor started, cont to monitor
will need outpatient follow up
will need outpatient follow up
allow permissive HTN in the setting pf ashutosh cardia   toleraing

## 2018-11-21 NOTE — PROGRESS NOTE ADULT - PROBLEM SELECTOR PROBLEM 3
ESRD (end stage renal disease) on dialysis
Bradycardia
ESRD (end stage renal disease) on dialysis
Non-rheumatic mitral regurgitation
ESRD (end stage renal disease) on dialysis
Bradycardia

## 2018-11-21 NOTE — PROGRESS NOTE ADULT - PROBLEM SELECTOR PROBLEM 8
Postprocedural hypotension
Postprocedural hypotension
Prophylactic measure
Adrenal mass, left
Prophylactic measure

## 2018-11-21 NOTE — PROGRESS NOTE ADULT - PROBLEM SELECTOR PLAN 9
Continue GI ppx with Protonix and Colace, DVT ppx with Lovenox
Continue GI ppx with Protonix and Colace, DVT ppx with Lovenox
will need outpatient follow up
continue post-op GI, DVT, abx prophylaxis
will need outpatient follow up

## 2018-11-21 NOTE — PROGRESS NOTE ADULT - ASSESSMENT
1) ESRD on HD  2) MBD of renal dx  3) Anemia of renal dx  4) Vol/HTN    Next HD 11/23 holiday schedule  Continue current management as per CTS  Will continue to follow 1) ESRD on HD  2) MBD of renal dx  3) Anemia of renal dx  4) Vol/HTN    dc planning; her unit open tomorrow per social work  Next HD 11/23 holiday schedule if inpatient  Continue current management as per CTS  Will continue to follow

## 2018-11-21 NOTE — PROGRESS NOTE ADULT - PROBLEM SELECTOR PROBLEM 2
Adrenal mass, left
Adrenal mass, left
S/P CABG x 4
Type 2 diabetes mellitus without complication, without long-term current use of insulin
Bradycardia
S/P CABG x 4

## 2018-11-21 NOTE — PROGRESS NOTE ADULT - PROBLEM SELECTOR PROBLEM 7
Hyperlipidemia
Type 2 diabetes mellitus without complication, without long-term current use of insulin
Hyperlipidemia
Type 2 diabetes mellitus without complication, without long-term current use of insulin

## 2018-11-21 NOTE — PROGRESS NOTE ADULT - PROBLEM SELECTOR PROBLEM 1
Coronary artery disease involving native coronary artery of native heart with unstable angina pectoris
Cardiogenic shock
Coronary artery disease involving native coronary artery of native heart with unstable angina pectoris
Cardiogenic shock

## 2018-11-21 NOTE — PROGRESS NOTE ADULT - PROBLEM SELECTOR PLAN 6
Synthroid
Continue Coreg & clonidine.  Continue DASH diet.
Continue per Renal recommendations
Continue per Renal recommendations  HD today
HD T/Th/sat  pt to have session on friday at her outpt HD center ([facility]) due to holiday on thursday
HD T/Th/sat  pt to have session tomorrow  They are open and it is confirmed
on low dose pressors at this time
Continue per Renal recommendations  HD today

## 2018-11-21 NOTE — PROGRESS NOTE ADULT - PROBLEM SELECTOR PLAN 7
Continue Lipitor.
On premeal and lantus  Needs Endocrine consult and diabetes education
On premeal and lantus  f/u with endocrine regarding plan for home regimen
Plan as per endo is for the patient to hold off on lantus and check her sugars TID before meals and if her glucoses are > 200 consistently she should resume her lantus 10 units/ her
Titrate insulin gtt per protocol, transition to premeal per protocol, Endocrine consult and diabetes education
Continue Lipitor.
On premeal and lantus  Needs Endocrine consult and diabetes education

## 2018-11-21 NOTE — PROGRESS NOTE ADULT - PROBLEM/PLAN-6
09/20/2017  Tom Gage is a 63 y.o., male.    Pre-op Assessment    I have reviewed the Patient Summary Reports.      I have reviewed the Medications.     Review of Systems  Anesthesia Hx:  History of prior surgery of interest to airway management or planning:  Denies Personal Hx of Anesthesia complications.   Cardiovascular:   Hypertension CAD   CHF    Renal/:   Chronic Renal Disease, ESRD    Hepatic/GI:   GERD    Neurological:   TIA,    Endocrine:   Diabetes        Physical Exam  General:  Well nourished    Airway/Jaw/Neck:  Airway Findings: Mouth Opening: Normal Tongue: Normal  General Airway Assessment: Adult  Mallampati: III  Improves to II with phonation.  TM Distance: Normal, at least 6 cm  Jaw/Neck Findings:  Neck ROM: Normal ROM      Dental:  Dental Findings: In tact   Chest/Lungs:  Chest/Lungs Findings: Normal Respiratory Rate     Heart/Vascular:  Heart Findings: Rhythm: Regular Rhythm        Mental Status:  Mental Status Findings:  Alert and Oriented         Anesthesia Plan  Type of Anesthesia, risks & benefits discussed:  Anesthesia Type:  general  Patient's Preference: General   Intra-op Monitoring Plan: standard ASA monitors  Intra-op Monitoring Plan Comments:   Post Op Pain Control Plan:   Post Op Pain Control Plan Comments:   Induction:   IV  Beta Blocker:  Patient is not currently on a Beta-Blocker (No further documentation required).       Informed Consent: Patient understands risks and agrees with Anesthesia plan.  Questions answered. Anesthesia consent signed with patient.  ASA Score: 3     Day of Surgery Review of History & Physical:    H&P update referred to the surgeon.         Ready For Surgery From Anesthesia Perspective.       
DISPLAY PLAN FREE TEXT

## 2018-11-21 NOTE — PROGRESS NOTE ADULT - PROBLEM SELECTOR PROBLEM 4
Type 2 diabetes mellitus without complication, without long-term current use of insulin
Atrial fibrillation, new onset
Bradycardia
Hypothyroidism, unspecified type
Atrial fibrillation, new onset

## 2018-11-21 NOTE — PROGRESS NOTE ADULT - PROBLEM SELECTOR PLAN 4
LIONEL   May need endo consult
Continue Synthroid.
EP following  resolved  d/c  @1mcg
Tachy/ashutosh as per above  currently NSR  amio dcd
cont levothyroxine
none since over the weekend  currently remains paced  as per Dr Dirk Lion or any AC at this time
none since over the weekend  currently remains paced  lopressor started  d/w Dr. Cavazos when to resume eliquis
now V pacing  will trial off V-pacing in the AM  f/u with EP today
now V pacing  will trial off V-pacing in the AM  f/u with EP today, discuss need for PPM
Continue Synthroid.
Tachy/ashutosh as per above  currently NSR  amio dcd

## 2018-11-26 ENCOUNTER — APPOINTMENT (OUTPATIENT)
Age: 63
End: 2018-11-26
Payer: MEDICAID

## 2018-11-26 VITALS
SYSTOLIC BLOOD PRESSURE: 156 MMHG | DIASTOLIC BLOOD PRESSURE: 80 MMHG | OXYGEN SATURATION: 97 % | RESPIRATION RATE: 15 BRPM | HEART RATE: 70 BPM

## 2018-11-26 PROCEDURE — 99024 POSTOP FOLLOW-UP VISIT: CPT

## 2018-11-26 RX ORDER — AMLODIPINE BESYLATE 5 MG/1
5 TABLET ORAL
Refills: 0 | Status: ACTIVE | COMMUNITY

## 2018-11-26 RX ORDER — CLOPIDOGREL BISULFATE 75 MG/1
75 TABLET, FILM COATED ORAL
Refills: 0 | Status: ACTIVE | COMMUNITY

## 2018-11-26 RX ORDER — ASPIRIN 325 MG/1
325 TABLET, FILM COATED ORAL
Refills: 0 | Status: ACTIVE | COMMUNITY

## 2018-11-26 RX ORDER — ALENDRONATE SODIUM 70 MG/1
70 TABLET ORAL
Refills: 0 | Status: ACTIVE | COMMUNITY

## 2018-11-26 RX ORDER — METOPROLOL TARTRATE 25 MG/1
25 TABLET, FILM COATED ORAL TWICE DAILY
Refills: 0 | Status: ACTIVE | COMMUNITY

## 2018-12-18 ENCOUNTER — APPOINTMENT (OUTPATIENT)
Dept: CARDIOTHORACIC SURGERY | Facility: CLINIC | Age: 63
End: 2018-12-18
Payer: MEDICAID

## 2018-12-18 VITALS
HEART RATE: 70 BPM | BODY MASS INDEX: 34.07 KG/M2 | SYSTOLIC BLOOD PRESSURE: 166 MMHG | TEMPERATURE: 97.8 F | RESPIRATION RATE: 18 BRPM | WEIGHT: 212 LBS | DIASTOLIC BLOOD PRESSURE: 77 MMHG | HEIGHT: 66 IN | OXYGEN SATURATION: 96 %

## 2018-12-18 DIAGNOSIS — I44.2 ATRIOVENTRICULAR BLOCK, COMPLETE: ICD-10-CM

## 2018-12-18 PROCEDURE — 99024 POSTOP FOLLOW-UP VISIT: CPT

## 2018-12-19 NOTE — ASSESSMENT
[FreeTextEntry1] : Very pleasant patient following coronary artery bypass grafting x$ on 11/13/2018. The patient is currently at home doing very well. Patient reports significant improvement over the last few weeks. The patient is becoming more active. Patient reports feeling fatigued and mild shortness of breath, and some incisional discomfort, but reports that this is getting better over the last few days. On exam all the vital signs are stable. Lungs are clear. The heart sounds are normal. All the incisions are healing nicely.   The incisions are stable, without any evidence of infection. I have advised the patient to continue with physical activity, and to follow up with you for the rest of the cardiovascular care. Thank you for the opportunity to participate in the care of your patient. Please do not hesitate to call me if I can be of further assistance.

## 2018-12-19 NOTE — PHYSICAL EXAM
[] : no respiratory distress [Apical Impulse] : the apical impulse was normal [Site: ___] : Site: [unfilled] [Clean] : clean [Dry] : dry [Healing Well] : healing well [___ +] : bilateral ankle [unfilled]U+ pitting edema [FreeTextEntry1] : PPM site

## 2018-12-19 NOTE — CONSULT LETTER
[Dear  ___] : Dear  [unfilled], [Courtesy Letter:] : I had the pleasure of seeing your patient, [unfilled], in my office today. [Consult Closing:] : Thank you very much for allowing me to participate in the care of this patient.  If you have any questions, please do not hesitate to contact me. [Sincerely,] : Sincerely, [FreeTextEntry2] : Son Wilkins (DO), Internal Medicine \par [address] [place] [address] \par [address] Floor \[place] [address] \par Phone: [phone] \par Fax: [phone] [FreeTextEntry3] : \par Jovan Cavazos MD\par Chief, Cardiovascular Surgery at [facility]\par System Director of Surgical Heart Failure\par Professor, Cardiothoracic Surgery, [employer]\par \par

## 2018-12-19 NOTE — REASON FOR VISIT
[de-identified] : CABG x 4 [de-identified] : 11/13/18 [de-identified] : CHB post operative [employer] PPM placed on 11/19/18 DDD

## 2019-01-09 PROCEDURE — 86850 RBC ANTIBODY SCREEN: CPT

## 2019-01-09 PROCEDURE — 93306 TTE W/DOPPLER COMPLETE: CPT

## 2019-01-09 PROCEDURE — 85027 COMPLETE CBC AUTOMATED: CPT

## 2019-01-09 PROCEDURE — P9012: CPT

## 2019-01-09 PROCEDURE — 97116 GAIT TRAINING THERAPY: CPT

## 2019-01-09 PROCEDURE — 93458 L HRT ARTERY/VENTRICLE ANGIO: CPT

## 2019-01-09 PROCEDURE — C1769: CPT

## 2019-01-09 PROCEDURE — P9016: CPT

## 2019-01-09 PROCEDURE — C1887: CPT

## 2019-01-09 PROCEDURE — P9037: CPT

## 2019-01-09 PROCEDURE — 71045 X-RAY EXAM CHEST 1 VIEW: CPT

## 2019-01-09 PROCEDURE — 94002 VENT MGMT INPAT INIT DAY: CPT

## 2019-01-09 PROCEDURE — 80069 RENAL FUNCTION PANEL: CPT

## 2019-01-09 PROCEDURE — 97163 PT EVAL HIGH COMPLEX 45 MIN: CPT

## 2019-01-09 PROCEDURE — 99261: CPT

## 2019-01-09 PROCEDURE — 83036 HEMOGLOBIN GLYCOSYLATED A1C: CPT

## 2019-01-09 PROCEDURE — 82330 ASSAY OF CALCIUM: CPT

## 2019-01-09 PROCEDURE — C1894: CPT

## 2019-01-09 PROCEDURE — 93005 ELECTROCARDIOGRAM TRACING: CPT

## 2019-01-09 PROCEDURE — 93325 DOPPLER ECHO COLOR FLOW MAPG: CPT

## 2019-01-09 PROCEDURE — 99153 MOD SED SAME PHYS/QHP EA: CPT

## 2019-01-09 PROCEDURE — 85610 PROTHROMBIN TIME: CPT

## 2019-01-09 PROCEDURE — 84100 ASSAY OF PHOSPHORUS: CPT

## 2019-01-09 PROCEDURE — 87641 MR-STAPH DNA AMP PROBE: CPT

## 2019-01-09 PROCEDURE — 86900 BLOOD TYPING SEROLOGIC ABO: CPT

## 2019-01-09 PROCEDURE — 94760 N-INVAS EAR/PLS OXIMETRY 1: CPT

## 2019-01-09 PROCEDURE — 82803 BLOOD GASES ANY COMBINATION: CPT

## 2019-01-09 PROCEDURE — 86901 BLOOD TYPING SEROLOGIC RH(D): CPT

## 2019-01-09 PROCEDURE — C1898: CPT

## 2019-01-09 PROCEDURE — P9045: CPT

## 2019-01-09 PROCEDURE — 84134 ASSAY OF PREALBUMIN: CPT

## 2019-01-09 PROCEDURE — 71046 X-RAY EXAM CHEST 2 VIEWS: CPT

## 2019-01-09 PROCEDURE — 80202 ASSAY OF VANCOMYCIN: CPT

## 2019-01-09 PROCEDURE — 84484 ASSAY OF TROPONIN QUANT: CPT

## 2019-01-09 PROCEDURE — 80048 BASIC METABOLIC PNL TOTAL CA: CPT

## 2019-01-09 PROCEDURE — 94640 AIRWAY INHALATION TREATMENT: CPT

## 2019-01-09 PROCEDURE — 86965 POOLING BLOOD PLATELETS: CPT

## 2019-01-09 PROCEDURE — 82553 CREATINE MB FRACTION: CPT

## 2019-01-09 PROCEDURE — 85730 THROMBOPLASTIN TIME PARTIAL: CPT

## 2019-01-09 PROCEDURE — 84295 ASSAY OF SERUM SODIUM: CPT

## 2019-01-09 PROCEDURE — 97530 THERAPEUTIC ACTIVITIES: CPT

## 2019-01-09 PROCEDURE — 83735 ASSAY OF MAGNESIUM: CPT

## 2019-01-09 PROCEDURE — 99152 MOD SED SAME PHYS/QHP 5/>YRS: CPT

## 2019-01-09 PROCEDURE — 36430 TRANSFUSION BLD/BLD COMPNT: CPT

## 2019-01-09 PROCEDURE — 86923 COMPATIBILITY TEST ELECTRIC: CPT

## 2019-01-09 PROCEDURE — 84443 ASSAY THYROID STIM HORMONE: CPT

## 2019-01-09 PROCEDURE — 80053 COMPREHEN METABOLIC PANEL: CPT

## 2019-01-09 PROCEDURE — 93312 ECHO TRANSESOPHAGEAL: CPT

## 2019-01-09 PROCEDURE — 83970 ASSAY OF PARATHORMONE: CPT

## 2019-01-09 PROCEDURE — 94010 BREATHING CAPACITY TEST: CPT

## 2019-01-09 PROCEDURE — 93880 EXTRACRANIAL BILAT STUDY: CPT

## 2019-01-09 PROCEDURE — 84132 ASSAY OF SERUM POTASSIUM: CPT

## 2019-01-09 PROCEDURE — T1013: CPT

## 2019-01-09 PROCEDURE — 82310 ASSAY OF CALCIUM: CPT

## 2019-01-09 PROCEDURE — 36415 COLL VENOUS BLD VENIPUNCTURE: CPT

## 2019-01-09 PROCEDURE — 82962 GLUCOSE BLOOD TEST: CPT

## 2019-01-09 PROCEDURE — 87640 STAPH A DNA AMP PROBE: CPT

## 2019-01-09 PROCEDURE — 71250 CT THORAX DX C-: CPT

## 2019-01-09 PROCEDURE — C1785: CPT

## 2019-01-09 PROCEDURE — 93320 DOPPLER ECHO COMPLETE: CPT

## 2019-01-09 PROCEDURE — 99285 EMERGENCY DEPT VISIT HI MDM: CPT

## 2019-01-09 PROCEDURE — C1892: CPT

## 2019-01-09 PROCEDURE — 82306 VITAMIN D 25 HYDROXY: CPT

## 2019-01-09 PROCEDURE — C1889: CPT

## 2019-01-09 PROCEDURE — 97110 THERAPEUTIC EXERCISES: CPT

## 2019-01-09 PROCEDURE — 33208 INSRT HEART PM ATRIAL & VENT: CPT

## 2019-01-09 PROCEDURE — 82435 ASSAY OF BLOOD CHLORIDE: CPT

## 2019-01-09 PROCEDURE — 82550 ASSAY OF CK (CPK): CPT

## 2019-01-09 PROCEDURE — 93308 TTE F-UP OR LMTD: CPT

## 2019-01-09 PROCEDURE — 85014 HEMATOCRIT: CPT

## 2019-01-09 PROCEDURE — 83880 ASSAY OF NATRIURETIC PEPTIDE: CPT

## 2019-01-09 PROCEDURE — 83605 ASSAY OF LACTIC ACID: CPT

## 2019-01-09 PROCEDURE — 82947 ASSAY GLUCOSE BLOOD QUANT: CPT

## 2019-03-16 ENCOUNTER — INPATIENT (INPATIENT)
Facility: HOSPITAL | Age: 64
LOS: 3 days | Discharge: HOME | DRG: 186 | End: 2019-03-20
Attending: HOSPITALIST | Admitting: INTERNAL MEDICINE
Payer: COMMERCIAL

## 2019-03-16 VITALS
RESPIRATION RATE: 18 BRPM | DIASTOLIC BLOOD PRESSURE: 79 MMHG | TEMPERATURE: 98 F | SYSTOLIC BLOOD PRESSURE: 166 MMHG | OXYGEN SATURATION: 97 % | WEIGHT: 199.96 LBS | HEART RATE: 69 BPM | HEIGHT: 63 IN

## 2019-03-16 DIAGNOSIS — I77.0 ARTERIOVENOUS FISTULA, ACQUIRED: Chronic | ICD-10-CM

## 2019-03-16 DIAGNOSIS — J90 PLEURAL EFFUSION, NOT ELSEWHERE CLASSIFIED: ICD-10-CM

## 2019-03-16 DIAGNOSIS — Z90.49 ACQUIRED ABSENCE OF OTHER SPECIFIED PARTS OF DIGESTIVE TRACT: Chronic | ICD-10-CM

## 2019-03-16 LAB
ALBUMIN SERPL ELPH-MCNC: 4.1 G/DL — SIGNIFICANT CHANGE UP (ref 3.3–5.2)
ALBUMIN SERPL ELPH-MCNC: 4.4 G/DL — SIGNIFICANT CHANGE UP (ref 3.3–5.2)
ALP SERPL-CCNC: 133 U/L — HIGH (ref 40–120)
ALP SERPL-CCNC: 137 U/L — HIGH (ref 40–120)
ALT FLD-CCNC: 16 U/L — SIGNIFICANT CHANGE UP
ALT FLD-CCNC: 17 U/L — SIGNIFICANT CHANGE UP
ANION GAP SERPL CALC-SCNC: 14 MMOL/L — SIGNIFICANT CHANGE UP (ref 5–17)
ANION GAP SERPL CALC-SCNC: 16 MMOL/L — SIGNIFICANT CHANGE UP (ref 5–17)
APTT BLD: 35.5 SEC — SIGNIFICANT CHANGE UP (ref 27.5–36.3)
AST SERPL-CCNC: 22 U/L — SIGNIFICANT CHANGE UP
AST SERPL-CCNC: 28 U/L — SIGNIFICANT CHANGE UP
B PERT IGG+IGM PNL SER: ABNORMAL
BASOPHILS # BLD AUTO: 0 K/UL — SIGNIFICANT CHANGE UP (ref 0–0.2)
BASOPHILS NFR BLD AUTO: 0.3 % — SIGNIFICANT CHANGE UP (ref 0–2)
BILIRUB SERPL-MCNC: 0.3 MG/DL — LOW (ref 0.4–2)
BILIRUB SERPL-MCNC: 0.4 MG/DL — SIGNIFICANT CHANGE UP (ref 0.4–2)
BUN SERPL-MCNC: 15 MG/DL — SIGNIFICANT CHANGE UP (ref 8–20)
BUN SERPL-MCNC: 19 MG/DL — SIGNIFICANT CHANGE UP (ref 8–20)
CALCIUM SERPL-MCNC: 9.6 MG/DL — SIGNIFICANT CHANGE UP (ref 8.6–10.2)
CALCIUM SERPL-MCNC: 9.7 MG/DL — SIGNIFICANT CHANGE UP (ref 8.6–10.2)
CHLORIDE SERPL-SCNC: 92 MMOL/L — LOW (ref 98–107)
CHLORIDE SERPL-SCNC: 95 MMOL/L — LOW (ref 98–107)
CO2 SERPL-SCNC: 28 MMOL/L — SIGNIFICANT CHANGE UP (ref 22–29)
CO2 SERPL-SCNC: 30 MMOL/L — HIGH (ref 22–29)
COLOR FLD: ABNORMAL
CREAT SERPL-MCNC: 2.96 MG/DL — HIGH (ref 0.5–1.3)
CREAT SERPL-MCNC: 3.7 MG/DL — HIGH (ref 0.5–1.3)
EOSINOPHIL # BLD AUTO: 0.1 K/UL — SIGNIFICANT CHANGE UP (ref 0–0.5)
EOSINOPHIL NFR BLD AUTO: 1.4 % — SIGNIFICANT CHANGE UP (ref 0–6)
FLUID INTAKE SUBSTANCE CLASS: SIGNIFICANT CHANGE UP
FLUID SEGMENTED GRANULOCYTES: 2 % — SIGNIFICANT CHANGE UP
GLUCOSE SERPL-MCNC: 189 MG/DL — HIGH (ref 70–115)
GLUCOSE SERPL-MCNC: 195 MG/DL — HIGH (ref 70–115)
GRAM STN FLD: SIGNIFICANT CHANGE UP
HCT VFR BLD CALC: 35.7 % — LOW (ref 37–47)
HCT VFR BLD CALC: 36.6 % — LOW (ref 37–47)
HGB BLD-MCNC: 11.2 G/DL — LOW (ref 12–16)
HGB BLD-MCNC: 11.5 G/DL — LOW (ref 12–16)
INR BLD: 1.08 RATIO — SIGNIFICANT CHANGE UP (ref 0.88–1.16)
LIDOCAIN IGE QN: 63 U/L — HIGH (ref 22–51)
LYMPHOCYTES # BLD AUTO: 1.6 K/UL — SIGNIFICANT CHANGE UP (ref 1–4.8)
LYMPHOCYTES # BLD AUTO: 16.7 % — LOW (ref 20–55)
LYMPHOCYTES # FLD: 18 % — SIGNIFICANT CHANGE UP
MCHC RBC-ENTMCNC: 27.4 PG — SIGNIFICANT CHANGE UP (ref 27–31)
MCHC RBC-ENTMCNC: 27.6 PG — SIGNIFICANT CHANGE UP (ref 27–31)
MCHC RBC-ENTMCNC: 31.4 G/DL — LOW (ref 32–36)
MCHC RBC-ENTMCNC: 31.4 G/DL — LOW (ref 32–36)
MCV RBC AUTO: 87.4 FL — SIGNIFICANT CHANGE UP (ref 81–99)
MCV RBC AUTO: 87.9 FL — SIGNIFICANT CHANGE UP (ref 81–99)
MESOTHL CELL # FLD: 8 % — SIGNIFICANT CHANGE UP
MONOCYTES # BLD AUTO: 0.6 K/UL — SIGNIFICANT CHANGE UP (ref 0–0.8)
MONOCYTES NFR BLD AUTO: 6.2 % — SIGNIFICANT CHANGE UP (ref 3–10)
MONOS+MACROS # FLD: 72 % — SIGNIFICANT CHANGE UP
NEUTROPHILS # BLD AUTO: 7.1 K/UL — SIGNIFICANT CHANGE UP (ref 1.8–8)
NEUTROPHILS NFR BLD AUTO: 75 % — HIGH (ref 37–73)
NT-PROBNP SERPL-SCNC: HIGH PG/ML (ref 0–300)
PH FLD: 8 — SIGNIFICANT CHANGE UP
PLATELET # BLD AUTO: 243 K/UL — SIGNIFICANT CHANGE UP (ref 150–400)
PLATELET # BLD AUTO: 256 K/UL — SIGNIFICANT CHANGE UP (ref 150–400)
POTASSIUM SERPL-MCNC: 3.6 MMOL/L — SIGNIFICANT CHANGE UP (ref 3.5–5.3)
POTASSIUM SERPL-MCNC: 4.4 MMOL/L — SIGNIFICANT CHANGE UP (ref 3.5–5.3)
POTASSIUM SERPL-SCNC: 3.6 MMOL/L — SIGNIFICANT CHANGE UP (ref 3.5–5.3)
POTASSIUM SERPL-SCNC: 4.4 MMOL/L — SIGNIFICANT CHANGE UP (ref 3.5–5.3)
PROT SERPL-MCNC: 7.9 G/DL — SIGNIFICANT CHANGE UP (ref 6.6–8.7)
PROT SERPL-MCNC: 8.2 G/DL — SIGNIFICANT CHANGE UP (ref 6.6–8.7)
PROTHROM AB SERPL-ACNC: 12.5 SEC — SIGNIFICANT CHANGE UP (ref 10–12.9)
RBC # BLD: 4.06 M/UL — LOW (ref 4.4–5.2)
RBC # BLD: 4.19 M/UL — LOW (ref 4.4–5.2)
RBC # FLD: 16.1 % — HIGH (ref 11–15.6)
RBC # FLD: 16.1 % — HIGH (ref 11–15.6)
RCV VOL RI: HIGH /UL (ref 0–5)
SODIUM SERPL-SCNC: 137 MMOL/L — SIGNIFICANT CHANGE UP (ref 135–145)
SODIUM SERPL-SCNC: 138 MMOL/L — SIGNIFICANT CHANGE UP (ref 135–145)
SPECIMEN SOURCE: SIGNIFICANT CHANGE UP
TOTAL NUCLEATED CELL COUNT, BODY FLUID: 392 /UL — HIGH (ref 0–5)
TROPONIN T SERPL-MCNC: 0.04 NG/ML — SIGNIFICANT CHANGE UP (ref 0–0.06)
TROPONIN T SERPL-MCNC: 0.05 NG/ML — SIGNIFICANT CHANGE UP (ref 0–0.06)
TUBE TYPE: SIGNIFICANT CHANGE UP
WBC # BLD: 10.3 K/UL — SIGNIFICANT CHANGE UP (ref 4.8–10.8)
WBC # BLD: 9.5 K/UL — SIGNIFICANT CHANGE UP (ref 4.8–10.8)
WBC # FLD AUTO: 10.3 K/UL — SIGNIFICANT CHANGE UP (ref 4.8–10.8)
WBC # FLD AUTO: 9.5 K/UL — SIGNIFICANT CHANGE UP (ref 4.8–10.8)

## 2019-03-16 PROCEDURE — 71045 X-RAY EXAM CHEST 1 VIEW: CPT | Mod: 26

## 2019-03-16 PROCEDURE — 99285 EMERGENCY DEPT VISIT HI MDM: CPT

## 2019-03-16 PROCEDURE — 93010 ELECTROCARDIOGRAM REPORT: CPT | Mod: 76

## 2019-03-16 PROCEDURE — 71045 X-RAY EXAM CHEST 1 VIEW: CPT | Mod: 26,77

## 2019-03-16 RX ORDER — OXYCODONE AND ACETAMINOPHEN 5; 325 MG/1; MG/1
1 TABLET ORAL EVERY 6 HOURS
Qty: 0 | Refills: 0 | Status: DISCONTINUED | OUTPATIENT
Start: 2019-03-16 | End: 2019-03-20

## 2019-03-16 RX ORDER — AMLODIPINE BESYLATE 2.5 MG/1
5 TABLET ORAL DAILY
Qty: 0 | Refills: 0 | Status: DISCONTINUED | OUTPATIENT
Start: 2019-03-16 | End: 2019-03-20

## 2019-03-16 RX ORDER — SIMVASTATIN 20 MG/1
20 TABLET, FILM COATED ORAL AT BEDTIME
Qty: 0 | Refills: 0 | Status: DISCONTINUED | OUTPATIENT
Start: 2019-03-16 | End: 2019-03-20

## 2019-03-16 RX ORDER — LEVOTHYROXINE SODIUM 125 MCG
75 TABLET ORAL DAILY
Qty: 0 | Refills: 0 | Status: DISCONTINUED | OUTPATIENT
Start: 2019-03-16 | End: 2019-03-20

## 2019-03-16 RX ORDER — AMLODIPINE BESYLATE 2.5 MG/1
5 TABLET ORAL ONCE
Qty: 0 | Refills: 0 | Status: COMPLETED | OUTPATIENT
Start: 2019-03-16 | End: 2019-03-16

## 2019-03-16 RX ORDER — MORPHINE SULFATE 50 MG/1
4 CAPSULE, EXTENDED RELEASE ORAL ONCE
Qty: 0 | Refills: 0 | Status: DISCONTINUED | OUTPATIENT
Start: 2019-03-16 | End: 2019-03-16

## 2019-03-16 RX ORDER — DEXTROSE 50 % IN WATER 50 %
15 SYRINGE (ML) INTRAVENOUS ONCE
Qty: 0 | Refills: 0 | Status: DISCONTINUED | OUTPATIENT
Start: 2019-03-16 | End: 2019-03-20

## 2019-03-16 RX ORDER — CARVEDILOL PHOSPHATE 80 MG/1
12.5 CAPSULE, EXTENDED RELEASE ORAL ONCE
Qty: 0 | Refills: 0 | Status: COMPLETED | OUTPATIENT
Start: 2019-03-16 | End: 2019-03-16

## 2019-03-16 RX ORDER — SODIUM CHLORIDE 9 MG/ML
1000 INJECTION, SOLUTION INTRAVENOUS
Qty: 0 | Refills: 0 | Status: DISCONTINUED | OUTPATIENT
Start: 2019-03-16 | End: 2019-03-20

## 2019-03-16 RX ORDER — ONDANSETRON 8 MG/1
4 TABLET, FILM COATED ORAL EVERY 6 HOURS
Qty: 0 | Refills: 0 | Status: DISCONTINUED | OUTPATIENT
Start: 2019-03-16 | End: 2019-03-20

## 2019-03-16 RX ORDER — DEXTROSE 50 % IN WATER 50 %
12.5 SYRINGE (ML) INTRAVENOUS ONCE
Qty: 0 | Refills: 0 | Status: DISCONTINUED | OUTPATIENT
Start: 2019-03-16 | End: 2019-03-20

## 2019-03-16 RX ORDER — GLUCAGON INJECTION, SOLUTION 0.5 MG/.1ML
1 INJECTION, SOLUTION SUBCUTANEOUS ONCE
Qty: 0 | Refills: 0 | Status: DISCONTINUED | OUTPATIENT
Start: 2019-03-16 | End: 2019-03-20

## 2019-03-16 RX ORDER — FAMOTIDINE 10 MG/ML
20 INJECTION INTRAVENOUS DAILY
Qty: 0 | Refills: 0 | Status: DISCONTINUED | OUTPATIENT
Start: 2019-03-16 | End: 2019-03-18

## 2019-03-16 RX ORDER — IPRATROPIUM/ALBUTEROL SULFATE 18-103MCG
3 AEROSOL WITH ADAPTER (GRAM) INHALATION EVERY 6 HOURS
Qty: 0 | Refills: 0 | Status: DISCONTINUED | OUTPATIENT
Start: 2019-03-16 | End: 2019-03-20

## 2019-03-16 RX ORDER — LIDOCAINE HCL 20 MG/ML
10 VIAL (ML) INJECTION ONCE
Qty: 0 | Refills: 0 | Status: COMPLETED | OUTPATIENT
Start: 2019-03-16 | End: 2019-03-16

## 2019-03-16 RX ORDER — CALCIUM ACETATE 667 MG
667 TABLET ORAL
Qty: 0 | Refills: 0 | Status: DISCONTINUED | OUTPATIENT
Start: 2019-03-16 | End: 2019-03-20

## 2019-03-16 RX ORDER — METOCLOPRAMIDE HCL 10 MG
5 TABLET ORAL THREE TIMES A DAY
Qty: 0 | Refills: 0 | Status: DISCONTINUED | OUTPATIENT
Start: 2019-03-16 | End: 2019-03-20

## 2019-03-16 RX ORDER — INSULIN LISPRO 100/ML
VIAL (ML) SUBCUTANEOUS
Qty: 0 | Refills: 0 | Status: DISCONTINUED | OUTPATIENT
Start: 2019-03-16 | End: 2019-03-20

## 2019-03-16 RX ORDER — SODIUM CHLORIDE 9 MG/ML
3 INJECTION INTRAMUSCULAR; INTRAVENOUS; SUBCUTANEOUS EVERY 8 HOURS
Qty: 0 | Refills: 0 | Status: DISCONTINUED | OUTPATIENT
Start: 2019-03-16 | End: 2019-03-20

## 2019-03-16 RX ORDER — FOLIC ACID 0.8 MG
1 TABLET ORAL DAILY
Qty: 0 | Refills: 0 | Status: DISCONTINUED | OUTPATIENT
Start: 2019-03-16 | End: 2019-03-20

## 2019-03-16 RX ORDER — ACETAMINOPHEN 500 MG
650 TABLET ORAL EVERY 6 HOURS
Qty: 0 | Refills: 0 | Status: DISCONTINUED | OUTPATIENT
Start: 2019-03-16 | End: 2019-03-20

## 2019-03-16 RX ORDER — ONDANSETRON 8 MG/1
4 TABLET, FILM COATED ORAL ONCE
Qty: 0 | Refills: 0 | Status: COMPLETED | OUTPATIENT
Start: 2019-03-16 | End: 2019-03-16

## 2019-03-16 RX ADMIN — FAMOTIDINE 20 MILLIGRAM(S): 10 INJECTION INTRAVENOUS at 15:06

## 2019-03-16 RX ADMIN — MORPHINE SULFATE 4 MILLIGRAM(S): 50 CAPSULE, EXTENDED RELEASE ORAL at 19:04

## 2019-03-16 RX ADMIN — ONDANSETRON 4 MILLIGRAM(S): 8 TABLET, FILM COATED ORAL at 18:32

## 2019-03-16 RX ADMIN — Medication 30 MILLILITER(S): at 22:34

## 2019-03-16 RX ADMIN — CARVEDILOL PHOSPHATE 12.5 MILLIGRAM(S): 80 CAPSULE, EXTENDED RELEASE ORAL at 17:31

## 2019-03-16 RX ADMIN — MORPHINE SULFATE 4 MILLIGRAM(S): 50 CAPSULE, EXTENDED RELEASE ORAL at 18:32

## 2019-03-16 RX ADMIN — OXYCODONE AND ACETAMINOPHEN 1 TABLET(S): 5; 325 TABLET ORAL at 21:29

## 2019-03-16 RX ADMIN — ONDANSETRON 4 MILLIGRAM(S): 8 TABLET, FILM COATED ORAL at 23:39

## 2019-03-16 RX ADMIN — Medication 30 MILLILITER(S): at 15:06

## 2019-03-16 RX ADMIN — Medication 10 MILLILITER(S): at 22:35

## 2019-03-16 RX ADMIN — OXYCODONE AND ACETAMINOPHEN 1 TABLET(S): 5; 325 TABLET ORAL at 22:32

## 2019-03-16 RX ADMIN — AMLODIPINE BESYLATE 5 MILLIGRAM(S): 2.5 TABLET ORAL at 17:31

## 2019-03-16 NOTE — ED ADULT NURSE REASSESSMENT NOTE - NS ED NURSE REASSESS COMMENT FT1
pt with total 1700cc serosanguinous drainage from chest tube, CT surgery eliezer, ER MD at bedside due to pt complaints of nausea and pain with some SOB. repeat CXR completed, medicated as ordered. as per ER MD chest tube to be clamped at this time.

## 2019-03-16 NOTE — H&P ADULT - RESPIRATORY COMMENTS
chest tube in left lower posterior chest wall with bloody fluid, 1900ml of blood fluid in Pleur-evac

## 2019-03-16 NOTE — ED PROVIDER NOTE - CLINICAL SUMMARY MEDICAL DECISION MAKING FREE TEXT BOX
Pt improves s/p Pepcid and Maalox, no epigastric pain at this time. On CXR large l sided pleural effusion, cardiothoracic surgery consulted. Placed chest tube and will continue to follow, 1700ccs in 15 minutes. Pt c/o pain from chest tube, pain medication given. Repeat CXR shows resolution of pleural effusion, trop negative labs otherwise unremarkable, pt is not on anticoagulation, will admit to medicine for further eval

## 2019-03-16 NOTE — ED ADULT NURSE REASSESSMENT NOTE - NS ED NURSE REASSESS COMMENT FT1
pt awake and alert, sitting up in personal wheelchair with no difficulty. remains paced on monitor, even and unlabored resps, IV site patent, CT surgery PA at bedside for eval and placement of chest tube.

## 2019-03-16 NOTE — H&P ADULT - NSICDXPASTMEDICALHX_GEN_ALL_CORE_FT
PAST MEDICAL HISTORY:  3-vessel CAD     Diabetes mellitus     Hemodialysis patient tues thursday saturday    Hypertension     Hypothyroidism, unspecified type     Renal failure

## 2019-03-16 NOTE — ED ADULT NURSE NOTE - OBJECTIVE STATEMENT
pt comes to ED with daughter who reports she is having epigastric abd pain x 1 month. states had CABG x 4 a few months ago and was told to stop taking her insulin. pt with no vomiting, no fevers, reports no urinary complaints. states pain gets better after she eats. pt abd soft non tender non distended. even and unlabored resps, no chest pain. pt comes to ED with daughter who reports she is having epigastric abd pain x 1 month. states had CABG x 4 a few months ago and was told to stop taking her insulin. pt with no vomiting, no fevers, reports no urinary complaints. states pain gets better after she eats. pt abd soft non tender non distended. even and unlabored resps, no chest pain. receives dialysis T, TH, SAT completed treatment today without issue.

## 2019-03-16 NOTE — ED STATDOCS - PROGRESS NOTE DETAILS
64 y/o F pt with significant PMHx of CAD, DM, and pacemaker presents to the ED c/o constant CP onset one month ago. Reports hypertension, and difficulty breathing. Pt was here in November for Cardiac Bypass. While she was here they discontinued her insulin and told her to only take it if her blood sugar gets too high. Family at bedside states she gave her the insulin twice recently. Eating relieves her pain. Pt just came from Dialysis. Denies nausea, vomiting, or diarrhea. No further complaints at this time.   Cardio-   Exam- reproducible epigastric pain. 1 mL elevation in V1, no other acute EKG changes, no reciprocal changes.  Patient will be sent to the main for further evaluation and work up. Initial orders placed. 64 y/o F pt with significant PMHx of CAD, DM, and pacemaker presents to the ED c/o constant CP/abdominal pain onset one month ago. Reports hypertension, and difficulty breathing. Pt was here in November for Cardiac Bypass. While she was here they discontinued her insulin and told her to only take it if her blood sugar gets too high. Family at bedside states she gave her the insulin twice recently. Eating relieves her pain. Pt just came from Dialysis. Denies nausea, vomiting, or diarrhea. No further complaints at this time.   Cardio- DrInes Lee  Exam- reproducible epigastric pain. 1 mL elevation in V1, no other acute EKG changes, no reciprocal changes.  Patient will be sent to the main for further evaluation and work up. Initial orders placed.

## 2019-03-16 NOTE — ED PROVIDER NOTE - OBJECTIVE STATEMENT
62 y/o F with hx of CAD, DM, pacemaker, recent CABG in Nov done by Dr. Cavazos, ESRD on dialysis Tues/Thurs/Sat, had normal dialysis today, presents to the ED c/o epigastric pain and nausea intermittent x1 month worse today. Pt states she had slight SOB before dialysis now resolved. Denies fever chills, vomiting, cp, lightheadedness, diarrhea, constipation, urinary sx. Pt states she is urinating but with low urine output.

## 2019-03-16 NOTE — ED STATDOCS - NS_ ATTENDINGSCRIBEDETAILS _ED_A_ED_FT
I, Monica Gupta, performed the initial face to face bedside interview with this patient regarding history of present illness, review of symptoms and relevant past medical, social and family history.  I completed an independent physical examination.  The history, relevant review of systems, past medical and surgical history, medical decision making, and physical examination was documented by the scribe in my presence and I attest to the accuracy of the documentation.

## 2019-03-16 NOTE — H&P ADULT - ASSESSMENT
64 y/o female with large left sided hemothorax s/p CT placement, CAD s/p CABG 11/18, ESRD on HD T/TH/Sat, HTN, DM-2, Gastroparesis, Hypothyroid    Pleural effusion:  Possibly related to prior open heart surgery complicated by being on DATP vs Malignant pleural effusion. No suspicion of infection based on absence of fever, leukocytosis, and no infiltrate on post CT placement cxr. F/U cytology, LDH, and remainder of fluid studies. No PTX on post film. Repeat cxr in AM. Likely can have CT dcd if AM film unchanged. Hold DATP for now.     CAD:  Cont. o/p regimen, except holding DATP in setting of hemothorax    ESRD:  Due for HD on Tues,  Cont. Renal diet  AVF with pos thrill/bruit  Renal consult    HTN:  Cont. o/p regimen    DM-2:  ADA diet, Accuchecks AC/HS, HISS    Hypothyroid:  Check TSH   cont. Synthroid    Gastroparesis:  Cont. Reglan, PPI, add sucralafate  Has o/p GI appt. with Dr. Payton on Friday    OOB/VCboots, Fall risk, PT program

## 2019-03-16 NOTE — ED ADULT NURSE NOTE - CHIEF COMPLAINT QUOTE
Patient arrived in wheelchair to ED, awake, alert and oriented times 3, breathing unlabored.  Patient complaining of epigastric pain for 1 month.  patient was seen by GI and not followed up.  As per family, patient was taken off insulin since november due to needed surgery as per family.  Elevated blood sugar as per family.  Open heart in November.  Dialysis Tues/TH/Sat.  patient did not take HTN meds today

## 2019-03-16 NOTE — CONSULT NOTE ADULT - SUBJECTIVE AND OBJECTIVE BOX
History of Present Illness:  HPI: 63F h/o CABG, p/w abd pain, likely gastritis, reports also SOB, progressive and not severe, worse with exertion. CXR reveals large left pleural effusion.    Past Medical History  Hypothyroidism, unspecified type  Hemodialysis patient: tues thursday saturday  Renal failure  Hypertension  Diabetes mellitus      Past Surgical History  A-V fistula  S/P cholecystectomy      MEDICATIONS  (STANDING):  famotidine    Tablet 20 milliGRAM(s) Oral daily  lidocaine 1% Injectable 10 milliLiter(s) Local Injection Once    MEDICATIONS  (PRN):  aluminum hydroxide/magnesium hydroxide/simethicone Suspension 30 milliLiter(s) Oral every 4 hours PRN Dyspepsia    Antiplatelet therapy:                           Last dose/amt:    Allergies: No Known Allergies  OHS (Unknown)      SOCIAL HISTORY:  Smoker: [ ] Yes  [x ] No        PACK YEARS:                         WHEN QUIT?  ETOH use: [ ] Yes  [ x] No              FREQUENCY / QUANTITY:  Ilicit Drug use:  [ ] Yes  [x ] No  Occupation: none  Live with: family  Assist device use: walker/wheelchair    PMD: Shital Johnson  Referring Cardiologist: Yo Lee    Relevant Family History  FAMILY HISTORY:  Family history of diabetes mellitus      Review of Systems  GENERAL:  Fevers[] chills[] sweats[] fatigue[] weight loss[] weight gain []                                      [x ] NEGATIVE  NEURO:  parathesias[] seizures []  syncope []  confusion []                                                                [ x] NEGATIVE                 EYES: glasses[]  blurry vision[]  discharge[] pain[] glaucoma []                                                           [x ] NEGATIVE                 ENMT:  difficulty hearing []  vertigo[]  dysphagia[] epistaxis[] recent dental work []                     [ x] NEGATIVE                 CV:  chest pain[] palpitations[] HOYOS [] diaphoresis [] edema[]                                                           [ ] NEGATIVE                                 RESPIRATORY:  wheezing[] SOB[] cough [] sputum[] hemoptysis[]                                                   [ ] NEGATIVE               GI:  nausea[]  vomiting []  diarrhea[] constipation [] melena []                                                          [ ] NEGATIVE            : hematuria[ ]  dysuria[ ] urgency[] incontinence[]                                                                          [ ] NEGATIVE                    MUSKULOSKELETAL:  arthritis[ ]  joint swelling [ ] muscle weakness [ ]                                            [ ] NEGATIVE                     SKIN/BREAST:  rash[ ] itching [ ]  hair loss[ ] masses[ ]                                                                          [ ] NEGATIVE                     PSYCH:  dementia [ ] depression [ ] anxiety[x ]                                                                                          [ ] NEGATIVE                        HEME/LYMPH:  bruises easily[ ] enlarged lymph nodes[ ] tender lymph nodes[ ]                           [x ] NEGATIVE                      ENDOCRINE:  cold intolerance[ ] heat intolerance[ ] polydipsia[ ]                                                      [ ] NEGATIVE                        Telemetry:    PHYSICAL EXAM  Vital Signs Last 24 Hrs  T(C): 36.8 (16 Mar 2019 10:28), Max: 36.8 (16 Mar 2019 10:28)  T(F): 98.2 (16 Mar 2019 10:28), Max: 98.2 (16 Mar 2019 10:28)  HR: 78 (16 Mar 2019 15:00) (69 - 78)  BP: 180/78 (16 Mar 2019 15:00) (166/79 - 180/78)  BP(mean): --  RR: 18 (16 Mar 2019 15:00) (18 - 18)  SpO2: 96% (16 Mar 2019 15:00) (96% - 97%)    General: Well nourished, well developed, no acute distress.                                                         Neuro: Normal exam oriented to person/place & time with no focal motor or sensory  deficits.                    Eyes: Normal exam of conjunctiva & lids, pupils equally reactive.   ENT: Normal exam of nasal/oral mucosa with absence of cyanosis.   Neck: Normal exam of jugular veins, trachea & thyroid.   Chest: Normal lung exam with good air movement absence of wheezes, rales, or rhonchi:                                                                          CV:  Auscultation: normal [ ] S3[ ] S4[ ] Irregular [ ] Rub[ ] Clicks[ ]  Murmurs none:[ ]systolic [ ]  diastolic [ ] holosystolic [ ]  Carotids: No Bruits[ ] Other____________ Abdominal Aorta: normal [ ] nonpalpable[ ]                                                                         GI: Normal exam of abdomen, liver & spleen with no noted masses or tenderness.                                                                                              Extremities: Normal no evidence of cyanosis or deformity Edema: none[ ]trace[ ]1+[ ]2+[ ]3+[ ]4+[ ]  Lower Extremity Pulses: Right[ ] Left[ ]Varicosities[ ]  SKIN : Normal exam to inspection & palpation.                                                           LABS:                        11.2   10.3  )-----------( 256      ( 16 Mar 2019 11:57 )             35.7     03-16    138  |  92<L>  |  15.0  ----------------------------<  189<H>  3.6   |  30.0<H>  |  2.96<H>    Ca    9.6      16 Mar 2019 11:57    TPro  8.2  /  Alb  4.4  /  TBili  0.3<L>  /  DBili  x   /  AST  22  /  ALT  16  /  AlkPhos  137<H>  03-16    PT/INR - ( 16 Mar 2019 11:57 )   PT: 12.5 sec;   INR: 1.08 ratio         PTT - ( 16 Mar 2019 11:57 )  PTT:35.5 sec    CARDIAC MARKERS ( 16 Mar 2019 11:57 )  x     / 0.04 ng/mL / x     / x     / x              Cardiac Cath:    TTE / ZONIA:    Assessment:  63y Female presents with     Plan: History of Present Illness:  HPI: 63F h/o CABG, p/w abd pain, likely gastritis, reports also SOB, progressive and not severe, worse with exertion. CXR reveals large left pleural effusion.    Past Medical History  Hypothyroidism, unspecified type  Hemodialysis patient: tues thursday saturday  Renal failure  Hypertension  Diabetes mellitus      Past Surgical History  A-V fistula  S/P cholecystectomy      MEDICATIONS  (STANDING):  famotidine    Tablet 20 milliGRAM(s) Oral daily  lidocaine 1% Injectable 10 milliLiter(s) Local Injection Once    MEDICATIONS  (PRN):  aluminum hydroxide/magnesium hydroxide/simethicone Suspension 30 milliLiter(s) Oral every 4 hours PRN Dyspepsia    Antiplatelet therapy:                           Last dose/amt:    Allergies: No Known Allergies  OHS (Unknown)      SOCIAL HISTORY:  Smoker: [ ] Yes  [x ] No        PACK YEARS:                         WHEN QUIT?  ETOH use: [ ] Yes  [ x] No              FREQUENCY / QUANTITY:  Ilicit Drug use:  [ ] Yes  [x ] No  Occupation: none  Live with: family  Assist device use: walker/wheelchair    PMD: Shital Johnson  Referring Cardiologist: Yo Lee    Relevant Family History  FAMILY HISTORY:  Family history of diabetes mellitus      Review of Systems  GENERAL:  Fevers[] chills[] sweats[] fatigue[] weight loss[] weight gain []                                      [x ] NEGATIVE  NEURO:  parathesias[] seizures []  syncope []  confusion []                                                                [ x] NEGATIVE                 EYES: glasses[]  blurry vision[]  discharge[] pain[] glaucoma []                                                           [x ] NEGATIVE                 ENMT:  difficulty hearing []  vertigo[]  dysphagia[] epistaxis[] recent dental work []                     [ x] NEGATIVE                 CV:  chest pain[] palpitations[] HOYOS [] diaphoresis [] edema[]                                                           [x ] NEGATIVE                                 RESPIRATORY:  wheezing[] SOB[x] cough [] sputum[] hemoptysis[]                                                   [ ] NEGATIVE               GI:  nausea[]  vomiting []  diarrhea[] constipation [] melena []                                                          [ ] NEGATIVE            : hematuria[ ]  dysuria[ ] urgency[] incontinence[]                              (minimal urine output d/t dialysis pt)                                            [  ] NEGATIVE                    MUSKULOSKELETAL:  arthritis[ ]  joint swelling [ ] muscle weakness [ ]                                            [ x] NEGATIVE                     SKIN/BREAST:  rash[ ] itching [ ]  hair loss[ ] masses[ ]                                                                          [x ] NEGATIVE                     PSYCH:  dementia [ ] depression [ ] anxiety[x ]                                                                                          [ ] NEGATIVE                        HEME/LYMPH:  bruises easily[ ] enlarged lymph nodes[ ] tender lymph nodes[ ]                           [x ] NEGATIVE                      ENDOCRINE:  cold intolerance[ ] heat intolerance[ ] polydipsia[ ]                                                      [x ] NEGATIVE                        Telemetry: SR    PHYSICAL EXAM  Vital Signs Last 24 Hrs  T(C): 36.8 (16 Mar 2019 10:28), Max: 36.8 (16 Mar 2019 10:28)  T(F): 98.2 (16 Mar 2019 10:28), Max: 98.2 (16 Mar 2019 10:28)  HR: 78 (16 Mar 2019 15:00) (69 - 78)  BP: 180/78 (16 Mar 2019 15:00) (166/79 - 180/78)  BP(mean): --  RR: 18 (16 Mar 2019 15:00) (18 - 18)  SpO2: 96% (16 Mar 2019 15:00) (96% - 97%)    General: Well nourished, well developed, no acute distress.                                                         Neuro: Normal exam oriented to person/place & time with no focal motor or sensory  deficits.                    Eyes: Normal exam of conjunctiva & lids, pupils equally reactive.   ENT: Normal exam of nasal/oral mucosa with absence of cyanosis.   Neck: Normal exam of jugular veins, trachea & thyroid.   Chest: absent bs left base, diminished mid lung, clear otherwise                                                                CV:  Auscultation: normal [ x] S3[ ] S4[ ] Irregular [ ] Rub[ ] Clicks[ ]  Murmurs none:[ ]systolic [ ]  diastolic [ ] holosystolic [ ]  Carotids: No Bruits[x ] Other____________ Abdominal Aorta: normal [x ] nonpalpable[ ]                                                                         GI: Normal exam of abdomen, liver & spleen with no noted masses or tenderness.                                                                                              Extremities: Normal no evidence of cyanosis or deformity Edema: none[ x]trace[ ]1+[ ]2+[ ]3+[ ]4+[ ]  Lower Extremity Pulses: Right[x ] Left[ x]Varicosities[ ]  SKIN : midsternal incision well healed. left LE vein harvest site with superficial dehiscence.                                                           LABS:                        11.2   10.3  )-----------( 256      ( 16 Mar 2019 11:57 )             35.7     03-16    138  |  92<L>  |  15.0  ----------------------------<  189<H>  3.6   |  30.0<H>  |  2.96<H>    Ca    9.6      16 Mar 2019 11:57    TPro  8.2  /  Alb  4.4  /  TBili  0.3<L>  /  DBili  x   /  AST  22  /  ALT  16  /  AlkPhos  137<H>  03-16    PT/INR - ( 16 Mar 2019 11:57 )   PT: 12.5 sec;   INR: 1.08 ratio         PTT - ( 16 Mar 2019 11:57 )  PTT:35.5 sec    CARDIAC MARKERS ( 16 Mar 2019 11:57 )  x     / 0.04 ng/mL / x     / x     / x History of Present Illness:  HPI: 63F h/o CABG, p/w abd pain, likely gastritis, reports also SOB, progressive, unclear how long, and not severe, worse with exertion. CXR reveals large left pleural effusion.    Past Medical History  Hypothyroidism, unspecified type  Hemodialysis patient: tues thursday saturday  Renal failure  Hypertension  Diabetes mellitus      Past Surgical History  A-V fistula  S/P cholecystectomy      MEDICATIONS  (STANDING):  famotidine    Tablet 20 milliGRAM(s) Oral daily  lidocaine 1% Injectable 10 milliLiter(s) Local Injection Once    MEDICATIONS  (PRN):  aluminum hydroxide/magnesium hydroxide/simethicone Suspension 30 milliLiter(s) Oral every 4 hours PRN Dyspepsia    Antiplatelet therapy:                           Last dose/amt:    Allergies: No Known Allergies  OHS (Unknown)      SOCIAL HISTORY:  Smoker: [ ] Yes  [x ] No        PACK YEARS:                         WHEN QUIT?  ETOH use: [ ] Yes  [ x] No              FREQUENCY / QUANTITY:  Ilicit Drug use:  [ ] Yes  [x ] No  Occupation: none  Live with: family  Assist device use: walker/wheelchair    PMD: InRaheel Johnson  Referring Cardiologist: Yo Lee    Relevant Family History  FAMILY HISTORY:  Family history of diabetes mellitus      Review of Systems  GENERAL:  Fevers[] chills[] sweats[] fatigue[] weight loss[] weight gain []                                      [x ] NEGATIVE  NEURO:  parathesias[] seizures []  syncope []  confusion []                                                                [ x] NEGATIVE                 EYES: glasses[]  blurry vision[]  discharge[] pain[] glaucoma []                                                           [x ] NEGATIVE                 ENMT:  difficulty hearing []  vertigo[]  dysphagia[] epistaxis[] recent dental work []                     [ x] NEGATIVE                 CV:  chest pain[] palpitations[] HOYOS [] diaphoresis [] edema[]                                                           [x ] NEGATIVE                                 RESPIRATORY:  wheezing[] SOB[x] cough [] sputum[] hemoptysis[]                                                   [ ] NEGATIVE               GI:  nausea[]  vomiting []  diarrhea[] constipation [] melena []                                                          [ ] NEGATIVE            : hematuria[ ]  dysuria[ ] urgency[] incontinence[]                              (minimal urine output d/t dialysis pt)                                            [  ] NEGATIVE                    MUSKULOSKELETAL:  arthritis[ ]  joint swelling [ ] muscle weakness [ ]                                            [ x] NEGATIVE                     SKIN/BREAST:  rash[ ] itching [ ]  hair loss[ ] masses[ ]                                                                          [x ] NEGATIVE                     PSYCH:  dementia [ ] depression [ ] anxiety[x ]                                                                                          [ ] NEGATIVE                        HEME/LYMPH:  bruises easily[ ] enlarged lymph nodes[ ] tender lymph nodes[ ]                           [x ] NEGATIVE                      ENDOCRINE:  cold intolerance[ ] heat intolerance[ ] polydipsia[ ]                                                      [x ] NEGATIVE                        Telemetry: SR    PHYSICAL EXAM  Vital Signs Last 24 Hrs  T(C): 36.8 (16 Mar 2019 10:28), Max: 36.8 (16 Mar 2019 10:28)  T(F): 98.2 (16 Mar 2019 10:28), Max: 98.2 (16 Mar 2019 10:28)  HR: 78 (16 Mar 2019 15:00) (69 - 78)  BP: 180/78 (16 Mar 2019 15:00) (166/79 - 180/78)  BP(mean): --  RR: 18 (16 Mar 2019 15:00) (18 - 18)  SpO2: 96% (16 Mar 2019 15:00) (96% - 97%)    General: Well nourished, well developed, no acute distress.                                                         Neuro: Normal exam oriented to person/place & time with no focal motor or sensory  deficits.                    Eyes: Normal exam of conjunctiva & lids, pupils equally reactive.   ENT: Normal exam of nasal/oral mucosa with absence of cyanosis.   Neck: Normal exam of jugular veins, trachea & thyroid.   Chest: absent bs left base, diminished mid lung, clear otherwise                                                                CV:  Auscultation: normal [ x] S3[ ] S4[ ] Irregular [ ] Rub[ ] Clicks[ ]  Murmurs none:[ ]systolic [ ]  diastolic [ ] holosystolic [ ]  Carotids: No Bruits[x ] Other____________ Abdominal Aorta: normal [x ] nonpalpable[ ]                                                                         GI: Normal exam of abdomen, liver & spleen with no noted masses or tenderness.                                                                                              Extremities: Normal no evidence of cyanosis or deformity Edema: none[ x]trace[ ]1+[ ]2+[ ]3+[ ]4+[ ]  Lower Extremity Pulses: Right[x ] Left[ x]Varicosities[ ]  SKIN : midsternal incision well healed. left LE vein harvest site with superficial dehiscence.                                                           LABS:                        11.2   10.3  )-----------( 256      ( 16 Mar 2019 11:57 )             35.7     03-16    138  |  92<L>  |  15.0  ----------------------------<  189<H>  3.6   |  30.0<H>  |  2.96<H>    Ca    9.6      16 Mar 2019 11:57    TPro  8.2  /  Alb  4.4  /  TBili  0.3<L>  /  DBili  x   /  AST  22  /  ALT  16  /  AlkPhos  137<H>  03-16    PT/INR - ( 16 Mar 2019 11:57 )   PT: 12.5 sec;   INR: 1.08 ratio         PTT - ( 16 Mar 2019 11:57 )  PTT:35.5 sec    CARDIAC MARKERS ( 16 Mar 2019 11:57 )  x     / 0.04 ng/mL / x     / x     / x

## 2019-03-16 NOTE — H&P ADULT - HISTORY OF PRESENT ILLNESS
64 y/o female with history of CAD s/p 3VCABG/PPM in 11/18, ESRD on HD via Left UE AVF T/TH/Sat, HTN, DM-2, Gastroparesis came in with Daughter after finishing HD today c/o epigastric pain and SOB. No recent medication changes, no travel, or sick contacts. Patient uses cane/walker at home but has been less mobile and more fatigued lately. She is also unable to lye flat. Denies fever, CP. She has chronic Nausea and intermittent epigastric pain from poor eating and all her medications as per Daughter. No diarrhea. In the ED patient found to have large left sided pleural effusion which was drained by CTsx with a pigtail catheter. 1900ml of blood fluid was drained and appropriate fluid tests ordered. No evidence of PNA on repeat cxr with complete reexpansion of her lung. No hx of malignancy in patient or family.

## 2019-03-17 ENCOUNTER — RESULT REVIEW (OUTPATIENT)
Age: 64
End: 2019-03-17

## 2019-03-17 LAB
ALBUMIN FLD-MCNC: 3.6 G/DL — SIGNIFICANT CHANGE UP
GLUCOSE BLDC GLUCOMTR-MCNC: 133 MG/DL — HIGH (ref 70–99)
GLUCOSE BLDC GLUCOMTR-MCNC: 136 MG/DL — HIGH (ref 70–99)
GLUCOSE BLDC GLUCOMTR-MCNC: 179 MG/DL — HIGH (ref 70–99)
GLUCOSE BLDC GLUCOMTR-MCNC: 239 MG/DL — HIGH (ref 70–99)
GLUCOSE FLD-MCNC: 159 MG/DL — SIGNIFICANT CHANGE UP
HBA1C BLD-MCNC: 7.1 % — HIGH (ref 4–5.6)
LDH SERPL L TO P-CCNC: 148 U/L — SIGNIFICANT CHANGE UP
PROT FLD-MCNC: 5.7 G/DL — SIGNIFICANT CHANGE UP

## 2019-03-17 PROCEDURE — 99233 SBSQ HOSP IP/OBS HIGH 50: CPT

## 2019-03-17 PROCEDURE — 88305 TISSUE EXAM BY PATHOLOGIST: CPT | Mod: 26

## 2019-03-17 PROCEDURE — 71045 X-RAY EXAM CHEST 1 VIEW: CPT | Mod: 26

## 2019-03-17 PROCEDURE — 88112 CYTOPATH CELL ENHANCE TECH: CPT | Mod: 26

## 2019-03-17 PROCEDURE — 71250 CT THORAX DX C-: CPT | Mod: 26

## 2019-03-17 RX ORDER — PIPERACILLIN AND TAZOBACTAM 4; .5 G/20ML; G/20ML
3.38 INJECTION, POWDER, LYOPHILIZED, FOR SOLUTION INTRAVENOUS EVERY 12 HOURS
Qty: 0 | Refills: 0 | Status: DISCONTINUED | OUTPATIENT
Start: 2019-03-17 | End: 2019-03-20

## 2019-03-17 RX ORDER — PIPERACILLIN AND TAZOBACTAM 4; .5 G/20ML; G/20ML
3.38 INJECTION, POWDER, LYOPHILIZED, FOR SOLUTION INTRAVENOUS EVERY 8 HOURS
Qty: 0 | Refills: 0 | Status: DISCONTINUED | OUTPATIENT
Start: 2019-03-17 | End: 2019-03-17

## 2019-03-17 RX ORDER — CARVEDILOL PHOSPHATE 80 MG/1
12.5 CAPSULE, EXTENDED RELEASE ORAL EVERY 12 HOURS
Qty: 0 | Refills: 0 | Status: DISCONTINUED | OUTPATIENT
Start: 2019-03-17 | End: 2019-03-20

## 2019-03-17 RX ADMIN — CARVEDILOL PHOSPHATE 12.5 MILLIGRAM(S): 80 CAPSULE, EXTENDED RELEASE ORAL at 18:10

## 2019-03-17 RX ADMIN — CARVEDILOL PHOSPHATE 12.5 MILLIGRAM(S): 80 CAPSULE, EXTENDED RELEASE ORAL at 06:06

## 2019-03-17 RX ADMIN — Medication 5 MILLIGRAM(S): at 06:06

## 2019-03-17 RX ADMIN — Medication 2: at 11:48

## 2019-03-17 RX ADMIN — Medication 667 MILLIGRAM(S): at 08:08

## 2019-03-17 RX ADMIN — PIPERACILLIN AND TAZOBACTAM 25 GRAM(S): 4; .5 INJECTION, POWDER, LYOPHILIZED, FOR SOLUTION INTRAVENOUS at 17:09

## 2019-03-17 RX ADMIN — Medication 4: at 16:46

## 2019-03-17 RX ADMIN — SODIUM CHLORIDE 3 MILLILITER(S): 9 INJECTION INTRAMUSCULAR; INTRAVENOUS; SUBCUTANEOUS at 13:32

## 2019-03-17 RX ADMIN — AMLODIPINE BESYLATE 5 MILLIGRAM(S): 2.5 TABLET ORAL at 06:06

## 2019-03-17 RX ADMIN — SODIUM CHLORIDE 3 MILLILITER(S): 9 INJECTION INTRAMUSCULAR; INTRAVENOUS; SUBCUTANEOUS at 06:04

## 2019-03-17 RX ADMIN — Medication 5 MILLIGRAM(S): at 15:57

## 2019-03-17 RX ADMIN — ONDANSETRON 4 MILLIGRAM(S): 8 TABLET, FILM COATED ORAL at 11:49

## 2019-03-17 RX ADMIN — SIMVASTATIN 20 MILLIGRAM(S): 20 TABLET, FILM COATED ORAL at 21:53

## 2019-03-17 RX ADMIN — Medication 1 TABLET(S): at 11:49

## 2019-03-17 RX ADMIN — Medication 650 MILLIGRAM(S): at 17:19

## 2019-03-17 RX ADMIN — SODIUM CHLORIDE 3 MILLILITER(S): 9 INJECTION INTRAMUSCULAR; INTRAVENOUS; SUBCUTANEOUS at 21:55

## 2019-03-17 RX ADMIN — FAMOTIDINE 20 MILLIGRAM(S): 10 INJECTION INTRAVENOUS at 11:48

## 2019-03-17 RX ADMIN — Medication 75 MICROGRAM(S): at 06:06

## 2019-03-17 RX ADMIN — Medication 667 MILLIGRAM(S): at 16:46

## 2019-03-17 RX ADMIN — Medication 1 MILLIGRAM(S): at 11:48

## 2019-03-17 RX ADMIN — Medication 30 MILLILITER(S): at 21:53

## 2019-03-17 RX ADMIN — Medication 667 MILLIGRAM(S): at 11:49

## 2019-03-17 RX ADMIN — Medication 5 MILLIGRAM(S): at 21:53

## 2019-03-17 NOTE — PROGRESS NOTE ADULT - SUBJECTIVE AND OBJECTIVE BOX
CC: Abdominal pain    INTERVAL HPI/OVERNIGHT EVENTS: Patient seen and examined, daughter at bedside for translation. Patient complains of epigastric pain and nausea which she has had for a few weeks now. Pain at the chest tube site. Sitting up comfortably in bed      Vital Signs Last 24 Hrs  T(C): 36.3 (17 Mar 2019 07:38), Max: 37 (16 Mar 2019 20:30)  T(F): 97.4 (17 Mar 2019 07:38), Max: 98.6 (16 Mar 2019 20:30)  HR: 70 (17 Mar 2019 07:38) (70 - 78)  BP: 121/65 (17 Mar 2019 07:38) (117/62 - 180/78)  BP(mean): 84 (16 Mar 2019 22:31) (84 - 84)  RR: 17 (17 Mar 2019 07:38) (17 - 22)  SpO2: 96% (17 Mar 2019 00:49) (94% - 100%)    PHYSICAL EXAM:    GENERAL: NAD, AOX3  HEAD:  Atraumatic, Normocephalic  EYES: EOMI, PERRLA, conjunctiva and sclera clear  ENMT: Moist mucous membranes  CHEST/LUNG: Clear to auscultation bilaterally; No rales, rhonchi, wheezing, or rubs  Left chest tube in place   HEART: Regular rate and rhythm; No murmurs, rubs, or gallops  ABDOMEN: Soft, Nontender, Nondistended; Bowel sounds present  EXTREMITIES:  2+ Peripheral Pulses, No clubbing, cyanosis, or edema        MEDICATIONS  (STANDING):  amLODIPine   Tablet 5 milliGRAM(s) Oral daily  calcium acetate 667 milliGRAM(s) Oral three times a day with meals  carvedilol 12.5 milliGRAM(s) Oral every 12 hours  dextrose 5%. 1000 milliLiter(s) (50 mL/Hr) IV Continuous <Continuous>  dextrose 50% Injectable 12.5 Gram(s) IV Push once  famotidine    Tablet 20 milliGRAM(s) Oral daily  folic acid 1 milliGRAM(s) Oral daily  insulin lispro (HumaLOG) corrective regimen sliding scale   SubCutaneous Before meals and at bedtime  levothyroxine 75 MICROGram(s) Oral daily  metoclopramide 5 milliGRAM(s) Oral three times a day  Nephro-heather 1 Tablet(s) Oral daily  simvastatin 20 milliGRAM(s) Oral at bedtime  sodium chloride 0.9% lock flush 3 milliLiter(s) IV Push every 8 hours    MEDICATIONS  (PRN):  acetaminophen   Tablet .. 650 milliGRAM(s) Oral every 6 hours PRN Mild Pain (1 - 3)  ALBUTerol/ipratropium for Nebulization 3 milliLiter(s) Nebulizer every 6 hours PRN Shortness of Breath and/or Wheezing  aluminum hydroxide/magnesium hydroxide/simethicone Suspension 30 milliLiter(s) Oral every 4 hours PRN Dyspepsia  dextrose 40% Gel 15 Gram(s) Oral once PRN Blood Glucose LESS THAN 70 milliGRAM(s)/deciliter  glucagon  Injectable 1 milliGRAM(s) IntraMuscular once PRN Glucose LESS THAN 70 milligrams/deciliter  ondansetron Injectable 4 milliGRAM(s) IV Push every 6 hours PRN Nausea  oxyCODONE    5 mG/acetaminophen 325 mG 1 Tablet(s) Oral every 6 hours PRN Moderate Pain (4 - 6)      Allergies    No Known Allergies    Intolerances    OHS (Unknown)        LABS:                          11.5   9.5   )-----------( 243      ( 16 Mar 2019 20:11 )             36.6     03-16    137  |  95<L>  |  19.0  ----------------------------<  195<H>  4.4   |  28.0  |  3.70<H>    Ca    9.7      16 Mar 2019 20:11    TPro  7.9  /  Alb  4.1  /  TBili  0.4  /  DBili  x   /  AST  28  /  ALT  17  /  AlkPhos  133<H>  03-16    PT/INR - ( 16 Mar 2019 11:57 )   PT: 12.5 sec;   INR: 1.08 ratio         PTT - ( 16 Mar 2019 11:57 )  PTT:35.5 sec      RADIOLOGY & ADDITIONAL TESTS:

## 2019-03-17 NOTE — CONSULT NOTE ADULT - SUBJECTIVE AND OBJECTIVE BOX
[facility] DIVISION OF KIDNEY DISEASES AND HYPERTENSION -- INITIAL CONSULT NOTE  --------------------------------------------------------------------------------  HPI:  63F with CAD, CABG, PPM, ESRD on HD with LUE AVF; TTS; HTN, DM 2; gastroparesis here for shortness of breath; found to have white out on lung with large left sided pleural effusion drained; bloody; with 1.9L of immediate output. Family at bedside; pt lying in bed in NAD; due for HD on Tuesday.    PAST HISTORY  --------------------------------------------------------------------------------  PAST MEDICAL & SURGICAL HISTORY:  3-vessel CAD  Hypothyroidism, unspecified type  Hemodialysis patient: tues thursday saturday  Renal failure  Hypertension  Diabetes mellitus  A-V fistula  S/P cholecystectomy    FAMILY HISTORY:  Family history of diabetes mellitus    PAST SOCIAL HISTORY:    ALLERGIES & MEDICATIONS  --------------------------------------------------------------------------------  Allergies    No Known Allergies    Intolerances    OHS (Unknown)    Standing Inpatient Medications  amLODIPine   Tablet 5 milliGRAM(s) Oral daily  calcium acetate 667 milliGRAM(s) Oral three times a day with meals  carvedilol 12.5 milliGRAM(s) Oral every 12 hours  dextrose 5%. 1000 milliLiter(s) IV Continuous <Continuous>  dextrose 50% Injectable 12.5 Gram(s) IV Push once  famotidine    Tablet 20 milliGRAM(s) Oral daily  folic acid 1 milliGRAM(s) Oral daily  insulin lispro (HumaLOG) corrective regimen sliding scale   SubCutaneous Before meals and at bedtime  levothyroxine 75 MICROGram(s) Oral daily  metoclopramide 5 milliGRAM(s) Oral three times a day  Nephro-heather 1 Tablet(s) Oral daily  simvastatin 20 milliGRAM(s) Oral at bedtime  sodium chloride 0.9% lock flush 3 milliLiter(s) IV Push every 8 hours    PRN Inpatient Medications  acetaminophen   Tablet .. 650 milliGRAM(s) Oral every 6 hours PRN  ALBUTerol/ipratropium for Nebulization 3 milliLiter(s) Nebulizer every 6 hours PRN  aluminum hydroxide/magnesium hydroxide/simethicone Suspension 30 milliLiter(s) Oral every 4 hours PRN  dextrose 40% Gel 15 Gram(s) Oral once PRN  glucagon  Injectable 1 milliGRAM(s) IntraMuscular once PRN  ondansetron Injectable 4 milliGRAM(s) IV Push every 6 hours PRN  oxyCODONE    5 mG/acetaminophen 325 mG 1 Tablet(s) Oral every 6 hours PRN      REVIEW OF SYSTEMS  --------------------------------------------------------------------------------  Gen: No weight changes, fatigue, fevers/chills, weakness  Skin: No rashes  Head/Eyes/Ears/Mouth: No headache; Normal hearing; Normal vision w/o blurriness; No sinus pain/discomfort, sore throat  Respiratory: No dyspnea, cough, wheezing, hemoptysis  CV: No chest pain, PND, orthopnea  GI: No abdominal pain, diarrhea, constipation, nausea, vomiting, melena, hematochezia  : No increased frequency, dysuria, hematuria, nocturia  MSK: No joint pain/swelling; no back pain; no edema  Neuro: No dizziness/lightheadedness, weakness, seizures, numbness, tingling  Heme: No easy bruising or bleeding  Endo: No heat/cold intolerance  Psych: No significant nervousness, anxiety, stress, depression    All other systems were reviewed and are negative, except as noted.    VITALS/PHYSICAL EXAM  --------------------------------------------------------------------------------  T(C): 36.3 (03-17-19 @ 07:38), Max: 37 (03-16-19 @ 20:30)  HR: 70 (03-17-19 @ 07:38) (70 - 78)  BP: 121/65 (03-17-19 @ 07:38) (117/62 - 180/78)  RR: 17 (03-17-19 @ 07:38) (17 - 22)  SpO2: 96% (03-17-19 @ 00:49) (94% - 100%)  Wt(kg): --  Height (cm): 160.02 (03-16-19 @ 10:28)  Weight (kg): 90.7 (03-16-19 @ 10:28)  BMI (kg/m2): 35.4 (03-16-19 @ 10:28)  BSA (m2): 1.93 (03-16-19 @ 10:28)      03-16-19 @ 07:01  -  03-17-19 @ 07:00  --------------------------------------------------------  IN: 0 mL / OUT: 1900 mL / NET: -1900 mL      Physical Exam:  	Gen: NAD, well-appearing  	HEENT: PERRL, supple neck, clear oropharynx  	Pulm: dec BS L>R; chest tube in place  	CV: RRR, S1S2; no rub  	Back: No spinal or CVA tenderness; no sacral edema  	Abd: +BS, soft, nontender/nondistended  	: No suprapubic tenderness  	UE: Warm, FROM, no clubbing, intact strength; no edema; no asterixis  	LE: Warm, FROM, no clubbing, intact strength; no edema  	Neuro: No focal deficits, intact gait  	Psych: Normal affect and mood  	Skin: Warm, without rashes  	 RADHA BLACKMON+    LABS/STUDIES  --------------------------------------------------------------------------------              11.5   9.5   >-----------<  243      [03-16-19 @ 20:11]              36.6     137  |  95  |  19.0  ----------------------------<  195      [03-16-19 @ 20:11]  4.4   |  28.0  |  3.70        Ca     9.7     [03-16-19 @ 20:11]    TPro  7.9  /  Alb  4.1  /  TBili  0.4  /  DBili  x   /  AST  28  /  ALT  17  /  AlkPhos  133  [03-16-19 @ 20:11]    PT/INR: PT 12.5 , INR 1.08       [03-16-19 @ 11:57]  PTT: 35.5       [03-16-19 @ 11:57]    Troponin 0.05      [03-16-19 @ 20:11]    Creatinine Trend:  SCr 3.70 [03-16 @ 20:11]  SCr 2.96 [03-16 @ 11:57]        PTH -- (Ca 9.8)      [11-13-18 @ 16:19]   551  Vitamin D (25OH) 25.0      [11-13-18 @ 16:38]  HbA1c 7.1      [03-17-19 @ 08:03]  TSH 2.27      [11-07-18 @ 18:50]    HBsAb Nonreact      [12-12-15 @ 18:07]  HBsAg Nonreact      [12-12-15 @ 18:07]  HBcAb Nonreact      [12-12-15 @ 18:07]  HCV 0.07, Nonreact      [03-18-16 @ 18:19]

## 2019-03-17 NOTE — PROGRESS NOTE ADULT - ASSESSMENT
The patient is a 63 year old  female with history of CAD s/p 3VCABG/PPM in 11/18, ESRD on HD via Left UE AVF T/TH/Sat, HTN, DM-2, Gastroparesis came in with Daughter after HD for complaints of difficulty breathing and epigastric pain. Chest xray in the ER consistent with large left pleural effusion status post chest tube placement by CT surgery. Drained 1700cc of serosanguinous fluid; aspirin and plavix were held. Repeat chest xray with resolution.    Assessment/Plan:    1.  Left pleura effusion: Etiology unclear- fluid serosanguinous Aspirin/plavix were held.   Follow up pleural fluid studies  Repeat chest xray done this morning; CT surgery following    2. CAD status post CABG: Continue bb and statin    3. Chronic epigastric pain: Cont. Reglan, PPI, add sucralfate  Has o/p GI appt. with Dr. Payton on Friday    4. ESRD on HD Tu/Thu/Sat    5. Hypertension: Continue home medications    6. Diabetes mellitus type 2:   ADA diet, Accuchecks AC/HS, HISS      OOB/VCboots, Fall risk, PT program     Plan discussed with patient and family at bedside The patient is a 63 year old  female with history of CAD s/p 3VCABG/PPM in 11/18, ESRD on HD via Left UE AVF T/TH/Sat, HTN, DM-2, Gastroparesis came in with Daughter after HD for complaints of difficulty breathing and epigastric pain. Chest xray in the ER consistent with large left pleural effusion status post chest tube placement by CT surgery. Drained 1700cc of serosanguinous fluid; aspirin and plavix were held. Repeat chest xray with resolution.    Assessment/Plan:    1.  Left pleura effusion: Etiology unclear- fluid serosanguinous Aspirin/plavix were held.   Follow up pleural fluid studies  Repeat chest xray shows left midlung opacity- will cover with IV zosyn. CT chest ordered    2. CAD status post CABG: Continue bb and statin    3. Chronic epigastric pain: Cont. Reglan, PPI, add sucralfate  Has o/p GI appt. with Dr. Payton on Friday    4. ESRD on HD Tu/Thu/Sat    5. Hypertension: Continue home medications    6. Diabetes mellitus type 2:   ADA diet, Accuchecks AC/HS, HISS      OOB/VCboots, Fall risk, PT program     Plan discussed with patient and family at bedside

## 2019-03-17 NOTE — CONSULT NOTE ADULT - ASSESSMENT
1) ESRD on HD  2) MBD of renal dx  3) Anemia of renal dx  4) Vol HTN    Awaiting pleural effusion studies results;  HD plan for Tuesday  Hold CARLOS  Check daily phosphorus (can check renal panel instead of BMP)  Will follow    d/w Dr Blake

## 2019-03-17 NOTE — CHART NOTE - NSCHARTNOTEFT_GEN_A_CORE
Thoracic     63F h/o CABG, p/w abd pain, likely gastritis, reports also SOB, progressive and not severe, worse with exertion.    3/16  s/p Lt pigtail insertion  Maintain CT today  CXR in am   Will follow   CLARK Boyce in am rounds

## 2019-03-18 DIAGNOSIS — J90 PLEURAL EFFUSION, NOT ELSEWHERE CLASSIFIED: ICD-10-CM

## 2019-03-18 LAB
ANION GAP SERPL CALC-SCNC: 17 MMOL/L — SIGNIFICANT CHANGE UP (ref 5–17)
BUN SERPL-MCNC: 36 MG/DL — HIGH (ref 8–20)
CALCIUM SERPL-MCNC: 10 MG/DL — SIGNIFICANT CHANGE UP (ref 8.6–10.2)
CHLORIDE SERPL-SCNC: 91 MMOL/L — LOW (ref 98–107)
CO2 SERPL-SCNC: 26 MMOL/L — SIGNIFICANT CHANGE UP (ref 22–29)
CREAT SERPL-MCNC: 5.97 MG/DL — HIGH (ref 0.5–1.3)
GLUCOSE BLDC GLUCOMTR-MCNC: 102 MG/DL — HIGH (ref 70–99)
GLUCOSE BLDC GLUCOMTR-MCNC: 146 MG/DL — HIGH (ref 70–99)
GLUCOSE BLDC GLUCOMTR-MCNC: 159 MG/DL — HIGH (ref 70–99)
GLUCOSE BLDC GLUCOMTR-MCNC: 220 MG/DL — HIGH (ref 70–99)
GLUCOSE SERPL-MCNC: 132 MG/DL — HIGH (ref 70–115)
HCT VFR BLD CALC: 33.8 % — LOW (ref 37–47)
HGB BLD-MCNC: 10.3 G/DL — LOW (ref 12–16)
LDH SERPL L TO P-CCNC: 180 U/L — SIGNIFICANT CHANGE UP (ref 98–192)
MCHC RBC-ENTMCNC: 27.4 PG — SIGNIFICANT CHANGE UP (ref 27–31)
MCHC RBC-ENTMCNC: 30.5 G/DL — LOW (ref 32–36)
MCV RBC AUTO: 89.9 FL — SIGNIFICANT CHANGE UP (ref 81–99)
NIGHT BLUE STAIN TISS: SIGNIFICANT CHANGE UP
PLATELET # BLD AUTO: 175 K/UL — SIGNIFICANT CHANGE UP (ref 150–400)
POTASSIUM SERPL-MCNC: 4.7 MMOL/L — SIGNIFICANT CHANGE UP (ref 3.5–5.3)
POTASSIUM SERPL-SCNC: 4.7 MMOL/L — SIGNIFICANT CHANGE UP (ref 3.5–5.3)
RBC # BLD: 3.76 M/UL — LOW (ref 4.4–5.2)
RBC # FLD: 16.4 % — HIGH (ref 11–15.6)
SODIUM SERPL-SCNC: 134 MMOL/L — LOW (ref 135–145)
SPECIMEN SOURCE: SIGNIFICANT CHANGE UP
WBC # BLD: 6.4 K/UL — SIGNIFICANT CHANGE UP (ref 4.8–10.8)
WBC # FLD AUTO: 6.4 K/UL — SIGNIFICANT CHANGE UP (ref 4.8–10.8)

## 2019-03-18 PROCEDURE — 99232 SBSQ HOSP IP/OBS MODERATE 35: CPT

## 2019-03-18 PROCEDURE — 99233 SBSQ HOSP IP/OBS HIGH 50: CPT | Mod: GC

## 2019-03-18 PROCEDURE — 71045 X-RAY EXAM CHEST 1 VIEW: CPT | Mod: 26

## 2019-03-18 PROCEDURE — 99233 SBSQ HOSP IP/OBS HIGH 50: CPT

## 2019-03-18 RX ORDER — ASPIRIN/CALCIUM CARB/MAGNESIUM 324 MG
81 TABLET ORAL DAILY
Qty: 0 | Refills: 0 | Status: DISCONTINUED | OUTPATIENT
Start: 2019-03-18 | End: 2019-03-20

## 2019-03-18 RX ORDER — SIMETHICONE 80 MG/1
80 TABLET, CHEWABLE ORAL THREE TIMES A DAY
Qty: 0 | Refills: 0 | Status: DISCONTINUED | OUTPATIENT
Start: 2019-03-18 | End: 2019-03-20

## 2019-03-18 RX ORDER — INSULIN GLARGINE 100 [IU]/ML
5 INJECTION, SOLUTION SUBCUTANEOUS AT BEDTIME
Qty: 0 | Refills: 0 | Status: DISCONTINUED | OUTPATIENT
Start: 2019-03-18 | End: 2019-03-20

## 2019-03-18 RX ORDER — SACCHAROMYCES BOULARDII 250 MG
250 POWDER IN PACKET (EA) ORAL
Qty: 0 | Refills: 0 | Status: DISCONTINUED | OUTPATIENT
Start: 2019-03-18 | End: 2019-03-20

## 2019-03-18 RX ORDER — CLOPIDOGREL BISULFATE 75 MG/1
75 TABLET, FILM COATED ORAL DAILY
Qty: 0 | Refills: 0 | Status: DISCONTINUED | OUTPATIENT
Start: 2019-03-18 | End: 2019-03-20

## 2019-03-18 RX ORDER — PANTOPRAZOLE SODIUM 20 MG/1
40 TABLET, DELAYED RELEASE ORAL
Qty: 0 | Refills: 0 | Status: DISCONTINUED | OUTPATIENT
Start: 2019-03-18 | End: 2019-03-20

## 2019-03-18 RX ADMIN — SODIUM CHLORIDE 3 MILLILITER(S): 9 INJECTION INTRAMUSCULAR; INTRAVENOUS; SUBCUTANEOUS at 05:29

## 2019-03-18 RX ADMIN — SODIUM CHLORIDE 3 MILLILITER(S): 9 INJECTION INTRAMUSCULAR; INTRAVENOUS; SUBCUTANEOUS at 14:02

## 2019-03-18 RX ADMIN — Medication 667 MILLIGRAM(S): at 12:16

## 2019-03-18 RX ADMIN — Medication 75 MICROGRAM(S): at 05:25

## 2019-03-18 RX ADMIN — SIMETHICONE 80 MILLIGRAM(S): 80 TABLET, CHEWABLE ORAL at 21:30

## 2019-03-18 RX ADMIN — PIPERACILLIN AND TAZOBACTAM 25 GRAM(S): 4; .5 INJECTION, POWDER, LYOPHILIZED, FOR SOLUTION INTRAVENOUS at 17:07

## 2019-03-18 RX ADMIN — SIMVASTATIN 20 MILLIGRAM(S): 20 TABLET, FILM COATED ORAL at 21:30

## 2019-03-18 RX ADMIN — Medication 1 MILLIGRAM(S): at 12:16

## 2019-03-18 RX ADMIN — Medication 81 MILLIGRAM(S): at 17:06

## 2019-03-18 RX ADMIN — FAMOTIDINE 20 MILLIGRAM(S): 10 INJECTION INTRAVENOUS at 12:17

## 2019-03-18 RX ADMIN — Medication 2: at 21:32

## 2019-03-18 RX ADMIN — Medication 667 MILLIGRAM(S): at 17:09

## 2019-03-18 RX ADMIN — Medication 667 MILLIGRAM(S): at 08:19

## 2019-03-18 RX ADMIN — AMLODIPINE BESYLATE 5 MILLIGRAM(S): 2.5 TABLET ORAL at 05:25

## 2019-03-18 RX ADMIN — Medication 1 TABLET(S): at 12:17

## 2019-03-18 RX ADMIN — Medication 4: at 12:15

## 2019-03-18 RX ADMIN — SODIUM CHLORIDE 3 MILLILITER(S): 9 INJECTION INTRAMUSCULAR; INTRAVENOUS; SUBCUTANEOUS at 21:33

## 2019-03-18 RX ADMIN — PIPERACILLIN AND TAZOBACTAM 25 GRAM(S): 4; .5 INJECTION, POWDER, LYOPHILIZED, FOR SOLUTION INTRAVENOUS at 05:25

## 2019-03-18 RX ADMIN — Medication 30 MILLILITER(S): at 12:17

## 2019-03-18 RX ADMIN — CARVEDILOL PHOSPHATE 12.5 MILLIGRAM(S): 80 CAPSULE, EXTENDED RELEASE ORAL at 17:09

## 2019-03-18 RX ADMIN — CARVEDILOL PHOSPHATE 12.5 MILLIGRAM(S): 80 CAPSULE, EXTENDED RELEASE ORAL at 05:25

## 2019-03-18 RX ADMIN — INSULIN GLARGINE 5 UNIT(S): 100 INJECTION, SOLUTION SUBCUTANEOUS at 21:30

## 2019-03-18 RX ADMIN — Medication 250 MILLIGRAM(S): at 17:06

## 2019-03-18 RX ADMIN — Medication 5 MILLIGRAM(S): at 14:03

## 2019-03-18 RX ADMIN — Medication 5 MILLIGRAM(S): at 21:30

## 2019-03-18 RX ADMIN — CLOPIDOGREL BISULFATE 75 MILLIGRAM(S): 75 TABLET, FILM COATED ORAL at 17:06

## 2019-03-18 RX ADMIN — SIMETHICONE 80 MILLIGRAM(S): 80 TABLET, CHEWABLE ORAL at 17:07

## 2019-03-18 RX ADMIN — ONDANSETRON 4 MILLIGRAM(S): 8 TABLET, FILM COATED ORAL at 08:19

## 2019-03-18 RX ADMIN — Medication 650 MILLIGRAM(S): at 18:46

## 2019-03-18 RX ADMIN — Medication 5 MILLIGRAM(S): at 05:25

## 2019-03-18 NOTE — PHYSICAL THERAPY INITIAL EVALUATION ADULT - ADDITIONAL COMMENTS
Pt lives in a house with 4 SHANNAN with b/l HR, no steps inside. Her Son stays with her during the day and her daughter at night. They have a cane, RW and a commode.

## 2019-03-18 NOTE — PROGRESS NOTE ADULT - SUBJECTIVE AND OBJECTIVE BOX
[facility] DIVISION OF KIDNEY DISEASES AND HYPERTENSION -- FOLLOW UP NOTE  --------------------------------------------------------------------------------  Chief Complaint:  ESRD on HD    24 hour events/subjective:  Pt seen and examined  NAD  HD planned for am       PAST HISTORY  --------------------------------------------------------------------------------  No significant changes to PMH, PSH, FHx, SHx, unless otherwise noted    ALLERGIES & MEDICATIONS  --------------------------------------------------------------------------------  Allergies    No Known Allergies    Intolerances    OHS (Unknown)    Standing Inpatient Medications  amLODIPine   Tablet 5 milliGRAM(s) Oral daily  calcium acetate 667 milliGRAM(s) Oral three times a day with meals  carvedilol 12.5 milliGRAM(s) Oral every 12 hours  dextrose 5%. 1000 milliLiter(s) IV Continuous <Continuous>  dextrose 50% Injectable 12.5 Gram(s) IV Push once  famotidine    Tablet 20 milliGRAM(s) Oral daily  folic acid 1 milliGRAM(s) Oral daily  insulin lispro (HumaLOG) corrective regimen sliding scale   SubCutaneous Before meals and at bedtime  levothyroxine 75 MICROGram(s) Oral daily  metoclopramide 5 milliGRAM(s) Oral three times a day  Nephro-heather 1 Tablet(s) Oral daily  piperacillin/tazobactam IVPB. 3.375 Gram(s) IV Intermittent every 12 hours  saccharomyces boulardii 250 milliGRAM(s) Oral two times a day  simvastatin 20 milliGRAM(s) Oral at bedtime  sodium chloride 0.9% lock flush 3 milliLiter(s) IV Push every 8 hours    PRN Inpatient Medications  acetaminophen   Tablet .. 650 milliGRAM(s) Oral every 6 hours PRN  ALBUTerol/ipratropium for Nebulization 3 milliLiter(s) Nebulizer every 6 hours PRN  aluminum hydroxide/magnesium hydroxide/simethicone Suspension 30 milliLiter(s) Oral every 4 hours PRN  dextrose 40% Gel 15 Gram(s) Oral once PRN  glucagon  Injectable 1 milliGRAM(s) IntraMuscular once PRN  ondansetron Injectable 4 milliGRAM(s) IV Push every 6 hours PRN  oxyCODONE    5 mG/acetaminophen 325 mG 1 Tablet(s) Oral every 6 hours PRN      REVIEW OF SYSTEMS  --------------------------------------------------------------------------------  Gen: No weight changes, fatigue, fevers/chills, weakness  Skin: No rashes  Head/Eyes/Ears/Mouth: No headache; Normal hearing; Normal vision w/o blurriness; No sinus pain/discomfort, sore throat  Respiratory: No dyspnea, cough, wheezing, hemoptysis  CV: No chest pain, PND, orthopnea  GI: No abdominal pain, diarrhea, constipation, nausea, vomiting, melena, hematochezia  : No increased frequency, dysuria, hematuria, nocturia  MSK: No joint pain/swelling; no back pain; no edema  Neuro: No dizziness/lightheadedness, weakness, seizures, numbness, tingling  Heme: No easy bruising or bleeding  Endo: No heat/cold intolerance  Psych: No significant nervousness, anxiety, stress, depression    All other systems were reviewed and are negative, except as noted.    VITALS/PHYSICAL EXAM  --------------------------------------------------------------------------------  T(C): 36.6 (03-18-19 @ 10:06), Max: 36.8 (03-17-19 @ 16:59)  HR: 70 (03-18-19 @ 10:06) (70 - 70)  BP: 132/64 (03-18-19 @ 10:06) (128/62 - 173/73)  RR: 18 (03-18-19 @ 10:06) (18 - 18)  SpO2: 96% (03-18-19 @ 05:23) (95% - 96%)  Wt(kg): --        03-17-19 @ 07:01  -  03-18-19 @ 07:00  --------------------------------------------------------  IN: 0 mL / OUT: 0 mL / NET: 0 mL      Physical Exam:  	Gen: NAD, well-appearing  	HEENT: PERRL, supple neck, clear oropharynx  	Pulm: CTA B/L  	CV: RRR, S1S2; no rub  	Back: No spinal or CVA tenderness; no sacral edema  	Abd: +BS, soft, nontender/nondistended  	: No suprapubic tenderness  	UE: Warm, FROM, no clubbing, intact strength; no edema; no asterixis  	LE: Warm, FROM, no clubbing, intact strength; no edema  	Neuro: No focal deficits, intact gait  	Psych: Normal affect and mood  	Skin: Warm, without rashes  	Vascular access:  RADHA BLACKMON++    LABS/STUDIES  --------------------------------------------------------------------------------              10.3   6.4   >-----------<  175      [03-18-19 @ 06:47]              33.8     134  |  91  |  36.0  ----------------------------<  132      [03-18-19 @ 06:47]  4.7   |  26.0  |  5.97        Ca     10.0     [03-18-19 @ 06:47]    TPro  7.9  /  Alb  4.1  /  TBili  0.4  /  DBili  x   /  AST  28  /  ALT  17  /  AlkPhos  133  [03-16-19 @ 20:11]        Troponin 0.05      [03-16-19 @ 20:11]    Creatinine Trend:  SCr 5.97 [03-18 @ 06:47]  SCr 3.70 [03-16 @ 20:11]  SCr 2.96 [03-16 @ 11:57]        PTH -- (Ca 9.8)      [11-13-18 @ 16:19]   551  Vitamin D (25OH) 25.0      [11-13-18 @ 16:38]  HbA1c 7.1      [03-17-19 @ 08:03]  TSH 2.27      [11-07-18 @ 18:50]

## 2019-03-18 NOTE — PROGRESS NOTE ADULT - ASSESSMENT
63 year old Telugu speaking female with history PMH of CAD s/p 3VCABG/PPM in 11/18/18 with Dr. Cavazos, ESRD, HTN, DM-2, & Gastroparesis admitted to [facility] ER on 3/17/19 after HD for complaints of shortness of breath & epigastric pain. Chest xray indicated large left pleural effusion, CT surgery notified. Left pigtail inserted & drained 1700cc of serosanguinous fluid.  3/18/19 Left pigtail removed, post removal chest xray obtained.

## 2019-03-18 NOTE — PROGRESS NOTE ADULT - ASSESSMENT
The patient is a 63 year old  female with history of CAD s/p 3VCABG/PPM in 11/18, ESRD on HD via Left UE AVF T/TH/Sat, HTN, DM-2, Gastroparesis came in with Daughter after HD for complaints of difficulty breathing and epigastric pain. Chest xray in the ER consistent with large left pleural effusion status post chest tube placement by CT surgery. Drained 1900cc of serosanguinous fluid; aspirin and plavix were held.     Assessment/Plan:    Problem 1.   > Left pleural effusion  -Pigtail in place  -Fluid total protein 5.7, Serum total Protein 7.9. Pleural fluid protein/serum protein ratio greater than 0.5. Suspicious for Exudative pleural effusion based on lights criteria  -Rn reports chest tube draining minimal drainage after initial 1900cc drainage. Will contact CT surgery regarding pigtail    Problem 2)   >Pneumonia  -Repeat chest xray shows left midlung opacity  -Chest CT shows Left upper and lower lung pneumonia  -Continue zosyn    Problems 3  > CAD status post CABG  - Continue carvedilol and simvastatin    Problem 4  >Chronic epigastric pain  - Cont. Reglan, PPI, added sucralfate  -Pt has o/p GI appt. with Dr. Payton this Friday    Problem 5)  >ESRD   -Hemodyalisis Tu/Thu/Sat. Scheduled for HD tomorrow    Problem 6  > Hypertension  -Continue home medications    Problem 7  - Diabetes mellitus type 2:   ADA diet, Accuchecks AC/HS, HISS      OOB/VCboots, Fall risk, PT program The patient is a 63 year old  female with history of CAD s/p 3VCABG/PPM in 11/18, ESRD on HD via Left UE AVF T/TH/Sat, HTN, DM-2, Gastroparesis came in with Daughter after HD for complaints of difficulty breathing and epigastric pain. Chest xray in the ER consistent with large left pleural effusion status post chest tube placement by CT surgery. Drained 1700cc of serosanguinous fluid; aspirin and plavix were held.     Assessment/Plan:    Problem 1.   > Left pleural effusion  -Pigtail in place  -Fluid total protein 5.7, Serum total Protein 7.9. Pleural fluid protein/serum protein ratio greater than 0.5. Suspicious for Exudative pleural effusion based on lights criteria  -Rn reports chest tube draining minimal drainage after initial 1700cc drainage. Will contact CT surgery regarding pigtail    Problem 2)   >Pneumonia  -Repeat chest xray shows left midlung opacity  -Chest CT shows Left upper and lower lung pneumonia  -Continue zosyn    Problems 3  > CAD status post CABG  - Continue carvedilol and simvastatin    Problem 4  >Chronic epigastric pain  - Cont. Reglan, PPI, added sucralfate  -Pt has o/p GI appt. with Dr. Payton this Friday    Problem 5)  >ESRD   -Hemodyalisis Tu/Thu/Sat. Scheduled for HD tomorrow  -Awaiting nephrology consult    Problem 6  > Hypertension  -Continue home medications    Problem 7  - Diabetes mellitus type 2:   ADA diet, Accuchecks AC/HS, HISS      OOB/VCboots, Fall risk, PT program The patient is a 63 year old  female with history of CAD s/p 3VCABG/PPM in 11/18, ESRD on HD via Left UE AVF T/TH/Sat, HTN, DM-2, Gastroparesis came in with Daughter after HD for complaints of difficulty breathing and epigastric pain. Chest xray in the ER consistent with large left pleural effusion status post chest tube placement by CT surgery. Drained 1700cc of serosanguinous fluid; aspirin and plavix were held initially.    Assessment/Plan:    Problem 1.   > Left pleural effusion  -Pigtail in place.   -Fluid total protein 5.7, Serum total Protein 7.9. Pleural fluid protein/serum protein ratio greater than 0.5. Suspicious for Exudative pleural effusion based on lights criteria  -Rn reports chest tube draining minimal drainage after initial 1700cc drainage. Will contact CT surgery regarding pigtail    Problem 2)   >Pneumonia  -Repeat chest xray shows left midlung opacity  -Chest CT shows Left upper and lower lung pneumonia  -Continue zosyn    Problems 3  > CAD status post CABG  - Continue carvedilol and simvastatin    Problem 4  >Chronic epigastric pain  - Cont. Reglan, PPI, added sucralfate  -Pt has o/p GI appt. with Dr. Payton this Friday    Problem 5)  >ESRD   -Hemodyalisis Tu/Thu/Sat. Scheduled for HD tomorrow  -Awaiting nephrology consult    Problem 6  > Hypertension  -Continue home medications    Problem 7  - Diabetes mellitus type 2:   ADA diet, Accuchecks AC/HS, HISS      OOB/VCboots, Fall risk, PT program

## 2019-03-18 NOTE — PROGRESS NOTE ADULT - NSICDXPROBLEM_GEN_ALL_CORE_FT
PROBLEM DIAGNOSES  Problem: Pleural effusion, left  Assessment and Plan: PROBLEM DIAGNOSES  Problem: Pleural effusion, left  Assessment and Plan: D/C Left pigtail. Post removal xray ordered.  Maintain SPO2>90% on room air.  Encourage the use of I/S, CDBE, OOB to chair, increase ambulation.  Discussed plan with Dr. Castellanos, no other interventions at this time.  Thoracic surgery to sign off.

## 2019-03-18 NOTE — PROGRESS NOTE ADULT - SUBJECTIVE AND OBJECTIVE BOX
Subjective:    VITAL SIGNS  Vital Signs Last 24 Hrs  T(C): 36.6 (03-18-19 @ 16:29), Max: 36.8 (03-17-19 @ 16:59)  T(F): 97.9 (03-18-19 @ 16:29), Max: 98.2 (03-17-19 @ 16:59)  HR: 70 (03-18-19 @ 16:29) (70 - 70)  BP: 155/71 (03-18-19 @ 16:29) (128/62 - 173/73)  RR: 18 (03-18-19 @ 16:29) (18 - 18)  SpO2: 94% (03-18-19 @ 16:29) (94% - 96%)  on (O2)              Telemetry:    LVEF:     MEDICATIONS  acetaminophen   Tablet .. 650 milliGRAM(s) Oral every 6 hours PRN  ALBUTerol/ipratropium for Nebulization 3 milliLiter(s) Nebulizer every 6 hours PRN  aluminum hydroxide/magnesium hydroxide/simethicone Suspension 30 milliLiter(s) Oral every 4 hours PRN  amLODIPine   Tablet 5 milliGRAM(s) Oral daily  aspirin  chewable 81 milliGRAM(s) Oral daily  calcium acetate 667 milliGRAM(s) Oral three times a day with meals  carvedilol 12.5 milliGRAM(s) Oral every 12 hours  clopidogrel Tablet 75 milliGRAM(s) Oral daily  dextrose 40% Gel 15 Gram(s) Oral once PRN  dextrose 5%. 1000 milliLiter(s) IV Continuous <Continuous>  dextrose 50% Injectable 12.5 Gram(s) IV Push once  folic acid 1 milliGRAM(s) Oral daily  glucagon  Injectable 1 milliGRAM(s) IntraMuscular once PRN  insulin glargine Injectable (LANTUS) 5 Unit(s) SubCutaneous at bedtime  insulin lispro (HumaLOG) corrective regimen sliding scale   SubCutaneous Before meals and at bedtime  levothyroxine 75 MICROGram(s) Oral daily  metoclopramide 5 milliGRAM(s) Oral three times a day  Nephro-heather 1 Tablet(s) Oral daily  ondansetron Injectable 4 milliGRAM(s) IV Push every 6 hours PRN  oxyCODONE    5 mG/acetaminophen 325 mG 1 Tablet(s) Oral every 6 hours PRN  pantoprazole    Tablet 40 milliGRAM(s) Oral before breakfast  piperacillin/tazobactam IVPB. 3.375 Gram(s) IV Intermittent every 12 hours  saccharomyces boulardii 250 milliGRAM(s) Oral two times a day  simethicone 80 milliGRAM(s) Chew three times a day  simvastatin 20 milliGRAM(s) Oral at bedtime  sodium chloride 0.9% lock flush 3 milliLiter(s) IV Push every 8 hours      PHYSICAL EXAM  General: well nourished, well developed, no acute distress  Neurology: alert and oriented x 3, nonfocal, no gross deficits  Respiratory: clear to auscultation bilaterally  CV: regular rate and rhythm, normal S1, S2  Abdomen: soft, nontender, nondistended, positive bowel sounds, last bowel movement   Extremities: warm, well perfused. no edema. + DP pulses  Incisions: midline sternal incision, + mepilex, c/d/i. sternum stable.  Chest tubes:   Epicardial Wires:    > EPM (settings) / isolated    I&O's Detail    17 Mar 2019 07:01  -  18 Mar 2019 07:00  --------------------------------------------------------  IN:  Total IN: 0 mL    OUT:  Total OUT: 0 mL    Total NET: 0 mL          Weights:  Daily     Daily   Admit Wt: Drug Dosing Weight  Height (cm): 160.02 (16 Mar 2019 10:28)  Weight (kg): 90.7 (16 Mar 2019 10:28)  BMI (kg/m2): 35.4 (16 Mar 2019 10:28)  BSA (m2): 1.93 (16 Mar 2019 10:28)    LABS  03-18    134<L>  |  91<L>  |  36.0<H>  ----------------------------<  132<H>  4.7   |  26.0  |  5.97<H>    Ca    10.0      18 Mar 2019 06:47    TPro  7.9  /  Alb  4.1  /  TBili  0.4  /  DBili  x   /  AST  28  /  ALT  17  /  AlkPhos  133<H>  03-16                                 10.3   6.4   )-----------( 175      ( 18 Mar 2019 06:47 )             33.8                  CAPILLARY BLOOD GLUCOSE      POCT Blood Glucose.: 220 mg/dL (18 Mar 2019 11:23)  POCT Blood Glucose.: 146 mg/dL (18 Mar 2019 07:56)  POCT Blood Glucose.: 133 mg/dL (17 Mar 2019 21:52)           Today's CXR:    Today's EKG:    PAST MEDICAL & SURGICAL HISTORY:  3-vessel CAD  Hypothyroidism, unspecified type  Hemodialysis patient: tues thursday saturday  Renal failure  Hypertension  Diabetes mellitus  A-V fistula  S/P cholecystectomy Subjective: Pt. seen & examined, daughter at the bedside,  at the bedside.    VITAL SIGNS  Vital Signs Last 24 Hrs  T(C): 36.6 (03-18-19 @ 16:29), Max: 36.8 (03-17-19 @ 16:59)  T(F): 97.9 (03-18-19 @ 16:29), Max: 98.2 (03-17-19 @ 16:59)  HR: 70 (03-18-19 @ 16:29) (70 - 70)  BP: 155/71 (03-18-19 @ 16:29) (128/62 - 173/73)  RR: 18 (03-18-19 @ 16:29) (18 - 18)  SpO2: 94% (03-18-19 @ 16:29) (94% - 96%)  on (O2)                MEDICATIONS  acetaminophen   Tablet .. 650 milliGRAM(s) Oral every 6 hours PRN  ALBUTerol/ipratropium for Nebulization 3 milliLiter(s) Nebulizer every 6 hours PRN  aluminum hydroxide/magnesium hydroxide/simethicone Suspension 30 milliLiter(s) Oral every 4 hours PRN  amLODIPine   Tablet 5 milliGRAM(s) Oral daily  aspirin  chewable 81 milliGRAM(s) Oral daily  calcium acetate 667 milliGRAM(s) Oral three times a day with meals  carvedilol 12.5 milliGRAM(s) Oral every 12 hours  clopidogrel Tablet 75 milliGRAM(s) Oral daily  dextrose 40% Gel 15 Gram(s) Oral once PRN  dextrose 5%. 1000 milliLiter(s) IV Continuous <Continuous>  dextrose 50% Injectable 12.5 Gram(s) IV Push once  folic acid 1 milliGRAM(s) Oral daily  glucagon  Injectable 1 milliGRAM(s) IntraMuscular once PRN  insulin glargine Injectable (LANTUS) 5 Unit(s) SubCutaneous at bedtime  insulin lispro (HumaLOG) corrective regimen sliding scale   SubCutaneous Before meals and at bedtime  levothyroxine 75 MICROGram(s) Oral daily  metoclopramide 5 milliGRAM(s) Oral three times a day  Nephro-heather 1 Tablet(s) Oral daily  ondansetron Injectable 4 milliGRAM(s) IV Push every 6 hours PRN  oxyCODONE    5 mG/acetaminophen 325 mG 1 Tablet(s) Oral every 6 hours PRN  pantoprazole    Tablet 40 milliGRAM(s) Oral before breakfast  piperacillin/tazobactam IVPB. 3.375 Gram(s) IV Intermittent every 12 hours  saccharomyces boulardii 250 milliGRAM(s) Oral two times a day  simethicone 80 milliGRAM(s) Chew three times a day  simvastatin 20 milliGRAM(s) Oral at bedtime  sodium chloride 0.9% lock flush 3 milliLiter(s) IV Push every 8 hours      PHYSICAL EXAM  General: well nourished, well developed, no acute distress  Neurology: alert and oriented x 3, nonfocal, no gross deficits  Respiratory: Clear to auscultation bilaterally  CV: regular rate and rhythm, normal S1, S2  Abdomen: soft, nontender, nondistended, positive bowel sounds, last bowel movement 3/16/19.  Extremities: warm, well perfused. no edema. + DP pulses, Left AVF +bruit/+thrill  Chest tubes: Left chest tube to water seal, - air leak noted.      I&O's Detail    17 Mar 2019 07:01  -  18 Mar 2019 07:00  --------------------------------------------------------  IN:  Total IN: 0 mL    OUT:  Total OUT: 0 mL    Total NET: 0 mL          Weights:  Daily     Daily   Admit Wt: Drug Dosing Weight  Height (cm): 160.02 (16 Mar 2019 10:28)  Weight (kg): 90.7 (16 Mar 2019 10:28)  BMI (kg/m2): 35.4 (16 Mar 2019 10:28)  BSA (m2): 1.93 (16 Mar 2019 10:28)    LABS  03-18    134<L>  |  91<L>  |  36.0<H>  ----------------------------<  132<H>  4.7   |  26.0  |  5.97<H>    Ca    10.0      18 Mar 2019 06:47    TPro  7.9  /  Alb  4.1  /  TBili  0.4  /  DBili  x   /  AST  28  /  ALT  17  /  AlkPhos  133<H>  03-16                                 10.3   6.4   )-----------( 175      ( 18 Mar 2019 06:47 )             33.8                  CAPILLARY BLOOD GLUCOSE      POCT Blood Glucose.: 220 mg/dL (18 Mar 2019 11:23)  POCT Blood Glucose.: 146 mg/dL (18 Mar 2019 07:56)  POCT Blood Glucose.: 133 mg/dL (17 Mar 2019 21:52)           Today's CXR:< from: Xray Chest 1 View- PORTABLE-Routine (03.17.19 @ 06:21) >  EXAM:  XR CHEST PORTABLE ROUTINE 1V                          PROCEDURE DATE:  03/17/2019          INTERPRETATION:  History: Chest tube. Pleural effusion.    Findings: Frontal chest.    Comparison: 3/16/2019.    Sternotomy sutures, mediastinal clips, right chest wall implanted dual   lead pacemaker again seen in place. Left sided pigtail chest tube again   seen in place. No pneumothorax appreciated. Mild streak-like opacities   mid to lower lung fields again noted. Mild hazy opacity left lower lung   may represent small effusion. New opacity left mid lung field. The heart   is enlarged, may be exaggerated by AP technique. Calcified aorta.    Impression:    Left sided pigtail chest tube in place. New opacity left mid lung field.  < end of copied text >    PAST MEDICAL & SURGICAL HISTORY:  3-vessel CAD  Hypothyroidism, unspecified type  Hemodialysis patient: tues thursday saturday  Renal failure  Hypertension  Diabetes mellitus  A-V fistula  S/P cholecystectomy

## 2019-03-18 NOTE — PROGRESS NOTE ADULT - SUBJECTIVE AND OBJECTIVE BOX
Kendra Walter 62 y/o female MR # [id number]    HPI    Overnight Events: Seen and examined in room. Pt sitting up in chair with daughter at bedside for translation. Pt reports that she feels better today. She currently has no abdominal pain. She still complains of nausea which she states that she gets often. She has a pigtail in place to left chest. She has no other active complaints at this time. No chest pain, no sob.    ROS:  Constitutional: No fevers, no chills, no fatigue  ENT/Mouth: No Hearing Changes, no ear pain, No Nasal Congestion  Cardiovascular: No Chest Pain, no palpitations  Respiratory: No Cough, No Sputum, No Wheezing, No Smoke Exposure, No Dyspnea   Gastrointestinal: +, No Vomiting, No Diarrhea, No Constipation, No Pain,   Genitourinary: no dysuria, no urinary frequency  Musculoskeletal: No pain, no weakness  Skin: No rash, No lesion  Neuro: No Weakness, No Numbness, No Paresthesias, No Loss of Consciousness  Psych: No Anxiety/Panic, No Depression, No Insomnia,   Heme/Lymph: No Bruising, No Bleeding    Phsyical Exam:  GENERAL: NAD, AOX3  HEAD:  Atraumatic, Normocephalic  EYES: EOMI, PERRLA, conjunctiva and sclera clear  ENMT: Moist mucous membranes  CHEST/LUNG: Clear to auscultation bilaterally; No rales, rhonchi, wheezing, or rubs  Left chest tube in place   HEART: Regular rate and rhythm; No murmurs, rubs, or gallops  ABDOMEN: Soft, Nontender, Nondistended; Bowel sounds present  EXTREMITIES:  2+ Peripheral Pulses, No clubbing, cyanosis, or edema    Vital Signs Last 24 Hrs  T(C): 36.6 (18 Mar 2019 10:06), Max: 36.8 (17 Mar 2019 16:59)  T(F): 97.8 (18 Mar 2019 10:06), Max: 98.2 (17 Mar 2019 16:59)  HR: 70 (18 Mar 2019 10:06) (70 - 70)  BP: 132/64 (18 Mar 2019 10:06) (128/62 - 173/73)  BP(mean): --  RR: 18 (18 Mar 2019 10:06) (18 - 18)  SpO2: 96% (18 Mar 2019 05:23) (95% - 96%)                          10.3   6.4   )-----------( 175      ( 18 Mar 2019 06:47 )             33.8     03-18    134<L>  |  91<L>  |  36.0<H>  ----------------------------<  132<H>  4.7   |  26.0  |  5.97<H>    Ca    10.0      18 Mar 2019 06:47    TPro  7.9  /  Alb  4.1  /  TBili  0.4  /  DBili  x   /  AST  28  /  ALT  17  /  AlkPhos  133<H>  03-16    MEDICATIONS  (STANDING):  amLODIPine   Tablet 5 milliGRAM(s) Oral daily  calcium acetate 667 milliGRAM(s) Oral three times a day with meals  carvedilol 12.5 milliGRAM(s) Oral every 12 hours  dextrose 5%. 1000 milliLiter(s) (50 mL/Hr) IV Continuous <Continuous>  dextrose 50% Injectable 12.5 Gram(s) IV Push once  famotidine    Tablet 20 milliGRAM(s) Oral daily  folic acid 1 milliGRAM(s) Oral daily  insulin lispro (HumaLOG) corrective regimen sliding scale   SubCutaneous Before meals and at bedtime  levothyroxine 75 MICROGram(s) Oral daily  metoclopramide 5 milliGRAM(s) Oral three times a day  Nephro-heather 1 Tablet(s) Oral daily  piperacillin/tazobactam IVPB. 3.375 Gram(s) IV Intermittent every 12 hours  saccharomyces boulardii 250 milliGRAM(s) Oral two times a day  simvastatin 20 milliGRAM(s) Oral at bedtime  sodium chloride 0.9% lock flush 3 milliLiter(s) IV Push every 8 hours    MEDICATIONS  (PRN):  acetaminophen   Tablet .. 650 milliGRAM(s) Oral every 6 hours PRN Mild Pain (1 - 3)  ALBUTerol/ipratropium for Nebulization 3 milliLiter(s) Nebulizer every 6 hours PRN Shortness of Breath and/or Wheezing  aluminum hydroxide/magnesium hydroxide/simethicone Suspension 30 milliLiter(s) Oral every 4 hours PRN Dyspepsia  dextrose 40% Gel 15 Gram(s) Oral once PRN Blood Glucose LESS THAN 70 milliGRAM(s)/deciliter  glucagon  Injectable 1 milliGRAM(s) IntraMuscular once PRN Glucose LESS THAN 70 milligrams/deciliter  ondansetron Injectable 4 milliGRAM(s) IV Push every 6 hours PRN Nausea  oxyCODONE    5 mG/acetaminophen 325 mG 1 Tablet(s) Oral every 6 hours PRN Moderate Pain (4 - 6) Kendra Walter 64 y/o female MR # [id number]    Initial HPI  This is a 63 year old  female with history of CAD s/p 3VCABG/PPM in 11/18, ESRD on HD via Left UE AVF T/TH/Sat, HTN, DM-2, Gastroparesis came in with Daughter after HD for complaints of difficulty breathing and epigastric pain. Chest xray in the ER consistent with large left pleural effusion status post chest tube placement by CT surgery.     Overnight Events: Seen and examined in room. Pt sitting up in chair with daughter at bedside for translation. Pt reports that she feels better today. She currently has no abdominal pain. She still complains of nausea which she states that she gets often. She has a pigtail in place to left chest. She has no other active complaints at this time. No chest pain, no sob.    ROS:  Constitutional: No fevers, no chills, no fatigue  ENT/Mouth: No Hearing Changes, no ear pain, No Nasal Congestion  Cardiovascular: No Chest Pain, no palpitations  Respiratory: No Cough, No Sputum, No Wheezing,  No Dyspnea   Gastrointestinal: +nausea, NO pain No Diarrhea, No Constipation, No Pain,   Genitourinary: no dysuria, no urinary frequency  Musculoskeletal: No pain, no weakness  Skin: No rash, No lesion  Neuro: No Weakness, No Numbness, No Paresthesias, No Loss of Consciousness  Psych: No Anxiety/Panic, No Depression, No Insomnia,   Heme/Lymph: No Bruising, No Bleeding    Phsyical Exam:  GENERAL: NAD, AOX3  HEAD:  Atraumatic, Normocephalic  EYES: EOMI, PERRLA, conjunctiva and sclera clear  ENMT: Moist mucous membranes  CHEST/LUNG: Clear to auscultation bilaterally; No rales, rhonchi, wheezing, or rubs  Left chest tube in place   HEART: Regular rate and rhythm; No murmurs, rubs, or gallops. S1, S2  ABDOMEN: Soft, Nontender, Nondistended; Bowel sounds present  EXTREMITIES:  2+ Peripheral Pulses, No clubbing, cyanosis, or edema. Left AV fistula with + bruit + thrill    Vital Signs Last 24 Hrs  T(C): 36.6 (18 Mar 2019 10:06), Max: 36.8 (17 Mar 2019 16:59)  T(F): 97.8 (18 Mar 2019 10:06), Max: 98.2 (17 Mar 2019 16:59)  HR: 70 (18 Mar 2019 10:06) (70 - 70)  BP: 132/64 (18 Mar 2019 10:06) (128/62 - 173/73)  BP(mean): --  RR: 18 (18 Mar 2019 10:06) (18 - 18)  SpO2: 96% (18 Mar 2019 05:23) (95% - 96%)                          10.3   6.4   )-----------( 175      ( 18 Mar 2019 06:47 )             33.8     03-18    134<L>  |  91<L>  |  36.0<H>  ----------------------------<  132<H>  4.7   |  26.0  |  5.97<H>    Ca    10.0      18 Mar 2019 06:47    TPro  7.9  /  Alb  4.1  /  TBili  0.4  /  DBili  x   /  AST  28  /  ALT  17  /  AlkPhos  133<H>  03-16    MEDICATIONS  (STANDING):  amLODIPine   Tablet 5 milliGRAM(s) Oral daily  calcium acetate 667 milliGRAM(s) Oral three times a day with meals  carvedilol 12.5 milliGRAM(s) Oral every 12 hours  dextrose 5%. 1000 milliLiter(s) (50 mL/Hr) IV Continuous <Continuous>  dextrose 50% Injectable 12.5 Gram(s) IV Push once  famotidine    Tablet 20 milliGRAM(s) Oral daily  folic acid 1 milliGRAM(s) Oral daily  insulin lispro (HumaLOG) corrective regimen sliding scale   SubCutaneous Before meals and at bedtime  levothyroxine 75 MICROGram(s) Oral daily  metoclopramide 5 milliGRAM(s) Oral three times a day  Nephro-heather 1 Tablet(s) Oral daily  piperacillin/tazobactam IVPB. 3.375 Gram(s) IV Intermittent every 12 hours  saccharomyces boulardii 250 milliGRAM(s) Oral two times a day  simvastatin 20 milliGRAM(s) Oral at bedtime  sodium chloride 0.9% lock flush 3 milliLiter(s) IV Push every 8 hours    MEDICATIONS  (PRN):  acetaminophen   Tablet .. 650 milliGRAM(s) Oral every 6 hours PRN Mild Pain (1 - 3)  ALBUTerol/ipratropium for Nebulization 3 milliLiter(s) Nebulizer every 6 hours PRN Shortness of Breath and/or Wheezing  aluminum hydroxide/magnesium hydroxide/simethicone Suspension 30 milliLiter(s) Oral every 4 hours PRN Dyspepsia  dextrose 40% Gel 15 Gram(s) Oral once PRN Blood Glucose LESS THAN 70 milliGRAM(s)/deciliter  glucagon  Injectable 1 milliGRAM(s) IntraMuscular once PRN Glucose LESS THAN 70 milligrams/deciliter  ondansetron Injectable 4 milliGRAM(s) IV Push every 6 hours PRN Nausea  oxyCODONE    5 mG/acetaminophen 325 mG 1 Tablet(s) Oral every 6 hours PRN Moderate Pain (4 - 6)

## 2019-03-18 NOTE — PROGRESS NOTE ADULT - ASSESSMENT
1) ESRD on HD  2) MBD of renal dx  3) Anemia of renal dx  4) Vol HTN    Awaiting pleural effusion studies results  HD plan for am  Hold CARLOS  Check daily phosphorus (can check renal panel instead of BMP)  Continue current management

## 2019-03-18 NOTE — PHYSICAL THERAPY INITIAL EVALUATION ADULT - PERTINENT HX OF CURRENT PROBLEM, REHAB EVAL
Pt is a 64 y/o female who presented from home with epigastric pain/SOB. (+) hemothorax, pleural effusion.

## 2019-03-18 NOTE — PROGRESS NOTE ADULT - ATTENDING COMMENTS
The patient is a 63 year old  female with history of CAD s/p 3VCABG/PPM in 11/18, ESRD on HD via Left UE AVF T/TH/Sat, HTN, DM-2, Gastroparesis came in with Daughter after HD for complaints of difficulty breathing and epigastric pain. Chest xray in the ER consistent with large left pleural effusion status post chest tube placement by CT surgery. Drained 1700cc of serosanguinous fluid; aspirin and plavix were held initially.    Phsyical Exam:  GENERAL: NAD, AOX3  HEAD:  Atraumatic, Normocephalic  EYES: EOMI, PERRLA, conjunctiva and sclera clear  ENMT: Moist mucous membranes  CHEST/LUNG: Clear to auscultation bilaterally; No rales, rhonchi, wheezing, or rubs  Left chest tube in place   HEART: Regular rate and rhythm; No murmurs, rubs, or gallops. S1, S2  ABDOMEN: Soft, Nontender, Nondistended; Bowel sounds present  EXTREMITIES:  2+ Peripheral Pulses, No clubbing, cyanosis, or edema. Left AV fistula with + bruit + thrill      Plan:  ASPIRIN AND PLAVIX started after discussion with ct surgery  ct surgery follow up  continue zosyn and probiotics   legionella urine at, blood culture, sputum culture  simethicone for gas  gi work up as an outpatient. patient daughter mentioned that patient had epigastric pain when she came in hospial. currently patient has no epigastric pain and abd is benign.

## 2019-03-19 LAB
ANION GAP SERPL CALC-SCNC: 16 MMOL/L — SIGNIFICANT CHANGE UP (ref 5–17)
BUN SERPL-MCNC: 51 MG/DL — HIGH (ref 8–20)
CALCIUM SERPL-MCNC: 10 MG/DL — SIGNIFICANT CHANGE UP (ref 8.6–10.2)
CHLORIDE SERPL-SCNC: 89 MMOL/L — LOW (ref 98–107)
CO2 SERPL-SCNC: 28 MMOL/L — SIGNIFICANT CHANGE UP (ref 22–29)
CREAT SERPL-MCNC: 7.66 MG/DL — HIGH (ref 0.5–1.3)
GLUCOSE BLDC GLUCOMTR-MCNC: 112 MG/DL — HIGH (ref 70–99)
GLUCOSE BLDC GLUCOMTR-MCNC: 126 MG/DL — HIGH (ref 70–99)
GLUCOSE BLDC GLUCOMTR-MCNC: 136 MG/DL — HIGH (ref 70–99)
GLUCOSE BLDC GLUCOMTR-MCNC: 163 MG/DL — HIGH (ref 70–99)
GLUCOSE BLDC GLUCOMTR-MCNC: 227 MG/DL — HIGH (ref 70–99)
GLUCOSE SERPL-MCNC: 139 MG/DL — HIGH (ref 70–115)
HCT VFR BLD CALC: 32.2 % — LOW (ref 37–47)
HGB BLD-MCNC: 9.9 G/DL — LOW (ref 12–16)
MCHC RBC-ENTMCNC: 27.1 PG — SIGNIFICANT CHANGE UP (ref 27–31)
MCHC RBC-ENTMCNC: 30.7 G/DL — LOW (ref 32–36)
MCV RBC AUTO: 88.2 FL — SIGNIFICANT CHANGE UP (ref 81–99)
MRSA PCR RESULT.: DETECTED
PHOSPHATE SERPL-MCNC: 3.2 MG/DL — SIGNIFICANT CHANGE UP (ref 2.4–4.7)
PLATELET # BLD AUTO: 194 K/UL — SIGNIFICANT CHANGE UP (ref 150–400)
POTASSIUM SERPL-MCNC: 5.2 MMOL/L — SIGNIFICANT CHANGE UP (ref 3.5–5.3)
POTASSIUM SERPL-SCNC: 5.2 MMOL/L — SIGNIFICANT CHANGE UP (ref 3.5–5.3)
RBC # BLD: 3.65 M/UL — LOW (ref 4.4–5.2)
RBC # FLD: 15.8 % — HIGH (ref 11–15.6)
S AUREUS DNA NOSE QL NAA+PROBE: DETECTED
SODIUM SERPL-SCNC: 133 MMOL/L — LOW (ref 135–145)
WBC # BLD: 8.5 K/UL — SIGNIFICANT CHANGE UP (ref 4.8–10.8)
WBC # FLD AUTO: 8.5 K/UL — SIGNIFICANT CHANGE UP (ref 4.8–10.8)

## 2019-03-19 PROCEDURE — 99232 SBSQ HOSP IP/OBS MODERATE 35: CPT | Mod: GC

## 2019-03-19 PROCEDURE — 90937 HEMODIALYSIS REPEATED EVAL: CPT

## 2019-03-19 RX ORDER — CHLORHEXIDINE GLUCONATE 213 G/1000ML
1 SOLUTION TOPICAL
Qty: 0 | Refills: 0 | Status: DISCONTINUED | OUTPATIENT
Start: 2019-03-19 | End: 2019-03-20

## 2019-03-19 RX ORDER — MUPIROCIN 20 MG/G
1 OINTMENT TOPICAL
Qty: 0 | Refills: 0 | Status: DISCONTINUED | OUTPATIENT
Start: 2019-03-19 | End: 2019-03-20

## 2019-03-19 RX ADMIN — SIMETHICONE 80 MILLIGRAM(S): 80 TABLET, CHEWABLE ORAL at 06:07

## 2019-03-19 RX ADMIN — Medication 5 MILLIGRAM(S): at 23:22

## 2019-03-19 RX ADMIN — Medication 667 MILLIGRAM(S): at 12:15

## 2019-03-19 RX ADMIN — Medication 2: at 12:14

## 2019-03-19 RX ADMIN — Medication 5 MILLIGRAM(S): at 15:03

## 2019-03-19 RX ADMIN — SODIUM CHLORIDE 3 MILLILITER(S): 9 INJECTION INTRAMUSCULAR; INTRAVENOUS; SUBCUTANEOUS at 20:51

## 2019-03-19 RX ADMIN — SODIUM CHLORIDE 3 MILLILITER(S): 9 INJECTION INTRAMUSCULAR; INTRAVENOUS; SUBCUTANEOUS at 15:04

## 2019-03-19 RX ADMIN — CHLORHEXIDINE GLUCONATE 1 APPLICATION(S): 213 SOLUTION TOPICAL at 12:15

## 2019-03-19 RX ADMIN — PIPERACILLIN AND TAZOBACTAM 25 GRAM(S): 4; .5 INJECTION, POWDER, LYOPHILIZED, FOR SOLUTION INTRAVENOUS at 17:06

## 2019-03-19 RX ADMIN — Medication 667 MILLIGRAM(S): at 16:50

## 2019-03-19 RX ADMIN — Medication 5 MILLIGRAM(S): at 06:07

## 2019-03-19 RX ADMIN — PANTOPRAZOLE SODIUM 40 MILLIGRAM(S): 20 TABLET, DELAYED RELEASE ORAL at 06:08

## 2019-03-19 RX ADMIN — SIMETHICONE 80 MILLIGRAM(S): 80 TABLET, CHEWABLE ORAL at 15:28

## 2019-03-19 RX ADMIN — CLOPIDOGREL BISULFATE 75 MILLIGRAM(S): 75 TABLET, FILM COATED ORAL at 12:19

## 2019-03-19 RX ADMIN — SODIUM CHLORIDE 3 MILLILITER(S): 9 INJECTION INTRAMUSCULAR; INTRAVENOUS; SUBCUTANEOUS at 06:08

## 2019-03-19 RX ADMIN — Medication 650 MILLIGRAM(S): at 07:17

## 2019-03-19 RX ADMIN — MUPIROCIN 1 APPLICATION(S): 20 OINTMENT TOPICAL at 23:21

## 2019-03-19 RX ADMIN — Medication 650 MILLIGRAM(S): at 20:52

## 2019-03-19 RX ADMIN — INSULIN GLARGINE 5 UNIT(S): 100 INJECTION, SOLUTION SUBCUTANEOUS at 23:21

## 2019-03-19 RX ADMIN — Medication 650 MILLIGRAM(S): at 06:25

## 2019-03-19 RX ADMIN — Medication 4: at 16:49

## 2019-03-19 RX ADMIN — PIPERACILLIN AND TAZOBACTAM 25 GRAM(S): 4; .5 INJECTION, POWDER, LYOPHILIZED, FOR SOLUTION INTRAVENOUS at 06:07

## 2019-03-19 RX ADMIN — AMLODIPINE BESYLATE 5 MILLIGRAM(S): 2.5 TABLET ORAL at 12:19

## 2019-03-19 RX ADMIN — Medication 75 MICROGRAM(S): at 06:07

## 2019-03-19 RX ADMIN — SIMETHICONE 80 MILLIGRAM(S): 80 TABLET, CHEWABLE ORAL at 23:22

## 2019-03-19 RX ADMIN — Medication 250 MILLIGRAM(S): at 06:07

## 2019-03-19 RX ADMIN — Medication 81 MILLIGRAM(S): at 12:18

## 2019-03-19 RX ADMIN — Medication 250 MILLIGRAM(S): at 17:05

## 2019-03-19 RX ADMIN — SIMVASTATIN 20 MILLIGRAM(S): 20 TABLET, FILM COATED ORAL at 23:22

## 2019-03-19 RX ADMIN — Medication 1 TABLET(S): at 12:15

## 2019-03-19 RX ADMIN — Medication 2.5 MILLIGRAM(S): at 15:03

## 2019-03-19 RX ADMIN — Medication 1 MILLIGRAM(S): at 12:15

## 2019-03-19 RX ADMIN — CARVEDILOL PHOSPHATE 12.5 MILLIGRAM(S): 80 CAPSULE, EXTENDED RELEASE ORAL at 17:05

## 2019-03-19 NOTE — PROGRESS NOTE ADULT - SUBJECTIVE AND OBJECTIVE BOX
KIM LEE    [id number]    63y      Female    cc: sob large pleural effusion    Initial HPI  This is a 63 year old  female with history of CAD s/p 3VCABG/PPM in 11/18, ESRD on HD via Left UE AVF T/TH/Sat, HTN, DM-2, Gastroparesis came in with Daughter after HD for complaints of difficulty breathing and epigastric pain. Chest xray in the ER consistent with large left pleural effusion status post chest tube placement by CT surgery.     Overnight Events: Seen and examined in room. Denied active complains. s/p ct removal on 3/18 and repeat cxr after that showed resolution of PNA and effusion. blood culture in process, procalcitonin f/u.  Daughter at bedside and mentioned that she has appointment with gi on friday for GASTRIC emptying study      ROS:  negative except as above.    Phsyical Exam:  GENERAL: NAD, AOX3  HEAD:  Atraumatic, Normocephalic  EYES: EOMI, PERRLA, conjunctiva and sclera clear  ENMT: Moist mucous membranes  CHEST/LUNG: Clear to auscultation bilaterally; No rales, rhonchi, wheezing, or rubs  Left chest tube in place   HEART: Regular rate and rhythm; No murmurs, rubs, or gallops. S1, S2  ABDOMEN: Soft, Nontender, Nondistended; Bowel sounds present  EXTREMITIES:  2+ Peripheral Pulses, No clubbing, cyanosis, or edema. Left AV fistula with + bruit + thrill    Vital Signs Last 24 Hrs  T(C): 36.6 (19 Mar 2019 13:04), Max: 36.6 (18 Mar 2019 16:29)  T(F): 97.8 (19 Mar 2019 13:04), Max: 97.9 (18 Mar 2019 16:29)  HR: 72 (19 Mar 2019 13:04) (70 - 74)  BP: 138/60 (19 Mar 2019 13:04) (138/60 - 156/76)  BP(mean): --  RR: 18 (19 Mar 2019 13:04) (18 - 18)  SpO2: 97% (19 Mar 2019 13:04) (94% - 98%)    PHYSICAL EXAM:    GENERAL: NAD, well-groomed, obese  HEENT: PERRL, +EOMI  NECK: soft, Supple, No JVD,   CHEST/LUNG: Clear to auscultation bilaterally; No wheezing  HEART: S1S2+, Regular rate and rhythm; No murmurs, rubs, or gallops  ABDOMEN: Soft, Nontender, Nondistended; Bowel sounds present  EXTREMITIES:  2+ Peripheral Pulses, No clubbing, cyanosis, or edema  SKIN: No rashes or lesions  NEURO: AAOX3, no focal deficits, no motor r sensory loss  PSYCH: normal mood      LABS:                        9.9    8.5   )-----------( 194      ( 19 Mar 2019 07:34 )             32.2     03-19    133<L>  |  89<L>  |  51.0<H>  ----------------------------<  139<H>  5.2   |  28.0  |  7.66<H>    Ca    10.0      19 Mar 2019 07:34  Phos  3.2     03-19              MEDICATIONS  (STANDING):  amLODIPine   Tablet 5 milliGRAM(s) Oral daily  aspirin  chewable 81 milliGRAM(s) Oral daily  calcium acetate 667 milliGRAM(s) Oral three times a day with meals  carvedilol 12.5 milliGRAM(s) Oral every 12 hours  chlorhexidine 2% Cloths 1 Application(s) Topical <User Schedule>  clopidogrel Tablet 75 milliGRAM(s) Oral daily  dextrose 5%. 1000 milliLiter(s) (50 mL/Hr) IV Continuous <Continuous>  dextrose 50% Injectable 12.5 Gram(s) IV Push once  enalapril 2.5 milliGRAM(s) Oral daily  folic acid 1 milliGRAM(s) Oral daily  insulin glargine Injectable (LANTUS) 5 Unit(s) SubCutaneous at bedtime  insulin lispro (HumaLOG) corrective regimen sliding scale   SubCutaneous Before meals and at bedtime  levothyroxine 75 MICROGram(s) Oral daily  metoclopramide 5 milliGRAM(s) Oral three times a day  Nephro-heather 1 Tablet(s) Oral daily  pantoprazole    Tablet 40 milliGRAM(s) Oral before breakfast  piperacillin/tazobactam IVPB. 3.375 Gram(s) IV Intermittent every 12 hours  saccharomyces boulardii 250 milliGRAM(s) Oral two times a day  simethicone 80 milliGRAM(s) Chew three times a day  simvastatin 20 milliGRAM(s) Oral at bedtime  sodium chloride 0.9% lock flush 3 milliLiter(s) IV Push every 8 hours    MEDICATIONS  (PRN):  acetaminophen   Tablet .. 650 milliGRAM(s) Oral every 6 hours PRN Mild Pain (1 - 3)  ALBUTerol/ipratropium for Nebulization 3 milliLiter(s) Nebulizer every 6 hours PRN Shortness of Breath and/or Wheezing  dextrose 40% Gel 15 Gram(s) Oral once PRN Blood Glucose LESS THAN 70 milliGRAM(s)/deciliter  glucagon  Injectable 1 milliGRAM(s) IntraMuscular once PRN Glucose LESS THAN 70 milligrams/deciliter  ondansetron Injectable 4 milliGRAM(s) IV Push every 6 hours PRN Nausea  oxyCODONE    5 mG/acetaminophen 325 mG 1 Tablet(s) Oral every 6 hours PRN Moderate Pain (4 - 6)      RADIOLOGY & ADDITIONAL TESTS:

## 2019-03-19 NOTE — PROGRESS NOTE ADULT - ASSESSMENT
Assessment and Plan:   The patient is a 63 year old  female with history of CAD s/p 3VCABG/PPM in 11/18, ESRD on HD via Left UE AVF T/TH/Sat, HTN, DM-2, Gastroparesis came in with Daughter after HD for complaints of difficulty breathing and epigastric pain. Chest xray in the ER consistent with large left pleural effusion status post chest tube placement by CT surgery. Drained 1700cc of serosanguinous fluid; Pleural fluid studies showed exudative effusion and consistent with pneumonia. cytology and blood culture pending.    Assessment/Plan:    Problem 1.   > Left exudative pleural effusion secondary to complex pneumonia  -Fluid total protein 5.7, Serum total Protein 7.9. Pleural fluid protein/serum protein ratio greater than 0.5. Suspicious for Exudative pleural effusion based on lights criteria  s/p chest tube removal and resolution of infiltrate and effusion  f/u  cytology and blood culture results    Problem 2)   > complex pneumonia complicated to pleural effusion  -Repeat chest xray shows left midlung opacity  -Chest CT shows Left upper and lower lung pneumonia  -Continue zosyn day 3  blood cultures pending  sputum can't be collected as she has no cough    Problems 3  > CAD status post CABG  - Continue carvedilol and simvastatin  restarted on aspirin and plavix    Problem 4  >Chronic epigastric pain  - Cont. Reglan, PPI, added sucralfate and simethicone  -Pt has o/p GI appt. with Dr. Payton this Friday    Problem 5)  >ESRD   -Hemodyalisis Tu/Thu/Sat.     Problem 6  > Hypertension  -Continue home medications    Problem 7  - Diabetes mellitus type 2:   ADA diet, Accuchecks AC/HS, HISS      OOB/VCboots, Fall risk,  PT recommended home with home PT Assessment and Plan:   The patient is a 63 year old  female with history of CAD s/p 3VCABG/PPM in 11/18, ESRD on HD via Left UE AVF T/TH/Sat, HTN, DM-2, Gastroparesis came in with Daughter after HD for complaints of difficulty breathing and epigastric pain. Chest xray in the ER consistent with large left pleural effusion status post chest tube placement by CT surgery. Drained 1700cc of serosanguinous fluid; Pleural fluid studies showed exudative effusion and consistent with pneumonia. cytology and blood culture pending.    Assessment/Plan:    Problem 1.   > Left exudative pleural effusion secondary to complex pneumonia  -Fluid total protein 5.7, Serum total Protein 7.9. Pleural fluid protein/serum protein ratio greater than 0.5. Suspicious for Exudative pleural effusion based on lights criteria  s/p chest tube removal and resolution of infiltrate and effusion  f/u  cytology and blood culture results    Problem 2)   > complex pneumonia complicated to pleural effusion  -Repeat chest xray shows left midlung opacity  -Chest CT shows Left upper and lower lung pneumonia  -Continue zosyn day 3  blood cultures pending  sputum can't be collected as she has no cough    Problems 3  > CAD status post CABG  - Continue carvedilol and simvastatin  restarted on aspirin and plavix    Problem 4  >Chronic epigastric pain  - Cont. Reglan, PPI, added sucralfate and simethicone  -Pt has o/p GI appt. with Dr. Payton this Friday    Problem 5)  >ESRD   -Hemodyalisis Tu/Thu/Sat.     Problem 6  > Hypertension  -Continue home medications    Problem 7  - Diabetes mellitus type 2:   ADA diet, Accuchecks AC/HS, HISS      OOB/VCboots, Fall risk,  PT recommended home with home PT    dispo: planing tomorrow after blood culture results Assessment and Plan:   The patient is a 63 year old  female with history of CAD s/p 3VCABG/PPM in 11/18, ESRD on HD via Left UE AVF T/TH/Sat, HTN, DM-2, Gastroparesis came in with Daughter after HD for complaints of difficulty breathing and epigastric pain. Chest xray in the ER consistent with large left pleural effusion status post chest tube placement by CT surgery. Drained 1700cc of serosanguinous fluid; Pleural fluid studies showed exudative effusion and consistent with pneumonia. cytology and blood culture pending.    Assessment/Plan:    Problem 1.   > Left exudative pleural effusion   It was bloody effusion in beginning, concern of hemothorax, s/p chest tube removal and repeat cxr showed clear lungs  .-Fluid total protein 5.7, Serum total Protein 7.9. Pleural fluid protein/serum protein ratio greater than 0.5. Suspicious for Exudative pleural effusion based on lights criteria  f/u  cytology and blood culture results.        Problem 2)   > suspected complex pneumonia complicated to pleural effusion  suspected gram neg  -Chest CT shows Left upper and lower lung pneumonia but repeat cxr after chest tube removal showed clear lungs  -Continue zosyn day 4  blood cultures pending  sputum can't be collected as she has no cough    Problems 3  > CAD status post CABG  - Continue carvedilol and simvastatin  restarted on aspirin and plavix after discussion with ct surgery    Problem 4  >Chronic epigastric pain  - Cont. Reglan, PPI, added sucralfate and simethicone  -Pt has o/p GI appt. with Dr. Payton this Friday    Problem 5)  >ESRD   -Hemodyalisis Tu/Thu/Sat.     Problem 6  > Hypertension  -Continue home medications    Problem 7  - Diabetes mellitus type 2:   ADA diet, Accuchecks AC/HS, HISS      OOB/VCboots, Fall risk,  PT recommended home with home PT    dispo: planing tomorrow after blood culture results

## 2019-03-19 NOTE — PROGRESS NOTE ADULT - SUBJECTIVE AND OBJECTIVE BOX
Patient was seen and evaluated on dialysis.   No c/o CP SOB NV  no F/C  no swelling    T(C): 36.4 (03-19-19 @ 08:01), Max: 36.6 (03-18-19 @ 16:29)  HR: 70 (03-19-19 @ 08:01) (70 - 74)  BP: 156/76 (03-19-19 @ 08:01) (146/66 - 156/76)    Wt(kg): --    PE ;  NAD , Pale,  lungs - CTA  CV gr 1 murmur,  No gallop or rub  Abd : soft, NT BS +, No masses  Ext- No edema  Neuro : Grossly intact, moving extremities   AVF +                       9.9    8.5   )-----------( 194      ( 19 Mar 2019 07:34 )             32.2   03-19    133<L>  |  89<L>  |  51.0<H>  ----------------------------<  139<H>  5.2   |  28.0  |  7.66<H>    Ca    10.0      19 Mar 2019 07:34  Phos  3.2     03-19    MEDICATIONS  (STANDING):    ALBUTerol/ipratropium for Nebulization PRN  amLODIPine   Tablet  aspirin  chewable  calcium acetate  carvedilol  clopidogrel Tablet  folic acid  insulin glargine Injectable (LANTUS)  insulin lispro (HumaLOG) corrective regimen sliding scale  levothyroxine  metoclopramide  Nephro-heather  pantoprazole    Tablet  piperacillin/tazobactam IVPB.  saccharomyces boulardii  simethicone  simvastatin    Patient stable  [facility] HD easily  D.C Aluminum & Mg++    UF : 2.4 L ,  ,  ml.,T 36.5 C,

## 2019-03-20 ENCOUNTER — TRANSCRIPTION ENCOUNTER (OUTPATIENT)
Age: 64
End: 2019-03-20

## 2019-03-20 VITALS
TEMPERATURE: 98 F | OXYGEN SATURATION: 96 % | SYSTOLIC BLOOD PRESSURE: 159 MMHG | RESPIRATION RATE: 18 BRPM | HEART RATE: 70 BPM | DIASTOLIC BLOOD PRESSURE: 77 MMHG

## 2019-03-20 LAB
ALBUMIN SERPL ELPH-MCNC: 3.6 G/DL — SIGNIFICANT CHANGE UP (ref 3.3–5.2)
ANION GAP SERPL CALC-SCNC: 15 MMOL/L — SIGNIFICANT CHANGE UP (ref 5–17)
BUN SERPL-MCNC: 34 MG/DL — HIGH (ref 8–20)
CALCIUM SERPL-MCNC: 10.1 MG/DL — SIGNIFICANT CHANGE UP (ref 8.6–10.2)
CHLORIDE SERPL-SCNC: 94 MMOL/L — LOW (ref 98–107)
CO2 SERPL-SCNC: 29 MMOL/L — SIGNIFICANT CHANGE UP (ref 22–29)
CREAT SERPL-MCNC: 5.14 MG/DL — HIGH (ref 0.5–1.3)
GLUCOSE BLDC GLUCOMTR-MCNC: 114 MG/DL — HIGH (ref 70–99)
GLUCOSE BLDC GLUCOMTR-MCNC: 178 MG/DL — HIGH (ref 70–99)
GLUCOSE BLDC GLUCOMTR-MCNC: 98 MG/DL — SIGNIFICANT CHANGE UP (ref 70–99)
GLUCOSE SERPL-MCNC: 118 MG/DL — HIGH (ref 70–115)
HCV AB S/CO SERPL IA: 0.09 S/CO — SIGNIFICANT CHANGE UP (ref 0–0.79)
HCV AB SERPL-IMP: SIGNIFICANT CHANGE UP
PHOSPHATE SERPL-MCNC: 3.4 MG/DL — SIGNIFICANT CHANGE UP (ref 2.4–4.7)
POTASSIUM SERPL-MCNC: 4.8 MMOL/L — SIGNIFICANT CHANGE UP (ref 3.5–5.3)
POTASSIUM SERPL-SCNC: 4.8 MMOL/L — SIGNIFICANT CHANGE UP (ref 3.5–5.3)
PROCALCITONIN SERPL-MCNC: 0.79 NG/ML — HIGH (ref 0–0.04)
PROCALCITONIN SERPL-MCNC: 1.01 NG/ML — HIGH (ref 0.02–0.1)
SODIUM SERPL-SCNC: 138 MMOL/L — SIGNIFICANT CHANGE UP (ref 135–145)

## 2019-03-20 PROCEDURE — 87206 SMEAR FLUORESCENT/ACID STAI: CPT

## 2019-03-20 PROCEDURE — 96376 TX/PRO/DX INJ SAME DRUG ADON: CPT | Mod: XU

## 2019-03-20 PROCEDURE — 82945 GLUCOSE OTHER FLUID: CPT

## 2019-03-20 PROCEDURE — 97116 GAIT TRAINING THERAPY: CPT

## 2019-03-20 PROCEDURE — 87102 FUNGUS ISOLATION CULTURE: CPT

## 2019-03-20 PROCEDURE — 83690 ASSAY OF LIPASE: CPT

## 2019-03-20 PROCEDURE — 85027 COMPLETE CBC AUTOMATED: CPT

## 2019-03-20 PROCEDURE — 36415 COLL VENOUS BLD VENIPUNCTURE: CPT

## 2019-03-20 PROCEDURE — 99261: CPT

## 2019-03-20 PROCEDURE — 96374 THER/PROPH/DIAG INJ IV PUSH: CPT | Mod: XU

## 2019-03-20 PROCEDURE — 93005 ELECTROCARDIOGRAM TRACING: CPT

## 2019-03-20 PROCEDURE — 87015 SPECIMEN INFECT AGNT CONCNTJ: CPT

## 2019-03-20 PROCEDURE — 86803 HEPATITIS C AB TEST: CPT

## 2019-03-20 PROCEDURE — 99239 HOSP IP/OBS DSCHRG MGMT >30: CPT

## 2019-03-20 PROCEDURE — 87040 BLOOD CULTURE FOR BACTERIA: CPT

## 2019-03-20 PROCEDURE — 80048 BASIC METABOLIC PNL TOTAL CA: CPT

## 2019-03-20 PROCEDURE — 99285 EMERGENCY DEPT VISIT HI MDM: CPT | Mod: 25

## 2019-03-20 PROCEDURE — 80069 RENAL FUNCTION PANEL: CPT

## 2019-03-20 PROCEDURE — 84484 ASSAY OF TROPONIN QUANT: CPT

## 2019-03-20 PROCEDURE — 96375 TX/PRO/DX INJ NEW DRUG ADDON: CPT | Mod: XU

## 2019-03-20 PROCEDURE — 84145 PROCALCITONIN (PCT): CPT

## 2019-03-20 PROCEDURE — C1729: CPT

## 2019-03-20 PROCEDURE — 83986 ASSAY PH BODY FLUID NOS: CPT

## 2019-03-20 PROCEDURE — 82042 OTHER SOURCE ALBUMIN QUAN EA: CPT

## 2019-03-20 PROCEDURE — 71045 X-RAY EXAM CHEST 1 VIEW: CPT

## 2019-03-20 PROCEDURE — 87070 CULTURE OTHR SPECIMN AEROBIC: CPT

## 2019-03-20 PROCEDURE — 84157 ASSAY OF PROTEIN OTHER: CPT

## 2019-03-20 PROCEDURE — 87075 CULTR BACTERIA EXCEPT BLOOD: CPT

## 2019-03-20 PROCEDURE — 32556 INSERT CATH PLEURA W/O IMAGE: CPT

## 2019-03-20 PROCEDURE — 87640 STAPH A DNA AMP PROBE: CPT

## 2019-03-20 PROCEDURE — 71250 CT THORAX DX C-: CPT

## 2019-03-20 PROCEDURE — 85610 PROTHROMBIN TIME: CPT

## 2019-03-20 PROCEDURE — 88305 TISSUE EXAM BY PATHOLOGIST: CPT

## 2019-03-20 PROCEDURE — 89051 BODY FLUID CELL COUNT: CPT

## 2019-03-20 PROCEDURE — 83880 ASSAY OF NATRIURETIC PEPTIDE: CPT

## 2019-03-20 PROCEDURE — 87116 MYCOBACTERIA CULTURE: CPT

## 2019-03-20 PROCEDURE — 83615 LACTATE (LD) (LDH) ENZYME: CPT

## 2019-03-20 PROCEDURE — 82962 GLUCOSE BLOOD TEST: CPT

## 2019-03-20 PROCEDURE — 97530 THERAPEUTIC ACTIVITIES: CPT

## 2019-03-20 PROCEDURE — 99233 SBSQ HOSP IP/OBS HIGH 50: CPT

## 2019-03-20 PROCEDURE — 87205 SMEAR GRAM STAIN: CPT

## 2019-03-20 PROCEDURE — 80053 COMPREHEN METABOLIC PANEL: CPT

## 2019-03-20 PROCEDURE — 83036 HEMOGLOBIN GLYCOSYLATED A1C: CPT

## 2019-03-20 PROCEDURE — 88112 CYTOPATH CELL ENHANCE TECH: CPT

## 2019-03-20 PROCEDURE — 84100 ASSAY OF PHOSPHORUS: CPT

## 2019-03-20 PROCEDURE — 85730 THROMBOPLASTIN TIME PARTIAL: CPT

## 2019-03-20 RX ORDER — SIMETHICONE 80 MG/1
1 TABLET, CHEWABLE ORAL
Qty: 21 | Refills: 0
Start: 2019-03-20 | End: 2019-03-26

## 2019-03-20 RX ORDER — ASPIRIN/CALCIUM CARB/MAGNESIUM 324 MG
1 TABLET ORAL
Qty: 30 | Refills: 0
Start: 2019-03-20 | End: 2019-04-18

## 2019-03-20 RX ORDER — METOCLOPRAMIDE HCL 10 MG
1 TABLET ORAL
Qty: 21 | Refills: 0
Start: 2019-03-20 | End: 2019-03-26

## 2019-03-20 RX ORDER — MUPIROCIN 20 MG/G
1 OINTMENT TOPICAL
Qty: 4 | Refills: 0
Start: 2019-03-20 | End: 2019-03-24

## 2019-03-20 RX ORDER — METOCLOPRAMIDE HCL 10 MG
1 TABLET ORAL
Qty: 0 | Refills: 0 | COMMUNITY

## 2019-03-20 RX ORDER — METOCLOPRAMIDE HCL 10 MG
1 TABLET ORAL
Qty: 21 | Refills: 0 | DISCHARGE
Start: 2019-03-20 | End: 2019-03-26

## 2019-03-20 RX ADMIN — Medication 650 MILLIGRAM(S): at 10:20

## 2019-03-20 RX ADMIN — Medication 2.5 MILLIGRAM(S): at 05:10

## 2019-03-20 RX ADMIN — Medication 1 TABLET(S): at 12:11

## 2019-03-20 RX ADMIN — CARVEDILOL PHOSPHATE 12.5 MILLIGRAM(S): 80 CAPSULE, EXTENDED RELEASE ORAL at 17:41

## 2019-03-20 RX ADMIN — PANTOPRAZOLE SODIUM 40 MILLIGRAM(S): 20 TABLET, DELAYED RELEASE ORAL at 05:10

## 2019-03-20 RX ADMIN — CLOPIDOGREL BISULFATE 75 MILLIGRAM(S): 75 TABLET, FILM COATED ORAL at 12:11

## 2019-03-20 RX ADMIN — SIMETHICONE 80 MILLIGRAM(S): 80 TABLET, CHEWABLE ORAL at 13:00

## 2019-03-20 RX ADMIN — CARVEDILOL PHOSPHATE 12.5 MILLIGRAM(S): 80 CAPSULE, EXTENDED RELEASE ORAL at 05:10

## 2019-03-20 RX ADMIN — Medication 2: at 12:09

## 2019-03-20 RX ADMIN — Medication 5 MILLIGRAM(S): at 05:10

## 2019-03-20 RX ADMIN — CHLORHEXIDINE GLUCONATE 1 APPLICATION(S): 213 SOLUTION TOPICAL at 05:09

## 2019-03-20 RX ADMIN — Medication 250 MILLIGRAM(S): at 05:09

## 2019-03-20 RX ADMIN — Medication 667 MILLIGRAM(S): at 12:11

## 2019-03-20 RX ADMIN — PIPERACILLIN AND TAZOBACTAM 25 GRAM(S): 4; .5 INJECTION, POWDER, LYOPHILIZED, FOR SOLUTION INTRAVENOUS at 05:09

## 2019-03-20 RX ADMIN — Medication 650 MILLIGRAM(S): at 09:44

## 2019-03-20 RX ADMIN — Medication 667 MILLIGRAM(S): at 07:51

## 2019-03-20 RX ADMIN — AMLODIPINE BESYLATE 5 MILLIGRAM(S): 2.5 TABLET ORAL at 05:10

## 2019-03-20 RX ADMIN — SODIUM CHLORIDE 3 MILLILITER(S): 9 INJECTION INTRAMUSCULAR; INTRAVENOUS; SUBCUTANEOUS at 13:00

## 2019-03-20 RX ADMIN — SODIUM CHLORIDE 3 MILLILITER(S): 9 INJECTION INTRAMUSCULAR; INTRAVENOUS; SUBCUTANEOUS at 07:44

## 2019-03-20 RX ADMIN — Medication 5 MILLIGRAM(S): at 13:00

## 2019-03-20 RX ADMIN — Medication 250 MILLIGRAM(S): at 17:41

## 2019-03-20 RX ADMIN — Medication 667 MILLIGRAM(S): at 17:41

## 2019-03-20 RX ADMIN — Medication 75 MICROGRAM(S): at 05:10

## 2019-03-20 RX ADMIN — MUPIROCIN 1 APPLICATION(S): 20 OINTMENT TOPICAL at 05:09

## 2019-03-20 RX ADMIN — SIMETHICONE 80 MILLIGRAM(S): 80 TABLET, CHEWABLE ORAL at 05:10

## 2019-03-20 RX ADMIN — Medication 81 MILLIGRAM(S): at 12:11

## 2019-03-20 RX ADMIN — Medication 1 MILLIGRAM(S): at 12:11

## 2019-03-20 NOTE — PROGRESS NOTE ADULT - SUBJECTIVE AND OBJECTIVE BOX
KIM LEE    [id number]    63y      Female    cc: sob large pleural effusion    This is a 63 year old  female with history of CAD s/p 3VCABG/PPM in 11/18, ESRD on HD via Left UE AVF T/TH/Sat, HTN, DM-2, Gastroparesis came in with Daughter after HD for complaints of difficulty breathing and epigastric pain. Chest xray in the ER consistent with large left pleural effusion status post chest tube placement by CT surgery.     Overnight Events: Seen and examined in room. Denied active complains. s/p ct removal on 3/18 and repeat cxr after that showed resolution of PNA and effusion. blood culture in process, procalcitonin 0.79  Son Raheem at bedside and all his questions and concern were answered.  pending blood culture results for dc planing. will be resulted at 4 pm at bedside and mentioned that she has appointment with gi on friday for GASTRIC emptying study    ROS:  negative except as above.    Physical Exam:  GENERAL: NAD, AOX3, Pale,   HEAD:  Atraumatic, Normocephalic  EYES: EOMI, PERRLA, conjunctiva and sclera clear  ENMT: Moist mucous membranes  CHEST/LUNG: Clear to auscultation bilaterally; No rales, rhonchi, wheezing, or rubs , S/P L - CT ,   HEART: Regular rate and rhythm; No murmurs, rubs, or gallops. S1, S2  ABDOMEN: Soft, Nontender, Nondistended; Bowel sounds present  EXTREMITIES:  2+ Peripheral Pulses, No clubbing, cyanosis, or edema.   Left AV fistula with + bruit + thrill    Vital Signs Last 24 Hrs  Vital Signs Last 24 Hrs  T(C): 36.6 (20 Mar 2019 07:27), Max: 36.7 (19 Mar 2019 15:51)  T(F): 97.8 (20 Mar 2019 07:27), Max: 98.1 (19 Mar 2019 15:51)  HR: 70 (20 Mar 2019 07:27) (70 - 72)  BP: 152/65 (20 Mar 2019 07:27) (125/69 - 170/70)  BP(mean): --  RR: 17 (20 Mar 2019 07:27) (17 - 18)  SpO2: 96% (20 Mar 2019 07:27) (94% - 97%)    LABS:                        9.9    8.5   )-----------( 194      ( 19 Mar 2019 07:34 )             32.2     03-20    138  |  94<L>  |  34.0<H>  ----------------------------<  118<H>  4.8   |  29.0  |  5.14<H>    Ca    10.1      20 Mar 2019 06:44  Phos  3.4     03-20    TPro  x   /  Alb  3.6  /  TBili  x   /  DBili  x   /  AST  x   /  ALT  x   /  AlkPhos  x   03-20      MEDICATIONS  (STANDING):  amLODIPine   Tablet 5 milliGRAM(s) Oral daily  aspirin  chewable 81 milliGRAM(s) Oral daily  calcium acetate 667 milliGRAM(s) Oral three times a day with meals  carvedilol 12.5 milliGRAM(s) Oral every 12 hours  chlorhexidine 2% Cloths 1 Application(s) Topical <User Schedule>  clopidogrel Tablet 75 milliGRAM(s) Oral daily  dextrose 5%. 1000 milliLiter(s) (50 mL/Hr) IV Continuous <Continuous>  dextrose 50% Injectable 12.5 Gram(s) IV Push once  enalapril 2.5 milliGRAM(s) Oral daily  folic acid 1 milliGRAM(s) Oral daily  insulin glargine Injectable (LANTUS) 5 Unit(s) SubCutaneous at bedtime  insulin lispro (HumaLOG) corrective regimen sliding scale   SubCutaneous Before meals and at bedtime  levothyroxine 75 MICROGram(s) Oral daily  metoclopramide 5 milliGRAM(s) Oral three times a day  mupirocin 2% Nasal 1 Application(s) Nasal two times a day  Nephro-heather 1 Tablet(s) Oral daily  pantoprazole    Tablet 40 milliGRAM(s) Oral before breakfast  piperacillin/tazobactam IVPB. 3.375 Gram(s) IV Intermittent every 12 hours  saccharomyces boulardii 250 milliGRAM(s) Oral two times a day  simethicone 80 milliGRAM(s) Chew three times a day  simvastatin 20 milliGRAM(s) Oral at bedtime  sodium chloride 0.9% lock flush 3 milliLiter(s) IV Push every 8 hours    MEDICATIONS  (PRN):  acetaminophen   Tablet .. 650 milliGRAM(s) Oral every 6 hours PRN Mild Pain (1 - 3)  ALBUTerol/ipratropium for Nebulization 3 milliLiter(s) Nebulizer every 6 hours PRN Shortness of Breath and/or Wheezing  dextrose 40% Gel 15 Gram(s) Oral once PRN Blood Glucose LESS THAN 70 milliGRAM(s)/deciliter  glucagon  Injectable 1 milliGRAM(s) IntraMuscular once PRN Glucose LESS THAN 70 milligrams/deciliter  ondansetron Injectable 4 milliGRAM(s) IV Push every 6 hours PRN Nausea  oxyCODONE    5 mG/acetaminophen 325 mG 1 Tablet(s) Oral every 6 hours PRN Moderate Pain (4 - 6)      The patient is a 63 year old  female with history of CAD s/p 3VCABG/PPM in 11/18, ESRD on HD via Left UE AVF T/TH/Sat, HTN, DM-2, Gastroparesis came in with Daughter after HD for complaints of difficulty breathing and epigastric pain. Chest xray in the ER consistent with large left pleural effusion status post chest tube placement by CT surgery. Drained 1700cc of serosanguinous fluid; Pleural fluid studies showed exudative effusion and consistent with pneumonia. cytology and blood culture pending. Dc planning after blood culture results.    Assessment/Plan:    Problem 1.   > Left exudative pleural effusion   It was bloody effusion in beginning, concern of hemothorax, s/p chest tube removal. and repeat cxr showed clear lungs  .-Fluid total protein 5.7, Serum total Protein 7.9. Pleural fluid protein/serum protein ratio greater than 0.5. Suspicious for Exudative pleural effusion based on lights criteria  f/u  cytology and blood culture results.    Problem 2)   > suspected complex pneumonia , pleural effusion  -Chest CT shows Left upper and lower lung pneumonia but repeat cxr after chest tube removal showed clear lungs  -Treated w. Zosyn,     Problems 3  > CAD status post CABG  - Continue carvedilol and simvastatin  restarted on aspirin 81 mg and Plavix ,     Problem 4  - Diabetes mellitus type 2:   ADA diet, Accu-Cheks AC/HS, HISS      HD as Op On Thursday, D/W RN & Home care RNs,

## 2019-03-20 NOTE — PROGRESS NOTE ADULT - NSHPATTENDINGPLANDISCUSS_GEN_ALL_CORE
patient, rn, cm and sw
patient, rn, cm and sw
Dr. Castellanos & CTS in morning rounds.
patient, rn, cm and sw

## 2019-03-20 NOTE — PROGRESS NOTE ADULT - SUBJECTIVE AND OBJECTIVE BOX
KIM LEE    [id number]    63y      Female      cc: sob large pleural effusion    Initial HPI  This is a 63 year old  female with history of CAD s/p 3VCABG/PPM in 11/18, ESRD on HD via Left UE AVF T/TH/Sat, HTN, DM-2, Gastroparesis came in with Daughter after HD for complaints of difficulty breathing and epigastric pain. Chest xray in the ER consistent with large left pleural effusion status post chest tube placement by CT surgery.     Overnight Events: Seen and examined in room. Denied active complains. s/p ct removal on 3/18 and repeat cxr after that showed resolution of PNA and effusion. blood culture in process, procalcitonin 0.79  Son Raheem at bedside and all his questions and concern were answered.  pending blood culture results for dc planing. will be resulted at 4 pm at bedside and mentioned that she has appointment with gi on friday for GASTRIC emptying study      ROS:  negative except as above.    Phsyical Exam:  GENERAL: NAD, AOX3  HEAD:  Atraumatic, Normocephalic  EYES: EOMI, PERRLA, conjunctiva and sclera clear  ENMT: Moist mucous membranes  CHEST/LUNG: Clear to auscultation bilaterally; No rales, rhonchi, wheezing, or rubs  Left chest tube in place   HEART: Regular rate and rhythm; No murmurs, rubs, or gallops. S1, S2  ABDOMEN: Soft, Nontender, Nondistended; Bowel sounds present  EXTREMITIES:  2+ Peripheral Pulses, No clubbing, cyanosis, or edema. Left AV fistula with + bruit + thrill    Vital Signs Last 24 Hrs  Vital Signs Last 24 Hrs  T(C): 36.6 (20 Mar 2019 07:27), Max: 36.7 (19 Mar 2019 15:51)  T(F): 97.8 (20 Mar 2019 07:27), Max: 98.1 (19 Mar 2019 15:51)  HR: 70 (20 Mar 2019 07:27) (70 - 72)  BP: 152/65 (20 Mar 2019 07:27) (125/69 - 170/70)  BP(mean): --  RR: 17 (20 Mar 2019 07:27) (17 - 18)  SpO2: 96% (20 Mar 2019 07:27) (94% - 97%)        LABS:                        9.9    8.5   )-----------( 194      ( 19 Mar 2019 07:34 )             32.2     03-20    138  |  94<L>  |  34.0<H>  ----------------------------<  118<H>  4.8   |  29.0  |  5.14<H>    Ca    10.1      20 Mar 2019 06:44  Phos  3.4     03-20    TPro  x   /  Alb  3.6  /  TBili  x   /  DBili  x   /  AST  x   /  ALT  x   /  AlkPhos  x   03-20            MEDICATIONS  (STANDING):  amLODIPine   Tablet 5 milliGRAM(s) Oral daily  aspirin  chewable 81 milliGRAM(s) Oral daily  calcium acetate 667 milliGRAM(s) Oral three times a day with meals  carvedilol 12.5 milliGRAM(s) Oral every 12 hours  chlorhexidine 2% Cloths 1 Application(s) Topical <User Schedule>  clopidogrel Tablet 75 milliGRAM(s) Oral daily  dextrose 5%. 1000 milliLiter(s) (50 mL/Hr) IV Continuous <Continuous>  dextrose 50% Injectable 12.5 Gram(s) IV Push once  enalapril 2.5 milliGRAM(s) Oral daily  folic acid 1 milliGRAM(s) Oral daily  insulin glargine Injectable (LANTUS) 5 Unit(s) SubCutaneous at bedtime  insulin lispro (HumaLOG) corrective regimen sliding scale   SubCutaneous Before meals and at bedtime  levothyroxine 75 MICROGram(s) Oral daily  metoclopramide 5 milliGRAM(s) Oral three times a day  mupirocin 2% Nasal 1 Application(s) Nasal two times a day  Nephro-heather 1 Tablet(s) Oral daily  pantoprazole    Tablet 40 milliGRAM(s) Oral before breakfast  piperacillin/tazobactam IVPB. 3.375 Gram(s) IV Intermittent every 12 hours  saccharomyces boulardii 250 milliGRAM(s) Oral two times a day  simethicone 80 milliGRAM(s) Chew three times a day  simvastatin 20 milliGRAM(s) Oral at bedtime  sodium chloride 0.9% lock flush 3 milliLiter(s) IV Push every 8 hours    MEDICATIONS  (PRN):  acetaminophen   Tablet .. 650 milliGRAM(s) Oral every 6 hours PRN Mild Pain (1 - 3)  ALBUTerol/ipratropium for Nebulization 3 milliLiter(s) Nebulizer every 6 hours PRN Shortness of Breath and/or Wheezing  dextrose 40% Gel 15 Gram(s) Oral once PRN Blood Glucose LESS THAN 70 milliGRAM(s)/deciliter  glucagon  Injectable 1 milliGRAM(s) IntraMuscular once PRN Glucose LESS THAN 70 milligrams/deciliter  ondansetron Injectable 4 milliGRAM(s) IV Push every 6 hours PRN Nausea  oxyCODONE    5 mG/acetaminophen 325 mG 1 Tablet(s) Oral every 6 hours PRN Moderate Pain (4 - 6)      RADIOLOGY & ADDITIONAL TESTS:

## 2019-03-20 NOTE — DISCHARGE NOTE PROVIDER - CARE PROVIDER_API CALL
Jovan Cavazos)  Surgery; Thoracic and Cardiac Surgery  [address] [place] [address]  Phone: [phone]  Fax: [phone]  Follow Up Time:     pcp,   Phone: (   )    -  Fax: (   )    -  Follow Up Time: Jovan Cavazos)  Surgery; Thoracic and Cardiac Surgery  [address] [place] [address]  Phone: [phone]  Fax: [phone]  Follow Up Time:     pcp,   Phone: (   )    -  Fax: (   )    -  Follow Up Time:     Kushal Hernandez)  Cardiovascular Disease; Internal Medicine  [facility], [address] [place] [address]  Phone: [phone]  Fax: [phone]  Follow Up Time:

## 2019-03-20 NOTE — DISCHARGE NOTE PROVIDER - HOSPITAL COURSE
The patient is a 63 year old  female with history of CAD s/p 3VCABG/PPM in 11/18, ESRD on HD via Left UE AVF T/TH/Sat, HTN, DM-2, Gastroparesis came in with Daughter after HD for complaints of difficulty breathing and epigastric pain. Chest xray in the ER consistent with large left pleural effusion status post chest tube placement by CT surgery. Drained 1700cc of serosanguinous fluid; Pleural fluid studies showed exudative effusion and consistent with pneumonia. cytology and blood culture pending. f/u with GI as an outpatient        Assessment/Plan:        Problem 1.     > Left exudative pleural effusion     It was bloody effusion in beginning, concern of hemothorax, s/p chest tube removal. and repeat cxr showed clear lungs    .-Fluid total protein 5.7, Serum total Protein 7.9. Pleural fluid protein/serum protein ratio greater than 0.5. Suspicious for Exudative pleural effusion based on lights criteria    s/p chest tube removal and resolution of infiltrate and effusion    f/u  cytology and blood culture results.        Problem 2)     > Suspected complex pneumonia complicated to pleural effusion    suspected gram negative    -Chest CT shows Left upper and lower lung pneumonia but repeat cxr after chest tube removal showed clear lungs    -Continue zosyn day 4    blood cultures pending    sputum can't be collected as she has no cough        Problems 3    > CAD status post CABG    - Continue carvedilol and simvastatin    Restarted on aspirin 81 mg and Plavix. Patient family mentioned she was on Aspirin 325 mg at home. As per last dc summary aspirin dose decreased to 81 mg.        Problem 4    >Gastroparesis:    - Cont. Reglan, PPI, added sucralfate and simethicone    -Pt has o/p GI appt. with Dr. Payton this Friday        Problem 5)    >ESRD     -Hemodyalisis Tu/Thu/Sat.         Problem 6    > Hypertension    -Continue home medications        Problem 7    - Diabetes mellitus type 2:     ADA diet, Accuchecks AC/HS, HISS            OOB/VCboots, Fall risk,    PT recommended home with home PT

## 2019-03-20 NOTE — DISCHARGE NOTE PROVIDER - NSDCCPCAREPLAN_GEN_ALL_CORE_FT
PRINCIPAL DISCHARGE DIAGNOSIS  Diagnosis: Pleural effusion  Assessment and Plan of Treatment: exudative pleual effusion  s/p chest tube removal.  f/u with pcp as an outpatient      SECONDARY DISCHARGE DIAGNOSES  Diagnosis: Gastroparesis  Assessment and Plan of Treatment: gi follow up as an outpatient  continue  reglan and simethicone    Diagnosis: DM (diabetes mellitus)  Assessment and Plan of Treatment: hbaic 7  pcp follow up as an outpatient

## 2019-03-20 NOTE — DISCHARGE NOTE PROVIDER - CARE PROVIDERS DIRECT ADDRESSES
,[email or web address],DirectAddress_Unknown ,[email or web address],DirectAddress_Unknown,DirectAddress_Unknown Statement Selected

## 2019-03-20 NOTE — DISCHARGE NOTE NURSING/CASE MANAGEMENT/SOCIAL WORK - NSDCDPATPORTLINK_GEN_ALL_CORE
[place] INPATIENT ENCOUNTER   INTERNAL MEDICINE HISTORY AND PHYSICAL    ADMISSION DATE:  5/20/2018  ADMITTING PHYSICIAN:  Jerry Richter MD  ATTENDING PHYSICIAN:  Jc Fry MD  PRIMARY CARE PHYSICIAN:  Dell Perkins MD    CODE STATUS:  Full      CHIEF COMPLAINT/REASON FOR ADMISSION:  Chest pain    HISTORY OF PRESENT ILLNESS:    Jerson Arias is a 64 year old male presenting with chest pain/pressure off-and-on all day yesterday. It was 4-5/10 in intensity, currently 2/10. No association with exertion. He denies any shortness breath associated with it, but did feel anxious, and noted some numbness on the left side of his face starting in the afternoon. No prior episodes. No known history of cardiovascular disease. He has some nausea, but no vomiting.  He woke up about midnight, felt uncomfortable, and presented to the ER for further evaluation. Blood pressure was noted to be elevated at 234/121 on arrival.    MEDICATIONS PRIOR TO ADMISSION:    Prescriptions Prior to Admission   Medication Sig Dispense Refill   • diphenhydrAMINE (BENADRYL) 25 MG capsule Take 25 mg by mouth every 6 hours as needed for Itching.          ALLERGIES:    ALLERGIES:  No Known Allergies    PAST MEDICAL HISTORY:    No past medical history on file.    SURGICAL HISTORY:    No past surgical history on file.    FAMILY HISTORY:    No family history on file.    SOCIAL HISTORY:    Social History   Substance Use Topics   • Smoking status: Former Smoker     Types: Cigars     Quit date: 7/14/2007   • Smokeless tobacco: Not on file   • Alcohol use Yes      Comment: 1 drink per day       REVIEW OF SYSTEMS:    He felt dizzy, but denies headache. No blurry or double vision. He has chronic decreased hearing. Nose or throat or dysphagia. Had some nausea, but no vomiting. patient diarrhea. No dysuria or difficulty with urination. No swelling in his extremities. No numbness or tingling in his extremities. No prior episodes.    PHYSICAL EXAM:     VITAL SIGNS:    Vital Last Value 24 Hour Range   Temperature 97.5 °F (36.4 °C) (05/20/18 0312) Temp  Min: 97.5 °F (36.4 °C)  Max: 97.5 °F (36.4 °C)   Pulse 70 (05/20/18 0441) Pulse  Min: 70  Max: 92   Respiratory 17 (05/20/18 0441) Resp  Min: 14  Max: 27   Non-Invasive  Blood Pressure (!) 160/97 (05/20/18 0441) BP  Min: 150/85  Max: 234/121   Pulse Oximetry 97 % (05/20/18 0441) SpO2  Min: 94 %  Max: 98 %     Vital Today Admitted   Weight 116.1 kg (05/20/18 0312) Weight: 116.1 kg (05/20/18 0312)   Height N/A Height: 6' 3\" (190.5 cm) (05/20/18 0312)   BMI N/A BMI (Calculated): 32.06 (05/20/18 0312)     Physical Exam:  General - alert, in no acute distress, well-nourished, oriented ×3, mood/affect normal  HEENT - PERRL, conjunctiva clear, oral mucosa moist, tympanic membranes clear bilaterally  Neck - supple without masses, no cervical lymphadenopathy or thyromegaly  CV - regular rate and rhythm without murmurs detected, no carotid bruit  Pulm - Clear to auscultation bilaterally without wheezes or crackles, no respiratory distress  Abdomen - soft, non-tender, no masses or hepatosplenomegaly, normal bowel tones, no guarding, no rebound tenderness  Extremeties - warm without edema  Neurological - without focal sensory or motor deficits detected, biceps reflexes intact  Skin - no rashes detected, normal skin tone   Breast exam - No masses detected. No axillary lymphadenopathy.       LABORATORY DATA:      Recent Labs  Lab 05/20/18  0320   SODIUM 139   POTASSIUM 3.7   CHLORIDE 107   CO2 25   BUN 17   CREATININE 0.98   GLUCOSE 105*   ALBUMIN 3.7   AST 23   BILIRUBIN 0.3     WBC (K/mcL)   Date Value   05/20/2018 3.8 (L)     RBC (mil/mcL)   Date Value   05/20/2018 4.70     HCT (%)   Date Value   05/20/2018 42.6     HGB (g/dL)   Date Value   05/20/2018 14.5     PLT (K/mcL)   Date Value   05/20/2018 179       IMAGING STUDIES:    Imaging studies reviewed.  The pertinent positives are   XR CHEST AP OR PA - PORTABLE   Final  Result   IMPRESSION:   No acute cardiopulmonary findings.               EKG INTERPRETATION:    Results pending, no acute changes per report.    ASSESSMENT:    1. Chest pain/pressure  2. Elevated blood pressure  3. Elevated glucose  4. Elevated alkaline phosphatase    PLAN:    1. Chest pain/pressure - We will admit to the medical/surgical unit for further evaluation treatment. Check serial troponin, maintain on telemetry, check stress echocardiogram for further evaluation. Chest pain may been related to elevated blood pressure, but will evaluate for coronary artery disease as well.  Nitroglycerin and daily aspirin ordered.  2. Elevated blood pressure - continue nitroglycerin ointment and p.r.n. hydralazine. Consider adding oral antihypertensive medication if persistently elevated.  3. Elevated glucose - check A1c  4. Elevated alkaline phosphatase - mild elevation, recheck CMP with further evaluation if persistently elevated.    I do not anticipate he'll require a 2 midnight stay. DVT prophylaxis with teds/SCDs/subcutaneous Lovenox. GI prophylaxis not indicated at this time. He denies tobacco use. He requests full CODE STATUS.                   Quality Indicators     AMI With Heart Failure with Reduced LVEF (<40%)? no  Heart failure?  No  Stroke measures indicated? no  AMI? No  DVT/VTE Prophylaxis:  VTE Pharmacologic Prophylaxis: Yes  VTE Mechanical Prophylaxis: Yes  Severe sepsis with septic shock?  No       You can access the [facility] Patient Portal, offered by [facility], by registering with the following website: [email or web address]

## 2019-03-20 NOTE — PROGRESS NOTE ADULT - ASSESSMENT
Assessment and Plan:   The patient is a 63 year old  female with history of CAD s/p 3VCABG/PPM in 11/18, ESRD on HD via Left UE AVF T/TH/Sat, HTN, DM-2, Gastroparesis came in with Daughter after HD for complaints of difficulty breathing and epigastric pain. Chest xray in the ER consistent with large left pleural effusion status post chest tube placement by CT surgery. Drained 1700cc of serosanguinous fluid; Pleural fluid studies showed exudative effusion and consistent with pneumonia. cytology and blood culture pending. Dc planning after blood culture results.    Assessment/Plan:    Problem 1.   > Left exudative pleural effusion secondary to complex pneumonia  -Fluid total protein 5.7, Serum total Protein 7.9. Pleural fluid protein/serum protein ratio greater than 0.5. Suspicious for Exudative pleural effusion based on lights criteria  s/p chest tube removal and resolution of infiltrate and effusion  f/u  cytology and blood culture results    Problem 2)   > complex pneumonia complicated to pleural effusion  -Chest CT shows Left upper and lower lung pneumonia but repeat cxr after chest tube removal showed clear lungs  -Continue zosyn day 4  blood cultures pending  sputum can't be collected as she has no cough    Problems 3  > CAD status post CABG  - Continue carvedilol and simvastatin  restarted on aspirin and plavix    Problem 4  >Chronic epigastric pain  - Cont. Reglan, PPI, added sucralfate and simethicone  -Pt has o/p GI appt. with Dr. Payton this Friday    Problem 5)  >ESRD   -Hemodyalisis Tu/Thu/Sat.     Problem 6  > Hypertension  -Continue home medications    Problem 7  - Diabetes mellitus type 2:   ADA diet, Accuchecks AC/HS, HISS      OOB/VCboots, Fall risk,  PT recommended home with home PT    dispo: planing tomorrow after blood culture results Assessment and Plan:   The patient is a 63 year old  female with history of CAD s/p 3VCABG/PPM in 11/18, ESRD on HD via Left UE AVF T/TH/Sat, HTN, DM-2, Gastroparesis came in with Daughter after HD for complaints of difficulty breathing and epigastric pain. Chest xray in the ER consistent with large left pleural effusion status post chest tube placement by CT surgery. Drained 1700cc of serosanguinous fluid; Pleural fluid studies showed exudative effusion and consistent with pneumonia. cytology and blood culture pending. Dc planning after blood culture results.    Assessment/Plan:    Problem 1.   > Left exudative pleural effusion   It was bloody effusion in beginning, concern of hemothorax, s/p chest tube removal. and repeat cxr showed clear lungs  .-Fluid total protein 5.7, Serum total Protein 7.9. Pleural fluid protein/serum protein ratio greater than 0.5. Suspicious for Exudative pleural effusion based on lights criteria  s/p chest tube removal and resolution of infiltrate and effusion  f/u  cytology and blood culture results.        Problem 2)   > suspected complex pneumonia complicated to pleural effusion  -Chest CT shows Left upper and lower lung pneumonia but repeat cxr after chest tube removal showed clear lungs  -Continue zosyn day 4  blood cultures pending  sputum can't be collected as she has no cough    Problems 3  > CAD status post CABG  - Continue carvedilol and simvastatin  restarted on aspirin and plavix    Problem 4  >Chronic epigastric pain  - Cont. Reglan, PPI, added sucralfate and simethicone  -Pt has o/p GI appt. with Dr. Payton this Friday    Problem 5)  >ESRD   -Hemodyalisis Tu/Thu/Sat.     Problem 6  > Hypertension  -Continue home medications    Problem 7  - Diabetes mellitus type 2:   ADA diet, Accuchecks AC/HS, HISS      OOB/VCboots, Fall risk,  PT recommended home with home PT    dispo: planing tomorrow after blood culture results Assessment and Plan:   The patient is a 63 year old  female with history of CAD s/p 3VCABG/PPM in 11/18, ESRD on HD via Left UE AVF T/TH/Sat, HTN, DM-2, Gastroparesis came in with Daughter after HD for complaints of difficulty breathing and epigastric pain. Chest xray in the ER consistent with large left pleural effusion status post chest tube placement by CT surgery. Drained 1700cc of serosanguinous fluid; Pleural fluid studies showed exudative effusion and consistent with pneumonia. cytology and blood culture pending. Dc planning after blood culture results.    Assessment/Plan:    Problem 1.   > Left exudative pleural effusion   It was bloody effusion in beginning, concern of hemothorax, s/p chest tube removal. and repeat cxr showed clear lungs  .-Fluid total protein 5.7, Serum total Protein 7.9. Pleural fluid protein/serum protein ratio greater than 0.5. Suspicious for Exudative pleural effusion based on lights criteria  f/u  cytology and blood culture results.        Problem 2)   > suspected complex pneumonia complicated to pleural effusion  -Chest CT shows Left upper and lower lung pneumonia but repeat cxr after chest tube removal showed clear lungs  -Continue zosyn day 4  blood cultures pending  sputum can't be collected as she has no cough    Problems 3  > CAD status post CABG  - Continue carvedilol and simvastatin  restarted on aspirin 81 mg and plavix after discussion with ct surgery.    Problem 4  >Chronic epigastric pain  - Cont. Reglan, PPI, added sucralfate and simethicone  -Pt has o/p GI appt. with Dr. Payton this Friday    Problem 5)  >ESRD   -Hemodyalisis Tu/Thu/Sat.     Problem 6  > Hypertension  -Continue home medications    Problem 7  - Diabetes mellitus type 2:   ADA diet, Accuchecks AC/HS, HISS      OOB/VCboots, Fall risk,  PT recommended home with home PT    dispo: planing tomorrow after blood culture results

## 2019-03-20 NOTE — DISCHARGE NOTE PROVIDER - PROVIDER TOKENS
PROVIDER:[TOKEN:[[id number]]],FREE:[LAST:[pcp],PHONE:[(   )    -],FAX:[(   )    -]] PROVIDER:[TOKEN:[[id number]]],FREE:[LAST:[pcp],PHONE:[(   )    -],FAX:[(   )    -]],PROVIDER:[TOKEN:[[id number]]]

## 2019-03-21 LAB
CULTURE RESULTS: SIGNIFICANT CHANGE UP
NON-GYNECOLOGICAL CYTOLOGY STUDY: SIGNIFICANT CHANGE UP
SPECIMEN SOURCE: SIGNIFICANT CHANGE UP

## 2019-03-23 LAB
CULTURE RESULTS: SIGNIFICANT CHANGE UP
CULTURE RESULTS: SIGNIFICANT CHANGE UP
SPECIMEN SOURCE: SIGNIFICANT CHANGE UP
SPECIMEN SOURCE: SIGNIFICANT CHANGE UP

## 2019-04-08 LAB
CULTURE RESULTS: SIGNIFICANT CHANGE UP
SPECIMEN SOURCE: SIGNIFICANT CHANGE UP

## 2019-05-08 LAB
CULTURE RESULTS: SIGNIFICANT CHANGE UP
SPECIMEN SOURCE: SIGNIFICANT CHANGE UP

## 2019-10-04 ENCOUNTER — INPATIENT (INPATIENT)
Facility: HOSPITAL | Age: 64
LOS: 18 days | Discharge: HOME | DRG: 73 | End: 2019-10-23
Attending: HOSPITALIST | Admitting: STUDENT IN AN ORGANIZED HEALTH CARE EDUCATION/TRAINING PROGRAM
Payer: COMMERCIAL

## 2019-10-04 VITALS
WEIGHT: 220.9 LBS | HEIGHT: 63 IN | HEART RATE: 72 BPM | RESPIRATION RATE: 18 BRPM | TEMPERATURE: 98 F | SYSTOLIC BLOOD PRESSURE: 205 MMHG | OXYGEN SATURATION: 98 % | DIASTOLIC BLOOD PRESSURE: 81 MMHG

## 2019-10-04 DIAGNOSIS — Z90.49 ACQUIRED ABSENCE OF OTHER SPECIFIED PARTS OF DIGESTIVE TRACT: Chronic | ICD-10-CM

## 2019-10-04 DIAGNOSIS — I77.0 ARTERIOVENOUS FISTULA, ACQUIRED: Chronic | ICD-10-CM

## 2019-10-04 DIAGNOSIS — I16.0 HYPERTENSIVE URGENCY: ICD-10-CM

## 2019-10-04 LAB
ALBUMIN SERPL ELPH-MCNC: 4.4 G/DL — SIGNIFICANT CHANGE UP (ref 3.3–5.2)
ALP SERPL-CCNC: 162 U/L — HIGH (ref 40–120)
ALT FLD-CCNC: 16 U/L — SIGNIFICANT CHANGE UP
ANION GAP SERPL CALC-SCNC: 22 MMOL/L — HIGH (ref 5–17)
AST SERPL-CCNC: 28 U/L — SIGNIFICANT CHANGE UP
BASOPHILS # BLD AUTO: 0.09 K/UL — SIGNIFICANT CHANGE UP (ref 0–0.2)
BASOPHILS NFR BLD AUTO: 1 % — SIGNIFICANT CHANGE UP (ref 0–2)
BILIRUB SERPL-MCNC: 0.3 MG/DL — LOW (ref 0.4–2)
BUN SERPL-MCNC: 39 MG/DL — HIGH (ref 8–20)
CALCIUM SERPL-MCNC: 9.7 MG/DL — SIGNIFICANT CHANGE UP (ref 8.6–10.2)
CHLORIDE SERPL-SCNC: 92 MMOL/L — LOW (ref 98–107)
CO2 SERPL-SCNC: 22 MMOL/L — SIGNIFICANT CHANGE UP (ref 22–29)
CREAT SERPL-MCNC: 5.73 MG/DL — HIGH (ref 0.5–1.3)
EOSINOPHIL # BLD AUTO: 0.29 K/UL — SIGNIFICANT CHANGE UP (ref 0–0.5)
EOSINOPHIL NFR BLD AUTO: 3.4 % — SIGNIFICANT CHANGE UP (ref 0–6)
GLUCOSE BLDC GLUCOMTR-MCNC: 117 MG/DL — HIGH (ref 70–99)
GLUCOSE BLDC GLUCOMTR-MCNC: 194 MG/DL — HIGH (ref 70–99)
GLUCOSE BLDC GLUCOMTR-MCNC: 231 MG/DL — HIGH (ref 70–99)
GLUCOSE SERPL-MCNC: 221 MG/DL — HIGH (ref 70–115)
HCT VFR BLD CALC: 42.3 % — SIGNIFICANT CHANGE UP (ref 34.5–45)
HGB BLD-MCNC: 12.6 G/DL — SIGNIFICANT CHANGE UP (ref 11.5–15.5)
IMM GRANULOCYTES NFR BLD AUTO: 0.5 % — SIGNIFICANT CHANGE UP (ref 0–1.5)
LYMPHOCYTES # BLD AUTO: 1.15 K/UL — SIGNIFICANT CHANGE UP (ref 1–3.3)
LYMPHOCYTES # BLD AUTO: 13.3 % — SIGNIFICANT CHANGE UP (ref 13–44)
MCHC RBC-ENTMCNC: 28.5 PG — SIGNIFICANT CHANGE UP (ref 27–34)
MCHC RBC-ENTMCNC: 29.8 GM/DL — LOW (ref 32–36)
MCV RBC AUTO: 95.7 FL — SIGNIFICANT CHANGE UP (ref 80–100)
MONOCYTES # BLD AUTO: 0.67 K/UL — SIGNIFICANT CHANGE UP (ref 0–0.9)
MONOCYTES NFR BLD AUTO: 7.8 % — SIGNIFICANT CHANGE UP (ref 2–14)
NEUTROPHILS # BLD AUTO: 6.38 K/UL — SIGNIFICANT CHANGE UP (ref 1.8–7.4)
NEUTROPHILS NFR BLD AUTO: 74 % — SIGNIFICANT CHANGE UP (ref 43–77)
PLATELET # BLD AUTO: 280 K/UL — SIGNIFICANT CHANGE UP (ref 150–400)
POTASSIUM SERPL-MCNC: 5.6 MMOL/L — HIGH (ref 3.5–5.3)
POTASSIUM SERPL-SCNC: 5.6 MMOL/L — HIGH (ref 3.5–5.3)
PROT SERPL-MCNC: 8 G/DL — SIGNIFICANT CHANGE UP (ref 6.6–8.7)
RBC # BLD: 4.42 M/UL — SIGNIFICANT CHANGE UP (ref 3.8–5.2)
RBC # FLD: 14 % — SIGNIFICANT CHANGE UP (ref 10.3–14.5)
SODIUM SERPL-SCNC: 136 MMOL/L — SIGNIFICANT CHANGE UP (ref 135–145)
WBC # BLD: 8.62 K/UL — SIGNIFICANT CHANGE UP (ref 3.8–10.5)
WBC # FLD AUTO: 8.62 K/UL — SIGNIFICANT CHANGE UP (ref 3.8–10.5)

## 2019-10-04 PROCEDURE — 99223 1ST HOSP IP/OBS HIGH 75: CPT | Mod: GC,25

## 2019-10-04 PROCEDURE — 70450 CT HEAD/BRAIN W/O DYE: CPT | Mod: 26

## 2019-10-04 PROCEDURE — 99285 EMERGENCY DEPT VISIT HI MDM: CPT

## 2019-10-04 PROCEDURE — 99223 1ST HOSP IP/OBS HIGH 75: CPT

## 2019-10-04 PROCEDURE — 90937 HEMODIALYSIS REPEATED EVAL: CPT

## 2019-10-04 PROCEDURE — 72125 CT NECK SPINE W/O DYE: CPT | Mod: 26

## 2019-10-04 RX ORDER — INSULIN LISPRO 100/ML
VIAL (ML) SUBCUTANEOUS AT BEDTIME
Refills: 0 | Status: DISCONTINUED | OUTPATIENT
Start: 2019-10-04 | End: 2019-10-23

## 2019-10-04 RX ORDER — CALCIUM ACETATE 667 MG
1 TABLET ORAL
Qty: 0 | Refills: 0 | DISCHARGE

## 2019-10-04 RX ORDER — SIMVASTATIN 20 MG/1
20 TABLET, FILM COATED ORAL AT BEDTIME
Refills: 0 | Status: DISCONTINUED | OUTPATIENT
Start: 2019-10-04 | End: 2019-10-23

## 2019-10-04 RX ORDER — INSULIN LISPRO 100/ML
VIAL (ML) SUBCUTANEOUS
Refills: 0 | Status: DISCONTINUED | OUTPATIENT
Start: 2019-10-04 | End: 2019-10-23

## 2019-10-04 RX ORDER — INFLUENZA VIRUS VACCINE 15; 15; 15; 15 UG/.5ML; UG/.5ML; UG/.5ML; UG/.5ML
0.5 SUSPENSION INTRAMUSCULAR ONCE
Refills: 0 | Status: DISCONTINUED | OUTPATIENT
Start: 2019-10-04 | End: 2019-10-23

## 2019-10-04 RX ORDER — METOCLOPRAMIDE HCL 10 MG
5 TABLET ORAL THREE TIMES A DAY
Refills: 0 | Status: DISCONTINUED | OUTPATIENT
Start: 2019-10-04 | End: 2019-10-10

## 2019-10-04 RX ORDER — ASPIRIN/CALCIUM CARB/MAGNESIUM 324 MG
81 TABLET ORAL DAILY
Refills: 0 | Status: DISCONTINUED | OUTPATIENT
Start: 2019-10-04 | End: 2019-10-09

## 2019-10-04 RX ORDER — CALCIUM ACETATE 667 MG
667 TABLET ORAL
Refills: 0 | Status: DISCONTINUED | OUTPATIENT
Start: 2019-10-04 | End: 2019-10-05

## 2019-10-04 RX ORDER — CLOPIDOGREL BISULFATE 75 MG/1
75 TABLET, FILM COATED ORAL DAILY
Refills: 0 | Status: DISCONTINUED | OUTPATIENT
Start: 2019-10-04 | End: 2019-10-09

## 2019-10-04 RX ORDER — CARVEDILOL PHOSPHATE 80 MG/1
12.5 CAPSULE, EXTENDED RELEASE ORAL EVERY 12 HOURS
Refills: 0 | Status: DISCONTINUED | OUTPATIENT
Start: 2019-10-04 | End: 2019-10-18

## 2019-10-04 RX ORDER — ASPIRIN/CALCIUM CARB/MAGNESIUM 324 MG
1 TABLET ORAL
Qty: 0 | Refills: 0 | DISCHARGE

## 2019-10-04 RX ORDER — GABAPENTIN 400 MG/1
100 CAPSULE ORAL THREE TIMES A DAY
Refills: 0 | Status: DISCONTINUED | OUTPATIENT
Start: 2019-10-04 | End: 2019-10-05

## 2019-10-04 RX ORDER — ACETAMINOPHEN 500 MG
975 TABLET ORAL ONCE
Refills: 0 | Status: COMPLETED | OUTPATIENT
Start: 2019-10-04 | End: 2019-10-04

## 2019-10-04 RX ORDER — PANTOPRAZOLE SODIUM 20 MG/1
40 TABLET, DELAYED RELEASE ORAL
Refills: 0 | Status: DISCONTINUED | OUTPATIENT
Start: 2019-10-04 | End: 2019-10-23

## 2019-10-04 RX ORDER — DEXTROSE 50 % IN WATER 50 %
25 SYRINGE (ML) INTRAVENOUS ONCE
Refills: 0 | Status: DISCONTINUED | OUTPATIENT
Start: 2019-10-04 | End: 2019-10-23

## 2019-10-04 RX ORDER — LEVOTHYROXINE SODIUM 125 MCG
75 TABLET ORAL DAILY
Refills: 0 | Status: DISCONTINUED | OUTPATIENT
Start: 2019-10-04 | End: 2019-10-23

## 2019-10-04 RX ORDER — CYCLOBENZAPRINE HYDROCHLORIDE 10 MG/1
5 TABLET, FILM COATED ORAL THREE TIMES A DAY
Refills: 0 | Status: DISCONTINUED | OUTPATIENT
Start: 2019-10-04 | End: 2019-10-08

## 2019-10-04 RX ORDER — AMLODIPINE BESYLATE 2.5 MG/1
5 TABLET ORAL DAILY
Refills: 0 | Status: DISCONTINUED | OUTPATIENT
Start: 2019-10-04 | End: 2019-10-05

## 2019-10-04 RX ORDER — HYDRALAZINE HCL 50 MG
100 TABLET ORAL EVERY 8 HOURS
Refills: 0 | Status: DISCONTINUED | OUTPATIENT
Start: 2019-10-04 | End: 2019-10-05

## 2019-10-04 RX ORDER — GLUCAGON INJECTION, SOLUTION 0.5 MG/.1ML
1 INJECTION, SOLUTION SUBCUTANEOUS ONCE
Refills: 0 | Status: DISCONTINUED | OUTPATIENT
Start: 2019-10-04 | End: 2019-10-23

## 2019-10-04 RX ORDER — ACETAMINOPHEN 500 MG
975 TABLET ORAL EVERY 6 HOURS
Refills: 0 | Status: DISCONTINUED | OUTPATIENT
Start: 2019-10-04 | End: 2019-10-18

## 2019-10-04 RX ORDER — OXYCODONE HYDROCHLORIDE 5 MG/1
10 TABLET ORAL EVERY 4 HOURS
Refills: 0 | Status: DISCONTINUED | OUTPATIENT
Start: 2019-10-04 | End: 2019-10-07

## 2019-10-04 RX ORDER — DEXTROSE 50 % IN WATER 50 %
15 SYRINGE (ML) INTRAVENOUS ONCE
Refills: 0 | Status: DISCONTINUED | OUTPATIENT
Start: 2019-10-04 | End: 2019-10-23

## 2019-10-04 RX ORDER — SODIUM CHLORIDE 9 MG/ML
1000 INJECTION, SOLUTION INTRAVENOUS
Refills: 0 | Status: DISCONTINUED | OUTPATIENT
Start: 2019-10-04 | End: 2019-10-23

## 2019-10-04 RX ORDER — LABETALOL HCL 100 MG
10 TABLET ORAL EVERY 4 HOURS
Refills: 0 | Status: DISCONTINUED | OUTPATIENT
Start: 2019-10-04 | End: 2019-10-23

## 2019-10-04 RX ORDER — SODIUM CHLORIDE 9 MG/ML
1000 INJECTION INTRAMUSCULAR; INTRAVENOUS; SUBCUTANEOUS ONCE
Refills: 0 | Status: COMPLETED | OUTPATIENT
Start: 2019-10-04 | End: 2019-10-04

## 2019-10-04 RX ORDER — ACETAMINOPHEN 500 MG
650 TABLET ORAL EVERY 4 HOURS
Refills: 0 | Status: DISCONTINUED | OUTPATIENT
Start: 2019-10-04 | End: 2019-10-18

## 2019-10-04 RX ORDER — DEXTROSE 50 % IN WATER 50 %
12.5 SYRINGE (ML) INTRAVENOUS ONCE
Refills: 0 | Status: DISCONTINUED | OUTPATIENT
Start: 2019-10-04 | End: 2019-10-23

## 2019-10-04 RX ORDER — METOCLOPRAMIDE HCL 10 MG
10 TABLET ORAL ONCE
Refills: 0 | Status: COMPLETED | OUTPATIENT
Start: 2019-10-04 | End: 2019-10-04

## 2019-10-04 RX ORDER — FOLIC ACID 0.8 MG
1 TABLET ORAL DAILY
Refills: 0 | Status: DISCONTINUED | OUTPATIENT
Start: 2019-10-04 | End: 2019-10-23

## 2019-10-04 RX ADMIN — AMLODIPINE BESYLATE 5 MILLIGRAM(S): 2.5 TABLET ORAL at 12:36

## 2019-10-04 RX ADMIN — Medication 100 MILLIGRAM(S): at 13:32

## 2019-10-04 RX ADMIN — SODIUM CHLORIDE 1000 MILLILITER(S): 9 INJECTION INTRAMUSCULAR; INTRAVENOUS; SUBCUTANEOUS at 05:16

## 2019-10-04 RX ADMIN — GABAPENTIN 100 MILLIGRAM(S): 400 CAPSULE ORAL at 22:16

## 2019-10-04 RX ADMIN — Medication 975 MILLIGRAM(S): at 09:32

## 2019-10-04 RX ADMIN — Medication 667 MILLIGRAM(S): at 12:35

## 2019-10-04 RX ADMIN — Medication 5 MILLIGRAM(S): at 13:31

## 2019-10-04 RX ADMIN — Medication 100 MILLIGRAM(S): at 22:16

## 2019-10-04 RX ADMIN — Medication 1: at 16:30

## 2019-10-04 RX ADMIN — GABAPENTIN 100 MILLIGRAM(S): 400 CAPSULE ORAL at 13:31

## 2019-10-04 RX ADMIN — SODIUM CHLORIDE 1000 MILLILITER(S): 9 INJECTION INTRAMUSCULAR; INTRAVENOUS; SUBCUTANEOUS at 06:27

## 2019-10-04 RX ADMIN — CYCLOBENZAPRINE HYDROCHLORIDE 5 MILLIGRAM(S): 10 TABLET, FILM COATED ORAL at 13:30

## 2019-10-04 RX ADMIN — SIMVASTATIN 20 MILLIGRAM(S): 20 TABLET, FILM COATED ORAL at 22:16

## 2019-10-04 RX ADMIN — CARVEDILOL PHOSPHATE 12.5 MILLIGRAM(S): 80 CAPSULE, EXTENDED RELEASE ORAL at 20:34

## 2019-10-04 RX ADMIN — Medication 1 TABLET(S): at 12:34

## 2019-10-04 RX ADMIN — OXYCODONE HYDROCHLORIDE 10 MILLIGRAM(S): 5 TABLET ORAL at 22:16

## 2019-10-04 RX ADMIN — Medication 975 MILLIGRAM(S): at 06:35

## 2019-10-04 RX ADMIN — Medication 10 MILLIGRAM(S): at 05:16

## 2019-10-04 RX ADMIN — Medication 81 MILLIGRAM(S): at 13:29

## 2019-10-04 RX ADMIN — OXYCODONE HYDROCHLORIDE 10 MILLIGRAM(S): 5 TABLET ORAL at 23:16

## 2019-10-04 NOTE — ED PROVIDER NOTE - PROGRESS NOTE DETAILS
Catrachito THORPE: priority CT called Catrachito THORPE: headache originally improved, now has returned. Will order tylenol. Pt to be admitted for hypertensive urgency.

## 2019-10-04 NOTE — ED PROVIDER NOTE - PHYSICAL EXAMINATION
Gen: Ill apppearing overweight female in moderate distress 2/2 pain  Head: NC/AT, EOMI  Neck: trachea midline, FROM  Resp:  No distress,CTAB  CV: RRR  GI: soft, NTND  Ext: no deformities, FROM  Neuro:  CN 2-12 intact, No tremors. No fasciculations. No nystagmus. No dysmetria.  Normal gait. Normal sensation/motor  Skin:  Warm and dry as visualized  Psych:  Normal affect and mood

## 2019-10-04 NOTE — H&P ADULT - ASSESSMENT
63 year old female with PMH HTN, DMT2, CAD s/p CABG, PPM, ESRD on HD T,T,S presents with left sided headache and uncontrolled BP SBP>200.    Hypertensive urgency - BP improved after administration of home BP medications, however continued to be elevated and have headache  - Continue home medications  - Nephrology for HD, fluid removal  - Analgesics, muscle relaxants    Headache. CTH unremarkable.  - Tylenol  - Flexeril  - Gabapentin  - CT neck to evaluate C- spine    ESRD  - Continue HD  - Continue Phoslo    DMT2  - BGM with SSI  - A1c    Hypothyroidism  - Continue Synthroid    CAD  - Continue DAPT, Statin, BB    Prophylactic measure  - IPC for VTEp    Advance Directives - discussed briefly with patient, daughter, Full Code for now    Disposition - pending clinical improvement. Anticipate home in 48 hours

## 2019-10-04 NOTE — H&P ADULT - NSICDXPASTMEDICALHX_GEN_ALL_CORE_FT
PAST MEDICAL HISTORY:  3-vessel CAD s/p CABG    Diabetes mellitus     Hemodialysis patient tues thursday saturday    Hypertension     Hypothyroidism, unspecified type     Renal failure

## 2019-10-04 NOTE — ED ADULT TRIAGE NOTE - CHIEF COMPLAINT QUOTE
AS per daughter patient c/o headache since monday with nausea. Pt denies blurry vision. C/o 10/10 headache. Dialysis patient t/th/f. Left fistula. AS per daughter patient c/o headache since monday with nausea. Pt denies blurry vision. C/o 10/10 headache. Dialysis patient t/th/f. Left fistula. Pt taken to Critical care, with critical care RN at bedside.

## 2019-10-04 NOTE — ED PROVIDER NOTE - OBJECTIVE STATEMENT
Offered  services, patient prefers family to translate.   63F h/o CAD s/p CABG, ESRD on HD T/Th/Sa, HTN p/w intermittent occipital headache x 4-5 days, worsened over the last 2-3 hours. Also endorses tingling to bilateral lower extremities tonight. Did not yet take her morning BP meds. Last HD yesterday. +nausea. Denies vomiting, photophobia, fever, diarrhea.

## 2019-10-04 NOTE — ED PROVIDER NOTE - PMH
3-vessel CAD    Diabetes mellitus    Hemodialysis patient  tues thursday saturday  Hypertension    Hypothyroidism, unspecified type    Renal failure

## 2019-10-04 NOTE — H&P ADULT - HISTORY OF PRESENT ILLNESS
63 year old female with PMH HTN, DMT2, CAD s/p CABG, ESRD on HD T,T,S presents with left sided headache since 4-5 days not relieved with Tylenol so came to ER.  Described headache as sharp radiating from occiput to left temporal region and relieved somewhat with Tylenol. Has nausea, but denies vomiting, visual changes, dizziness, fever or chills. Denies any dyspnea, chest pain or palpitations. Occasional numbness in feet.  Noted to have elevated /81

## 2019-10-04 NOTE — CONSULT NOTE ADULT - ASSESSMENT
1) ESRD on HD  2) MBD of renal dx  3) Anemia of renal dx  4) Vol HTN    Plan for HD today again; shorter treatment; hyperkalemic and hypertensive  Will plan on 1kg-1.5kg UF  HD again in AM per schedule  Start phos binder  Hold epogen    d/w Dr Cavazos

## 2019-10-04 NOTE — CONSULT NOTE ADULT - SUBJECTIVE AND OBJECTIVE BOX
[facility] DIVISION OF KIDNEY DISEASES AND HYPERTENSION -- INITIAL CONSULT NOTE  --------------------------------------------------------------------------------  HPI:  63F h/o CAD s/p CABG, ESRD on HD T/Th/Sa, HTN p/w intermittent occipital headache x 4-5 days, worsened over the last 2-3 hours. Also endorses tingling to bilateral lower extremities tonight. Did not yet take her morning BP meds. Last HD yesterday. +nausea. Denies vomiting, photophobia, fever, diarrhea.  Nephrology consulted for dialysis services; was dialyzed yesterday in [place]; still hyperkalemic and hypertensive despite antihypertensives.       PAST HISTORY  --------------------------------------------------------------------------------  PAST MEDICAL & SURGICAL HISTORY:  3-vessel CAD  Hypothyroidism, unspecified type  Hemodialysis patient: tues thursday saturday  Renal failure  Hypertension  Diabetes mellitus  A-V fistula  S/P cholecystectomy    FAMILY HISTORY:  Family history of diabetes mellitus    PAST SOCIAL HISTORY:    ALLERGIES & MEDICATIONS  --------------------------------------------------------------------------------  Allergies    No Known Allergies    Intolerances      Standing Inpatient Medications  amLODIPine   Tablet 5 milliGRAM(s) Oral daily  aspirin  chewable 81 milliGRAM(s) Oral daily  calcium acetate 667 milliGRAM(s) Oral three times a day with meals  carvedilol 12.5 milliGRAM(s) Oral every 12 hours  clopidogrel Tablet 75 milliGRAM(s) Oral daily  dextrose 5%. 1000 milliLiter(s) IV Continuous <Continuous>  dextrose 50% Injectable 12.5 Gram(s) IV Push once  dextrose 50% Injectable 25 Gram(s) IV Push once  dextrose 50% Injectable 25 Gram(s) IV Push once  folic acid 1 milliGRAM(s) Oral daily  gabapentin 100 milliGRAM(s) Oral three times a day  hydrALAZINE 100 milliGRAM(s) Oral every 8 hours  insulin lispro (HumaLOG) corrective regimen sliding scale   SubCutaneous three times a day before meals  insulin lispro (HumaLOG) corrective regimen sliding scale   SubCutaneous at bedtime  levothyroxine 75 MICROGram(s) Oral daily  Nephro-heather 1 Tablet(s) Oral daily  pantoprazole    Tablet 40 milliGRAM(s) Oral before breakfast  simvastatin 20 milliGRAM(s) Oral at bedtime    PRN Inpatient Medications  cyclobenzaprine 5 milliGRAM(s) Oral three times a day PRN  dextrose 40% Gel 15 Gram(s) Oral once PRN  glucagon  Injectable 1 milliGRAM(s) IntraMuscular once PRN  metoclopramide 5 milliGRAM(s) Oral three times a day PRN      REVIEW OF SYSTEMS  --------------------------------------------------------------------------------  Gen: No weight changes, fatigue, fevers/chills, weakness  Skin: No rashes  Head/Eyes/Ears/Mouth: No headache; Normal hearing; Normal vision w/o blurriness; No sinus pain/discomfort, sore throat  Respiratory: No dyspnea, cough, wheezing, hemoptysis  CV: No chest pain, PND, orthopnea  GI: No abdominal pain, diarrhea, constipation, nausea, vomiting, melena, hematochezia  : No increased frequency, dysuria, hematuria, nocturia  MSK: No joint pain/swelling; no back pain; no edema  Neuro: No dizziness/lightheadedness, weakness, seizures, numbness, tingling  Heme: No easy bruising or bleeding  Endo: No heat/cold intolerance  Psych: No significant nervousness, anxiety, stress, depression    All other systems were reviewed and are negative, except as noted.    VITALS/PHYSICAL EXAM  --------------------------------------------------------------------------------  T(C): 36.2 (10-04-19 @ 11:40), Max: 36.7 (10-04-19 @ 04:29)  HR: 70 (10-04-19 @ 11:40) (69 - 72)  BP: 199/75 (10-04-19 @ 11:40) (172/78 - 205/81)  RR: 18 (10-04-19 @ 11:40) (16 - 18)  SpO2: 97% (10-04-19 @ 11:40) (95% - 98%)  Wt(kg): --  Height (cm): 160.02 (10-04-19 @ 04:29)  Weight (kg): 100.2 (10-04-19 @ 04:29)  BMI (kg/m2): 39.1 (10-04-19 @ 04:29)  BSA (m2): 2.02 (10-04-19 @ 04:29)      Physical Exam:  	Gen: NAD   	HEENT: PERRL, supple neck, clear oropharynx  	Pulm: CTA B/L  	CV: RRR, S1S2; no rub  	Back: No spinal or CVA tenderness; no sacral edema  	Abd: +BS, soft, nontender/nondistended  	: No suprapubic tenderness  	UE: Warm, FROM, no clubbing, intact strength; no edema; no asterixis  	LE: Warm, FROM, no clubbing, intact strength; no edema  	Neuro: No focal deficits, intact gait  	Psych: Normal affect and mood  	Skin: Warm, without rashes  	Vascular access:    LABS/STUDIES  --------------------------------------------------------------------------------              12.6   8.62  >-----------<  280      [10-04-19 @ 05:24]              42.3     136  |  92  |  39.0  ----------------------------<  221      [10-04-19 @ 05:24]  5.6   |  22.0  |  5.73        Ca     9.7     [10-04-19 @ 05:24]    TPro  8.0  /  Alb  4.4  /  TBili  0.3  /  DBili  x   /  AST  28  /  ALT  16  /  AlkPhos  162  [10-04-19 @ 05:24]          Creatinine Trend:  SCr 5.73 [10-04 @ 05:24]        PTH -- (Ca 9.8)      [11-13-18 @ 16:19]   551  Vitamin D (25OH) 25.0      [11-13-18 @ 16:38]  HbA1c 7.1      [03-17-19 @ 08:03]  TSH 2.27      [11-07-18 @ 18:50]    HBsAb Nonreact      [12-12-15 @ 18:07]  HBsAg Nonreact      [12-12-15 @ 18:07]  HBcAb Nonreact      [12-12-15 @ 18:07]  HCV 0.09, Nonreact      [03-19-19 @ 19:06]

## 2019-10-05 LAB
ALBUMIN SERPL ELPH-MCNC: 4.1 G/DL — SIGNIFICANT CHANGE UP (ref 3.3–5.2)
ANION GAP SERPL CALC-SCNC: 15 MMOL/L — SIGNIFICANT CHANGE UP (ref 5–17)
BUN SERPL-MCNC: 38 MG/DL — HIGH (ref 8–20)
CALCIUM SERPL-MCNC: 9.7 MG/DL — SIGNIFICANT CHANGE UP (ref 8.6–10.2)
CHLORIDE SERPL-SCNC: 92 MMOL/L — LOW (ref 98–107)
CO2 SERPL-SCNC: 25 MMOL/L — SIGNIFICANT CHANGE UP (ref 22–29)
CREAT SERPL-MCNC: 5.79 MG/DL — HIGH (ref 0.5–1.3)
CRP SERPL-MCNC: <0.4 MG/DL — SIGNIFICANT CHANGE UP (ref 0–0.4)
ERYTHROCYTE [SEDIMENTATION RATE] IN BLOOD: 28 MM/HR — HIGH (ref 0–20)
GLUCOSE BLDC GLUCOMTR-MCNC: 116 MG/DL — HIGH (ref 70–99)
GLUCOSE BLDC GLUCOMTR-MCNC: 127 MG/DL — HIGH (ref 70–99)
GLUCOSE BLDC GLUCOMTR-MCNC: 163 MG/DL — HIGH (ref 70–99)
GLUCOSE BLDC GLUCOMTR-MCNC: 220 MG/DL — HIGH (ref 70–99)
GLUCOSE SERPL-MCNC: 237 MG/DL — HIGH (ref 70–115)
HBA1C BLD-MCNC: 6.5 % — HIGH (ref 4–5.6)
PHOSPHATE SERPL-MCNC: 5.9 MG/DL — HIGH (ref 2.4–4.7)
POTASSIUM SERPL-MCNC: 5.4 MMOL/L — HIGH (ref 3.5–5.3)
POTASSIUM SERPL-SCNC: 5.4 MMOL/L — HIGH (ref 3.5–5.3)
SODIUM SERPL-SCNC: 132 MMOL/L — LOW (ref 135–145)
TSH SERPL-MCNC: 0.91 UIU/ML — SIGNIFICANT CHANGE UP (ref 0.27–4.2)

## 2019-10-05 PROCEDURE — 99223 1ST HOSP IP/OBS HIGH 75: CPT

## 2019-10-05 PROCEDURE — 90937 HEMODIALYSIS REPEATED EVAL: CPT

## 2019-10-05 PROCEDURE — 99233 SBSQ HOSP IP/OBS HIGH 50: CPT

## 2019-10-05 RX ORDER — HYDRALAZINE HCL 50 MG
10 TABLET ORAL EVERY 6 HOURS
Refills: 0 | Status: DISCONTINUED | OUTPATIENT
Start: 2019-10-05 | End: 2019-10-23

## 2019-10-05 RX ORDER — GABAPENTIN 400 MG/1
200 CAPSULE ORAL AT BEDTIME
Refills: 0 | Status: DISCONTINUED | OUTPATIENT
Start: 2019-10-05 | End: 2019-10-09

## 2019-10-05 RX ORDER — HYDRALAZINE HCL 50 MG
100 TABLET ORAL EVERY 12 HOURS
Refills: 0 | Status: DISCONTINUED | OUTPATIENT
Start: 2019-10-05 | End: 2019-10-23

## 2019-10-05 RX ORDER — SEVELAMER CARBONATE 2400 MG/1
1600 POWDER, FOR SUSPENSION ORAL THREE TIMES A DAY
Refills: 0 | Status: DISCONTINUED | OUTPATIENT
Start: 2019-10-05 | End: 2019-10-23

## 2019-10-05 RX ORDER — GABAPENTIN 400 MG/1
150 CAPSULE ORAL AT BEDTIME
Refills: 0 | Status: DISCONTINUED | OUTPATIENT
Start: 2019-10-05 | End: 2019-10-05

## 2019-10-05 RX ORDER — INSULIN LISPRO 100/ML
3 VIAL (ML) SUBCUTANEOUS
Refills: 0 | Status: DISCONTINUED | OUTPATIENT
Start: 2019-10-05 | End: 2019-10-20

## 2019-10-05 RX ADMIN — Medication 100 MILLIGRAM(S): at 05:35

## 2019-10-05 RX ADMIN — Medication 10 MILLIGRAM(S): at 22:18

## 2019-10-05 RX ADMIN — Medication 3 UNIT(S): at 17:02

## 2019-10-05 RX ADMIN — AMLODIPINE BESYLATE 5 MILLIGRAM(S): 2.5 TABLET ORAL at 05:35

## 2019-10-05 RX ADMIN — Medication 1 MILLIGRAM(S): at 11:59

## 2019-10-05 RX ADMIN — CYCLOBENZAPRINE HYDROCHLORIDE 5 MILLIGRAM(S): 10 TABLET, FILM COATED ORAL at 22:19

## 2019-10-05 RX ADMIN — GABAPENTIN 200 MILLIGRAM(S): 400 CAPSULE ORAL at 22:19

## 2019-10-05 RX ADMIN — CLOPIDOGREL BISULFATE 75 MILLIGRAM(S): 75 TABLET, FILM COATED ORAL at 11:58

## 2019-10-05 RX ADMIN — Medication 975 MILLIGRAM(S): at 14:54

## 2019-10-05 RX ADMIN — Medication 2: at 11:45

## 2019-10-05 RX ADMIN — OXYCODONE HYDROCHLORIDE 10 MILLIGRAM(S): 5 TABLET ORAL at 22:49

## 2019-10-05 RX ADMIN — Medication 1 TABLET(S): at 11:59

## 2019-10-05 RX ADMIN — OXYCODONE HYDROCHLORIDE 10 MILLIGRAM(S): 5 TABLET ORAL at 05:35

## 2019-10-05 RX ADMIN — Medication 10 MILLIGRAM(S): at 08:26

## 2019-10-05 RX ADMIN — Medication 0.1 MILLIGRAM(S): at 17:02

## 2019-10-05 RX ADMIN — SEVELAMER CARBONATE 1600 MILLIGRAM(S): 2400 POWDER, FOR SUSPENSION ORAL at 22:18

## 2019-10-05 RX ADMIN — PANTOPRAZOLE SODIUM 40 MILLIGRAM(S): 20 TABLET, DELAYED RELEASE ORAL at 05:37

## 2019-10-05 RX ADMIN — GABAPENTIN 100 MILLIGRAM(S): 400 CAPSULE ORAL at 05:35

## 2019-10-05 RX ADMIN — Medication 0.1 MILLIGRAM(S): at 22:19

## 2019-10-05 RX ADMIN — SIMVASTATIN 20 MILLIGRAM(S): 20 TABLET, FILM COATED ORAL at 22:18

## 2019-10-05 RX ADMIN — SEVELAMER CARBONATE 1600 MILLIGRAM(S): 2400 POWDER, FOR SUSPENSION ORAL at 12:46

## 2019-10-05 RX ADMIN — Medication 100 MILLIGRAM(S): at 17:03

## 2019-10-05 RX ADMIN — Medication 81 MILLIGRAM(S): at 11:58

## 2019-10-05 RX ADMIN — CARVEDILOL PHOSPHATE 12.5 MILLIGRAM(S): 80 CAPSULE, EXTENDED RELEASE ORAL at 05:35

## 2019-10-05 RX ADMIN — Medication 75 MICROGRAM(S): at 05:35

## 2019-10-05 RX ADMIN — Medication 0.1 MILLIGRAM(S): at 14:55

## 2019-10-05 RX ADMIN — OXYCODONE HYDROCHLORIDE 10 MILLIGRAM(S): 5 TABLET ORAL at 22:19

## 2019-10-05 RX ADMIN — CARVEDILOL PHOSPHATE 12.5 MILLIGRAM(S): 80 CAPSULE, EXTENDED RELEASE ORAL at 17:02

## 2019-10-05 NOTE — PROGRESS NOTE ADULT - SUBJECTIVE AND OBJECTIVE BOX
[facility] DIVISION OF KIDNEY DISEASES AND HYPERTENSION -- FOLLOW UP NOTE  --------------------------------------------------------------------------------  Chief Complaint: C/O  L - Temporal Headache,     24 hour events/subjective: SBP Remains > 160.,     PAST HISTORY  --------------------------------------------------------------------------------  No significant changes to PMH, PSH, FHx, SHx, unless otherwise noted    ALLERGIES & MEDICATIONS  --------------------------------------------------------------------------------  Allergies    No Known Allergies    Standing Inpatient Medications  aspirin  chewable 81 milliGRAM(s) Oral daily  carvedilol 12.5 milliGRAM(s) Oral every 12 hours  cloNIDine 0.1 milliGRAM(s) Oral every 4 hours  clopidogrel Tablet 75 milliGRAM(s) Oral daily  dextrose 5%. 1000 milliLiter(s) IV Continuous <Continuous>  dextrose 50% Injectable 12.5 Gram(s) IV Push once  dextrose 50% Injectable 25 Gram(s) IV Push once  dextrose 50% Injectable 25 Gram(s) IV Push once  folic acid 1 milliGRAM(s) Oral daily  gabapentin 150 milliGRAM(s) Oral at bedtime  hydrALAZINE 100 milliGRAM(s) Oral every 12 hours  influenza   Vaccine 0.5 milliLiter(s) IntraMuscular once  insulin lispro (HumaLOG) corrective regimen sliding scale   SubCutaneous three times a day before meals  insulin lispro (HumaLOG) corrective regimen sliding scale   SubCutaneous at bedtime  levothyroxine 75 MICROGram(s) Oral daily  Nephro-heather 1 Tablet(s) Oral daily  pantoprazole    Tablet 40 milliGRAM(s) Oral before breakfast  simvastatin 20 milliGRAM(s) Oral at bedtime    PRN Inpatient Medications  acetaminophen   Tablet .. 650 milliGRAM(s) Oral every 4 hours PRN  acetaminophen   Tablet .. 975 milliGRAM(s) Oral every 6 hours PRN  cyclobenzaprine 5 milliGRAM(s) Oral three times a day PRN  dextrose 40% Gel 15 Gram(s) Oral once PRN  glucagon  Injectable 1 milliGRAM(s) IntraMuscular once PRN  hydrALAZINE Injectable 10 milliGRAM(s) IV Push every 6 hours PRN  labetalol Injectable 10 milliGRAM(s) IV Push every 4 hours PRN  metoclopramide 5 milliGRAM(s) Oral three times a day PRN  oxyCODONE    IR 10 milliGRAM(s) Oral every 4 hours PRN    REVIEW OF SYSTEMS  --------------------------------------------------------------------------------  Gen: No weight changes, fatigue, fevers/chills, weakness  Skin: No rashes  Head/Eyes/Ears/Mouth: No headache; Normal hearing; Normal vision w/o blurriness; No sinus pain/discomfort, sore throat  Respiratory: No dyspnea, cough, wheezing, hemoptysis  CV: No chest pain, PND, orthopnea  GI: No abdominal pain, diarrhea, constipation, nausea, vomiting, melena, hematochezia  : Anuric,   MSK: No joint pain/swelling; no back pain; no edema  Neuro: No dizziness/lightheadedness, weakness, seizures, numbness, tingling  Heme: No easy bruising or bleeding  Endo: No heat/cold intolerance  Psych: + significant nervousness, anxiety, stress, depression    All other systems were reviewed and are negative, except as noted.    VITALS/PHYSICAL EXAM  --------------------------------------------------------------------------------  T(C): 36.5 (10-05-19 @ 07:56), Max: 37.1 (10-04-19 @ 15:52)  HR: 70 (10-05-19 @ 07:56) (68 - 94)  BP: 170/70 (10-05-19 @ 07:56) (154/76 - 199/75)  RR: 18 (10-05-19 @ 07:56) (17 - 18)  SpO2: 95% (10-05-19 @ 07:56) (95% - 97%)  Height (cm): 160.02 (10-04-19 @ 04:29)  Weight (kg): 100.2 (10-04-19 @ 04:29)  BMI (kg/m2): 39.1 (10-04-19 @ 04:29)  BSA (m2): 2.02 (10-04-19 @ 04:29)    10-04-19 @ 07:01  -  10-05-19 @ 07:00  --------------------------------------------------------  IN: 0 mL / OUT: 2000 mL / NET: -2000 mL    Physical Exam:  	Gen: NAD, well-appearing  	HEENT: PERRL, supple neck,   	Pulm: CTA B/L  	CV: RRR, S1S2; no rub  	Back: No spinal or CVA tenderness; no sacral edema  	Abd: +BS, soft, nontender/nondistended  	: No suprapubic tenderness  	UE: Warm, FROM, no clubbing, intact strength; no edema; no asterixis  	LE: Warm, FROM, no clubbing, intact strength; no edema  	Neuro: No focal deficits,  	Psych: Normal affect and mood  	Skin: Warm, without rashes  	Vascular access: RADHA BLACKMON,     LABS/STUDIES  --------------------------------------------------------------------------------              12.6   8.62  >-----------<  280      [10-04-19 @ 05:24]              42.3     136  |  92  |  39.0  ----------------------------<  221      [10-04-19 @ 05:24]  5.6   |  22.0  |  5.73        Ca     9.7     [10-04-19 @ 05:24]    TPro  8.0  /  Alb  4.4  /  TBili  0.3  /  DBili  x   /  AST  28  /  ALT  16  /  AlkPhos  162  [10-04-19 @ 05:24]    Creatinine Trend:  SCr 5.73 [10-04 @ 05:24]    PTH -- (Ca 9.8)      [11-13-18 @ 16:19]   551  Vitamin D (25OH) 25.0      [11-13-18 @ 16:38]  HbA1c 7.1      [03-17-19 @ 08:03]  TSH 2.27      [11-07-18 @ 18:50]

## 2019-10-05 NOTE — CONSULT NOTE ADULT - SUBJECTIVE AND OBJECTIVE BOX
[facility]                                     Neu[facility] [place]                                 Wanda Cardoza, & [facility]                                  [address]                                        [place], [place], [address]                                             [phone]    CC: headache  HPI:  63 year old female with PMH HTN, DMT2, CAD s/p CABG, ESRD on HD T,T,S presents with left sided headache since 4-5 days not relieved with Tylenol so came to ER.  Described headache as sharp radiating from occiput to left temporal region and relieved somewhat with Tylenol. Has nausea, but denies vomiting, visual changes, dizziness, fever or chills. Denies any dyspnea, chest pain or palpitations. Occasional numbness in feet.  Noted to have elevated /81 (04 Oct 2019 15:55)  The patient is a 63y Female who presented with left sided headache that radiated from occiput along temporal area to forehead.  this started two days ago.  She was noted to have a /81.  With some medication and lowering her BP her headache has improved significantly.  She still has some residual headache though, and the left side of head is somewhat tender to the touch.  She denies vision changes, photophobia, nausea( although had some earlier)/vomiting, vision loss or jaw claudication.  Neurology eval is requested.    PAST MEDICAL & SURGICAL HISTORY:  3-vessel CAD: s/p CABG  Hypothyroidism, unspecified type  Hemodialysis patient: tues thursday saturday  Renal failure  Hypertension  Diabetes mellitus  A-V fistula  S/P cholecystectomy      MEDICATIONS  (STANDING):  aspirin  chewable 81 milliGRAM(s) Oral daily  carvedilol 12.5 milliGRAM(s) Oral every 12 hours  cloNIDine 0.1 milliGRAM(s) Oral every 4 hours  clopidogrel Tablet 75 milliGRAM(s) Oral daily  dextrose 5%. 1000 milliLiter(s) (50 mL/Hr) IV Continuous <Continuous>  dextrose 50% Injectable 12.5 Gram(s) IV Push once  dextrose 50% Injectable 25 Gram(s) IV Push once  dextrose 50% Injectable 25 Gram(s) IV Push once  folic acid 1 milliGRAM(s) Oral daily  gabapentin 200 milliGRAM(s) Oral at bedtime  hydrALAZINE 100 milliGRAM(s) Oral every 12 hours  influenza   Vaccine 0.5 milliLiter(s) IntraMuscular once  insulin lispro (HumaLOG) corrective regimen sliding scale   SubCutaneous three times a day before meals  insulin lispro (HumaLOG) corrective regimen sliding scale   SubCutaneous at bedtime  levothyroxine 75 MICROGram(s) Oral daily  Nephro-ehather 1 Tablet(s) Oral daily  pantoprazole    Tablet 40 milliGRAM(s) Oral before breakfast  sevelamer carbonate 1600 milliGRAM(s) Oral three times a day  simvastatin 20 milliGRAM(s) Oral at bedtime    MEDICATIONS  (PRN):  acetaminophen   Tablet .. 650 milliGRAM(s) Oral every 4 hours PRN Mild Pain (1 - 3)  acetaminophen   Tablet .. 975 milliGRAM(s) Oral every 6 hours PRN Moderate Pain (4 - 6)  cyclobenzaprine 5 milliGRAM(s) Oral three times a day PRN Muscle Spasm  dextrose 40% Gel 15 Gram(s) Oral once PRN Blood Glucose LESS THAN 70 milliGRAM(s)/deciliter  glucagon  Injectable 1 milliGRAM(s) IntraMuscular once PRN Glucose LESS THAN 70 milligrams/deciliter  hydrALAZINE Injectable 10 milliGRAM(s) IV Push every 6 hours PRN SBP >160  labetalol Injectable 10 milliGRAM(s) IV Push every 4 hours PRN Systolic/Diastolic >160/100  metoclopramide 5 milliGRAM(s) Oral three times a day PRN nausea  oxyCODONE    IR 10 milliGRAM(s) Oral every 4 hours PRN Severe Pain (7 - 10)      Allergies    No Known Allergies    Intolerances        SOCIAL HISTORY:  no tob,   no alcohol   no drugs    FAMILY HISTORY:  Family history of diabetes mellitus        ROS: 14 point ROS negative other than what is present in HPI or below    Vital Signs Last 24 Hrs  T(C): 36.8 (05 Oct 2019 12:24), Max: 37.1 (04 Oct 2019 15:52)  T(F): 98.2 (05 Oct 2019 12:24), Max: 98.7 (04 Oct 2019 15:52)  HR: 84 (05 Oct 2019 12:24) (68 - 94)  BP: 189/89 (05 Oct 2019 12:24) (154/76 - 189/89)  BP(mean): --  RR: 18 (05 Oct 2019 12:24) (17 - 18)  SpO2: 95% (05 Oct 2019 12:24) (95% - 96%)      General: NAD    Detailed Neurologic Exam:    Mental status: The patient is awake and alert and has normal attention span.  The patient is fully oriented in 3 spheres. The patient is oriented to current events. The patient is able to name objects, follow commands    Cranial nerves: Pupils equal and react symmetrically to light. There is no visual field deficit to confrontation. Extraocular motion is full with no nystagmus. There is no ptosis. Facial sensation is intact. Facial musculature is symmetric. Palate elevates symmetrically. Tongue is midline.    Motor: There is normal bulk and tone.  There is no tremor.  Strength is 5/5 in the right arm and leg.   Strength is 5/5 in the left arm and leg.    Sensation: Intact to light touch and pin in 4 extremities    Reflexes: 1+ throughout and plantar responses are flexor.    Cerebellar: There is no dysmetria on finger to nose testing.    Gait : deferred    LABS:                         12.6   8.62  )-----------( 280      ( 04 Oct 2019 05:24 )             42.3       10-05    132<L>  |  92<L>  |  38.0<H>  ----------------------------<  237<H>  5.4<H>   |  25.0  |  5.79<H>    Ca    9.7      05 Oct 2019 13:33  Phos  5.9     10-05    TPro  x   /  Alb  4.1  /  TBili  x   /  DBili  x   /  AST  x   /  ALT  x   /  AlkPhos  x   10-05      RADIOLOGY & ADDITIONAL STUDIES (independently reviewed unless otherwise noted):  CT head- no acute CVA, mass or blood, (+) SVID/atrophy

## 2019-10-05 NOTE — CONSULT NOTE ADULT - ASSESSMENT
The patient is a 63y Female who is followed by neurology because of headache    Headache  suspect occipital neuralgia, but given age I would check ESR/CRP to evaluate to temporal arterits  continue to treat headache symptomatically with GBP and prn flexeril  If medication no help, may need occipital nerve block  If ESR and CRP elevated may need steroids and temporal artery biopsy    HTN  normalize BP gradually china avoid CVA from hypotension    will follow with you    Loyd Muñoz MD PhD   [phone]

## 2019-10-05 NOTE — PROGRESS NOTE ADULT - ASSESSMENT
Stable,     Patient was seen and evaluated on dialysis.   Patient is tolerating the procedure well.   T(C): 37.1 (10-06-19 @ 09:09), Max: 37.1 (10-06-19 @ 09:09)  HR: 70 (10-06-19 @ 10:56) (68 - 87)  BP: 171/74 (10-06-19 @ 10:56) (155/60 - 173/69)  Continue dialysis: TIW,   Dialyzer: Revaclear 300         QB:  450 ml.,       QD: 500ml.,

## 2019-10-05 NOTE — PROGRESS NOTE ADULT - ASSESSMENT
63 year old female with PMH HTN, DMT2, CAD s/p CABG, PPM, ESRD on HD T,T,S presents with left sided headache and uncontrolled BP SBP>200.    Hypertensive urgency - BP improved after administration of home BP medications, however continued to be elevated and have headache. Hypertensive urgency resolved.  - Continue home medications  - Nephrology for HD, fluid removal  - Analgesics, muscle relaxants    Headache. CTH unremarkable. CT neck unremarkable.  - Tylenol  - Flexeril  - Gabapentin  - Neurology evaluation  - Doubt Temporal arteritis, but will check ESR, CRP    ESRD  - Continue HD  - Continue Phoslo    DMT2, mild hyperglycemia. A1c 6.5  - BGM with SSI  - Add Humalog 3 U with meals      Hypothyroidism  - Continue Synthroid    CAD  - Continue DAPT, Statin, BB    Prophylactic measure  - IPC for VTEp    Advance Directives - discussed briefly with patient, daughter, Full Code for now    Disposition - pending clinical improvement. Anticipate home in 24 hours

## 2019-10-05 NOTE — PROGRESS NOTE ADULT - SUBJECTIVE AND OBJECTIVE BOX
S/P 3.5 hrs of HD,    2 L UF.    Post HD access care done,    occlusive dressing  placed.    Report given to CIARAN Rodriguez,   --------------------------------------------------------------------------------  Chief Complaint: ESRD/Ongoing hemodialysis requirement    24 hour events/subjective:    PAST HISTORY  --------------------------------------------------------------------------------  No significant changes to PMH, PSH, FHx, SHx, unless otherwise noted    ALLERGIES & MEDICATIONS  --------------------------------------------------------------------------------  Allergies    No Known Allergies    Standing Inpatient Medications  amLODIPine   Tablet 10 milliGRAM(s) Oral daily  aspirin  chewable 81 milliGRAM(s) Oral daily  carvedilol 12.5 milliGRAM(s) Oral every 12 hours  cloNIDine 0.2 milliGRAM(s) Oral every 8 hours  clopidogrel Tablet 75 milliGRAM(s) Oral daily  dextrose 5%. 1000 milliLiter(s) IV Continuous <Continuous>  dextrose 50% Injectable 12.5 Gram(s) IV Push once  dextrose 50% Injectable 25 Gram(s) IV Push once  dextrose 50% Injectable 25 Gram(s) IV Push once  folic acid 1 milliGRAM(s) Oral daily  gabapentin 200 milliGRAM(s) Oral at bedtime  hydrALAZINE 100 milliGRAM(s) Oral every 12 hours  influenza   Vaccine 0.5 milliLiter(s) IntraMuscular once  insulin lispro (HumaLOG) corrective regimen sliding scale   SubCutaneous three times a day before meals  insulin lispro (HumaLOG) corrective regimen sliding scale   SubCutaneous at bedtime  insulin lispro Injectable (HumaLOG) 3 Unit(s) SubCutaneous three times a day before meals  levothyroxine 75 MICROGram(s) Oral daily  Nephro-heather 1 Tablet(s) Oral daily  pantoprazole    Tablet 40 milliGRAM(s) Oral before breakfast  sevelamer carbonate 1600 milliGRAM(s) Oral three times a day  simvastatin 20 milliGRAM(s) Oral at bedtime    PRN Inpatient Medications  acetaminophen   Tablet .. 650 milliGRAM(s) Oral every 4 hours PRN  acetaminophen   Tablet .. 975 milliGRAM(s) Oral every 6 hours PRN  cyclobenzaprine 5 milliGRAM(s) Oral three times a day PRN  dextrose 40% Gel 15 Gram(s) Oral once PRN  glucagon  Injectable 1 milliGRAM(s) IntraMuscular once PRN  hydrALAZINE Injectable 10 milliGRAM(s) IV Push every 6 hours PRN  labetalol Injectable 10 milliGRAM(s) IV Push every 4 hours PRN  metoclopramide 5 milliGRAM(s) Oral three times a day PRN  oxyCODONE    IR 10 milliGRAM(s) Oral every 4 hours PRN      REVIEW OF SYSTEMS  --------------------------------------------------------------------------------  Gen: No weight changes, fatigue, fevers/chills, weakness  Skin: No rashes  Head/Eyes/Ears/Mouth: No headache; Normal hearing; Normal vision w/o blurriness; No sinus pain/discomfort, sore throat  Respiratory: No dyspnea, cough, wheezing, hemoptysis  CV: No chest pain, PND, orthopnea  GI: No abdominal pain, diarrhea, constipation, nausea, vomiting, melena, hematochezia  : No increased frequency, dysuria, hematuria, nocturia  MSK: No joint pain/swelling; no back pain; no edema  Neuro: No dizziness/lightheadedness, weakness, seizures, numbness, tingling  Heme: No easy bruising or bleeding  Endo: No heat/cold intolerance  Psych: No significant nervousness, anxiety, stress, depression    All other systems were reviewed and are negative, except as noted.    VITALS/PHYSICAL EXAM  --------------------------------------------------------------------------------  T(C): 37.1 (10-06-19 @ 09:09), Max: 37.1 (10-06-19 @ 09:09)  HR: 70 (10-06-19 @ 10:56) (68 - 87)  BP: 171/74 (10-06-19 @ 10:56) (155/60 - 173/69)  RR: 18 (10-06-19 @ 09:09) (16 - 18)  SpO2: 91% (10-06-19 @ 09:09) (91% - 96%)    10-05-19 @ 07:01  -  10-06-19 @ 07:00  --------------------------------------------------------  IN: 0 mL / OUT: 2000 mL / NET: -2000 mL    Physical Exam:  	Gen: NAD, well-appearing  	HEENT: PERRL, supple neck, clear oropharynx  	Pulm: CTA B/L  	CV: RRR, S1S2; no rub  	Back: No spinal or CVA tenderness; no sacral edema  	Abd: +BS, soft, nontender/nondistended  	: No suprapubic tenderness  	UE: Warm, FROM, no clubbing, intact strength; no edema; no asterixis  	LE: Warm, FROM, no clubbing, intact strength; no edema  	Neuro: No focal deficits, intact gait  	Psych: Normal affect and mood  	Skin: Warm, without rashes  	Vascular access: AVF,     LABS/STUDIES  --------------------------------------------------------------------------------    132  |  92  |  38.0  ----------------------------<  237      [10-05-19 @ 13:33]  5.4   |  25.0  |  5.79        Ca     9.7     [10-05-19 @ 13:33]      Phos  5.9     [10-05-19 @ 13:33]    TPro  x   /  Alb  4.1  /  TBili  x   /  DBili  x   /  AST  x   /  ALT  x   /  AlkPhos  x   [10-05-19 @ 13:33]    PTH -- (Ca 9.8)      [11-13-18 @ 16:19]   551  Vitamin D (25OH) 25.0      [11-13-18 @ 16:38]  HbA1c 6.5      [10-05-19 @ 13:33]  TSH 0.91      [10-05-19 @ 13:33]

## 2019-10-05 NOTE — PROGRESS NOTE ADULT - SUBJECTIVE AND OBJECTIVE BOX
HOSPITALIST PROGRESS NOTE    KIM LEE  [id number]  63yFemale    Patient is a 63y old  Female who presents with a chief complaint of headache (05 Oct 2019 15:43)      SUBJECTIVE:   Chart reviewed since last visit.  Patient seen and examined at bedside for headache.  neck pain improved though complaining of 'shock' like left frontal headache  Denies any visual changes, paresthesia, fever, chill    OBJECTIVE:  Vital Signs Last 24 Hrs  T(C): 36.8 (05 Oct 2019 12:24), Max: 36.8 (05 Oct 2019 12:24)  T(F): 98.2 (05 Oct 2019 12:24), Max: 98.2 (05 Oct 2019 12:24)  HR: 84 (05 Oct 2019 12:24) (68 - 84)  BP: 189/89 (05 Oct 2019 12:24) (154/76 - 189/89)   RR: 18 (05 Oct 2019 12:24) (17 - 18)  SpO2: 95% (05 Oct 2019 12:24) (95% - 96%)    PHYSICAL EXAMINATION  General: Obese, lying in bed, NAD  HEENT:  extraocular movements intact, no asymmetry. No temporal tenderness  NECK:  supple, pain on lateral rotation  CVS: regular rate and rhythm S1 S2  RESP:  CTAB  GI:  Soft nondistended nontender BS+  : No suprapubic tenderness  MSK:  FROM. no edema  CNS:  NO gross focal or global deficit noted  INTEG:  Warm dry skin	  PSYCH:  Fair mood    MONITOR:  CAPILLARY BLOOD GLUCOSE      POCT Blood Glucose.: 220 mg/dL (05 Oct 2019 11:31)  POCT Blood Glucose.: 116 mg/dL (05 Oct 2019 07:10)  POCT Blood Glucose.: 117 mg/dL (04 Oct 2019 22:09)        I&O's Summary    04 Oct 2019 07:01  -  05 Oct 2019 07:00  --------------------------------------------------------  IN: 0 mL / OUT: 2000 mL / NET: -2000 mL                            12.6   8.62  )-----------( 280      ( 04 Oct 2019 05:24 )             42.3       10-05    132<L>  |  92<L>  |  38.0<H>  ----------------------------<  237<H>  5.4<H>   |  25.0  |  5.79<H>    Ca    9.7      05 Oct 2019 13:33  Phos  5.9     10-05    TPro  x   /  Alb  4.1  /  TBili  x   /  DBili  x   /  AST  x   /  ALT  x   /  AlkPhos  x   10-05            Culture:    TTE:    RADIOLOGY  < from: CT Cervical Spine No Cont (10.04.19 @ 14:53) >     EXAM:  CT CERVICAL SPINE                          PROCEDURE DATE:  10/04/2019          INTERPRETATION:  CLINICAL INDICATIONS: Left occipital pain radiating to   left head, painful range of motion mid, hypertensionneck pain    COMPARISON: None.    TECHNIQUE: Noncontrast CT of the cervical spine. Multiple contiguous   axial images through the cervical spine as well as multiplanar   reformatted images are submitted for interpretation without the   administration of intravenous contrast. 3-D images.    FINDINGS:  Osseous structures are osteopenic. Mild multilevel uncovertebral   hypertrophy.  There is no evidence for acute fracture. A normal lordosis is noted.   Craniocervical junction is normal. The cervicovertebral body heights and   intervertebral disc spaces are preserved. There is no prevertebral soft   tissue abnormality. The odontoid process is intact.     Thyroid gland is unremarkable. The airway is patent. The upper lung   apices are unremarkable. Right-sided pacemaker leads are noted.    Evaluation of the individual levels:    C2/C3 level: No spinal canal stenosis or foraminal narrowing.    C3/C4 level: Mild right-sided foraminal narrowing. No spinal canal   stenosis or left-sided foraminal narrowing.    C4/C5 level: No spinal canal stenosis or foraminal narrowing.    C5/C6 level: No spinal canal stenosis or foraminal narrowing.    C6/C7 level: No spinal canal stenosis or foraminal narrowing.    C7/T1 level: No spinal canal stenosis or foraminal narrowing.      IMPRESSION:    No acute osseous abnormality.                RON NI M.D., ATTENDING RADIOLOGIST  This document has been electronically signed. Oct  4 2019  4:06PM                < end of copied text >        MEDICATIONS  (STANDING):  aspirin  chewable 81 milliGRAM(s) Oral daily  carvedilol 12.5 milliGRAM(s) Oral every 12 hours  cloNIDine 0.1 milliGRAM(s) Oral every 4 hours  clopidogrel Tablet 75 milliGRAM(s) Oral daily  dextrose 5%. 1000 milliLiter(s) (50 mL/Hr) IV Continuous <Continuous>  dextrose 50% Injectable 12.5 Gram(s) IV Push once  dextrose 50% Injectable 25 Gram(s) IV Push once  dextrose 50% Injectable 25 Gram(s) IV Push once  folic acid 1 milliGRAM(s) Oral daily  gabapentin 200 milliGRAM(s) Oral at bedtime  hydrALAZINE 100 milliGRAM(s) Oral every 12 hours  influenza   Vaccine 0.5 milliLiter(s) IntraMuscular once  insulin lispro (HumaLOG) corrective regimen sliding scale   SubCutaneous three times a day before meals  insulin lispro (HumaLOG) corrective regimen sliding scale   SubCutaneous at bedtime  insulin lispro Injectable (HumaLOG) 3 Unit(s) SubCutaneous three times a day before meals  levothyroxine 75 MICROGram(s) Oral daily  Nephro-heather 1 Tablet(s) Oral daily  pantoprazole    Tablet 40 milliGRAM(s) Oral before breakfast  sevelamer carbonate 1600 milliGRAM(s) Oral three times a day  simvastatin 20 milliGRAM(s) Oral at bedtime      MEDICATIONS  (PRN):  acetaminophen   Tablet .. 650 milliGRAM(s) Oral every 4 hours PRN Mild Pain (1 - 3)  acetaminophen   Tablet .. 975 milliGRAM(s) Oral every 6 hours PRN Moderate Pain (4 - 6)  cyclobenzaprine 5 milliGRAM(s) Oral three times a day PRN Muscle Spasm  dextrose 40% Gel 15 Gram(s) Oral once PRN Blood Glucose LESS THAN 70 milliGRAM(s)/deciliter  glucagon  Injectable 1 milliGRAM(s) IntraMuscular once PRN Glucose LESS THAN 70 milligrams/deciliter  hydrALAZINE Injectable 10 milliGRAM(s) IV Push every 6 hours PRN SBP >160  labetalol Injectable 10 milliGRAM(s) IV Push every 4 hours PRN Systolic/Diastolic >160/100  metoclopramide 5 milliGRAM(s) Oral three times a day PRN nausea  oxyCODONE    IR 10 milliGRAM(s) Oral every 4 hours PRN Severe Pain (7 - 10)

## 2019-10-05 NOTE — PROGRESS NOTE ADULT - ASSESSMENT
1. Control HTN - Add Clonidine , Hydralazine  Maximum 200 mg., daily, D.C Amlodipine ( Headache )    2. HD this PM, ( Neurontin- dose adjusted for ESRD )     To Maximize UF,    Non Calcium containing P + Binders,    D/W son,

## 2019-10-06 LAB
GLUCOSE BLDC GLUCOMTR-MCNC: 133 MG/DL — HIGH (ref 70–99)
GLUCOSE BLDC GLUCOMTR-MCNC: 134 MG/DL — HIGH (ref 70–99)
GLUCOSE BLDC GLUCOMTR-MCNC: 156 MG/DL — HIGH (ref 70–99)
GLUCOSE BLDC GLUCOMTR-MCNC: 166 MG/DL — HIGH (ref 70–99)

## 2019-10-06 PROCEDURE — 99233 SBSQ HOSP IP/OBS HIGH 50: CPT

## 2019-10-06 PROCEDURE — 99232 SBSQ HOSP IP/OBS MODERATE 35: CPT

## 2019-10-06 RX ORDER — ACETAMINOPHEN 500 MG
2 TABLET ORAL
Qty: 0 | Refills: 0 | DISCHARGE
Start: 2019-10-06

## 2019-10-06 RX ORDER — ACETAMINOPHEN 500 MG
3 TABLET ORAL
Qty: 0 | Refills: 0 | DISCHARGE
Start: 2019-10-06

## 2019-10-06 RX ORDER — GABAPENTIN 400 MG/1
2 CAPSULE ORAL
Qty: 60 | Refills: 0
Start: 2019-10-06 | End: 2019-11-04

## 2019-10-06 RX ORDER — AMLODIPINE BESYLATE 2.5 MG/1
1 TABLET ORAL
Qty: 30 | Refills: 0
Start: 2019-10-06 | End: 2019-11-04

## 2019-10-06 RX ORDER — AMLODIPINE BESYLATE 2.5 MG/1
10 TABLET ORAL DAILY
Refills: 0 | Status: DISCONTINUED | OUTPATIENT
Start: 2019-10-06 | End: 2019-10-23

## 2019-10-06 RX ORDER — CYCLOBENZAPRINE HYDROCHLORIDE 10 MG/1
1 TABLET, FILM COATED ORAL
Qty: 30 | Refills: 0
Start: 2019-10-06 | End: 2019-10-15

## 2019-10-06 RX ORDER — OXYCODONE HYDROCHLORIDE 5 MG/1
1 TABLET ORAL
Qty: 20 | Refills: 0
Start: 2019-10-06 | End: 2019-10-10

## 2019-10-06 RX ADMIN — SEVELAMER CARBONATE 1600 MILLIGRAM(S): 2400 POWDER, FOR SUSPENSION ORAL at 13:54

## 2019-10-06 RX ADMIN — SEVELAMER CARBONATE 1600 MILLIGRAM(S): 2400 POWDER, FOR SUSPENSION ORAL at 22:47

## 2019-10-06 RX ADMIN — OXYCODONE HYDROCHLORIDE 10 MILLIGRAM(S): 5 TABLET ORAL at 03:04

## 2019-10-06 RX ADMIN — Medication 975 MILLIGRAM(S): at 17:32

## 2019-10-06 RX ADMIN — Medication 1: at 17:30

## 2019-10-06 RX ADMIN — Medication 1 TABLET(S): at 11:01

## 2019-10-06 RX ADMIN — Medication 0.1 MILLIGRAM(S): at 03:04

## 2019-10-06 RX ADMIN — CLOPIDOGREL BISULFATE 75 MILLIGRAM(S): 75 TABLET, FILM COATED ORAL at 11:01

## 2019-10-06 RX ADMIN — Medication 75 MICROGRAM(S): at 05:27

## 2019-10-06 RX ADMIN — Medication 975 MILLIGRAM(S): at 23:32

## 2019-10-06 RX ADMIN — Medication 975 MILLIGRAM(S): at 09:45

## 2019-10-06 RX ADMIN — SEVELAMER CARBONATE 1600 MILLIGRAM(S): 2400 POWDER, FOR SUSPENSION ORAL at 05:27

## 2019-10-06 RX ADMIN — GABAPENTIN 200 MILLIGRAM(S): 400 CAPSULE ORAL at 22:47

## 2019-10-06 RX ADMIN — OXYCODONE HYDROCHLORIDE 10 MILLIGRAM(S): 5 TABLET ORAL at 03:34

## 2019-10-06 RX ADMIN — Medication 10 MILLIGRAM(S): at 22:50

## 2019-10-06 RX ADMIN — Medication 5 MILLIGRAM(S): at 22:45

## 2019-10-06 RX ADMIN — Medication 100 MILLIGRAM(S): at 17:30

## 2019-10-06 RX ADMIN — Medication 975 MILLIGRAM(S): at 09:15

## 2019-10-06 RX ADMIN — Medication 0.1 MILLIGRAM(S): at 05:27

## 2019-10-06 RX ADMIN — Medication 1 MILLIGRAM(S): at 11:01

## 2019-10-06 RX ADMIN — Medication 3 UNIT(S): at 08:08

## 2019-10-06 RX ADMIN — Medication 975 MILLIGRAM(S): at 19:03

## 2019-10-06 RX ADMIN — Medication 81 MILLIGRAM(S): at 11:01

## 2019-10-06 RX ADMIN — Medication 3 UNIT(S): at 17:30

## 2019-10-06 RX ADMIN — Medication 0.2 MILLIGRAM(S): at 13:55

## 2019-10-06 RX ADMIN — Medication 100 MILLIGRAM(S): at 05:28

## 2019-10-06 RX ADMIN — Medication 0.1 MILLIGRAM(S): at 09:12

## 2019-10-06 RX ADMIN — CARVEDILOL PHOSPHATE 12.5 MILLIGRAM(S): 80 CAPSULE, EXTENDED RELEASE ORAL at 17:29

## 2019-10-06 RX ADMIN — AMLODIPINE BESYLATE 10 MILLIGRAM(S): 2.5 TABLET ORAL at 10:52

## 2019-10-06 RX ADMIN — CARVEDILOL PHOSPHATE 12.5 MILLIGRAM(S): 80 CAPSULE, EXTENDED RELEASE ORAL at 05:27

## 2019-10-06 RX ADMIN — Medication 0.2 MILLIGRAM(S): at 22:47

## 2019-10-06 RX ADMIN — PANTOPRAZOLE SODIUM 40 MILLIGRAM(S): 20 TABLET, DELAYED RELEASE ORAL at 05:27

## 2019-10-06 RX ADMIN — SIMVASTATIN 20 MILLIGRAM(S): 20 TABLET, FILM COATED ORAL at 22:47

## 2019-10-06 RX ADMIN — Medication 3 UNIT(S): at 10:52

## 2019-10-06 NOTE — PROGRESS NOTE ADULT - ASSESSMENT
The patient is a 63y Female who is followed by neurology because of headache    Headache  suspect occipital neuralgia,  ESR=28, CRP<0.40, doubt temporal arteritis  continue to treat headache/neuralgia symptomatically with GBP and prn flexeril  If medication no help, may need occipital nerve block as out patient    HTN  normalize BP gradually to avoid CVA from hypotension    Neurologically stable ,    Well dialyzed, Cleared for discharge, OP HD in AM.    D/W The son,     Thank you for allowing me to participate in the care of your patient

## 2019-10-06 NOTE — PROGRESS NOTE ADULT - SUBJECTIVE AND OBJECTIVE BOX
Chief Complaint: C/O  L - Temporal Headache  Resolved, Feels well,     Asleep, son @ side,     24 hour events/subjective: SBP now < 180 mm.,      Vital Signs Last 24 Hrs  T(C): 37.1 (06 Oct 2019 09:09), Max: 37.1 (06 Oct 2019 09:09)  T(F): 98.7 (06 Oct 2019 09:09), Max: 98.7 (06 Oct 2019 09:09)  HR: 70 (06 Oct 2019 10:56) (68 - 87)  BP: 171/74 (06 Oct 2019 10:56) (155/60 - 173/69)  RR: 18 (06 Oct 2019 09:09) (16 - 18)  SpO2: 91% (06 Oct 2019 09:09) (91% - 96%)     PAST HISTORY  --------------------------------------------------------------------------------  No significant changes to PMH, PSH, FHx, SHx, unless otherwise noted    ALLERGIES & MEDICATIONS  --------------------------------------------------------------------------------  Allergies    No Known Allergies    Standing Inpatient Medications  aspirin  chewable 81 milliGRAM(s) Oral daily  carvedilol 12.5 milliGRAM(s) Oral every 12 hours  cloNIDine 0.1 milliGRAM(s) Oral every 4 hours  clopidogrel Tablet 75 milliGRAM(s) Oral daily  dextrose 5%. 1000 milliLiter(s) IV Continuous <Continuous>  dextrose 50% Injectable 12.5 Gram(s) IV Push once  dextrose 50% Injectable 25 Gram(s) IV Push once  dextrose 50% Injectable 25 Gram(s) IV Push once  folic acid 1 milliGRAM(s) Oral daily  gabapentin 150 milliGRAM(s) Oral at bedtime  hydrALAZINE 100 milliGRAM(s) Oral every 12 hours  influenza   Vaccine 0.5 milliLiter(s) IntraMuscular once  insulin lispro (HumaLOG) corrective regimen sliding scale   SubCutaneous three times a day before meals  insulin lispro (HumaLOG) corrective regimen sliding scale   SubCutaneous at bedtime  levothyroxine 75 MICROGram(s) Oral daily  Nephro-heather 1 Tablet(s) Oral daily  pantoprazole    Tablet 40 milliGRAM(s) Oral before breakfast  simvastatin 20 milliGRAM(s) Oral at bedtime    PRN Inpatient Medications  acetaminophen   Tablet .. 650 milliGRAM(s) Oral every 4 hours PRN  acetaminophen   Tablet .. 975 milliGRAM(s) Oral every 6 hours PRN  cyclobenzaprine 5 milliGRAM(s) Oral three times a day PRN  dextrose 40% Gel 15 Gram(s) Oral once PRN  glucagon  Injectable 1 milliGRAM(s) IntraMuscular once PRN  hydrALAZINE Injectable 10 milliGRAM(s) IV Push every 6 hours PRN  labetalol Injectable 10 milliGRAM(s) IV Push every 4 hours PRN  metoclopramide 5 milliGRAM(s) Oral three times a day PRN  oxyCODONE    IR 10 milliGRAM(s) Oral every 4 hours PRN    REVIEW OF SYSTEMS  --------------------------------------------------------------------------------  Gen: No weight changes, fatigue, fevers/chills, weakness  Skin: No rashes  Head/Eyes/Ears/Mouth: No headache; Normal hearing; Normal vision w/o blurriness; No sinus pain/discomfort, sore throat  Respiratory: No dyspnea, cough, wheezing, hemoptysis  CV: No chest pain, PND, orthopnea  GI: No abdominal pain, diarrhea, constipation, nausea, vomiting, melena, hematochezia  : Anuric,   MSK: No joint pain/swelling; no back pain; no edema  Neuro: No dizziness/lightheadedness, weakness, seizures, numbness, tingling  Heme: No easy bruising or bleeding  Endo: No heat/cold intolerance  Psych: + significant nervousness, anxiety, stress, depression    All other systems were reviewed and are negative, except as noted.    VITALS/PHYSICAL EXAM  --------------------------------------------------------------------------------  T(C): 36.5 (10-05-19 @ 07:56), Max: 37.1 (10-04-19 @ 15:52)  HR: 70 (10-05-19 @ 07:56) (68 - 94)  BP: 170/70 (10-05-19 @ 07:56) (154/76 - 199/75)  RR: 18 (10-05-19 @ 07:56) (17 - 18)  SpO2: 95% (10-05-19 @ 07:56) (95% - 97%)  Height (cm): 160.02 (10-04-19 @ 04:29)  Weight (kg): 100.2 (10-04-19 @ 04:29)  BMI (kg/m2): 39.1 (10-04-19 @ 04:29)  BSA (m2): 2.02 (10-04-19 @ 04:29)    10-04-19 @ 07:01  -  10-05-19 @ 07:00  --------------------------------------------------------  IN: 0 mL / OUT: 2000 mL / NET: -2000 mL    Physical Exam:  	Gen: NAD, well-appearing  	HEENT: PERRL, supple neck,   	Pulm: CTA B/L  	CV: RRR, S1S2; no rub  	Back: No spinal or CVA tenderness; no sacral edema  	Abd: +BS, soft, nontender/nondistended  	: No suprapubic tenderness  	UE: Warm, FROM, no clubbing, intact strength; no edema; no asterixis  	LE: Warm, FROM, no clubbing, intact strength; no edema  	Neuro: No focal deficits,  	Psych: Normal affect and mood  	Skin: Warm, without rashes  	Vascular access: RADHA AVF,     LABS/STUDIES  --------------------------------------------------------------------------------              12.6   8.62  >-----------<  280      [10-04-19 @ 05:24]              42.3     136  |  92  |  39.0  ----------------------------<  221      [10-04-19 @ 05:24]  5.6   |  22.0  |  5.73        Ca     9.7     [10-04-19 @ 05:24]    TPro  8.0  /  Alb  4.4  /  TBili  0.3  /  DBili  x   /  AST  28  /  ALT  16  /  AlkPhos  162  [10-04-19 @ 05:24]    Creatinine Trend:  SCr 5.73 [10-04 @ 05:24]    PTH -- (Ca 9.8)      [11-13-18 @ 16:19]   551  Vitamin D (25OH) 25.0      [11-13-18 @ 16:38]  HbA1c 7.1      [03-17-19 @ 08:03]  TSH 2.27      [11-07-18 @ 18:50]    1. Control HTN - Add Clonidine , Hydralazine  Maximum 200 mg., daily, D.C Amlodipine ( Headache )    2. HD last  PM, ( Neurontin- dose adjusted for ESRD )     attained  Maximize UF,    Non Calcium containing P + Binders,    D/W son,       TY,

## 2019-10-06 NOTE — PROGRESS NOTE ADULT - ASSESSMENT
63 year old female with PMH HTN, DMT2, CAD s/p CABG, PPM, ESRD on HD T,T,S presents with left sided headache and uncontrolled BP SBP>200.    Hypertensive urgency - BP improved after administration of home BP medications, however continued to be elevated and have headache. Hypertensive urgency resolved.  - Continue home medications, ajusted  - Nephrology for HD, fluid removal  - Analgesics, muscle relaxants    Headache. CTH unremarkable. CT neck unremarkable. Occipital neuralgia as per Neurology. ESR CRP normal - no temporal arteritis  - Tylenol  - Flexeril  - Gabapentin      ESRD  - Continue HD  - Continue Phoslo    DMT2, mild hyperglycemia. A1c 6.5  - BGM with SSI  - Added Humalog 3 U with meals with improvement      Hypothyroidism  - Continue Synthroid    CAD  - Continue DAPT, Statin, BB    Prophylactic measure  - IPC for VTEp    Advance Directives - discussed briefly with patient, daughter, Full Code for now    Disposition - Medically stable for discharge.      Discussed with patient, son agreeable to discharge. Then received call from [facility] JOSETTE Cooney who informed me that patient had CDPAP aides before which will be reinstated in am, thus patient will go home tomorrow

## 2019-10-06 NOTE — PROGRESS NOTE ADULT - SUBJECTIVE AND OBJECTIVE BOX
[facility]                                     Ne[facility] [place]                                 [facility] [address]                                  [address]                                        [place], [place], [address]                                             [phone]    CC: headache  HPI: The patient is a 63y Female who presented with left sided headache that radiated from occiput along temporal area to forehead.  this started two days ago.  She was noted to have a /81.  With some medication and lowering her BP her headache has improved significantly.  She still has some residual headache though, and the left side of head is somewhat tender to the touch.  She denies vision changes, photophobia, nausea( although had some earlier)/vomiting, vision loss or jaw claudication.  Neurology eval is requested.    Interval history: head pain greatly improved. no vision issues    ROS neurology: Denies headache or dizziness. Denies weakness/numbness.  Denies speech/language deficits. Denies diplopia/blurred vision.  Denies confusion    MEDICATIONS  (STANDING):  amLODIPine   Tablet 10 milliGRAM(s) Oral daily  aspirin  chewable 81 milliGRAM(s) Oral daily  carvedilol 12.5 milliGRAM(s) Oral every 12 hours  cloNIDine 0.2 milliGRAM(s) Oral every 8 hours  clopidogrel Tablet 75 milliGRAM(s) Oral daily  dextrose 5%. 1000 milliLiter(s) (50 mL/Hr) IV Continuous <Continuous>  dextrose 50% Injectable 12.5 Gram(s) IV Push once  dextrose 50% Injectable 25 Gram(s) IV Push once  dextrose 50% Injectable 25 Gram(s) IV Push once  folic acid 1 milliGRAM(s) Oral daily  gabapentin 200 milliGRAM(s) Oral at bedtime  hydrALAZINE 100 milliGRAM(s) Oral every 12 hours  influenza   Vaccine 0.5 milliLiter(s) IntraMuscular once  insulin lispro (HumaLOG) corrective regimen sliding scale   SubCutaneous three times a day before meals  insulin lispro (HumaLOG) corrective regimen sliding scale   SubCutaneous at bedtime  insulin lispro Injectable (HumaLOG) 3 Unit(s) SubCutaneous three times a day before meals  levothyroxine 75 MICROGram(s) Oral daily  Nephro-heather 1 Tablet(s) Oral daily  pantoprazole    Tablet 40 milliGRAM(s) Oral before breakfast  sevelamer carbonate 1600 milliGRAM(s) Oral three times a day  simvastatin 20 milliGRAM(s) Oral at bedtime    MEDICATIONS  (PRN):  acetaminophen   Tablet .. 650 milliGRAM(s) Oral every 4 hours PRN Mild Pain (1 - 3)  acetaminophen   Tablet .. 975 milliGRAM(s) Oral every 6 hours PRN Moderate Pain (4 - 6)  cyclobenzaprine 5 milliGRAM(s) Oral three times a day PRN Muscle Spasm  dextrose 40% Gel 15 Gram(s) Oral once PRN Blood Glucose LESS THAN 70 milliGRAM(s)/deciliter  glucagon  Injectable 1 milliGRAM(s) IntraMuscular once PRN Glucose LESS THAN 70 milligrams/deciliter  hydrALAZINE Injectable 10 milliGRAM(s) IV Push every 6 hours PRN SBP >160  labetalol Injectable 10 milliGRAM(s) IV Push every 4 hours PRN Systolic/Diastolic >160/100  metoclopramide 5 milliGRAM(s) Oral three times a day PRN nausea  oxyCODONE    IR 10 milliGRAM(s) Oral every 4 hours PRN Severe Pain (7 - 10)      Vital Signs Last 24 Hrs  T(C): 37.1 (06 Oct 2019 09:09), Max: 37.1 (06 Oct 2019 09:09)  T(F): 98.7 (06 Oct 2019 09:09), Max: 98.7 (06 Oct 2019 09:09)  HR: 70 (06 Oct 2019 10:56) (68 - 87)  BP: 171/74 (06 Oct 2019 10:56) (155/60 - 189/89)  BP(mean): --  RR: 18 (06 Oct 2019 09:09) (16 - 18)  SpO2: 91% (06 Oct 2019 09:09) (91% - 96%)    Detailed Neurologic Exam (in Cypriot, family assists in translation when needed):    Mental status: The patient is awake and alert and has normal attention span.  The patient is fully oriented in 3 spheres. The patient is oriented to current events. The patient is able to name objects, follow commands    Cranial nerves: Pupils equal and react symmetrically to light. There is no visual field deficit to confrontation. Extraocular motion is full with no nystagmus. There is no ptosis. Facial sensation is intact. Facial musculature is symmetric. Palate elevates symmetrically. Tongue is midline.    Motor: There is normal bulk and tone.  There is no tremor.  Strength is 5/5 in the right arm and leg.   Strength is 5/5 in the left arm and leg.    Sensation: Intact to light touch and pin in 4 extremities    Reflexes: 1+ throughout and plantar responses are flexor.    Cerebellar: There is no dysmetria on finger to nose testing.    Gait : deferred    LABS:                10-05    132<L>  |  92<L>  |  38.0<H>  ----------------------------<  237<H>  5.4<H>   |  25.0  |  5.79<H>    Ca    9.7      05 Oct 2019 13:33  Phos  5.9     10-05    TPro  x   /  Alb  4.1  /  TBili  x   /  DBili  x   /  AST  x   /  ALT  x   /  AlkPhos  x   10-05    LIVER FUNCTIONS - ( 05 Oct 2019 13:33 )  Alb: 4.1 g/dL / Pro: x     / ALK PHOS: x     / ALT: x     / AST: x     / GGT: x           Sedimentation Rate, Erythrocyte (10.05.19 @ 20:29)    Sedimentation Rate, Erythrocyte: 28 mm/hr  C-Reactive Protein, Serum (10.05.19 @ 20:24)    C-Reactive Protein, Serum: <0.40 mg/dL    RADIOLOGY & ADDITIONAL STUDIES (independently reviewed unless otherwise noted):  CT head- no acute CVA, mass or blood, (+) SVID/atrophy

## 2019-10-06 NOTE — PROGRESS NOTE ADULT - SUBJECTIVE AND OBJECTIVE BOX
HOSPITALIST PROGRESS NOTE    KIM LEE  [id number]  63yFemale    Patient is a 63y old  Female who presents with a chief complaint of headache (06 Oct 2019 13:41)      SUBJECTIVE:   Chart reviewed since last visit.  Patient seen and examined at bedside for headache, HTN, ESRD  Feels much better - mild left frontal headache, denies any neck ache  Denies any dizziness, palpitations, chest pain, dyspnea      OBJECTIVE:  Vital Signs Last 24 Hrs  T(C): 37.6 (06 Oct 2019 15:55), Max: 37.6 (06 Oct 2019 15:55)  T(F): 99.7 (06 Oct 2019 15:55), Max: 99.7 (06 Oct 2019 15:55)  HR: 68 (06 Oct 2019 15:55) (68 - 77)  BP: 155/74 (06 Oct 2019 15:55) (146/72 - 173/69)   RR: 19 (06 Oct 2019 15:55) (16 - 19)  SpO2: 91% (06 Oct 2019 09:09) (91% - 96%)    PHYSICAL EXAMINATION  General: Obese, lying in bed, NAD  HEENT:  extraocular movements intact, no asymmetry. No temporal tenderness  NECK:  supple, pain on lateral rotation  CVS: regular rate and rhythm S1 S2  RESP:  CTAB  GI:  Soft nondistended nontender BS+  : No suprapubic tenderness  MSK:  FROM. no edema  CNS:  NO gross focal or global deficit noted  INTEG:  Mild papular rash on nape of neck - no vesicles  PSYCH:  Fair mood      MONITOR:  CAPILLARY BLOOD GLUCOSE      POCT Blood Glucose.: 156 mg/dL (06 Oct 2019 17:28)  POCT Blood Glucose.: 134 mg/dL (06 Oct 2019 10:50)  POCT Blood Glucose.: 133 mg/dL (06 Oct 2019 08:00)  POCT Blood Glucose.: 163 mg/dL (05 Oct 2019 21:39)        I&O's Summary    05 Oct 2019 07:01  -  06 Oct 2019 07:00  --------------------------------------------------------  IN: 0 mL / OUT: 2000 mL / NET: -2000 mL          10-05    132<L>  |  92<L>  |  38.0<H>  ----------------------------<  237<H>  5.4<H>   |  25.0  |  5.79<H>    Ca    9.7      05 Oct 2019 13:33  Phos  5.9     10-05    TPro  x   /  Alb  4.1  /  TBili  x   /  DBili  x   /  AST  x   /  ALT  x   /  AlkPhos  x   10-05            Culture:    TTE:    RADIOLOGY        MEDICATIONS  (STANDING):  amLODIPine   Tablet 10 milliGRAM(s) Oral daily  aspirin  chewable 81 milliGRAM(s) Oral daily  carvedilol 12.5 milliGRAM(s) Oral every 12 hours  cloNIDine 0.2 milliGRAM(s) Oral every 8 hours  clopidogrel Tablet 75 milliGRAM(s) Oral daily  dextrose 5%. 1000 milliLiter(s) (50 mL/Hr) IV Continuous <Continuous>  dextrose 50% Injectable 12.5 Gram(s) IV Push once  dextrose 50% Injectable 25 Gram(s) IV Push once  dextrose 50% Injectable 25 Gram(s) IV Push once  folic acid 1 milliGRAM(s) Oral daily  gabapentin 200 milliGRAM(s) Oral at bedtime  hydrALAZINE 100 milliGRAM(s) Oral every 12 hours  influenza   Vaccine 0.5 milliLiter(s) IntraMuscular once  insulin lispro (HumaLOG) corrective regimen sliding scale   SubCutaneous three times a day before meals  insulin lispro (HumaLOG) corrective regimen sliding scale   SubCutaneous at bedtime  insulin lispro Injectable (HumaLOG) 3 Unit(s) SubCutaneous three times a day before meals  levothyroxine 75 MICROGram(s) Oral daily  Nephro-heather 1 Tablet(s) Oral daily  pantoprazole    Tablet 40 milliGRAM(s) Oral before breakfast  sevelamer carbonate 1600 milliGRAM(s) Oral three times a day  simvastatin 20 milliGRAM(s) Oral at bedtime      MEDICATIONS  (PRN):  acetaminophen   Tablet .. 650 milliGRAM(s) Oral every 4 hours PRN Mild Pain (1 - 3)  acetaminophen   Tablet .. 975 milliGRAM(s) Oral every 6 hours PRN Moderate Pain (4 - 6)  cyclobenzaprine 5 milliGRAM(s) Oral three times a day PRN Muscle Spasm  dextrose 40% Gel 15 Gram(s) Oral once PRN Blood Glucose LESS THAN 70 milliGRAM(s)/deciliter  glucagon  Injectable 1 milliGRAM(s) IntraMuscular once PRN Glucose LESS THAN 70 milligrams/deciliter  hydrALAZINE Injectable 10 milliGRAM(s) IV Push every 6 hours PRN SBP >160  labetalol Injectable 10 milliGRAM(s) IV Push every 4 hours PRN Systolic/Diastolic >160/100  metoclopramide 5 milliGRAM(s) Oral three times a day PRN nausea  oxyCODONE    IR 10 milliGRAM(s) Oral every 4 hours PRN Severe Pain (7 - 10)

## 2019-10-07 ENCOUNTER — TRANSCRIPTION ENCOUNTER (OUTPATIENT)
Age: 64
End: 2019-10-07

## 2019-10-07 LAB
GLUCOSE BLDC GLUCOMTR-MCNC: 107 MG/DL — HIGH (ref 70–99)
GLUCOSE BLDC GLUCOMTR-MCNC: 114 MG/DL — HIGH (ref 70–99)
GLUCOSE BLDC GLUCOMTR-MCNC: 141 MG/DL — HIGH (ref 70–99)
GLUCOSE BLDC GLUCOMTR-MCNC: 163 MG/DL — HIGH (ref 70–99)

## 2019-10-07 PROCEDURE — 99223 1ST HOSP IP/OBS HIGH 75: CPT

## 2019-10-07 PROCEDURE — 99232 SBSQ HOSP IP/OBS MODERATE 35: CPT

## 2019-10-07 RX ORDER — VALACYCLOVIR 500 MG/1
500 TABLET, FILM COATED ORAL DAILY
Refills: 0 | Status: DISCONTINUED | OUTPATIENT
Start: 2019-10-07 | End: 2019-10-09

## 2019-10-07 RX ORDER — VALACYCLOVIR 500 MG/1
1 TABLET, FILM COATED ORAL
Qty: 7 | Refills: 0
Start: 2019-10-07 | End: 2019-10-13

## 2019-10-07 RX ADMIN — Medication 975 MILLIGRAM(S): at 05:43

## 2019-10-07 RX ADMIN — CARVEDILOL PHOSPHATE 12.5 MILLIGRAM(S): 80 CAPSULE, EXTENDED RELEASE ORAL at 18:08

## 2019-10-07 RX ADMIN — Medication 0.2 MILLIGRAM(S): at 23:00

## 2019-10-07 RX ADMIN — CARVEDILOL PHOSPHATE 12.5 MILLIGRAM(S): 80 CAPSULE, EXTENDED RELEASE ORAL at 05:43

## 2019-10-07 RX ADMIN — Medication 3 UNIT(S): at 08:08

## 2019-10-07 RX ADMIN — SIMVASTATIN 20 MILLIGRAM(S): 20 TABLET, FILM COATED ORAL at 23:00

## 2019-10-07 RX ADMIN — Medication 1: at 13:20

## 2019-10-07 RX ADMIN — CLOPIDOGREL BISULFATE 75 MILLIGRAM(S): 75 TABLET, FILM COATED ORAL at 13:06

## 2019-10-07 RX ADMIN — VALACYCLOVIR 500 MILLIGRAM(S): 500 TABLET, FILM COATED ORAL at 16:37

## 2019-10-07 RX ADMIN — Medication 975 MILLIGRAM(S): at 06:45

## 2019-10-07 RX ADMIN — GABAPENTIN 200 MILLIGRAM(S): 400 CAPSULE ORAL at 23:00

## 2019-10-07 RX ADMIN — Medication 75 MICROGRAM(S): at 05:42

## 2019-10-07 RX ADMIN — Medication 0.2 MILLIGRAM(S): at 05:43

## 2019-10-07 RX ADMIN — PANTOPRAZOLE SODIUM 40 MILLIGRAM(S): 20 TABLET, DELAYED RELEASE ORAL at 08:08

## 2019-10-07 RX ADMIN — Medication 81 MILLIGRAM(S): at 13:24

## 2019-10-07 RX ADMIN — SEVELAMER CARBONATE 1600 MILLIGRAM(S): 2400 POWDER, FOR SUSPENSION ORAL at 13:07

## 2019-10-07 RX ADMIN — Medication 0.2 MILLIGRAM(S): at 13:16

## 2019-10-07 RX ADMIN — AMLODIPINE BESYLATE 10 MILLIGRAM(S): 2.5 TABLET ORAL at 05:42

## 2019-10-07 RX ADMIN — Medication 975 MILLIGRAM(S): at 17:45

## 2019-10-07 RX ADMIN — SEVELAMER CARBONATE 1600 MILLIGRAM(S): 2400 POWDER, FOR SUSPENSION ORAL at 05:43

## 2019-10-07 RX ADMIN — Medication 100 MILLIGRAM(S): at 18:12

## 2019-10-07 RX ADMIN — Medication 975 MILLIGRAM(S): at 16:45

## 2019-10-07 RX ADMIN — Medication 100 MILLIGRAM(S): at 05:43

## 2019-10-07 RX ADMIN — Medication 3 UNIT(S): at 13:21

## 2019-10-07 RX ADMIN — Medication 1 MILLIGRAM(S): at 13:16

## 2019-10-07 RX ADMIN — Medication 1 TABLET(S): at 13:05

## 2019-10-07 RX ADMIN — Medication 650 MILLIGRAM(S): at 23:01

## 2019-10-07 RX ADMIN — Medication 5 MILLIGRAM(S): at 16:44

## 2019-10-07 RX ADMIN — SEVELAMER CARBONATE 1600 MILLIGRAM(S): 2400 POWDER, FOR SUSPENSION ORAL at 23:00

## 2019-10-07 RX ADMIN — Medication 3 UNIT(S): at 16:55

## 2019-10-07 NOTE — DISCHARGE NOTE PROVIDER - NSDCACTIVITY_GEN_ALL_CORE
Bathing allowed/Walking - Indoors allowed/No heavy lifting/straining/Walking - Outdoors allowed/Do not make important decisions/Showering allowed/Stairs allowed/Do not drive or operate machinery

## 2019-10-07 NOTE — DISCHARGE NOTE PROVIDER - HOSPITAL COURSE
63 year old female with PMH HTN, DMT2, CAD s/p CABG, PPM, ESRD on HD T,T,S presents with left sided headache and uncontrolled BP SBP>200.        Hypertensive urgency - BP improved after administration of home BP medications, however continued to be elevated and have headache. Hypertensive urgency resolved. her Clonidine and Amlodipine have been increased.          Headache. CTH unremarkable. CT neck unremarkable. Occipital neuralgia as per Neurology. ESR CRP normal - no temporal arteritis. Her pain improved with Tylenol, Flexeril, Gabapentin and Oxycodone. Discontinued Oxycodone as daughter concerned about somnolence. Then rash was discovered by patient and possible shingles, seen in consultation by ID with recommendations for Valtrex which has been started (renal dosed)        ESRD - She was continued on HD        DMT2, mild hyperglycemia. A1c 6.5. placed on Insulins with good glycemic control.            She was continued on Synthroid for her Hypothyroidism and on DAPT, Statin, BB for her CAD.        Medically improved and stable for discharge.    Discussed with daughter who agrees with plan.    prescriptions sent to [facility].    CDPAP aided being arranged for by [facility]        Dischareg time 45 minutes 63 year old female with PMH HTN, DMT2, CAD s/p CABG, PPM, ESRD on HD T,T,S presents with left sided headache and uncontrolled BP SBP>200.        Hypertensive urgency - BP improved after administration of home BP medications, however continued to be elevated and have headache. Hypertensive urgency resolved. Her Clonidine and Amlodipine have been increased.          Headache. CTH unremarkable. CT neck unremarkable. Occipital neuralgia as per Neurology. ESR CRP normal - no temporal arteritis. Her pain improved with Tylenol, Flexeril, Gabapentin and Oxycodone. Discontinued Oxycodone as daughter concerned about somnolence. Then rash was discovered by patient and possible shingles, seen in consultation by ID with recommendations for Valtrex which has been started (renal dosed). Patient has been increasingly confused with hallucinations and her headache improved so all analgesics medications were discontinued. Given rash concern for viral encephalopathy, decision was made to obtain spinal tap, however given patient on DATP, that was discontinued        ESRD - She was continued on HD        DMT2, mild hyperglycemia. A1c 6.5. placed on Insulins with good glycemic control.        She was continued on Synthroid for her Hypothyroidism and on DAPT, Statin, BB for her CAD. 63 year old female with PMH HTN, DMT2, CAD s/p CABG, PPM, ESRD on HD T,T,S presents with left sided headache and uncontrolled BP SBP>200.        Hypertensive urgency - BP improved after administration of home BP medications, however continued to be elevated and have headache. Hypertensive urgency resolved. Her Clonidine and Amlodipine have been increased.          Headache. CTH unremarkable. CT neck unremarkable. Occipital neuralgia as per Neurology. ESR CRP normal - no temporal arteritis. Her pain improved with Tylenol, Flexeril, Gabapentin and Oxycodone. Discontinued Oxycodone as daughter concerned about somnolence. Then rash was discovered by patient and possible shingles, seen in consultation by ID with recommendations for Valtrex which has been started (renal dosed). Patient has been increasingly confused with hallucinations and her headache improved so all analgesics medications were discontinued. Given rash concern for viral encephalopathy, decision was made to obtain spinal tap, however given patient on DATP, that was discontinued. Underwent LP on 10/11 which was positive for VZV. Completed treatment with IV acyclovir 10/23. She does continue to have herpetic neuralgia and restarted on Gabapentin which was increased and then decreased back to 100 as felt dizzy and wavy with 200mg        ESRD - She was continued on HD        DMT2, mild hyperglycemia. A1c 6.5. placed on Insulins with good glycemic control.        She was continued on Synthroid for her Hypothyroidism and on DAPT, Statin, BB for her CAD.         She did have severe constipation which finally resolved with aggressive bowel regimen.        Medically stable for discharge home        Discharge time 45 minutes

## 2019-10-07 NOTE — PROGRESS NOTE ADULT - SUBJECTIVE AND OBJECTIVE BOX
HOSPITALIST PROGRESS NOTE    KIM LEE  [id number]  63yFemale    Patient is a 63y old  Female who presents with a chief complaint of headache (07 Oct 2019 10:29)      SUBJECTIVE:   Chart reviewed since last visit.  Patient seen and examined at bedside for headache  Somewhat sleepy today as per daughter   Headache improved - 'shock like' in back of neck and left temporal region  NO dizziness, paresthesia, fever, chills, nausea or vomiting. No fever or chills      OBJECTIVE:  Vital Signs Last 24 Hrs  T(C): 36.7 (07 Oct 2019 08:53), Max: 37.6 (06 Oct 2019 15:55)  T(F): 98 (07 Oct 2019 08:53), Max: 99.7 (06 Oct 2019 15:55)  HR: 70 (07 Oct 2019 08:53) (68 - 84)  BP: 142/72 (07 Oct 2019 08:53) (142/72 - 164/76)  RR: 18 (07 Oct 2019 08:53) (18 - 19)  SpO2: 95% (07 Oct 2019 08:53) (94% - 95%)    PHYSICAL EXAMINATION  General: Obese, lying in bed, NAD  HEENT:  extraocular movements intact, no asymmetry. No temporal tenderness  NECK:  supple, pain on lateral rotation  CVS: regular rate and rhythm S1 S2  RESP:  CTAB  GI:  Soft nondistended nontender BS+  : No suprapubic tenderness  MSK:  FROM. no edema  CNS:  NO gross focal or global deficit noted  INTEG:  Mild papular rash on nape of neck - no vesicles  PSYCH:  Fair mood    MONITOR:  CAPILLARY BLOOD GLUCOSE      POCT Blood Glucose.: 107 mg/dL (07 Oct 2019 08:02)  POCT Blood Glucose.: 166 mg/dL (06 Oct 2019 22:36)  POCT Blood Glucose.: 156 mg/dL (06 Oct 2019 17:28)        I&O's Summary        10-05    132<L>  |  92<L>  |  38.0<H>  ----------------------------<  237<H>  5.4<H>   |  25.0  |  5.79<H>    Ca    9.7      05 Oct 2019 13:33  Phos  5.9     10-05    TPro  x   /  Alb  4.1  /  TBili  x   /  DBili  x   /  AST  x   /  ALT  x   /  AlkPhos  x   10-05            Culture:    TTE:    RADIOLOGY        MEDICATIONS  (STANDING):  amLODIPine   Tablet 10 milliGRAM(s) Oral daily  aspirin  chewable 81 milliGRAM(s) Oral daily  carvedilol 12.5 milliGRAM(s) Oral every 12 hours  cloNIDine 0.2 milliGRAM(s) Oral every 8 hours  clopidogrel Tablet 75 milliGRAM(s) Oral daily  dextrose 5%. 1000 milliLiter(s) (50 mL/Hr) IV Continuous <Continuous>  dextrose 50% Injectable 12.5 Gram(s) IV Push once  dextrose 50% Injectable 25 Gram(s) IV Push once  dextrose 50% Injectable 25 Gram(s) IV Push once  folic acid 1 milliGRAM(s) Oral daily  gabapentin 200 milliGRAM(s) Oral at bedtime  hydrALAZINE 100 milliGRAM(s) Oral every 12 hours  influenza   Vaccine 0.5 milliLiter(s) IntraMuscular once  insulin lispro (HumaLOG) corrective regimen sliding scale   SubCutaneous three times a day before meals  insulin lispro (HumaLOG) corrective regimen sliding scale   SubCutaneous at bedtime  insulin lispro Injectable (HumaLOG) 3 Unit(s) SubCutaneous three times a day before meals  levothyroxine 75 MICROGram(s) Oral daily  Nephro-heather 1 Tablet(s) Oral daily  pantoprazole    Tablet 40 milliGRAM(s) Oral before breakfast  sevelamer carbonate 1600 milliGRAM(s) Oral three times a day  simvastatin 20 milliGRAM(s) Oral at bedtime  valACYclovir 500 milliGRAM(s) Oral daily      MEDICATIONS  (PRN):  acetaminophen   Tablet .. 650 milliGRAM(s) Oral every 4 hours PRN Mild Pain (1 - 3)  acetaminophen   Tablet .. 975 milliGRAM(s) Oral every 6 hours PRN Moderate Pain (4 - 6)  cyclobenzaprine 5 milliGRAM(s) Oral three times a day PRN Muscle Spasm  dextrose 40% Gel 15 Gram(s) Oral once PRN Blood Glucose LESS THAN 70 milliGRAM(s)/deciliter  glucagon  Injectable 1 milliGRAM(s) IntraMuscular once PRN Glucose LESS THAN 70 milligrams/deciliter  hydrALAZINE Injectable 10 milliGRAM(s) IV Push every 6 hours PRN SBP >160  labetalol Injectable 10 milliGRAM(s) IV Push every 4 hours PRN Systolic/Diastolic >160/100  metoclopramide 5 milliGRAM(s) Oral three times a day PRN nausea

## 2019-10-07 NOTE — PHYSICAL THERAPY INITIAL EVALUATION ADULT - GAIT TRAINING, PT EVAL
Time Frame: 7 days   Goal:   Pt will ambulate 100 feet with RW or WBQC with modified independence. Stairs: pt will navigate 4 stairs with rails independently.

## 2019-10-07 NOTE — CONSULT NOTE ADULT - SUBJECTIVE AND OBJECTIVE BOX
INFECTIOUS DISEASES AND INTERNAL MEDICINE at [place]  =======================================================  Carlo Esparza MD  Diplomates American Board of Internal Medicine and Infectious Diseases  Telephone [phone]  Fax            [phone]  =======================================================    KIM [id number]      HPI:  63 year old female with PMH HTN, DMT2, CAD s/p CABG, ESRD on HD T,T,S presents with left sided headache since 4-5 days not relieved with Tylenol so came to ER.  Described headache as sharp radiating from occiput to left temporal region and relieved somewhat with Tylenol. Has nausea, but denies vomiting, visual changes, dizziness, fever or chills. Denies any dyspnea, chest pain or palpitations. Occasional numbness in feet.  Noted to have elevated /81   PT DEVELOPED RASH YESTERDAY BACK OF HEAD AND SCALP ON LEFT SIDE WITH BLISTERING   CONCERN FOR  ZOSTER  ASKED TO EVALUATE FROM ID STANDPOINT       PAST MEDICAL & SURGICAL HISTORY:  3-vessel CAD: s/p CABG  Hypothyroidism, unspecified type  Hemodialysis patient: tues thursday saturday  Renal failure  Hypertension  Diabetes mellitus  A-V fistula  S/P cholecystectomy      ANTIBIOTICS      Allergies    No Known Allergies    Intolerances        SOCIAL HISTORY:     FAMILY HX   FAMILY HISTORY:  Family history of diabetes mellitus      Vital Signs Last 24 Hrs  T(C): 36.7 (07 Oct 2019 08:53), Max: 37.6 (06 Oct 2019 15:55)  T(F): 98 (07 Oct 2019 08:53), Max: 99.7 (06 Oct 2019 15:55)  HR: 70 (07 Oct 2019 08:53) (68 - 84)  BP: 142/72 (07 Oct 2019 08:53) (142/72 - 171/74)  BP(mean): --  RR: 18 (07 Oct 2019 08:53) (18 - 19)  SpO2: 95% (07 Oct 2019 08:53) (94% - 95%)  Drug Dosing Weight  Height (cm): 160.02 (04 Oct 2019 04:29)  Weight (kg): 100.2 (04 Oct 2019 04:29)  BMI (kg/m2): 39.1 (04 Oct 2019 04:29)  BSA (m2): 2.02 (04 Oct 2019 04:29)      REVIEW OF SYSTEMS:    CONSTITUTIONAL:  As per HPI.    HEENT:  Eyes:  No diplopia or blurred vision. ENT:  No earache, sore throat or runny nose.    CARDIOVASCULAR:  No pressure, squeezing, strangling, tightness, heaviness or aching about the chest, neck, axilla or epigastrium.    RESPIRATORY:  No cough, shortness of breath, PND or orthopnea.    GASTROINTESTINAL:  No nausea, vomiting or diarrhea.    GENITOURINARY:  No dysuria, frequency or urgency.    MUSCULOSKELETAL:  As per HPI.    SKIN:   AS PER HPI    NEUROLOGIC:  No paresthesias, fasciculations, seizures or weakness.                  PHYSICAL EXAMINATION:    GENERAL: The patient is a well-developed, well-nourished _____in no apparent distress. ___ is alert and oriented x3.    VITAL SIGNS: T(C): 36.7 (10-07-19 @ 08:53), Max: 37.6 (10-06-19 @ 15:55)  HR: 70 (10-07-19 @ 08:53) (68 - 84)  BP: 142/72 (10-07-19 @ 08:53) (142/72 - 171/74)  RR: 18 (10-07-19 @ 08:53) (18 - 19)  SpO2: 95% (10-07-19 @ 08:53) (94% - 95%)  Wt(kg): --    HEENT: Head is normocephalic and atraumatic.  ANICTERIC  NECK: Supple. No carotid bruits.  No lymphadenopathy or thyromegaly.    LUNGS:COARSE BREATH SOUNDS    HEART: Regular rate and rhythm without murmur.    ABDOMEN: Soft, nontender, and nondistended.  Positive bowel sounds.  No hepatosplenomegaly was noted. NO REBOUND NO GUARDING    EXTREMITIES: NO EDEMA NO ERYTHEMA    NEUROLOGIC: NON FOCAL      SKIN: No ulceration or induration present. NO RASH        BLOOD CULTURES       URINE CX          LABS:    10-05    132<L>  |  92<L>  |  38.0<H>  ----------------------------<  237<H>  5.4<H>   |  25.0  |  5.79<H>    Ca    9.7      05 Oct 2019 13:33  Phos  5.9     10-05    TPro  x   /  Alb  4.1  /  TBili  x   /  DBili  x   /  AST  x   /  ALT  x   /  AlkPhos  x   10-05          RADIOLOGY & ADDITIONAL STUDIES:      ASSESSMENT/PLAN    63 year old female with PMH HTN, DMT2, CAD s/p CABG, ESRD on HD T,T,S presents with left sided headache since 4-5 days not relieved with Tylenol so came to ER.  Described headache as sharp radiating from occiput to left temporal region and relieved somewhat with Tylenol. Has nausea, but denies vomiting, visual changes, dizziness, fever or chills. Denies any dyspnea, chest pain or palpitations. Occasional numbness in feet.  Noted to have elevated /81   PT DEVELOPED RASH YESTERDAY BACK OF HEAD AND SCALP ON LEFT SIDE WITH BLISTERING  IT APPEARS TO BE ZOSTER WOULD START VALTREX ORAL 500MG ADJUSTED FOR THIS DIALYSIS  PATIENT  CONTACT ISOLATION WHILE IN HOSPITAL   D/W HOSPITALIST   D/W DAUGHTER AT BEDSIDE                NELI SOLIS MD

## 2019-10-07 NOTE — PROGRESS NOTE ADULT - ASSESSMENT
63 year old female with PMH HTN, DMT2, CAD s/p CABG, PPM, ESRD on HD T,T,S presents with left sided headache and uncontrolled BP SBP>200.    Hypertensive urgency - BP improved after administration of home BP medications, however continued to be elevated and have headache. Hypertensive urgency resolved.  - Continue home medications, adjusted  - Nephrology for HD, fluid removal  - Analgesics, muscle relaxants    Headache. CTH unremarkable. CT neck unremarkable. Occipital neuralgia as per Neurology. ESR CRP normal - no temporal arteritis  - Tylenol  - Flexeril  - Gabapentin  Discontinue Oxycodone as daughter concerned about somnolence    Rash suggestive of shingles as per ID  - Valtrex 500mg PO daily x 7 days (HD dosed)    ESRD  - Continue HD  - Continue Phoslo    DMT2, mild hyperglycemia. A1c 6.5  - BGM with SSI  - Added Humalog 3 U with meals with improvement      Hypothyroidism  - Continue Synthroid    CAD  - Continue DAPT, Statin, BB    Prophylactic measure  - IPC for VTEp    Advance Directives - discussed briefly with patient, daughter, Full Code for now    Disposition - Medically stable for discharge.      Discussed with patient, daughter

## 2019-10-07 NOTE — PHYSICAL THERAPY INITIAL EVALUATION ADULT - PRECAUTIONS/LIMITATIONS, REHAB EVAL
obesity precautions/cardiac precautions/fall precautions cardiac precautions/obesity precautions/Contact precautions - shingles on head/fall precautions/isolation precautions

## 2019-10-07 NOTE — DISCHARGE NOTE PROVIDER - NSDCCPCAREPLAN_GEN_ALL_CORE_FT
PRINCIPAL DISCHARGE DIAGNOSIS  Diagnosis: Hypertensive urgency  Assessment and Plan of Treatment: resolved.  Continue diet and medications as prescribed,  Follow up with Nephrology      SECONDARY DISCHARGE DIAGNOSES  Diagnosis: Occipital neuralgia of left side  Assessment and Plan of Treatment: Continue medications as prescribed.  Follow up with Neurology    Diagnosis: Shingles  Assessment and Plan of Treatment: Continue medications as prescribed.  Follow up with neurology    Diagnosis: T2DM (type 2 diabetes mellitus)  Assessment and Plan of Treatment: Continue diet and medications as prescribed.  Follow up with PMD    Diagnosis: CAD (coronary artery disease)  Assessment and Plan of Treatment: Continue diet and medications as prescribed.  Follow up with Cardiology    Diagnosis: ESRD on dialysis  Assessment and Plan of Treatment: HD as scheduled PRINCIPAL DISCHARGE DIAGNOSIS  Diagnosis: Herpes zoster encephalitis  Assessment and Plan of Treatment: Completed treatment      SECONDARY DISCHARGE DIAGNOSES  Diagnosis: Hypertensive urgency  Assessment and Plan of Treatment: resolved.  Continue diet and medications as prescribed,  Follow up with Nephrology    Diagnosis: Shingles  Assessment and Plan of Treatment: Continue medications as prescribed.  Follow up with neurology    Diagnosis: T2DM (type 2 diabetes mellitus)  Assessment and Plan of Treatment: Continue diet and medications as prescribed.  Follow up with PMD    Diagnosis: CAD (coronary artery disease)  Assessment and Plan of Treatment: Continue diet and medications as prescribed.  Follow up with Cardiology    Diagnosis: ESRD on dialysis  Assessment and Plan of Treatment: HD as scheduled

## 2019-10-07 NOTE — DISCHARGE NOTE PROVIDER - CARE PROVIDER_API CALL
Loyd Muñoz; PhD)  Neurology; Vascular Neurology  [address], [address]  [place] [address]  Phone: [phone]  Fax: [phone]  Follow Up Time:     Marsha Bergeron (WILLI Lopez [address] [place] [address]  Phone: [phone]  Fax: [phone]  Follow Up Time:

## 2019-10-08 ENCOUNTER — TRANSCRIPTION ENCOUNTER (OUTPATIENT)
Age: 64
End: 2019-10-08

## 2019-10-08 LAB
ANION GAP SERPL CALC-SCNC: 20 MMOL/L — HIGH (ref 5–17)
BUN SERPL-MCNC: 75 MG/DL — HIGH (ref 8–20)
CALCIUM SERPL-MCNC: 9.3 MG/DL — SIGNIFICANT CHANGE UP (ref 8.6–10.2)
CHLORIDE SERPL-SCNC: 89 MMOL/L — LOW (ref 98–107)
CO2 SERPL-SCNC: 20 MMOL/L — LOW (ref 22–29)
CREAT SERPL-MCNC: 9.55 MG/DL — HIGH (ref 0.5–1.3)
CRP SERPL-MCNC: 0.91 MG/DL — HIGH (ref 0–0.4)
ERYTHROCYTE [SEDIMENTATION RATE] IN BLOOD: 35 MM/HR — HIGH (ref 0–20)
GLUCOSE BLDC GLUCOMTR-MCNC: 125 MG/DL — HIGH (ref 70–99)
GLUCOSE BLDC GLUCOMTR-MCNC: 143 MG/DL — HIGH (ref 70–99)
GLUCOSE BLDC GLUCOMTR-MCNC: 175 MG/DL — HIGH (ref 70–99)
GLUCOSE BLDC GLUCOMTR-MCNC: 191 MG/DL — HIGH (ref 70–99)
GLUCOSE SERPL-MCNC: 152 MG/DL — HIGH (ref 70–115)
HCT VFR BLD CALC: 34.6 % — SIGNIFICANT CHANGE UP (ref 34.5–45)
HGB BLD-MCNC: 10.9 G/DL — LOW (ref 11.5–15.5)
MCHC RBC-ENTMCNC: 28.8 PG — SIGNIFICANT CHANGE UP (ref 27–34)
MCHC RBC-ENTMCNC: 31.5 GM/DL — LOW (ref 32–36)
MCV RBC AUTO: 91.3 FL — SIGNIFICANT CHANGE UP (ref 80–100)
PLATELET # BLD AUTO: 193 K/UL — SIGNIFICANT CHANGE UP (ref 150–400)
POTASSIUM SERPL-MCNC: 6.3 MMOL/L — CRITICAL HIGH (ref 3.5–5.3)
POTASSIUM SERPL-SCNC: 6.3 MMOL/L — CRITICAL HIGH (ref 3.5–5.3)
PROCALCITONIN SERPL-MCNC: 0.42 NG/ML — HIGH (ref 0.02–0.1)
RBC # BLD: 3.79 M/UL — LOW (ref 3.8–5.2)
RBC # FLD: 13.5 % — SIGNIFICANT CHANGE UP (ref 10.3–14.5)
SODIUM SERPL-SCNC: 129 MMOL/L — LOW (ref 135–145)
WBC # BLD: 10.99 K/UL — HIGH (ref 3.8–10.5)
WBC # FLD AUTO: 10.99 K/UL — HIGH (ref 3.8–10.5)

## 2019-10-08 PROCEDURE — 90937 HEMODIALYSIS REPEATED EVAL: CPT

## 2019-10-08 PROCEDURE — 71045 X-RAY EXAM CHEST 1 VIEW: CPT | Mod: 26

## 2019-10-08 PROCEDURE — 99233 SBSQ HOSP IP/OBS HIGH 50: CPT

## 2019-10-08 RX ORDER — HYDROMORPHONE HYDROCHLORIDE 2 MG/ML
1 INJECTION INTRAMUSCULAR; INTRAVENOUS; SUBCUTANEOUS EVERY 4 HOURS
Refills: 0 | Status: DISCONTINUED | OUTPATIENT
Start: 2019-10-08 | End: 2019-10-09

## 2019-10-08 RX ADMIN — Medication 100 MILLIGRAM(S): at 18:27

## 2019-10-08 RX ADMIN — SEVELAMER CARBONATE 1600 MILLIGRAM(S): 2400 POWDER, FOR SUSPENSION ORAL at 23:03

## 2019-10-08 RX ADMIN — Medication 975 MILLIGRAM(S): at 16:30

## 2019-10-08 RX ADMIN — Medication 1 TABLET(S): at 13:28

## 2019-10-08 RX ADMIN — AMLODIPINE BESYLATE 10 MILLIGRAM(S): 2.5 TABLET ORAL at 05:46

## 2019-10-08 RX ADMIN — Medication 0.2 MILLIGRAM(S): at 23:02

## 2019-10-08 RX ADMIN — Medication 10 MILLIGRAM(S): at 23:02

## 2019-10-08 RX ADMIN — VALACYCLOVIR 500 MILLIGRAM(S): 500 TABLET, FILM COATED ORAL at 13:29

## 2019-10-08 RX ADMIN — SEVELAMER CARBONATE 1600 MILLIGRAM(S): 2400 POWDER, FOR SUSPENSION ORAL at 05:47

## 2019-10-08 RX ADMIN — Medication 650 MILLIGRAM(S): at 00:01

## 2019-10-08 RX ADMIN — Medication 650 MILLIGRAM(S): at 09:56

## 2019-10-08 RX ADMIN — SEVELAMER CARBONATE 1600 MILLIGRAM(S): 2400 POWDER, FOR SUSPENSION ORAL at 13:28

## 2019-10-08 RX ADMIN — SIMVASTATIN 20 MILLIGRAM(S): 20 TABLET, FILM COATED ORAL at 23:02

## 2019-10-08 RX ADMIN — GABAPENTIN 200 MILLIGRAM(S): 400 CAPSULE ORAL at 23:08

## 2019-10-08 RX ADMIN — Medication 650 MILLIGRAM(S): at 23:04

## 2019-10-08 RX ADMIN — Medication 3 UNIT(S): at 08:35

## 2019-10-08 RX ADMIN — Medication 1 MILLIGRAM(S): at 13:36

## 2019-10-08 RX ADMIN — Medication 650 MILLIGRAM(S): at 09:06

## 2019-10-08 RX ADMIN — Medication 650 MILLIGRAM(S): at 05:50

## 2019-10-08 RX ADMIN — Medication 3 UNIT(S): at 13:25

## 2019-10-08 RX ADMIN — Medication 650 MILLIGRAM(S): at 06:50

## 2019-10-08 RX ADMIN — CARVEDILOL PHOSPHATE 12.5 MILLIGRAM(S): 80 CAPSULE, EXTENDED RELEASE ORAL at 05:45

## 2019-10-08 RX ADMIN — Medication 975 MILLIGRAM(S): at 15:42

## 2019-10-08 RX ADMIN — Medication 0.2 MILLIGRAM(S): at 13:28

## 2019-10-08 RX ADMIN — Medication 3 UNIT(S): at 18:28

## 2019-10-08 RX ADMIN — Medication 1: at 13:25

## 2019-10-08 RX ADMIN — Medication 75 MICROGRAM(S): at 05:50

## 2019-10-08 RX ADMIN — Medication 10 MILLIGRAM(S): at 15:41

## 2019-10-08 RX ADMIN — Medication 0.2 MILLIGRAM(S): at 05:45

## 2019-10-08 RX ADMIN — CARVEDILOL PHOSPHATE 12.5 MILLIGRAM(S): 80 CAPSULE, EXTENDED RELEASE ORAL at 18:27

## 2019-10-08 RX ADMIN — CLOPIDOGREL BISULFATE 75 MILLIGRAM(S): 75 TABLET, FILM COATED ORAL at 13:36

## 2019-10-08 RX ADMIN — Medication 100 MILLIGRAM(S): at 05:45

## 2019-10-08 RX ADMIN — PANTOPRAZOLE SODIUM 40 MILLIGRAM(S): 20 TABLET, DELAYED RELEASE ORAL at 13:29

## 2019-10-08 RX ADMIN — Medication 81 MILLIGRAM(S): at 13:36

## 2019-10-08 NOTE — PROGRESS NOTE ADULT - SUBJECTIVE AND OBJECTIVE BOX
[facility] DIVISION OF KIDNEY DISEASES AND HYPERTENSION -- HEMODIALYSIS NOTE  --------------------------------------------------------------------------------  Chief Complaint: ESRD/Ongoing hemodialysis requirement    24 hour events/subjective: None,      PAST HISTORY  --------------------------------------------------------------------------------  No significant changes to PMH, PSH, FHx, SHx, unless otherwise noted    ALLERGIES & MEDICATIONS  --------------------------------------------------------------------------------  Allergies    No Known Allergies    Standing Inpatient Medications  amLODIPine   Tablet 10 milliGRAM(s) Oral daily  aspirin  chewable 81 milliGRAM(s) Oral daily  carvedilol 12.5 milliGRAM(s) Oral every 12 hours  cloNIDine 0.2 milliGRAM(s) Oral every 8 hours  clopidogrel Tablet 75 milliGRAM(s) Oral daily  dextrose 5%. 1000 milliLiter(s) IV Continuous <Continuous>  dextrose 50% Injectable 12.5 Gram(s) IV Push once  dextrose 50% Injectable 25 Gram(s) IV Push once  dextrose 50% Injectable 25 Gram(s) IV Push once  folic acid 1 milliGRAM(s) Oral daily  gabapentin 200 milliGRAM(s) Oral at bedtime  hydrALAZINE 100 milliGRAM(s) Oral every 12 hours  influenza   Vaccine 0.5 milliLiter(s) IntraMuscular once  insulin lispro (HumaLOG) corrective regimen sliding scale   SubCutaneous three times a day before meals  insulin lispro (HumaLOG) corrective regimen sliding scale   SubCutaneous at bedtime  insulin lispro Injectable (HumaLOG) 3 Unit(s) SubCutaneous three times a day before meals  levothyroxine 75 MICROGram(s) Oral daily  Nephro-heather 1 Tablet(s) Oral daily  pantoprazole    Tablet 40 milliGRAM(s) Oral before breakfast  sevelamer carbonate 1600 milliGRAM(s) Oral three times a day  simvastatin 20 milliGRAM(s) Oral at bedtime  valACYclovir 500 milliGRAM(s) Oral daily    PRN Inpatient Medications  acetaminophen   Tablet .. 650 milliGRAM(s) Oral every 4 hours PRN  acetaminophen   Tablet .. 975 milliGRAM(s) Oral every 6 hours PRN  dextrose 40% Gel 15 Gram(s) Oral once PRN  glucagon  Injectable 1 milliGRAM(s) IntraMuscular once PRN  hydrALAZINE Injectable 10 milliGRAM(s) IV Push every 6 hours PRN  HYDROmorphone  Injectable 1 milliGRAM(s) IV Push every 4 hours PRN  labetalol Injectable 10 milliGRAM(s) IV Push every 4 hours PRN  metoclopramide 5 milliGRAM(s) Oral three times a day PRN      REVIEW OF SYSTEMS  --------------------------------------------------------------------------------  Gen: No weight changes, fatigue, fevers/chills, weakness  Skin: No rashes  Head/Eyes/Ears/Mouth: No headache; Normal hearing; Normal vision w/o blurriness; No sinus pain/discomfort, sore throat  Respiratory: No dyspnea, cough, wheezing, hemoptysis  CV: No chest pain, PND, orthopnea  GI: No abdominal pain, diarrhea, constipation, nausea, vomiting, melena, hematochezia  : No increased frequency, dysuria, hematuria, nocturia  MSK: No joint pain/swelling; no back pain; no edema  Neuro: No dizziness/lightheadedness, weakness, seizures, numbness, tingling  Heme: No easy bruising or bleeding  Endo: No heat/cold intolerance  Psych: No significant nervousness, anxiety, stress, depression    All other systems were reviewed and are negative, except as noted.    VITALS/PHYSICAL EXAM  --------------------------------------------------------------------------------  T(C): 37.2 (10-09-19 @ 05:28), Max: 37.9 (10-08-19 @ 15:17)  HR: 70 (10-09-19 @ 05:28) (69 - 74)  BP: 177/70 (10-09-19 @ 05:28) (145/57 - 177/73)  RR: 18 (10-09-19 @ 05:28) (18 - 20)  SpO2: 96% (10-09-19 @ 05:28) (94% - 96%)    10-08-19 @ 07:01  -  10-09-19 @ 05:56  --------------------------------------------------------  IN: 0 mL / OUT: 2000 mL / NET: -2000 mL    Physical Exam:  	Gen: NAD, well-appearing  	HEENT: PERRL, supple neck, clear oropharynx  	Pulm: CTA B/L  	CV: RRR, S1S2; no rub  	Back: No spinal or CVA tenderness; no sacral edema  	Abd: +BS, soft, nontender/nondistended  	: No suprapubic tenderness  	UE: Warm, FROM, no clubbing, intact strength; no edema; no asterixis  	LE: Warm, FROM, no clubbing, intact strength; no edema  	Neuro: No focal deficits, intact gait  	Psych: Normal affect and mood  	Skin: Warm, without rashes  	Vascular access:    LABS/STUDIES  --------------------------------------------------------------------------------              10.9   10.99 >-----------<  193      [10-08-19 @ 08:09]              34.6     129  |  89  |  75.0  ----------------------------<  152      [10-08-19 @ 08:09]  6.3   |  20.0  |  9.55        Ca     9.3     [10-08-19 @ 08:09]    PTH -- (Ca 9.8)      [11-13-18 @ 16:19]   551  Vitamin D (25OH) 25.0      [11-13-18 @ 16:38]  HbA1c 6.5      [10-05-19 @ 13:33]  TSH 0.91      [10-05-19 @ 13:33]

## 2019-10-08 NOTE — DISCHARGE NOTE NURSING/CASE MANAGEMENT/SOCIAL WORK - PATIENT PORTAL LINK FT
You can access the FollowMyHealth Patient Portal offered by [facility] by registering at the following website: [email or web address]. By joining [facility]’s FollowMyHealth portal, you will also be able to view your health information using other applications (apps) compatible with our system.

## 2019-10-08 NOTE — PROGRESS NOTE ADULT - ASSESSMENT
63 year old female with PMH HTN, DMT2, CAD s/p CABG, PPM, ESRD on HD T,T,S presents with left sided headache and uncontrolled BP SBP>200.    Headache. CTH unremarkable. CT neck unremarkable. Occipital neuralgia as per Neurology. ESR CRP normal - no temporal arteritis  - Tylenol  - Flexeril  - Gabapentin  Discontinue Oxycodone as daughter concerned about somnolence    Rash suggestive of shingles as per ID  - Valtrex 500mg PO daily x 7 days (HD dosed)    Hypertensive urgency - BP improved after administration of home BP medications, however continued to be elevated and have headache. Hypertensive urgency resolved.  - Continue home medications, adjusted  - Nephrology for HD, fluid removal  - Analgesics, muscle relaxants    ESRD  - Continue HD  - Continue Phoslo    DMT2, mild hyperglycemia. A1c 6.5  - BGM with SSI  - Added Humalog 3 U with meals with improvement      Hypothyroidism  - Continue Synthroid    CAD  - Continue DAPT, Statin, BB    Prophylactic measure  - IPC for VTEp    Advance Directives - discussed briefly with patient, daughter, Full Code for now    Disposition - Medically stable for discharge.      Discussed with patient, CCC

## 2019-10-08 NOTE — PROGRESS NOTE ADULT - SUBJECTIVE AND OBJECTIVE BOX
HOSPITALIST PROGRESS NOTE    KIM LEE  [id number]  63yFemale    Patient is a 63y old  Female who presents with a chief complaint of headache (07 Oct 2019 12:47)      SUBJECTIVE:   Chart reviewed since last visit.  Patient seen and examined i HD suite for shingles, ESRD.  Headache much improved.  Denies any confusion, dizziness, fever or chill.  Denies any nausea or vomiting      OBJECTIVE:  Vital Signs Last 24 Hrs  T(C): 36.8 (08 Oct 2019 11:00), Max: 37.2 (08 Oct 2019 07:21)  T(F): 98.2 (08 Oct 2019 11:00), Max: 98.9 (08 Oct 2019 07:21)  HR: 71 (08 Oct 2019 11:00) (69 - 71)  BP: 145/57 (08 Oct 2019 11:00) (145/57 - 174/66)   RR: 18 (08 Oct 2019 11:00) (18 - 20)  SpO2: 96% (08 Oct 2019 11:00) (94% - 96%)    PHYSICAL EXAMINATION  General: Obese, lying in bed, NAD  HEENT:  extraocular movements intact, no asymmetry. No temporal tenderness  NECK:  supple, pain on lateral rotation  CVS: regular rate and rhythm S1 S2  RESP:  CTAB  GI:  Soft nondistended nontender BS+  : No suprapubic tenderness  MSK:  FROM. no edema  CNS:  NO gross focal or global deficit noted  INTEG:  Mild papular rash on nape of neck - no vesicles  PSYCH:  Fair mood      MONITOR:  CAPILLARY BLOOD GLUCOSE      POCT Blood Glucose.: 143 mg/dL (08 Oct 2019 08:10)  POCT Blood Glucose.: 141 mg/dL (07 Oct 2019 22:58)  POCT Blood Glucose.: 114 mg/dL (07 Oct 2019 16:53)  POCT Blood Glucose.: 163 mg/dL (07 Oct 2019 13:19)        I&O's Summary                          10.9   10.99 )-----------( 193      ( 08 Oct 2019 08:09 )             34.6       10-08    129<L>  |  89<L>  |  75.0<H>  ----------------------------<  152<H>  6.3<HH>   |  20.0<L>  |  9.55<H>    Ca    9.3      08 Oct 2019 08:09              Culture:    TTE:    RADIOLOGY        MEDICATIONS  (STANDING):  amLODIPine   Tablet 10 milliGRAM(s) Oral daily  aspirin  chewable 81 milliGRAM(s) Oral daily  carvedilol 12.5 milliGRAM(s) Oral every 12 hours  cloNIDine 0.2 milliGRAM(s) Oral every 8 hours  clopidogrel Tablet 75 milliGRAM(s) Oral daily  dextrose 5%. 1000 milliLiter(s) (50 mL/Hr) IV Continuous <Continuous>  dextrose 50% Injectable 12.5 Gram(s) IV Push once  dextrose 50% Injectable 25 Gram(s) IV Push once  dextrose 50% Injectable 25 Gram(s) IV Push once  folic acid 1 milliGRAM(s) Oral daily  gabapentin 200 milliGRAM(s) Oral at bedtime  hydrALAZINE 100 milliGRAM(s) Oral every 12 hours  influenza   Vaccine 0.5 milliLiter(s) IntraMuscular once  insulin lispro (HumaLOG) corrective regimen sliding scale   SubCutaneous three times a day before meals  insulin lispro (HumaLOG) corrective regimen sliding scale   SubCutaneous at bedtime  insulin lispro Injectable (HumaLOG) 3 Unit(s) SubCutaneous three times a day before meals  levothyroxine 75 MICROGram(s) Oral daily  Nephro-heather 1 Tablet(s) Oral daily  pantoprazole    Tablet 40 milliGRAM(s) Oral before breakfast  sevelamer carbonate 1600 milliGRAM(s) Oral three times a day  simvastatin 20 milliGRAM(s) Oral at bedtime  valACYclovir 500 milliGRAM(s) Oral daily      MEDICATIONS  (PRN):  acetaminophen   Tablet .. 650 milliGRAM(s) Oral every 4 hours PRN Mild Pain (1 - 3)  acetaminophen   Tablet .. 975 milliGRAM(s) Oral every 6 hours PRN Moderate Pain (4 - 6)  cyclobenzaprine 5 milliGRAM(s) Oral three times a day PRN Muscle Spasm  dextrose 40% Gel 15 Gram(s) Oral once PRN Blood Glucose LESS THAN 70 milliGRAM(s)/deciliter  glucagon  Injectable 1 milliGRAM(s) IntraMuscular once PRN Glucose LESS THAN 70 milligrams/deciliter  hydrALAZINE Injectable 10 milliGRAM(s) IV Push every 6 hours PRN SBP >160  labetalol Injectable 10 milliGRAM(s) IV Push every 4 hours PRN Systolic/Diastolic >160/100  metoclopramide 5 milliGRAM(s) Oral three times a day PRN nausea

## 2019-10-09 LAB
BASOPHILS # BLD AUTO: 0.06 K/UL — SIGNIFICANT CHANGE UP (ref 0–0.2)
BASOPHILS NFR BLD AUTO: 0.8 % — SIGNIFICANT CHANGE UP (ref 0–2)
EOSINOPHIL # BLD AUTO: 0.03 K/UL — SIGNIFICANT CHANGE UP (ref 0–0.5)
EOSINOPHIL NFR BLD AUTO: 0.4 % — SIGNIFICANT CHANGE UP (ref 0–6)
GLUCOSE BLDC GLUCOMTR-MCNC: 137 MG/DL — HIGH (ref 70–99)
GLUCOSE BLDC GLUCOMTR-MCNC: 156 MG/DL — HIGH (ref 70–99)
GLUCOSE BLDC GLUCOMTR-MCNC: 175 MG/DL — HIGH (ref 70–99)
GLUCOSE BLDC GLUCOMTR-MCNC: 92 MG/DL — SIGNIFICANT CHANGE UP (ref 70–99)
HCT VFR BLD CALC: 35.3 % — SIGNIFICANT CHANGE UP (ref 34.5–45)
HGB BLD-MCNC: 10.9 G/DL — LOW (ref 11.5–15.5)
IMM GRANULOCYTES NFR BLD AUTO: 0.3 % — SIGNIFICANT CHANGE UP (ref 0–1.5)
LYMPHOCYTES # BLD AUTO: 1.35 K/UL — SIGNIFICANT CHANGE UP (ref 1–3.3)
LYMPHOCYTES # BLD AUTO: 17.3 % — SIGNIFICANT CHANGE UP (ref 13–44)
MCHC RBC-ENTMCNC: 28.4 PG — SIGNIFICANT CHANGE UP (ref 27–34)
MCHC RBC-ENTMCNC: 30.9 GM/DL — LOW (ref 32–36)
MCV RBC AUTO: 91.9 FL — SIGNIFICANT CHANGE UP (ref 80–100)
MONOCYTES # BLD AUTO: 0.71 K/UL — SIGNIFICANT CHANGE UP (ref 0–0.9)
MONOCYTES NFR BLD AUTO: 9.1 % — SIGNIFICANT CHANGE UP (ref 2–14)
NEUTROPHILS # BLD AUTO: 5.64 K/UL — SIGNIFICANT CHANGE UP (ref 1.8–7.4)
NEUTROPHILS NFR BLD AUTO: 72.1 % — SIGNIFICANT CHANGE UP (ref 43–77)
PLATELET # BLD AUTO: 181 K/UL — SIGNIFICANT CHANGE UP (ref 150–400)
RBC # BLD: 3.84 M/UL — SIGNIFICANT CHANGE UP (ref 3.8–5.2)
RBC # FLD: 13.3 % — SIGNIFICANT CHANGE UP (ref 10.3–14.5)
WBC # BLD: 7.81 K/UL — SIGNIFICANT CHANGE UP (ref 3.8–10.5)
WBC # FLD AUTO: 7.81 K/UL — SIGNIFICANT CHANGE UP (ref 3.8–10.5)

## 2019-10-09 PROCEDURE — 99232 SBSQ HOSP IP/OBS MODERATE 35: CPT

## 2019-10-09 PROCEDURE — 99233 SBSQ HOSP IP/OBS HIGH 50: CPT

## 2019-10-09 RX ORDER — ACYCLOVIR SODIUM 500 MG
400 VIAL (EA) INTRAVENOUS EVERY 24 HOURS
Refills: 0 | Status: DISCONTINUED | OUTPATIENT
Start: 2019-10-09 | End: 2019-10-23

## 2019-10-09 RX ADMIN — SEVELAMER CARBONATE 1600 MILLIGRAM(S): 2400 POWDER, FOR SUSPENSION ORAL at 15:04

## 2019-10-09 RX ADMIN — Medication 108 MILLIGRAM(S): at 17:47

## 2019-10-09 RX ADMIN — Medication 10 MILLIGRAM(S): at 05:18

## 2019-10-09 RX ADMIN — Medication 0.3 MILLIGRAM(S): at 15:04

## 2019-10-09 RX ADMIN — Medication 0.2 MILLIGRAM(S): at 05:16

## 2019-10-09 RX ADMIN — Medication 3 UNIT(S): at 17:47

## 2019-10-09 RX ADMIN — Medication 3 UNIT(S): at 12:22

## 2019-10-09 RX ADMIN — Medication 1: at 08:31

## 2019-10-09 RX ADMIN — PANTOPRAZOLE SODIUM 40 MILLIGRAM(S): 20 TABLET, DELAYED RELEASE ORAL at 05:17

## 2019-10-09 RX ADMIN — CARVEDILOL PHOSPHATE 12.5 MILLIGRAM(S): 80 CAPSULE, EXTENDED RELEASE ORAL at 17:47

## 2019-10-09 RX ADMIN — Medication 100 MILLIGRAM(S): at 05:16

## 2019-10-09 RX ADMIN — Medication 650 MILLIGRAM(S): at 04:16

## 2019-10-09 RX ADMIN — Medication 0.3 MILLIGRAM(S): at 21:57

## 2019-10-09 RX ADMIN — Medication 1: at 12:23

## 2019-10-09 RX ADMIN — Medication 1 TABLET(S): at 12:22

## 2019-10-09 RX ADMIN — Medication 100 MILLIGRAM(S): at 17:47

## 2019-10-09 RX ADMIN — SEVELAMER CARBONATE 1600 MILLIGRAM(S): 2400 POWDER, FOR SUSPENSION ORAL at 21:57

## 2019-10-09 RX ADMIN — SIMVASTATIN 20 MILLIGRAM(S): 20 TABLET, FILM COATED ORAL at 21:57

## 2019-10-09 RX ADMIN — SEVELAMER CARBONATE 1600 MILLIGRAM(S): 2400 POWDER, FOR SUSPENSION ORAL at 05:16

## 2019-10-09 RX ADMIN — Medication 650 MILLIGRAM(S): at 05:16

## 2019-10-09 RX ADMIN — Medication 650 MILLIGRAM(S): at 00:34

## 2019-10-09 RX ADMIN — AMLODIPINE BESYLATE 10 MILLIGRAM(S): 2.5 TABLET ORAL at 05:16

## 2019-10-09 RX ADMIN — Medication 75 MICROGRAM(S): at 05:17

## 2019-10-09 RX ADMIN — Medication 3 UNIT(S): at 08:30

## 2019-10-09 RX ADMIN — CARVEDILOL PHOSPHATE 12.5 MILLIGRAM(S): 80 CAPSULE, EXTENDED RELEASE ORAL at 05:17

## 2019-10-09 RX ADMIN — Medication 1 MILLIGRAM(S): at 12:22

## 2019-10-09 NOTE — DIETITIAN INITIAL EVALUATION ADULT. - ETIOLOGY
related to increased protein-energy needs in setting of ESRD on HD, recent decrease in PO intake 2/2 confusion

## 2019-10-09 NOTE — PROGRESS NOTE ADULT - ASSESSMENT
1) ESRD on HD  2) Anemia of renal dx  3) HTN  4) MBD of renal dx    Plan for HD in AM  On sevelamer TID WC; trend phosphorus  Continue antihypertensives;  Order in place  Will follow  Kirk Cavazos

## 2019-10-09 NOTE — PROGRESS NOTE ADULT - SUBJECTIVE AND OBJECTIVE BOX
CC: f/u headache, shingles rash  INTERVAL HPI/OVERNIGHT EVENTS: Pt seen and examined at bedside by Hospitalist and PA with Daughter, Martha, present. Yesterday, Pt had a fever of 100.3F. As per Daughter, early this AM pt was hallucinating and combative towards her. As per RN and Daughter, Pt remains sleepy. The Daughter is convinced that confusion is due to gabapentin and pain medications given.     REVIEW OF SYSTEMS: unable to obtain, only what Daughter can provide   Allergies  No Known Allergies    PAST MEDICAL & SURGICAL HISTORY:  3-vessel CAD: s/p CABG  Hypothyroidism, unspecified type  Hemodialysis patient: tues thursday saturday  Renal failure  Hypertension  Diabetes mellitus  A-V fistula  S/P cholecystectomy    VITAL SIGNS:  T(C): 36.7 (10-09-19 @ 07:38), Max: 37.9 (10-08-19 @ 15:17)  HR: 75 (10-09-19 @ 07:38) (70 - 75)  BP: 162/67 (10-09-19 @ 07:38) (162/67 - 177/73)  RR: 19 (10-09-19 @ 07:38) (18 - 19)  SpO2: 96% (10-09-19 @ 07:38) (94% - 96%)  Wt(kg): --Vital Signs Last 24 Hrs  T(C): 36.7 (09 Oct 2019 07:38), Max: 37.9 (08 Oct 2019 15:17)  T(F): 98 (09 Oct 2019 07:38), Max: 100.3 (08 Oct 2019 15:17)  HR: 75 (09 Oct 2019 07:38) (70 - 75)  BP: 162/67 (09 Oct 2019 07:38) (162/67 - 177/73)  BP(mean): --  RR: 19 (09 Oct 2019 07:38) (18 - 19)  SpO2: 96% (09 Oct 2019 07:38) (94% - 96%)    PHYSICAL EXAM:  GENERAL: Pt sleeping in bed comfortably in NAD  HEAD:  Atraumatic, Normocephalic  EYES: EOMI, PERRLA, conjunctiva and sclera clear  ENT: Moist mucous membranes  NECK: vesicles scabbed over in occipital area, No JVD  CHEST/LUNG: Clear to auscultation bilaterally; No rales, rhonchi, wheezing, or rubs. Unlabored respirations  HEART: Regular rate and rhythm; No murmurs, rubs, or gallops  ABDOMEN: Bowel sounds present; Soft, Nontender, Nondistended. No guarding or rigidity   EXTREMITIES:  2+ Peripheral Pulses, brisk capillary refill. No clubbing, cyanosis, or edema  NERVOUS SYSTEM:  somnolent, wakes easily to voice, Alert & Oriented X3, moves B/L UE, neuro exam in LE limited due to uncooperativeness with physical exam   SKIN: No rashes or lesions    LABS:                      10.9   7.81  )-----------( 181      ( 09 Oct 2019 13:44 )             35.3     10-08    129<L>  |  89<L>  |  75.0<H>  ----------------------------<  152<H>  6.3<HH>   |  20.0<L>  |  9.55<H>    Ca    9.3      08 Oct 2019 08:09    CAPILLARY BLOOD GLUCOSE  POCT Blood Glucose.: 156 mg/dL (09 Oct 2019 12:19)  POCT Blood Glucose.: 175 mg/dL (09 Oct 2019 08:28)  POCT Blood Glucose.: 175 mg/dL (08 Oct 2019 22:49)  POCT Blood Glucose.: 125 mg/dL (08 Oct 2019 18:25)

## 2019-10-09 NOTE — DIETITIAN INITIAL EVALUATION ADULT. - ADD RECOMMEND
Continue Nephro-heather daily; Monitor electrolytes, correct prn; Obtain new weight and monitor weights daily for trend/accuracy

## 2019-10-09 NOTE — PROGRESS NOTE ADULT - SUBJECTIVE AND OBJECTIVE BOX
[facility]  INFECTIOUS DISEASES AND INTERNAL MEDICINE at [place]  =======================================================  Carlo Esparza MD  Diplomates American Board of Internal Medicine and Infectious Diseases  Tel: [phone]      Fax: [phone]  =======================================================    KIM LEE [id number]    Follow up: Herpes Zoster  Was started on valtrex for blistering rash at back of head and scalp  Overnight had tmax 100.3 F and per daughter  with mental status change-patient appeared to be hallucinating and combative which is not like her at all    Allergies:  No Known Allergies      Medications:  acetaminophen   Tablet .. 650 milliGRAM(s) Oral every 4 hours PRN  acetaminophen   Tablet .. 975 milliGRAM(s) Oral every 6 hours PRN  acyclovir IVPB 400 milliGRAM(s) IV Intermittent every 24 hours  amLODIPine   Tablet 10 milliGRAM(s) Oral daily  carvedilol 12.5 milliGRAM(s) Oral every 12 hours  cloNIDine 0.3 milliGRAM(s) Oral every 8 hours  dextrose 40% Gel 15 Gram(s) Oral once PRN  dextrose 5%. 1000 milliLiter(s) IV Continuous <Continuous>  dextrose 50% Injectable 12.5 Gram(s) IV Push once  dextrose 50% Injectable 25 Gram(s) IV Push once  dextrose 50% Injectable 25 Gram(s) IV Push once  folic acid 1 milliGRAM(s) Oral daily  glucagon  Injectable 1 milliGRAM(s) IntraMuscular once PRN  hydrALAZINE 100 milliGRAM(s) Oral every 12 hours  hydrALAZINE Injectable 10 milliGRAM(s) IV Push every 6 hours PRN  influenza   Vaccine 0.5 milliLiter(s) IntraMuscular once  insulin lispro (HumaLOG) corrective regimen sliding scale   SubCutaneous three times a day before meals  insulin lispro (HumaLOG) corrective regimen sliding scale   SubCutaneous at bedtime  insulin lispro Injectable (HumaLOG) 3 Unit(s) SubCutaneous three times a day before meals  labetalol Injectable 10 milliGRAM(s) IV Push every 4 hours PRN  levothyroxine 75 MICROGram(s) Oral daily  metoclopramide 5 milliGRAM(s) Oral three times a day PRN  Nephro-heather 1 Tablet(s) Oral daily  pantoprazole    Tablet 40 milliGRAM(s) Oral before breakfast  sevelamer carbonate 1600 milliGRAM(s) Oral three times a day  simvastatin 20 milliGRAM(s) Oral at bedtime            REVIEW OF SYSTEMS:  cannot obtain           Physical Exam:  ICU Vital Signs Last 24 Hrs  T(C): 36.7 (09 Oct 2019 07:38), Max: 37.9 (08 Oct 2019 15:17)  T(F): 98 (09 Oct 2019 07:38), Max: 100.3 (08 Oct 2019 15:17)  HR: 75 (09 Oct 2019 07:38) (70 - 75)  BP: 162/67 (09 Oct 2019 07:38) (162/67 - 177/73)  BP(mean): --  ABP: --  ABP(mean): --  RR: 19 (09 Oct 2019 07:38) (18 - 19)  SpO2: 96% (09 Oct 2019 07:38) (94% - 96%)      GEN: NAD, drowsy but arousable  HEENT: normocephalic and atraumatic. EOMI. PERRL.  Anicteric   NECK: Supple.   LUNGS: Clear to auscultation.  HEART: Regular rate and rhythm without murmur.  ABDOMEN: Soft, nontender, and nondistended.  Positive bowel sounds.    : No CVA tenderness  EXTREMITIES: Without any edema.  MSK: no joint swelling LUe AVF  NEUROLOGIC: Cranial nerves II through XII are grossly intact. No focal deficits  PSYCHIATRIC: Appropriate affect .  SKIN: crusted lesions overnape of neck and left side of scalp      Labs:  10-08    129<L>  |  89<L>  |  75.0<H>  ----------------------------<  152<H>  6.3<HH>   |  20.0<L>  |  9.55<H>    Ca    9.3      08 Oct 2019 08:09                            10.9   7.81  )-----------( 181      ( 09 Oct 2019 13:44 )             35.3                 CAPILLARY BLOOD GLUCOSE      POCT Blood Glucose.: 156 mg/dL (09 Oct 2019 12:19)  POCT Blood Glucose.: 175 mg/dL (09 Oct 2019 08:28)  POCT Blood Glucose.: 175 mg/dL (08 Oct 2019 22:49)  POCT Blood Glucose.: 125 mg/dL (08 Oct 2019 18:25)        RECENT CULTURES:

## 2019-10-09 NOTE — DIETITIAN INITIAL EVALUATION ADULT. - OTHER INFO
Pt admitted with a headache, hypertensive urgency, rash suggestive of shingles, hx ESRD on HD, hypothyroidism, DM (HgbA1c 6.5%). Pts daughter reports Pt with good appetite at home, mostly follows dietary recommendations for HD however Pt's cultural diet is high in K+ foods, noted with K 6.3<H>. Per daughter Pt takes Phos and K binders at home. In house Pt with recent decline in appetite x 2 days due to confusion and irrational thought that her food is being poisoned. Discussed with daughter to bring in food from home if  they feel it will increase PO. Weight from previous admission Nov 2018, 213lbs. Unclear accuracy of documented admission weight as daughter says UBW ~220lbs with few pounds weight gain over the last year.

## 2019-10-09 NOTE — PROGRESS NOTE ADULT - ASSESSMENT
63 year old female with PMH HTN, DMT2, CAD s/p CABG, PPM, ESRD on HD T,T,S presents with left sided headache and uncontrolled BP SBP>200. Found to have vesicles suspicious for shingles.    Headache. CTH unremarkable. CT neck unremarkable. Occipital neuralgia as per Neurology. ESR CRP normal - no temporal arteritis  - d/c Flexeril, Oxycodone, Gabapentin and Dilaudid. May continue Tylenol prn     Rash suggestive of shingles as per ID, ? disseminated HSV encephalopathy  - Valtrex PO daily switched to IV Acyclovir (renally dosed)   - ID following    -procal 0.42, CRP now 0.9  - Neurology called by Attending. Stopped anticoagulation today if LP with IR is indicated. if febrile again or Pt mental status worsens then will need an LP more urgently     AMS: ?Delirium vs HSV encephalopathy vs gabapentin use   - psych consulted   - pain meds d/c   - Pt mood is stable because Pt is somnolent      Hypertensive urgency - BP improved after administration of home BP medications, however continued to be elevated and have headache. Hypertensive urgency resolved.  - Continue home medications, adjusted  - Nephrology for HD, fluid removal  - SBPs 160-170s. added clonidine. Continue to trend BPs    ESRD  - Continue HD  - Continue Phoslo    DMT2, mild hyperglycemia. A1c 6.5  - BGM with SSI  - continue to monitor     Hypothyroidism  - Continue Synthroid    CAD  - Continue Statin, BB  -ASA and Plavix d/c'd for possible LP     Prophylactic measure  - IPC for VTEp    Advance Directives - discussed briefly with patient, daughter, Full Code for now 63 year old female with PMH HTN, DMT2, CAD s/p CABG, PPM, ESRD on HD T,T,S presents with left sided headache and uncontrolled BP SBP>200. Found to have vesicles suspicious for shingles.    Headache. CTH unremarkable. CT neck unremarkable. Occipital neuralgia as per Neurology. ESR CRP normal - no temporal arteritis.   - d/c Flexeril, Oxycodone, Gabapentin and Dilaudid. May continue Tylenol prn     Rash suggestive of shingles as per ID, ? disseminated HSV encephalopathy  - Valtrex PO daily switched to IV Acyclovir (renally dosed)   - ID following    -procal 0.42, CRP now 0.9  - Neurology called by Attending. Stopped anticoagulation today if LP with IR is indicated. if febrile again or Pt mental status worsens then will need an LP more urgently     AMS: ?Delirium vs HSV encephalopathy vs gabapentin use   - psych consulted   - pain meds d/c   - Pt mood is stable because Pt is somnolent      Hypertensive urgency - BP improved after administration of home BP medications, however continued to be elevated and have headache. Hypertensive urgency resolved.  - Continue home medications, adjusted  - Nephrology for HD, fluid removal  - SBPs 160-170s. added clonidine. Continue to trend BPs    ESRD  - Continue HD  - Continue Phoslo    DMT2, mild hyperglycemia. A1c 6.5  - BGM with SSI  - continue to monitor     Hypothyroidism  - Continue Synthroid    CAD  - Continue Statin, BB  -ASA and Plavix d/c'd for possible LP     Prophylactic measure  - IPC for VTEp    Advance Directives - discussed briefly with patient, daughter, Full Code for now

## 2019-10-09 NOTE — PROGRESS NOTE ADULT - SUBJECTIVE AND OBJECTIVE BOX
[facility] DIVISION OF KIDNEY DISEASES AND HYPERTENSION -- FOLLOW UP NOTE  --------------------------------------------------------------------------------  Chief Complaint: ESRD HD    24 hour events/subjective:  Seen/examined  HD in AM      PAST HISTORY  --------------------------------------------------------------------------------  No significant changes to PMH, PSH, FHx, SHx, unless otherwise noted    ALLERGIES & MEDICATIONS  --------------------------------------------------------------------------------  Allergies    No Known Allergies    Intolerances      Standing Inpatient Medications  acyclovir IVPB 400 milliGRAM(s) IV Intermittent every 24 hours  amLODIPine   Tablet 10 milliGRAM(s) Oral daily  carvedilol 12.5 milliGRAM(s) Oral every 12 hours  cloNIDine 0.3 milliGRAM(s) Oral every 8 hours  dextrose 5%. 1000 milliLiter(s) IV Continuous <Continuous>  dextrose 50% Injectable 12.5 Gram(s) IV Push once  dextrose 50% Injectable 25 Gram(s) IV Push once  dextrose 50% Injectable 25 Gram(s) IV Push once  folic acid 1 milliGRAM(s) Oral daily  hydrALAZINE 100 milliGRAM(s) Oral every 12 hours  influenza   Vaccine 0.5 milliLiter(s) IntraMuscular once  insulin lispro (HumaLOG) corrective regimen sliding scale   SubCutaneous three times a day before meals  insulin lispro (HumaLOG) corrective regimen sliding scale   SubCutaneous at bedtime  insulin lispro Injectable (HumaLOG) 3 Unit(s) SubCutaneous three times a day before meals  levothyroxine 75 MICROGram(s) Oral daily  Nephro-heather 1 Tablet(s) Oral daily  pantoprazole    Tablet 40 milliGRAM(s) Oral before breakfast  sevelamer carbonate 1600 milliGRAM(s) Oral three times a day  simvastatin 20 milliGRAM(s) Oral at bedtime    PRN Inpatient Medications  acetaminophen   Tablet .. 650 milliGRAM(s) Oral every 4 hours PRN  acetaminophen   Tablet .. 975 milliGRAM(s) Oral every 6 hours PRN  dextrose 40% Gel 15 Gram(s) Oral once PRN  glucagon  Injectable 1 milliGRAM(s) IntraMuscular once PRN  hydrALAZINE Injectable 10 milliGRAM(s) IV Push every 6 hours PRN  labetalol Injectable 10 milliGRAM(s) IV Push every 4 hours PRN  metoclopramide 5 milliGRAM(s) Oral three times a day PRN      REVIEW OF SYSTEMS  --------------------------------------------------------------------------------  Gen: No weight changes, fatigue, fevers/chills, weakness  Skin: No rashes  Head/Eyes/Ears/Mouth: No headache; Normal hearing; Normal vision w/o blurriness; No sinus pain/discomfort, sore throat  Respiratory: No dyspnea, cough, wheezing, hemoptysis  CV: No chest pain, PND, orthopnea  GI: No abdominal pain, diarrhea, constipation, nausea, vomiting, melena, hematochezia  : No increased frequency, dysuria, hematuria, nocturia  MSK: No joint pain/swelling; no back pain; no edema  Neuro: No dizziness/lightheadedness, weakness, seizures, numbness, tingling  Heme: No easy bruising or bleeding  Endo: No heat/cold intolerance  Psych: No significant nervousness, anxiety, stress, depression    All other systems were reviewed and are negative, except as noted.    VITALS/PHYSICAL EXAM  --------------------------------------------------------------------------------  T(C): 36.7 (10-09-19 @ 07:38), Max: 37.9 (10-08-19 @ 15:17)  HR: 75 (10-09-19 @ 07:38) (70 - 75)  BP: 162/67 (10-09-19 @ 07:38) (162/67 - 177/73)  RR: 19 (10-09-19 @ 07:38) (18 - 19)  SpO2: 96% (10-09-19 @ 07:38) (94% - 96%)  Wt(kg): --    Weight (kg): 79.4 (10-09-19 @ 08:33)      10-08-19 @ 07:01  -  10-09-19 @ 07:00  --------------------------------------------------------  IN: 0 mL / OUT: 2000 mL / NET: -2000 mL      Physical Exam:              Gen: NAD, well-appearing  	HEENT: PERRL, supple neck, clear oropharynx  	Pulm: CTA B/L  	CV: RRR, S1S2; no rub  	Back: No spinal or CVA tenderness; no sacral edema  	Abd: +BS, soft, nontender/nondistended  	: No suprapubic tenderness  	UE: Warm, FROM, no clubbing, intact strength; no edema; no asterixis  	LE: Warm, FROM, no clubbing, intact strength; no edema  	Neuro: No focal deficits, intact gait  	Psych: Normal affect and mood  	Skin: Warm, without rashes    LABS/STUDIES  --------------------------------------------------------------------------------              10.9   10.99 >-----------<  193      [10-08-19 @ 08:09]              34.6     129  |  89  |  75.0  ----------------------------<  152      [10-08-19 @ 08:09]  6.3   |  20.0  |  9.55        Ca     9.3     [10-08-19 @ 08:09]      Creatinine Trend:  SCr 9.55 [10-08 @ 08:09]  SCr 5.79 [10-05 @ 13:33]  SCr 5.73 [10-04 @ 05:24]    PTH -- (Ca 9.8)      [11-13-18 @ 16:19]   551  Vitamin D (25OH) 25.0      [11-13-18 @ 16:38]  HbA1c 6.5      [10-05-19 @ 13:33]  TSH 0.91      [10-05-19 @ 13:33]

## 2019-10-09 NOTE — DIETITIAN INITIAL EVALUATION ADULT. - PERTINENT MEDS FT
Humalog, Dilaudid, Apresoline, Renvela, Coreg, Folic Acid, Reglan, Protonix, Synthroid, Nephro-heather

## 2019-10-09 NOTE — PROGRESS NOTE ADULT - ASSESSMENT
63 year old female with PMH HTN, DMT2, CAD s/p CABG, ESRD on HD T,T,S presents with left sided headache since 4-5 days not relieved with Tylenol so came to ER.  Described headache as sharp radiating from occiput to left temporal region and relieved somewhat with Tylenol. Has nausea, but denies vomiting, visual changes, dizziness, fever or chills. Denies any dyspnea, chest pain or palpitations. Occasional numbness in feet.  Noted to have elevated /81 and rash consistent with Herpes Zoster. Was started on valtrex and gabapentin  Overnight with tmax 100.3 F and new mental status change    Concern for disseminated Zoster vs AMS due to gabapentin  Lesions appear to be crusting  DC valtrex  Acyclovir 5mg IV daily and after HD on HD days  Hold gabapentin  Airborne + contact isolation  Plan for LP-please send cytology CSF cell counts, glucose, protein, CSF PCr as well as HSV/VZV PCR, cultures    d/w Dr Cavazos

## 2019-10-10 DIAGNOSIS — F05 DELIRIUM DUE TO KNOWN PHYSIOLOGICAL CONDITION: ICD-10-CM

## 2019-10-10 LAB
ANION GAP SERPL CALC-SCNC: 22 MMOL/L — HIGH (ref 5–17)
APTT BLD: 34 SEC — SIGNIFICANT CHANGE UP (ref 27.5–36.3)
BUN SERPL-MCNC: 66 MG/DL — HIGH (ref 8–20)
CALCIUM SERPL-MCNC: 9.3 MG/DL — SIGNIFICANT CHANGE UP (ref 8.6–10.2)
CHLORIDE SERPL-SCNC: 88 MMOL/L — LOW (ref 98–107)
CO2 SERPL-SCNC: 21 MMOL/L — LOW (ref 22–29)
CREAT SERPL-MCNC: 9.29 MG/DL — HIGH (ref 0.5–1.3)
GLUCOSE BLDC GLUCOMTR-MCNC: 125 MG/DL — HIGH (ref 70–99)
GLUCOSE BLDC GLUCOMTR-MCNC: 133 MG/DL — HIGH (ref 70–99)
GLUCOSE BLDC GLUCOMTR-MCNC: 147 MG/DL — HIGH (ref 70–99)
GLUCOSE SERPL-MCNC: 155 MG/DL — HIGH (ref 70–115)
INR BLD: 1.06 RATIO — SIGNIFICANT CHANGE UP (ref 0.88–1.16)
MRSA PCR RESULT.: DETECTED
POTASSIUM SERPL-MCNC: 5.8 MMOL/L — HIGH (ref 3.5–5.3)
POTASSIUM SERPL-SCNC: 5.8 MMOL/L — HIGH (ref 3.5–5.3)
PROTHROM AB SERPL-ACNC: 12.2 SEC — SIGNIFICANT CHANGE UP (ref 10–12.9)
RAPID RVP RESULT: SIGNIFICANT CHANGE UP
S AUREUS DNA NOSE QL NAA+PROBE: DETECTED
SODIUM SERPL-SCNC: 131 MMOL/L — LOW (ref 135–145)

## 2019-10-10 PROCEDURE — 99232 SBSQ HOSP IP/OBS MODERATE 35: CPT

## 2019-10-10 PROCEDURE — 99233 SBSQ HOSP IP/OBS HIGH 50: CPT

## 2019-10-10 PROCEDURE — 93010 ELECTROCARDIOGRAM REPORT: CPT

## 2019-10-10 PROCEDURE — 70450 CT HEAD/BRAIN W/O DYE: CPT | Mod: 26

## 2019-10-10 PROCEDURE — 90792 PSYCH DIAG EVAL W/MED SRVCS: CPT

## 2019-10-10 PROCEDURE — 90937 HEMODIALYSIS REPEATED EVAL: CPT

## 2019-10-10 RX ORDER — MUPIROCIN 20 MG/G
1 OINTMENT TOPICAL
Refills: 0 | Status: COMPLETED | OUTPATIENT
Start: 2019-10-10 | End: 2019-10-15

## 2019-10-10 RX ORDER — QUETIAPINE FUMARATE 200 MG/1
12.5 TABLET, FILM COATED ORAL
Refills: 0 | Status: DISCONTINUED | OUTPATIENT
Start: 2019-10-10 | End: 2019-10-14

## 2019-10-10 RX ORDER — CHLORHEXIDINE GLUCONATE 213 G/1000ML
1 SOLUTION TOPICAL ONCE
Refills: 0 | Status: DISCONTINUED | OUTPATIENT
Start: 2019-10-10 | End: 2019-10-23

## 2019-10-10 RX ORDER — QUETIAPINE FUMARATE 200 MG/1
12.5 TABLET, FILM COATED ORAL AT BEDTIME
Refills: 0 | Status: DISCONTINUED | OUTPATIENT
Start: 2019-10-10 | End: 2019-10-14

## 2019-10-10 RX ADMIN — Medication 108 MILLIGRAM(S): at 23:42

## 2019-10-10 RX ADMIN — AMLODIPINE BESYLATE 10 MILLIGRAM(S): 2.5 TABLET ORAL at 07:38

## 2019-10-10 RX ADMIN — CARVEDILOL PHOSPHATE 12.5 MILLIGRAM(S): 80 CAPSULE, EXTENDED RELEASE ORAL at 07:37

## 2019-10-10 RX ADMIN — Medication 1 MILLIGRAM(S): at 16:46

## 2019-10-10 RX ADMIN — Medication 0.3 MILLIGRAM(S): at 15:39

## 2019-10-10 RX ADMIN — Medication 10 MILLIGRAM(S): at 20:36

## 2019-10-10 RX ADMIN — QUETIAPINE FUMARATE 12.5 MILLIGRAM(S): 200 TABLET, FILM COATED ORAL at 15:44

## 2019-10-10 RX ADMIN — Medication 0.3 MILLIGRAM(S): at 07:37

## 2019-10-10 RX ADMIN — SEVELAMER CARBONATE 1600 MILLIGRAM(S): 2400 POWDER, FOR SUSPENSION ORAL at 07:38

## 2019-10-10 RX ADMIN — Medication 100 MILLIGRAM(S): at 07:38

## 2019-10-10 RX ADMIN — Medication 2 MILLIGRAM(S): at 18:15

## 2019-10-10 RX ADMIN — Medication 3 UNIT(S): at 07:37

## 2019-10-10 RX ADMIN — Medication 1 MILLIGRAM(S): at 13:05

## 2019-10-10 NOTE — PROGRESS NOTE ADULT - ASSESSMENT
The patient is a 62yo Sp.  Female w, Headache    Headache  Occipital neuralgia, question vesicular rash in occipital nerve distribution  ESR=28, CRP<0.40, doubt temporal arteritis  continue to treat headache/neuralgia symptomatically with GBP and prn flexeril  If medication no help, may need occipital nerve block as out patient    Encephalopathy - ? VZV,     HTN:  Control BP gradually to avoid CVA,  from hypotension , Goal < 150 mm.,

## 2019-10-10 NOTE — BEHAVIORAL HEALTH ASSESSMENT NOTE - NSBHCHARTREVIEWIMAGING_PSY_A_CORE FT
EXAM:  CT BRAIN                          PROCEDURE DATE:  10/04/2019          INTERPRETATION:  CLINICAL INFORMATION: Headache. Elevated blood pressure    TECHNIQUE: Noncontrast axial CT images of the brain were acquired from   the base of skull tovertex.    COMPARISON: None.    FINDINGS: No intracranial hemorrhage is seen. The grey-white   differentiation is maintained. Mild periventricular and subcortical white   matter hypoattenuation without mass effect is noted, non-specific, but   likelyrelated to chronic small vessel ischemic changes.    Cerebral volume loss is present with proportional prominence of the sulci   and ventricles. No mass effect or midline shift is seen. Basal cisterns   are not effaced.    Bilateral maxillary sinus mucosal thickening and opacification. The   tympanomastoid cavities are clear. Prior right ocular lens surgery.    No osseous abnormality seen.    IMPRESSION: No intracranial hemorrhage or mass effect.                  FEDERICO SALDAÑA M.D., ATTENDING RADIOLOGIST  This document has been electronically signed. Oct  4 2019  5:07AM

## 2019-10-10 NOTE — PROGRESS NOTE ADULT - SUBJECTIVE AND OBJECTIVE BOX
[facility] DIVISION OF KIDNEY DISEASES AND HYPERTENSION -- HEMODIALYSIS NOTE  --------------------------------------------------------------------------------  Chief Complaint: ESRD/Ongoing hemodialysis requirement    24 hour events/subjective:        PAST HISTORY  --------------------------------------------------------------------------------  No significant changes to PMH, PSH, FHx, SHx, unless otherwise noted    ALLERGIES & MEDICATIONS  --------------------------------------------------------------------------------  Allergies    No Known Allergies    Intolerances      Standing Inpatient Medications  acyclovir IVPB 400 milliGRAM(s) IV Intermittent every 24 hours  amLODIPine   Tablet 10 milliGRAM(s) Oral daily  carvedilol 12.5 milliGRAM(s) Oral every 12 hours  chlorhexidine 2% Cloths 1 Application(s) Topical once  cloNIDine 0.3 milliGRAM(s) Oral every 8 hours  dextrose 5%. 1000 milliLiter(s) IV Continuous <Continuous>  dextrose 50% Injectable 12.5 Gram(s) IV Push once  dextrose 50% Injectable 25 Gram(s) IV Push once  dextrose 50% Injectable 25 Gram(s) IV Push once  folic acid 1 milliGRAM(s) Oral daily  hydrALAZINE 100 milliGRAM(s) Oral every 12 hours  influenza   Vaccine 0.5 milliLiter(s) IntraMuscular once  insulin lispro (HumaLOG) corrective regimen sliding scale   SubCutaneous three times a day before meals  insulin lispro (HumaLOG) corrective regimen sliding scale   SubCutaneous at bedtime  insulin lispro Injectable (HumaLOG) 3 Unit(s) SubCutaneous three times a day before meals  levothyroxine 75 MICROGram(s) Oral daily  LORazepam   Injectable 1 milliGRAM(s) IntraMuscular once  mupirocin 2% Nasal 1 Application(s) Nasal two times a day  Nephro-heather 1 Tablet(s) Oral daily  pantoprazole    Tablet 40 milliGRAM(s) Oral before breakfast  QUEtiapine 12.5 milliGRAM(s) Oral at bedtime  sevelamer carbonate 1600 milliGRAM(s) Oral three times a day  simvastatin 20 milliGRAM(s) Oral at bedtime    PRN Inpatient Medications  acetaminophen   Tablet .. 650 milliGRAM(s) Oral every 4 hours PRN  acetaminophen   Tablet .. 975 milliGRAM(s) Oral every 6 hours PRN  dextrose 40% Gel 15 Gram(s) Oral once PRN  glucagon  Injectable 1 milliGRAM(s) IntraMuscular once PRN  hydrALAZINE Injectable 10 milliGRAM(s) IV Push every 6 hours PRN  labetalol Injectable 10 milliGRAM(s) IV Push every 4 hours PRN  LORazepam   Injectable 1 milliGRAM(s) IV Push every 4 hours PRN  QUEtiapine 12.5 milliGRAM(s) Oral two times a day PRN      REVIEW OF SYSTEMS  --------------------------------------------------------------------------------  Gen: No weight changes, fatigue, fevers/chills, weakness  Skin: No rashes  Head/Eyes/Ears/Mouth: No headache; Normal hearing; Normal vision w/o blurriness; No sinus pain/discomfort, sore throat  Respiratory: No dyspnea, cough, wheezing, hemoptysis  CV: No chest pain, PND, orthopnea  GI: No abdominal pain, diarrhea, constipation, nausea, vomiting, melena, hematochezia  : No increased frequency, dysuria, hematuria, nocturia  MSK: No joint pain/swelling; no back pain; no edema  Neuro: No dizziness/lightheadedness, weakness, seizures, numbness, tingling  Heme: No easy bruising or bleeding  Endo: No heat/cold intolerance  Psych: No significant nervousness, anxiety, stress, depression    All other systems were reviewed and are negative, except as noted.    VITALS/PHYSICAL EXAM  --------------------------------------------------------------------------------  T(C): 37.1 (10-11-19 @ 07:51), Max: 37.1 (10-11-19 @ 07:51)  HR: 70 (10-11-19 @ 07:51) (70 - 77)  BP: 151/56 (10-11-19 @ 07:51) (151/56 - 169/71)  RR: 19 (10-11-19 @ 07:51) (18 - 19)  SpO2: 90% (10-11-19 @ 07:51) (90% - 97%)  Wt(kg): --        10-10-19 @ 07:01  -  10-11-19 @ 07:00  --------------------------------------------------------  IN: 0 mL / OUT: 2100 mL / NET: -2100 mL      Physical Exam:  	Gen: NAD, well-appearing  	HEENT: PERRL, supple neck, clear oropharynx  	Pulm: CTA B/L  	CV: RRR, S1S2; no rub  	Back: No spinal or CVA tenderness; no sacral edema  	Abd: +BS, soft, nontender/nondistended  	: No suprapubic tenderness  	UE: Warm, FROM, no clubbing, intact strength; no edema; no asterixis  	LE: Warm, FROM, no clubbing, intact strength; no edema  	Neuro: No focal deficits, intact gait  	Psych: Normal affect and mood  	Skin: Warm, without rashes  	Vascular access:    LABS/STUDIES  --------------------------------------------------------------------------------              10.9   7.81  >-----------<  181      [10-09-19 @ 13:44]              35.3     131  |  88  |  66.0  ----------------------------<  155      [10-10-19 @ 18:33]  5.8   |  21.0  |  9.29        Ca     9.3     [10-10-19 @ 18:33]      PT/INR: PT 12.2 , INR 1.06       [10-10-19 @ 18:33]  PTT: 34.0       [10-10-19 @ 18:33]      PTH -- (Ca 9.8)      [11-13-18 @ 16:19]   551  Vitamin D (25OH) 25.0      [11-13-18 @ 16:38]  HbA1c 6.5      [10-05-19 @ 13:33]  TSH 0.91      [10-05-19 @ 13:33]

## 2019-10-10 NOTE — BEHAVIORAL HEALTH ASSESSMENT NOTE - NSBHCHARTREVIEWLAB_PSY_A_CORE FT
MRSA/MSSA PCR (10.10.19 @ 10:32)    MRSA PCR Result.: Detected: TYPE:(C=Critical, N=Notification, A=Abnormal) C  TESTS: MRSA PCR  DATE/TIME CALLED: 10/10/19 11:55  CALLED TO: 6TWR: Michael Gregory RN  READ BACK (2 Patient Identifiers)(Y/N): Y  READ BACK VALUES (Y/N): Y  CALLED BY: nora  Copy to Infection Control,Logistics. 10/10/19 11:55  NOT DETECTED RESULT: MRSA and MSSA not detected  A result of MRSA and MSSA not detected does not preclude the possibility  of colonization with MRSA or MSSA. Negative results may occur from  improper specimen collection, handling or storage, or because the number  of organisms in the specimen is below the analytical sensitivity of the  test.  INDETERMINATE RESULT: Indeterminate  An indeterminate result may be due to:  1) levels of MRSA DNA and/or MSSA DNA present below the established  definitive positive detection valuei 2) the presence of amplification  inhibitors in the samplei 3) interfering substances with no MRSA DNA  and/or MSSA DNA present. This result does not preclude the possibility of  infection with MRSA or the presence of MSSA. Please submit an additional  specimen for repeat testing.  DETECTED RESULT: MRSA DNA detected and/or MSSA DNA detected.  A positive test result does not necessarily indicate the presence of  viable organisms and therefore, does not necessarily indicate treatment  eradication failure since DNA may persist.  The results of this test should be interpreted with consideration of all  clinical and laboratory findings. This test determines colonization  status at a given time and is not intended to identify patients with MRSA  infection or the presence of MSSA.    Staph Aureus PCR Result: Detected  Basic Metabolic Panel (10.08.19 @ 08:09)    Sodium, Serum: 129 mmol/L    Potassium, Serum: 6.3: TYPE:(C=Critical, N=Notification, A=Abnormal) C  TESTS: POTASSIUM  DATE/TIME CALLED: 10/08/19 08:59  CALLED TO: CIARAN CASTILLO  READ BACK (2 Patient Identifiers)(Y/N): Y  READ BACK VALUES (Y/N): Y  CALLED BY: JS mmol/L    Chloride, Serum: 89 mmol/L    Carbon Dioxide, Serum: 20.0 mmol/L    Anion Gap, Serum: 20 mmol/L    Blood Urea Nitrogen, Serum: 75.0 mg/dL    Creatinine, Serum: 9.55 mg/dL    Glucose, Serum: 152 mg/dL    Calcium, Total Serum: 9.3 mg/dL

## 2019-10-10 NOTE — PROGRESS NOTE ADULT - ASSESSMENT
63 year old female with PMH HTN, DMT2, CAD s/p CABG, ESRD on HD T,T,S presents with left sided headache since 4-5 days not relieved with Tylenol so came to ER.  Described headache as sharp radiating from occiput to left temporal region and relieved somewhat with Tylenol. Has nausea, but denies vomiting, visual changes, dizziness, fever or chills. Denies any dyspnea, chest pain or palpitations. Occasional numbness in feet.  Noted to have elevated /81 and rash consistent with Herpes Zoster. Was started on valtrex and gabapentin  PT HAS BECOME MRE PARANOID AND COMBATATIVE   CONCERN FOR  HERPES ZOSTER ENCEPHALITIS   Concern for disseminated Zoster vs AMS due to gabapentin     Acyclovir 5mg IV daily and after HD on HD days  Hold gabapentin  Airborne + contact isolation  RECOMMEND   LP-please send cytology CSF cell counts, glucose, protein, CSF PCr as well as HSV/VZV PCR, cultures  SPOKE TO FAMILY DI FRENCH BEDSIDE

## 2019-10-10 NOTE — BEHAVIORAL HEALTH ASSESSMENT NOTE - SUMMARY
63 yohf, , from [facility] in [place] years, lives with family, no formal PPH, tx, no h/o SA, SIB, no  h/o psychosis,  drug or alcohol use, no h/o violence or behavioral issues, history obtained from daughter and Pt via interpreting by daughter, admitted to [facility] for headache and medical workup in progress, referred to psych for management of delirium.  Pt presents with some agitation and paranoia claiming she feels unsafe with some of people in hospital.  Her thinking is disorganized with reports or fluctuations of awareness and VH consistent with delirium.  Recommend Seroquel 12.5 mg hs and Seroquel 12.5 mg bid prn for agitation.  Hold for qtc >500.  Need EKG as last one on record is March.  Avoid Benzo as they may worsen confusion.

## 2019-10-10 NOTE — BEHAVIORAL HEALTH ASSESSMENT NOTE - NSBHCHARTREVIEWVS_PSY_A_CORE FT
ICU Vital Signs Last 24 Hrs  T(C): 36.7 (10 Oct 2019 10:14), Max: 37.1 (09 Oct 2019 17:49)  T(F): 98 (10 Oct 2019 10:14), Max: 98.8 (09 Oct 2019 17:49)  HR: 77 (10 Oct 2019 10:14) (70 - 77)  BP: 161/78 (10 Oct 2019 10:14) (153/73 - 165/69)  BP(mean): --  ABP: --  ABP(mean): --  RR: 18 (10 Oct 2019 10:14) (18 - 18)  SpO2: 95% (10 Oct 2019 10:14) (94% - 97%)

## 2019-10-10 NOTE — PROGRESS NOTE ADULT - ASSESSMENT
63 year old female with PMH HTN, DMT2, CAD s/p CABG, ESRD on HD T,T,S presents with left sided headache since 4-5 days not relieved with Tylenol so came to ER.  Described headache as sharp radiating from occiput to left temporal region and relieved somewhat with Tylenol. Has nausea, but denies vomiting, visual changes, dizziness, fever or chills. Denies any dyspnea, chest pain or palpitations.   Occasional numbness in feet.  Noted to have elevated /81 and rash consistent with Herpes Zoster. Was started on valtrex and gabapentin    CONCERN FOR  HERPES ZOSTER ENCEPHALITIS   Concern for disseminated Zoster vs AMS due to gabapentin     Acyclovir 5mg IV daily and after HD on HD days  Hold gabapentin  Airborne + contact isolation  Planning LP- cytology CSF cell counts, glucose, protein, CSF PCR for HSV/ VZV  + cultures

## 2019-10-10 NOTE — PROGRESS NOTE ADULT - SUBJECTIVE AND OBJECTIVE BOX
[facility] DIVISION OF KIDNEY DISEASES AND HYPERTENSION -- FOLLOW UP NOTE  --------------------------------------------------------------------------------  Chief Complaint: Encephalitis,     24 hour events/subjective:    PAST HISTORY  --------------------------------------------------------------------------------  No significant changes to PMH, PSH, FHx, SHx, unless otherwise noted    ALLERGIES & MEDICATIONS  --------------------------------------------------------------------------------  Allergies    No Known Allergies    Standing Inpatient Medications  acyclovir IVPB 400 milliGRAM(s) IV Intermittent every 24 hours  amLODIPine   Tablet 10 milliGRAM(s) Oral daily  carvedilol 12.5 milliGRAM(s) Oral every 12 hours  chlorhexidine 2% Cloths 1 Application(s) Topical once  cloNIDine 0.3 milliGRAM(s) Oral every 8 hours  dextrose 5%. 1000 milliLiter(s) IV Continuous <Continuous>  dextrose 50% Injectable 12.5 Gram(s) IV Push once  dextrose 50% Injectable 25 Gram(s) IV Push once  dextrose 50% Injectable 25 Gram(s) IV Push once  folic acid 1 milliGRAM(s) Oral daily  hydrALAZINE 100 milliGRAM(s) Oral every 12 hours  influenza   Vaccine 0.5 milliLiter(s) IntraMuscular once  insulin lispro (HumaLOG) corrective regimen sliding scale   SubCutaneous three times a day before meals  insulin lispro (HumaLOG) corrective regimen sliding scale   SubCutaneous at bedtime  insulin lispro Injectable (HumaLOG) 3 Unit(s) SubCutaneous three times a day before meals  levothyroxine 75 MICROGram(s) Oral daily  LORazepam   Injectable 1 milliGRAM(s) IntraMuscular once  mupirocin 2% Nasal 1 Application(s) Nasal two times a day  Nephro-heather 1 Tablet(s) Oral daily  pantoprazole    Tablet 40 milliGRAM(s) Oral before breakfast  sevelamer carbonate 1600 milliGRAM(s) Oral three times a day  simvastatin 20 milliGRAM(s) Oral at bedtime    PRN Inpatient Medications  acetaminophen   Tablet .. 650 milliGRAM(s) Oral every 4 hours PRN  acetaminophen   Tablet .. 975 milliGRAM(s) Oral every 6 hours PRN  dextrose 40% Gel 15 Gram(s) Oral once PRN  glucagon  Injectable 1 milliGRAM(s) IntraMuscular once PRN  hydrALAZINE Injectable 10 milliGRAM(s) IV Push every 6 hours PRN  labetalol Injectable 10 milliGRAM(s) IV Push every 4 hours PRN  LORazepam   Injectable 1 milliGRAM(s) IV Push every 4 hours PRN  metoclopramide 5 milliGRAM(s) Oral three times a day PRN    REVIEW OF SYSTEMS  --------------------------------------------------------------------------------  Gen: + weight changes, fatigue, fevers/chills, weakness  Skin: H.Z.  rash,   Head/Eyes/Ears/Mouth: + headache; Normal hearing; Normal vision w/o blurriness; No sinus pain/discomfort, sore throat  Respiratory: No dyspnea, cough, wheezing, hemoptysis  CV: No chest pain, PND, orthopnea  GI: No abdominal pain, diarrhea, constipation, nausea, vomiting, melena, hematochezia  : Oliguric,   MSK: No joint pain/swelling; no back pain; + edema  Neuro: No dizziness/lightheadedness, weakness, seizures, numbness, tingling  Heme: No easy bruising or bleeding  Endo: No heat/cold intolerance  Psych: + significant nervousness, anxiety, stress, depression    All other systems were reviewed and are negative, except as noted.    VITALS/PHYSICAL EXAM  --------------------------------------------------------------------------------  T(C): 36.7 (10-10-19 @ 10:14), Max: 37.1 (10-09-19 @ 17:49)  HR: 77 (10-10-19 @ 10:14) (70 - 77)  BP: 161/78 (10-10-19 @ 10:14) (153/73 - 165/69)  RR: 18 (10-10-19 @ 10:14) (18 - 18)  SpO2: 95% (10-10-19 @ 10:14) (94% - 97%)    Weight (kg): 79.4 (10-09-19 @ 08:33)    Physical Exam:  	Gen: Confused, Combative, Pale,   	HEENT: PERRL, No Neck Rigidity,   	Pulm: CTA B/L  	CV: RRR, S1S2; no rub  	Back: No spinal or CVA tenderness; no sacral edema  	Abd: +BS, soft, nontender/nondistended  	: No suprapubic tenderness  	UE: Warm, FROM, no clubbing, intact strength; no edema; no asterixis  	LE: Warm, FROM, no clubbing, intact strength; no edema  	Neuro: No focal deficits,   	Psych: Normal affect and mood  	Skin: Warm, without rashes  	Vascular access: AVG,     LABS/STUDIES  --------------------------------------------------------------------------------              10.9   7.81  >-----------<  181      [10-09-19 @ 13:44]              35.3     Creatinine Trend:  SCr 9.55 [10-08 @ 08:09]  SCr 5.79 [10-05 @ 13:33]  SCr 5.73 [10-04 @ 05:24]    PTH -- (Ca 9.8)      [11-13-18 @ 16:19]   551  Vitamin D (25OH) 25.0      [11-13-18 @ 16:38]  HbA1c 6.5      [10-05-19 @ 13:33]  TSH 0.91      [10-05-19 @ 13:33]

## 2019-10-10 NOTE — PROGRESS NOTE ADULT - SUBJECTIVE AND OBJECTIVE BOX
[facility]  INFECTIOUS DISEASES AND INTERNAL MEDICINE at [place]  =======================================================  Carlo Esparza MD  Diplomates American Board of Internal Medicine and Infectious Diseases  Tel: [phone]      Fax: [phone]  =======================================================    LEEKIM LOPEZ [id number]    Follow up: Herpes Zoster  Was started on valtrex for blistering rash at back of head and scalp   t had tmax 100.3 F and per daughter  with mental status change-patient appeared to be hallucinating   PT IS NOW PARANOID AND COMBATATIVE    Allergies:  No Known Allergies      Medications:  acetaminophen   Tablet .. 650 milliGRAM(s) Oral every 4 hours PRN  acetaminophen   Tablet .. 975 milliGRAM(s) Oral every 6 hours PRN  acyclovir IVPB 400 milliGRAM(s) IV Intermittent every 24 hours  amLODIPine   Tablet 10 milliGRAM(s) Oral daily  carvedilol 12.5 milliGRAM(s) Oral every 12 hours  cloNIDine 0.3 milliGRAM(s) Oral every 8 hours  dextrose 40% Gel 15 Gram(s) Oral once PRN  dextrose 5%. 1000 milliLiter(s) IV Continuous <Continuous>  dextrose 50% Injectable 12.5 Gram(s) IV Push once  dextrose 50% Injectable 25 Gram(s) IV Push once  dextrose 50% Injectable 25 Gram(s) IV Push once  folic acid 1 milliGRAM(s) Oral daily  glucagon  Injectable 1 milliGRAM(s) IntraMuscular once PRN  hydrALAZINE 100 milliGRAM(s) Oral every 12 hours  hydrALAZINE Injectable 10 milliGRAM(s) IV Push every 6 hours PRN  influenza   Vaccine 0.5 milliLiter(s) IntraMuscular once  insulin lispro (HumaLOG) corrective regimen sliding scale   SubCutaneous three times a day before meals  insulin lispro (HumaLOG) corrective regimen sliding scale   SubCutaneous at bedtime  insulin lispro Injectable (HumaLOG) 3 Unit(s) SubCutaneous three times a day before meals  labetalol Injectable 10 milliGRAM(s) IV Push every 4 hours PRN  levothyroxine 75 MICROGram(s) Oral daily  metoclopramide 5 milliGRAM(s) Oral three times a day PRN  Nephro-heather 1 Tablet(s) Oral daily  pantoprazole    Tablet 40 milliGRAM(s) Oral before breakfast  sevelamer carbonate 1600 milliGRAM(s) Oral three times a day  simvastatin 20 milliGRAM(s) Oral at bedtime            REVIEW OF SYSTEMS:  cannot obtain           Physical Exam:                         10.9   7.81  )-----------( 181      ( 09 Oct 2019 13:44 )             35.3         GEN: NAD, drowsy but arousable  HEENT: normocephalic and atraumatic. EOMI. PERRL.  Anicteric   NECK: Supple.   LUNGS: Clear to auscultation.  HEART: Regular rate and rhythm without murmur.  ABDOMEN: Soft, nontender, and nondistended.  Positive bowel sounds.    : No CVA tenderness  EXTREMITIES: Without any edema.  MSK: no joint swelling LUe AVF  NEUROLOGIC: Cranial nerves II through XII are grossly intact. No focal deficits  PSYCHIATRIC: Appropriate affect .  SKIN: crusted lesions overnape of neck and left side of scalp      Labs:                            10.9   7.81  )-----------( 181      ( 09 Oct 2019 13:44 )             35.3           CAPILLARY BLOOD GLUCOSE      POCT Blood Glucose.: 156 mg/dL (09 Oct 2019 12:19)  POCT Blood Glucose.: 175 mg/dL (09 Oct 2019 08:28)  POCT Blood Glucose.: 175 mg/dL (08 Oct 2019 22:49)  POCT Blood Glucose.: 125 mg/dL (08 Oct 2019 18:25)        RECENT CULTURES: [facility]  INFECTIOUS DISEASES AND INTERNAL MEDICINE at [place]  =======================================================  Carlo Esparza MD  Diplomates American Board of Internal Medicine and Infectious Diseases  Tel: [phone]      Fax: [phone]  =======================================================    LEEKIM LOPEZ [id number]    Follow up: Herpes Zoster  Was started on valtrex for blistering rash at back of head and scalp   t had tmax 100.3 F and per daughter  with mental status change-patient appeared to be hallucinating   PT IS NOW PARANOID AND COMBATATIVE    Allergies:  No Known Allergies      Medications:  acetaminophen   Tablet .. 650 milliGRAM(s) Oral every 4 hours PRN  acetaminophen   Tablet .. 975 milliGRAM(s) Oral every 6 hours PRN  acyclovir IVPB 400 milliGRAM(s) IV Intermittent every 24 hours  amLODIPine   Tablet 10 milliGRAM(s) Oral daily  carvedilol 12.5 milliGRAM(s) Oral every 12 hours  cloNIDine 0.3 milliGRAM(s) Oral every 8 hours  dextrose 40% Gel 15 Gram(s) Oral once PRN  dextrose 5%. 1000 milliLiter(s) IV Continuous <Continuous>  dextrose 50% Injectable 12.5 Gram(s) IV Push once  dextrose 50% Injectable 25 Gram(s) IV Push once  dextrose 50% Injectable 25 Gram(s) IV Push once  folic acid 1 milliGRAM(s) Oral daily  glucagon  Injectable 1 milliGRAM(s) IntraMuscular once PRN  hydrALAZINE 100 milliGRAM(s) Oral every 12 hours  hydrALAZINE Injectable 10 milliGRAM(s) IV Push every 6 hours PRN  influenza   Vaccine 0.5 milliLiter(s) IntraMuscular once  insulin lispro (HumaLOG) corrective regimen sliding scale   SubCutaneous three times a day before meals  insulin lispro (HumaLOG) corrective regimen sliding scale   SubCutaneous at bedtime  insulin lispro Injectable (HumaLOG) 3 Unit(s) SubCutaneous three times a day before meals  labetalol Injectable 10 milliGRAM(s) IV Push every 4 hours PRN  levothyroxine 75 MICROGram(s) Oral daily  metoclopramide 5 milliGRAM(s) Oral three times a day PRN  Nephro-heather 1 Tablet(s) Oral daily  pantoprazole    Tablet 40 milliGRAM(s) Oral before breakfast  sevelamer carbonate 1600 milliGRAM(s) Oral three times a day  simvastatin 20 milliGRAM(s) Oral at bedtime            REVIEW OF SYSTEMS:  cannot obtain           Physical Exam:              AFEBRILE VSS        GEN: NAD, drowsy but arousable  HEENT: normocephalic and atraumatic. EOMI. PERRL.  Anicteric   NECK: Supple.   LUNGS: Clear to auscultation.  HEART: Regular rate and rhythm without murmur.  ABDOMEN: Soft, nontender, and nondistended.  Positive bowel sounds.    : No CVA tenderness  EXTREMITIES: Without any edema.  MSK: no joint swelling LUe AVF  NEUROLOGIC: confused     SKIN: crusted lesions overnape of neck and left side of scalp      Labs:                            10.9   7.81  )-----------( 181      ( 09 Oct 2019 13:44 )             35.3           CAPILLARY BLOOD GLUCOSE      POCT Blood Glucose.: 156 mg/dL (09 Oct 2019 12:19)  POCT Blood Glucose.: 175 mg/dL (09 Oct 2019 08:28)  POCT Blood Glucose.: 175 mg/dL (08 Oct 2019 22:49)  POCT Blood Glucose.: 125 mg/dL (08 Oct 2019 18:25)        RECENT CULTURES:

## 2019-10-10 NOTE — PROGRESS NOTE ADULT - ASSESSMENT
Remains calm & comfortable W/ ATIVAN 2mg IV Given,  as ordered;     Slept throughout hd for 3hrs via LT arm AVF,     uf goal 2.1kg met;     BP controlled w/ hydralazine 10mg ivp;     reports given to CIARAN ESPINOSA;     back to floor via bed, arousable though.
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
## 2019-10-10 NOTE — PROGRESS NOTE ADULT - SUBJECTIVE AND OBJECTIVE BOX
[facility]                                     Neurology at [place]                                 Wanda Cardoza, & Garfield                                  [address]                                        [place], [place], [address]                                             [phone]    CC: headache  HPI: The patient is a 63y Female who presented with left sided headache that radiated from occiput along temporal area to forehead.  this started two days ago.  She was noted to have a /81.  With some medication and lowering her BP her headache has improved significantly.  She still has some residual headache though, and the left side of head is somewhat tender to the touch.  She denies vision changes, photophobia, nausea( although had some earlier)/vomiting, vision loss or jaw claudication.  Neurology eval is requested.    Interval history: increased confusion/agitiation, rash now vesicular    ROS neurology: Difficult to obtain.   (+)confusion    MEDICATIONS  (STANDING):  acyclovir IVPB 400 milliGRAM(s) IV Intermittent every 24 hours  amLODIPine   Tablet 10 milliGRAM(s) Oral daily  carvedilol 12.5 milliGRAM(s) Oral every 12 hours  chlorhexidine 2% Cloths 1 Application(s) Topical once  cloNIDine 0.3 milliGRAM(s) Oral every 8 hours  dextrose 5%. 1000 milliLiter(s) (50 mL/Hr) IV Continuous <Continuous>  dextrose 50% Injectable 12.5 Gram(s) IV Push once  dextrose 50% Injectable 25 Gram(s) IV Push once  dextrose 50% Injectable 25 Gram(s) IV Push once  folic acid 1 milliGRAM(s) Oral daily  hydrALAZINE 100 milliGRAM(s) Oral every 12 hours  influenza   Vaccine 0.5 milliLiter(s) IntraMuscular once  insulin lispro (HumaLOG) corrective regimen sliding scale   SubCutaneous three times a day before meals  insulin lispro (HumaLOG) corrective regimen sliding scale   SubCutaneous at bedtime  insulin lispro Injectable (HumaLOG) 3 Unit(s) SubCutaneous three times a day before meals  levothyroxine 75 MICROGram(s) Oral daily  LORazepam   Injectable 1 milliGRAM(s) IntraMuscular once  mupirocin 2% Nasal 1 Application(s) Nasal two times a day  Nephro-heather 1 Tablet(s) Oral daily  pantoprazole    Tablet 40 milliGRAM(s) Oral before breakfast  sevelamer carbonate 1600 milliGRAM(s) Oral three times a day  simvastatin 20 milliGRAM(s) Oral at bedtime    MEDICATIONS  (PRN):  acetaminophen   Tablet .. 650 milliGRAM(s) Oral every 4 hours PRN Mild Pain (1 - 3)  acetaminophen   Tablet .. 975 milliGRAM(s) Oral every 6 hours PRN Moderate Pain (4 - 6)  dextrose 40% Gel 15 Gram(s) Oral once PRN Blood Glucose LESS THAN 70 milliGRAM(s)/deciliter  glucagon  Injectable 1 milliGRAM(s) IntraMuscular once PRN Glucose LESS THAN 70 milligrams/deciliter  hydrALAZINE Injectable 10 milliGRAM(s) IV Push every 6 hours PRN SBP >160  labetalol Injectable 10 milliGRAM(s) IV Push every 4 hours PRN Systolic/Diastolic >160/100  LORazepam   Injectable 1 milliGRAM(s) IV Push every 4 hours PRN Agitation  metoclopramide 5 milliGRAM(s) Oral three times a day PRN nausea      Vital Signs Last 24 Hrs  T(C): 36.7 (10 Oct 2019 10:14), Max: 37.1 (09 Oct 2019 17:49)  T(F): 98 (10 Oct 2019 10:14), Max: 98.8 (09 Oct 2019 17:49)  HR: 77 (10 Oct 2019 10:14) (70 - 77)  BP: 161/78 (10 Oct 2019 10:14) (153/73 - 165/69)  BP(mean): --  RR: 18 (10 Oct 2019 10:14) (18 - 18)  SpO2: 95% (10 Oct 2019 10:14) (94% - 97%)    Detailed Neurologic Exam (in Latvian, family assists in translation when needed):    Mental status: The patient is awake and alert and has diminished attention span.  The patient is fully oriented to self only.  Mumbling non sensical speech per family.     Cranial nerves: Pupils equal and react symmetrically to light. There is no visual field deficit to confrontation. Extraocular motion is full with no nystagmus. There is no ptosis. Facial sensation is intact. Facial musculature is symmetric. Palate elevates symmetrically. Tongue is midline.    Neck: supple    Motor: There is normal bulk and tone.  There is no tremor.  Moves 4 ext well    Sensation: Intact to light touch and pin in 4 extremities    Reflexes: 1+ throughout and plantar responses are flexor.    Cerebellar: There is no dysmetria on finger to nose testing.    Gait : deferred    LABS:                                    10.9   7.81  )-----------( 181      ( 09 Oct 2019 13:44 )             35.3             Sedimentation Rate, Erythrocyte (10.05.19 @ 20:29)    Sedimentation Rate, Erythrocyte: 28 mm/hr  C-Reactive Protein, Serum (10.05.19 @ 20:24)    C-Reactive Protein, Serum: <0.40 mg/dL    RADIOLOGY & ADDITIONAL STUDIES (independently reviewed unless otherwise noted):  CT head- no acute CVA, mass or blood, (+) SVID/atrophy

## 2019-10-10 NOTE — PROGRESS NOTE ADULT - ASSESSMENT
63 year old female with PMH HTN, DMT2, CAD s/p CABG, PPM, ESRD on HD T,T,S presents with left sided headache and uncontrolled BP SBP>200. Found to have vesicles suspicious for shingles.      Headache. CTH unremarkable. CT neck unremarkable. Occipital neuralgia as per Neurology. ESR CRP normal - no temporal arteritis. Oxycodone, Flexeril and Gabapentin discontinued due to confusion. Headache has improved.   - Tylenol PRN    Encephalopathy, acute - possible medication induced versus infection vs delirium  Discussed with ID, IR - will proceed with LP with sedation. Repeat CTH ordered as per NeuroRadiology recommendations. Doubt meningitis, Possible viral encephalopathy, encephalitis - though headache improved, afebrile with normal WBC. PCT 0.42, CRP 0.9  - Valtrex PO daily switched to IV Acyclovir (renally dosed)   - IR guided LP; ordered CSF studies  - Psych evaluation    Hypertensive urgency - BP improved after administration of home BP medications, however continued to be elevated and have headache. Hypertensive urgency resolved.  - Continue home medications, adjusted  - Nephrology for HD, fluid removal      ESRD  - Continue HD  - Continue Phoslo    DMT2 . A1c 6.5  - BGM with SSI  - continue to monitor     Hypothyroidism  - Continue Synthroid    CAD  - Continue Statin, BB  -ASA and Plavix d/c'd for possible LP; may resume afterwards    Prophylactic measure  - IPC for VTEp    Advance Directives - discussed briefly with patient, daughter, Full Code for now

## 2019-10-10 NOTE — BEHAVIORAL HEALTH ASSESSMENT NOTE - SUICIDE PROTECTIVE FACTORS
Responsibility to family and others/Supportive social network of family or friends/Cultural, spiritual and/or moral attitudes against suicide/Identifies reasons for living

## 2019-10-10 NOTE — PROGRESS NOTE ADULT - SUBJECTIVE AND OBJECTIVE BOX
Vital Signs Last 24 Hrs,    T(C): 36.7 (10 Oct 2019 10:14), Max: 37.1 (09 Oct 2019 17:49)  T(F): 98 (10 Oct 2019 10:14), Max: 98.8 (09 Oct 2019 17:49)  HR: 77 (10 Oct 2019 10:14) (70 - 77)  BP: 161/78 (10 Oct 2019 10:14) (153/73 - 165/69)  RR: 18 (10 Oct 2019 10:14) (18 - 18)  SpO2: 95% (10 Oct 2019 10:14) (94% - 97%)                        10.9<L>  7.81  )-----------( 181      ( 09 Oct 2019 13:44 )             35.3     Phos: 5.9 mg/dL<H>   B12:0.91 uIU/mL    Patient was seen and evaluated on dialysis.   Patient is tolerating the procedure well.   T(C): 36.7 (10-10-19 @ 10:14), Max: 37.1 (10-09-19 @ 17:49)  HR: 77 (10-10-19 @ 10:14) (70 - 77)  BP: 161/78 (10-10-19 @ 10:14) (153/73 - 165/69)  Continue dialysis: TIW,   Dialyzer: Revaclear 300         QB: 400 ml.,        QD: 500ml.,  Goal UF As [place]., over 3 Hours

## 2019-10-10 NOTE — PROGRESS NOTE ADULT - ASSESSMENT
The patient is a 63y Female who is followed by neurology because of headache    Headache  suspect occipital neuralgia, question vesicular rash in occipital nerve distribution  ESR=28, CRP<0.40, doubt temporal arteritis  continue to treat headache/neuralgia symptomatically with GBP and prn flexeril  If medication no help, may need occipital nerve block as out patient    Encephalopathy  Has rash possible zoster with ams  abx per ID  agree with LP    HTN  normalize BP gradually to avoid CVA from hypotension    will follow with you    Loyd Muñoz MD PhD   [id number]

## 2019-10-10 NOTE — BEHAVIORAL HEALTH ASSESSMENT NOTE - NSBHREFERDETAILS_PSY_A_CORE_FT
copy past from h and p:   History of Present Illness:  Reason for Admission: headache  History of Present Illness:   63 year old female with PMH HTN, DMT2, CAD s/p CABG, ESRD on HD T,T,S presents with left sided headache since 4-5 days not relieved with Tylenol so came to ER.  Described headache as sharp radiating from occiput to left temporal region and relieved somewhat with Tylenol. Has nausea, but denies vomiting, visual changes, dizziness, fever or chills. Denies any dyspnea, chest pain or palpitations. Occasional numbness in feet.  Noted to have elevated /81
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
## 2019-10-11 LAB
APPEARANCE CSF: CLEAR — SIGNIFICANT CHANGE UP
APPEARANCE CSF: CLEAR — SIGNIFICANT CHANGE UP
COLOR CSF: SIGNIFICANT CHANGE UP
COLOR CSF: SIGNIFICANT CHANGE UP
CRP SERPL-MCNC: 4.02 MG/DL — HIGH (ref 0–0.4)
EOSINOPHIL # CSF: 2 % — SIGNIFICANT CHANGE UP
ERYTHROCYTE [SEDIMENTATION RATE] IN BLOOD: 49 MM/HR — HIGH (ref 0–20)
GLUCOSE BLDC GLUCOMTR-MCNC: 101 MG/DL — HIGH (ref 70–99)
GLUCOSE BLDC GLUCOMTR-MCNC: 109 MG/DL — HIGH (ref 70–99)
GLUCOSE BLDC GLUCOMTR-MCNC: 133 MG/DL — HIGH (ref 70–99)
GLUCOSE BLDC GLUCOMTR-MCNC: 96 MG/DL — SIGNIFICANT CHANGE UP (ref 70–99)
GLUCOSE CSF-MCNC: 56 MG/DLG/24H — SIGNIFICANT CHANGE UP (ref 40–70)
GRAM STN FLD: SIGNIFICANT CHANGE UP
LYMPHOCYTES # CSF: 92 % — HIGH (ref 40–80)
LYMPHOCYTES # CSF: 96 % — HIGH (ref 40–80)
MONOS+MACROS NFR CSF: 3 % — SIGNIFICANT CHANGE UP
MONOS+MACROS NFR CSF: 6 % — SIGNIFICANT CHANGE UP
NEUTROPHILS # CSF: 0 % — SIGNIFICANT CHANGE UP
NEUTROPHILS # CSF: 1 % — SIGNIFICANT CHANGE UP
NRBC NFR CSF: 106 /UL — HIGH (ref 0–5)
NRBC NFR CSF: 72 /UL — HIGH (ref 0–5)
PROCALCITONIN SERPL-MCNC: 0.48 NG/ML — HIGH (ref 0.02–0.1)
PROT CSF-MCNC: 68 MG/DL — HIGH (ref 15–45)
RBC # CSF: 0 /CMM — SIGNIFICANT CHANGE UP (ref 0–1)
RBC # CSF: 45 /CMM — HIGH (ref 0–1)
SPECIMEN SOURCE: SIGNIFICANT CHANGE UP
TUBE TYPE: SIGNIFICANT CHANGE UP
TUBE TYPE: SIGNIFICANT CHANGE UP

## 2019-10-11 PROCEDURE — 99233 SBSQ HOSP IP/OBS HIGH 50: CPT

## 2019-10-11 PROCEDURE — 62270 DX LMBR SPI PNXR: CPT

## 2019-10-11 PROCEDURE — 99232 SBSQ HOSP IP/OBS MODERATE 35: CPT

## 2019-10-11 PROCEDURE — 77003 FLUOROGUIDE FOR SPINE INJECT: CPT | Mod: 26

## 2019-10-11 RX ORDER — HYDROMORPHONE HYDROCHLORIDE 2 MG/ML
0.5 INJECTION INTRAMUSCULAR; INTRAVENOUS; SUBCUTANEOUS ONCE
Refills: 0 | Status: DISCONTINUED | OUTPATIENT
Start: 2019-10-11 | End: 2019-10-11

## 2019-10-11 RX ADMIN — Medication 100 MILLIGRAM(S): at 17:57

## 2019-10-11 RX ADMIN — Medication 0.3 MILLIGRAM(S): at 21:00

## 2019-10-11 RX ADMIN — Medication 75 MICROGRAM(S): at 04:21

## 2019-10-11 RX ADMIN — CARVEDILOL PHOSPHATE 12.5 MILLIGRAM(S): 80 CAPSULE, EXTENDED RELEASE ORAL at 04:22

## 2019-10-11 RX ADMIN — Medication 975 MILLIGRAM(S): at 04:57

## 2019-10-11 RX ADMIN — Medication 975 MILLIGRAM(S): at 04:16

## 2019-10-11 RX ADMIN — Medication 0.3 MILLIGRAM(S): at 17:57

## 2019-10-11 RX ADMIN — Medication 0.3 MILLIGRAM(S): at 04:21

## 2019-10-11 RX ADMIN — Medication 108 MILLIGRAM(S): at 17:56

## 2019-10-11 RX ADMIN — MUPIROCIN 1 APPLICATION(S): 20 OINTMENT TOPICAL at 04:22

## 2019-10-11 RX ADMIN — AMLODIPINE BESYLATE 10 MILLIGRAM(S): 2.5 TABLET ORAL at 04:21

## 2019-10-11 RX ADMIN — QUETIAPINE FUMARATE 12.5 MILLIGRAM(S): 200 TABLET, FILM COATED ORAL at 21:00

## 2019-10-11 RX ADMIN — Medication 100 MILLIGRAM(S): at 04:22

## 2019-10-11 RX ADMIN — HYDROMORPHONE HYDROCHLORIDE 0.5 MILLIGRAM(S): 2 INJECTION INTRAMUSCULAR; INTRAVENOUS; SUBCUTANEOUS at 17:56

## 2019-10-11 RX ADMIN — CARVEDILOL PHOSPHATE 12.5 MILLIGRAM(S): 80 CAPSULE, EXTENDED RELEASE ORAL at 17:57

## 2019-10-11 RX ADMIN — SIMVASTATIN 20 MILLIGRAM(S): 20 TABLET, FILM COATED ORAL at 21:00

## 2019-10-11 NOTE — PROGRESS NOTE ADULT - SUBJECTIVE AND OBJECTIVE BOX
[facility]                                        Neurology at [place]                                 Wanda Cardoza, & Garfield                                  [address]                                        [place], [place], [address]                                             [phone]        CC: headache and confusion.     HPI:   The patient is a 63y Female who presented with left sided headache that radiated from occiput along temporal area to forehead. This started two days prior to arrival.  She was noted to have a /81.  With some medication and lowering her BP her headache has improved significantly.  She still has some residual headache though, and the left side of head is somewhat tender to the touch.  She denies vision changes, photophobia, nausea( although had some earlier)/vomiting, vision loss or jaw claudication.   She had some rash noted on the left side of her neck.   She has been more agitated and confused.     Interim history:  She is on 6 [place] on isolation.     ROS:   Denies chest pain.  Denies shortness of breath.    MEDICATIONS  (STANDING):  acyclovir IVPB 400 milliGRAM(s) IV Intermittent every 24 hours  amLODIPine   Tablet 10 milliGRAM(s) Oral daily  carvedilol 12.5 milliGRAM(s) Oral every 12 hours  chlorhexidine 2% Cloths 1 Application(s) Topical once  cloNIDine 0.3 milliGRAM(s) Oral every 8 hours  dextrose 5%. 1000 milliLiter(s) (50 mL/Hr) IV Continuous <Continuous>  folic acid 1 milliGRAM(s) Oral daily  hydrALAZINE 100 milliGRAM(s) Oral every 12 hours  influenza   Vaccine 0.5 milliLiter(s) IntraMuscular once  insulin lispro (HumaLOG) corrective regimen sliding scale   SubCutaneous three times a day before meals  insulin lispro (HumaLOG) corrective regimen sliding scale   SubCutaneous at bedtime  insulin lispro Injectable (HumaLOG) 3 Unit(s) SubCutaneous three times a day before meals  levothyroxine 75 MICROGram(s) Oral daily  LORazepam   Injectable 1 milliGRAM(s) IntraMuscular once  mupirocin 2% Nasal 1 Application(s) Nasal two times a day  Nephro-heather 1 Tablet(s) Oral daily  pantoprazole    Tablet 40 milliGRAM(s) Oral before breakfast  QUEtiapine 12.5 milliGRAM(s) Oral at bedtime  sevelamer carbonate 1600 milliGRAM(s) Oral three times a day  simvastatin 20 milliGRAM(s) Oral at bedtime      Vital Signs Last 24 Hrs  T(C): 37.1 (11 Oct 2019 07:51), Max: 37.1 (11 Oct 2019 07:51)  T(F): 98.7 (11 Oct 2019 07:51), Max: 98.7 (11 Oct 2019 07:51)  HR: 70 (11 Oct 2019 07:51) (70 - 77)  BP: 151/56 (11 Oct 2019 07:51) (151/56 - 169/71)  RR: 19 (11 Oct 2019 07:51) (18 - 19)  SpO2: 90% (11 Oct 2019 07:51) (90% - 97%)    Detailed Neurologic Exam:    Mental status: The patient is awake and alert. She is oriented to self only.  There is no aphasia. There is no dysarthria.     Cranial nerves: Pupils equal and react symmetrically to light. There is no visual field deficit to threat. Extraocular motion is full with no nystagmus. There is no ptosis. Facial sensation is intact. Facial musculature is symmetric. Palate elevates symmetrically. Tongue is midline.    Motor: There is normal bulk and tone.  There is no tremor.  Strength grossly 5/5 bilaterally.    Sensation: Grossly intact to light touch and pin.    Reflexes: 1+ throughout and plantar responses are flexor.    Cerebellar: No dysmetria on finger nose testing.    Labs:     10-10    131<L>  |  88<L>  |  66.0<H>  ----------------------------<  155<H>  5.8<H>   |  21.0<L>  |  9.29<H>    Ca    9.3      10 Oct 2019 18:33                              10.9   7.81  )-----------( 181      ( 09 Oct 2019 13:44 )             35.3

## 2019-10-11 NOTE — PROGRESS NOTE ADULT - SUBJECTIVE AND OBJECTIVE BOX
Patient is a 63y old  Female who presents with a chief complaint of headache (10 Oct 2019 08:33)      SUBJECTIVE:   Chart reviewed since last visit.  Patient seen and examined at bedside for confusion, headache, shingles, ESRD    Daughter is present at bedside. The daughter states no improvement in terms of mentation. The daughter states her mother is still confused and is not as agitated as before.        Vital Signs  T(F): 98.7 (11 Oct 2019 07:51), Max: 98.7 (11 Oct 2019 07:51)  HR: 70 (11 Oct 2019 07:51) (70 - 76)  BP: 151/56 (11 Oct 2019 07:51) (151/56 - 169/71)  RR: 19 (11 Oct 2019 07:51) (18 - 19)  SpO2: 90% (11 Oct 2019 07:51) (90% - 97%)      PHYSICAL EXAMINATION  General: Obese, lying in bed,  HEENT:  extraocular movements intact, no asymmetry. No temporal tenderness  NECK:  supple, rash occipital region  GI:  Soft nondistended nontender  : No suprapubic tenderness  MSK:  FROM. No edema  CNS:  No gross focal or global deficit noted  INTEG:  Mild papular rash on nape of neck - extending to scalp mostly on left side    MONITOR:  CAPILLARY BLOOD GLUCOSE      POCT Blood Glucose.: 133 mg/dL (10 Oct 2019 10:36)  POCT Blood Glucose.: 125 mg/dL (10 Oct 2019 07:09)  POCT Blood Glucose.: 92 mg/dL (09 Oct 2019 22:02)  POCT Blood Glucose.: 137 mg/dL (09 Oct 2019 17:45)  POCT Blood Glucose.: 156 mg/dL (09 Oct 2019 12:19)                            10.9   7.81  )-----------( 181      ( 09 Oct 2019 13:44 )             35.3     10 Oct 2019 18:33    131    |  88     |  66.0   ----------------------------<  155    5.8     |  21.0   |  9.29     Ca    9.3        10 Oct 2019 18:33        PT/INR - ( 10 Oct 2019 18:33 )   PT: 12.2 sec;   INR: 1.06 ratio         PTT - ( 10 Oct 2019 18:33 )  PTT:34.0 sec  CAPILLARY BLOOD GLUCOSE      POCT Blood Glucose.: 109 mg/dL (11 Oct 2019 11:03)  POCT Blood Glucose.: 96 mg/dL (11 Oct 2019 07:16)  POCT Blood Glucose.: 147 mg/dL (10 Oct 2019 16:34)                        Culture:    TTE:    RADIOLOGY        MEDICATIONS  (STANDING):  acyclovir IVPB 400 milliGRAM(s) IV Intermittent every 24 hours  amLODIPine   Tablet 10 milliGRAM(s) Oral daily  carvedilol 12.5 milliGRAM(s) Oral every 12 hours  chlorhexidine 2% Cloths 1 Application(s) Topical once  cloNIDine 0.3 milliGRAM(s) Oral every 8 hours  dextrose 5%. 1000 milliLiter(s) (50 mL/Hr) IV Continuous <Continuous>  dextrose 50% Injectable 12.5 Gram(s) IV Push once  dextrose 50% Injectable 25 Gram(s) IV Push once  dextrose 50% Injectable 25 Gram(s) IV Push once  folic acid 1 milliGRAM(s) Oral daily  hydrALAZINE 100 milliGRAM(s) Oral every 12 hours  influenza   Vaccine 0.5 milliLiter(s) IntraMuscular once  insulin lispro (HumaLOG) corrective regimen sliding scale   SubCutaneous three times a day before meals  insulin lispro (HumaLOG) corrective regimen sliding scale   SubCutaneous at bedtime  insulin lispro Injectable (HumaLOG) 3 Unit(s) SubCutaneous three times a day before meals  levothyroxine 75 MICROGram(s) Oral daily  LORazepam   Injectable 1 milliGRAM(s) IntraMuscular once  mupirocin 2% Nasal 1 Application(s) Nasal two times a day  Nephro-heather 1 Tablet(s) Oral daily  pantoprazole    Tablet 40 milliGRAM(s) Oral before breakfast  QUEtiapine 12.5 milliGRAM(s) Oral at bedtime  sevelamer carbonate 1600 milliGRAM(s) Oral three times a day  simvastatin 20 milliGRAM(s) Oral at bedtime    MEDICATIONS  (PRN):  acetaminophen   Tablet .. 650 milliGRAM(s) Oral every 4 hours PRN Mild Pain (1 - 3)  acetaminophen   Tablet .. 975 milliGRAM(s) Oral every 6 hours PRN Moderate Pain (4 - 6)  dextrose 40% Gel 15 Gram(s) Oral once PRN Blood Glucose LESS THAN 70 milliGRAM(s)/deciliter  glucagon  Injectable 1 milliGRAM(s) IntraMuscular once PRN Glucose LESS THAN 70 milligrams/deciliter  hydrALAZINE Injectable 10 milliGRAM(s) IV Push every 6 hours PRN SBP >160  labetalol Injectable 10 milliGRAM(s) IV Push every 4 hours PRN Systolic/Diastolic >160/100  LORazepam   Injectable 1 milliGRAM(s) IV Push every 4 hours PRN Agitation  QUEtiapine 12.5 milliGRAM(s) Oral two times a day PRN agitation

## 2019-10-11 NOTE — PROGRESS NOTE ADULT - ASSESSMENT
1) ESRD on HD  2) MBD of renal dx  3) Anemia of renal dx  4) VOl HTN    HD planned for AM  Continue with antihypertensives  Daily phosphorus; trend;  Continue sevelamer carbonate 1600mg TID WC    mandie Lott

## 2019-10-11 NOTE — PROGRESS NOTE ADULT - ASSESSMENT
63 year old female with PMH HTN, DMT2, CAD s/p CABG, ESRD on HD T,T,S presents with left sided headache since 4-5 days not relieved with Tylenol so came to ER.  Described headache as sharp radiating from occiput to left temporal region and relieved somewhat with Tylenol. Has nausea, but denies vomiting, visual changes, dizziness, fever or chills. Denies any dyspnea, chest pain or palpitations. Occasional numbness in feet.  Noted to have elevated /81 and rash consistent with Herpes Zoster. Was started on valtrex and gabapentin  PT HAS BECOME MRE PARANOID AND COMBATATIVE   CONCERN FOR  HERPES ZOSTER ENCEPHALITIS   Concern for disseminated Zoster vs AMS due to gabapentin     Acyclovir 5mg IV daily and after HD on HD days  Hold gabapentin  Airborne + contact isolation  FOR   LP- TODAY please send cytology CSF cell counts, glucose, protein, CSF PCr as well as HSV/VZV PCR, cultures  SPOKE TO FAMILY DI FRENCH BEDSIDE

## 2019-10-11 NOTE — PROGRESS NOTE ADULT - ASSESSMENT
63y Female who is followed by neurology because of headache    Headache  Suspect occipital neuralgia, question vesicular rash in occipital nerve distribution. Possibly post herpetic (see below).   ESR=28, CRP<0.40 essentially rules out temporal arteritis.    Encephalopathy  Has rash possible zoster with ams  On antivirals per ID  For IR guided LP today.     HTN  Control blood pressure.

## 2019-10-11 NOTE — PROGRESS NOTE ADULT - ASSESSMENT
63 year old female with PMH HTN, DMT2, CAD s/p CABG, PPM, ESRD on HD T,T,S presents with left sided headache and uncontrolled BP SBP>200. Found to have vesicles suspicious for shingles.      Headache. CTH unremarkable. CT neck unremarkable. Occipital neuralgia as per Neurology. ESR CRP normal - no temporal arteritis. Oxycodone, Flexeril and Gabapentin discontinued due to confusion. Headache has improved.   - Tylenol PRN    Encephalopathy, acute - possible medication induced versus infection vs delirium  Discussed with ID, IR - will proceed with LP with sedation. Repeat CTH ordered as per NeuroRadiology recommendations. Doubt meningitis, Possible viral encephalopathy, encephalitis - though headache improved, afebrile with normal WBC. PCT 0.42, CRP 0.9  - Valtrex PO daily switched to IV Acyclovir (renally dosed)   - IR guided LP; ordered CSF studies  - Psych evaluation    Hypertensive urgency - BP improved after administration of home BP medications, however continued to be elevated and have headache. Hypertensive urgency resolved.  - Continue home medications, adjusted  - Nephrology for HD, fluid removal      ESRD  - Continue HD  - Continue Phoslo    DMT2 . A1c 6.5  - BGM with SSI  - continue to monitor     Hypothyroidism  - Continue Synthroid    CAD  - Continue Statin, BB  -ASA and Plavix d/c'd for possible LP; may resume afterwards    Prophylactic measure  - IPC for VTEp    Advance Directives - discussed briefly with patient, daughter, Full Code for now 63 year old female with PMH HTN, DMT2, CAD s/p CABG, PPM, ESRD on HD T,T,S presents with left sided headache and uncontrolled BP SBP>200. Found to have vesicles suspicious for shingles. Hospital course complicated by encephalopathy; possibly secondary to disseminated zoster vs adverse reaction from gabapentin. Patient underwent LP today.     Herpes Zoster  -on contact and airborne isolation  -patient with vesicular lesion on posterior neck  -IV acyclovir per ID    Occipital Neuralgia  - CTH unremarkable. CT neck unremarkable  - ESR - CRP normal - no temporal arteritis  - Oxycodone, Flexeril and Gabapentin discontinued due to confusion  - Headache has improved; continue tylenol PRN    Encephalopathy - possible medication induced versus infection vs delirium  Repeat CT head without any acute changes  IR guided LP today     IV Acyclovir (renally dosed)   Psych consulted  Neurology recommendations appreciated; f/u LP studies    Hypertensive urgency - resolved  -BP elevated due to pain; one dose of dilaudid   -Continue home medications and adjust as needed  -Ultrafiltration per renal    ESRD on HD (TTS)  -Next HD tentatively scheduled tomorrow  -UF goal per renal  -Continue Phoslo  -Strict I/os, daily weights  -renal on board    DMT2 . A1c 6.5  - BGM with SSI  - continue to monitor     Hypothyroidism  -check TSH  -Continue Synthroid    CAD  -asymptomatic  -Continue Statin, BB  -ASA and Plavix d/c'd for LP today; may resume afterwards    Prophylactic measure  - SCDs

## 2019-10-11 NOTE — PROGRESS NOTE ADULT - SUBJECTIVE AND OBJECTIVE BOX
[facility] DIVISION OF KIDNEY DISEASES AND HYPERTENSION -- FOLLOW UP NOTE  --------------------------------------------------------------------------------  Chief Complaint: ESRD HD    24 hour events/subjective:  Seen/examined  HD in AM      PAST HISTORY  --------------------------------------------------------------------------------  No significant changes to PMH, PSH, FHx, SHx, unless otherwise noted    ALLERGIES & MEDICATIONS  --------------------------------------------------------------------------------  Allergies    No Known Allergies    Intolerances      Standing Inpatient Medications  acyclovir IVPB 400 milliGRAM(s) IV Intermittent every 24 hours  amLODIPine   Tablet 10 milliGRAM(s) Oral daily  carvedilol 12.5 milliGRAM(s) Oral every 12 hours  chlorhexidine 2% Cloths 1 Application(s) Topical once  cloNIDine 0.3 milliGRAM(s) Oral every 8 hours  dextrose 5%. 1000 milliLiter(s) IV Continuous <Continuous>  dextrose 50% Injectable 12.5 Gram(s) IV Push once  dextrose 50% Injectable 25 Gram(s) IV Push once  dextrose 50% Injectable 25 Gram(s) IV Push once  folic acid 1 milliGRAM(s) Oral daily  hydrALAZINE 100 milliGRAM(s) Oral every 12 hours  influenza   Vaccine 0.5 milliLiter(s) IntraMuscular once  insulin lispro (HumaLOG) corrective regimen sliding scale   SubCutaneous three times a day before meals  insulin lispro (HumaLOG) corrective regimen sliding scale   SubCutaneous at bedtime  insulin lispro Injectable (HumaLOG) 3 Unit(s) SubCutaneous three times a day before meals  levothyroxine 75 MICROGram(s) Oral daily  LORazepam   Injectable 1 milliGRAM(s) IntraMuscular once  mupirocin 2% Nasal 1 Application(s) Nasal two times a day  Nephro-heather 1 Tablet(s) Oral daily  pantoprazole    Tablet 40 milliGRAM(s) Oral before breakfast  QUEtiapine 12.5 milliGRAM(s) Oral at bedtime  sevelamer carbonate 1600 milliGRAM(s) Oral three times a day  simvastatin 20 milliGRAM(s) Oral at bedtime    PRN Inpatient Medications  acetaminophen   Tablet .. 650 milliGRAM(s) Oral every 4 hours PRN  acetaminophen   Tablet .. 975 milliGRAM(s) Oral every 6 hours PRN  dextrose 40% Gel 15 Gram(s) Oral once PRN  glucagon  Injectable 1 milliGRAM(s) IntraMuscular once PRN  hydrALAZINE Injectable 10 milliGRAM(s) IV Push every 6 hours PRN  labetalol Injectable 10 milliGRAM(s) IV Push every 4 hours PRN  LORazepam   Injectable 1 milliGRAM(s) IV Push every 4 hours PRN  QUEtiapine 12.5 milliGRAM(s) Oral two times a day PRN      REVIEW OF SYSTEMS  --------------------------------------------------------------------------------  Unable to obtain    VITALS/PHYSICAL EXAM  --------------------------------------------------------------------------------  T(C): 36.7 (10-11-19 @ 17:01), Max: 37.1 (10-11-19 @ 07:51)  HR: 88 (10-11-19 @ 17:01) (70 - 88)  BP: 168/61 (10-11-19 @ 17:01) (151/56 - 168/61)  RR: 19 (10-11-19 @ 17:01) (19 - 19)  SpO2: 98% (10-11-19 @ 17:01) (90% - 98%)  Wt(kg): --        10-10-19 @ 07:01  -  10-11-19 @ 07:00  --------------------------------------------------------  IN: 0 mL / OUT: 2100 mL / NET: -2100 mL      PHYSICAL EXAMINATION  General: Obese, lying in bed,  HEENT:  extraocular movements intact, no asymmetry. No temporal tenderness  NECK:  supple, rash occipital region  GI:  Soft nondistended nontender  : No suprapubic tenderness  MSK:  FROM. No edema  CNS:  No gross focal or global deficit noted  INTEG:  Mild papular rash on nape of neck - extending to scalp mostly on left side    LABS/STUDIES  --------------------------------------------------------------------------------    131  |  88  |  66.0  ----------------------------<  155      [10-10-19 @ 18:33]  5.8   |  21.0  |  9.29        Ca     9.3     [10-10-19 @ 18:33]      PT/INR: PT 12.2 , INR 1.06       [10-10-19 @ 18:33]  PTT: 34.0       [10-10-19 @ 18:33]      Creatinine Trend:  SCr 9.29 [10-10 @ 18:33]  SCr 9.55 [10-08 @ 08:09]  SCr 5.79 [10-05 @ 13:33]  SCr 5.73 [10-04 @ 05:24]        PTH -- (Ca 9.8)      [11-13-18 @ 16:19]   551  Vitamin D (25OH) 25.0      [11-13-18 @ 16:38]  HbA1c 6.5      [10-05-19 @ 13:33]  TSH 0.91      [10-05-19 @ 13:33]

## 2019-10-11 NOTE — BRIEF OPERATIVE NOTE - NSICDXBRIEFPROCEDURE_GEN_ALL_CORE_FT
PROCEDURES:  Lumbar puncture, with fluoroscopic guidance, for CSF drainage 11-Oct-2019 15:38:48  Mahendra Talbert

## 2019-10-11 NOTE — PROGRESS NOTE ADULT - SUBJECTIVE AND OBJECTIVE BOX
[facility]  INFECTIOUS DISEASES AND INTERNAL MEDICINE at [place]  =======================================================  Carlo Esparza MD  Diplomates American Board of Internal Medicine and Infectious Diseases  Tel: [phone]      Fax: [phone]  =======================================================    KIM LEE [id number]    Follow up: Herpes Zoster  Was started on valtrex for blistering rash at back of head and scalp   t had tmax 100.3 F and per daughter  with mental status change-patient appeared to be hallucinating   PT IS NOW letharigc and confused  Allergies:  No Known Allergies      Medications:  acetaminophen   Tablet .. 650 milliGRAM(s) Oral every 4 hours PRN  acetaminophen   Tablet .. 975 milliGRAM(s) Oral every 6 hours PRN  acyclovir IVPB 400 milliGRAM(s) IV Intermittent every 24 hours  amLODIPine   Tablet 10 milliGRAM(s) Oral daily  carvedilol 12.5 milliGRAM(s) Oral every 12 hours  cloNIDine 0.3 milliGRAM(s) Oral every 8 hours  dextrose 40% Gel 15 Gram(s) Oral once PRN  dextrose 5%. 1000 milliLiter(s) IV Continuous <Continuous>  dextrose 50% Injectable 12.5 Gram(s) IV Push once  dextrose 50% Injectable 25 Gram(s) IV Push once  dextrose 50% Injectable 25 Gram(s) IV Push once  folic acid 1 milliGRAM(s) Oral daily  glucagon  Injectable 1 milliGRAM(s) IntraMuscular once PRN  hydrALAZINE 100 milliGRAM(s) Oral every 12 hours  hydrALAZINE Injectable 10 milliGRAM(s) IV Push every 6 hours PRN  influenza   Vaccine 0.5 milliLiter(s) IntraMuscular once  insulin lispro (HumaLOG) corrective regimen sliding scale   SubCutaneous three times a day before meals  insulin lispro (HumaLOG) corrective regimen sliding scale   SubCutaneous at bedtime  insulin lispro Injectable (HumaLOG) 3 Unit(s) SubCutaneous three times a day before meals  labetalol Injectable 10 milliGRAM(s) IV Push every 4 hours PRN  levothyroxine 75 MICROGram(s) Oral daily  metoclopramide 5 milliGRAM(s) Oral three times a day PRN  Nephro-heather 1 Tablet(s) Oral daily  pantoprazole    Tablet 40 milliGRAM(s) Oral before breakfast  sevelamer carbonate 1600 milliGRAM(s) Oral three times a day  simvastatin 20 milliGRAM(s) Oral at bedtime            REVIEW OF SYSTEMS:  cannot obtain           Physical Exam:                     Vital Signs Last 24 Hrs  T(C): 36.6 (10 Oct 2019 21:20), Max: 36.9 (10 Oct 2019 15:25)  T(F): 97.8 (10 Oct 2019 21:20), Max: 98.5 (10 Oct 2019 15:25)  HR: 70 (10 Oct 2019 21:20) (70 - 77)  BP: 168/69 (10 Oct 2019 21:20) (157/75 - 169/71)  BP(mean): --  RR: 18 (10 Oct 2019 21:20) (18 - 18)  SpO2: 97% (10 Oct 2019 21:20) (93% - 97%)        GEN: NAD, drowsy but arousable  HEENT: normocephalic and atraumatic. EOMI. PERRL.  Anicteric   NECK: Supple.   LUNGS: Clear to auscultation.  HEART: Regular rate and rhythm without murmur.  ABDOMEN: Soft, nontender, and nondistended.  Positive bowel sounds.    : No CVA tenderness  EXTREMITIES: Without any edema.  MSK: no joint swelling LUe AVF  NEUROLOGIC: CONFUSED   C:   .  SKIN: crusted lesions overnape of neck and left side of scalp      Labs:                                         10.9   7.81  )-----------( 181      ( 09 Oct 2019 13:44 )             35.3   10-10    131<L>  |  88<L>  |  66.0<H>  ----------------------------<  155<H>  5.8<H>   |  21.0<L>  |  9.29<H>    Ca    9.3      10 Oct 2019 18:33            CAPILLARY BLOOD GLUCOSE      POCT Blood Glucose.: 156 mg/dL (09 Oct 2019 12:19)  POCT Blood Glucose.: 175 mg/dL (09 Oct 2019 08:28)  POCT Blood Glucose.: 175 mg/dL (08 Oct 2019 22:49)  POCT Blood Glucose.: 125 mg/dL (08 Oct 2019 18:25)        RECENT CULTURES:

## 2019-10-12 LAB
ANION GAP SERPL CALC-SCNC: 24 MMOL/L — HIGH (ref 5–17)
BASOPHILS # BLD AUTO: 0.06 K/UL — SIGNIFICANT CHANGE UP (ref 0–0.2)
BASOPHILS NFR BLD AUTO: 0.6 % — SIGNIFICANT CHANGE UP (ref 0–2)
BUN SERPL-MCNC: 57 MG/DL — HIGH (ref 8–20)
CALCIUM SERPL-MCNC: 9.5 MG/DL — SIGNIFICANT CHANGE UP (ref 8.6–10.2)
CHLORIDE SERPL-SCNC: 87 MMOL/L — LOW (ref 98–107)
CO2 SERPL-SCNC: 19 MMOL/L — LOW (ref 22–29)
CREAT SERPL-MCNC: 8.14 MG/DL — HIGH (ref 0.5–1.3)
CSF PCR RESULT: DETECTED
EOSINOPHIL # BLD AUTO: 0.76 K/UL — HIGH (ref 0–0.5)
EOSINOPHIL NFR BLD AUTO: 7.9 % — HIGH (ref 0–6)
GLUCOSE BLDC GLUCOMTR-MCNC: 110 MG/DL — HIGH (ref 70–99)
GLUCOSE BLDC GLUCOMTR-MCNC: 116 MG/DL — HIGH (ref 70–99)
GLUCOSE BLDC GLUCOMTR-MCNC: 125 MG/DL — HIGH (ref 70–99)
GLUCOSE BLDC GLUCOMTR-MCNC: 158 MG/DL — HIGH (ref 70–99)
GLUCOSE SERPL-MCNC: 148 MG/DL — HIGH (ref 70–115)
HCT VFR BLD CALC: 36.1 % — SIGNIFICANT CHANGE UP (ref 34.5–45)
HGB BLD-MCNC: 11.4 G/DL — LOW (ref 11.5–15.5)
IMM GRANULOCYTES NFR BLD AUTO: 0.4 % — SIGNIFICANT CHANGE UP (ref 0–1.5)
LABORATORY COMMENT REPORT: SIGNIFICANT CHANGE UP
LYMPHOCYTES # BLD AUTO: 1.52 K/UL — SIGNIFICANT CHANGE UP (ref 1–3.3)
LYMPHOCYTES # BLD AUTO: 15.7 % — SIGNIFICANT CHANGE UP (ref 13–44)
MAGNESIUM SERPL-MCNC: 2.2 MG/DL — SIGNIFICANT CHANGE UP (ref 1.6–2.6)
MCHC RBC-ENTMCNC: 28.5 PG — SIGNIFICANT CHANGE UP (ref 27–34)
MCHC RBC-ENTMCNC: 31.6 GM/DL — LOW (ref 32–36)
MCV RBC AUTO: 90.3 FL — SIGNIFICANT CHANGE UP (ref 80–100)
MONOCYTES # BLD AUTO: 0.83 K/UL — SIGNIFICANT CHANGE UP (ref 0–0.9)
MONOCYTES NFR BLD AUTO: 8.6 % — SIGNIFICANT CHANGE UP (ref 2–14)
NEUTROPHILS # BLD AUTO: 6.46 K/UL — SIGNIFICANT CHANGE UP (ref 1.8–7.4)
NEUTROPHILS NFR BLD AUTO: 66.8 % — SIGNIFICANT CHANGE UP (ref 43–77)
PHOSPHATE SERPL-MCNC: 7.4 MG/DL — HIGH (ref 2.4–4.7)
PLATELET # BLD AUTO: 189 K/UL — SIGNIFICANT CHANGE UP (ref 150–400)
POTASSIUM SERPL-MCNC: 5.3 MMOL/L — SIGNIFICANT CHANGE UP (ref 3.5–5.3)
POTASSIUM SERPL-SCNC: 5.3 MMOL/L — SIGNIFICANT CHANGE UP (ref 3.5–5.3)
RBC # BLD: 4 M/UL — SIGNIFICANT CHANGE UP (ref 3.8–5.2)
RBC # FLD: 13 % — SIGNIFICANT CHANGE UP (ref 10.3–14.5)
SODIUM SERPL-SCNC: 130 MMOL/L — LOW (ref 135–145)
SOURCE HSV 1/2: SIGNIFICANT CHANGE UP
TSH SERPL-MCNC: 1.64 UIU/ML — SIGNIFICANT CHANGE UP (ref 0.27–4.2)
VZV DNA CSF QL NAA+PROBE: DETECTED
WBC # BLD: 9.67 K/UL — SIGNIFICANT CHANGE UP (ref 3.8–10.5)
WBC # FLD AUTO: 9.67 K/UL — SIGNIFICANT CHANGE UP (ref 3.8–10.5)

## 2019-10-12 PROCEDURE — 90937 HEMODIALYSIS REPEATED EVAL: CPT

## 2019-10-12 PROCEDURE — 99232 SBSQ HOSP IP/OBS MODERATE 35: CPT

## 2019-10-12 PROCEDURE — 99233 SBSQ HOSP IP/OBS HIGH 50: CPT

## 2019-10-12 RX ORDER — CLOPIDOGREL BISULFATE 75 MG/1
75 TABLET, FILM COATED ORAL DAILY
Refills: 0 | Status: DISCONTINUED | OUTPATIENT
Start: 2019-10-12 | End: 2019-10-23

## 2019-10-12 RX ORDER — ASPIRIN/CALCIUM CARB/MAGNESIUM 324 MG
81 TABLET ORAL DAILY
Refills: 0 | Status: DISCONTINUED | OUTPATIENT
Start: 2019-10-12 | End: 2019-10-23

## 2019-10-12 RX ADMIN — CLOPIDOGREL BISULFATE 75 MILLIGRAM(S): 75 TABLET, FILM COATED ORAL at 15:01

## 2019-10-12 RX ADMIN — Medication 108 MILLIGRAM(S): at 22:38

## 2019-10-12 RX ADMIN — Medication 0.3 MILLIGRAM(S): at 22:38

## 2019-10-12 RX ADMIN — Medication 1 MILLIGRAM(S): at 15:51

## 2019-10-12 RX ADMIN — Medication 975 MILLIGRAM(S): at 22:47

## 2019-10-12 RX ADMIN — AMLODIPINE BESYLATE 10 MILLIGRAM(S): 2.5 TABLET ORAL at 05:25

## 2019-10-12 RX ADMIN — Medication 975 MILLIGRAM(S): at 23:00

## 2019-10-12 RX ADMIN — Medication 10 MILLIGRAM(S): at 15:29

## 2019-10-12 RX ADMIN — Medication 1: at 11:20

## 2019-10-12 RX ADMIN — Medication 0.3 MILLIGRAM(S): at 15:01

## 2019-10-12 RX ADMIN — SEVELAMER CARBONATE 1600 MILLIGRAM(S): 2400 POWDER, FOR SUSPENSION ORAL at 15:01

## 2019-10-12 RX ADMIN — Medication 75 MICROGRAM(S): at 05:26

## 2019-10-12 RX ADMIN — SIMVASTATIN 20 MILLIGRAM(S): 20 TABLET, FILM COATED ORAL at 22:39

## 2019-10-12 RX ADMIN — Medication 81 MILLIGRAM(S): at 15:01

## 2019-10-12 RX ADMIN — CARVEDILOL PHOSPHATE 12.5 MILLIGRAM(S): 80 CAPSULE, EXTENDED RELEASE ORAL at 05:26

## 2019-10-12 RX ADMIN — Medication 1 MILLIGRAM(S): at 11:20

## 2019-10-12 RX ADMIN — QUETIAPINE FUMARATE 12.5 MILLIGRAM(S): 200 TABLET, FILM COATED ORAL at 22:39

## 2019-10-12 RX ADMIN — Medication 100 MILLIGRAM(S): at 05:26

## 2019-10-12 RX ADMIN — Medication 1 TABLET(S): at 11:19

## 2019-10-12 RX ADMIN — Medication 0.3 MILLIGRAM(S): at 05:25

## 2019-10-12 NOTE — PROGRESS NOTE ADULT - ASSESSMENT
63 year old female with PMH HTN, DMT2, CAD s/p CABG, ESRD on HD T,T,S presents with left sided headache since 4-5 days not relieved with Tylenol so came to ER.  Described headache as sharp radiating from occiput to left temporal region and relieved somewhat with Tylenol. Has nausea, but denies vomiting, visual changes, dizziness, fever or chills. Denies any dyspnea, chest pain or palpitations. Occasional numbness in feet.  Noted to have elevated /81 and rash consistent with Herpes Zoster. Was started on valtrex and gabapentin     HERPES ZOSTER ENCEPHALITIS   Concern for disseminated Zoster vs AMS due to gabapentin   LP for >100 cells with lymphocytic predominance, CSF PCR + for VZV  Acyclovir 5mg IV daily and after HD on HD days D #4  Hold gabapentin and all narcotics  Airborne + contact isolation    will follow

## 2019-10-12 NOTE — PROGRESS NOTE ADULT - SUBJECTIVE AND OBJECTIVE BOX
[facility]                                        Neurology at [place]                                 Wanda Cardoza, [address]                                  [address]                                        [place], [place], [address]                                             [phone]        CC: headache and confusion.     HPI:   The patient is a 63y Female who presented with left sided headache that radiated from occiput along temporal area to forehead. This started two days prior to arrival.  She was noted to have a /81.  With some medication and lowering her BP her headache has improved significantly.  She still has some residual headache though, and the left side of head is somewhat tender to the touch.  She denies vision changes, photophobia, nausea( although had some earlier)/vomiting, vision loss or jaw claudication.   She had some rash noted on the left side of her neck.   She has been more agitated and confused.     Interim history:  Remains on [address] [place].   Feeling better.  Less confused.     ROS:   Denies headache or dizziness.  Denies chest pain.  Denies shortness of breath.    MEDICATIONS  (STANDING):  acyclovir IVPB 400 milliGRAM(s) IV Intermittent every 24 hours  amLODIPine   Tablet 10 milliGRAM(s) Oral daily  carvedilol 12.5 milliGRAM(s) Oral every 12 hours  chlorhexidine 2% Cloths 1 Application(s) Topical once  cloNIDine 0.3 milliGRAM(s) Oral every 8 hours  dextrose 5%. 1000 milliLiter(s) (50 mL/Hr) IV Continuous <Continuous>  folic acid 1 milliGRAM(s) Oral daily  hydrALAZINE 100 milliGRAM(s) Oral every 12 hours  influenza   Vaccine 0.5 milliLiter(s) IntraMuscular once  insulin lispro (HumaLOG) corrective regimen sliding scale   SubCutaneous three times a day before meals  insulin lispro (HumaLOG) corrective regimen sliding scale   SubCutaneous at bedtime  insulin lispro Injectable (HumaLOG) 3 Unit(s) SubCutaneous three times a day before meals  levothyroxine 75 MICROGram(s) Oral daily  LORazepam   Injectable 1 milliGRAM(s) IntraMuscular once  mupirocin 2% Nasal 1 Application(s) Nasal two times a day  Nephro-heather 1 Tablet(s) Oral daily  pantoprazole    Tablet 40 milliGRAM(s) Oral before breakfast  QUEtiapine 12.5 milliGRAM(s) Oral at bedtime  sevelamer carbonate 1600 milliGRAM(s) Oral three times a day  simvastatin 20 milliGRAM(s) Oral at bedtime      Vital Signs Last 24 Hrs  T(C): 36.8 (12 Oct 2019 07:48), Max: 36.8 (12 Oct 2019 07:48)  T(F): 98.2 (12 Oct 2019 07:48), Max: 98.2 (12 Oct 2019 07:48)  HR: 70 (12 Oct 2019 07:48) (70 - 88)  BP: 144/82 (12 Oct 2019 07:55) (144/82 - 168/61)  RR: 18 (12 Oct 2019 07:48) (18 - 19)  SpO2: 95% (12 Oct 2019 07:55) (93% - 98%)    Detailed Neurologic Exam:    Mental status: The patient is awake and alert. There is no aphasia. There is no dysarthria. Fully oriented now.     Cranial nerves: Pupils equal and react symmetrically to light. There is no visual field deficit to threat. Extraocular motion is full with no nystagmus. There is no ptosis. Facial sensation is intact. Facial musculature is symmetric. Palate elevates symmetrically. Tongue is midline.    Motor: There is normal bulk and tone.  There is no tremor.  Strength grossly 5/5 bilaterally.    Sensation: Grossly intact to light touch and pin.    Reflexes: 1+ throughout and plantar responses are flexor.    Cerebellar: No dysmetria on finger nose testing.    Labs:     10-12    130<L>  |  87<L>  |  57.0<H>  ----------------------------<  148<H>  5.3   |  19.0<L>  |  8.14<H>    Ca    9.5      12 Oct 2019 10:08  Phos  7.4     10-12  Mg     2.2     10-12                          11.4   9.67  )-----------( 189      ( 12 Oct 2019 10:08 )             36.1       CSF  WBC: 106 (tube 1) to 72 (tube4)  RBC: 45 (tube1) to 0 (tube 4)  Protein: 68  Glucose: 56  VZV detected on PCR.

## 2019-10-12 NOTE — PROGRESS NOTE ADULT - SUBJECTIVE AND OBJECTIVE BOX
[facility]  INFECTIOUS DISEASES AND INTERNAL MEDICINE at [place]  =======================================================  Carlo Esparza MD  Diplomates American Board of Internal Medicine and Infectious Diseases  Tel: [phone]      Fax: [phone]  =======================================================    KIM LEE [id number]    Follow up: Herpes Zoster encephalitis  awake but still confused  CSF + for VZV    Allergies:  No Known Allergies      Medications:  acetaminophen   Tablet .. 650 milliGRAM(s) Oral every 4 hours PRN  acetaminophen   Tablet .. 975 milliGRAM(s) Oral every 6 hours PRN  acyclovir IVPB 400 milliGRAM(s) IV Intermittent every 24 hours  amLODIPine   Tablet 10 milliGRAM(s) Oral daily  carvedilol 12.5 milliGRAM(s) Oral every 12 hours  cloNIDine 0.3 milliGRAM(s) Oral every 8 hours  dextrose 40% Gel 15 Gram(s) Oral once PRN  dextrose 5%. 1000 milliLiter(s) IV Continuous <Continuous>  dextrose 50% Injectable 12.5 Gram(s) IV Push once  dextrose 50% Injectable 25 Gram(s) IV Push once  dextrose 50% Injectable 25 Gram(s) IV Push once  folic acid 1 milliGRAM(s) Oral daily  glucagon  Injectable 1 milliGRAM(s) IntraMuscular once PRN  hydrALAZINE 100 milliGRAM(s) Oral every 12 hours  hydrALAZINE Injectable 10 milliGRAM(s) IV Push every 6 hours PRN  influenza   Vaccine 0.5 milliLiter(s) IntraMuscular once  insulin lispro (HumaLOG) corrective regimen sliding scale   SubCutaneous three times a day before meals  insulin lispro (HumaLOG) corrective regimen sliding scale   SubCutaneous at bedtime  insulin lispro Injectable (HumaLOG) 3 Unit(s) SubCutaneous three times a day before meals  labetalol Injectable 10 milliGRAM(s) IV Push every 4 hours PRN  levothyroxine 75 MICROGram(s) Oral daily  metoclopramide 5 milliGRAM(s) Oral three times a day PRN  Nephro-heather 1 Tablet(s) Oral daily  pantoprazole    Tablet 40 milliGRAM(s) Oral before breakfast  sevelamer carbonate 1600 milliGRAM(s) Oral three times a day  simvastatin 20 milliGRAM(s) Oral at bedtime            REVIEW OF SYSTEMS:  cannot obtain           Physical Exam:                     Vital Signs Last 24 Hrs  T(C): 36.6 (10 Oct 2019 21:20), Max: 36.9 (10 Oct 2019 15:25)  T(F): 97.8 (10 Oct 2019 21:20), Max: 98.5 (10 Oct 2019 15:25)  HR: 70 (10 Oct 2019 21:20) (70 - 77)  BP: 168/69 (10 Oct 2019 21:20) (157/75 - 169/71)  BP(mean): --  RR: 18 (10 Oct 2019 21:20) (18 - 18)  SpO2: 97% (10 Oct 2019 21:20) (93% - 97%)        GEN: NAD, awakw but confused  HEENT: normocephalic and atraumatic. EOMI. PERRL.  Anicteric   NECK: Supple.   LUNGS: Clear to auscultation.  HEART: Regular rate and rhythm without murmur.  ABDOMEN: Soft, nontender, and nondistended.  Positive bowel sounds.    : No CVA tenderness  EXTREMITIES: Without any edema.  MSK: no joint swelling LUe AVF  NEUROLOGIC: CONFUSED   C:   .  SKIN: crusted lesions over nape of neck and left side of scalp      Labs:                                         10.9   7.81  )-----------( 181      ( 09 Oct 2019 13:44 )             35.3   10-10    131<L>  |  88<L>  |  66.0<H>  ----------------------------<  155<H>  5.8<H>   |  21.0<L>  |  9.29<H>    Ca    9.3      10 Oct 2019 18:33            CAPILLARY BLOOD GLUCOSE      POCT Blood Glucose.: 156 mg/dL (09 Oct 2019 12:19)  POCT Blood Glucose.: 175 mg/dL (09 Oct 2019 08:28)  POCT Blood Glucose.: 175 mg/dL (08 Oct 2019 22:49)  POCT Blood Glucose.: 125 mg/dL (08 Oct 2019 18:25)        RECENT CULTURES:

## 2019-10-12 NOTE — PROGRESS NOTE ADULT - ASSESSMENT
63 year old female with PMH HTN, DMT2, CAD s/p CABG, PPM, ESRD on HD T,T,S presents with left sided headache and uncontrolled BP SBP>200. Found to have vesicles suspicious for shingles. Hospital course complicated by encephalopathy; possibly secondary to disseminated zoster vs adverse reaction from gabapentin. Patient underwent LP today.     Herpes Zoster encephalitis  -on contact and airborne isolation  -patient with vesicular lesion on posterior neck, healing  -IV acyclovir per ID    Occipital Neuralgia  - CTH unremarkable. CT neck unremarkable  - ESR - CRP normal - no temporal arteritis  - Oxycodone, Flexeril and Gabapentin discontinued due to confusion  - Headache has improved; continue tylenol PRN    Encephalopathy - possible medication induced versus infection vs delirium  Repeat CT head without any acute changes  IR guided LP done 10/11,  CSf PCR positive for VZ  IV Acyclovir (renally dosed)   Psych consulted  Neurology recommendations appreciated;    Hypertensive urgency - resolved  -BP elevated due to pain; one dose of dilaudid   -Continue home medications and adjust as needed  -Ultrafiltration per renal    ESRD on HD (TTS)  -Next HD today  -UF goal per renal  -Continue Phoslo  -Strict I/os, daily weights  -renal on board    DMT2 . A1c 6.5  - BGM with SSI  - continue to monitor     Hypothyroidism  -check TSH  -Continue Synthroid    CAD  -asymptomatic  -Continue Statin, BB  -ASA and Plavix d/c'd for LP. resumed today    Prophylactic measure  - SCDs

## 2019-10-12 NOTE — PROGRESS NOTE ADULT - ASSESSMENT
63y Female who is followed by neurology because of headache    Headache  Suspect occipital neuralgia, question vesicular rash in occipital nerve distribution. Possibly post herpetic (see below).   ESR=28, CRP<0.40 essentially rules out temporal arteritis.    Encephalopathy  Improved.  Has zoster with ams and positive CSF.   On antivirals per ID.    HTN  Control blood pressure.    Vascular risks.  OK to resume aspirin and Plavix.

## 2019-10-12 NOTE — PROGRESS NOTE ADULT - SUBJECTIVE AND OBJECTIVE BOX
Patient is a 63y old  Female who presents with a chief complaint of headache (10 Oct 2019 08:33)  VZ encephalitis, AMS      SUBJECTIVE:  seen and examined at bedside. family members at bedside. patient denied complaints. family members reported confusion is better today. reports occasional hallucination links it with narcotics. no depresion/anxiety/SI/HI. no headaches/limb weakness/vision or speech problem. no CP/SOB/palpitation. all other ROS neg    Vital Signs Last 24 Hrs  T(C): 36.8 (12 Oct 2019 07:48), Max: 36.8 (12 Oct 2019 07:48)  T(F): 98.2 (12 Oct 2019 07:48), Max: 98.2 (12 Oct 2019 07:48)  HR: 70 (12 Oct 2019 07:48) (70 - 88)  BP: 144/82 (12 Oct 2019 07:55) (144/82 - 168/61)  BP(mean): --  RR: 18 (12 Oct 2019 07:48) (18 - 19)  SpO2: 95% (12 Oct 2019 07:55) (93% - 98%)    CAPILLARY BLOOD GLUCOSE      POCT Blood Glucose.: 158 mg/dL (12 Oct 2019 11:18)  POCT Blood Glucose.: 116 mg/dL (12 Oct 2019 07:48)  POCT Blood Glucose.: 133 mg/dL (11 Oct 2019 20:58)  POCT Blood Glucose.: 101 mg/dL (11 Oct 2019 17:41)        GENERAL: Not in distress. Alert    HEENT:  Normocephalic and atraumatic. PEARLA,EOMI  NECK: Supple.  No JVD.    CARDIOVASCULAR: RRR S1, S2. No murmur/rubs/gallop  LUNGS: BLAE+, no rales, no wheezing, no rhonchi.    ABDOMEN: ND. Soft,  NT, no guarding / rebound / rigidity. BS normoactive. No CVA tenderness.    BACK: No spine tenderness.  EXTREMITIES: no cyanosis, no clubbing, no edema.   SKIN: no rash. No skin break or ulcer. No cellulitis.  NEUROLOGIC: AAO*3.strength is symmetric, sensation intact, speech fluent.    PSYCHIATRIC: Calm.  No agitation.                            11.4   9.67  )-----------( 189      ( 12 Oct 2019 10:08 )             36.1       10-12    130<L>  |  87<L>  |  57.0<H>  ----------------------------<  148<H>  5.3   |  19.0<L>  |  8.14<H>    Ca    9.5      12 Oct 2019 10:08  Phos  7.4     10-12  Mg     2.2     10-12        Culture:    Culture - CSF with Gram Stain . (10.11.19 @ 16:02)    Gram Stain:   Few White blood cells  No organisms seen    Specimen Source: .CSF    Culture Results:   No growth to date  Culture in progress    Culture - Blood (10.08.19 @ 18:12)    Specimen Source: .Blood    Culture Results:   No growth at 48 hours    CSF PCR Panel (10.12.19 @ 00:01)    CSF PCR Result: Detected: The meningitis/encephalitis (ME) panel (CSFPCR) is a PCR based assay that  screens for: Escherichia coli; Haemophilus influenzae; Listeria  monocytogenes; Neisseria meningitidis; Streptococcus agalactiae;  Streptococcus pneumoniae; CMV; Enterovirus; HSV-1; HSV-2; Human  herpesvirus 6; Parechovirus; VZV and Cryptococcus. Result should be  interpreted in context of clinical presentation, imaging and other lab  tests. Positive predictive value may be lower in patients with normal CSF  chemistry and cell count.    Varicella zoster virus: Detected        TTE:    RADIOLOGY    MEDICATIONS  (STANDING):  acyclovir IVPB 400 milliGRAM(s) IV Intermittent every 24 hours  amLODIPine   Tablet 10 milliGRAM(s) Oral daily  aspirin  chewable 81 milliGRAM(s) Oral daily  carvedilol 12.5 milliGRAM(s) Oral every 12 hours  chlorhexidine 2% Cloths 1 Application(s) Topical once  cloNIDine 0.3 milliGRAM(s) Oral every 8 hours  clopidogrel Tablet 75 milliGRAM(s) Oral daily  dextrose 5%. 1000 milliLiter(s) (50 mL/Hr) IV Continuous <Continuous>  dextrose 50% Injectable 12.5 Gram(s) IV Push once  dextrose 50% Injectable 25 Gram(s) IV Push once  dextrose 50% Injectable 25 Gram(s) IV Push once  folic acid 1 milliGRAM(s) Oral daily  hydrALAZINE 100 milliGRAM(s) Oral every 12 hours  influenza   Vaccine 0.5 milliLiter(s) IntraMuscular once  insulin lispro (HumaLOG) corrective regimen sliding scale   SubCutaneous three times a day before meals  insulin lispro (HumaLOG) corrective regimen sliding scale   SubCutaneous at bedtime  insulin lispro Injectable (HumaLOG) 3 Unit(s) SubCutaneous three times a day before meals  levothyroxine 75 MICROGram(s) Oral daily  LORazepam   Injectable 1 milliGRAM(s) IntraMuscular once  mupirocin 2% Nasal 1 Application(s) Nasal two times a day  Nephro-heather 1 Tablet(s) Oral daily  pantoprazole    Tablet 40 milliGRAM(s) Oral before breakfast  QUEtiapine 12.5 milliGRAM(s) Oral at bedtime  sevelamer carbonate 1600 milliGRAM(s) Oral three times a day  simvastatin 20 milliGRAM(s) Oral at bedtime    MEDICATIONS  (PRN):  acetaminophen   Tablet .. 650 milliGRAM(s) Oral every 4 hours PRN Mild Pain (1 - 3)  acetaminophen   Tablet .. 975 milliGRAM(s) Oral every 6 hours PRN Moderate Pain (4 - 6)  dextrose 40% Gel 15 Gram(s) Oral once PRN Blood Glucose LESS THAN 70 milliGRAM(s)/deciliter  glucagon  Injectable 1 milliGRAM(s) IntraMuscular once PRN Glucose LESS THAN 70 milligrams/deciliter  hydrALAZINE Injectable 10 milliGRAM(s) IV Push every 6 hours PRN SBP >160  labetalol Injectable 10 milliGRAM(s) IV Push every 4 hours PRN Systolic/Diastolic >160/100  LORazepam   Injectable 1 milliGRAM(s) IV Push every 4 hours PRN Agitation  QUEtiapine 12.5 milliGRAM(s) Oral two times a day PRN agitation

## 2019-10-13 LAB
CULTURE RESULTS: SIGNIFICANT CHANGE UP
CULTURE RESULTS: SIGNIFICANT CHANGE UP
GLUCOSE BLDC GLUCOMTR-MCNC: 109 MG/DL — HIGH (ref 70–99)
GLUCOSE BLDC GLUCOMTR-MCNC: 138 MG/DL — HIGH (ref 70–99)
GLUCOSE BLDC GLUCOMTR-MCNC: 165 MG/DL — HIGH (ref 70–99)
GLUCOSE BLDC GLUCOMTR-MCNC: 169 MG/DL — HIGH (ref 70–99)
LDH CSF L TO P-CCNC: 24 U/L — SIGNIFICANT CHANGE UP
LDH FLD-CCNC: 24 U/L — SIGNIFICANT CHANGE UP
SPECIMEN SOURCE: SIGNIFICANT CHANGE UP
SPECIMEN SOURCE: SIGNIFICANT CHANGE UP

## 2019-10-13 PROCEDURE — 99233 SBSQ HOSP IP/OBS HIGH 50: CPT

## 2019-10-13 RX ADMIN — Medication 975 MILLIGRAM(S): at 15:15

## 2019-10-13 RX ADMIN — AMLODIPINE BESYLATE 10 MILLIGRAM(S): 2.5 TABLET ORAL at 06:34

## 2019-10-13 RX ADMIN — CARVEDILOL PHOSPHATE 12.5 MILLIGRAM(S): 80 CAPSULE, EXTENDED RELEASE ORAL at 06:34

## 2019-10-13 RX ADMIN — Medication 100 MILLIGRAM(S): at 06:34

## 2019-10-13 RX ADMIN — Medication 1 MILLIGRAM(S): at 11:53

## 2019-10-13 RX ADMIN — Medication 975 MILLIGRAM(S): at 20:44

## 2019-10-13 RX ADMIN — Medication 75 MICROGRAM(S): at 06:34

## 2019-10-13 RX ADMIN — Medication 3 UNIT(S): at 11:54

## 2019-10-13 RX ADMIN — Medication 108 MILLIGRAM(S): at 17:07

## 2019-10-13 RX ADMIN — Medication 0.3 MILLIGRAM(S): at 06:34

## 2019-10-13 RX ADMIN — SEVELAMER CARBONATE 1600 MILLIGRAM(S): 2400 POWDER, FOR SUSPENSION ORAL at 14:08

## 2019-10-13 RX ADMIN — Medication 0.3 MILLIGRAM(S): at 21:42

## 2019-10-13 RX ADMIN — CARVEDILOL PHOSPHATE 12.5 MILLIGRAM(S): 80 CAPSULE, EXTENDED RELEASE ORAL at 17:07

## 2019-10-13 RX ADMIN — Medication 0.3 MILLIGRAM(S): at 14:08

## 2019-10-13 RX ADMIN — Medication 975 MILLIGRAM(S): at 20:14

## 2019-10-13 RX ADMIN — Medication 81 MILLIGRAM(S): at 11:53

## 2019-10-13 RX ADMIN — SIMVASTATIN 20 MILLIGRAM(S): 20 TABLET, FILM COATED ORAL at 21:41

## 2019-10-13 RX ADMIN — Medication 975 MILLIGRAM(S): at 14:15

## 2019-10-13 RX ADMIN — Medication 100 MILLIGRAM(S): at 17:07

## 2019-10-13 RX ADMIN — SEVELAMER CARBONATE 1600 MILLIGRAM(S): 2400 POWDER, FOR SUSPENSION ORAL at 21:41

## 2019-10-13 RX ADMIN — PANTOPRAZOLE SODIUM 40 MILLIGRAM(S): 20 TABLET, DELAYED RELEASE ORAL at 06:35

## 2019-10-13 RX ADMIN — Medication 3 UNIT(S): at 17:07

## 2019-10-13 RX ADMIN — QUETIAPINE FUMARATE 12.5 MILLIGRAM(S): 200 TABLET, FILM COATED ORAL at 21:42

## 2019-10-13 RX ADMIN — Medication 1 TABLET(S): at 11:53

## 2019-10-13 RX ADMIN — CLOPIDOGREL BISULFATE 75 MILLIGRAM(S): 75 TABLET, FILM COATED ORAL at 11:53

## 2019-10-13 NOTE — PROGRESS NOTE ADULT - SUBJECTIVE AND OBJECTIVE BOX
Patient is a 63y old  Female who presents with a chief complaint of headache (10 Oct 2019 08:33)  VZ encephalitis, AMS      SUBJECTIVE:  seen and examined at bedside. family members at bedside. patient denied complaints. confusion+. still has occasional hallucination . no depresion/anxiety/SI/HI. no headaches/limb weakness/vision or speech problem. no CP/SOB/palpitation. all other ROS neg    Vital Signs Last 24 Hrs  T(C): 36.5 (13 Oct 2019 15:19), Max: 36.8 (13 Oct 2019 08:08)  T(F): 97.7 (13 Oct 2019 15:19), Max: 98.2 (13 Oct 2019 08:08)  HR: 72 (13 Oct 2019 17:04) (70 - 72)  BP: 158/73 (13 Oct 2019 17:04) (158/73 - 159/68)  BP(mean): --  RR: 18 (13 Oct 2019 15:19) (17 - 18)  SpO2: 97% (13 Oct 2019 15:19) (96% - 97%)    I&O's Detail    12 Oct 2019 07:01  -  13 Oct 2019 07:00  --------------------------------------------------------  IN:  Total IN: 0 mL    OUT:    Other: 2000 mL    Voided: 800 mL  Total OUT: 2800 mL    Total NET: -2800 mL      GENERAL: Not in distress. Alert    HEENT:  Normocephalic and atraumatic.  NECK: Supple.    CARDIOVASCULAR: RRR S1, S2. No murmur/rubs/gallop  LUNGS: BLAE+, no rales, no wheezing, no rhonchi.    ABDOMEN: ND. Soft,  NT, no guarding / rebound / rigidity.     EXTREMITIES: no cyanosis, no edema.   SKIN: no rash.   NEUROLOGIC: AAO*3.strength is symmetric, sensation intact, speech fluent.    PSYCHIATRIC: Calm.  No agitation. no hallucination/delusion at this time, no SI/HI                             11.4   9.67  )-----------( 189      ( 12 Oct 2019 10:08 )             36.1       10-12    130<L>  |  87<L>  |  57.0<H>  ----------------------------<  148<H>  5.3   |  19.0<L>  |  8.14<H>    Ca    9.5      12 Oct 2019 10:08  Phos  7.4     10-12  Mg     2.2     10-12            Culture:    Culture - CSF with Gram Stain . (10.11.19 @ 16:02)    Gram Stain:   Few White blood cells  No organisms seen    Specimen Source: .CSF    Culture Results:   No growth to date  Culture in progress    Culture - Blood (10.08.19 @ 18:12)    Specimen Source: .Blood    Culture Results:   No growth at 48 hours    CSF PCR Panel (10.12.19 @ 00:01)    CSF PCR Result: Detected: The meningitis/encephalitis (ME) panel (CSFPCR) is a PCR based assay that  screens for: Escherichia coli; Haemophilus influenzae; Listeria  monocytogenes; Neisseria meningitidis; Streptococcus agalactiae;  Streptococcus pneumoniae; CMV; Enterovirus; HSV-1; HSV-2; Human  herpesvirus 6; Parechovirus; VZV and Cryptococcus. Result should be  interpreted in context of clinical presentation, imaging and other lab  tests. Positive predictive value may be lower in patients with normal CSF  chemistry and cell count.    Varicella zoster virus: Detected    Thyroid Stimulating Hormone, Serum in AM (10.12.19 @ 10:08)    Thyroid Stimulating Hormone, Serum: 1.64 uIU/mL        TTE:    RADIOLOGY    MEDICATIONS  (STANDING):  acyclovir IVPB 400 milliGRAM(s) IV Intermittent every 24 hours  amLODIPine   Tablet 10 milliGRAM(s) Oral daily  aspirin  chewable 81 milliGRAM(s) Oral daily  carvedilol 12.5 milliGRAM(s) Oral every 12 hours  chlorhexidine 2% Cloths 1 Application(s) Topical once  cloNIDine 0.3 milliGRAM(s) Oral every 8 hours  clopidogrel Tablet 75 milliGRAM(s) Oral daily  dextrose 5%. 1000 milliLiter(s) (50 mL/Hr) IV Continuous <Continuous>  dextrose 50% Injectable 12.5 Gram(s) IV Push once  dextrose 50% Injectable 25 Gram(s) IV Push once  dextrose 50% Injectable 25 Gram(s) IV Push once  folic acid 1 milliGRAM(s) Oral daily  hydrALAZINE 100 milliGRAM(s) Oral every 12 hours  influenza   Vaccine 0.5 milliLiter(s) IntraMuscular once  insulin lispro (HumaLOG) corrective regimen sliding scale   SubCutaneous three times a day before meals  insulin lispro (HumaLOG) corrective regimen sliding scale   SubCutaneous at bedtime  insulin lispro Injectable (HumaLOG) 3 Unit(s) SubCutaneous three times a day before meals  levothyroxine 75 MICROGram(s) Oral daily  LORazepam   Injectable 1 milliGRAM(s) IntraMuscular once  mupirocin 2% Nasal 1 Application(s) Nasal two times a day  Nephro-heather 1 Tablet(s) Oral daily  pantoprazole    Tablet 40 milliGRAM(s) Oral before breakfast  QUEtiapine 12.5 milliGRAM(s) Oral at bedtime  sevelamer carbonate 1600 milliGRAM(s) Oral three times a day  simvastatin 20 milliGRAM(s) Oral at bedtime    MEDICATIONS  (PRN):  acetaminophen   Tablet .. 650 milliGRAM(s) Oral every 4 hours PRN Mild Pain (1 - 3)  acetaminophen   Tablet .. 975 milliGRAM(s) Oral every 6 hours PRN Moderate Pain (4 - 6)  dextrose 40% Gel 15 Gram(s) Oral once PRN Blood Glucose LESS THAN 70 milliGRAM(s)/deciliter  glucagon  Injectable 1 milliGRAM(s) IntraMuscular once PRN Glucose LESS THAN 70 milligrams/deciliter  hydrALAZINE Injectable 10 milliGRAM(s) IV Push every 6 hours PRN SBP >160  labetalol Injectable 10 milliGRAM(s) IV Push every 4 hours PRN Systolic/Diastolic >160/100  LORazepam   Injectable 1 milliGRAM(s) IV Push every 4 hours PRN Agitation  QUEtiapine 12.5 milliGRAM(s) Oral two times a day PRN agitation

## 2019-10-13 NOTE — PROGRESS NOTE ADULT - ASSESSMENT
63 year old female with PMH HTN, DMT2, CAD s/p CABG, PPM, ESRD on HD T,T,S presents with left sided headache and uncontrolled BP SBP>200. Found to have vesicles suspicious for shingles. Hospital course complicated by encephalopathy; possibly secondary to disseminated zoster vs adverse reaction from gabapentin. Patient underwent LP today.     Herpes Zoster encephalitis  -on contact and airborne isolation  -patient with vesicular lesion on posterior neck, healing  -IV acyclovir per ID  - DC planning    Occipital Neuralgia  - CTH unremarkable. CT neck unremarkable  - ESR - CRP normal - no temporal arteritis  - Oxycodone, Flexeril and Gabapentin discontinued due to confusion  - Headache has improved; continue tylenol PRN    Encephalopathy - possible medication induced versus infection vs delirium  Repeat CT head without any acute changes  IR guided LP done 10/11,  CSf PCR positive for VZ  IV Acyclovir (renally dosed)   Psych consulted  Neurology recommendations appreciated;    Hypertensive urgency - resolved  -BP elevated due to pain; one dose of dilaudid   -Continue home medications and adjust as needed  -Ultrafiltration per renal    ESRD on HD (TTS)  -Next HD Tuesday  -UF goal per renal  -Continue Phoslo  -Strict I/os, daily weights  -renal on board    DMT2 . A1c 6.5  - BGM with SSI  - continue to monitor     Hypothyroidism  - normal TSH  -Continue Synthroid    CAD  -asymptomatic  -Continue Statin, BB  -ASA and Plavix d/c'd for LP. resumed     Prophylactic measure  - SCDs. AC if no contraindication 63 year old female with PMH HTN, DMT2, CAD s/p CABG, PPM, ESRD on HD T,T,S presents with left sided headache and uncontrolled BP SBP>200. Found to have vesicles suspicious for shingles. Hospital course complicated by encephalopathy; possibly secondary to disseminated zoster vs adverse reaction from gabapentin. Patient underwent LP today.     Herpes Zoster encephalitis  -on contact and airborne isolation  -patient with vesicular lesion on posterior neck, healing  -IV acyclovir per ID  - DC planning    Occipital Neuralgia  - CTH unremarkable. CT neck unremarkable  - ESR - CRP normal - no temporal arteritis  - Oxycodone, Flexeril and Gabapentin discontinued due to confusion  - Headache has improved; continue tylenol PRN    Encephalopathy - possible medication induced versus infection vs delirium  Repeat CT head without any acute changes  IR guided LP done 10/11,  CSf PCR positive for VZ  IV Acyclovir (renally dosed)   Psych consulted  Neurology recommendations appreciated;    Hypertensive urgency - resolved  -BP elevated due to pain; one dose of dilaudid   -Continue home medications and adjust as needed  -Ultrafiltration per renal    ESRD on HD (TTS)  -Next HD Tuesday  -UF goal per renal  -Continue Phoslo  -Strict I/os, daily weights  -renal on board    DMT2 . A1c 6.5  - BGM with SSI  - continue to monitor     Hypothyroidism  - normal TSH  -Continue Synthroid    CAD  -asymptomatic  -Continue Statin, BB  -ASA and Plavix d/c'd for LP. resumed       Hyponatremia: patient looks dehydrated. encouraged PO hydration.as per patient renal suggested 36 pz of fluid. monitor sodium    Debillity: PT eval once feasible. fall precaution    Prophylactic measure  - SCDs. AC if no contraindication

## 2019-10-13 NOTE — PROGRESS NOTE ADULT - SUBJECTIVE AND OBJECTIVE BOX
[facility] DIVISION OF KIDNEY DISEASES AND HYPERTENSION -- FOLLOW UP NOTE  --------------------------------------------------------------------------------  Chief Complaint:  ESRD HD    24 hour events/subjective:  HD planned for Tues;        PAST HISTORY  --------------------------------------------------------------------------------  No significant changes to PMH, PSH, FHx, SHx, unless otherwise noted    ALLERGIES & MEDICATIONS  --------------------------------------------------------------------------------  Allergies    No Known Allergies    Intolerances      Standing Inpatient Medications  acyclovir IVPB 400 milliGRAM(s) IV Intermittent every 24 hours  amLODIPine   Tablet 10 milliGRAM(s) Oral daily  aspirin  chewable 81 milliGRAM(s) Oral daily  carvedilol 12.5 milliGRAM(s) Oral every 12 hours  chlorhexidine 2% Cloths 1 Application(s) Topical once  cloNIDine 0.3 milliGRAM(s) Oral every 8 hours  clopidogrel Tablet 75 milliGRAM(s) Oral daily  dextrose 5%. 1000 milliLiter(s) IV Continuous <Continuous>  dextrose 50% Injectable 12.5 Gram(s) IV Push once  dextrose 50% Injectable 25 Gram(s) IV Push once  dextrose 50% Injectable 25 Gram(s) IV Push once  folic acid 1 milliGRAM(s) Oral daily  hydrALAZINE 100 milliGRAM(s) Oral every 12 hours  influenza   Vaccine 0.5 milliLiter(s) IntraMuscular once  insulin lispro (HumaLOG) corrective regimen sliding scale   SubCutaneous three times a day before meals  insulin lispro (HumaLOG) corrective regimen sliding scale   SubCutaneous at bedtime  insulin lispro Injectable (HumaLOG) 3 Unit(s) SubCutaneous three times a day before meals  levothyroxine 75 MICROGram(s) Oral daily  LORazepam   Injectable 1 milliGRAM(s) IntraMuscular once  mupirocin 2% Nasal 1 Application(s) Nasal two times a day  Nephro-heather 1 Tablet(s) Oral daily  pantoprazole    Tablet 40 milliGRAM(s) Oral before breakfast  QUEtiapine 12.5 milliGRAM(s) Oral at bedtime  sevelamer carbonate 1600 milliGRAM(s) Oral three times a day  simvastatin 20 milliGRAM(s) Oral at bedtime    PRN Inpatient Medications  acetaminophen   Tablet .. 650 milliGRAM(s) Oral every 4 hours PRN  acetaminophen   Tablet .. 975 milliGRAM(s) Oral every 6 hours PRN  dextrose 40% Gel 15 Gram(s) Oral once PRN  glucagon  Injectable 1 milliGRAM(s) IntraMuscular once PRN  hydrALAZINE Injectable 10 milliGRAM(s) IV Push every 6 hours PRN  labetalol Injectable 10 milliGRAM(s) IV Push every 4 hours PRN  LORazepam   Injectable 1 milliGRAM(s) IV Push every 4 hours PRN  QUEtiapine 12.5 milliGRAM(s) Oral two times a day PRN      REVIEW OF SYSTEMS  --------------------------------------------------------------------------------  Gen: No weight changes, fatigue, fevers/chills, weakness  Skin: No rashes  Head/Eyes/Ears/Mouth: No headache; Normal hearing; Normal vision w/o blurriness; No sinus pain/discomfort, sore throat  Respiratory: No dyspnea, cough, wheezing, hemoptysis  CV: No chest pain, PND, orthopnea  GI: No abdominal pain, diarrhea, constipation, nausea, vomiting, melena, hematochezia  : No increased frequency, dysuria, hematuria, nocturia  MSK: No joint pain/swelling; no back pain; no edema  Neuro: No dizziness/lightheadedness, weakness, seizures, numbness, tingling  Heme: No easy bruising or bleeding  Endo: No heat/cold intolerance  Psych: No significant nervousness, anxiety, stress, depression    All other systems were reviewed and are negative, except as noted.    VITALS/PHYSICAL EXAM  --------------------------------------------------------------------------------  T(C): 36.8 (10-13-19 @ 08:08), Max: 37 (10-12-19 @ 15:25)  HR: 70 (10-13-19 @ 08:08) (70 - 70)  BP: 159/68 (10-13-19 @ 08:08) (159/68 - 168/70)  RR: 17 (10-13-19 @ 08:08) (17 - 19)  SpO2: 96% (10-13-19 @ 08:08) (92% - 96%)  Wt(kg): --        10-12-19 @ 07:01  -  10-13-19 @ 07:00  --------------------------------------------------------  IN: 0 mL / OUT: 2800 mL / NET: -2800 mL      Physical Exam:  General: Obese, lying in bed,  HEENT:  extraocular movements intact, no asymmetry. No temporal tenderness  NECK:  supple, rash occipital region  GI:  Soft nondistended nontender  : No suprapubic tenderness  MSK:  FROM. No edema  CNS:  No gross focal or global deficit noted  INTEG:  Mild papular rash on nape of neck - extending to scalp mostly on left side    LABS/STUDIES  --------------------------------------------------------------------------------              11.4   9.67  >-----------<  189      [10-12-19 @ 10:08]              36.1     130  |  87  |  57.0  ----------------------------<  148      [10-12-19 @ 10:08]  5.3   |  19.0  |  8.14        Ca     9.5     [10-12-19 @ 10:08]      Mg     2.2     [10-12-19 @ 10:08]      Phos  7.4     [10-12-19 @ 10:08]            Creatinine Trend:  SCr 8.14 [10-12 @ 10:08]  SCr 9.29 [10-10 @ 18:33]  SCr 9.55 [10-08 @ 08:09]  SCr 5.79 [10-05 @ 13:33]  SCr 5.73 [10-04 @ 05:24]        PTH -- (Ca 9.8)      [11-13-18 @ 16:19]   551  Vitamin D (25OH) 25.0      [11-13-18 @ 16:38]  HbA1c 6.5      [10-05-19 @ 13:33]  TSH 1.64      [10-12-19 @ 10:08]

## 2019-10-13 NOTE — PROGRESS NOTE ADULT - ASSESSMENT
1) ESRD on HD  2) MBD of renal dx  3) Anemia of renal dx  4) VOl HTN    HD Tues  Continue with antihypertensives  Daily phosphorus; trend;  Continue sevelamer carbonate 1600mg TID WC    mandie Cyr

## 2019-10-14 LAB
ALBUMIN SERPL ELPH-MCNC: 3.8 G/DL — SIGNIFICANT CHANGE UP (ref 3.3–5.2)
ANION GAP SERPL CALC-SCNC: 21 MMOL/L — HIGH (ref 5–17)
BUN SERPL-MCNC: 54 MG/DL — HIGH (ref 8–20)
CALCIUM SERPL-MCNC: 10.2 MG/DL — SIGNIFICANT CHANGE UP (ref 8.6–10.2)
CHLORIDE SERPL-SCNC: 90 MMOL/L — LOW (ref 98–107)
CO2 SERPL-SCNC: 23 MMOL/L — SIGNIFICANT CHANGE UP (ref 22–29)
CREAT SERPL-MCNC: 7.32 MG/DL — HIGH (ref 0.5–1.3)
GLUCOSE BLDC GLUCOMTR-MCNC: 119 MG/DL — HIGH (ref 70–99)
GLUCOSE BLDC GLUCOMTR-MCNC: 152 MG/DL — HIGH (ref 70–99)
GLUCOSE BLDC GLUCOMTR-MCNC: 182 MG/DL — HIGH (ref 70–99)
GLUCOSE BLDC GLUCOMTR-MCNC: 185 MG/DL — HIGH (ref 70–99)
GLUCOSE SERPL-MCNC: 162 MG/DL — HIGH (ref 70–115)
OSMOLALITY SERPL: 306 MOSMOL/KG — HIGH (ref 280–301)
PHOSPHATE SERPL-MCNC: 6.2 MG/DL — HIGH (ref 2.4–4.7)
POTASSIUM SERPL-MCNC: 4.5 MMOL/L — SIGNIFICANT CHANGE UP (ref 3.5–5.3)
POTASSIUM SERPL-SCNC: 4.5 MMOL/L — SIGNIFICANT CHANGE UP (ref 3.5–5.3)
SODIUM SERPL-SCNC: 134 MMOL/L — LOW (ref 135–145)

## 2019-10-14 PROCEDURE — 99233 SBSQ HOSP IP/OBS HIGH 50: CPT

## 2019-10-14 PROCEDURE — 99232 SBSQ HOSP IP/OBS MODERATE 35: CPT

## 2019-10-14 RX ORDER — LANOLIN ALCOHOL/MO/W.PET/CERES
3 CREAM (GRAM) TOPICAL AT BEDTIME
Refills: 0 | Status: DISCONTINUED | OUTPATIENT
Start: 2019-10-14 | End: 2019-10-23

## 2019-10-14 RX ADMIN — Medication 1 TABLET(S): at 12:12

## 2019-10-14 RX ADMIN — Medication 3 UNIT(S): at 08:08

## 2019-10-14 RX ADMIN — MUPIROCIN 1 APPLICATION(S): 20 OINTMENT TOPICAL at 17:02

## 2019-10-14 RX ADMIN — CLOPIDOGREL BISULFATE 75 MILLIGRAM(S): 75 TABLET, FILM COATED ORAL at 12:12

## 2019-10-14 RX ADMIN — Medication 975 MILLIGRAM(S): at 02:30

## 2019-10-14 RX ADMIN — SIMVASTATIN 20 MILLIGRAM(S): 20 TABLET, FILM COATED ORAL at 22:46

## 2019-10-14 RX ADMIN — SEVELAMER CARBONATE 1600 MILLIGRAM(S): 2400 POWDER, FOR SUSPENSION ORAL at 05:07

## 2019-10-14 RX ADMIN — Medication 975 MILLIGRAM(S): at 12:43

## 2019-10-14 RX ADMIN — Medication 975 MILLIGRAM(S): at 01:55

## 2019-10-14 RX ADMIN — Medication 1 MILLIGRAM(S): at 12:12

## 2019-10-14 RX ADMIN — MUPIROCIN 1 APPLICATION(S): 20 OINTMENT TOPICAL at 05:15

## 2019-10-14 RX ADMIN — Medication 3 MILLIGRAM(S): at 22:49

## 2019-10-14 RX ADMIN — Medication 1: at 08:08

## 2019-10-14 RX ADMIN — SEVELAMER CARBONATE 1600 MILLIGRAM(S): 2400 POWDER, FOR SUSPENSION ORAL at 22:46

## 2019-10-14 RX ADMIN — Medication 3 UNIT(S): at 12:11

## 2019-10-14 RX ADMIN — Medication 0.3 MILLIGRAM(S): at 22:47

## 2019-10-14 RX ADMIN — Medication 0.3 MILLIGRAM(S): at 14:41

## 2019-10-14 RX ADMIN — SEVELAMER CARBONATE 1600 MILLIGRAM(S): 2400 POWDER, FOR SUSPENSION ORAL at 14:41

## 2019-10-14 RX ADMIN — Medication 1 MILLIGRAM(S): at 22:46

## 2019-10-14 RX ADMIN — Medication 650 MILLIGRAM(S): at 19:23

## 2019-10-14 RX ADMIN — Medication 108 MILLIGRAM(S): at 17:02

## 2019-10-14 RX ADMIN — Medication 3 UNIT(S): at 17:03

## 2019-10-14 RX ADMIN — PANTOPRAZOLE SODIUM 40 MILLIGRAM(S): 20 TABLET, DELAYED RELEASE ORAL at 05:08

## 2019-10-14 RX ADMIN — Medication 100 MILLIGRAM(S): at 05:08

## 2019-10-14 RX ADMIN — AMLODIPINE BESYLATE 10 MILLIGRAM(S): 2.5 TABLET ORAL at 05:07

## 2019-10-14 RX ADMIN — CARVEDILOL PHOSPHATE 12.5 MILLIGRAM(S): 80 CAPSULE, EXTENDED RELEASE ORAL at 05:07

## 2019-10-14 RX ADMIN — Medication 81 MILLIGRAM(S): at 12:12

## 2019-10-14 RX ADMIN — Medication 650 MILLIGRAM(S): at 18:53

## 2019-10-14 RX ADMIN — Medication 100 MILLIGRAM(S): at 17:02

## 2019-10-14 RX ADMIN — Medication 75 MICROGRAM(S): at 05:08

## 2019-10-14 RX ADMIN — Medication 1: at 17:03

## 2019-10-14 RX ADMIN — Medication 0.3 MILLIGRAM(S): at 05:08

## 2019-10-14 RX ADMIN — Medication 1 MILLIGRAM(S): at 01:54

## 2019-10-14 RX ADMIN — Medication 1: at 12:11

## 2019-10-14 RX ADMIN — Medication 975 MILLIGRAM(S): at 12:12

## 2019-10-14 RX ADMIN — CARVEDILOL PHOSPHATE 12.5 MILLIGRAM(S): 80 CAPSULE, EXTENDED RELEASE ORAL at 17:02

## 2019-10-14 NOTE — CHART NOTE - NSCHARTNOTEFT_GEN_A_CORE
Source: Patient [ ]  Family [ ]   other [x] RN    Current Diet:   Diet, Consistent Carbohydrate Renal/No Snacks:   DASH/TLC {Sodium & Cholesteral Restricted}  Gluten-Gliadin Restricted  Supplement Feeding Modality:  Oral  Nepro Cans or Servings Per Day:  1       Frequency:  Three Times a day (10-13-19 @ 21:31)    PO intake:  < 50% [ ]   50-75%  [x]   %  [x]  other :    Source for PO intake [ ] Patient [ ] family [ ] chart [x] staff [ ] other    Current Weight:   (10/10)   216 lbs  (10/9)    175 lbs- likely inaccurate     % Weight Change: Possible 4lb weight loss since admission, Pts UBW ~220lbs. Aware weight fluctuations likely in setting of HD.      Pertinent Medications: MEDICATIONS  (STANDING):  acyclovir IVPB 400 milliGRAM(s) IV Intermittent every 24 hours  amLODIPine   Tablet 10 milliGRAM(s) Oral daily  aspirin  chewable 81 milliGRAM(s) Oral daily  carvedilol 12.5 milliGRAM(s) Oral every 12 hours  chlorhexidine 2% Cloths 1 Application(s) Topical once  cloNIDine 0.3 milliGRAM(s) Oral every 8 hours  clopidogrel Tablet 75 milliGRAM(s) Oral daily  dextrose 5%. 1000 milliLiter(s) (50 mL/Hr) IV Continuous <Continuous>  dextrose 50% Injectable 12.5 Gram(s) IV Push once  dextrose 50% Injectable 25 Gram(s) IV Push once  dextrose 50% Injectable 25 Gram(s) IV Push once  folic acid 1 milliGRAM(s) Oral daily  hydrALAZINE 100 milliGRAM(s) Oral every 12 hours  influenza   Vaccine 0.5 milliLiter(s) IntraMuscular once  insulin lispro (HumaLOG) corrective regimen sliding scale   SubCutaneous three times a day before meals  insulin lispro (HumaLOG) corrective regimen sliding scale   SubCutaneous at bedtime  insulin lispro Injectable (HumaLOG) 3 Unit(s) SubCutaneous three times a day before meals  levothyroxine 75 MICROGram(s) Oral daily  LORazepam   Injectable 1 milliGRAM(s) IntraMuscular once  mupirocin 2% Nasal 1 Application(s) Nasal two times a day  Nephro-heather 1 Tablet(s) Oral daily  pantoprazole    Tablet 40 milliGRAM(s) Oral before breakfast  QUEtiapine 12.5 milliGRAM(s) Oral at bedtime  sevelamer carbonate 1600 milliGRAM(s) Oral three times a day  simvastatin 20 milliGRAM(s) Oral at bedtime    MEDICATIONS  (PRN):  acetaminophen   Tablet .. 650 milliGRAM(s) Oral every 4 hours PRN Mild Pain (1 - 3)  acetaminophen   Tablet .. 975 milliGRAM(s) Oral every 6 hours PRN Moderate Pain (4 - 6)  dextrose 40% Gel 15 Gram(s) Oral once PRN Blood Glucose LESS THAN 70 milliGRAM(s)/deciliter  glucagon  Injectable 1 milliGRAM(s) IntraMuscular once PRN Glucose LESS THAN 70 milligrams/deciliter  hydrALAZINE Injectable 10 milliGRAM(s) IV Push every 6 hours PRN SBP >160  labetalol Injectable 10 milliGRAM(s) IV Push every 4 hours PRN Systolic/Diastolic >160/100  LORazepam   Injectable 1 milliGRAM(s) IV Push every 4 hours PRN Agitation  QUEtiapine 12.5 milliGRAM(s) Oral two times a day PRN agitation    Pertinent Labs: 10-14 Na134 mmol/L<L> Glu 162 mg/dL<H> K+ 4.5 mmol/L Cr  7.32 mg/dL<H> BUN 54.0 mg/dL<H> Phos 6.2 mg/dL<H> Alb 3.8 g/dL PAB n/a       Skin:     Nutrition focused physical exam not conducted - found signs of malnutrition [ ]absent [ ]present    Subcutaneous fat loss: [ ] Orbital fat pads region, [ ]Buccal fat region, [ ]Triceps region,  [ ]Ribs region    Muscle wasting: [ ]Temples region, [ ]Clavicle region, [ ]Shoulder region, [ ]Scapula region, [ ]Interosseous region,  [ ]thigh region, [ ]Calf region    Estimated Needs:   [x] no change since previous assessment  [ ] recalculated:     Current Nutrition Diagnosis:  Increased nutrient needs related to increased physiological demand as evidenced by increased protein losses associated with HD. Per RN, Pt is eating well, completing >75% of meals. Pt s/p lumbar puncture for CSF drainage. Per neurology suspect occipital neuralgia, question vesicular rash in occipital nerve distribution. RD to remain available.     Recommendations:   Continue with current nutrition plan   Provide encouragement/assistance as needed during mealtimes to inc PO   Monitor weights daily     Monitoring and Evaluation:   [x] PO intake [x] Tolerance to diet prescription [X] Weights  [X] Follow up per protocol [X] Labs:

## 2019-10-14 NOTE — PROGRESS NOTE ADULT - SUBJECTIVE AND OBJECTIVE BOX
[facility] DIVISION OF KIDNEY DISEASES AND HYPERTENSION -- FOLLOW UP NOTE  --------------------------------------------------------------------------------  Chief Complaint:  ESRD HD    24 hour events/subjective:  HD planned for Tues;    PAST HISTORY  --------------------------------------------------------------------------------  No significant changes to PMH, PSH, FHx, SHx, unless otherwise noted    ALLERGIES & MEDICATIONS  --------------------------------------------------------------------------------  Allergies    No Known Allergies    Intolerances      Standing Inpatient Medications  acyclovir IVPB 400 milliGRAM(s) IV Intermittent every 24 hours  amLODIPine   Tablet 10 milliGRAM(s) Oral daily  aspirin  chewable 81 milliGRAM(s) Oral daily  carvedilol 12.5 milliGRAM(s) Oral every 12 hours  chlorhexidine 2% Cloths 1 Application(s) Topical once  cloNIDine 0.3 milliGRAM(s) Oral every 8 hours  clopidogrel Tablet 75 milliGRAM(s) Oral daily  dextrose 5%. 1000 milliLiter(s) IV Continuous <Continuous>  dextrose 50% Injectable 12.5 Gram(s) IV Push once  dextrose 50% Injectable 25 Gram(s) IV Push once  dextrose 50% Injectable 25 Gram(s) IV Push once  folic acid 1 milliGRAM(s) Oral daily  hydrALAZINE 100 milliGRAM(s) Oral every 12 hours  influenza   Vaccine 0.5 milliLiter(s) IntraMuscular once  insulin lispro (HumaLOG) corrective regimen sliding scale   SubCutaneous three times a day before meals  insulin lispro (HumaLOG) corrective regimen sliding scale   SubCutaneous at bedtime  insulin lispro Injectable (HumaLOG) 3 Unit(s) SubCutaneous three times a day before meals  levothyroxine 75 MICROGram(s) Oral daily  LORazepam   Injectable 1 milliGRAM(s) IntraMuscular once  mupirocin 2% Nasal 1 Application(s) Nasal two times a day  Nephro-heather 1 Tablet(s) Oral daily  pantoprazole    Tablet 40 milliGRAM(s) Oral before breakfast  QUEtiapine 12.5 milliGRAM(s) Oral at bedtime  sevelamer carbonate 1600 milliGRAM(s) Oral three times a day  simvastatin 20 milliGRAM(s) Oral at bedtime    PRN Inpatient Medications  acetaminophen   Tablet .. 650 milliGRAM(s) Oral every 4 hours PRN  acetaminophen   Tablet .. 975 milliGRAM(s) Oral every 6 hours PRN  dextrose 40% Gel 15 Gram(s) Oral once PRN  glucagon  Injectable 1 milliGRAM(s) IntraMuscular once PRN  hydrALAZINE Injectable 10 milliGRAM(s) IV Push every 6 hours PRN  labetalol Injectable 10 milliGRAM(s) IV Push every 4 hours PRN  LORazepam   Injectable 1 milliGRAM(s) IV Push every 4 hours PRN  QUEtiapine 12.5 milliGRAM(s) Oral two times a day PRN      REVIEW OF SYSTEMS  --------------------------------------------------------------------------------  Gen: No weight changes, fatigue, fevers/chills, weakness  Skin: No rashes  Head/Eyes/Ears/Mouth: No headache; Normal hearing; Normal vision w/o blurriness; No sinus pain/discomfort, sore throat  Respiratory: No dyspnea, cough, wheezing, hemoptysis  CV: No chest pain, PND, orthopnea  GI: No abdominal pain, diarrhea, constipation, nausea, vomiting, melena, hematochezia  : No increased frequency, dysuria, hematuria, nocturia  MSK: No joint pain/swelling; no back pain; no edema  Neuro: No dizziness/lightheadedness, weakness, seizures, numbness, tingling  Heme: No easy bruising or bleeding  Endo: No heat/cold intolerance  Psych: No significant nervousness, anxiety, stress, depression    All other systems were reviewed and are negative, except as noted.    VITALS/PHYSICAL EXAM  --------------------------------------------------------------------------------  T(C): 36.8 (10-14-19 @ 07:49), Max: 37 (10-14-19 @ 00:41)  HR: 70 (10-14-19 @ 07:49) (70 - 72)  BP: 156/74 (10-14-19 @ 07:49) (156/74 - 160/70)  RR: 18 (10-14-19 @ 07:49) (18 - 18)  SpO2: 96% (10-14-19 @ 07:49) (96% - 97%)  Wt(kg): --        Physical Exam:  General: Obese, lying in bed,  HEENT:  extraocular movements intact, no asymmetry. No temporal tenderness  NECK:  supple, rash occipital region  GI:  Soft nondistended nontender  : No suprapubic tenderness  MSK:  FROM. No edema  CNS:  No gross focal or global deficit noted  INTEG:  Mild papular rash on nape of neck - extending to scalp mostly on left side    LABS/STUDIES  --------------------------------------------------------------------------------    134  |  90  |  54.0  ----------------------------<  162      [10-14-19 @ 08:23]  4.5   |  23.0  |  7.32        Ca     10.2     [10-14-19 @ 08:23]      Phos  6.2     [10-14-19 @ 08:23]    TPro  x   /  Alb  3.8  /  TBili  x   /  DBili  x   /  AST  x   /  ALT  x   /  AlkPhos  x   [10-14-19 @ 08:23]        Serum Osmolality 306      [10-14-19 @ 08:23]    Creatinine Trend:  SCr 7.32 [10-14 @ 08:23]  SCr 8.14 [10-12 @ 10:08]  SCr 9.29 [10-10 @ 18:33]  SCr 9.55 [10-08 @ 08:09]  SCr 5.79 [10-05 @ 13:33]        PTH -- (Ca 9.8)      [11-13-18 @ 16:19]   551  Vitamin D (25OH) 25.0      [11-13-18 @ 16:38]  HbA1c 6.5      [10-05-19 @ 13:33]  TSH 1.64      [10-12-19 @ 10:08]

## 2019-10-14 NOTE — PROGRESS NOTE ADULT - ASSESSMENT
63 year old female with PMH HTN, DMT2, CAD s/p CABG, PPM, ESRD on HD T,T,S presents with left sided headache and uncontrolled BP SBP>200. Found to have vesicles suspicious for shingles. Hospital course complicated by encephalopathy; possibly secondary to disseminated zoster vs adverse reaction from gabapentin. Patient underwent LP 10/11 which was + for HZV.     Herpes Zoster encephalitis  -on contact and airborne isolation  -patient with vesicular lesion on posterior neck, healing, scabbed   -IV acyclovir for at least 2 weeks per ID until 10/23    Occipital Neuralgia  - CTH unremarkable. CT neck unremarkable  - ESR - CRP normal - no temporal arteritis  - Oxycodone, Flexeril and Gabapentin discontinued due to confusion  - Headache has improved; continue tylenol PRN    Encephalopathy - possible medication induced versus infection vs delirium  Repeat CT head without any acute changes  IR guided LP done 10/11  CSf PCR positive for VZ  IV Acyclovir (renally dosed)   Psych consulted  Will hold gabapentin or any psych med.   Neurology recommendations appreciated    Hypertensive urgency - resolved  -Continue home medications and adjust as needed  -Ultrafiltration per renal    ESRD on HD (TTS)  -Next HD tomorrow   -UF goal per renal  -Continue Phoslo  -Strict I/os, daily weights  -renal on board    DMT2 . A1c 6.5  - BGM with SSI  - continue to monitor     Hypothyroidism  - normal TSH  -Continue Synthroid    CAD  -asymptomatic  -Continue Statin, BB  -ASA and Plavix resumed     Hyponatremia: patient looks dehydrated. Encouraged PO hydration, continue to monitor sodium    Debillity: PT eval. fall precautions    Prophylactic measure  - SCDs. AC if no contraindication

## 2019-10-14 NOTE — PROGRESS NOTE ADULT - SUBJECTIVE AND OBJECTIVE BOX
[facility]  INFECTIOUS DISEASES AND INTERNAL MEDICINE at [place]  =======================================================  Carlo Esparza MD  Diplomates American Board of Internal Medicine and Infectious Diseases  Tel: [phone]      Fax: [phone]  =======================================================    KIM LEE [id number]    Follow up: Herpes Zoster encephalitis  Awake but confused and mild slurred speech, as per daughter at the bedside improving gradually.   CSF + for VZV    Allergies:  No Known Allergies    Antibiotics:  acyclovir IVPB 400 milliGRAM(s) IV Intermittent every 24 hours    REVIEW OF SYSTEMS:  Unable to obtain     Physical Exam:  Vital Signs Last 24 Hrs  T(C): 36.8 (14 Oct 2019 07:49), Max: 37 (14 Oct 2019 00:41)  T(F): 98.2 (14 Oct 2019 07:49), Max: 98.6 (14 Oct 2019 00:41)  HR: 71 (14 Oct 2019 14:39) (70 - 72)  BP: 124/74 (14 Oct 2019 14:39) (124/74 - 160/70)  RR: 18 (14 Oct 2019 07:49) (18 - 18)  SpO2: 96% (14 Oct 2019 07:49) (96% - 97%)  GEN: NAD, awake and alert  HEENT: normocephalic and atraumatic. EOMI. PERRL.  Anicteric   NECK: Supple.   LUNGS: Clear to auscultation.  HEART: Regular rate and rhythm without murmur.  ABDOMEN: Soft, nontender, and nondistended.  Positive bowel sounds.    : No CVA tenderness  EXTREMITIES: Without any edema.  MSK: no joint swelling, LUE fistula   NEUROLOGIC: confused with mild slurred speech   SKIN: crusted lesions over nape of neck and left side of scalp    Labs:  10-14    134<L>  |  90<L>  |  54.0<H>  ----------------------------<  162<H>  4.5   |  23.0  |  7.32<H>    Ca    10.2      14 Oct 2019 08:23  Phos  6.2     10-14    TPro  x   /  Alb  3.8  /  TBili  x   /  DBili  x   /  AST  x   /  ALT  x   /  AlkPhos  x   10-14    LIVER FUNCTIONS - ( 14 Oct 2019 08:23 )  Alb: 3.8 g/dL / Pro: x     / ALK PHOS: x     / ALT: x     / AST: x     / GGT: x           RECENT CULTURES:  10-11 @ 16:02 .CSF     No growth at 2 days.  Culture in progress    Few White blood cells  No organisms seen    10-10 @ 10:33      NotDetec    10-08 @ 18:12 .Blood     No growth at 5 days.    10-08 @ 18:11 .Blood     No growth at 5 days.        Assessment and Plan:   63 year old female with PMH HTN, DMT2, CAD s/p CABG, ESRD on HD T,T,S presents with left sided headache since 4-5 days not relieved with Tylenol so came to ER.  Described headache as sharp radiating from occiput to left temporal region and relieved somewhat with Tylenol. Has nausea, but denies vomiting, visual changes, dizziness, fever or chills. Denies any dyspnea, chest pain or palpitations. Occasional numbness in feet.  Noted to have elevated /81 and rash consistent with Herpes Zoster. Was started on valtrex and gabapentin.   LP for >100 cells with lymphocytic predominance, CSF PCR + for VZV    Herpes zoster encephalitis  Herpes zoster left scalp and neck lesions     - No sign of disseminated zoster, no pneumonitis or GE   - Continue Acyclovir 400mg daily adjusted for her renal function   - Will complete at least 14 day IV treatment   - Airborne + contact isolation  - Hold gabapentin or any psych med.     will follow.

## 2019-10-14 NOTE — PROGRESS NOTE ADULT - SUBJECTIVE AND OBJECTIVE BOX
[facility]                                        Neurology at [place]                                 Wanda Cardoza, [facility]                                  [address]                                        [place], [place], [address]                                             [phone]        CC: headache and confusion.     HPI:   The patient is a 63y Female who presented with left sided headache that radiated from occiput along temporal area to forehead. This started two days prior to arrival.  She was noted to have a /81.  With some medication and lowering her BP her headache has improved significantly.  She still has some residual headache though, and the left side of head is somewhat tender to the touch.  She denies vision changes, photophobia, nausea( although had some earlier)/vomiting, vision loss or jaw claudication.   She had some rash noted on the left side of her neck.     Interim history:  Remains on [address] [place].   Feeling better.  Less confused.     ROS:   Denies headache or dizziness.  Denies chest pain.  Denies shortness of breath.    MEDICATIONS  (STANDING):  acyclovir IVPB 400 milliGRAM(s) IV Intermittent every 24 hours  amLODIPine   Tablet 10 milliGRAM(s) Oral daily  carvedilol 12.5 milliGRAM(s) Oral every 12 hours  chlorhexidine 2% Cloths 1 Application(s) Topical once  cloNIDine 0.3 milliGRAM(s) Oral every 8 hours  dextrose 5%. 1000 milliLiter(s) (50 mL/Hr) IV Continuous <Continuous>  folic acid 1 milliGRAM(s) Oral daily  hydrALAZINE 100 milliGRAM(s) Oral every 12 hours  influenza   Vaccine 0.5 milliLiter(s) IntraMuscular once  insulin lispro (HumaLOG) corrective regimen sliding scale   SubCutaneous three times a day before meals  insulin lispro (HumaLOG) corrective regimen sliding scale   SubCutaneous at bedtime  insulin lispro Injectable (HumaLOG) 3 Unit(s) SubCutaneous three times a day before meals  levothyroxine 75 MICROGram(s) Oral daily  LORazepam   Injectable 1 milliGRAM(s) IntraMuscular once  mupirocin 2% Nasal 1 Application(s) Nasal two times a day  Nephro-heather 1 Tablet(s) Oral daily  pantoprazole    Tablet 40 milliGRAM(s) Oral before breakfast  QUEtiapine 12.5 milliGRAM(s) Oral at bedtime  sevelamer carbonate 1600 milliGRAM(s) Oral three times a day  simvastatin 20 milliGRAM(s) Oral at bedtime      Vital Signs Last 24 Hrs  T(C): 36.8 (12 Oct 2019 07:48), Max: 36.8 (12 Oct 2019 07:48)  T(F): 98.2 (12 Oct 2019 07:48), Max: 98.2 (12 Oct 2019 07:48)  HR: 70 (12 Oct 2019 07:48) (70 - 88)  BP: 144/82 (12 Oct 2019 07:55) (144/82 - 168/61)  RR: 18 (12 Oct 2019 07:48) (18 - 19)  SpO2: 95% (12 Oct 2019 07:55) (93% - 98%)    Detailed Neurologic Exam:    Mental status: The patient is awake and alert. There is no aphasia. There is no dysarthria. Fully oriented now.     Cranial nerves: Pupils equal and react symmetrically to light. There is no visual field deficit to threat. Extraocular motion is full with no nystagmus. There is no ptosis. Facial sensation is intact. Facial musculature is symmetric.     Motor: There is normal bulk and tone.  There is no tremor.  Strength grossly 5/5 bilaterally.    Sensation: Grossly intact to light touch and pin.    Reflexes: 1+ throughout and plantar responses are flexor.    Cerebellar: No dysmetria on finger nose testing.    Labs:     10-12    130<L>  |  87<L>  |  57.0<H>  ----------------------------<  148<H>  5.3   |  19.0<L>  |  8.14<H>    Ca    9.5      12 Oct 2019 10:08  Phos  7.4     10-12  Mg     2.2     10-12                          11.4   9.67  )-----------( 189      ( 12 Oct 2019 10:08 )             36.1       CSF  WBC: 106 (tube 1) to 72 (tube4)  RBC: 45 (tube1) to 0 (tube 4)  Protein: 68  Glucose: 56  VZV detected on PCR.         Assessment and Plan:   · Assessment		  63y Female who is followed by neurology because of headache    Headache  Suspect occipital neuralgia, question vesicular rash in occipital nerve distribution. Possibly post herpetic (see below).   ESR=28, CRP<0.40 essentially rules out temporal arteritis.    Encephalopathy  Improved.  Has zoster with ams and positive CSF.   On antivirals per ID.    HTN  Control blood pressure.    Vascular risks.  OK to resume aspirin and Plavix.

## 2019-10-14 NOTE — PROGRESS NOTE ADULT - SUBJECTIVE AND OBJECTIVE BOX
CC: f/u headache, HZV encephalopathy   INTERVAL HPI/OVERNIGHT EVENTS: Pt is a 64 y/o F admitted 10/4 for hypertensive urgency and headache, found to have shingles complicated by HZV encephalopathy. Pt seen and examined at bedside by PA, PA student and Attending with Daughter Martha present. Pt is sitting in bedside chair, alert and awake. Denies headache, neck pain, dizziness, fever, chills, sob or any other complaints. No other rashes or lesions.     REVIEW OF SYSTEMS:  CONSTITUTIONAL: No fever, weight loss, or fatigue  EYES: No eye pain, visual disturbances, or discharge  ENT:  No difficulty hearing, tinnitus, vertigo; No sinus or throat pain  NECK: No pain or stiffness  RESPIRATORY: No cough, wheezing, chills or hemoptysis; No shortness of breath  CARDIOVASCULAR: No chest pain, palpitations, dizziness, or leg swelling  GASTROINTESTINAL: No abdominal or epigastric pain. No nausea, vomiting, or hematemesis; No diarrhea or constipation.  GENITOURINARY: No dysuria, frequency, hematuria, or incontinence  NEUROLOGICAL: No headaches, memory loss, loss of strength, numbness, or tremors  SKIN: see HPI  MUSCULOSKELETAL: No joint pain or swelling; No muscle, back, or extremity pain    Allergies  No Known Allergies    HEALTH ISSUES - PROBLEM Dx:  Delirium due to another medical condition    PAST MEDICAL & SURGICAL HISTORY:  3-vessel CAD: s/p CABG  Hypothyroidism, unspecified type  Hemodialysis patient: tues thursday saturday  Renal failure  Hypertension  Diabetes mellitus  A-V fistula  S/P cholecystectomy    VITAL SIGNS:  T(C): 36.8 (10-14-19 @ 07:49), Max: 37 (10-14-19 @ 00:41)  HR: 71 (10-14-19 @ 14:39) (70 - 72)  BP: 124/74 (10-14-19 @ 14:39) (124/74 - 160/70)  RR: 18 (10-14-19 @ 07:49) (18 - 18)  SpO2: 96% (10-14-19 @ 07:49) (96% - 97%)  Wt(kg): --Vital Signs Last 24 Hrs  T(C): 36.8 (14 Oct 2019 07:49), Max: 37 (14 Oct 2019 00:41)  T(F): 98.2 (14 Oct 2019 07:49), Max: 98.6 (14 Oct 2019 00:41)  HR: 71 (14 Oct 2019 14:39) (70 - 72)  BP: 124/74 (14 Oct 2019 14:39) (124/74 - 160/70)  BP(mean): --  RR: 18 (14 Oct 2019 07:49) (18 - 18)  SpO2: 96% (14 Oct 2019 07:49) (96% - 97%)    PHYSICAL EXAM:  GENERAL: Pt sitting in bedside chair comfortably in NAD  HEAD:  Atraumatic, Normocephalic  EYES: EOMI, PERRLA, conjunctiva and sclera clear  ENT: Moist mucous membranes  NECK: Supple, No JVD  CHEST/LUNG: Clear to auscultation bilaterally; No rales, rhonchi, wheezing, or rubs. Unlabored respirations  HEART: Regular rate and rhythm; No murmurs, rubs, or gallops  ABDOMEN: Bowel sounds present; Soft, Nontender, Nondistended. No guarding or rigidity   EXTREMITIES:  2+ Peripheral Pulses, brisk capillary refill. No clubbing, cyanosis, or edema  NERVOUS SYSTEM:  Alert & Oriented X3, slight aphagia but speech understandable, FROM x 4 extremities. No deficits   SKIN: Crusted over lesions on posterior neck extending into hairline. No other rashes or lesions    LABS:    10-14    134<L>  |  90<L>  |  54.0<H>  ----------------------------<  162<H>  4.5   |  23.0  |  7.32<H>    Ca    10.2      14 Oct 2019 08:23  Phos  6.2     10-14    TPro  x   /  Alb  3.8  /  TBili  x   /  DBili  x   /  AST  x   /  ALT  x   /  AlkPhos  x   10-14    CAPILLARY BLOOD GLUCOSE  POCT Blood Glucose.: 185 mg/dL (14 Oct 2019 12:07)  POCT Blood Glucose.: 152 mg/dL (14 Oct 2019 08:06)  POCT Blood Glucose.: 109 mg/dL (13 Oct 2019 21:40)  POCT Blood Glucose.: 169 mg/dL (13 Oct 2019 17:06)

## 2019-10-15 LAB
CULTURE RESULTS: SIGNIFICANT CHANGE UP
GLUCOSE BLDC GLUCOMTR-MCNC: 125 MG/DL — HIGH (ref 70–99)
GLUCOSE BLDC GLUCOMTR-MCNC: 150 MG/DL — HIGH (ref 70–99)
GLUCOSE BLDC GLUCOMTR-MCNC: 169 MG/DL — HIGH (ref 70–99)
GLUCOSE BLDC GLUCOMTR-MCNC: 185 MG/DL — HIGH (ref 70–99)
SPECIMEN SOURCE: SIGNIFICANT CHANGE UP

## 2019-10-15 PROCEDURE — 99232 SBSQ HOSP IP/OBS MODERATE 35: CPT

## 2019-10-15 PROCEDURE — 90937 HEMODIALYSIS REPEATED EVAL: CPT

## 2019-10-15 RX ADMIN — Medication 650 MILLIGRAM(S): at 17:50

## 2019-10-15 RX ADMIN — Medication 1: at 12:12

## 2019-10-15 RX ADMIN — Medication 975 MILLIGRAM(S): at 02:22

## 2019-10-15 RX ADMIN — Medication 650 MILLIGRAM(S): at 13:14

## 2019-10-15 RX ADMIN — AMLODIPINE BESYLATE 10 MILLIGRAM(S): 2.5 TABLET ORAL at 05:43

## 2019-10-15 RX ADMIN — MUPIROCIN 1 APPLICATION(S): 20 OINTMENT TOPICAL at 06:00

## 2019-10-15 RX ADMIN — Medication 3 UNIT(S): at 17:07

## 2019-10-15 RX ADMIN — Medication 1 TABLET(S): at 12:12

## 2019-10-15 RX ADMIN — Medication 650 MILLIGRAM(S): at 16:47

## 2019-10-15 RX ADMIN — Medication 3 UNIT(S): at 12:13

## 2019-10-15 RX ADMIN — SEVELAMER CARBONATE 1600 MILLIGRAM(S): 2400 POWDER, FOR SUSPENSION ORAL at 05:43

## 2019-10-15 RX ADMIN — SEVELAMER CARBONATE 1600 MILLIGRAM(S): 2400 POWDER, FOR SUSPENSION ORAL at 23:24

## 2019-10-15 RX ADMIN — Medication 1 MILLIGRAM(S): at 12:12

## 2019-10-15 RX ADMIN — Medication 3 MILLIGRAM(S): at 23:24

## 2019-10-15 RX ADMIN — CARVEDILOL PHOSPHATE 12.5 MILLIGRAM(S): 80 CAPSULE, EXTENDED RELEASE ORAL at 05:43

## 2019-10-15 RX ADMIN — Medication 0.3 MILLIGRAM(S): at 05:42

## 2019-10-15 RX ADMIN — PANTOPRAZOLE SODIUM 40 MILLIGRAM(S): 20 TABLET, DELAYED RELEASE ORAL at 08:38

## 2019-10-15 RX ADMIN — Medication 0.3 MILLIGRAM(S): at 23:24

## 2019-10-15 RX ADMIN — Medication 3 UNIT(S): at 08:38

## 2019-10-15 RX ADMIN — Medication 75 MICROGRAM(S): at 05:42

## 2019-10-15 RX ADMIN — Medication 1: at 08:37

## 2019-10-15 RX ADMIN — Medication 108 MILLIGRAM(S): at 20:28

## 2019-10-15 RX ADMIN — Medication 100 MILLIGRAM(S): at 05:42

## 2019-10-15 RX ADMIN — SIMVASTATIN 20 MILLIGRAM(S): 20 TABLET, FILM COATED ORAL at 23:24

## 2019-10-15 RX ADMIN — Medication 650 MILLIGRAM(S): at 12:14

## 2019-10-15 RX ADMIN — Medication 975 MILLIGRAM(S): at 01:31

## 2019-10-15 RX ADMIN — CLOPIDOGREL BISULFATE 75 MILLIGRAM(S): 75 TABLET, FILM COATED ORAL at 12:12

## 2019-10-15 RX ADMIN — Medication 81 MILLIGRAM(S): at 12:12

## 2019-10-15 RX ADMIN — CARVEDILOL PHOSPHATE 12.5 MILLIGRAM(S): 80 CAPSULE, EXTENDED RELEASE ORAL at 23:24

## 2019-10-15 NOTE — PHYSICAL THERAPY INITIAL EVALUATION ADULT - DIAGNOSIS, PT EVAL
Difficulty walking
Pt with decreased functional mobility secondary to LE weakness, decrease balance and decreased endurance.

## 2019-10-15 NOTE — PHYSICAL THERAPY INITIAL EVALUATION ADULT - DISCHARGE DISPOSITION, PT EVAL
Home with assist vs [facility] pending progress
Home with assist, PT vs [facility] (pending progress with mobility)/home w/ assist/home w/ home PT/rehabilitation facility

## 2019-10-15 NOTE — PHYSICAL THERAPY INITIAL EVALUATION ADULT - CRITERIA FOR SKILLED THERAPEUTIC INTERVENTIONS
impairments found
rehab potential/anticipated equipment needs at discharge/predicted duration of therapy intervention/anticipated discharge recommendation/functional limitations in following categories/risk reduction/prevention/therapy frequency/impairments found

## 2019-10-15 NOTE — PHYSICAL THERAPY INITIAL EVALUATION ADULT - PLANNED THERAPY INTERVENTIONS, PT EVAL
bed mobility training/transfer training/gait training
motor coordination training/neuromuscular re-education/ROM/stretching/transfer training/strengthening/gait training/balance training/bed mobility training

## 2019-10-15 NOTE — PROGRESS NOTE ADULT - ASSESSMENT
63 year old female with PMH HTN, DMT2, CAD s/p CABG, ESRD on HD presents with left sided headache since 4-5 days not relieved,   Described headache as sharp radiating from occiput to left temporal region and relieved somewhat with Tylenol. Has nausea, but denies vomiting, visual changes, dizziness, fever or chills. Denies any dyspnea, chest pain or palpitations. Occasional numbness in feet.  Noted to have elevated /81 and rash consistent with Herpes Zoster. Was started on valtrex and gabapentin.     LP + , w.  >100 cells with lymphocytic predominance, CSF PCR + for VZV    Herpes zoster encephalitis  Herpes zoster left scalp and neck lesions     - No signs  of disseminated zoster,  pneumonitis or GE   - Continue Acyclovir 400mg daily adjusted for ESRD,   - To Complete 14 day IV treatment   - Airborne + contact isolations,     will follow.

## 2019-10-15 NOTE — PHYSICAL THERAPY INITIAL EVALUATION ADULT - GENERAL OBSERVATIONS, REHAB EVAL
Pt lethargic, mildly difficulty to arouse, oriented x 1, daughter at bedside.
Pt received supine, NAD, daughter (Martha) present

## 2019-10-15 NOTE — PHYSICAL THERAPY INITIAL EVALUATION ADULT - LIVES WITH, PROFILE
children
Pt lives with daughter (Martha) in private home, [address] with bilateral handrails, no steps inside/children

## 2019-10-15 NOTE — PROGRESS NOTE ADULT - SUBJECTIVE AND OBJECTIVE BOX
KIM LEE Female 63y [id number]    Patient is a 63y old  Female who presents with a chief complaint of headache (15 Oct 2019 10:41)    Subjective/objective:  Pt seen and examined at bedside by PA, PA Student, and Hospitalist with daughter Martha present, no over night event reported by night staff or patients daughter. Pt reports doing well and has no new complaints. Daughter reports patient slept well and tolerated food     Review of system:  No fever, chills, nausea, vomiting, diarrhea, headache, dizziness, chest pain, SOB or palpitation.    PHYSICAL EXAM:    Vital Signs Last 24 Hrs  T(C): 36.2 (15 Oct 2019 08:47), Max: 36.7 (14 Oct 2019 15:30)  T(F): 97.2 (15 Oct 2019 08:47), Max: 98.1 (14 Oct 2019 15:30)  HR: 70 (15 Oct 2019 08:47) (69 - 71)  BP: 151/67 (15 Oct 2019 05:40) (124/74 - 153/72)  RR: 19 (15 Oct 2019 08:47) (18 - 19)  SpO2: 97% (15 Oct 2019 08:47) (96% - 97%)    GENERAL: Pt lying comfortably in bed, NAD.  ENMT: PERRL, +EOMI.  NECK: soft, Supple, No JVD,   CHEST/LUNG: Clear to auscultatation bilaterally; No wheezing.  HEART: S1S2+, Regular rate and rhythm; No gallops or murmurs noted.  ABDOMEN: Soft, Nontender, Nondistended; Bowel sounds present in all 4 quadrants  MUSCULOSKELETAL: Normal range of motion.  SKIN: scabbed over lesions noted on the posterior neck extending into the patients scalp. no pus or drainage coming from lesions  NEURO: AAOX3, no focal deficits, no motor or sensory loss.  PSYCH: normal mood.    MEDICATIONS  (STANDING):  acyclovir IVPB 400 milliGRAM(s) IV Intermittent every 24 hours  amLODIPine   Tablet 10 milliGRAM(s) Oral daily  aspirin  chewable 81 milliGRAM(s) Oral daily  carvedilol 12.5 milliGRAM(s) Oral every 12 hours  chlorhexidine 2% Cloths 1 Application(s) Topical once  cloNIDine 0.3 milliGRAM(s) Oral every 8 hours  clopidogrel Tablet 75 milliGRAM(s) Oral daily  dextrose 5%. 1000 milliLiter(s) (50 mL/Hr) IV Continuous <Continuous>  dextrose 50% Injectable 12.5 Gram(s) IV Push once  dextrose 50% Injectable 25 Gram(s) IV Push once  dextrose 50% Injectable 25 Gram(s) IV Push once  folic acid 1 milliGRAM(s) Oral daily  hydrALAZINE 100 milliGRAM(s) Oral every 12 hours  influenza   Vaccine 0.5 milliLiter(s) IntraMuscular once  insulin lispro (HumaLOG) corrective regimen sliding scale SubCutaneous three times a day before meals  insulin lispro (HumaLOG) corrective regimen sliding scale   SubCutaneous at bedtime  insulin lispro Injectable (HumaLOG) 3 Unit(s) SubCutaneous three times a day before meals  levothyroxine 75 MICROGram(s) Oral daily  LORazepam   Injectable 1 milliGRAM(s) IntraMuscular once  melatonin 3 milliGRAM(s) Oral at bedtime  mupirocin 2% Nasal 1 Application(s) Nasal two times a day  Nephro-heather 1 Tablet(s) Oral daily  pantoprazole    Tablet 40 milliGRAM(s) Oral before breakfast  sevelamer carbonate 1600 milliGRAM(s) Oral three times a day  simvastatin 20 milliGRAM(s) Oral at bedtime    MEDICATIONS  (PRN):  acetaminophen   Tablet .. 650 milliGRAM(s) Oral every 4 hours PRN Mild Pain (1 - 3)  acetaminophen   Tablet .. 975 milliGRAM(s) Oral every 6 hours PRN Moderate Pain (4 - 6)  dextrose 40% Gel 15 Gram(s) Oral once PRN Blood Glucose LESS THAN 70 milliGRAM(s)/deciliter  glucagon  Injectable 1 milliGRAM(s) IntraMuscular once PRN Glucose LESS THAN 70 milligrams/deciliter  hydrALAZINE Injectable 10 milliGRAM(s) IV Push every 6 hours PRN SBP >160  labetalol Injectable 10 milliGRAM(s) IV Push every 4 hours PRN Systolic/Diastolic >160/100  LORazepam   Injectable 1 milliGRAM(s) IV Push every 4 hours PRN Agitation    LABS:    10-14    134<L>  |  90<L>  |  54.0<H>  ----------------------------<  162<H>  4.5   |  23.0  |  7.32<H>    Ca    10.2      14 Oct 2019 08:23  Phos  6.2     10-14    TPro  x   /  Alb  3.8  /  TBili  x   /  DBili  x   /  AST  x   /  ALT  x   /  AlkPhos  x   10-14      LIVER FUNCTIONS - ( 14 Oct 2019 08:23 )  Alb: 3.8 g/dL / Pro: x     / ALK PHOS: x     / ALT: x     / AST: x     / GGT: x KIM LEE Female 63y [id number]    CC: follow up headache and HSV Encephalitis    Subjective/objective:  Pt is a 64 y/o female admitted on 10/04 for hypertensive urgency and headache, found to have shingles on her posterior neck complicated by HSV encephalitis. Pt seen and examined at bedside alert and orientated by PA, PA Student, and Hospitalist with daughter Martha present, no over night events reported by night staff or patients daughter. Pt reports doing well and has no new complaints. Daughter reports patient slept well and tolerated food with no problems.     Review of system:  No fever, chills, nausea, vomiting, diarrhea, headache, dizziness, chest pain, SOB, palpitation, or nuchal rigidity.     PHYSICAL EXAM:    Vital Signs Last 24 Hrs  T(C): 36.2 (15 Oct 2019 08:47), Max: 36.7 (14 Oct 2019 15:30)  T(F): 97.2 (15 Oct 2019 08:47), Max: 98.1 (14 Oct 2019 15:30)  HR: 70 (15 Oct 2019 08:47) (69 - 71)  BP: 151/67 (15 Oct 2019 05:40) (124/74 - 153/72)  RR: 19 (15 Oct 2019 08:47) (18 - 19)  SpO2: 97% (15 Oct 2019 08:47) (96% - 97%)    GENERAL: Pt lying comfortably in bed, NAD.  ENMT: PERRL, +EOMI.  NECK: soft, Supple, No JVD,   CHEST/LUNG: Clear to auscultation bilaterally; No wheezing, rhales, or rhonchi.  HEART: S1S2+, Regular rate and rhythm; No gallops or murmurs noted.  ABDOMEN: Soft, Nontender, Nondistended; + Bowel sounds in all 4 quadrants  MUSCULOSKELETAL: Normal range of motion.  SKIN: scabbed over lesions noted on the posterior neck extending into patients scalp. No pus or drainage coming from lesions. No other rashes or lesions.   NEURO: AAOX3, no focal deficits, no motor or sensory loss.  PSYCH: normal mood.    MEDICATIONS  (STANDING):  acyclovir IVPB 400 milliGRAM(s) IV Intermittent every 24 hours  amLODIPine   Tablet 10 milliGRAM(s) Oral daily  aspirin  chewable 81 milliGRAM(s) Oral daily  carvedilol 12.5 milliGRAM(s) Oral every 12 hours  chlorhexidine 2% Cloths 1 Application(s) Topical once  cloNIDine 0.3 milliGRAM(s) Oral every 8 hours  clopidogrel Tablet 75 milliGRAM(s) Oral daily  dextrose 5%. 1000 milliLiter(s) (50 mL/Hr) IV Continuous <Continuous>  dextrose 50% Injectable 12.5 Gram(s) IV Push once  dextrose 50% Injectable 25 Gram(s) IV Push once  dextrose 50% Injectable 25 Gram(s) IV Push once  folic acid 1 milliGRAM(s) Oral daily  hydrALAZINE 100 milliGRAM(s) Oral every 12 hours  influenza   Vaccine 0.5 milliLiter(s) IntraMuscular once  insulin lispro (HumaLOG) corrective regimen sliding scale SubCutaneous three times a day before meals  insulin lispro (HumaLOG) corrective regimen sliding scale   SubCutaneous at bedtime  insulin lispro Injectable (HumaLOG) 3 Unit(s) SubCutaneous three times a day before meals  levothyroxine 75 MICROGram(s) Oral daily  LORazepam   Injectable 1 milliGRAM(s) IntraMuscular once  melatonin 3 milliGRAM(s) Oral at bedtime  mupirocin 2% Nasal 1 Application(s) Nasal two times a day  Nephro-heather 1 Tablet(s) Oral daily  pantoprazole    Tablet 40 milliGRAM(s) Oral before breakfast  sevelamer carbonate 1600 milliGRAM(s) Oral three times a day  simvastatin 20 milliGRAM(s) Oral at bedtime    MEDICATIONS  (PRN):  acetaminophen   Tablet .. 650 milliGRAM(s) Oral every 4 hours PRN Mild Pain (1 - 3)  acetaminophen   Tablet .. 975 milliGRAM(s) Oral every 6 hours PRN Moderate Pain (4 - 6)  dextrose 40% Gel 15 Gram(s) Oral once PRN Blood Glucose LESS THAN 70 milliGRAM(s)/deciliter  glucagon  Injectable 1 milliGRAM(s) IntraMuscular once PRN Glucose LESS THAN 70 milligrams/deciliter  hydrALAZINE Injectable 10 milliGRAM(s) IV Push every 6 hours PRN SBP >160  labetalol Injectable 10 milliGRAM(s) IV Push every 4 hours PRN Systolic/Diastolic >160/100  LORazepam   Injectable 1 milliGRAM(s) IV Push every 4 hours PRN Agitation    LABS:  10-14    134<L>  |  90<L>  |  54.0<H>  ----------------------------<  162<H>  4.5   |  23.0  |  7.32<H>    Ca    10.2      14 Oct 2019 08:23  Phos  6.2     10-14    TPro  x   /  Alb  3.8  /  TBili  x   /  DBili  x   /  AST  x   /  ALT  x   /  AlkPhos  x   10-14    LIVER FUNCTIONS - ( 14 Oct 2019 08:23 )  Alb: 3.8 g/dL / Pro: x     / ALK PHOS: x     / ALT: x     / AST: x     / GGT: x KIM LEE Female 63y [id number]    CC: follow up headache and HSV Encephalitis    Subjective/objective:  Pt is a 64 y/o female admitted on 10/04 for hypertensive urgency and headache, found to have shingles on her posterior neck complicated by HSV encephalitis. Pt seen and examined at bedside alert and orientated by PA, PA Student, and Hospitalist with daughter Martha present, no over night events reported by night staff or patients daughter. Pt reports doing well and has no new complaints. Daughter reports patient slept well and tolerated food with no problems.     Review of system:  No fever, chills, nausea, vomiting, diarrhea, headache, dizziness, chest pain, SOB, palpitation, or nuchal rigidity.     PHYSICAL EXAM:    Vital Signs Last 24 Hrs  T(C): 36.2 (15 Oct 2019 08:47), Max: 36.7 (14 Oct 2019 15:30)  T(F): 97.2 (15 Oct 2019 08:47), Max: 98.1 (14 Oct 2019 15:30)  HR: 70 (15 Oct 2019 08:47) (69 - 71)  BP: 151/67 (15 Oct 2019 05:40) (124/74 - 153/72)  RR: 19 (15 Oct 2019 08:47) (18 - 19)  SpO2: 97% (15 Oct 2019 08:47) (96% - 97%)    GENERAL: Pt lying comfortably in bed, NAD.  ENMT: PERRL, +EOMI.  NECK: soft, Supple, No JVD,   CHEST/LUNG: Clear to auscultation bilaterally; No wheezing, rhales, or rhonchi.  HEART: S1S2+, Regular rate and rhythm; No gallops or murmurs noted.  ABDOMEN: Soft, Nontender, Nondistended; + Bowel sounds in all 4 quadrants  MUSCULOSKELETAL: Normal range of motion.  SKIN: scabbed over lesions noted on the posterior neck extending into patients scalp. No pus or drainage coming from lesions. No other rashes or lesions.   NEURO: AAOX3, no focal deficits, no motor or sensory loss.  PSYCH: normal mood.    MEDICATIONS  (STANDING):  acyclovir IVPB 400 milliGRAM(s) IV Intermittent every 24 hours  amLODIPine   Tablet 10 milliGRAM(s) Oral daily  aspirin  chewable 81 milliGRAM(s) Oral daily  carvedilol 12.5 milliGRAM(s) Oral every 12 hours  chlorhexidine 2% Cloths 1 Application(s) Topical once  cloNIDine 0.3 milliGRAM(s) Oral every 8 hours  clopidogrel Tablet 75 milliGRAM(s) Oral daily  dextrose 5%. 1000 milliLiter(s) (50 mL/Hr) IV Continuous <Continuous>  dextrose 50% Injectable 12.5 Gram(s) IV Push once  dextrose 50% Injectable 25 Gram(s) IV Push once  dextrose 50% Injectable 25 Gram(s) IV Push once  folic acid 1 milliGRAM(s) Oral daily  hydrALAZINE 100 milliGRAM(s) Oral every 12 hours  influenza   Vaccine 0.5 milliLiter(s) IntraMuscular once  insulin lispro (HumaLOG) corrective regimen sliding scale SubCutaneous three times a day before meals  insulin lispro (HumaLOG) corrective regimen sliding scale   SubCutaneous at bedtime  insulin lispro Injectable (HumaLOG) 3 Unit(s) SubCutaneous three times a day before meals  levothyroxine 75 MICROGram(s) Oral daily  LORazepam   Injectable 1 milliGRAM(s) IntraMuscular once  melatonin 3 milliGRAM(s) Oral at bedtime  mupirocin 2% Nasal 1 Application(s) Nasal two times a day  Nephro-heather 1 Tablet(s) Oral daily  pantoprazole    Tablet 40 milliGRAM(s) Oral before breakfast  sevelamer carbonate 1600 milliGRAM(s) Oral three times a day  simvastatin 20 milliGRAM(s) Oral at bedtime    MEDICATIONS  (PRN):  acetaminophen   Tablet .. 650 milliGRAM(s) Oral every 4 hours PRN Mild Pain (1 - 3)  acetaminophen   Tablet .. 975 milliGRAM(s) Oral every 6 hours PRN Moderate Pain (4 - 6)  dextrose 40% Gel 15 Gram(s) Oral once PRN Blood Glucose LESS THAN 70 milliGRAM(s)/deciliter  glucagon  Injectable 1 milliGRAM(s) IntraMuscular once PRN Glucose LESS THAN 70 milligrams/deciliter  hydrALAZINE Injectable 10 milliGRAM(s) IV Push every 6 hours PRN SBP >160  labetalol Injectable 10 milliGRAM(s) IV Push every 4 hours PRN Systolic/Diastolic >160/100  LORazepam   Injectable 1 milliGRAM(s) IV Push every 4 hours PRN Agitation    LABS:    CAPILLARY BLOOD GLUCOSE  POCT Blood Glucose.: 185 mg/dL (15 Oct 2019 12:09)  POCT Blood Glucose.: 169 mg/dL (15 Oct 2019 08:34)  POCT Blood Glucose.: 119 mg/dL (14 Oct 2019 22:40)  POCT Blood Glucose.: 182 mg/dL (14 Oct 2019 16:58)      10-14    134<L>  |  90<L>  |  54.0<H>  ----------------------------<  162<H>  4.5   |  23.0  |  7.32<H>    Ca    10.2      14 Oct 2019 08:23  Phos  6.2     10-14    TPro  x   /  Alb  3.8  /  TBili  x   /  DBili  x   /  AST  x   /  ALT  x   /  AlkPhos  x   10-14    LIVER FUNCTIONS - ( 14 Oct 2019 08:23 )  Alb: 3.8 g/dL / Pro: x     / ALK PHOS: x     / ALT: x     / AST: x     / GGT: x

## 2019-10-15 NOTE — PROGRESS NOTE ADULT - ASSESSMENT
64 y/o female with PMHx of HTN, DMT2, CAD s/p CABG, PPM, ESRD on HD T,T,S presents with left sided headache and uncontrolled BP SBP>200. Found to have vesicles suspicious for shingles. Hospital course complicated by encephalopathy; possibly secondary to disseminated zoster vs adverse reaction from gabapentin. Patient underwent LP 10/11 which was + for HZV.     1. Herpes Zoster encephalitis  - on contact and airborne isolation  - patient with vesicular lesion on posterior neck extending into hairline, healing, scabbed   - IV acyclovir for 2 weeks per ID until 10/23    2. Occipital Neuralgia  - CTH unremarkable. CT neck unremarkable  - ESR - CRP normal - no temporal arteritis  - Oxycodone, Flexeril and Gabapentin discontinued due to confusion  - Headache has improved; continue Tylenol PRN    3. Encephalopathy - possible medication induced versus infection vs delirium  - Repeat CT head without any acute changes  - IR guided LP done 10/11  - CSF PCR positive for VZ  - IV Acyclovir x 2 weeks (renally dosed)   - Psych consulted  - Hold gabapentin or any psych med due to delirium   - Neurology recommendations appreciated    4. Hypertensive urgency - resolved  - Continue home medications and adjust as needed  - Ultrafiltration per renal    5. ESRD on HD (TTS)  - Next HD tomorrow   - UF goal per renal  - Continue Phoslo  - Strict I's/O's, daily weights  - renal on board    DMT2 . A1c 6.5  - BGM with SSI  - continue to monitor     Hypothyroidism  - normal TSH  -Continue Synthroid    CAD  -asymptomatic  -Continue Statin, BB  -ASA and Plavix resumed     Hyponatremia: patient looks dehydrated. Encouraged PO hydration, continue to monitor sodium    Debillity: PT eval. fall precautions    Prophylactic measure  - SCDs. AC if no contraindication 62 y/o female with PMHx of HTN, DMT2, CAD s/p CABG, PPM, ESRD on HD T,T,S presented to hospital with left sided headache and uncontrolled BP SBP>200. Found to have vesicles suspicious for shingles. Hospital course complicated by encephalopathy; possibly secondary to disseminated zoster vs adverse reaction from gabapentin. Patient underwent LP 10/11 which was + for HZV.     1. Herpes Zoster encephalitis  - on contact and airborne isolation  - patient with vesicular lesion on posterior neck extending into hairline, healing, scabbed   - IV acyclovir for 2 weeks per ID until 10/23    2. Occipital Neuralgia  - CTH unremarkable. CT neck unremarkable  - ESR - CRP normal - no temporal arteritis  - Oxycodone, Flexeril and Gabapentin discontinued due to confusion  - Headache has improved; continue Tylenol PRN    3. Encephalopathy - possible medication induced versus infection vs delirium  - Repeat CT  head 10/10 without any acute changes  - IR guided LP done 10/11  - CSF PCR positive for VZ  - IV Acyclovir x 2 weeks (renally dosed)   - Hold gabapentin or any psych med due to delirium   - Neurology recommendations appreciated    4. Hypertensive urgency - resolved  - BP currently in good control  - Continue home medications and adjust as needed  - Ultrafiltration per renal    5. ESRD on HD (TTS)  - Pt scheduled for HD today 10/15   - UF goal per renal  - Continue Phoslo  - Strict I's/O's, daily weights  - renal on board    6. DMT2 . A1c 6.5 on 10/05  - BGM with SSI  - continue to monitor     7. Hypothyroidism  - normal TSH (measured as 1.64 on 10/12)  - Continue Synthroid    8. CAD  - asymptomatic  - Continue Statin, BB  - ASA and Plavix resumed     9. Hyponatremia:   - patient looks dehydrated with dry mouth  - Encouraged PO hydration  - continue to monitor sodium levels (last Na 134 on 10/13)    Debillity:  - PT eval for fall precautions    Prophylactic measure  - SCDs for mechanical DVT ppx

## 2019-10-15 NOTE — PHYSICAL THERAPY INITIAL EVALUATION ADULT - IMPAIRMENTS CONTRIBUTING TO GAIT DEVIATIONS, PT EVAL
impaired postural control/impaired balance/decreased strength
impaired balance/decreased strength/ataxic/decreased flexibility/impaired motor control/impaired coordination

## 2019-10-15 NOTE — PROGRESS NOTE ADULT - SUBJECTIVE AND OBJECTIVE BOX
[facility] DIVISION OF KIDNEY DISEASES AND HYPERTENSION -- HEMODIALYSIS NOTE  --------------------------------------------------------------------------------  Chief Complaint: ESRD/Ongoing hemodialysis requirement    24 hour events/subjective:    PAST HISTORY  --------------------------------------------------------------------------------  No significant changes to PMH, PSH, FHx, SHx, unless otherwise noted    ALLERGIES & MEDICATIONS  --------------------------------------------------------------------------------  Allergies    No Known Allergies    Standing Inpatient Medications  acyclovir IVPB 400 milliGRAM(s) IV Intermittent every 24 hours  amLODIPine   Tablet 10 milliGRAM(s) Oral daily  aspirin  chewable 81 milliGRAM(s) Oral daily  carvedilol 12.5 milliGRAM(s) Oral every 12 hours  chlorhexidine 2% Cloths 1 Application(s) Topical once  cloNIDine 0.3 milliGRAM(s) Oral every 8 hours  clopidogrel Tablet 75 milliGRAM(s) Oral daily  dextrose 5%. 1000 milliLiter(s) IV Continuous <Continuous>  dextrose 50% Injectable 12.5 Gram(s) IV Push once  dextrose 50% Injectable 25 Gram(s) IV Push once  dextrose 50% Injectable 25 Gram(s) IV Push once  folic acid 1 milliGRAM(s) Oral daily  hydrALAZINE 100 milliGRAM(s) Oral every 12 hours  influenza   Vaccine 0.5 milliLiter(s) IntraMuscular once  insulin lispro (HumaLOG) corrective regimen sliding scale   SubCutaneous three times a day before meals  insulin lispro (HumaLOG) corrective regimen sliding scale   SubCutaneous at bedtime  insulin lispro Injectable (HumaLOG) 3 Unit(s) SubCutaneous three times a day before meals  levothyroxine 75 MICROGram(s) Oral daily  LORazepam   Injectable 1 milliGRAM(s) IntraMuscular once  melatonin 3 milliGRAM(s) Oral at bedtime  mupirocin 2% Nasal 1 Application(s) Nasal two times a day  Nephro-heather 1 Tablet(s) Oral daily  pantoprazole    Tablet 40 milliGRAM(s) Oral before breakfast  sevelamer carbonate 1600 milliGRAM(s) Oral three times a day  simvastatin 20 milliGRAM(s) Oral at bedtime    PRN Inpatient Medications  acetaminophen   Tablet .. 650 milliGRAM(s) Oral every 4 hours PRN  acetaminophen   Tablet .. 975 milliGRAM(s) Oral every 6 hours PRN  dextrose 40% Gel 15 Gram(s) Oral once PRN  glucagon  Injectable 1 milliGRAM(s) IntraMuscular once PRN  hydrALAZINE Injectable 10 milliGRAM(s) IV Push every 6 hours PRN  labetalol Injectable 10 milliGRAM(s) IV Push every 4 hours PRN  LORazepam   Injectable 1 milliGRAM(s) IV Push every 4 hours PRN    REVIEW OF SYSTEMS  --------------------------------------------------------------------------------  Gen: + weight changes, fatigue, No fevers/chills, weakness  Skin: ++ rashes  Head/Eyes/Ears/Mouth: ++ headache; Normal hearing; Normal vision w/o blurriness; No sinus pain/discomfort, sore throat  Respiratory: No dyspnea, cough, wheezing, hemoptysis  CV: No chest pain, PND, orthopnea  GI: No abdominal pain, diarrhea, constipation, nausea, vomiting, melena, hematochezia  : No increased frequency, dysuria, hematuria, nocturia  MSK: No joint pain/swelling; no back pain; no edema  Neuro: No dizziness/lightheadedness, weakness, seizures, numbness, tingling  Heme: No easy bruising or bleeding  Endo: No heat/cold intolerance  Psych: No significant nervousness, anxiety, stress, depression    All other systems were reviewed and are negative, except as noted.    VITALS/PHYSICAL EXAM  --------------------------------------------------------------------------------  T(C): 36.2 (10-15-19 @ 08:47), Max: 36.7 (10-14-19 @ 15:30)  HR: 70 (10-15-19 @ 08:47) (69 - 71)  BP: 151/67 (10-15-19 @ 05:40) (124/74 - 153/72)  RR: 19 (10-15-19 @ 08:47) (18 - 19)  SpO2: 97% (10-15-19 @ 08:47) (96% - 97%)    Physical Exam:  	Gen: NAD,Pale, ill-appearing  	HEENT: PERRL, supple neck,  Rash,   	Pulm: CTA B/L  	CV: RRR, S1S2; no rub  	Back: No spinal or CVA tenderness; no sacral edema  	Abd: +BS, soft, nontender/nondistended  	: No suprapubic tenderness  	UE: Warm, FROM, no clubbing, intact strength; no edema; no asterixis  	LE: Warm, FROM, no clubbing, intact strength; no edema  	Neuro: No focal deficits,  	Psych: Normal affect and mood  	Skin: Warm, VZV  rashes,  	Vascular access:    LABS/STUDIES  --------------------------------------------------------------------------------    134  |  90  |  54.0  ----------------------------<  162      [10-14-19 @ 08:23]  4.5   |  23.0  |  7.32        Ca     10.2     [10-14-19 @ 08:23]      Phos  6.2     [10-14-19 @ 08:23]    TPro  x   /  Alb  3.8  /  TBili  x   /  DBili  x   /  AST  x   /  ALT  x   /  AlkPhos  x   [10-14-19 @ 08:23]    Serum Osmolality 306      [10-14-19 @ 08:23]    PTH -- (Ca 9.8)      [11-13-18 @ 16:19]   551  Vitamin D (25OH) 25.0      [11-13-18 @ 16:38]  HbA1c 6.5      [10-05-19 @ 13:33]  TSH 1.64      [10-12-19 @ 10:08]

## 2019-10-15 NOTE — PHYSICAL THERAPY INITIAL EVALUATION ADULT - PERTINENT HX OF CURRENT PROBLEM, REHAB EVAL
Pt admitted with left sided HA and uncontrolled BP >200.
62 y/o female with PMHx of HTN, DMT2, CAD s/p CABG, PPM, ESRD on HD T,T,S presented to hospital with left sided headache and uncontrolled BP SBP>200. Found to have vesicles suspicious for shingles. Hospital course complicated by encephalopathy; possibly secondary to disseminated zoster vs adverse reaction from gabapentin. Patient underwent LP 10/11 which was + for HZV.

## 2019-10-15 NOTE — PHYSICAL THERAPY INITIAL EVALUATION ADULT - IMPAIRMENTS FOUND, PT EVAL
gait, locomotion, and balance
aerobic capacity/endurance/ROM/gross motor/neuromotor development and sensory integration/gait, locomotion, and balance/muscle strength

## 2019-10-15 NOTE — PHYSICAL THERAPY INITIAL EVALUATION ADULT - PATIENT/FAMILY/SIGNIFICANT OTHER GOALS STATEMENT, PT EVAL
Daughter expresses request/concern for mother staying in hospital another day due to cognitive state, lethargy, difficulty getting up.
To get stronger and be able to go home

## 2019-10-15 NOTE — PHYSICAL THERAPY INITIAL EVALUATION ADULT - GAIT DEVIATIONS NOTED, PT EVAL
decreased weight-shifting ability/decreased danny/decreased swing-to-stance ratio/footdrop/decreased velocity of limb motion
decreased step length

## 2019-10-15 NOTE — PHYSICAL THERAPY INITIAL EVALUATION ADULT - ACTIVE RANGE OF MOTION EXAMINATION, REHAB EVAL
bilateral  lower extremity Active ROM was WFL (within functional limits)
bilateral  lower extremity Active ROM was WFL (within functional limits)

## 2019-10-15 NOTE — PHYSICAL THERAPY INITIAL EVALUATION ADULT - ADDITIONAL COMMENTS
Pt lives in a house with 4 steps to enter with rails and no stairs inside.  Pt owns medical equipment: RW, WBQC, commode, shower chair.   Pt lives with: daughter who works, however per daughter someone is always home, (pt son, niece or nephew). All help to care for patient 24 hours a day.  Pt was previously ambulating with WBQC or RW up to 100 feet with modified independence.
Pt's daughter reports pt was independent PTA, using WBQC (x100 feet), pt owns RW, commode and shower chair. Pt lives with daughter and always has someone with her (other family members). Pt was able to negotiate 4 steps to enter with assist

## 2019-10-15 NOTE — PROGRESS NOTE ADULT - SUBJECTIVE AND OBJECTIVE BOX
[facility]  INFECTIOUS DISEASES AND INTERNAL MEDICINE at [place]  =======================================================  Carlo Esparza MD  Diplomates American Board of Internal Medicine and Infectious Diseases  Tel: [phone]      Fax: [phone]  =======================================================    KIM LEE [id number]    Follow up: Herpes Zoster encephalitis  Awake and alert, improved confusion and slurred speech, as per daughter at the bedside.   No diarrhea or any other complaint.     Allergies:  No Known Allergies    Antibiotics:  acyclovir IVPB 400 milliGRAM(s) IV Intermittent every 24 hours    REVIEW OF SYSTEMS:  Unable to obtain     Physical Exam:  Vital Signs Last 24 Hrs  T(C): 36.2 (15 Oct 2019 08:47), Max: 36.5 (15 Oct 2019 00:20)  T(F): 97.2 (15 Oct 2019 08:47), Max: 97.7 (15 Oct 2019 00:20)  HR: 70 (15 Oct 2019 08:47) (69 - 70)  BP: 151/67 (15 Oct 2019 05:40) (151/67 - 153/72)  RR: 19 (15 Oct 2019 08:47) (18 - 19)  SpO2: 97% (15 Oct 2019 08:47) (96% - 97%)  GEN: NAD, awake and alert  HEENT: normocephalic and atraumatic. EOMI. PERRL.  Anicteric   NECK: Supple.   LUNGS: Clear to auscultation.  HEART: Regular rate and rhythm without murmur.  ABDOMEN: Soft, nontender, and nondistended.  Positive bowel sounds.    : No CVA tenderness  EXTREMITIES: Without any edema.  MSK: no joint swelling, LUE fistula   NEUROLOGIC: confused with mild slurred speech   SKIN: crusted lesions over nape of neck and left side of scalp    Labs:  10-14    134<L>  |  90<L>  |  54.0<H>  ----------------------------<  162<H>  4.5   |  23.0  |  7.32<H>    Ca    10.2      14 Oct 2019 08:23  Phos  6.2     10-14    TPro  x   /  Alb  3.8  /  TBili  x   /  DBili  x   /  AST  x   /  ALT  x   /  AlkPhos  x   10-14    LIVER FUNCTIONS - ( 14 Oct 2019 08:23 )  Alb: 3.8 g/dL / Pro: x     / ALK PHOS: x     / ALT: x     / AST: x     / GGT: x           RECENT CULTURES:  10-11 @ 16:02 .CSF     No growth at 3 days.    Few White blood cells  No organisms seen    10-10 @ 10:33      NotDetec    10-08 @ 18:12 .Blood     No growth at 5 days.    10-08 @ 18:11 .Blood     No growth at 5 days.      Assessment and Plan:   63 year old female with PMH HTN, DMT2, CAD s/p CABG, ESRD on HD T,T,S presents with left sided headache since 4-5 days not relieved with Tylenol so came to ER.  Described headache as sharp radiating from occiput to left temporal region and relieved somewhat with Tylenol. Has nausea, but denies vomiting, visual changes, dizziness, fever or chills. Denies any dyspnea, chest pain or palpitations. Occasional numbness in feet.  Noted to have elevated /81 and rash consistent with Herpes Zoster. Was started on valtrex and gabapentin.   LP for >100 cells with lymphocytic predominance, CSF PCR + for VZV    Herpes zoster encephalitis  Herpes zoster left scalp and neck lesions     - No sign of disseminated zoster, no pneumonitis or GE   - Continue Acyclovir 400mg daily adjusted for her renal function   - Will complete at least 14 day IV treatment (recommendation is 14 to 21 days)  - Possibly can discharge her with acyclovir 400mg after HD on dialysis days and valtrex high dose 1g, q8h on days that she doesn't get dialyzed.   - Airborne + contact isolation  - Hold gabapentin or any psych med.     will follow.

## 2019-10-16 LAB
GLUCOSE BLDC GLUCOMTR-MCNC: 138 MG/DL — HIGH (ref 70–99)
GLUCOSE BLDC GLUCOMTR-MCNC: 143 MG/DL — HIGH (ref 70–99)
GLUCOSE BLDC GLUCOMTR-MCNC: 152 MG/DL — HIGH (ref 70–99)
GLUCOSE BLDC GLUCOMTR-MCNC: 173 MG/DL — HIGH (ref 70–99)

## 2019-10-16 PROCEDURE — 99232 SBSQ HOSP IP/OBS MODERATE 35: CPT

## 2019-10-16 PROCEDURE — 90937 HEMODIALYSIS REPEATED EVAL: CPT

## 2019-10-16 RX ORDER — POLYETHYLENE GLYCOL 3350 17 G/17G
17 POWDER, FOR SOLUTION ORAL ONCE
Refills: 0 | Status: COMPLETED | OUTPATIENT
Start: 2019-10-16 | End: 2019-10-16

## 2019-10-16 RX ADMIN — Medication 975 MILLIGRAM(S): at 09:28

## 2019-10-16 RX ADMIN — Medication 100 MILLIGRAM(S): at 06:14

## 2019-10-16 RX ADMIN — Medication 975 MILLIGRAM(S): at 03:00

## 2019-10-16 RX ADMIN — Medication 0.3 MILLIGRAM(S): at 06:13

## 2019-10-16 RX ADMIN — CARVEDILOL PHOSPHATE 12.5 MILLIGRAM(S): 80 CAPSULE, EXTENDED RELEASE ORAL at 17:02

## 2019-10-16 RX ADMIN — Medication 81 MILLIGRAM(S): at 12:39

## 2019-10-16 RX ADMIN — Medication 975 MILLIGRAM(S): at 08:28

## 2019-10-16 RX ADMIN — SEVELAMER CARBONATE 1600 MILLIGRAM(S): 2400 POWDER, FOR SUSPENSION ORAL at 14:51

## 2019-10-16 RX ADMIN — Medication 975 MILLIGRAM(S): at 20:19

## 2019-10-16 RX ADMIN — SIMVASTATIN 20 MILLIGRAM(S): 20 TABLET, FILM COATED ORAL at 20:19

## 2019-10-16 RX ADMIN — Medication 1 TABLET(S): at 13:38

## 2019-10-16 RX ADMIN — Medication 1 MILLIGRAM(S): at 12:39

## 2019-10-16 RX ADMIN — Medication 3 UNIT(S): at 08:23

## 2019-10-16 RX ADMIN — Medication 3 UNIT(S): at 17:02

## 2019-10-16 RX ADMIN — Medication 1: at 08:22

## 2019-10-16 RX ADMIN — CARVEDILOL PHOSPHATE 12.5 MILLIGRAM(S): 80 CAPSULE, EXTENDED RELEASE ORAL at 06:14

## 2019-10-16 RX ADMIN — Medication 100 MILLIGRAM(S): at 17:02

## 2019-10-16 RX ADMIN — Medication 975 MILLIGRAM(S): at 21:20

## 2019-10-16 RX ADMIN — Medication 0.3 MILLIGRAM(S): at 14:50

## 2019-10-16 RX ADMIN — SEVELAMER CARBONATE 1600 MILLIGRAM(S): 2400 POWDER, FOR SUSPENSION ORAL at 20:19

## 2019-10-16 RX ADMIN — AMLODIPINE BESYLATE 10 MILLIGRAM(S): 2.5 TABLET ORAL at 06:14

## 2019-10-16 RX ADMIN — Medication 975 MILLIGRAM(S): at 02:05

## 2019-10-16 RX ADMIN — Medication 1: at 12:37

## 2019-10-16 RX ADMIN — PANTOPRAZOLE SODIUM 40 MILLIGRAM(S): 20 TABLET, DELAYED RELEASE ORAL at 06:14

## 2019-10-16 RX ADMIN — POLYETHYLENE GLYCOL 3350 17 GRAM(S): 17 POWDER, FOR SOLUTION ORAL at 17:04

## 2019-10-16 RX ADMIN — Medication 3 MILLIGRAM(S): at 20:18

## 2019-10-16 RX ADMIN — Medication 108 MILLIGRAM(S): at 18:57

## 2019-10-16 RX ADMIN — Medication 1 TABLET(S): at 12:38

## 2019-10-16 RX ADMIN — CLOPIDOGREL BISULFATE 75 MILLIGRAM(S): 75 TABLET, FILM COATED ORAL at 12:39

## 2019-10-16 RX ADMIN — Medication 1 TABLET(S): at 12:39

## 2019-10-16 RX ADMIN — Medication 75 MICROGRAM(S): at 06:13

## 2019-10-16 RX ADMIN — SEVELAMER CARBONATE 1600 MILLIGRAM(S): 2400 POWDER, FOR SUSPENSION ORAL at 06:14

## 2019-10-16 RX ADMIN — Medication 0.3 MILLIGRAM(S): at 20:19

## 2019-10-16 RX ADMIN — Medication 3 UNIT(S): at 12:38

## 2019-10-16 NOTE — PROGRESS NOTE ADULT - SUBJECTIVE AND OBJECTIVE BOX
[facility]  INFECTIOUS DISEASES AND INTERNAL MEDICINE at [place]  =======================================================  Carlo Esparza MD  Diplomates American Board of Internal Medicine and Infectious Diseases  Tel: [phone]      Fax: [phone]  =======================================================    KIM LEE [id number]    Follow up: Herpes Zoster encephalitis    Awake and alert, improved confusion and slurred speech, as per daughter at the bedside.   Today complaining of headache and insomnia last night. No neck pain or stiffness. No fever.     Allergies:  No Known Allergies    Antibiotics:  acyclovir IVPB 400 milliGRAM(s) IV Intermittent every 24 hours    REVIEW OF SYSTEMS:  Unable to obtain     Physical Exam:  Vital Signs Last 24 Hrs  T(C): 36.7 (16 Oct 2019 00:04), Max: 36.8 (15 Oct 2019 15:45)  T(F): 98.1 (16 Oct 2019 00:04), Max: 98.3 (15 Oct 2019 15:45)  HR: 69 (16 Oct 2019 06:11) (69 - 70)  BP: 157/73 (16 Oct 2019 06:11) (134/62 - 158/65)  BP(mean): --  RR: 18 (16 Oct 2019 00:04) (18 - 18)  SpO2: 94% (16 Oct 2019 00:04) (94% - 100%)  GEN: NAD, awake and alert  HEENT: normocephalic and atraumatic. EOMI. PERRL.  Anicteric   NECK: Supple.   LUNGS: Clear to auscultation.  HEART: Regular rate and rhythm without murmur.  ABDOMEN: Soft, nontender, and nondistended.  Positive bowel sounds.    : No CVA tenderness  EXTREMITIES: Without any edema.  MSK: no joint swelling, LUE fistula   NEUROLOGIC: confused with mild slurred speech   SKIN: crusted lesions over nape of neck and left side of scalp    Labs:  RECENT CULTURES:  10-11 @ 16:02 .CSF     No growth at 3 days.    Few White blood cells  No organisms seen    10-10 @ 10:33      NotDetec    10-08 @ 18:12 .Blood     No growth at 5 days.    10-08 @ 18:11 .Blood     No growth at 5 days.      Assessment and Plan:   63 year old female with PMH HTN, DMT2, CAD s/p CABG, ESRD on HD T,T,S presents with left sided headache since 4-5 days not relieved with Tylenol so came to ER.  Described headache as sharp radiating from occiput to left temporal region and relieved somewhat with Tylenol. Has nausea, but denies vomiting, visual changes, dizziness, fever or chills. Denies any dyspnea, chest pain or palpitations. Occasional numbness in feet.  Noted to have elevated /81 and rash consistent with Herpes Zoster. Was started on valtrex and gabapentin.   LP for >100 cells with lymphocytic predominance, CSF PCR + for VZV    Herpes zoster encephalitis  Herpes zoster left scalp and neck lesions     - No sign of disseminated zoster, no pneumonitis or GE   - Continue Acyclovir 400mg daily adjusted for her renal function   - Will complete at least 14 day IV treatment (recommendation is 14 to 21 days)  - Since has headache today, will watch her closely.   - Can discontinue isolation since no active lesions   - Hold gabapentin or any psych med.     will follow.

## 2019-10-16 NOTE — PROGRESS NOTE ADULT - ASSESSMENT
63 year old female with PMH HTN, DMT2, CAD s/p CABG, ESRD on HD T,T,S presents with left sided headache since 4-5 days not relieved with Tylenol so came to ER.  Described headache as sharp radiating from occiput to left temporal region and relieved somewhat with Tylenol. Has nausea, but denies vomiting, visual changes, dizziness, fever or chills. Denies any dyspnea, chest pain or palpitations. Occasional numbness in feet.  Noted to have elevated /81 and rash consistent with Herpes Zoster. Was started on valtrex and gabapentin.   LP for >100 cells with lymphocytic predominance, CSF PCR + for VZV    Herpes zoster encephalitis  Herpes zoster left scalp and neck lesions     - On Acyclovir 400mg daily - ESRD Dosing , 14 day IV treatment ( ID recommendation ; 14 to 21 days)    Off Isolation,     will follow.

## 2019-10-16 NOTE — PROGRESS NOTE ADULT - SUBJECTIVE AND OBJECTIVE BOX
CC: follow up headache and HSV Encephalitis   INTERVAL HPI/OVERNIGHT EVENTS: Pt is a 62 y/o female admitted on 10/04 for hypertensive urgency and headache, found to have shingles on her posterior neck complicated by HSV encephalitis. Pt seen and examined at bedside alert and orientated by PA, PA Student, and Hospitalist with daughter Martha present, no over night events reported by night staff. Patient reports headache that began shortly after dialysis around 16:00 on 10/14 and has not gone away since. Headache rated a 9 on a scale of 1 to 10 with minimal relief from Tylenol given around 8:30 this morning. Pt also reports no bowel movement since admission. Encouraged increased PO intake and fluids. Abdomen soft and non distended on physical exam with no complaints of abdominal pain and bloating. Denies fever, chills, chest pain, shortness of breath, nausea, vomiting diarrhea.     REVIEW OF SYSTEMS:  CONSTITUTIONAL: No fever, weight loss, or fatigue  EYES: No eye pain, visual disturbances, or discharge  ENT:  No difficulty hearing, tinnitus, vertigo; No sinus or throat pain  NECK: No pain or stiffness  RESPIRATORY: No cough, wheezing, chills or hemoptysis; No shortness of breath  CARDIOVASCULAR: No chest pain, palpitations, dizziness, or leg swelling  GASTROINTESTINAL: Some constipation. No abdominal or epigastric pain. No nausea, vomiting, or hematemesis; No diarrhea.   GENITOURINARY: No dysuria, frequency, hematuria, or incontinence  NEUROLOGICAL: Headache rated as 9 out of 10 with no relief from Tylenol. No memory loss, loss of strength, numbness, or tremors  SKIN: Crusted over lesions on the posterior neck extending into the hairline. No itching, burning, or other rashes/ lesions.  MUSCULOSKELETAL: No joint pain or swelling; No muscle, back, or extremity pain    Allergies  No Known Allergies    HEALTH ISSUES - PROBLEM Dx:  Delirium due to another medical condition    PAST MEDICAL & SURGICAL HISTORY:  3-vessel CAD: s/p CABG  Hypothyroidism, unspecified type  Hemodialysis patient: tues thursday saturday  Renal failure  Hypertension  Diabetes mellitus  A-V fistula  S/P cholecystectomy    VITAL SIGNS:  T(C): 36.7 (10-16-19 @ 00:04), Max: 36.8 (10-15-19 @ 15:45)  HR: 69 (10-16-19 @ 06:11) (69 - 70)  BP: 157/73 (10-16-19 @ 06:11) (134/62 - 158/65)  RR: 18 (10-16-19 @ 00:04) (18 - 18)  SpO2: 94% (10-16-19 @ 00:04) (94% - 100%)  Wt(kg): --  Vital Signs Last 24 Hrs  T(C): 36.7 (16 Oct 2019 00:04), Max: 36.8 (15 Oct 2019 15:45)  T(F): 98.1 (16 Oct 2019 00:04), Max: 98.3 (15 Oct 2019 15:45)  HR: 69 (16 Oct 2019 06:11) (69 - 70)  BP: 157/73 (16 Oct 2019 06:11) (134/62 - 158/65)  RR: 18 (16 Oct 2019 00:04) (18 - 18)  SpO2: 94% (16 Oct 2019 00:04) (94% - 100%)    PHYSICAL EXAM:  GENERAL: Pt lying in bed comfortably in NAD  HEAD:  Atraumatic, Normocephalic  EYES: EOMI, PERRL, conjunctiva and sclera clear  ENT: Dry oral cavity. All other mucous membranes normal  NECK: Supple, No JVD  CHEST/LUNG: Clear to auscultation bilaterally; No rales, rhonchi, wheezing, or rubs. Unlabored respirations  HEART: Regular rate and rhythm; No murmurs, rubs, or gallops  ABDOMEN: + Bowel sounds in all 4 quadrants; Soft, Nontender, slightly distended abdomen. No guarding or rigidity   EXTREMITIES:  2+ Peripheral Pulses, brisk capillary refill. No clubbing, cyanosis, or edema  NERVOUS SYSTEM:  A&O X3, speech clear, FROM x 4 extremities. No deficits   SKIN: Scabbed over lesions noted on the posterior neck extending into patients scalp. No pus or drainage coming from lesions. No other rashes or lesions.    LABS:  CAPILLARY BLOOD GLUCOSE  POCT Blood Glucose.: 152 mg/dL (16 Oct 2019 08:19)  POCT Blood Glucose.: 150 mg/dL (15 Oct 2019 23:23)  POCT Blood Glucose.: 125 mg/dL (15 Oct 2019 17:00)  POCT Blood Glucose.: 185 mg/dL (15 Oct 2019 12:09)

## 2019-10-16 NOTE — PROGRESS NOTE ADULT - SUBJECTIVE AND OBJECTIVE BOX
[facility] DIVISION OF KIDNEY DISEASES AND HYPERTENSION -- HEMODIALYSIS NOTE  --------------------------------------------------------------------------------  Chief Complaint: ESRD/Ongoing hemodialysis requirement    24 hour events/subjective:        PAST HISTORY  --------------------------------------------------------------------------------  No significant changes to PMH, PSH, FHx, SHx, unless otherwise noted    ALLERGIES & MEDICATIONS  --------------------------------------------------------------------------------  Allergies    No Known Allergies    Intolerances      Standing Inpatient Medications  acyclovir IVPB 400 milliGRAM(s) IV Intermittent every 24 hours  amLODIPine   Tablet 10 milliGRAM(s) Oral daily  aspirin  chewable 81 milliGRAM(s) Oral daily  carvedilol 12.5 milliGRAM(s) Oral every 12 hours  chlorhexidine 2% Cloths 1 Application(s) Topical once  cloNIDine 0.3 milliGRAM(s) Oral every 8 hours  clopidogrel Tablet 75 milliGRAM(s) Oral daily  dextrose 5%. 1000 milliLiter(s) IV Continuous <Continuous>  dextrose 50% Injectable 12.5 Gram(s) IV Push once  dextrose 50% Injectable 25 Gram(s) IV Push once  dextrose 50% Injectable 25 Gram(s) IV Push once  folic acid 1 milliGRAM(s) Oral daily  hydrALAZINE 100 milliGRAM(s) Oral every 12 hours  influenza   Vaccine 0.5 milliLiter(s) IntraMuscular once  insulin lispro (HumaLOG) corrective regimen sliding scale   SubCutaneous three times a day before meals  insulin lispro (HumaLOG) corrective regimen sliding scale   SubCutaneous at bedtime  insulin lispro Injectable (HumaLOG) 3 Unit(s) SubCutaneous three times a day before meals  levothyroxine 75 MICROGram(s) Oral daily  LORazepam   Injectable 1 milliGRAM(s) IntraMuscular once  melatonin 3 milliGRAM(s) Oral at bedtime  Nephro-heather 1 Tablet(s) Oral daily  pantoprazole    Tablet 40 milliGRAM(s) Oral before breakfast  polyethylene glycol 3350 17 Gram(s) Oral once  sevelamer carbonate 1600 milliGRAM(s) Oral three times a day  simvastatin 20 milliGRAM(s) Oral at bedtime    PRN Inpatient Medications  acetaminophen   Tablet .. 650 milliGRAM(s) Oral every 4 hours PRN  acetaminophen   Tablet .. 975 milliGRAM(s) Oral every 6 hours PRN  dextrose 40% Gel 15 Gram(s) Oral once PRN  glucagon  Injectable 1 milliGRAM(s) IntraMuscular once PRN  hydrALAZINE Injectable 10 milliGRAM(s) IV Push every 6 hours PRN  labetalol Injectable 10 milliGRAM(s) IV Push every 4 hours PRN  LORazepam   Injectable 1 milliGRAM(s) IV Push every 4 hours PRN      REVIEW OF SYSTEMS  --------------------------------------------------------------------------------  Gen: No weight changes, fatigue, fevers/chills, weakness  Skin: + rashes  Head/Eyes/Ears/Mouth: No headache; Normal hearing; Normal vision w/o blurriness; No sinus pain/discomfort, sore throat  Respiratory: No dyspnea, cough, wheezing, hemoptysis  CV: No chest pain, PND, orthopnea  GI: No abdominal pain, diarrhea, constipation, nausea, vomiting, melena, hematochezia  : No increased frequency, dysuria, hematuria, nocturia  MSK: No joint pain/swelling; no back pain; no edema  Neuro: No dizziness/lightheadedness, weakness, seizures, numbness, tingling  Heme: No easy bruising or bleeding  Endo: No heat/cold intolerance  Psych: No significant nervousness, anxiety, stress, depression    All other systems were reviewed and are negative, except as noted.    VITALS/PHYSICAL EXAM  --------------------------------------------------------------------------------  T(C): 36.7 (10-16-19 @ 00:04), Max: 36.8 (10-15-19 @ 15:45)  HR: 69 (10-16-19 @ 06:11) (69 - 70)  BP: 157/73 (10-16-19 @ 06:11) (134/62 - 158/65)  RR: 18 (10-16-19 @ 00:04) (18 - 18)  SpO2: 94% (10-16-19 @ 00:04) (94% - 100%)    10-15-19 @ 07:01  -  10-16-19 @ 07:00  --------------------------------------------------------  IN: 0 mL / OUT: 2000 mL / NET: -2000 mL    Physical Exam:  	Gen: NAD,   	HEENT: PERRL, supple neck,   	Pulm: CTA B/L  	CV: RRR, S1S2; no rub  	Back: No spinal or CVA tenderness; no sacral edema  	Abd: +BS, soft, nontender/nondistended  	: No suprapubic tenderness  	UE: Warm, FROM, no clubbing, intact strength; no edema; no asterixis  	LE: Warm, FROM, no clubbing, intact strength; no edema  	Neuro: No focal deficits, intact gait  	Psych: Normal affect and mood  	Skin: Warm, HZV  rash,   	Vascular access: AVF    LABS/STUDIES  --------------------------------------------------------------------------------  PTH -- (Ca 9.8)      [11-13-18 @ 16:19]   551  Vitamin D (25OH) 25.0      [11-13-18 @ 16:38]  HbA1c 6.5      [10-05-19 @ 13:33]  TSH 1.64      [10-12-19 @ 10:08]

## 2019-10-16 NOTE — PROGRESS NOTE ADULT - ASSESSMENT
64 y/o female with PMHx of HTN, DMT2, CAD s/p CABG, PPM, ESRD on HD T,T,S presented to hospital with left sided headache and uncontrolled BP SBP>200. Found to have vesicles suspicious for shingles. Hospital course complicated by encephalopathy; possibly secondary to disseminated zoster vs adverse reaction from gabapentin. Patient underwent LP 10/11 which was + for HZV.     1. Herpes Zoster encephalitis  - on contact and airborne isolation  - patient with vesicular lesion on posterior neck extending into hairline, healing, scabbed   - IV acyclovir for 2 weeks per ID until 10/23    2. Occipital Neuralgia  - CTH unremarkable. CT neck unremarkable  - ESR - CRP normal - no temporal arteritis  - Oxycodone, Flexeril and Gabapentin discontinued due to confusion  - Headache worsened after dialysis with no relief from Tylenol  - Will try Fioricet for headache relief    3. Encephalopathy - possible medication induced versus infection vs delirium  - Repeat CT  head 10/10 without any acute changes  - IR guided LP done 10/11  - CSF PCR positive for VZ  - IV Acyclovir x 2 weeks (renally dosed)  - Hold gabapentin or any psych med due to delirium   - Neurology recommendations appreciated    4. Hypertensive urgency - resolved  - BP currently in good control  - Continue home medications and adjust as needed  - Ultrafiltration per renal    5. ESRD on HD (TTS)  - Last HD on 10/15  - UF goal per renal  - Continue Phoslo  - Strict I's/O's, daily weights  - renal on board    6. DMT2 . A1c 6.5 on 10/05  - BGM with SSI  - continue to monitor     7. Hypothyroidism  - normal TSH (measured as 1.64 on 10/12)  - Continue Synthroid    8. CAD  - asymptomatic  - Continue Statin, BB  - ASA and Plavix resumed     9. Hyponatremia:   - dry oral mucous membranes noted on PE  - Encouraged PO hydration  - continue to monitor sodium levels (last Na 134 on 10/13)    10. Debillity:  - seen by PT 10/15   - awaiting recommendation for home PT vs SARA (pending progress with mobility)    11. Prophylactic measure  - SCDs for mechanical DVT ppx    12. Constipation  - Mirlax for stool softener  - Encourage oral intake of food and fluids

## 2019-10-17 LAB
ANION GAP SERPL CALC-SCNC: 19 MMOL/L — HIGH (ref 5–17)
BUN SERPL-MCNC: 54 MG/DL — HIGH (ref 8–20)
CALCIUM SERPL-MCNC: 10.1 MG/DL — SIGNIFICANT CHANGE UP (ref 8.6–10.2)
CHLORIDE SERPL-SCNC: 90 MMOL/L — LOW (ref 98–107)
CO2 SERPL-SCNC: 23 MMOL/L — SIGNIFICANT CHANGE UP (ref 22–29)
CREAT SERPL-MCNC: 7.63 MG/DL — HIGH (ref 0.5–1.3)
GLUCOSE BLDC GLUCOMTR-MCNC: 122 MG/DL — HIGH (ref 70–99)
GLUCOSE BLDC GLUCOMTR-MCNC: 133 MG/DL — HIGH (ref 70–99)
GLUCOSE BLDC GLUCOMTR-MCNC: 146 MG/DL — HIGH (ref 70–99)
GLUCOSE BLDC GLUCOMTR-MCNC: 150 MG/DL — HIGH (ref 70–99)
GLUCOSE SERPL-MCNC: 228 MG/DL — HIGH (ref 70–115)
HCT VFR BLD CALC: 33.8 % — LOW (ref 34.5–45)
HGB BLD-MCNC: 10.5 G/DL — LOW (ref 11.5–15.5)
MCHC RBC-ENTMCNC: 28.7 PG — SIGNIFICANT CHANGE UP (ref 27–34)
MCHC RBC-ENTMCNC: 31.1 GM/DL — LOW (ref 32–36)
MCV RBC AUTO: 92.3 FL — SIGNIFICANT CHANGE UP (ref 80–100)
PLATELET # BLD AUTO: 258 K/UL — SIGNIFICANT CHANGE UP (ref 150–400)
POTASSIUM SERPL-MCNC: 4.5 MMOL/L — SIGNIFICANT CHANGE UP (ref 3.5–5.3)
POTASSIUM SERPL-SCNC: 4.5 MMOL/L — SIGNIFICANT CHANGE UP (ref 3.5–5.3)
RBC # BLD: 3.66 M/UL — LOW (ref 3.8–5.2)
RBC # FLD: 12.7 % — SIGNIFICANT CHANGE UP (ref 10.3–14.5)
SODIUM SERPL-SCNC: 132 MMOL/L — LOW (ref 135–145)
WBC # BLD: 7.01 K/UL — SIGNIFICANT CHANGE UP (ref 3.8–10.5)
WBC # FLD AUTO: 7.01 K/UL — SIGNIFICANT CHANGE UP (ref 3.8–10.5)

## 2019-10-17 PROCEDURE — 99232 SBSQ HOSP IP/OBS MODERATE 35: CPT

## 2019-10-17 PROCEDURE — 90937 HEMODIALYSIS REPEATED EVAL: CPT

## 2019-10-17 RX ORDER — LACTULOSE 10 G/15ML
20 SOLUTION ORAL ONCE
Refills: 0 | Status: COMPLETED | OUTPATIENT
Start: 2019-10-17 | End: 2019-10-17

## 2019-10-17 RX ORDER — ONDANSETRON 8 MG/1
4 TABLET, FILM COATED ORAL
Refills: 0 | Status: DISCONTINUED | OUTPATIENT
Start: 2019-10-17 | End: 2019-10-23

## 2019-10-17 RX ADMIN — Medication 975 MILLIGRAM(S): at 22:31

## 2019-10-17 RX ADMIN — CARVEDILOL PHOSPHATE 12.5 MILLIGRAM(S): 80 CAPSULE, EXTENDED RELEASE ORAL at 17:12

## 2019-10-17 RX ADMIN — Medication 75 MICROGRAM(S): at 05:02

## 2019-10-17 RX ADMIN — PANTOPRAZOLE SODIUM 40 MILLIGRAM(S): 20 TABLET, DELAYED RELEASE ORAL at 05:00

## 2019-10-17 RX ADMIN — Medication 975 MILLIGRAM(S): at 09:00

## 2019-10-17 RX ADMIN — Medication 0.3 MILLIGRAM(S): at 15:42

## 2019-10-17 RX ADMIN — Medication 1 TABLET(S): at 15:42

## 2019-10-17 RX ADMIN — Medication 10 MILLIGRAM(S): at 22:46

## 2019-10-17 RX ADMIN — ONDANSETRON 4 MILLIGRAM(S): 8 TABLET, FILM COATED ORAL at 18:34

## 2019-10-17 RX ADMIN — Medication 650 MILLIGRAM(S): at 03:20

## 2019-10-17 RX ADMIN — LACTULOSE 20 GRAM(S): 10 SOLUTION ORAL at 22:45

## 2019-10-17 RX ADMIN — Medication 3 UNIT(S): at 17:12

## 2019-10-17 RX ADMIN — CARVEDILOL PHOSPHATE 12.5 MILLIGRAM(S): 80 CAPSULE, EXTENDED RELEASE ORAL at 05:01

## 2019-10-17 RX ADMIN — SEVELAMER CARBONATE 1600 MILLIGRAM(S): 2400 POWDER, FOR SUSPENSION ORAL at 05:00

## 2019-10-17 RX ADMIN — Medication 975 MILLIGRAM(S): at 16:13

## 2019-10-17 RX ADMIN — Medication 100 MILLIGRAM(S): at 05:02

## 2019-10-17 RX ADMIN — Medication 0.3 MILLIGRAM(S): at 05:02

## 2019-10-17 RX ADMIN — Medication 975 MILLIGRAM(S): at 08:41

## 2019-10-17 RX ADMIN — Medication 3 UNIT(S): at 08:38

## 2019-10-17 RX ADMIN — Medication 100 MILLIGRAM(S): at 17:12

## 2019-10-17 RX ADMIN — Medication 1 MILLIGRAM(S): at 15:42

## 2019-10-17 RX ADMIN — CLOPIDOGREL BISULFATE 75 MILLIGRAM(S): 75 TABLET, FILM COATED ORAL at 15:42

## 2019-10-17 RX ADMIN — Medication 0.3 MILLIGRAM(S): at 21:30

## 2019-10-17 RX ADMIN — Medication 975 MILLIGRAM(S): at 15:13

## 2019-10-17 RX ADMIN — Medication 3 MILLIGRAM(S): at 21:29

## 2019-10-17 RX ADMIN — SIMVASTATIN 20 MILLIGRAM(S): 20 TABLET, FILM COATED ORAL at 21:30

## 2019-10-17 RX ADMIN — Medication 81 MILLIGRAM(S): at 15:42

## 2019-10-17 RX ADMIN — Medication 650 MILLIGRAM(S): at 01:57

## 2019-10-17 RX ADMIN — AMLODIPINE BESYLATE 10 MILLIGRAM(S): 2.5 TABLET ORAL at 05:04

## 2019-10-17 RX ADMIN — Medication 975 MILLIGRAM(S): at 21:31

## 2019-10-17 RX ADMIN — Medication 108 MILLIGRAM(S): at 17:12

## 2019-10-17 RX ADMIN — SEVELAMER CARBONATE 1600 MILLIGRAM(S): 2400 POWDER, FOR SUSPENSION ORAL at 21:30

## 2019-10-17 NOTE — PROGRESS NOTE ADULT - ASSESSMENT
62 y/o female with PMHx of HTN, DMT2, CAD s/p CABG, PPM, ESRD on HD T,T,S presented to hospital with left sided headache and uncontrolled BP SBP>200. Found to have vesicles suspicious for shingles. Hospital course complicated by encephalopathy; possibly secondary to disseminated zoster vs adverse reaction from gabapentin. Patient underwent LP 10/11 which was + for HZV.     1. Herpes Zoster encephalitis  - on contact and airborne isolation  - patient with vesicular lesion on posterior neck extending into hairline, healing, scabbed   - IV acyclovir for 2 weeks per ID until 10/23  - spoke to ID and renal. patient needs to be in house to complete treatment. renal suggested not to use other arm for PICC line    2. Occipital Neuralgia  - CTH unremarkable. CT neck unremarkable  - ESR - CRP normal - no temporal arteritis  - Oxycodone, Flexeril and Gabapentin discontinued due to confusion  - Headache worsened after dialysis with no relief from Tylenol  - Will try Fioricet for headache relief prn    3. Encephalopathy - possible medication induced versus infection vs delirium. improved  - Repeat CT  head 10/10 without any acute changes  - IR guided LP done 10/11  - CSF PCR positive for VZ  - IV Acyclovir x 2 weeks (renally dosed)  - Hold gabapentin or any psych med due to delirium   - Neurology recommendations appreciated    4. Hypertensive urgency - resolved  - BP currently in good control  - Continue home medications and adjust as needed  - Ultrafiltration per renal    5. ESRD on HD (TTS)  - Last HD on 10/15  - UF goal per renal  - Continue Phoslo  - Strict I's/O's, daily weights  - renal on board    6. DMT2 . A1c 6.5 on 10/05  - BGM with SSI  - continue to monitor     7. Hypothyroidism  - normal TSH (measured as 1.64 on 10/12)  - Continue Synthroid    8. CAD  - asymptomatic  - Continue Statin, BB  - ASA and Plavix resumed     9. Hyponatremia:   - dry oral mucous membranes noted on PE  - Encouraged PO hydration  - continue to monitor sodium levels (last Na 134 on 10/13)    10. Debillity:  - seen by PT 10/15   - awaiting recommendation for home PT vs SARA (pending progress with mobility)    11. Prophylactic measure  - SCDs for mechanical DVT ppx    12. Constipation  - Mirlax for stool softener  - Encourage oral intake of food and fluids

## 2019-10-17 NOTE — PROGRESS NOTE ADULT - SUBJECTIVE AND OBJECTIVE BOX
[facility] DIVISION OF KIDNEY DISEASES AND HYPERTENSION -- HEMODIALYSIS NOTE  --------------------------------------------------------------------------------  Chief Complaint: ESRD/Ongoing hemodialysis requirement    24 hour events/subjective:    PAST HISTORY  --------------------------------------------------------------------------------  No significant changes to PMH, PSH, FHx, SHx, unless otherwise noted    ALLERGIES & MEDICATIONS  --------------------------------------------------------------------------------  Allergies    No Known Allergies    Standing Inpatient Medications  acyclovir IVPB 400 milliGRAM(s) IV Intermittent every 24 hours  amLODIPine   Tablet 10 milliGRAM(s) Oral daily  aspirin  chewable 81 milliGRAM(s) Oral daily  carvedilol 12.5 milliGRAM(s) Oral every 12 hours  chlorhexidine 2% Cloths 1 Application(s) Topical once  cloNIDine 0.3 milliGRAM(s) Oral every 8 hours  clopidogrel Tablet 75 milliGRAM(s) Oral daily  dextrose 5%. 1000 milliLiter(s) IV Continuous <Continuous>  dextrose 50% Injectable 12.5 Gram(s) IV Push once  dextrose 50% Injectable 25 Gram(s) IV Push once  dextrose 50% Injectable 25 Gram(s) IV Push once  folic acid 1 milliGRAM(s) Oral daily  hydrALAZINE 100 milliGRAM(s) Oral every 12 hours  influenza   Vaccine 0.5 milliLiter(s) IntraMuscular once  insulin lispro (HumaLOG) corrective regimen sliding scale   SubCutaneous three times a day before meals  insulin lispro (HumaLOG) corrective regimen sliding scale   SubCutaneous at bedtime  insulin lispro Injectable (HumaLOG) 3 Unit(s) SubCutaneous three times a day before meals  levothyroxine 75 MICROGram(s) Oral daily  LORazepam   Injectable 1 milliGRAM(s) IntraMuscular once  melatonin 3 milliGRAM(s) Oral at bedtime  Nephro-heather 1 Tablet(s) Oral daily  pantoprazole    Tablet 40 milliGRAM(s) Oral before breakfast  sevelamer carbonate 1600 milliGRAM(s) Oral three times a day  simvastatin 20 milliGRAM(s) Oral at bedtime    PRN Inpatient Medications  acetaminophen   Tablet .. 650 milliGRAM(s) Oral every 4 hours PRN  acetaminophen   Tablet .. 975 milliGRAM(s) Oral every 6 hours PRN  dextrose 40% Gel 15 Gram(s) Oral once PRN  glucagon  Injectable 1 milliGRAM(s) IntraMuscular once PRN  hydrALAZINE Injectable 10 milliGRAM(s) IV Push every 6 hours PRN  labetalol Injectable 10 milliGRAM(s) IV Push every 4 hours PRN  LORazepam   Injectable 1 milliGRAM(s) IV Push every 4 hours PRN      REVIEW OF SYSTEMS  --------------------------------------------------------------------------------  Gen: No weight changes, fatigue, fevers/chills, weakness  Skin: No rashes  Head/Eyes/Ears/Mouth: No headache; Normal hearing; Normal vision w/o blurriness; No sinus pain/discomfort, sore throat  Respiratory: No dyspnea, cough, wheezing, hemoptysis  CV: No chest pain, PND, orthopnea  GI: No abdominal pain, diarrhea, constipation, nausea, vomiting, melena, hematochezia  : No increased frequency, dysuria, hematuria, nocturia  MSK: No joint pain/swelling; no back pain; no edema  Neuro: No dizziness/lightheadedness, weakness, seizures, numbness, tingling  Heme: No easy bruising or bleeding  Endo: No heat/cold intolerance  Psych: No significant nervousness, anxiety, stress, depression    All other systems were reviewed and are negative, except as noted.    VITALS/PHYSICAL EXAM  --------------------------------------------------------------------------------  T(C): 36.9 (10-17-19 @ 07:55), Max: 36.9 (10-17-19 @ 07:55)  HR: 69 (10-17-19 @ 07:55) (69 - 70)  BP: 169/66 (10-17-19 @ 07:55) (155/70 - 188/75)  RR: 20 (10-17-19 @ 07:55) (18 - 20)  SpO2: 97% (10-17-19 @ 04:58) (97% - 98%)  Wt(kg): --  Height (cm): 160 (10-16-19 @ 22:20)  Weight (kg): 63.8 (10-16-19 @ 22:20)  BMI (kg/m2): 24.9 (10-16-19 @ 22:20)  BSA (m2): 1.66 (10-16-19 @ 22:20)      10-16-19 @ 07:01  -  10-17-19 @ 07:00  --------------------------------------------------------  IN: 200 mL / OUT: 0 mL / NET: 200 mL      Physical Exam:  	Gen: NAD, well-appearing  	HEENT: PERRL, supple neck, clear oropharynx  	Pulm: CTA B/L  	CV: RRR, S1S2; no rub  	Back: No spinal or CVA tenderness; no sacral edema  	Abd: +BS, soft, nontender/nondistended  	: No suprapubic tenderness  	UE: Warm, FROM, no clubbing, intact strength; no edema; no asterixis  	LE: Warm, FROM, no clubbing, intact strength; no edema  	Neuro: No focal deficits, intact gait  	Psych: Normal affect and mood  	Skin: Warm, without rashes  	Vascular access:    LABS/STUDIES  --------------------------------------------------------------------------------  PTH -- (Ca 9.8)      [11-13-18 @ 16:19]   551  Vitamin D (25OH) 25.0      [11-13-18 @ 16:38]  HbA1c 6.5      [10-05-19 @ 13:33]  TSH 1.64      [10-12-19 @ 10:08]

## 2019-10-17 NOTE — PROGRESS NOTE ADULT - SUBJECTIVE AND OBJECTIVE BOX
[facility]  INFECTIOUS DISEASES AND INTERNAL MEDICINE at [place]  =======================================================  Carlo Esparza MD  Diplomates American Board of Internal Medicine and Infectious Diseases  Tel: [phone]      Fax: [phone]  =======================================================    IKM LEE [id number]    Follow up: Herpes Zoster encephalitis    Awake and alert, Confusion improving. Headache is better with tylenol. appetite is fine. No fever.     Allergies:  No Known Allergies    Antibiotics:  acyclovir IVPB 400 milliGRAM(s) IV Intermittent every 24 hours    REVIEW OF SYSTEMS:  Unable to obtain     Physical Exam:  Vital Signs Last 24 Hrs  T(C): 36.8 (17 Oct 2019 14:24), Max: 36.9 (17 Oct 2019 07:55)  T(F): 98.2 (17 Oct 2019 14:24), Max: 98.4 (17 Oct 2019 07:55)  HR: 69 (17 Oct 2019 14:24) (69 - 70)  BP: 164/67 (17 Oct 2019 14:24) (157/65 - 188/75)  BP(mean): --  RR: 18 (17 Oct 2019 14:24) (18 - 20)  SpO2: 100% (17 Oct 2019 14:24) (97% - 100%)  GEN: NAD, awake and alert  HEENT: normocephalic and atraumatic. EOMI. PERRL.  Anicteric   NECK: Supple.   LUNGS: Clear to auscultation.  HEART: Regular rate and rhythm without murmur.  ABDOMEN: Soft, nontender, and nondistended.  Positive bowel sounds.    : No CVA tenderness  EXTREMITIES: Without any edema.  MSK: no joint swelling, LUE fistula   NEUROLOGIC: confused with mild slurred speech   SKIN: crusted lesions over nape of neck and left side of scalp    Labs:  10-17    132<L>  |  90<L>  |  54.0<H>  ----------------------------<  228<H>  4.5   |  23.0  |  7.63<H>    Ca    10.1      17 Oct 2019 11:38                        10.5   7.01  )-----------( 258      ( 17 Oct 2019 11:38 )             33.8     RECENT CULTURES:  10-11 @ 16:02 .CSF     No growth at 3 days.    Few White blood cells  No organisms seen    10-10 @ 10:33      NotDetec    10-08 @ 18:12 .Blood     No growth at 5 days.    10-08 @ 18:11 .Blood     No growth at 5 days.      Assessment and Plan:   63 year old female with PMH HTN, DMT2, CAD s/p CABG, ESRD on HD T,T,S presents with left sided headache since 4-5 days not relieved with Tylenol so came to ER.  Described headache as sharp radiating from occiput to left temporal region and relieved somewhat with Tylenol. Has nausea, but denies vomiting, visual changes, dizziness, fever or chills. Denies any dyspnea, chest pain or palpitations. Occasional numbness in feet.  Noted to have elevated /81 and rash consistent with Herpes Zoster. Was started on valtrex and gabapentin.   LP for >100 cells with lymphocytic predominance, CSF PCR + for VZV    Herpes zoster encephalitis  Herpes zoster left scalp and neck lesions   ESRD     - No sign of disseminated zoster, no pneumonitis or GE   - Continue Acyclovir 400mg daily adjusted for her renal function   - Unable to give acyclovir in HD.   - Will order Midline for possible discharge.   - Will complete at least 14 day IV treatment (recommendation is 14 to 21 days)  - Will watch her closely, had headache in last few days.   - Hold gabapentin or any psych med.     will follow.    Discussed with Dr. Cyr and Dr. Norwood.

## 2019-10-17 NOTE — PROGRESS NOTE ADULT - SUBJECTIVE AND OBJECTIVE BOX
Dr. Cyr Hospitalist Progress Note  KIM LEE [id number]    Patient is a 63y old  Female who presents with a chief complaint of headache (17 Oct 2019 15:59)    Interval: seen at bedside. no complaints. headaches earlier in am, better with tylenol. needs IV Acyclovir until 10/23 in hospital.     HPI:  63 year old female with PMH HTN, DMT2, CAD s/p CABG, ESRD on HD T,T,S presents with left sided headache since 4-5 days not relieved with Tylenol so came to ER.  Described headache as sharp radiating from occiput to left temporal region and relieved somewhat with Tylenol. Has nausea, but denies vomiting, visual changes, dizziness, fever or chills. Denies any dyspnea, chest pain or palpitations. Occasional numbness in feet.  Noted to have elevated /81 (04 Oct 2019 15:55)      ROS:  CONSTITUTIONAL:  No distress.no fever/chills  HEENT:  Eyes:  No diplopia or blurred vision.   CARDIOVASCULAR:  No pressure, squeezing, tightness, heaviness or aching about the chest; no palpitations.no leg swelling, no orthopnea or PND  RESPIRATORY:  no SOB. no wheezing.no cough or sputum.  No hemoptysis  GI: no nausea, no vomiting, no diarrhea, no constipation. No hematochezia or melena  EXT:No joint pain or joint swelling or redness  SKIN: no skin break or ulcer. No cellulitis.   CNS: No headaches. No weakness.no numbness. No depression or anxiety. No SI. no hallucination    T(C): 36.9 (10-17-19 @ 19:18), Max: 36.9 (10-17-19 @ 07:55)  HR: 70 (10-17-19 @ 19:18) (69 - 70)  BP: 177/69 (10-17-19 @ 19:18) (157/65 - 188/75)  RR: 18 (10-17-19 @ 19:18) (18 - 20)  SpO2: 95% (10-17-19 @ 19:18) (95% - 100%)    10-16-19 @ 07:01  -  10-17-19 @ 07:00  --------------------------------------------------------  IN: 200 mL / OUT: 0 mL / NET: 200 mL    CAPILLARY BLOOD GLUCOSE      POCT Blood Glucose.: 122 mg/dL (17 Oct 2019 16:51)  POCT Blood Glucose.: 133 mg/dL (17 Oct 2019 12:24)  POCT Blood Glucose.: 150 mg/dL (17 Oct 2019 08:08)  POCT Blood Glucose.: 138 mg/dL (16 Oct 2019 22:34)      Physical Exam:  GENERAL: Not in distress. Alert    HEENT:  Normocephalic and atraumatic. PEARLA,EOMI  NECK: Supple.  No JVD.    CARDIOVASCULAR: RRR S1, S2. No murmur/rubs/gallop  LUNGS: BLAE+, no rales, no wheezing, no rhonchi.    ABDOMEN: ND. Soft,  NT, no guarding / rebound / rigidity. BS normoactive. No CVA tenderness.    EXTREMITIES: no cyanosis, no clubbing, no edema.   SKIN: no rash over the neck and scalp dried up.. No skin break or ulcer. No cellulitis.  NEUROLOGIC: AAO*3.confused.strength is symmetric, sensation intact, speech fluent.    PSYCHIATRIC: Calm.  No agitation.    Labs                        10.5   7.01  )-----------( 258      ( 17 Oct 2019 11:38 )             33.8     10-17    132<L>  |  90<L>  |  54.0<H>  ----------------------------<  228<H>  4.5   |  23.0  |  7.63<H>    Ca    10.1      17 Oct 2019 11:38       MEDICATIONS  (STANDING):  acyclovir IVPB 400 milliGRAM(s) IV Intermittent every 24 hours  amLODIPine   Tablet 10 milliGRAM(s) Oral daily  aspirin  chewable 81 milliGRAM(s) Oral daily  carvedilol 12.5 milliGRAM(s) Oral every 12 hours  chlorhexidine 2% Cloths 1 Application(s) Topical once  cloNIDine 0.3 milliGRAM(s) Oral every 8 hours  clopidogrel Tablet 75 milliGRAM(s) Oral daily  dextrose 5%. 1000 milliLiter(s) (50 mL/Hr) IV Continuous <Continuous>  dextrose 50% Injectable 12.5 Gram(s) IV Push once  dextrose 50% Injectable 25 Gram(s) IV Push once  dextrose 50% Injectable 25 Gram(s) IV Push once  folic acid 1 milliGRAM(s) Oral daily  hydrALAZINE 100 milliGRAM(s) Oral every 12 hours  influenza   Vaccine 0.5 milliLiter(s) IntraMuscular once  insulin lispro (HumaLOG) corrective regimen sliding scale   SubCutaneous three times a day before meals  insulin lispro (HumaLOG) corrective regimen sliding scale   SubCutaneous at bedtime  insulin lispro Injectable (HumaLOG) 3 Unit(s) SubCutaneous three times a day before meals  levothyroxine 75 MICROGram(s) Oral daily  LORazepam   Injectable 1 milliGRAM(s) IntraMuscular once  melatonin 3 milliGRAM(s) Oral at bedtime  Nephro-heather 1 Tablet(s) Oral daily  pantoprazole    Tablet 40 milliGRAM(s) Oral before breakfast  sevelamer carbonate 1600 milliGRAM(s) Oral three times a day  simvastatin 20 milliGRAM(s) Oral at bedtime    MEDICATIONS  (PRN):  acetaminophen   Tablet .. 650 milliGRAM(s) Oral every 4 hours PRN Mild Pain (1 - 3)  acetaminophen   Tablet .. 975 milliGRAM(s) Oral every 6 hours PRN Moderate Pain (4 - 6)  dextrose 40% Gel 15 Gram(s) Oral once PRN Blood Glucose LESS THAN 70 milliGRAM(s)/deciliter  glucagon  Injectable 1 milliGRAM(s) IntraMuscular once PRN Glucose LESS THAN 70 milligrams/deciliter  hydrALAZINE Injectable 10 milliGRAM(s) IV Push every 6 hours PRN SBP >160  labetalol Injectable 10 milliGRAM(s) IV Push every 4 hours PRN Systolic/Diastolic >160/100  LORazepam   Injectable 1 milliGRAM(s) IV Push every 4 hours PRN Agitation  ondansetron Injectable 4 milliGRAM(s) IV Push four times a day PRN Nausea and/or Vomiting

## 2019-10-18 LAB
ANION GAP SERPL CALC-SCNC: 18 MMOL/L — HIGH (ref 5–17)
BUN SERPL-MCNC: 27 MG/DL — HIGH (ref 8–20)
CALCIUM SERPL-MCNC: 10.2 MG/DL — SIGNIFICANT CHANGE UP (ref 8.6–10.2)
CHLORIDE SERPL-SCNC: 92 MMOL/L — LOW (ref 98–107)
CO2 SERPL-SCNC: 26 MMOL/L — SIGNIFICANT CHANGE UP (ref 22–29)
CREAT SERPL-MCNC: 4.83 MG/DL — HIGH (ref 0.5–1.3)
GLUCOSE BLDC GLUCOMTR-MCNC: 127 MG/DL — HIGH (ref 70–99)
GLUCOSE BLDC GLUCOMTR-MCNC: 127 MG/DL — HIGH (ref 70–99)
GLUCOSE BLDC GLUCOMTR-MCNC: 147 MG/DL — HIGH (ref 70–99)
GLUCOSE BLDC GLUCOMTR-MCNC: 151 MG/DL — HIGH (ref 70–99)
GLUCOSE SERPL-MCNC: 160 MG/DL — HIGH (ref 70–115)
POTASSIUM SERPL-MCNC: 4.7 MMOL/L — SIGNIFICANT CHANGE UP (ref 3.5–5.3)
POTASSIUM SERPL-SCNC: 4.7 MMOL/L — SIGNIFICANT CHANGE UP (ref 3.5–5.3)
SODIUM SERPL-SCNC: 136 MMOL/L — SIGNIFICANT CHANGE UP (ref 135–145)
WNV RNA SPEC QL NAA+PROBE: SIGNIFICANT CHANGE UP
WNV RNA SPEC QL NAA+PROBE: SIGNIFICANT CHANGE UP

## 2019-10-18 PROCEDURE — 99232 SBSQ HOSP IP/OBS MODERATE 35: CPT

## 2019-10-18 PROCEDURE — 70450 CT HEAD/BRAIN W/O DYE: CPT | Mod: 26

## 2019-10-18 PROCEDURE — 99233 SBSQ HOSP IP/OBS HIGH 50: CPT

## 2019-10-18 RX ORDER — LORATADINE 10 MG/1
10 TABLET ORAL DAILY
Refills: 0 | Status: DISCONTINUED | OUTPATIENT
Start: 2019-10-18 | End: 2019-10-18

## 2019-10-18 RX ORDER — METHOCARBAMOL 500 MG/1
500 TABLET, FILM COATED ORAL THREE TIMES A DAY
Refills: 0 | Status: COMPLETED | OUTPATIENT
Start: 2019-10-18 | End: 2019-10-21

## 2019-10-18 RX ORDER — LORATADINE 10 MG/1
10 TABLET ORAL
Refills: 0 | Status: DISCONTINUED | OUTPATIENT
Start: 2019-10-18 | End: 2019-10-23

## 2019-10-18 RX ORDER — CARVEDILOL PHOSPHATE 80 MG/1
25 CAPSULE, EXTENDED RELEASE ORAL EVERY 12 HOURS
Refills: 0 | Status: DISCONTINUED | OUTPATIENT
Start: 2019-10-18 | End: 2019-10-23

## 2019-10-18 RX ORDER — GABAPENTIN 400 MG/1
100 CAPSULE ORAL AT BEDTIME
Refills: 0 | Status: DISCONTINUED | OUTPATIENT
Start: 2019-10-18 | End: 2019-10-21

## 2019-10-18 RX ORDER — ACETAMINOPHEN 500 MG
650 TABLET ORAL EVERY 6 HOURS
Refills: 0 | Status: COMPLETED | OUTPATIENT
Start: 2019-10-18 | End: 2019-10-21

## 2019-10-18 RX ORDER — DOCUSATE SODIUM 100 MG
100 CAPSULE ORAL THREE TIMES A DAY
Refills: 0 | Status: DISCONTINUED | OUTPATIENT
Start: 2019-10-18 | End: 2019-10-23

## 2019-10-18 RX ORDER — SENNA PLUS 8.6 MG/1
2 TABLET ORAL AT BEDTIME
Refills: 0 | Status: DISCONTINUED | OUTPATIENT
Start: 2019-10-18 | End: 2019-10-23

## 2019-10-18 RX ORDER — POLYETHYLENE GLYCOL 3350 17 G/17G
17 POWDER, FOR SOLUTION ORAL DAILY
Refills: 0 | Status: DISCONTINUED | OUTPATIENT
Start: 2019-10-18 | End: 2019-10-23

## 2019-10-18 RX ADMIN — Medication 975 MILLIGRAM(S): at 05:25

## 2019-10-18 RX ADMIN — Medication 1 TABLET(S): at 18:00

## 2019-10-18 RX ADMIN — Medication 0.3 MILLIGRAM(S): at 22:38

## 2019-10-18 RX ADMIN — SEVELAMER CARBONATE 1600 MILLIGRAM(S): 2400 POWDER, FOR SUSPENSION ORAL at 15:26

## 2019-10-18 RX ADMIN — CARVEDILOL PHOSPHATE 12.5 MILLIGRAM(S): 80 CAPSULE, EXTENDED RELEASE ORAL at 05:21

## 2019-10-18 RX ADMIN — CARVEDILOL PHOSPHATE 12.5 MILLIGRAM(S): 80 CAPSULE, EXTENDED RELEASE ORAL at 17:01

## 2019-10-18 RX ADMIN — Medication 100 MILLIGRAM(S): at 22:59

## 2019-10-18 RX ADMIN — Medication 75 MICROGRAM(S): at 05:21

## 2019-10-18 RX ADMIN — Medication 0.3 MILLIGRAM(S): at 15:54

## 2019-10-18 RX ADMIN — Medication 3 UNIT(S): at 12:24

## 2019-10-18 RX ADMIN — Medication 100 MILLIGRAM(S): at 05:21

## 2019-10-18 RX ADMIN — SIMVASTATIN 20 MILLIGRAM(S): 20 TABLET, FILM COATED ORAL at 22:39

## 2019-10-18 RX ADMIN — Medication 3 UNIT(S): at 16:58

## 2019-10-18 RX ADMIN — METHOCARBAMOL 500 MILLIGRAM(S): 500 TABLET, FILM COATED ORAL at 15:25

## 2019-10-18 RX ADMIN — Medication 650 MILLIGRAM(S): at 12:00

## 2019-10-18 RX ADMIN — Medication 1 TABLET(S): at 17:00

## 2019-10-18 RX ADMIN — Medication 81 MILLIGRAM(S): at 11:00

## 2019-10-18 RX ADMIN — METHOCARBAMOL 500 MILLIGRAM(S): 500 TABLET, FILM COATED ORAL at 22:58

## 2019-10-18 RX ADMIN — Medication 1 TABLET(S): at 12:00

## 2019-10-18 RX ADMIN — LORATADINE 10 MILLIGRAM(S): 10 TABLET ORAL at 10:57

## 2019-10-18 RX ADMIN — PANTOPRAZOLE SODIUM 40 MILLIGRAM(S): 20 TABLET, DELAYED RELEASE ORAL at 05:21

## 2019-10-18 RX ADMIN — Medication 1 TABLET(S): at 10:00

## 2019-10-18 RX ADMIN — Medication 1 MILLIGRAM(S): at 11:00

## 2019-10-18 RX ADMIN — Medication 3 MILLIGRAM(S): at 22:38

## 2019-10-18 RX ADMIN — Medication 650 MILLIGRAM(S): at 11:02

## 2019-10-18 RX ADMIN — Medication 0.3 MILLIGRAM(S): at 05:21

## 2019-10-18 RX ADMIN — Medication 975 MILLIGRAM(S): at 06:25

## 2019-10-18 RX ADMIN — Medication 3 UNIT(S): at 08:08

## 2019-10-18 RX ADMIN — Medication 650 MILLIGRAM(S): at 23:11

## 2019-10-18 RX ADMIN — SENNA PLUS 2 TABLET(S): 8.6 TABLET ORAL at 22:59

## 2019-10-18 RX ADMIN — Medication 1 TABLET(S): at 11:01

## 2019-10-18 RX ADMIN — Medication 100 MILLIGRAM(S): at 17:02

## 2019-10-18 RX ADMIN — SEVELAMER CARBONATE 1600 MILLIGRAM(S): 2400 POWDER, FOR SUSPENSION ORAL at 22:37

## 2019-10-18 RX ADMIN — AMLODIPINE BESYLATE 10 MILLIGRAM(S): 2.5 TABLET ORAL at 05:23

## 2019-10-18 RX ADMIN — CLOPIDOGREL BISULFATE 75 MILLIGRAM(S): 75 TABLET, FILM COATED ORAL at 11:00

## 2019-10-18 RX ADMIN — Medication 108 MILLIGRAM(S): at 17:06

## 2019-10-18 RX ADMIN — SEVELAMER CARBONATE 1600 MILLIGRAM(S): 2400 POWDER, FOR SUSPENSION ORAL at 05:21

## 2019-10-18 NOTE — PROGRESS NOTE ADULT - SUBJECTIVE AND OBJECTIVE BOX
[facility]  INFECTIOUS DISEASES AND INTERNAL MEDICINE at [place]  =======================================================  Carlo Esparza MD  Diplomates American Board of Internal Medicine and Infectious Diseases  Tel: [phone]      Fax: [phone]  =======================================================    KIM LEE [id number]    Follow up: Herpes Zoster encephalitis    Awake and alert, Confusion has improved significantly Headache looks like post herpetic neuralgia, responds to pain meds.   No other complaint.     Allergies:  No Known Allergies    Antibiotics:  acyclovir IVPB 400 milliGRAM(s) IV Intermittent every 24 hours    REVIEW OF SYSTEMS:  As above.      Physical Exam:  Vital Signs Last 24 Hrs  T(C): 36.7 (18 Oct 2019 08:17), Max: 36.9 (17 Oct 2019 15:59)  T(F): 98.1 (18 Oct 2019 08:17), Max: 98.4 (17 Oct 2019 15:59)  HR: 70 (18 Oct 2019 08:17) (69 - 70)  BP: 146/77 (18 Oct 2019 08:17) (146/77 - 185/74)  RR: 18 (18 Oct 2019 08:17) (18 - 18)  SpO2: 94% (18 Oct 2019 08:17) (94% - 100%)  GEN: NAD, awake and alert  HEENT: normocephalic and atraumatic. EOMI. PERRL.  Anicteric   NECK: Supple.   LUNGS: Clear to auscultation.  HEART: Regular rate and rhythm without murmur.  ABDOMEN: Soft, nontender, and nondistended.  Positive bowel sounds.    : No CVA tenderness  EXTREMITIES: Without any edema.  MSK: no joint swelling, LUE fistula   NEUROLOGIC: confused with mild slurred speech   SKIN: crusted lesions over nape of neck and left side of scalp    Labs:  10-17    136  |  92<L>  |  27.0<H>  ----------------------------<  160<H>  4.7   |  26.0  |  4.83<H>    Ca    10.2      17 Oct 2019 23:45                        10.5   7.01  )-----------( 258      ( 17 Oct 2019 11:38 )             33.8     RECENT CULTURES:  10-11 @ 16:02 .CSF     No growth at 3 days.    Few White blood cells  No organisms seen    10-10 @ 10:33      NotDetec    10-08 @ 18:12 .Blood     No growth at 5 days.    10-08 @ 18:11 .Blood     No growth at 5 days.      Assessment and Plan:   63 year old female with PMH HTN, DMT2, CAD s/p CABG, ESRD on HD T,T,S presents with left sided headache since 4-5 days not relieved with Tylenol so came to ER.  Described headache as sharp radiating from occiput to left temporal region and relieved somewhat with Tylenol. Has nausea, but denies vomiting, visual changes, dizziness, fever or chills. Denies any dyspnea, chest pain or palpitations. Occasional numbness in feet.  Noted to have elevated /81 and rash consistent with Herpes Zoster. Was started on valtrex and gabapentin.   LP for >100 cells with lymphocytic predominance, CSF PCR + for VZV    Herpes zoster encephalitis  Herpes zoster left scalp and neck lesions   ESRD     - No sign of disseminated zoster, no pneumonitis or GE   - Continue Acyclovir 400mg daily adjusted for her renal function   - Will complete at least 14 day IV treatment, stop date is 10/23.   - Headache most likely related to Post herpetic neuralgia, pain meds helping.     will sign off please call with any question.

## 2019-10-18 NOTE — PROGRESS NOTE ADULT - SUBJECTIVE AND OBJECTIVE BOX
[facility] DIVISION OF KIDNEY DISEASES AND HYPERTENSION -- FOLLOW UP NOTE  --------------------------------------------------------------------------------  Chief Complaint: ESRD HD    24 hour events/subjective:  Seen/examined  HD planned for AM;      PAST HISTORY  --------------------------------------------------------------------------------  No significant changes to PMH, PSH, FHx, SHx, unless otherwise noted    ALLERGIES & MEDICATIONS  --------------------------------------------------------------------------------  Allergies    No Known Allergies    Intolerances      Standing Inpatient Medications  acetaminophen   Tablet .. 650 milliGRAM(s) Oral every 6 hours  acyclovir IVPB 400 milliGRAM(s) IV Intermittent every 24 hours  amLODIPine   Tablet 10 milliGRAM(s) Oral daily  aspirin  chewable 81 milliGRAM(s) Oral daily  carvedilol 12.5 milliGRAM(s) Oral every 12 hours  chlorhexidine 2% Cloths 1 Application(s) Topical once  cloNIDine 0.3 milliGRAM(s) Oral every 8 hours  clopidogrel Tablet 75 milliGRAM(s) Oral daily  dextrose 5%. 1000 milliLiter(s) IV Continuous <Continuous>  dextrose 50% Injectable 12.5 Gram(s) IV Push once  dextrose 50% Injectable 25 Gram(s) IV Push once  dextrose 50% Injectable 25 Gram(s) IV Push once  folic acid 1 milliGRAM(s) Oral daily  hydrALAZINE 100 milliGRAM(s) Oral every 12 hours  influenza   Vaccine 0.5 milliLiter(s) IntraMuscular once  insulin lispro (HumaLOG) corrective regimen sliding scale   SubCutaneous three times a day before meals  insulin lispro (HumaLOG) corrective regimen sliding scale   SubCutaneous at bedtime  insulin lispro Injectable (HumaLOG) 3 Unit(s) SubCutaneous three times a day before meals  levothyroxine 75 MICROGram(s) Oral daily  loratadine 10 milliGRAM(s) Oral <User Schedule>  melatonin 3 milliGRAM(s) Oral at bedtime  methocarbamol 500 milliGRAM(s) Oral three times a day  Nephro-heather 1 Tablet(s) Oral daily  pantoprazole    Tablet 40 milliGRAM(s) Oral before breakfast  sevelamer carbonate 1600 milliGRAM(s) Oral three times a day  simvastatin 20 milliGRAM(s) Oral at bedtime    PRN Inpatient Medications  acetaminophen 325 mG/butalbital 50 mG/caffeine 40 mG 1 Tablet(s) Oral every 8 hours PRN  dextrose 40% Gel 15 Gram(s) Oral once PRN  glucagon  Injectable 1 milliGRAM(s) IntraMuscular once PRN  hydrALAZINE Injectable 10 milliGRAM(s) IV Push every 6 hours PRN  labetalol Injectable 10 milliGRAM(s) IV Push every 4 hours PRN  ondansetron Injectable 4 milliGRAM(s) IV Push four times a day PRN      REVIEW OF SYSTEMS  --------------------------------------------------------------------------------  Gen: No weight changes, fatigue, fevers/chills, weakness  Skin: No rashes  Head/Eyes/Ears/Mouth: No headache; Normal hearing; Normal vision w/o blurriness; No sinus pain/discomfort, sore throat  Respiratory: No dyspnea, cough, wheezing, hemoptysis  CV: No chest pain, PND, orthopnea  GI: No abdominal pain, diarrhea, constipation, nausea, vomiting, melena, hematochezia  : No increased frequency, dysuria, hematuria, nocturia  MSK: No joint pain/swelling; no back pain; no edema  Neuro: No dizziness/lightheadedness, weakness, seizures, numbness, tingling  Heme: No easy bruising or bleeding  Endo: No heat/cold intolerance  Psych: No significant nervousness, anxiety, stress, depression    All other systems were reviewed and are negative, except as noted.    VITALS/PHYSICAL EXAM  --------------------------------------------------------------------------------  T(C): 36.7 (10-18-19 @ 08:17), Max: 36.9 (10-17-19 @ 15:59)  HR: 70 (10-18-19 @ 08:17) (70 - 70)  BP: 146/77 (10-18-19 @ 08:17) (146/77 - 185/74)  RR: 18 (10-18-19 @ 08:17) (18 - 18)  SpO2: 94% (10-18-19 @ 08:17) (94% - 97%)  Wt(kg): --  Height (cm): 160 (10-16-19 @ 22:20)  Weight (kg): 63.8 (10-16-19 @ 22:20)  BMI (kg/m2): 24.9 (10-16-19 @ 22:20)  BSA (m2): 1.66 (10-16-19 @ 22:20)      10-17-19 @ 07:01  -  10-18-19 @ 07:00  --------------------------------------------------------  IN: 200 mL / OUT: 0 mL / NET: 200 mL      Physical Exam:  	Gen: NAD, well-appearing  	HEENT: PERRL, supple neck, clear oropharynx  	Pulm: CTA B/L  	CV: RRR, S1S2; no rub  	Back: No spinal or CVA tenderness; no sacral edema  	Abd: +BS, soft, nontender/nondistended  	: No suprapubic tenderness  	UE: Warm, FROM, no clubbing, intact strength; no edema; no asterixis  	LE: Warm, FROM, no clubbing, intact strength; no edema  	Neuro: No focal deficits, intact gait  	Psych: Normal affect and mood  	Skin: Warm, without rashes  	Vascular access:    LABS/STUDIES  --------------------------------------------------------------------------------              10.5   7.01  >-----------<  258      [10-17-19 @ 11:38]              33.8     136  |  92  |  27.0  ----------------------------<  160      [10-17-19 @ 23:45]  4.7   |  26.0  |  4.83        Ca     10.2     [10-17-19 @ 23:45]            Creatinine Trend:  SCr 4.83 [10-17 @ 23:45]  SCr 7.63 [10-17 @ 11:38]  SCr 7.32 [10-14 @ 08:23]  SCr 8.14 [10-12 @ 10:08]  SCr 9.29 [10-10 @ 18:33]        PTH -- (Ca 9.8)      [11-13-18 @ 16:19]   551  Vitamin D (25OH) 25.0      [11-13-18 @ 16:38]  HbA1c 6.5      [10-05-19 @ 13:33]  TSH 1.64      [10-12-19 @ 10:08]

## 2019-10-18 NOTE — PROGRESS NOTE ADULT - ASSESSMENT
HD ( [facility] )    On IV Acyclovir daily;    Last day for IV acyclovir is 23rd;    Hold off on PICC or midline given UE vascular preservation for HD access;    HD in AM    d/w Dr Cyr

## 2019-10-18 NOTE — PROGRESS NOTE ADULT - ASSESSMENT
62 y/o female with PMHx of HTN, DMT2, CAD s/p CABG, PPM, ESRD on HD T,T,S presented to hospital with left sided headache and uncontrolled BP SBP>200. Found to have vesicles suspicious for shingles. Hospital course complicated by encephalopathy; possibly secondary to disseminated zoster vs adverse reaction from gabapentin. Patient underwent LP 10/11 which was + for HZV.     1. Herpes Zoster encephalitis  - on contact and airborne isolation  - patient with vesicular lesion on posterior neck extending into hairline, healing, scabbed   - IV acyclovir for 2 weeks per ID until 10/23  - spoke to ID and renal. patient needs to be in house to complete treatment. renal suggested not to use other arm for PICC line    2. Occipital Neuralgia  - CTH unremarkable. CT neck unremarkable  - ESR - CRP normal - no temporal arteritis  - Oxycodone, Flexeril and Gabapentin discontinued due to confusion  - Headache worsened after dialysis with no relief from Tylenol  - Will try Fioricet for headache relief prn    3. Encephalopathy - possible medication induced versus infection vs delirium. improved  - Repeat CT  head 10/10 without any acute changes  - IR guided LP done 10/11  - CSF PCR positive for VZ  - IV Acyclovir x 2 weeks (renally dosed)  - Hold gabapentin or any psych med due to delirium   - Neurology recommendations appreciated    4. Hypertensive urgency - resolved  - BP currently in good control  - Continue home medications and adjust as needed  - Ultrafiltration per renal    5. ESRD on HD (TTS)  - Last HD on 10/15  - UF goal per renal  - Continue Phoslo  - Strict I's/O's, daily weights  - renal on board    6. DMT2 . A1c 6.5 on 10/05  - BGM with SSI  - continue to monitor     7. Hypothyroidism  - normal TSH (measured as 1.64 on 10/12)  - Continue Synthroid    8. CAD  - asymptomatic  - Continue Statin, BB  - ASA and Plavix resumed     9. Hyponatremia:   - dry oral mucous membranes noted on PE  - Encouraged PO hydration  - continue to monitor sodium levels (last Na 134 on 10/13)    10. Debillity:  - seen by PT 10/15   - awaiting recommendation for home PT vs SARA (pending progress with mobility)    11. Prophylactic measure  - SCDs for mechanical DVT ppx    12. Constipation  - Mirlax for stool softener  - Encourage oral intake of food and fluids 64 y/o female with PMHx of HTN, DMT2, CAD s/p CABG, PPM, ESRD on HD T,T,S presented to hospital with left sided headache and uncontrolled BP SBP>200. Found to have vesicles suspicious for shingles. Hospital course complicated by encephalopathy; possibly secondary to disseminated zoster vs adverse reaction from gabapentin. Patient underwent LP 10/11 which was + for HZV.     1. Herpes Zoster encephalitis  - off contact and airborne isolation  - patient with vesicular lesion on posterior neck extending into hairline and left  of scalp: healed. currently itchy.-->loratadine   - IV acyclovir for 2 weeks per ID until 10/23  - spoke to ID and renal. patient needs to be in house to complete treatment. renal suggested not to use other arm for PICC line    2. persistent headaches, likely due to post-herpetic Neuralgia  some muscle spasm also noted  - repeat CTH unremarkable. CT neck unremarkable  - ESR - CRP normal - no temporal arteritis  - Oxycodone, Flexeril and Gabapentin discontinued due to confusion  - Tylenol and robaxin scheduled for 2-3 days  - Will try Fioricet for headache relief prn    3. Encephalopathy - possible medication induced versus infection vs delirium. improved  - Repeat CT  head 10/10 and 10/18 without any acute changes  - IR guided LP done 10/11  - CSF PCR positive for VZ  - IV Acyclovir x 2 weeks (renally dosed)  - Held gabapentin or any psych med due to confusion. will resume gabapentin as bed time dose and titrate slowly.   - Neurology recommendations appreciated    4. Hypertensive urgency - resolved  - BP currently in good control  - Continue home medications and adjust as needed  - Ultrafiltration per renal    5. ESRD on HD (TTS)  - Last HD on 10/15  - UF goal per renal  - Continue Phoslo  - Strict I's/O's, daily weights  - renal on board    6. DMT2 . A1c 6.5 on 10/05  - BGM with SSI  - continue to monitor     7. Hypothyroidism  - normal TSH (measured as 1.64 on 10/12)  - Continue Synthroid    8. CAD  - asymptomatic  - Continue Statin, BB  - ASA and Plavix resumed     9. Hyponatremia:   - dry oral mucous membranes noted on PE  - Encouraged PO hydration  - continue to monitor sodium levels    10. Debillity:  - seen by PT 10/15   - awaiting recommendation for home PT vs SARA (pending progress with mobility)    11. Prophylactic measure  - SCDs for mechanical DVT ppx    12. Constipation  - Mirlax for stool softener  - Encourage oral intake of food and fluids    Dispo: after completion of acyclovir on 10/23. needs PT reval for safe DC

## 2019-10-18 NOTE — PROGRESS NOTE ADULT - NSHPATTENDINGPLANDISCUSS_GEN_ALL_CORE
patient, RN, PA, SW, ID, neuro
patient, RN, PA, SW, ID, neuro
patient, family at bedside, Dr. Jones
patient, RN, PA, SW, ID, neuro
patient, RN, daughter at bedside, PA
patient, daughter at bedside, Dr. Cervantes, RN, Resident

## 2019-10-18 NOTE — PROGRESS NOTE ADULT - ATTENDING COMMENTS
I have seen and examined the patient and agree with the above assessment and plan. Edited where necessary. Patient more cooperative today per daughter. Awake, alert x 2. Daughter reports patient is AAO x 3 at baseline. LP scheduled for today. Repeat CT head without acute changes. Continue IV acyclovir. ID and Neuro follow up. Prognosis guarded.    Vitals stable  Physical exam   General - elderly female, obese, laying in bed, NAD  HEENT - vesicular lesion on posterior neck with crusting  CVS - RRR, + S1/S2  Resp - bilateral air entry  GI - soft, obese, nontender
seen and examined. headaches better fiorecet. no other complaints. rash drying up. isolation dced by ID. anticipates DC tomorrow is stable. rest as per PA note
seen and examined. still confused. hallucination +. family reported more confused after taking Seroquel and lorazepam. family does not want any psych meds also. Seroquel DCed. lorazepam PRN for agitation. as per daughter, patient had never has any psych issue nor she was on any psych meds. daughter also reported some insomnia better with lorazepam and seroquel but wants something else. will give melatonin at HS. ID suggested to c/w IV acyclovir atleast 2 weeks. for HD tomorrow. neuro f/u noted. rest as per PA note.    all communication through  and daughter
HTN uncontrolled - Clonidine increased to 0.3 q8    Headache - no complaints now that somnolent; hallucinations as per family, though when patient awakened, aware that she's in [facility], stanley is president and its October. No meningeal signs.    Given low grade T, headache, MS changes - benedict lpursue LP. Discussed with NeuroIR  who recommends off plavix x5 d.    Analgesics discontinues as possible source.    Valacyclovir changed to IV for possible HSV, VZV encephalopathy.          Will monitor patient clinically; if worsening will obtain LP more emergently
I have seen and examined the patient and agree with the above assessment and plan. Edited where necessary. Patient more cooperative today per daughter. Awake, alert x 2. Daughter reports patient is AAO x 3 at baseline. LP scheduled for today. Repeat CT head without acute changes. Continue IV acyclovir. ID and Neuro follow up. Prognosis guarded.    Vitals stable  Physical exam   General - elderly female, obese, laying in bed, NAD  HEENT - vesicular lesion on posterior neck with crusting  CVS - RRR, + S1/S2  Resp - bilateral air entry  GI - soft, obese, nontender

## 2019-10-18 NOTE — PROGRESS NOTE ADULT - SUBJECTIVE AND OBJECTIVE BOX
Dr. Cyr Hospitalist Progress Note  KIM LEE [id number]    Patient is a 63y old  Female who presents with a chief complaint of headache (17 Oct 2019 15:59)    Interval: seen at bedside. no complaints. headaches earlier in am, better with tylenol. needs IV Acyclovir until 10/23 in hospital.     HPI:  63 year old female with PMH HTN, DMT2, CAD s/p CABG, ESRD on HD T,T,S presents with left sided headache since 4-5 days not relieved with Tylenol so came to ER.  Described headache as sharp radiating from occiput to left temporal region and relieved somewhat with Tylenol. Has nausea, but denies vomiting, visual changes, dizziness, fever or chills. Denies any dyspnea, chest pain or palpitations. Occasional numbness in feet.  Noted to have elevated /81 (04 Oct 2019 15:55)      ROS:  CONSTITUTIONAL:  No distress.no fever/chills  HEENT:  Eyes:  No diplopia or blurred vision.   CARDIOVASCULAR:  No pressure, squeezing, tightness, heaviness or aching about the chest; no palpitations.no leg swelling, no orthopnea or PND  RESPIRATORY:  no SOB. no wheezing.no cough or sputum.  No hemoptysis  GI: no nausea, no vomiting, no diarrhea, no constipation. No hematochezia or melena  EXT:No joint pain or joint swelling or redness  SKIN: no skin break or ulcer. No cellulitis.   CNS: No headaches. No weakness.no numbness. No depression or anxiety. No SI. no hallucination    Vital Signs Last 24 Hrs  T(C): 36.9 (18 Oct 2019 15:41), Max: 36.9 (18 Oct 2019 15:41)  T(F): 98.5 (18 Oct 2019 15:41), Max: 98.5 (18 Oct 2019 15:41)  HR: 70 (18 Oct 2019 15:41) (70 - 70)  BP: 153/71 (18 Oct 2019 15:41) (146/77 - 184/75)  BP(mean): --  RR: 18 (18 Oct 2019 15:41) (18 - 18)  SpO2: 96% (18 Oct 2019 15:41) (94% - 96%)    CAPILLARY BLOOD GLUCOSE      POCT Blood Glucose.: 127 mg/dL (18 Oct 2019 16:57)  POCT Blood Glucose.: 151 mg/dL (18 Oct 2019 12:01)  POCT Blood Glucose.: 147 mg/dL (18 Oct 2019 07:48)  POCT Blood Glucose.: 146 mg/dL (17 Oct 2019 21:22)        Physical Exam:  GENERAL: Not in distress. Alert    HEENT:  Normocephalic and atraumatic. PEARLA,EOMI  NECK: Supple.  No JVD.    CARDIOVASCULAR: RRR S1, S2. No murmur/rubs/gallop  LUNGS: BLAE+, no rales, no wheezing, no rhonchi.    ABDOMEN: ND. Soft,  NT, no guarding / rebound / rigidity. BS normoactive. No CVA tenderness.    EXTREMITIES: no cyanosis, no clubbing, no edema.   SKIN: no rash over the neck and scalp dried up.. No skin break or ulcer. No cellulitis.  NEUROLOGIC: AAO*3.confused.strength is symmetric, sensation intact, speech fluent.    PSYCHIATRIC: Calm.  No agitation.    Labs                                   10.5   7.01  )-----------( 258      ( 17 Oct 2019 11:38 )             33.8       10-17    136  |  92<L>  |  27.0<H>  ----------------------------<  160<H>  4.7   |  26.0  |  4.83<H>    Ca    10.2      17 Oct 2019 23:45           MEDICATIONS  (STANDING):  acyclovir IVPB 400 milliGRAM(s) IV Intermittent every 24 hours  amLODIPine   Tablet 10 milliGRAM(s) Oral daily  aspirin  chewable 81 milliGRAM(s) Oral daily  carvedilol 12.5 milliGRAM(s) Oral every 12 hours  chlorhexidine 2% Cloths 1 Application(s) Topical once  cloNIDine 0.3 milliGRAM(s) Oral every 8 hours  clopidogrel Tablet 75 milliGRAM(s) Oral daily  dextrose 5%. 1000 milliLiter(s) (50 mL/Hr) IV Continuous <Continuous>  dextrose 50% Injectable 12.5 Gram(s) IV Push once  dextrose 50% Injectable 25 Gram(s) IV Push once  dextrose 50% Injectable 25 Gram(s) IV Push once  folic acid 1 milliGRAM(s) Oral daily  hydrALAZINE 100 milliGRAM(s) Oral every 12 hours  influenza   Vaccine 0.5 milliLiter(s) IntraMuscular once  insulin lispro (HumaLOG) corrective regimen sliding scale   SubCutaneous three times a day before meals  insulin lispro (HumaLOG) corrective regimen sliding scale   SubCutaneous at bedtime  insulin lispro Injectable (HumaLOG) 3 Unit(s) SubCutaneous three times a day before meals  levothyroxine 75 MICROGram(s) Oral daily  LORazepam   Injectable 1 milliGRAM(s) IntraMuscular once  melatonin 3 milliGRAM(s) Oral at bedtime  Nephro-heather 1 Tablet(s) Oral daily  pantoprazole    Tablet 40 milliGRAM(s) Oral before breakfast  sevelamer carbonate 1600 milliGRAM(s) Oral three times a day  simvastatin 20 milliGRAM(s) Oral at bedtime    MEDICATIONS  (PRN):  acetaminophen   Tablet .. 650 milliGRAM(s) Oral every 4 hours PRN Mild Pain (1 - 3)  acetaminophen   Tablet .. 975 milliGRAM(s) Oral every 6 hours PRN Moderate Pain (4 - 6)  dextrose 40% Gel 15 Gram(s) Oral once PRN Blood Glucose LESS THAN 70 milliGRAM(s)/deciliter  glucagon  Injectable 1 milliGRAM(s) IntraMuscular once PRN Glucose LESS THAN 70 milligrams/deciliter  hydrALAZINE Injectable 10 milliGRAM(s) IV Push every 6 hours PRN SBP >160  labetalol Injectable 10 milliGRAM(s) IV Push every 4 hours PRN Systolic/Diastolic >160/100  LORazepam   Injectable 1 milliGRAM(s) IV Push every 4 hours PRN Agitation  ondansetron Injectable 4 milliGRAM(s) IV Push four times a day PRN Nausea and/or Vomiting Dr. Cyr Hospitalist Progress Note  KIM LEE [id number]    Patient is a 63y old  Female who presents with a chief complaint of headache (17 Oct 2019 15:59)    Interval: seen at bedside. continues to have headaches daily, better relieved with Fioricet. skin rash dried up. itchiness over the scalp and neck on the areas of rash. no further hallucination.   needs IV Acyclovir until 10/23 in hospital.       ROS:  CONSTITUTIONAL:  No distress.no fever/chills  HEENT:  Eyes:  No diplopia or blurred vision.   CARDIOVASCULAR:  No pressure, squeezing, tightness, heaviness or aching about the chest; no palpitations.no leg swelling, no orthopnea or PND  RESPIRATORY:  no SOB. no wheezing.no cough or sputum.  No hemoptysis  GI: no nausea, no vomiting, no diarrhea, no constipation. No hematochezia or melena  EXT:No leg or calf pain  SKIN: no skin break or ulcer.  CNS: No weakness.no numbness. No depression or anxiety. No SI. no hallucination    Vital Signs Last 24 Hrs  T(C): 36.9 (18 Oct 2019 15:41), Max: 36.9 (18 Oct 2019 15:41)  T(F): 98.5 (18 Oct 2019 15:41), Max: 98.5 (18 Oct 2019 15:41)  HR: 70 (18 Oct 2019 15:41) (70 - 70)  BP: 153/71 (18 Oct 2019 15:41) (146/77 - 184/75)  BP(mean): --  RR: 18 (18 Oct 2019 15:41) (18 - 18)  SpO2: 96% (18 Oct 2019 15:41) (94% - 96%)    CAPILLARY BLOOD GLUCOSE      POCT Blood Glucose.: 127 mg/dL (18 Oct 2019 16:57)  POCT Blood Glucose.: 151 mg/dL (18 Oct 2019 12:01)  POCT Blood Glucose.: 147 mg/dL (18 Oct 2019 07:48)  POCT Blood Glucose.: 146 mg/dL (17 Oct 2019 21:22)        Physical Exam:  GENERAL: Not in distress. Alert    HEENT:  Normocephalic and atraumatic. PEARLA,EOMI  NECK: Supple.  No JVD.    CARDIOVASCULAR: RRR S1, S2. No murmur/rubs/gallop  LUNGS: BLAE+, no rales, no wheezing, no rhonchi.    ABDOMEN: ND. Soft,  NT, no guarding / rebound / rigidity.     EXTREMITIES: no edema. no leg or calf TP  SKIN: rash over the neck and scalp dried up.. No skin break or ulcer. No cellulitis.  NEUROLOGIC: AAO*3. strength is symmetric, sensation intact, speech fluent.    PSYCHIATRIC: Calm.  No agitation.    Labs                                   10.5   7.01  )-----------( 258      ( 17 Oct 2019 11:38 )             33.8       10-17    136  |  92<L>  |  27.0<H>  ----------------------------<  160<H>  4.7   |  26.0  |  4.83<H>    Ca    10.2      17 Oct 2019 23:45           MEDICATIONS  (STANDING):  acyclovir IVPB 400 milliGRAM(s) IV Intermittent every 24 hours  amLODIPine   Tablet 10 milliGRAM(s) Oral daily  aspirin  chewable 81 milliGRAM(s) Oral daily  carvedilol 12.5 milliGRAM(s) Oral every 12 hours  chlorhexidine 2% Cloths 1 Application(s) Topical once  cloNIDine 0.3 milliGRAM(s) Oral every 8 hours  clopidogrel Tablet 75 milliGRAM(s) Oral daily  dextrose 5%. 1000 milliLiter(s) (50 mL/Hr) IV Continuous <Continuous>  dextrose 50% Injectable 12.5 Gram(s) IV Push once  dextrose 50% Injectable 25 Gram(s) IV Push once  dextrose 50% Injectable 25 Gram(s) IV Push once  folic acid 1 milliGRAM(s) Oral daily  hydrALAZINE 100 milliGRAM(s) Oral every 12 hours  influenza   Vaccine 0.5 milliLiter(s) IntraMuscular once  insulin lispro (HumaLOG) corrective regimen sliding scale   SubCutaneous three times a day before meals  insulin lispro (HumaLOG) corrective regimen sliding scale   SubCutaneous at bedtime  insulin lispro Injectable (HumaLOG) 3 Unit(s) SubCutaneous three times a day before meals  levothyroxine 75 MICROGram(s) Oral daily  LORazepam   Injectable 1 milliGRAM(s) IntraMuscular once  melatonin 3 milliGRAM(s) Oral at bedtime  Nephro-heather 1 Tablet(s) Oral daily  pantoprazole    Tablet 40 milliGRAM(s) Oral before breakfast  sevelamer carbonate 1600 milliGRAM(s) Oral three times a day  simvastatin 20 milliGRAM(s) Oral at bedtime    MEDICATIONS  (PRN):  acetaminophen   Tablet .. 650 milliGRAM(s) Oral every 4 hours PRN Mild Pain (1 - 3)  acetaminophen   Tablet .. 975 milliGRAM(s) Oral every 6 hours PRN Moderate Pain (4 - 6)  dextrose 40% Gel 15 Gram(s) Oral once PRN Blood Glucose LESS THAN 70 milliGRAM(s)/deciliter  glucagon  Injectable 1 milliGRAM(s) IntraMuscular once PRN Glucose LESS THAN 70 milligrams/deciliter  hydrALAZINE Injectable 10 milliGRAM(s) IV Push every 6 hours PRN SBP >160  labetalol Injectable 10 milliGRAM(s) IV Push every 4 hours PRN Systolic/Diastolic >160/100  LORazepam   Injectable 1 milliGRAM(s) IV Push every 4 hours PRN Agitation  ondansetron Injectable 4 milliGRAM(s) IV Push four times a day PRN Nausea and/or Vomiting    < from: CT Head No Cont (10.18.19 @ 15:46) >  Impression:  1. Unremarkable noncontrast CT scan of the brain.    < end of copied text >

## 2019-10-19 LAB
ANION GAP SERPL CALC-SCNC: 17 MMOL/L — SIGNIFICANT CHANGE UP (ref 5–17)
BUN SERPL-MCNC: 49 MG/DL — HIGH (ref 8–20)
CALCIUM SERPL-MCNC: 10.3 MG/DL — HIGH (ref 8.6–10.2)
CHLORIDE SERPL-SCNC: 89 MMOL/L — LOW (ref 98–107)
CO2 SERPL-SCNC: 27 MMOL/L — SIGNIFICANT CHANGE UP (ref 22–29)
CREAT SERPL-MCNC: 7.06 MG/DL — HIGH (ref 0.5–1.3)
GLUCOSE BLDC GLUCOMTR-MCNC: 148 MG/DL — HIGH (ref 70–99)
GLUCOSE BLDC GLUCOMTR-MCNC: 157 MG/DL — HIGH (ref 70–99)
GLUCOSE BLDC GLUCOMTR-MCNC: 166 MG/DL — HIGH (ref 70–99)
GLUCOSE BLDC GLUCOMTR-MCNC: 180 MG/DL — HIGH (ref 70–99)
GLUCOSE SERPL-MCNC: 154 MG/DL — HIGH (ref 70–115)
HCT VFR BLD CALC: 35.3 % — SIGNIFICANT CHANGE UP (ref 34.5–45)
HGB BLD-MCNC: 11 G/DL — LOW (ref 11.5–15.5)
MCHC RBC-ENTMCNC: 28.6 PG — SIGNIFICANT CHANGE UP (ref 27–34)
MCHC RBC-ENTMCNC: 31.2 GM/DL — LOW (ref 32–36)
MCV RBC AUTO: 91.9 FL — SIGNIFICANT CHANGE UP (ref 80–100)
PLATELET # BLD AUTO: 267 K/UL — SIGNIFICANT CHANGE UP (ref 150–400)
POTASSIUM SERPL-MCNC: 5 MMOL/L — SIGNIFICANT CHANGE UP (ref 3.5–5.3)
POTASSIUM SERPL-SCNC: 5 MMOL/L — SIGNIFICANT CHANGE UP (ref 3.5–5.3)
RBC # BLD: 3.84 M/UL — SIGNIFICANT CHANGE UP (ref 3.8–5.2)
RBC # FLD: 12.8 % — SIGNIFICANT CHANGE UP (ref 10.3–14.5)
SODIUM SERPL-SCNC: 133 MMOL/L — LOW (ref 135–145)
WBC # BLD: 8.34 K/UL — SIGNIFICANT CHANGE UP (ref 3.8–10.5)
WBC # FLD AUTO: 8.34 K/UL — SIGNIFICANT CHANGE UP (ref 3.8–10.5)

## 2019-10-19 PROCEDURE — 90937 HEMODIALYSIS REPEATED EVAL: CPT

## 2019-10-19 PROCEDURE — 99232 SBSQ HOSP IP/OBS MODERATE 35: CPT

## 2019-10-19 RX ORDER — HYDROMORPHONE HYDROCHLORIDE 2 MG/ML
0.5 INJECTION INTRAMUSCULAR; INTRAVENOUS; SUBCUTANEOUS ONCE
Refills: 0 | Status: DISCONTINUED | OUTPATIENT
Start: 2019-10-19 | End: 2019-10-19

## 2019-10-19 RX ORDER — PSYLLIUM SEED (WITH DEXTROSE)
1 POWDER (GRAM) ORAL
Refills: 0 | Status: DISCONTINUED | OUTPATIENT
Start: 2019-10-19 | End: 2019-10-23

## 2019-10-19 RX ORDER — HYDROMORPHONE HYDROCHLORIDE 2 MG/ML
0.5 INJECTION INTRAMUSCULAR; INTRAVENOUS; SUBCUTANEOUS EVERY 6 HOURS
Refills: 0 | Status: DISCONTINUED | OUTPATIENT
Start: 2019-10-19 | End: 2019-10-23

## 2019-10-19 RX ORDER — LACTULOSE 10 G/15ML
20 SOLUTION ORAL
Refills: 0 | Status: DISCONTINUED | OUTPATIENT
Start: 2019-10-19 | End: 2019-10-23

## 2019-10-19 RX ADMIN — CARVEDILOL PHOSPHATE 25 MILLIGRAM(S): 80 CAPSULE, EXTENDED RELEASE ORAL at 05:29

## 2019-10-19 RX ADMIN — Medication 1: at 17:25

## 2019-10-19 RX ADMIN — AMLODIPINE BESYLATE 10 MILLIGRAM(S): 2.5 TABLET ORAL at 05:31

## 2019-10-19 RX ADMIN — CLOPIDOGREL BISULFATE 75 MILLIGRAM(S): 75 TABLET, FILM COATED ORAL at 11:30

## 2019-10-19 RX ADMIN — Medication 0.3 MILLIGRAM(S): at 21:59

## 2019-10-19 RX ADMIN — Medication 1 TABLET(S): at 17:21

## 2019-10-19 RX ADMIN — Medication 100 MILLIGRAM(S): at 17:25

## 2019-10-19 RX ADMIN — LORATADINE 10 MILLIGRAM(S): 10 TABLET ORAL at 08:41

## 2019-10-19 RX ADMIN — Medication 3 MILLIGRAM(S): at 21:59

## 2019-10-19 RX ADMIN — Medication 1 TABLET(S): at 05:36

## 2019-10-19 RX ADMIN — METHOCARBAMOL 500 MILLIGRAM(S): 500 TABLET, FILM COATED ORAL at 05:30

## 2019-10-19 RX ADMIN — POLYETHYLENE GLYCOL 3350 17 GRAM(S): 17 POWDER, FOR SOLUTION ORAL at 15:50

## 2019-10-19 RX ADMIN — GABAPENTIN 100 MILLIGRAM(S): 400 CAPSULE ORAL at 21:59

## 2019-10-19 RX ADMIN — Medication 0.3 MILLIGRAM(S): at 05:29

## 2019-10-19 RX ADMIN — Medication 3 UNIT(S): at 08:01

## 2019-10-19 RX ADMIN — SEVELAMER CARBONATE 1600 MILLIGRAM(S): 2400 POWDER, FOR SUSPENSION ORAL at 21:59

## 2019-10-19 RX ADMIN — Medication 650 MILLIGRAM(S): at 23:49

## 2019-10-19 RX ADMIN — Medication 100 MILLIGRAM(S): at 21:59

## 2019-10-19 RX ADMIN — Medication 1 TABLET(S): at 06:36

## 2019-10-19 RX ADMIN — Medication 1: at 08:02

## 2019-10-19 RX ADMIN — Medication 100 MILLIGRAM(S): at 15:44

## 2019-10-19 RX ADMIN — Medication 1 TABLET(S): at 18:41

## 2019-10-19 RX ADMIN — SEVELAMER CARBONATE 1600 MILLIGRAM(S): 2400 POWDER, FOR SUSPENSION ORAL at 05:30

## 2019-10-19 RX ADMIN — Medication 100 MILLIGRAM(S): at 05:28

## 2019-10-19 RX ADMIN — Medication 1 TABLET(S): at 19:30

## 2019-10-19 RX ADMIN — Medication 1 PACKET(S): at 22:01

## 2019-10-19 RX ADMIN — Medication 650 MILLIGRAM(S): at 11:31

## 2019-10-19 RX ADMIN — HYDROMORPHONE HYDROCHLORIDE 0.5 MILLIGRAM(S): 2 INJECTION INTRAMUSCULAR; INTRAVENOUS; SUBCUTANEOUS at 11:35

## 2019-10-19 RX ADMIN — HYDROMORPHONE HYDROCHLORIDE 0.5 MILLIGRAM(S): 2 INJECTION INTRAMUSCULAR; INTRAVENOUS; SUBCUTANEOUS at 11:32

## 2019-10-19 RX ADMIN — SIMVASTATIN 20 MILLIGRAM(S): 20 TABLET, FILM COATED ORAL at 21:59

## 2019-10-19 RX ADMIN — CARVEDILOL PHOSPHATE 25 MILLIGRAM(S): 80 CAPSULE, EXTENDED RELEASE ORAL at 17:23

## 2019-10-19 RX ADMIN — Medication 75 MICROGRAM(S): at 05:30

## 2019-10-19 RX ADMIN — SENNA PLUS 2 TABLET(S): 8.6 TABLET ORAL at 21:59

## 2019-10-19 RX ADMIN — SEVELAMER CARBONATE 1600 MILLIGRAM(S): 2400 POWDER, FOR SUSPENSION ORAL at 15:43

## 2019-10-19 RX ADMIN — Medication 1 MILLIGRAM(S): at 11:31

## 2019-10-19 RX ADMIN — PANTOPRAZOLE SODIUM 40 MILLIGRAM(S): 20 TABLET, DELAYED RELEASE ORAL at 05:30

## 2019-10-19 RX ADMIN — Medication 3 UNIT(S): at 17:25

## 2019-10-19 RX ADMIN — Medication 100 MILLIGRAM(S): at 05:30

## 2019-10-19 RX ADMIN — Medication 10 MILLIGRAM(S): at 02:11

## 2019-10-19 RX ADMIN — Medication 650 MILLIGRAM(S): at 12:00

## 2019-10-19 RX ADMIN — Medication 81 MILLIGRAM(S): at 11:31

## 2019-10-19 RX ADMIN — Medication 0.3 MILLIGRAM(S): at 15:43

## 2019-10-19 RX ADMIN — METHOCARBAMOL 500 MILLIGRAM(S): 500 TABLET, FILM COATED ORAL at 21:59

## 2019-10-19 RX ADMIN — METHOCARBAMOL 500 MILLIGRAM(S): 500 TABLET, FILM COATED ORAL at 15:44

## 2019-10-19 RX ADMIN — Medication 108 MILLIGRAM(S): at 17:21

## 2019-10-19 NOTE — PROGRESS NOTE ADULT - SUBJECTIVE AND OBJECTIVE BOX
HOSPITALIST PROGRESS NOTE    KIM LEE  [id number]  63yFemale    Patient is a 63y old  Female who presents with a chief complaint of headache (19 Oct 2019 08:58)      SUBJECTIVE:   Chart reviewed since last visit.  Patient seen and examined at bedside for VZV encephalopathy, ESRD.  Complained of headache earlier - resolved with Dilaudid 0.5mg  Denies any fever, chills, nausea, dyspnea or chest pain      OBJECTIVE:  Vital Signs Last 24 Hrs  T(C): 36.8 (19 Oct 2019 17:26), Max: 36.8 (19 Oct 2019 17:26)  T(F): 98.3 (19 Oct 2019 17:26), Max: 98.3 (19 Oct 2019 17:26)  HR: 70 (19 Oct 2019 17:26) (69 - 72)  BP: 146/69 (19 Oct 2019 17:26) (138/68 - 188/65)   RR: 18 (19 Oct 2019 17:26) (18 - 18)  SpO2: 94% (19 Oct 2019 17:26) (94% - 100%)    PHYSICAL EXAMINATION  General: Obese, lying in bed, NAD  HEENT:  extraocular movements intact, no asymmetry. No temporal tenderness  NECK:  supple, rash occipital region  CVS: regular rate and rhythm S1 S2  RESP:  CTAB  GI:  Soft nondistended nontender BS+  : No suprapubic tenderness  MSK:  FROM. No edema  CNS:  No gross focal or global deficit noted  INTEG:  Rash almost resolved  PSYCH:  Fair mood      MONITOR:  CAPILLARY BLOOD GLUCOSE      POCT Blood Glucose.: 180 mg/dL (19 Oct 2019 16:37)  POCT Blood Glucose.: 148 mg/dL (19 Oct 2019 11:51)  POCT Blood Glucose.: 157 mg/dL (19 Oct 2019 07:45)  POCT Blood Glucose.: 127 mg/dL (18 Oct 2019 22:46)        I&O's Summary    18 Oct 2019 07:01  -  19 Oct 2019 07:00  --------------------------------------------------------  IN: 600 mL / OUT: 0 mL / NET: 600 mL    19 Oct 2019 07:01  -  19 Oct 2019 19:47  --------------------------------------------------------  IN: 0 mL / OUT: 2000 mL / NET: -2000 mL                            11.0   8.34  )-----------( 267      ( 19 Oct 2019 06:33 )             35.3       10-19    133<L>  |  89<L>  |  49.0<H>  ----------------------------<  154<H>  5.0   |  27.0  |  7.06<H>    Ca    10.3<H>      19 Oct 2019 06:29              Culture:    TTE:    RADIOLOGY        MEDICATIONS  (STANDING):  acetaminophen   Tablet .. 650 milliGRAM(s) Oral every 6 hours  acyclovir IVPB 400 milliGRAM(s) IV Intermittent every 24 hours  amLODIPine   Tablet 10 milliGRAM(s) Oral daily  aspirin  chewable 81 milliGRAM(s) Oral daily  carvedilol 25 milliGRAM(s) Oral every 12 hours  chlorhexidine 2% Cloths 1 Application(s) Topical once  cloNIDine 0.3 milliGRAM(s) Oral every 8 hours  clopidogrel Tablet 75 milliGRAM(s) Oral daily  dextrose 5%. 1000 milliLiter(s) (50 mL/Hr) IV Continuous <Continuous>  dextrose 50% Injectable 12.5 Gram(s) IV Push once  dextrose 50% Injectable 25 Gram(s) IV Push once  dextrose 50% Injectable 25 Gram(s) IV Push once  docusate sodium 100 milliGRAM(s) Oral three times a day  folic acid 1 milliGRAM(s) Oral daily  gabapentin 100 milliGRAM(s) Oral at bedtime  hydrALAZINE 100 milliGRAM(s) Oral every 12 hours  influenza   Vaccine 0.5 milliLiter(s) IntraMuscular once  insulin lispro (HumaLOG) corrective regimen sliding scale   SubCutaneous three times a day before meals  insulin lispro (HumaLOG) corrective regimen sliding scale   SubCutaneous at bedtime  insulin lispro Injectable (HumaLOG) 3 Unit(s) SubCutaneous three times a day before meals  levothyroxine 75 MICROGram(s) Oral daily  loratadine 10 milliGRAM(s) Oral <User Schedule>  melatonin 3 milliGRAM(s) Oral at bedtime  methocarbamol 500 milliGRAM(s) Oral three times a day  Nephro-heather 1 Tablet(s) Oral daily  pantoprazole    Tablet 40 milliGRAM(s) Oral before breakfast  psyllium Powder 1 Packet(s) Oral two times a day  senna 2 Tablet(s) Oral at bedtime  sevelamer carbonate 1600 milliGRAM(s) Oral three times a day  simvastatin 20 milliGRAM(s) Oral at bedtime      MEDICATIONS  (PRN):  acetaminophen 325 mG/butalbital 50 mG/caffeine 40 mG 1 Tablet(s) Oral every 8 hours PRN severe headache  dextrose 40% Gel 15 Gram(s) Oral once PRN Blood Glucose LESS THAN 70 milliGRAM(s)/deciliter  glucagon  Injectable 1 milliGRAM(s) IntraMuscular once PRN Glucose LESS THAN 70 milligrams/deciliter  hydrALAZINE Injectable 10 milliGRAM(s) IV Push every 6 hours PRN SBP >160  HYDROmorphone  Injectable 0.5 milliGRAM(s) IV Push every 6 hours PRN severe headache  labetalol Injectable 10 milliGRAM(s) IV Push every 4 hours PRN Systolic/Diastolic >160/100  lactulose Syrup 20 Gram(s) Oral <User Schedule> PRN constipation  ondansetron Injectable 4 milliGRAM(s) IV Push four times a day PRN Nausea and/or Vomiting  polyethylene glycol 3350 17 Gram(s) Oral daily PRN Constipation

## 2019-10-19 NOTE — PROGRESS NOTE ADULT - ASSESSMENT
62 y/o female with PMHx of HTN, DMT2, CAD s/p CABG, PPM, ESRD on HD T,T,S presented to hospital with left sided headache and uncontrolled BP SBP>200. Found to have vesicles suspicious for shingles. Hospital course complicated by encephalopathy; possibly secondary to disseminated zoster vs adverse reaction from gabapentin. Patient underwent LP 10/11 which was + for HZV.     1. Herpes Zoster encephalitis  - off contact and airborne isolation  - patient with vesicular lesion on posterior neck extending into hairline and left  of scalp: healed. currently itchy.-->loratadine   - IV acyclovir for 2 weeks per ID until 10/23  - spoke to ID and renal. patient needs to be in house to complete treatment. renal suggested not to use other arm for PICC line    2. persistent headaches, likely due to post-herpetic Neuralgia  some muscle spasm also noted  - repeat CTH unremarkable. CT neck unremarkable  - ESR - CRP normal - no temporal arteritis  - Oxycodone, Flexeril and Gabapentin discontinued due to confusion  - Tylenol and robaxin scheduled for 2-3 days  -   Fioricet for headache relief as needed for  - Dilaudid PRN for severe pain unresponsive to above    3. Encephalopathy - possible medication induced versus infection vs delirium. Improved  - Repeat CT  head 10/10 and 10/18 without any acute changes  - IR guided LP done 10/11  - CSF PCR positive for VZ  - IV Acyclovir x 2 weeks (renally dosed)  - Held gabapentin or any psych med due to confusion. will resume gabapentin as bed time dose and titrate slowly.   - Neurology recommendations appreciated    4. Hypertensive urgency - resolved  - BP currently in good control  - Continue home medications and adjust as needed  - Ultrafiltration per renal    5. ESRD on HD (TTS)  - Last HD on 10/15  - UF goal per renal  - Continue Phoslo  - Strict I's/O's, daily weights  - renal on board    6. DMT2 . A1c 6.5 on 10/05  - BGM with SSI  - continue to monitor     7. Hypothyroidism  - normal TSH (measured as 1.64 on 10/12)  - Continue Synthroid    8. CAD  - asymptomatic  - Continue Statin, BB  - ASA and Plavix resumed     9. Hyponatremia:   - dry oral mucous membranes noted on PE  - Encouraged PO hydration  - continue to monitor sodium levels    10. Debillity:  - seen by PT 10/15   - awaiting recommendation for home PT vs SARA (pending progress with mobility)    11. Prophylactic measure  - SCDs for mechanical DVT ppx    12. Constipation  - Miralax for stool softener  - Encourage oral intake of food and fluids  - Added Lactulose PRN and Metamucil    Dispo: after completion of acyclovir on 10/23. needs PT reval for safe DC

## 2019-10-19 NOTE — PROGRESS NOTE ADULT - SUBJECTIVE AND OBJECTIVE BOX
[facility] DIVISION OF KIDNEY DISEASES AND HYPERTENSION -- HEMODIALYSIS NOTE  --------------------------------------------------------------------------------  Chief Complaint: ESRD/Ongoing hemodialysis requirement    24 hour events/subjective: Denies Headache,    Daughter @ side, Alert ,     PAST HISTORY  --------------------------------------------------------------------------------  No significant changes to PMH, PSH, FHx, SHx, unless otherwise noted    ALLERGIES & MEDICATIONS  --------------------------------------------------------------------------------  Allergies    No Known Allergies    Standing Inpatient Medications  acetaminophen   Tablet .. 650 milliGRAM(s) Oral every 6 hours  acyclovir IVPB 400 milliGRAM(s) IV Intermittent every 24 hours  amLODIPine   Tablet 10 milliGRAM(s) Oral daily  aspirin  chewable 81 milliGRAM(s) Oral daily  carvedilol 25 milliGRAM(s) Oral every 12 hours  chlorhexidine 2% Cloths 1 Application(s) Topical once  cloNIDine 0.3 milliGRAM(s) Oral every 8 hours  clopidogrel Tablet 75 milliGRAM(s) Oral daily  dextrose 5%. 1000 milliLiter(s) IV Continuous <Continuous>  dextrose 50% Injectable 12.5 Gram(s) IV Push once  dextrose 50% Injectable 25 Gram(s) IV Push once  dextrose 50% Injectable 25 Gram(s) IV Push once  docusate sodium 100 milliGRAM(s) Oral three times a day  folic acid 1 milliGRAM(s) Oral daily  gabapentin 100 milliGRAM(s) Oral at bedtime  hydrALAZINE 100 milliGRAM(s) Oral every 12 hours  influenza   Vaccine 0.5 milliLiter(s) IntraMuscular once  insulin lispro (HumaLOG) corrective regimen sliding scale   SubCutaneous three times a day before meals  insulin lispro (HumaLOG) corrective regimen sliding scale   SubCutaneous at bedtime  insulin lispro Injectable (HumaLOG) 3 Unit(s) SubCutaneous three times a day before meals  levothyroxine 75 MICROGram(s) Oral daily  loratadine 10 milliGRAM(s) Oral <User Schedule>  melatonin 3 milliGRAM(s) Oral at bedtime  methocarbamol 500 milliGRAM(s) Oral three times a day  Nephro-heather 1 Tablet(s) Oral daily  pantoprazole    Tablet 40 milliGRAM(s) Oral before breakfast  senna 2 Tablet(s) Oral at bedtime  sevelamer carbonate 1600 milliGRAM(s) Oral three times a day  simvastatin 20 milliGRAM(s) Oral at bedtime    PRN Inpatient Medications  acetaminophen 325 mG/butalbital 50 mG/caffeine 40 mG 1 Tablet(s) Oral every 8 hours PRN  dextrose 40% Gel 15 Gram(s) Oral once PRN  glucagon  Injectable 1 milliGRAM(s) IntraMuscular once PRN  hydrALAZINE Injectable 10 milliGRAM(s) IV Push every 6 hours PRN  labetalol Injectable 10 milliGRAM(s) IV Push every 4 hours PRN  ondansetron Injectable 4 milliGRAM(s) IV Push four times a day PRN  polyethylene glycol 3350 17 Gram(s) Oral daily PRN    REVIEW OF SYSTEMS  --------------------------------------------------------------------------------  Gen: No weight changes, fatigue, fevers/chills, weakness  Skin: No rashes  Head/Eyes/Ears/Mouth: No headache; Normal hearing; Normal vision w/o blurriness; No sinus pain/discomfort, sore throat  Respiratory: No dyspnea, cough, wheezing, hemoptysis  CV: No chest pain, PND, orthopnea  GI: No abdominal pain, diarrhea, constipation, nausea, vomiting, melena, hematochezia  : No increased frequency, dysuria, hematuria, nocturia  MSK: No joint pain/swelling; no back pain; no edema  Neuro: No dizziness/lightheadedness, weakness, seizures, numbness, tingling  Heme: No easy bruising or bleeding  Endo: No heat/cold intolerance  Psych: No significant nervousness, anxiety, stress, depression    All other systems were reviewed and are negative, except as noted.    VITALS/PHYSICAL EXAM  --------------------------------------------------------------------------------  T(C): 36.5 (10-19-19 @ 08:00), Max: 36.9 (10-18-19 @ 15:41)  HR: 70 (10-19-19 @ 08:00) (70 - 72)  BP: 143/62 (10-19-19 @ 08:00) (138/68 - 188/65)  RR: 18 (10-19-19 @ 08:00) (18 - 18)  SpO2: 96% (10-19-19 @ 08:00) (95% - 97%)    10-18-19 @ 07:01  -  10-19-19 @ 07:00  --------------------------------------------------------  IN: 600 mL / OUT: 0 mL / NET: 600 mL    Physical Exam:  	Gen: NAD, well-appearing , Pale,   	HEENT: PERRL, supple neck, clear oropharynx  	Pulm: CTA B/L  	CV: RRR, S1S2; no rub  	Back: No spinal or CVA tenderness; no sacral edema  	Abd: +BS, soft, nontender/nondistended  	: No suprapubic tenderness  	UE: Warm, FROM, no clubbing, intact strength; no edema; no asterixis  	LE: Warm, FROM, no clubbing, intact strength; no edema  	Neuro: No focal deficits, intact gait  	Psych: Normal affect and mood  	Skin: Warm, + HZ rash,   	Vascular access: AVF,     LABS/STUDIES  --------------------------------------------------------------------------------              11.0   8.34  >-----------<  267      [10-19-19 @ 06:33]              35.3     133  |  89  |  49.0  ----------------------------<  154      [10-19-19 @ 06:29]  5.0   |  27.0  |  7.06        Ca     10.3     [10-19-19 @ 06:29]    PTH -- (Ca 9.8)      [11-13-18 @ 16:19]   551  Vitamin D (25OH) 25.0      [11-13-18 @ 16:38]  HbA1c 6.5      [10-05-19 @ 13:33]  TSH 1.64      [10-12-19 @ 10:08]

## 2019-10-20 LAB
GLUCOSE BLDC GLUCOMTR-MCNC: 132 MG/DL — HIGH (ref 70–99)
GLUCOSE BLDC GLUCOMTR-MCNC: 133 MG/DL — HIGH (ref 70–99)
GLUCOSE BLDC GLUCOMTR-MCNC: 138 MG/DL — HIGH (ref 70–99)
GLUCOSE BLDC GLUCOMTR-MCNC: 81 MG/DL — SIGNIFICANT CHANGE UP (ref 70–99)

## 2019-10-20 PROCEDURE — 99233 SBSQ HOSP IP/OBS HIGH 50: CPT

## 2019-10-20 PROCEDURE — 99232 SBSQ HOSP IP/OBS MODERATE 35: CPT

## 2019-10-20 RX ORDER — INSULIN LISPRO 100/ML
4 VIAL (ML) SUBCUTANEOUS
Refills: 0 | Status: DISCONTINUED | OUTPATIENT
Start: 2019-10-20 | End: 2019-10-23

## 2019-10-20 RX ORDER — NYSTATIN 500MM UNIT
500000 POWDER (EA) MISCELLANEOUS
Refills: 0 | Status: DISCONTINUED | OUTPATIENT
Start: 2019-10-20 | End: 2019-10-23

## 2019-10-20 RX ADMIN — METHOCARBAMOL 500 MILLIGRAM(S): 500 TABLET, FILM COATED ORAL at 06:48

## 2019-10-20 RX ADMIN — Medication 650 MILLIGRAM(S): at 06:53

## 2019-10-20 RX ADMIN — Medication 0.3 MILLIGRAM(S): at 21:59

## 2019-10-20 RX ADMIN — Medication 650 MILLIGRAM(S): at 07:30

## 2019-10-20 RX ADMIN — Medication 1 TABLET(S): at 22:30

## 2019-10-20 RX ADMIN — SEVELAMER CARBONATE 1600 MILLIGRAM(S): 2400 POWDER, FOR SUSPENSION ORAL at 13:07

## 2019-10-20 RX ADMIN — Medication 1 TABLET(S): at 02:52

## 2019-10-20 RX ADMIN — Medication 75 MICROGRAM(S): at 06:48

## 2019-10-20 RX ADMIN — Medication 650 MILLIGRAM(S): at 00:30

## 2019-10-20 RX ADMIN — Medication 0.3 MILLIGRAM(S): at 13:07

## 2019-10-20 RX ADMIN — SEVELAMER CARBONATE 1600 MILLIGRAM(S): 2400 POWDER, FOR SUSPENSION ORAL at 21:58

## 2019-10-20 RX ADMIN — Medication 1 TABLET(S): at 11:47

## 2019-10-20 RX ADMIN — Medication 1 TABLET(S): at 03:30

## 2019-10-20 RX ADMIN — METHOCARBAMOL 500 MILLIGRAM(S): 500 TABLET, FILM COATED ORAL at 13:07

## 2019-10-20 RX ADMIN — CARVEDILOL PHOSPHATE 25 MILLIGRAM(S): 80 CAPSULE, EXTENDED RELEASE ORAL at 06:48

## 2019-10-20 RX ADMIN — Medication 3 UNIT(S): at 13:00

## 2019-10-20 RX ADMIN — Medication 1 TABLET(S): at 11:49

## 2019-10-20 RX ADMIN — SIMVASTATIN 20 MILLIGRAM(S): 20 TABLET, FILM COATED ORAL at 21:59

## 2019-10-20 RX ADMIN — Medication 0.3 MILLIGRAM(S): at 06:48

## 2019-10-20 RX ADMIN — Medication 650 MILLIGRAM(S): at 17:19

## 2019-10-20 RX ADMIN — Medication 81 MILLIGRAM(S): at 11:48

## 2019-10-20 RX ADMIN — CARVEDILOL PHOSPHATE 25 MILLIGRAM(S): 80 CAPSULE, EXTENDED RELEASE ORAL at 17:18

## 2019-10-20 RX ADMIN — Medication 500000 UNIT(S): at 17:19

## 2019-10-20 RX ADMIN — Medication 1 MILLIGRAM(S): at 11:48

## 2019-10-20 RX ADMIN — Medication 100 MILLIGRAM(S): at 17:18

## 2019-10-20 RX ADMIN — METHOCARBAMOL 500 MILLIGRAM(S): 500 TABLET, FILM COATED ORAL at 21:59

## 2019-10-20 RX ADMIN — Medication 3 MILLIGRAM(S): at 21:59

## 2019-10-20 RX ADMIN — SENNA PLUS 2 TABLET(S): 8.6 TABLET ORAL at 21:58

## 2019-10-20 RX ADMIN — AMLODIPINE BESYLATE 10 MILLIGRAM(S): 2.5 TABLET ORAL at 06:48

## 2019-10-20 RX ADMIN — Medication 1 TABLET(S): at 12:17

## 2019-10-20 RX ADMIN — Medication 100 MILLIGRAM(S): at 21:59

## 2019-10-20 RX ADMIN — PANTOPRAZOLE SODIUM 40 MILLIGRAM(S): 20 TABLET, DELAYED RELEASE ORAL at 06:48

## 2019-10-20 RX ADMIN — Medication 100 MILLIGRAM(S): at 11:48

## 2019-10-20 RX ADMIN — Medication 108 MILLIGRAM(S): at 17:20

## 2019-10-20 RX ADMIN — CLOPIDOGREL BISULFATE 75 MILLIGRAM(S): 75 TABLET, FILM COATED ORAL at 11:48

## 2019-10-20 RX ADMIN — SEVELAMER CARBONATE 1600 MILLIGRAM(S): 2400 POWDER, FOR SUSPENSION ORAL at 06:48

## 2019-10-20 RX ADMIN — Medication 1 TABLET(S): at 21:58

## 2019-10-20 RX ADMIN — LORATADINE 10 MILLIGRAM(S): 10 TABLET ORAL at 11:48

## 2019-10-20 RX ADMIN — Medication 1 PACKET(S): at 08:37

## 2019-10-20 RX ADMIN — Medication 3 UNIT(S): at 08:37

## 2019-10-20 RX ADMIN — Medication 1 PACKET(S): at 17:19

## 2019-10-20 RX ADMIN — Medication 100 MILLIGRAM(S): at 06:48

## 2019-10-20 NOTE — PROGRESS NOTE ADULT - SUBJECTIVE AND OBJECTIVE BOX
HOSPITALIST PROGRESS NOTE    KIM LEE  [id number]  63yFemale    Patient is a 63y old  Female who presents with a chief complaint of headache (19 Oct 2019 19:46)      SUBJECTIVE:   Chart reviewed since last visit.  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      OBJECTIVE:  Vital Signs Last 24 Hrs  T(C): 36.7 (20 Oct 2019 16:50), Max: 37.1 (19 Oct 2019 20:30)  T(F): 98 (20 Oct 2019 16:50), Max: 98.8 (19 Oct 2019 20:30)  HR: 70 (20 Oct 2019 16:50) (20 - 70)  BP: 163/77 (20 Oct 2019 16:50) (141/54 - 163/77)   RR: 18 (20 Oct 2019 16:50) (18 - 18)  SpO2: 92% (20 Oct 2019 16:50) (92% - 94%)    PHYSICAL EXAMINATION  General: Obese, lying in bed, NAD  HEENT:  extraocular movements intact, no asymmetry. No temporal tenderness  NECK:  supple, rash occipital region  CVS: regular rate and rhythm S1 S2  RESP:  CTAB  GI:  Soft nondistended nontender BS+  : No suprapubic tenderness  MSK:  FROM. No edema  CNS:  No gross focal or global deficit noted  INTEG:  Area  of rash with denuded skin now replaced with fresh skin  PSYCH:  Fair mood  MONITOR:  CAPILLARY BLOOD GLUCOSE      POCT Blood Glucose.: 81 mg/dL (20 Oct 2019 16:55)  POCT Blood Glucose.: 138 mg/dL (20 Oct 2019 12:30)  POCT Blood Glucose.: 133 mg/dL (20 Oct 2019 08:19)  POCT Blood Glucose.: 166 mg/dL (19 Oct 2019 23:47)        I&O's Summary    19 Oct 2019 07:01  -  20 Oct 2019 07:00  --------------------------------------------------------  IN: 0 mL / OUT: 2000 mL / NET: -2000 mL                            11.0   8.34  )-----------( 267      ( 19 Oct 2019 06:33 )             35.3       10-19    133<L>  |  89<L>  |  49.0<H>  ----------------------------<  154<H>  5.0   |  27.0  |  7.06<H>    Ca    10.3<H>      19 Oct 2019 06:29              Culture:    TTE:    RADIOLOGY        MEDICATIONS  (STANDING):  acetaminophen   Tablet .. 650 milliGRAM(s) Oral every 6 hours  acyclovir IVPB 400 milliGRAM(s) IV Intermittent every 24 hours  amLODIPine   Tablet 10 milliGRAM(s) Oral daily  aspirin  chewable 81 milliGRAM(s) Oral daily  carvedilol 25 milliGRAM(s) Oral every 12 hours  chlorhexidine 2% Cloths 1 Application(s) Topical once  cloNIDine 0.3 milliGRAM(s) Oral every 8 hours  clopidogrel Tablet 75 milliGRAM(s) Oral daily  dextrose 5%. 1000 milliLiter(s) (50 mL/Hr) IV Continuous <Continuous>  dextrose 50% Injectable 12.5 Gram(s) IV Push once  dextrose 50% Injectable 25 Gram(s) IV Push once  dextrose 50% Injectable 25 Gram(s) IV Push once  docusate sodium 100 milliGRAM(s) Oral three times a day  folic acid 1 milliGRAM(s) Oral daily  gabapentin 100 milliGRAM(s) Oral at bedtime  hydrALAZINE 100 milliGRAM(s) Oral every 12 hours  influenza   Vaccine 0.5 milliLiter(s) IntraMuscular once  insulin lispro (HumaLOG) corrective regimen sliding scale   SubCutaneous three times a day before meals  insulin lispro (HumaLOG) corrective regimen sliding scale   SubCutaneous at bedtime  insulin lispro Injectable (HumaLOG) 4 Unit(s) SubCutaneous three times a day before meals  levothyroxine 75 MICROGram(s) Oral daily  loratadine 10 milliGRAM(s) Oral <User Schedule>  melatonin 3 milliGRAM(s) Oral at bedtime  methocarbamol 500 milliGRAM(s) Oral three times a day  Nephro-heather 1 Tablet(s) Oral daily  nystatin    Suspension 050760 Unit(s) Oral two times a day  pantoprazole    Tablet 40 milliGRAM(s) Oral before breakfast  psyllium Powder 1 Packet(s) Oral two times a day  senna 2 Tablet(s) Oral at bedtime  sevelamer carbonate 1600 milliGRAM(s) Oral three times a day  simvastatin 20 milliGRAM(s) Oral at bedtime      MEDICATIONS  (PRN):  acetaminophen 325 mG/butalbital 50 mG/caffeine 40 mG 1 Tablet(s) Oral every 8 hours PRN severe headache  dextrose 40% Gel 15 Gram(s) Oral once PRN Blood Glucose LESS THAN 70 milliGRAM(s)/deciliter  glucagon  Injectable 1 milliGRAM(s) IntraMuscular once PRN Glucose LESS THAN 70 milligrams/deciliter  hydrALAZINE Injectable 10 milliGRAM(s) IV Push every 6 hours PRN SBP >160  HYDROmorphone  Injectable 0.5 milliGRAM(s) IV Push every 6 hours PRN severe headache  labetalol Injectable 10 milliGRAM(s) IV Push every 4 hours PRN Systolic/Diastolic >160/100  lactulose Syrup 20 Gram(s) Oral <User Schedule> PRN constipation  ondansetron Injectable 4 milliGRAM(s) IV Push four times a day PRN Nausea and/or Vomiting  polyethylene glycol 3350 17 Gram(s) Oral daily PRN Constipation

## 2019-10-20 NOTE — PROGRESS NOTE ADULT - ASSESSMENT
- Will try Fioricet for headache relief as needed for/p CABG, PPM, ESRD on HD T,T,S presented to hospital with left sided headache and uncontrolled BP SBP>200. Found to have vesicles suspicious for shingles. Hospital course complicated by encephalopathy; possibly secondary to disseminated zoster vs adverse reaction from gabapentin. Patient underwent LP 10/11 which was + for HZV.     1. Herpes Zoster encephalitis  - off contact and airborne isolation  - patient with vesicular lesion on posterior neck extending into hairline and left  of scalp: healed. currently itchy.-->loratadine   - IV acyclovir for 2 weeks per ID until 10/23  - spoke to ID and renal. patient needs to be in house to complete treatment. renal suggested not to use other arm for PICC line    2. persistent headaches, likely due to post-herpetic Neuralgia  some muscle spasm also noted  - repeat CTH unremarkable. CT neck unremarkable  - ESR - CRP normal - no temporal arteritis  - Oxycodone, Flexeril and Gabapentin discontinued due to confusion  - Tylenol and robaxin scheduled for 2-3 days  - Will try Fioricet for headache relief prn  - Resumed Gabapentin for Neuropathy, patient appears to be tolerating well. Daughter Martha concerned about confusion, explained that prior confusion was due to VZV encephalopathy, will monitor    3. Encephalopathy - possible medication induced versus infection vs delirium. improved  - Repeat CT  head 10/10 and 10/18 without any acute changes  - IR guided LP done 10/11  - CSF PCR positive for VZ  - IV Acyclovir x 2 weeks (renally dosed)  - Held gabapentin or any psych med due to confusion. will resume gabapentin as bed time dose and titrate slowly.   - Neurology recommendations appreciated    4. Hypertensive urgency - resolved  - BP currently in good control  - Continue home medications and adjust as needed  - Ultrafiltration per renal    5. ESRD on HD (TTS)  - Last HD on 10/15  - UF goal per renal  - Continue Phoslo  - Strict I's/O's, daily weights  - renal on board    6. DMT2 . A1c 6.5 on 10/05  - BGM with SSI  - continue to monitor     7. Hypothyroidism  - normal TSH (measured as 1.64 on 10/12)  - Continue Synthroid    8. CAD  - asymptomatic  - Continue Statin, BB  - ASA and Plavix resumed     9. Hyponatremia:   - dry oral mucous membranes noted on PE  - Encouraged PO hydration  - continue to monitor sodium levels    10. Debillity:  - seen by PT 10/15   - awaiting recommendation for home PT vs SARA (pending progress with mobility)    11. Prophylactic measure  - SCDs for mechanical DVT ppx    12. Constipation  - Mirlax for stool softener  - Encourage oral intake of food and fluids    Dispo: after completion of acyclovir on 10/23. needs PT reval for safe DC

## 2019-10-21 LAB
GLUCOSE BLDC GLUCOMTR-MCNC: 135 MG/DL — HIGH (ref 70–99)
GLUCOSE BLDC GLUCOMTR-MCNC: 141 MG/DL — HIGH (ref 70–99)
GLUCOSE BLDC GLUCOMTR-MCNC: 157 MG/DL — HIGH (ref 70–99)
GLUCOSE BLDC GLUCOMTR-MCNC: 193 MG/DL — HIGH (ref 70–99)

## 2019-10-21 PROCEDURE — 99232 SBSQ HOSP IP/OBS MODERATE 35: CPT

## 2019-10-21 PROCEDURE — 90937 HEMODIALYSIS REPEATED EVAL: CPT

## 2019-10-21 RX ORDER — GABAPENTIN 400 MG/1
100 CAPSULE ORAL ONCE
Refills: 0 | Status: COMPLETED | OUTPATIENT
Start: 2019-10-21 | End: 2019-10-21

## 2019-10-21 RX ORDER — GABAPENTIN 400 MG/1
200 CAPSULE ORAL AT BEDTIME
Refills: 0 | Status: DISCONTINUED | OUTPATIENT
Start: 2019-10-21 | End: 2019-10-22

## 2019-10-21 RX ORDER — LACTULOSE 10 G/15ML
20 SOLUTION ORAL ONCE
Refills: 0 | Status: COMPLETED | OUTPATIENT
Start: 2019-10-21 | End: 2019-10-21

## 2019-10-21 RX ADMIN — Medication 650 MILLIGRAM(S): at 06:00

## 2019-10-21 RX ADMIN — Medication 100 MILLIGRAM(S): at 14:35

## 2019-10-21 RX ADMIN — Medication 650 MILLIGRAM(S): at 05:23

## 2019-10-21 RX ADMIN — GABAPENTIN 200 MILLIGRAM(S): 400 CAPSULE ORAL at 21:56

## 2019-10-21 RX ADMIN — Medication 0.3 MILLIGRAM(S): at 05:22

## 2019-10-21 RX ADMIN — Medication 100 MILLIGRAM(S): at 05:23

## 2019-10-21 RX ADMIN — Medication 1 TABLET(S): at 22:30

## 2019-10-21 RX ADMIN — SEVELAMER CARBONATE 1600 MILLIGRAM(S): 2400 POWDER, FOR SUSPENSION ORAL at 21:51

## 2019-10-21 RX ADMIN — AMLODIPINE BESYLATE 10 MILLIGRAM(S): 2.5 TABLET ORAL at 05:23

## 2019-10-21 RX ADMIN — Medication 1 PACKET(S): at 17:22

## 2019-10-21 RX ADMIN — HYDROMORPHONE HYDROCHLORIDE 0.5 MILLIGRAM(S): 2 INJECTION INTRAMUSCULAR; INTRAVENOUS; SUBCUTANEOUS at 03:26

## 2019-10-21 RX ADMIN — Medication 1 MILLIGRAM(S): at 11:15

## 2019-10-21 RX ADMIN — Medication 4 UNIT(S): at 08:19

## 2019-10-21 RX ADMIN — Medication 650 MILLIGRAM(S): at 01:10

## 2019-10-21 RX ADMIN — Medication 500000 UNIT(S): at 05:21

## 2019-10-21 RX ADMIN — Medication 1 TABLET(S): at 21:51

## 2019-10-21 RX ADMIN — Medication 0.3 MILLIGRAM(S): at 21:52

## 2019-10-21 RX ADMIN — ONDANSETRON 4 MILLIGRAM(S): 8 TABLET, FILM COATED ORAL at 08:19

## 2019-10-21 RX ADMIN — CARVEDILOL PHOSPHATE 25 MILLIGRAM(S): 80 CAPSULE, EXTENDED RELEASE ORAL at 05:23

## 2019-10-21 RX ADMIN — Medication 100 MILLIGRAM(S): at 17:22

## 2019-10-21 RX ADMIN — LACTULOSE 20 GRAM(S): 10 SOLUTION ORAL at 11:14

## 2019-10-21 RX ADMIN — PANTOPRAZOLE SODIUM 40 MILLIGRAM(S): 20 TABLET, DELAYED RELEASE ORAL at 05:25

## 2019-10-21 RX ADMIN — LORATADINE 10 MILLIGRAM(S): 10 TABLET ORAL at 11:15

## 2019-10-21 RX ADMIN — Medication 4 UNIT(S): at 12:20

## 2019-10-21 RX ADMIN — HYDROMORPHONE HYDROCHLORIDE 0.5 MILLIGRAM(S): 2 INJECTION INTRAMUSCULAR; INTRAVENOUS; SUBCUTANEOUS at 03:12

## 2019-10-21 RX ADMIN — Medication 1 TABLET(S): at 11:15

## 2019-10-21 RX ADMIN — Medication 108 MILLIGRAM(S): at 17:21

## 2019-10-21 RX ADMIN — SIMVASTATIN 20 MILLIGRAM(S): 20 TABLET, FILM COATED ORAL at 21:52

## 2019-10-21 RX ADMIN — CARVEDILOL PHOSPHATE 25 MILLIGRAM(S): 80 CAPSULE, EXTENDED RELEASE ORAL at 17:22

## 2019-10-21 RX ADMIN — Medication 650 MILLIGRAM(S): at 00:26

## 2019-10-21 RX ADMIN — Medication 500000 UNIT(S): at 21:52

## 2019-10-21 RX ADMIN — METHOCARBAMOL 500 MILLIGRAM(S): 500 TABLET, FILM COATED ORAL at 05:22

## 2019-10-21 RX ADMIN — Medication 4 UNIT(S): at 16:41

## 2019-10-21 RX ADMIN — Medication 0.3 MILLIGRAM(S): at 14:33

## 2019-10-21 RX ADMIN — Medication 75 MICROGRAM(S): at 05:23

## 2019-10-21 RX ADMIN — Medication 81 MILLIGRAM(S): at 11:15

## 2019-10-21 RX ADMIN — Medication 1: at 12:20

## 2019-10-21 RX ADMIN — Medication 1 PACKET(S): at 05:24

## 2019-10-21 RX ADMIN — SEVELAMER CARBONATE 1600 MILLIGRAM(S): 2400 POWDER, FOR SUSPENSION ORAL at 14:35

## 2019-10-21 RX ADMIN — Medication 100 MILLIGRAM(S): at 21:52

## 2019-10-21 RX ADMIN — Medication 3 MILLIGRAM(S): at 22:52

## 2019-10-21 RX ADMIN — SENNA PLUS 2 TABLET(S): 8.6 TABLET ORAL at 21:51

## 2019-10-21 RX ADMIN — CLOPIDOGREL BISULFATE 75 MILLIGRAM(S): 75 TABLET, FILM COATED ORAL at 11:15

## 2019-10-21 RX ADMIN — Medication 1: at 08:19

## 2019-10-21 RX ADMIN — GABAPENTIN 100 MILLIGRAM(S): 400 CAPSULE ORAL at 00:26

## 2019-10-21 RX ADMIN — SEVELAMER CARBONATE 1600 MILLIGRAM(S): 2400 POWDER, FOR SUSPENSION ORAL at 05:24

## 2019-10-21 NOTE — PROGRESS NOTE ADULT - ASSESSMENT
1) ESRD on HD  2) MBD of renal dx  3) Anemia of renal dx  4) Vol HTN    HD today;  DC after 23rd; finishing IV acyclovir  dw Dr Bueno

## 2019-10-21 NOTE — CHART NOTE - NSCHARTNOTEFT_GEN_A_CORE
Source: Patient [ ]  Family [ ]   other [ x]EMR    Current Diet: consistent CHO dash with Nepro TID  taking small amounts of nepro supplement      PO intake:  < 50% [x ]   50-75%  [ ]   %  [ ]  other : Po intake decreased last 2 days but now improving    Source for PO intake [x ] Patient [ ] family [x ] chart [ ] staff [ ] other    Enteral /Parenteral Nutrition:     Current Weight: 10/9 79.4kg,   10/10 98kg  10/16 63.8kg  10/17 83.7kg  10/19 83.9kg  question accuracy of weights    % Weight Change     Pertinent Medications: MEDICATIONS  (STANDING):  acyclovir IVPB 400 milliGRAM(s) IV Intermittent every 24 hours  amLODIPine   Tablet 10 milliGRAM(s) Oral daily  aspirin  chewable 81 milliGRAM(s) Oral daily  carvedilol 25 milliGRAM(s) Oral every 12 hours  chlorhexidine 2% Cloths 1 Application(s) Topical once  cloNIDine 0.3 milliGRAM(s) Oral every 8 hours  clopidogrel Tablet 75 milliGRAM(s) Oral daily  dextrose 5%. 1000 milliLiter(s) (50 mL/Hr) IV Continuous <Continuous>  dextrose 50% Injectable 12.5 Gram(s) IV Push once  dextrose 50% Injectable 25 Gram(s) IV Push once  dextrose 50% Injectable 25 Gram(s) IV Push once  docusate sodium 100 milliGRAM(s) Oral three times a day  folic acid 1 milliGRAM(s) Oral daily  gabapentin 100 milliGRAM(s) Oral at bedtime  hydrALAZINE 100 milliGRAM(s) Oral every 12 hours  influenza   Vaccine 0.5 milliLiter(s) IntraMuscular once  insulin lispro (HumaLOG) corrective regimen sliding scale   SubCutaneous three times a day before meals  insulin lispro (HumaLOG) corrective regimen sliding scale   SubCutaneous at bedtime  insulin lispro Injectable (HumaLOG) 4 Unit(s) SubCutaneous three times a day before meals  lactulose Syrup 20 Gram(s) Oral once  levothyroxine 75 MICROGram(s) Oral daily  loratadine 10 milliGRAM(s) Oral <User Schedule>  melatonin 3 milliGRAM(s) Oral at bedtime  Nephro-heather 1 Tablet(s) Oral daily  nystatin    Suspension 906857 Unit(s) Oral two times a day  pantoprazole    Tablet 40 milliGRAM(s) Oral before breakfast  psyllium Powder 1 Packet(s) Oral two times a day  senna 2 Tablet(s) Oral at bedtime  sevelamer carbonate 1600 milliGRAM(s) Oral three times a day  simvastatin 20 milliGRAM(s) Oral at bedtime    MEDICATIONS  (PRN):  acetaminophen 325 mG/butalbital 50 mG/caffeine 40 mG 1 Tablet(s) Oral every 8 hours PRN severe headache  dextrose 40% Gel 15 Gram(s) Oral once PRN Blood Glucose LESS THAN 70 milliGRAM(s)/deciliter  glucagon  Injectable 1 milliGRAM(s) IntraMuscular once PRN Glucose LESS THAN 70 milligrams/deciliter  hydrALAZINE Injectable 10 milliGRAM(s) IV Push every 6 hours PRN SBP >160  HYDROmorphone  Injectable 0.5 milliGRAM(s) IV Push every 6 hours PRN severe headache  labetalol Injectable 10 milliGRAM(s) IV Push every 4 hours PRN Systolic/Diastolic >160/100  lactulose Syrup 20 Gram(s) Oral <User Schedule> PRN constipation  ondansetron Injectable 4 milliGRAM(s) IV Push four times a day PRN Nausea and/or Vomiting  polyethylene glycol 3350 17 Gram(s) Oral daily PRN Constipation    Pertinent Labs:         Skin:     Nutrition focused physical exam not conducted - found signs of malnutrition [ ]absent [ ]present    Subcutaneous fat loss: [ ] Orbital fat pads region, [ ]Buccal fat region, [ ]Triceps region,  [ ]Ribs region    Muscle wasting: [ ]Temples region, [ ]Clavicle region, [ ]Shoulder region, [ ]Scapula region, [ ]Interosseous region,  [ ]thigh region, [ ]Calf region    Estimated Needs:   [x ] no change since previous assessment  [ ] recalculated:     Current Nutrition Diagnosis: Increased nutrient needs related to increased physiological demand as evidenced by increased protein losses associated with HD. Per RN, Pt with poor po intake x 2 days however now improving. Pt s/p lumbar puncture for CSF drainage.    Found to have vesicles suspicious for shingles. Hospital course complicated by encephalopathy; possibly secondary to disseminated zoster vs adverse reaction from gabapentin. Antibiotics till 10/23.    Recommendations:   Continue with current nutrition plan   Provide encouragement/assistance as needed during mealtimes to inc PO   Monitor weights daily         Monitoring and Evaluation:   [x ] PO intake [x ] Tolerance to diet prescription [X] Weights  [X] Follow up per protocol [X] Labs:

## 2019-10-21 NOTE — PROGRESS NOTE ADULT - SUBJECTIVE AND OBJECTIVE BOX
[facility] DIVISION OF KIDNEY DISEASES AND HYPERTENSION -- FOLLOW UP NOTE  --------------------------------------------------------------------------------  Chief Complaint: ESRD HD    24 hour events/subjective:  Pt seen/examined; no complaints;       PAST HISTORY  --------------------------------------------------------------------------------  No significant changes to PMH, PSH, FHx, SHx, unless otherwise noted    ALLERGIES & MEDICATIONS  --------------------------------------------------------------------------------  Allergies    No Known Allergies    Intolerances      Standing Inpatient Medications  acyclovir IVPB 400 milliGRAM(s) IV Intermittent every 24 hours  amLODIPine   Tablet 10 milliGRAM(s) Oral daily  aspirin  chewable 81 milliGRAM(s) Oral daily  carvedilol 25 milliGRAM(s) Oral every 12 hours  chlorhexidine 2% Cloths 1 Application(s) Topical once  cloNIDine 0.3 milliGRAM(s) Oral every 8 hours  clopidogrel Tablet 75 milliGRAM(s) Oral daily  dextrose 5%. 1000 milliLiter(s) IV Continuous <Continuous>  dextrose 50% Injectable 12.5 Gram(s) IV Push once  dextrose 50% Injectable 25 Gram(s) IV Push once  dextrose 50% Injectable 25 Gram(s) IV Push once  docusate sodium 100 milliGRAM(s) Oral three times a day  folic acid 1 milliGRAM(s) Oral daily  gabapentin 200 milliGRAM(s) Oral at bedtime  hydrALAZINE 100 milliGRAM(s) Oral every 12 hours  influenza   Vaccine 0.5 milliLiter(s) IntraMuscular once  insulin lispro (HumaLOG) corrective regimen sliding scale   SubCutaneous three times a day before meals  insulin lispro (HumaLOG) corrective regimen sliding scale   SubCutaneous at bedtime  insulin lispro Injectable (HumaLOG) 4 Unit(s) SubCutaneous three times a day before meals  levothyroxine 75 MICROGram(s) Oral daily  loratadine 10 milliGRAM(s) Oral <User Schedule>  melatonin 3 milliGRAM(s) Oral at bedtime  Nephro-heather 1 Tablet(s) Oral daily  nystatin    Suspension 229633 Unit(s) Oral two times a day  pantoprazole    Tablet 40 milliGRAM(s) Oral before breakfast  psyllium Powder 1 Packet(s) Oral two times a day  senna 2 Tablet(s) Oral at bedtime  sevelamer carbonate 1600 milliGRAM(s) Oral three times a day  simvastatin 20 milliGRAM(s) Oral at bedtime    PRN Inpatient Medications  acetaminophen 325 mG/butalbital 50 mG/caffeine 40 mG 1 Tablet(s) Oral every 8 hours PRN  dextrose 40% Gel 15 Gram(s) Oral once PRN  glucagon  Injectable 1 milliGRAM(s) IntraMuscular once PRN  hydrALAZINE Injectable 10 milliGRAM(s) IV Push every 6 hours PRN  HYDROmorphone  Injectable 0.5 milliGRAM(s) IV Push every 6 hours PRN  labetalol Injectable 10 milliGRAM(s) IV Push every 4 hours PRN  lactulose Syrup 20 Gram(s) Oral <User Schedule> PRN  ondansetron Injectable 4 milliGRAM(s) IV Push four times a day PRN  polyethylene glycol 3350 17 Gram(s) Oral daily PRN      REVIEW OF SYSTEMS  --------------------------------------------------------------------------------  Gen: No weight changes, fatigue, fevers/chills, weakness  Skin: No rashes  Head/Eyes/Ears/Mouth: No headache; Normal hearing; Normal vision w/o blurriness; No sinus pain/discomfort, sore throat  Respiratory: No dyspnea, cough, wheezing, hemoptysis  CV: No chest pain, PND, orthopnea  GI: No abdominal pain, diarrhea, constipation, nausea, vomiting, melena, hematochezia  : No increased frequency, dysuria, hematuria, nocturia  MSK: No joint pain/swelling; no back pain; no edema  Neuro: No dizziness/lightheadedness, weakness, seizures, numbness, tingling  Heme: No easy bruising or bleeding  Endo: No heat/cold intolerance  Psych: No significant nervousness, anxiety, stress, depression    All other systems were reviewed and are negative, except as noted.    VITALS/PHYSICAL EXAM  --------------------------------------------------------------------------------  T(C): 36.4 (10-21-19 @ 17:57), Max: 37.3 (10-20-19 @ 20:30)  HR: 70 (10-21-19 @ 17:57) (70 - 80)  BP: 151/70 (10-21-19 @ 17:57) (144/75 - 165/68)  RR: 16 (10-21-19 @ 17:57) (16 - 18)  SpO2: 95% (10-21-19 @ 17:57) (92% - 95%)  Wt(kg): --        Physical Exam:  	Gen: NAD   	HEENT: PERRL, supple neck, clear oropharynx  	Pulm: CTA B/L  	CV: RRR, S1S2; no rub  	Back: No spinal or CVA tenderness; no sacral edema  	Abd: +BS, soft, nontender/nondistended  	: No suprapubic tenderness  	UE: Warm, FROM, no clubbing, intact strength; no edema; no asterixis  	LE: Warm, FROM, no clubbing, intact strength; no edema  	Neuro: No focal deficits, intact gait  	Psych: Normal affect and mood  	Skin: Warm, without rashes  	Vascular access:    LABS/STUDIES  --------------------------------------------------------------------------------                Creatinine Trend:  SCr 7.06 [10-19 @ 06:29]  SCr 4.83 [10-17 @ 23:45]  SCr 7.63 [10-17 @ 11:38]  SCr 7.32 [10-14 @ 08:23]  SCr 8.14 [10-12 @ 10:08]        PTH -- (Ca 9.8)      [11-13-18 @ 16:19]   551  Vitamin D (25OH) 25.0      [11-13-18 @ 16:38]  HbA1c 6.5      [10-05-19 @ 13:33]  TSH 1.64      [10-12-19 @ 10:08]

## 2019-10-21 NOTE — PROGRESS NOTE ADULT - ASSESSMENT
62 y/o female with PMHx of HTN, DMT2, CAD s/p CABG, PPM, ESRD on HD T,T,S presented to hospital with left sided headache and uncontrolled BP SBP>200. Found to have vesicles suspicious for shingles. Hospital course complicated by encephalopathy; possibly secondary to disseminated zoster vs adverse reaction from gabapentin. Patient underwent LP 10/11 which was + for HZV.     1. Herpes Zoster encephalitis  - off contact and airborne isolation  - patient with vesicular lesion on posterior neck extending into hairline and left  of scalp: healed. currently itchy.-->loratadine   - IV acyclovir for 2 weeks per ID until 10/23  - spoke to ID and renal. patient needs to be in house to complete treatment. renal suggested not to use other arm for PICC line    2. Persistent headaches, likely due to post-herpetic Neuralgia  some muscle spasm also noted  - repeat CTH unremarkable. CT neck unremarkable  - ESR - CRP normal - no temporal arteritis  - Oxycodone, Flexeril and Gabapentin discontinued due to confusion  - Tylenol and robaxin scheduled for 2-3 days  - Will try Fioricet for headache relief prn  - Resumed Gabapentin for Neuropathy, patient appears to be tolerating well with improvement in pain. Will increase to 200mg  Daughter Martha concerned about confusion, explained that prior confusion was due to VZV encephalopathy, will monitor    3. Encephalopathy - possible medication induced versus infection vs delirium. Resolved.  - Repeat CT  head 10/10 and 10/18 without any acute changes  - IR guided LP done 10/11  - CSF PCR positive for VZV  - IV Acyclovir x 2 weeks (renally dosed)  - Held gabapentin or any psych med due to confusion. Resumed gabapentin as bed time dose and titrate slowly.   - Neurology recommendations appreciated    4. Hypertensive urgency - resolved  - BP currently in good control  - Continue home medications and adjust as needed  - Ultrafiltration per renal    5. ESRD on HD (TTS)  - Last HD on 10/15  - UF goal per renal  - Continue Phoslo  - Strict I's/O's, daily weights  - renal on board    6. DMT2 . A1c 6.5 on 10/05  - BGM with SSI  - continue to monitor     7. Hypothyroidism  - normal TSH (measured as 1.64 on 10/12)  - Continue Synthroid    8. CAD  - asymptomatic  - Continue Statin, BB  - ASA and Plavix resumed     9. Hyponatremia:   - dry oral mucous membranes noted on PE  - Encouraged PO hydration  - continue to monitor sodium levels    10. Debility  - seen by PT 10/15   - awaiting recommendation for home PT vs SARA (pending progress with mobility)    11. Prophylactic measure  - SCDs for mechanical DVT ppx    12. Constipation  - Miralax for stool softener  - Metamucil  - Lactulose  - Encourage oral intake of food and fluids    Dispo: after completion of acyclovir on 10/23. needs PT reval for safe DC

## 2019-10-21 NOTE — PROGRESS NOTE ADULT - SUBJECTIVE AND OBJECTIVE BOX
HOSPITALIST PROGRESS NOTE    KIM LEE  [id number]  63yFemale    Patient is a 63y old  Female who presents with a chief complaint of headache (20 Oct 2019 17:14)      SUBJECTIVE:   Chart reviewed since last visit.  Patient seen and examined at bedside for VZV encephalopathy, ESRD.  headache better - though still gets itching at time  No BM since 10/4, wants enema - (checked with RN - patient not received Lactulose yet) Denies any abdominal pain, nausea or vomiting. Passing flatus.      OBJECTIVE:  Vital Signs Last 24 Hrs  T(C): 36.6 (21 Oct 2019 08:15), Max: 37.3 (20 Oct 2019 20:30)  T(F): 97.8 (21 Oct 2019 08:15), Max: 99.2 (20 Oct 2019 20:30)  HR: 80 (21 Oct 2019 09:13) (70 - 80)  BP: 156/80 (21 Oct 2019 09:13) (144/75 - 165/68)   RR: 18 (21 Oct 2019 08:15) (18 - 18)  SpO2: 93% (21 Oct 2019 08:15) (92% - 93%)    PHYSICAL EXAMINATION  General: Obese, lying in bed, NAD  HEENT:  extraocular movements intact, no asymmetry. No temporal tenderness  NECK:  supple, rash occipital region  CVS: regular rate and rhythm S1 S2  RESP:  CTAB  GI:  Soft nondistended nontender BS+  : No suprapubic tenderness  MSK:  FROM. No edema  CNS:  No gross focal or global deficit noted  INTEG:  Area  of rash with denuded skin now replaced with fresh skin  PSYCH:  Fair mood    MONITOR:  CAPILLARY BLOOD GLUCOSE      POCT Blood Glucose.: 135 mg/dL (21 Oct 2019 16:40)  POCT Blood Glucose.: 193 mg/dL (21 Oct 2019 12:10)  POCT Blood Glucose.: 157 mg/dL (21 Oct 2019 08:15)  POCT Blood Glucose.: 132 mg/dL (20 Oct 2019 22:16)        I&O's Summary                      Culture:    TTE:    RADIOLOGY        MEDICATIONS  (STANDING):  acyclovir IVPB 400 milliGRAM(s) IV Intermittent every 24 hours  amLODIPine   Tablet 10 milliGRAM(s) Oral daily  aspirin  chewable 81 milliGRAM(s) Oral daily  carvedilol 25 milliGRAM(s) Oral every 12 hours  chlorhexidine 2% Cloths 1 Application(s) Topical once  cloNIDine 0.3 milliGRAM(s) Oral every 8 hours  clopidogrel Tablet 75 milliGRAM(s) Oral daily  dextrose 5%. 1000 milliLiter(s) (50 mL/Hr) IV Continuous <Continuous>  dextrose 50% Injectable 12.5 Gram(s) IV Push once  dextrose 50% Injectable 25 Gram(s) IV Push once  dextrose 50% Injectable 25 Gram(s) IV Push once  docusate sodium 100 milliGRAM(s) Oral three times a day  folic acid 1 milliGRAM(s) Oral daily  gabapentin 100 milliGRAM(s) Oral at bedtime  hydrALAZINE 100 milliGRAM(s) Oral every 12 hours  influenza   Vaccine 0.5 milliLiter(s) IntraMuscular once  insulin lispro (HumaLOG) corrective regimen sliding scale   SubCutaneous three times a day before meals  insulin lispro (HumaLOG) corrective regimen sliding scale   SubCutaneous at bedtime  insulin lispro Injectable (HumaLOG) 4 Unit(s) SubCutaneous three times a day before meals  levothyroxine 75 MICROGram(s) Oral daily  loratadine 10 milliGRAM(s) Oral <User Schedule>  melatonin 3 milliGRAM(s) Oral at bedtime  Nephro-heather 1 Tablet(s) Oral daily  nystatin    Suspension 381225 Unit(s) Oral two times a day  pantoprazole    Tablet 40 milliGRAM(s) Oral before breakfast  psyllium Powder 1 Packet(s) Oral two times a day  senna 2 Tablet(s) Oral at bedtime  sevelamer carbonate 1600 milliGRAM(s) Oral three times a day  simvastatin 20 milliGRAM(s) Oral at bedtime      MEDICATIONS  (PRN):  acetaminophen 325 mG/butalbital 50 mG/caffeine 40 mG 1 Tablet(s) Oral every 8 hours PRN severe headache  dextrose 40% Gel 15 Gram(s) Oral once PRN Blood Glucose LESS THAN 70 milliGRAM(s)/deciliter  glucagon  Injectable 1 milliGRAM(s) IntraMuscular once PRN Glucose LESS THAN 70 milligrams/deciliter  hydrALAZINE Injectable 10 milliGRAM(s) IV Push every 6 hours PRN SBP >160  HYDROmorphone  Injectable 0.5 milliGRAM(s) IV Push every 6 hours PRN severe headache  labetalol Injectable 10 milliGRAM(s) IV Push every 4 hours PRN Systolic/Diastolic >160/100  lactulose Syrup 20 Gram(s) Oral <User Schedule> PRN constipation  ondansetron Injectable 4 milliGRAM(s) IV Push four times a day PRN Nausea and/or Vomiting  polyethylene glycol 3350 17 Gram(s) Oral daily PRN Constipation

## 2019-10-22 LAB
GLUCOSE BLDC GLUCOMTR-MCNC: 101 MG/DL — HIGH (ref 70–99)
GLUCOSE BLDC GLUCOMTR-MCNC: 133 MG/DL — HIGH (ref 70–99)
GLUCOSE BLDC GLUCOMTR-MCNC: 149 MG/DL — HIGH (ref 70–99)
GLUCOSE BLDC GLUCOMTR-MCNC: 176 MG/DL — HIGH (ref 70–99)

## 2019-10-22 PROCEDURE — 90937 HEMODIALYSIS REPEATED EVAL: CPT

## 2019-10-22 PROCEDURE — 99232 SBSQ HOSP IP/OBS MODERATE 35: CPT

## 2019-10-22 RX ORDER — GABAPENTIN 400 MG/1
100 CAPSULE ORAL AT BEDTIME
Refills: 0 | Status: DISCONTINUED | OUTPATIENT
Start: 2019-10-22 | End: 2019-10-23

## 2019-10-22 RX ADMIN — CARVEDILOL PHOSPHATE 25 MILLIGRAM(S): 80 CAPSULE, EXTENDED RELEASE ORAL at 06:40

## 2019-10-22 RX ADMIN — Medication 0.3 MILLIGRAM(S): at 06:47

## 2019-10-22 RX ADMIN — Medication 100 MILLIGRAM(S): at 06:40

## 2019-10-22 RX ADMIN — AMLODIPINE BESYLATE 10 MILLIGRAM(S): 2.5 TABLET ORAL at 06:40

## 2019-10-22 RX ADMIN — Medication 0.3 MILLIGRAM(S): at 21:22

## 2019-10-22 RX ADMIN — Medication 4 UNIT(S): at 08:29

## 2019-10-22 RX ADMIN — Medication 500000 UNIT(S): at 06:40

## 2019-10-22 RX ADMIN — CLOPIDOGREL BISULFATE 75 MILLIGRAM(S): 75 TABLET, FILM COATED ORAL at 11:10

## 2019-10-22 RX ADMIN — Medication 500000 UNIT(S): at 18:47

## 2019-10-22 RX ADMIN — Medication 108 MILLIGRAM(S): at 18:50

## 2019-10-22 RX ADMIN — CARVEDILOL PHOSPHATE 25 MILLIGRAM(S): 80 CAPSULE, EXTENDED RELEASE ORAL at 18:48

## 2019-10-22 RX ADMIN — Medication 1 PACKET(S): at 06:40

## 2019-10-22 RX ADMIN — LORATADINE 10 MILLIGRAM(S): 10 TABLET ORAL at 11:09

## 2019-10-22 RX ADMIN — SIMVASTATIN 20 MILLIGRAM(S): 20 TABLET, FILM COATED ORAL at 21:22

## 2019-10-22 RX ADMIN — Medication 1 PACKET(S): at 18:47

## 2019-10-22 RX ADMIN — Medication 0.3 MILLIGRAM(S): at 18:48

## 2019-10-22 RX ADMIN — Medication 4 UNIT(S): at 12:32

## 2019-10-22 RX ADMIN — GABAPENTIN 100 MILLIGRAM(S): 400 CAPSULE ORAL at 21:22

## 2019-10-22 RX ADMIN — Medication 1 MILLIGRAM(S): at 11:11

## 2019-10-22 RX ADMIN — Medication 3 MILLIGRAM(S): at 21:22

## 2019-10-22 RX ADMIN — Medication 100 MILLIGRAM(S): at 18:48

## 2019-10-22 RX ADMIN — SEVELAMER CARBONATE 1600 MILLIGRAM(S): 2400 POWDER, FOR SUSPENSION ORAL at 21:22

## 2019-10-22 RX ADMIN — SEVELAMER CARBONATE 1600 MILLIGRAM(S): 2400 POWDER, FOR SUSPENSION ORAL at 14:11

## 2019-10-22 RX ADMIN — Medication 75 MICROGRAM(S): at 06:40

## 2019-10-22 RX ADMIN — Medication 81 MILLIGRAM(S): at 11:12

## 2019-10-22 RX ADMIN — Medication 1 TABLET(S): at 11:09

## 2019-10-22 RX ADMIN — PANTOPRAZOLE SODIUM 40 MILLIGRAM(S): 20 TABLET, DELAYED RELEASE ORAL at 06:40

## 2019-10-22 RX ADMIN — SEVELAMER CARBONATE 1600 MILLIGRAM(S): 2400 POWDER, FOR SUSPENSION ORAL at 06:40

## 2019-10-22 NOTE — PROGRESS NOTE ADULT - ASSESSMENT
1) ESRD on HD  2) MBD of renal dx  3) Anemia of renal dx  4) Vol HTN    HD today  DC after 23rd; finishing IV acyclovir  Continue current management

## 2019-10-22 NOTE — PROGRESS NOTE ADULT - SUBJECTIVE AND OBJECTIVE BOX
[facility] DIVISION OF KIDNEY DISEASES AND HYPERTENSION -- FOLLOW UP NOTE  --------------------------------------------------------------------------------  Chief Complaint:  ESRD on HD    24 hour events/subjective:  Pt seen and examined  NAD  HD today      PAST HISTORY  --------------------------------------------------------------------------------  No significant changes to PMH, PSH, FHx, SHx, unless otherwise noted    ALLERGIES & MEDICATIONS  --------------------------------------------------------------------------------  Allergies    No Known Allergies    Intolerances      Standing Inpatient Medications  acyclovir IVPB 400 milliGRAM(s) IV Intermittent every 24 hours  amLODIPine   Tablet 10 milliGRAM(s) Oral daily  aspirin  chewable 81 milliGRAM(s) Oral daily  carvedilol 25 milliGRAM(s) Oral every 12 hours  chlorhexidine 2% Cloths 1 Application(s) Topical once  cloNIDine 0.3 milliGRAM(s) Oral every 8 hours  clopidogrel Tablet 75 milliGRAM(s) Oral daily  dextrose 5%. 1000 milliLiter(s) IV Continuous <Continuous>  dextrose 50% Injectable 12.5 Gram(s) IV Push once  dextrose 50% Injectable 25 Gram(s) IV Push once  dextrose 50% Injectable 25 Gram(s) IV Push once  docusate sodium 100 milliGRAM(s) Oral three times a day  folic acid 1 milliGRAM(s) Oral daily  gabapentin 100 milliGRAM(s) Oral at bedtime  hydrALAZINE 100 milliGRAM(s) Oral every 12 hours  influenza   Vaccine 0.5 milliLiter(s) IntraMuscular once  insulin lispro (HumaLOG) corrective regimen sliding scale   SubCutaneous three times a day before meals  insulin lispro (HumaLOG) corrective regimen sliding scale   SubCutaneous at bedtime  insulin lispro Injectable (HumaLOG) 4 Unit(s) SubCutaneous three times a day before meals  levothyroxine 75 MICROGram(s) Oral daily  loratadine 10 milliGRAM(s) Oral <User Schedule>  melatonin 3 milliGRAM(s) Oral at bedtime  Nephro-heather 1 Tablet(s) Oral daily  nystatin    Suspension [id number] Unit(s) Oral two times a day  pantoprazole    Tablet 40 milliGRAM(s) Oral before breakfast  psyllium Powder 1 Packet(s) Oral two times a day  senna 2 Tablet(s) Oral at bedtime  sevelamer carbonate 1600 milliGRAM(s) Oral three times a day  simvastatin 20 milliGRAM(s) Oral at bedtime    PRN Inpatient Medications  acetaminophen 325 mG/butalbital 50 mG/caffeine 40 mG 1 Tablet(s) Oral every 8 hours PRN  dextrose 40% Gel 15 Gram(s) Oral once PRN  glucagon  Injectable 1 milliGRAM(s) IntraMuscular once PRN  hydrALAZINE Injectable 10 milliGRAM(s) IV Push every 6 hours PRN  HYDROmorphone  Injectable 0.5 milliGRAM(s) IV Push every 6 hours PRN  labetalol Injectable 10 milliGRAM(s) IV Push every 4 hours PRN  lactulose Syrup 20 Gram(s) Oral <User Schedule> PRN  ondansetron Injectable 4 milliGRAM(s) IV Push four times a day PRN  polyethylene glycol 3350 17 Gram(s) Oral daily PRN      REVIEW OF SYSTEMS  --------------------------------------------------------------------------------  Gen: No weight changes, fatigue, fevers/chills, weakness  Skin: No rashes  Head/Eyes/Ears/Mouth: No headache; Normal hearing; Normal vision w/o blurriness; No sinus pain/discomfort, sore throat  Respiratory: No dyspnea, cough, wheezing, hemoptysis  CV: No chest pain, PND, orthopnea  GI: No abdominal pain, diarrhea, constipation, nausea, vomiting, melena, hematochezia  : No increased frequency, dysuria, hematuria, nocturia  MSK: No joint pain/swelling; no back pain; no edema  Neuro: No dizziness/lightheadedness, weakness, seizures, numbness, tingling  Heme: No easy bruising or bleeding  Endo: No heat/cold intolerance  Psych: No significant nervousness, anxiety, stress, depression    All other systems were reviewed and are negative, except as noted.    VITALS/PHYSICAL EXAM  --------------------------------------------------------------------------------  T(C): 36.5 (10-22-19 @ 09:01), Max: 36.5 (10-22-19 @ 09:01)  HR: 70 (10-22-19 @ 09:01) (70 - 70)  BP: 120/73 (10-22-19 @ 09:01) (120/73 - 154/65)  RR: 18 (10-22-19 @ 09:01) (16 - 18)  SpO2: 94% (10-22-19 @ 09:01) (94% - 96%)  Wt(kg): --        Physical Exam:  	Gen: NAD, well-appearing  	HEENT: PERRL, supple neck, clear oropharynx  	Pulm: CTA B/L  	CV: RRR, S1S2; no rub  	Back: No spinal or CVA tenderness; no sacral edema  	Abd: +BS, soft, nontender/nondistended  	: No suprapubic tenderness  	UE: Warm, FROM, no clubbing, intact strength; no edema; no asterixis  	LE: Warm, FROM, no clubbing, intact strength; no edema  	Neuro: No focal deficits, intact gait  	Psych: Normal affect and mood  	Skin: Warm, without rashes  	Vascular access:    LABS/STUDIES  --------------------------------------------------------------------------------                Creatinine Trend:  SCr 7.06 [10-19 @ 06:29]  SCr 4.83 [10-17 @ 23:45]  SCr 7.63 [10-17 @ 11:38]  SCr 7.32 [10-14 @ 08:23]  SCr 8.14 [10-12 @ 10:08]        PTH -- (Ca 9.8)      [11-13-18 @ 16:19]   551  Vitamin D (25OH) 25.0      [11-13-18 @ 16:38]  HbA1c 6.5      [10-05-19 @ 13:33]  TSH 1.64      [10-12-19 @ 10:08]

## 2019-10-22 NOTE — PROGRESS NOTE ADULT - ASSESSMENT
64 y/o female with PMHx of HTN, DMT2, CAD s/p CABG, PPM, ESRD on HD T,T,S presented to hospital with left sided headache and uncontrolled BP SBP>200. Found to have vesicles suspicious for shingles. Hospital course complicated by encephalopathy; possibly secondary to disseminated zoster vs adverse reaction from gabapentin. Patient underwent LP 10/11 which was + for HZV.     1. Herpes Zoster encephalitis  - off contact and airborne isolation  - patient with vesicular lesion on posterior neck extending into hairline and left  of scalp: healed. currently itchy.-->loratadine   - IV acyclovir for 2 weeks per ID until 10/23  - spoke to ID and renal. patient needs to be in house to complete treatment. renal suggested not to use other arm for PICC line    2. Persistent headaches, likely due to post-herpetic Neuralgia  some muscle spasm also noted - Improved  - repeat CTH unremarkable. CT neck unremarkable  - ESR - CRP normal - no temporal arteritis  - Oxycodone, Flexeril and Gabapentin discontinued due to confusion  - Tylenol and robaxin scheduled for 2-3 days  - Will try Fioricet for headache relief prn  - Resumed Gabapentin for Neuropathy, patient appears to be tolerating well with improvement in pain. Will increase to 200mg  Daughter Martha concerned about confusion, explained that prior confusion was due to VZV encephalopathy.  Given patient with dizziness, waviness will decrease back to 100mg    3. Encephalopathy - possible medication induced versus infection vs delirium. Resolved.  - Repeat CT  head 10/10 and 10/18 without any acute changes  - IR guided LP done 10/11  - CSF PCR positive for VZV  - IV Acyclovir x 2 weeks (renally dosed)  - Held gabapentin or any psych med due to confusion. Resumed gabapentin as bed time dose and titrate slowly.   - Neurology recommendations appreciated    4. Hypertensive urgency - resolved  - BP currently in good control  - Continue home medications and adjust as needed  - Ultrafiltration per renal    5. ESRD on HD (TTS)  - Last HD on 10/15  - UF goal per renal  - Continue Phoslo  - Strict I's/O's, daily weights  - renal on board    6. DMT2 . A1c 6.5 on 10/05  - BGM with SSI  - continue to monitor     7. Hypothyroidism  - normal TSH (measured as 1.64 on 10/12)  - Continue Synthroid    8. CAD  - asymptomatic  - Continue Statin, BB  - ASA and Plavix resumed     9. Hyponatremia:   - dry oral mucous membranes noted on PE  - Encouraged PO hydration  - continue to monitor sodium levels    10. Debility  - seen by PT 10/15   - awaiting recommendation for home PT vs SARA (pending progress with mobility)    11. Prophylactic measure  - SCDs for mechanical DVT ppx    12. Constipation  - Miralax for stool softener  - Metamucil  - Lactulose  - Encourage oral intake of food and fluids    Dispo: after completion of acyclovir on 10/23. needs PT reval for safe DC

## 2019-10-22 NOTE — PROGRESS NOTE ADULT - SUBJECTIVE AND OBJECTIVE BOX
HOSPITALIST PROGRESS NOTE    KIM LEE  [id number]  63yFemale    Patient is a 63y old  Female who presents with a chief complaint of headache (22 Oct 2019 11:30)      SUBJECTIVE:   Chart reviewed since last visit.  Patient seen and examined at bedside for headache, VZV encephalopathy.  No headache today but feels dizzy and vision is wavy  Daughter concerned that this is possibly secondary to Gabapentin      OBJECTIVE:  Vital Signs Last 24 Hrs  T(C): 36.5 (22 Oct 2019 15:20), Max: 36.5 (22 Oct 2019 09:01)  T(F): 97.7 (22 Oct 2019 15:20), Max: 97.7 (22 Oct 2019 09:01)  HR: 69 (22 Oct 2019 15:20) (69 - 70)  BP: 169/51 (22 Oct 2019 15:20) (120/73 - 169/51)   RR: 18 (22 Oct 2019 15:20) (16 - 18)  SpO2: 96% (22 Oct 2019 15:20) (94% - 96%)    PHYSICAL EXAMINATION  General: Obese, lying in bed, NAD  HEENT:  extraocular movements intact, no asymmetry. No temporal tenderness  NECK:  supple, healed nape with fresh skin  CVS: regular rate and rhythm S1 S2  RESP:  CTAB  GI:  Soft nondistended nontender BS+  : No suprapubic tenderness  MSK:  FROM. No edema  CNS:  No gross focal or global deficit noted  INTEG:  Area  of rash with denuded skin now replaced with fresh skin  PSYCH:  Fair mood    MONITOR:  CAPILLARY BLOOD GLUCOSE      POCT Blood Glucose.: 149 mg/dL (22 Oct 2019 12:17)  POCT Blood Glucose.: 133 mg/dL (22 Oct 2019 07:51)  POCT Blood Glucose.: 141 mg/dL (21 Oct 2019 22:51)  POCT Blood Glucose.: 135 mg/dL (21 Oct 2019 16:40)        I&O's Summary                      Culture:    TTE:    RADIOLOGY        MEDICATIONS  (STANDING):  acyclovir IVPB 400 milliGRAM(s) IV Intermittent every 24 hours  amLODIPine   Tablet 10 milliGRAM(s) Oral daily  aspirin  chewable 81 milliGRAM(s) Oral daily  carvedilol 25 milliGRAM(s) Oral every 12 hours  chlorhexidine 2% Cloths 1 Application(s) Topical once  cloNIDine 0.3 milliGRAM(s) Oral every 8 hours  clopidogrel Tablet 75 milliGRAM(s) Oral daily  dextrose 5%. 1000 milliLiter(s) (50 mL/Hr) IV Continuous <Continuous>  dextrose 50% Injectable 12.5 Gram(s) IV Push once  dextrose 50% Injectable 25 Gram(s) IV Push once  dextrose 50% Injectable 25 Gram(s) IV Push once  docusate sodium 100 milliGRAM(s) Oral three times a day  folic acid 1 milliGRAM(s) Oral daily  gabapentin 100 milliGRAM(s) Oral at bedtime  hydrALAZINE 100 milliGRAM(s) Oral every 12 hours  influenza   Vaccine 0.5 milliLiter(s) IntraMuscular once  insulin lispro (HumaLOG) corrective regimen sliding scale   SubCutaneous three times a day before meals  insulin lispro (HumaLOG) corrective regimen sliding scale   SubCutaneous at bedtime  insulin lispro Injectable (HumaLOG) 4 Unit(s) SubCutaneous three times a day before meals  levothyroxine 75 MICROGram(s) Oral daily  loratadine 10 milliGRAM(s) Oral <User Schedule>  melatonin 3 milliGRAM(s) Oral at bedtime  Nephro-heather 1 Tablet(s) Oral daily  nystatin    Suspension 769997 Unit(s) Oral two times a day  pantoprazole    Tablet 40 milliGRAM(s) Oral before breakfast  psyllium Powder 1 Packet(s) Oral two times a day  senna 2 Tablet(s) Oral at bedtime  sevelamer carbonate 1600 milliGRAM(s) Oral three times a day  simvastatin 20 milliGRAM(s) Oral at bedtime      MEDICATIONS  (PRN):  acetaminophen 325 mG/butalbital 50 mG/caffeine 40 mG 1 Tablet(s) Oral every 8 hours PRN severe headache  dextrose 40% Gel 15 Gram(s) Oral once PRN Blood Glucose LESS THAN 70 milliGRAM(s)/deciliter  glucagon  Injectable 1 milliGRAM(s) IntraMuscular once PRN Glucose LESS THAN 70 milligrams/deciliter  hydrALAZINE Injectable 10 milliGRAM(s) IV Push every 6 hours PRN SBP >160  HYDROmorphone  Injectable 0.5 milliGRAM(s) IV Push every 6 hours PRN severe headache  labetalol Injectable 10 milliGRAM(s) IV Push every 4 hours PRN Systolic/Diastolic >160/100  lactulose Syrup 20 Gram(s) Oral <User Schedule> PRN constipation  ondansetron Injectable 4 milliGRAM(s) IV Push four times a day PRN Nausea and/or Vomiting  polyethylene glycol 3350 17 Gram(s) Oral daily PRN Constipation

## 2019-10-23 VITALS
DIASTOLIC BLOOD PRESSURE: 76 MMHG | HEART RATE: 70 BPM | OXYGEN SATURATION: 95 % | RESPIRATION RATE: 18 BRPM | SYSTOLIC BLOOD PRESSURE: 161 MMHG

## 2019-10-23 LAB
GLUCOSE BLDC GLUCOMTR-MCNC: 147 MG/DL — HIGH (ref 70–99)
GLUCOSE BLDC GLUCOMTR-MCNC: 152 MG/DL — HIGH (ref 70–99)
GLUCOSE BLDC GLUCOMTR-MCNC: 96 MG/DL — SIGNIFICANT CHANGE UP (ref 70–99)

## 2019-10-23 PROCEDURE — 99239 HOSP IP/OBS DSCHRG MGMT >30: CPT

## 2019-10-23 PROCEDURE — 99233 SBSQ HOSP IP/OBS HIGH 50: CPT

## 2019-10-23 RX ORDER — NYSTATIN 500MM UNIT
5 POWDER (EA) MISCELLANEOUS
Qty: 50 | Refills: 0
Start: 2019-10-23 | End: 2019-10-27

## 2019-10-23 RX ORDER — GABAPENTIN 400 MG/1
1 CAPSULE ORAL
Qty: 30 | Refills: 0
Start: 2019-10-23 | End: 2019-11-21

## 2019-10-23 RX ORDER — POLYETHYLENE GLYCOL 3350 17 G/17G
17 POWDER, FOR SOLUTION ORAL
Qty: 510 | Refills: 0
Start: 2019-10-23

## 2019-10-23 RX ORDER — PSYLLIUM SEED (WITH DEXTROSE)
1 POWDER (GRAM) ORAL
Qty: 0 | Refills: 0 | DISCHARGE
Start: 2019-10-23

## 2019-10-23 RX ORDER — DOCUSATE SODIUM 100 MG
1 CAPSULE ORAL
Qty: 90 | Refills: 0
Start: 2019-10-23 | End: 2019-11-21

## 2019-10-23 RX ADMIN — Medication 100 MILLIGRAM(S): at 13:18

## 2019-10-23 RX ADMIN — Medication 4 UNIT(S): at 08:17

## 2019-10-23 RX ADMIN — LORATADINE 10 MILLIGRAM(S): 10 TABLET ORAL at 11:40

## 2019-10-23 RX ADMIN — Medication 1 MILLIGRAM(S): at 11:40

## 2019-10-23 RX ADMIN — CLOPIDOGREL BISULFATE 75 MILLIGRAM(S): 75 TABLET, FILM COATED ORAL at 11:40

## 2019-10-23 RX ADMIN — Medication 0.3 MILLIGRAM(S): at 05:35

## 2019-10-23 RX ADMIN — Medication 500000 UNIT(S): at 05:35

## 2019-10-23 RX ADMIN — Medication 4 UNIT(S): at 11:40

## 2019-10-23 RX ADMIN — Medication 1 TABLET(S): at 13:24

## 2019-10-23 RX ADMIN — Medication 1 TABLET(S): at 14:20

## 2019-10-23 RX ADMIN — CARVEDILOL PHOSPHATE 25 MILLIGRAM(S): 80 CAPSULE, EXTENDED RELEASE ORAL at 17:52

## 2019-10-23 RX ADMIN — Medication 1 TABLET(S): at 06:32

## 2019-10-23 RX ADMIN — Medication 1 TABLET(S): at 05:37

## 2019-10-23 RX ADMIN — Medication 100 MILLIGRAM(S): at 05:35

## 2019-10-23 RX ADMIN — CARVEDILOL PHOSPHATE 25 MILLIGRAM(S): 80 CAPSULE, EXTENDED RELEASE ORAL at 05:35

## 2019-10-23 RX ADMIN — Medication 100 MILLIGRAM(S): at 17:52

## 2019-10-23 RX ADMIN — Medication 1 PACKET(S): at 17:52

## 2019-10-23 RX ADMIN — Medication 1 TABLET(S): at 11:40

## 2019-10-23 RX ADMIN — Medication 75 MICROGRAM(S): at 05:35

## 2019-10-23 RX ADMIN — AMLODIPINE BESYLATE 10 MILLIGRAM(S): 2.5 TABLET ORAL at 05:38

## 2019-10-23 RX ADMIN — Medication 108 MILLIGRAM(S): at 18:11

## 2019-10-23 RX ADMIN — SEVELAMER CARBONATE 1600 MILLIGRAM(S): 2400 POWDER, FOR SUSPENSION ORAL at 13:18

## 2019-10-23 RX ADMIN — Medication 0.3 MILLIGRAM(S): at 13:18

## 2019-10-23 RX ADMIN — Medication 1: at 11:40

## 2019-10-23 RX ADMIN — Medication 81 MILLIGRAM(S): at 11:40

## 2019-10-23 RX ADMIN — SEVELAMER CARBONATE 1600 MILLIGRAM(S): 2400 POWDER, FOR SUSPENSION ORAL at 05:35

## 2019-10-23 RX ADMIN — Medication 500000 UNIT(S): at 17:52

## 2019-10-23 RX ADMIN — PANTOPRAZOLE SODIUM 40 MILLIGRAM(S): 20 TABLET, DELAYED RELEASE ORAL at 05:35

## 2019-10-23 NOTE — PROGRESS NOTE ADULT - ASSESSMENT
64 y/o female with PMHx of HTN, DMT2, CAD s/p CABG, PPM, ESRD on HD T,T,S presented to hospital with left sided headache and uncontrolled BP SBP>200. Found to have vesicles suspicious for shingles. Hospital course complicated by encephalopathy; possibly secondary to disseminated zoster vs adverse reaction from gabapentin. Patient underwent LP 10/11 which was + for HZV.     1. Herpes Zoster encephalitis  - off contact and airborne isolation  - patient with vesicular lesion on posterior neck extending into hairline and left  of scalp: healed. currently itchy.-->loratadine   - IV acyclovir for 2 weeks per ID until 10/23  - spoke to ID and renal. patient needs to be in house to complete treatment. renal suggested not to use other arm for PICC line    2. Persistent headaches, likely due to post-herpetic Neuralgia  some muscle spasm also noted - resolved  - repeat CTH unremarkable. CT neck unremarkable  - ESR - CRP normal - no temporal arteritis  - Oxycodone, Flexeril and Gabapentin discontinued due to confusion  - Tylenol and robaxin scheduled for 2-3 days  - Will try Fioricet for headache relief prn  - Resumed Gabapentin for Neuropathy, patient appears to be tolerating well with improvement in pain. Will increase to 200mg  Daughter Martha concerned about confusion, explained that prior confusion was due to VZV encephalopathy.  Given patient with dizziness, waviness will decrease back to 100mg    3. Encephalopathy - possible medication induced versus infection vs delirium. Resolved.  - Repeat CT  head 10/10 and 10/18 without any acute changes  - IR guided LP done 10/11  - CSF PCR positive for VZV  - IV Acyclovir x 2 weeks (renally dosed)  - Held gabapentin or any psych med due to confusion. Resumed gabapentin as bed time dose and titrate slowly.   - Neurology recommendations appreciated    4. Hypertensive urgency - resolved  - BP currently in good control  - Continue home medications and adjust as needed  - Ultrafiltration per renal    5. ESRD on HD (TTS)  - Last HD on 10/15  - UF goal per renal  - Continue Phoslo  - Strict I's/O's, daily weights  - renal on board    6. DMT2 . A1c 6.5 on 10/05  - BGM with SSI  - continue to monitor     7. Hypothyroidism  - normal TSH (measured as 1.64 on 10/12)  - Continue Synthroid    8. CAD  - asymptomatic  - Continue Statin, BB  - ASA and Plavix resumed     9. Hyponatremia:   - dry oral mucous membranes noted on PE  - Encouraged PO hydration  - continue to monitor sodium levels    10. Debility  - seen by PT 10/15   - awaiting recommendation for home PT vs SARA (pending progress with mobility)    11. Prophylactic measure  - SCDs for mechanical DVT ppx    12. Constipation  - Miralax for stool softener  - Metamucil  - Lactulose  - Encourage oral intake of food and fluids    Dispo: Discharge home after tonights Acyclovir    Discussed with patient and daughter - agree with plan

## 2019-10-23 NOTE — PROGRESS NOTE ADULT - SUBJECTIVE AND OBJECTIVE BOX
[facility] DIVISION OF KIDNEY DISEASES AND HYPERTENSION -- FOLLOW UP NOTE  --------------------------------------------------------------------------------  Chief Complaint:  ESRD HD  24 hour events/subjective:  Seen/examined  HD planned for AM      PAST HISTORY  --------------------------------------------------------------------------------  No significant changes to PMH, PSH, FHx, SHx, unless otherwise noted    ALLERGIES & MEDICATIONS  --------------------------------------------------------------------------------  Allergies    No Known Allergies    Intolerances      Standing Inpatient Medications  acyclovir IVPB 400 milliGRAM(s) IV Intermittent every 24 hours  amLODIPine   Tablet 10 milliGRAM(s) Oral daily  aspirin  chewable 81 milliGRAM(s) Oral daily  carvedilol 25 milliGRAM(s) Oral every 12 hours  chlorhexidine 2% Cloths 1 Application(s) Topical once  cloNIDine 0.3 milliGRAM(s) Oral every 8 hours  clopidogrel Tablet 75 milliGRAM(s) Oral daily  dextrose 5%. 1000 milliLiter(s) IV Continuous <Continuous>  dextrose 50% Injectable 12.5 Gram(s) IV Push once  dextrose 50% Injectable 25 Gram(s) IV Push once  dextrose 50% Injectable 25 Gram(s) IV Push once  docusate sodium 100 milliGRAM(s) Oral three times a day  folic acid 1 milliGRAM(s) Oral daily  gabapentin 100 milliGRAM(s) Oral at bedtime  hydrALAZINE 100 milliGRAM(s) Oral every 12 hours  influenza   Vaccine 0.5 milliLiter(s) IntraMuscular once  insulin lispro (HumaLOG) corrective regimen sliding scale   SubCutaneous three times a day before meals  insulin lispro (HumaLOG) corrective regimen sliding scale   SubCutaneous at bedtime  insulin lispro Injectable (HumaLOG) 4 Unit(s) SubCutaneous three times a day before meals  levothyroxine 75 MICROGram(s) Oral daily  loratadine 10 milliGRAM(s) Oral <User Schedule>  melatonin 3 milliGRAM(s) Oral at bedtime  Nephro-heather 1 Tablet(s) Oral daily  nystatin    Suspension 736377 Unit(s) Oral two times a day  pantoprazole    Tablet 40 milliGRAM(s) Oral before breakfast  psyllium Powder 1 Packet(s) Oral two times a day  senna 2 Tablet(s) Oral at bedtime  sevelamer carbonate 1600 milliGRAM(s) Oral three times a day  simvastatin 20 milliGRAM(s) Oral at bedtime    PRN Inpatient Medications  acetaminophen 325 mG/butalbital 50 mG/caffeine 40 mG 1 Tablet(s) Oral every 8 hours PRN  dextrose 40% Gel 15 Gram(s) Oral once PRN  glucagon  Injectable 1 milliGRAM(s) IntraMuscular once PRN  hydrALAZINE Injectable 10 milliGRAM(s) IV Push every 6 hours PRN  HYDROmorphone  Injectable 0.5 milliGRAM(s) IV Push every 6 hours PRN  labetalol Injectable 10 milliGRAM(s) IV Push every 4 hours PRN  lactulose Syrup 20 Gram(s) Oral <User Schedule> PRN  ondansetron Injectable 4 milliGRAM(s) IV Push four times a day PRN  polyethylene glycol 3350 17 Gram(s) Oral daily PRN      REVIEW OF SYSTEMS  --------------------------------------------------------------------------------  Gen: No weight changes, fatigue, fevers/chills, weakness  Skin: No rashes  Head/Eyes/Ears/Mouth: No headache; Normal hearing; Normal vision w/o blurriness; No sinus pain/discomfort, sore throat  Respiratory: No dyspnea, cough, wheezing, hemoptysis  CV: No chest pain, PND, orthopnea  GI: No abdominal pain, diarrhea, constipation, nausea, vomiting, melena, hematochezia  : No increased frequency, dysuria, hematuria, nocturia  MSK: No joint pain/swelling; no back pain; no edema  Neuro: No dizziness/lightheadedness, weakness, seizures, numbness, tingling  Heme: No easy bruising or bleeding  Endo: No heat/cold intolerance  Psych: No significant nervousness, anxiety, stress, depression    All other systems were reviewed and are negative, except as noted.    VITALS/PHYSICAL EXAM  --------------------------------------------------------------------------------  T(C): 36.8 (10-23-19 @ 08:53), Max: 36.8 (10-23-19 @ 08:53)  HR: 10 (10-23-19 @ 08:53) (10 - 72)  BP: 125/70 (10-23-19 @ 08:53) (113/61 - 169/51)  RR: 18 (10-23-19 @ 08:53) (18 - 18)  SpO2: 94% (10-23-19 @ 08:53) (93% - 97%)  Wt(kg): --        10-22-19 @ 07:01  -  10-23-19 @ 07:00  --------------------------------------------------------  IN: 0 mL / OUT: 1500 mL / NET: -1500 mL      Physical Exam:  	Gen: NAD   	HEENT: PERRL, supple neck, clear oropharynx  	Pulm: CTA B/L  	CV: RRR, S1S2; no rub  	Back: No spinal or CVA tenderness; no sacral edema  	Abd: +BS, soft, nontender/nondistended  	: No suprapubic tenderness  	UE: Warm, FROM, no clubbing, intact strength; no edema; no asterixis  	LE: Warm, FROM, no clubbing, intact strength; no edema  	Neuro: No focal deficits, intact gait  	Psych: Normal affect and mood  	Skin: Warm, without rashes  	Vascular access:    LABS/STUDIES  --------------------------------------------------------------------------------                Creatinine Trend:  SCr 7.06 [10-19 @ 06:29]  SCr 4.83 [10-17 @ 23:45]  SCr 7.63 [10-17 @ 11:38]  SCr 7.32 [10-14 @ 08:23]  SCr 8.14 [10-12 @ 10:08]        PTH -- (Ca 9.8)      [11-13-18 @ 16:19]   551  Vitamin D (25OH) 25.0      [11-13-18 @ 16:38]  HbA1c 6.5      [10-05-19 @ 13:33]  TSH 1.64      [10-12-19 @ 10:08]

## 2019-10-23 NOTE — PROGRESS NOTE ADULT - SUBJECTIVE AND OBJECTIVE BOX
HOSPITALIST PROGRESS NOTE    KIM LEE  [id number]  63yFemale    Patient is a 63y old  Female who presents with a chief complaint of headache (23 Oct 2019 13:08)      SUBJECTIVE:   Chart reviewed since last visit.  Patient seen and examined at bedside for VZV encephalopathy, ESRD  Headache resolved, no dizziness or confusion today.      OBJECTIVE:  Vital Signs Last 24 Hrs  T(C): 36.9 (23 Oct 2019 12:40), Max: 36.9 (23 Oct 2019 12:40)  T(F): 98.5 (23 Oct 2019 12:40), Max: 98.5 (23 Oct 2019 12:40)  HR: 68 (23 Oct 2019 12:40) (68 - 72)  BP: 129/71 (23 Oct 2019 12:40) (113/61 - 169/51)   RR: 18 (23 Oct 2019 12:40) (18 - 18)  SpO2: 96% (23 Oct 2019 12:40) (93% - 97%)    PHYSICAL EXAMINATION  General: Obese, lying in bed, NAD  HEENT:  extraocular movements intact, no asymmetry. No temporal tenderness  NECK:  supple, healed nape with fresh skin  CVS: regular rate and rhythm S1 S2  RESP:  CTAB  GI:  Soft nondistended nontender BS+  : No suprapubic tenderness  MSK:  FROM. No edema  CNS:  No gross focal or global deficit noted  INTEG:  Area  of rash with denuded skin now replaced with fresh skin  PSYCH:  Fair mood     MONITOR:  CAPILLARY BLOOD GLUCOSE      POCT Blood Glucose.: 152 mg/dL (23 Oct 2019 11:22)  POCT Blood Glucose.: 147 mg/dL (23 Oct 2019 08:10)  POCT Blood Glucose.: 176 mg/dL (22 Oct 2019 21:13)  POCT Blood Glucose.: 101 mg/dL (22 Oct 2019 16:32)        I&O's Summary    22 Oct 2019 07:01  -  23 Oct 2019 07:00  --------------------------------------------------------  IN: 0 mL / OUT: 1500 mL / NET: -1500 mL                        Culture:    TTE:    RADIOLOGY        MEDICATIONS  (STANDING):  acyclovir IVPB 400 milliGRAM(s) IV Intermittent every 24 hours  amLODIPine   Tablet 10 milliGRAM(s) Oral daily  aspirin  chewable 81 milliGRAM(s) Oral daily  carvedilol 25 milliGRAM(s) Oral every 12 hours  chlorhexidine 2% Cloths 1 Application(s) Topical once  cloNIDine 0.3 milliGRAM(s) Oral every 8 hours  clopidogrel Tablet 75 milliGRAM(s) Oral daily  dextrose 5%. 1000 milliLiter(s) (50 mL/Hr) IV Continuous <Continuous>  dextrose 50% Injectable 12.5 Gram(s) IV Push once  dextrose 50% Injectable 25 Gram(s) IV Push once  dextrose 50% Injectable 25 Gram(s) IV Push once  docusate sodium 100 milliGRAM(s) Oral three times a day  folic acid 1 milliGRAM(s) Oral daily  gabapentin 100 milliGRAM(s) Oral at bedtime  hydrALAZINE 100 milliGRAM(s) Oral every 12 hours  influenza   Vaccine 0.5 milliLiter(s) IntraMuscular once  insulin lispro (HumaLOG) corrective regimen sliding scale   SubCutaneous three times a day before meals  insulin lispro (HumaLOG) corrective regimen sliding scale   SubCutaneous at bedtime  insulin lispro Injectable (HumaLOG) 4 Unit(s) SubCutaneous three times a day before meals  levothyroxine 75 MICROGram(s) Oral daily  loratadine 10 milliGRAM(s) Oral <User Schedule>  melatonin 3 milliGRAM(s) Oral at bedtime  Nephro-heather 1 Tablet(s) Oral daily  nystatin    Suspension 814172 Unit(s) Oral two times a day  pantoprazole    Tablet 40 milliGRAM(s) Oral before breakfast  psyllium Powder 1 Packet(s) Oral two times a day  senna 2 Tablet(s) Oral at bedtime  sevelamer carbonate 1600 milliGRAM(s) Oral three times a day  simvastatin 20 milliGRAM(s) Oral at bedtime      MEDICATIONS  (PRN):  acetaminophen 325 mG/butalbital 50 mG/caffeine 40 mG 1 Tablet(s) Oral every 8 hours PRN severe headache  dextrose 40% Gel 15 Gram(s) Oral once PRN Blood Glucose LESS THAN 70 milliGRAM(s)/deciliter  glucagon  Injectable 1 milliGRAM(s) IntraMuscular once PRN Glucose LESS THAN 70 milligrams/deciliter  hydrALAZINE Injectable 10 milliGRAM(s) IV Push every 6 hours PRN SBP >160  HYDROmorphone  Injectable 0.5 milliGRAM(s) IV Push every 6 hours PRN severe headache  labetalol Injectable 10 milliGRAM(s) IV Push every 4 hours PRN Systolic/Diastolic >160/100  lactulose Syrup 20 Gram(s) Oral <User Schedule> PRN constipation  ondansetron Injectable 4 milliGRAM(s) IV Push four times a day PRN Nausea and/or Vomiting  polyethylene glycol 3350 17 Gram(s) Oral daily PRN Constipation

## 2019-10-23 NOTE — PROGRESS NOTE ADULT - REASON FOR ADMISSION
headache

## 2019-10-23 NOTE — PROGRESS NOTE ADULT - ASSESSMENT
1) ESRD on HD  2) MBD of renal dx  3) Anemia of renal dx  4) Vol HTN    HD in AM  DC planning after 23rd; finishing IV acyclovir  Continue current management  If inpatient; will dialyze tomorrow in house;  d/w Dr Cavazos

## 2019-10-23 NOTE — PROGRESS NOTE ADULT - PROVIDER SPECIALTY LIST ADULT
Hospitalist
Infectious Disease
Nephrology
Neurology
Radiology
Infectious Disease
Hospitalist

## 2019-10-31 PROCEDURE — 87529 HSV DNA AMP PROBE: CPT

## 2019-10-31 PROCEDURE — 87040 BLOOD CULTURE FOR BACTERIA: CPT

## 2019-10-31 PROCEDURE — 97530 THERAPEUTIC ACTIVITIES: CPT

## 2019-10-31 PROCEDURE — 87070 CULTURE OTHR SPECIMN AEROBIC: CPT

## 2019-10-31 PROCEDURE — 87581 M.PNEUMON DNA AMP PROBE: CPT

## 2019-10-31 PROCEDURE — 72125 CT NECK SPINE W/O DYE: CPT

## 2019-10-31 PROCEDURE — 84100 ASSAY OF PHOSPHORUS: CPT

## 2019-10-31 PROCEDURE — 36415 COLL VENOUS BLD VENIPUNCTURE: CPT

## 2019-10-31 PROCEDURE — 84145 PROCALCITONIN (PCT): CPT

## 2019-10-31 PROCEDURE — 93005 ELECTROCARDIOGRAM TRACING: CPT

## 2019-10-31 PROCEDURE — 77003 FLUOROGUIDE FOR SPINE INJECT: CPT

## 2019-10-31 PROCEDURE — 82962 GLUCOSE BLOOD TEST: CPT

## 2019-10-31 PROCEDURE — 82945 GLUCOSE OTHER FLUID: CPT

## 2019-10-31 PROCEDURE — 70450 CT HEAD/BRAIN W/O DYE: CPT

## 2019-10-31 PROCEDURE — 80069 RENAL FUNCTION PANEL: CPT

## 2019-10-31 PROCEDURE — 85610 PROTHROMBIN TIME: CPT

## 2019-10-31 PROCEDURE — 97110 THERAPEUTIC EXERCISES: CPT

## 2019-10-31 PROCEDURE — 89051 BODY FLUID CELL COUNT: CPT

## 2019-10-31 PROCEDURE — 87205 SMEAR GRAM STAIN: CPT

## 2019-10-31 PROCEDURE — 97116 GAIT TRAINING THERAPY: CPT

## 2019-10-31 PROCEDURE — 99261: CPT

## 2019-10-31 PROCEDURE — 87640 STAPH A DNA AMP PROBE: CPT

## 2019-10-31 PROCEDURE — 83735 ASSAY OF MAGNESIUM: CPT

## 2019-10-31 PROCEDURE — 71045 X-RAY EXAM CHEST 1 VIEW: CPT

## 2019-10-31 PROCEDURE — 96361 HYDRATE IV INFUSION ADD-ON: CPT

## 2019-10-31 PROCEDURE — 83615 LACTATE (LD) (LDH) ENZYME: CPT

## 2019-10-31 PROCEDURE — 84443 ASSAY THYROID STIM HORMONE: CPT

## 2019-10-31 PROCEDURE — 83036 HEMOGLOBIN GLYCOSYLATED A1C: CPT

## 2019-10-31 PROCEDURE — 87633 RESP VIRUS 12-25 TARGETS: CPT

## 2019-10-31 PROCEDURE — 86140 C-REACTIVE PROTEIN: CPT

## 2019-10-31 PROCEDURE — 96374 THER/PROPH/DIAG INJ IV PUSH: CPT

## 2019-10-31 PROCEDURE — 97163 PT EVAL HIGH COMPLEX 45 MIN: CPT

## 2019-10-31 PROCEDURE — 87483 CNS DNA AMP PROBE TYPE 12-25: CPT

## 2019-10-31 PROCEDURE — 87798 DETECT AGENT NOS DNA AMP: CPT

## 2019-10-31 PROCEDURE — 80048 BASIC METABOLIC PNL TOTAL CA: CPT

## 2019-10-31 PROCEDURE — 84157 ASSAY OF PROTEIN OTHER: CPT

## 2019-10-31 PROCEDURE — 85027 COMPLETE CBC AUTOMATED: CPT

## 2019-10-31 PROCEDURE — 80053 COMPREHEN METABOLIC PANEL: CPT

## 2019-10-31 PROCEDURE — T1013: CPT

## 2019-10-31 PROCEDURE — 85652 RBC SED RATE AUTOMATED: CPT

## 2019-10-31 PROCEDURE — 87486 CHLMYD PNEUM DNA AMP PROBE: CPT

## 2019-10-31 PROCEDURE — 99285 EMERGENCY DEPT VISIT HI MDM: CPT | Mod: 25

## 2019-10-31 PROCEDURE — 83930 ASSAY OF BLOOD OSMOLALITY: CPT

## 2019-10-31 PROCEDURE — 85730 THROMBOPLASTIN TIME PARTIAL: CPT

## 2019-11-11 ENCOUNTER — EMERGENCY (EMERGENCY)
Facility: HOSPITAL | Age: 64
LOS: 1 days | Discharge: HOME | End: 2019-11-11
Attending: EMERGENCY MEDICINE
Payer: COMMERCIAL

## 2019-11-11 VITALS
HEIGHT: 63 IN | WEIGHT: 207.01 LBS | DIASTOLIC BLOOD PRESSURE: 75 MMHG | RESPIRATION RATE: 18 BRPM | TEMPERATURE: 98 F | SYSTOLIC BLOOD PRESSURE: 196 MMHG | HEART RATE: 70 BPM | OXYGEN SATURATION: 96 %

## 2019-11-11 DIAGNOSIS — Z90.49 ACQUIRED ABSENCE OF OTHER SPECIFIED PARTS OF DIGESTIVE TRACT: Chronic | ICD-10-CM

## 2019-11-11 DIAGNOSIS — I77.0 ARTERIOVENOUS FISTULA, ACQUIRED: Chronic | ICD-10-CM

## 2019-11-11 PROCEDURE — 10060 I&D ABSCESS SIMPLE/SINGLE: CPT

## 2019-11-11 PROCEDURE — 99283 EMERGENCY DEPT VISIT LOW MDM: CPT | Mod: 25

## 2019-11-11 RX ADMIN — Medication 300 MILLIGRAM(S): at 21:37

## 2019-11-11 NOTE — ED PROVIDER NOTE - PATIENT PORTAL LINK FT
You can access the FollowMyHealth Patient Portal offered by [facility] by registering at the following website: [email or web address]. By joining [employer]’s FollowMyHealth portal, you will also be able to view your health information using other applications (apps) compatible with our system.

## 2019-11-11 NOTE — ED PROVIDER NOTE - PMH
3-vessel CAD  s/p CABG  Diabetes mellitus    Hemodialysis patient  tues thursday saturday  Hypertension    Hypothyroidism, unspecified type    Renal failure

## 2019-11-11 NOTE — ED PROVIDER NOTE - PHYSICAL EXAMINATION
Const: Awake, alert and oriented. In no acute distress. Well appearing.  HEENT: NC/AT. Moist mucous membranes.  Eyes: Conjunctiva pink, sclera white bilaterally EOMI.  Neck:. Soft and supple. Full ROM without pain.  Cardiac:  +S1/S2. No murmurs. Peripheral pulses 2+ and symmetric. No LE edema.  Resp: Speaking in full sentences. No evidence of respiratory distress. No wheezes, rales or rhonchi.  Abd: Soft, non-tender, non-distended. Normal bowel sounds in all 4 quadrants. No guarding or rebound.  Back: Spine midline and non-tender. No CVAT.  Skin: 6.0 cm flucuant erythmatous lesion to occiput region of head   Lymph: No cervical lymphadenopathy.  Neuro: Awake, alert & oriented x 3. CN II-XII intact, Moves all extremities symmetrically.

## 2019-11-11 NOTE — ED PROVIDER NOTE - ATTENDING CONTRIBUTION TO CARE
Marya: I performed a face to face bedside interview with patient regarding history of present illness, review of symptoms and past medical history. I completed an independent physical exam.  I have discussed patient's plan of care with advanced care provider.   I agree with note as stated above including HISTORY OF PRESENT ILLNESS, HIV, PAST MEDICAL/SURGICAL/FAMILY/SOCIAL HISTORY, ALLERGIES AND HOME MEDICATIONS, REVIEW OF SYSTEMS, PHYSICAL EXAM, MEDICAL DECISION MAKING and any PROGRESS NOTES during the time I functioned as the attending physician for this patient  unless otherwise noted. My brief assessment is as follows: pt on dialysis, hx dm, with development of abscess to occiput over past week, started as pimple and grew in size. needle aspiration by pcp but sent in for further drainage, started on abx (unclear which one). no f/c, no other complaints. pt with ~6 cm flucuant erythmatous lesion to occiput, I and D with drainage of pus, with surrounding inflammation of tissue. continue abx, warm soaks, supportive care. no sign systemic symptoms. asymptomatic htn, continue meds, due for dialysis tmrw without other acute medical symptoms. stable fo rd/c with f/u within 2 days for wound check.

## 2019-11-11 NOTE — ED ADULT TRIAGE NOTE - CHIEF COMPLAINT QUOTE
patient's daughter states that she has an abscess to back of her head painful, PMD drained some of abscess today

## 2019-11-11 NOTE — ED ADULT NURSE NOTE - OBJECTIVE STATEMENT
Patient assessed and discharged prior to RN assessment. Patient is a 64 year old female, in no apparent distress. c/o abscess. As per patients family, patient had a "pimple" when she got shingles. This specific pimple on the back of patients head kept getting bigger. Patient went to Primary care doctor who insisted she went to the ER to drain abscess.

## 2019-11-11 NOTE — ED PROVIDER NOTE - OBJECTIVE STATEMENT
Patient is a 63 y/o female with a pmhx of dm, HTN, cad, on dialysis (Tues-thurs-sat), has not missed a dialysis session. Patient had shingles one month ago was tx with anti-virals. Patient developed a pimple to the back of the head which has grown in size in the past week. Patient denies fevers. Patient went to the pmd today, states pmd drained a small part of the abscess and told the patient to come to the ed to have it drained. PMD sent antibiotics to patients pharamacy. Patient denies cp, SOB, headache, visual changes, neck pain, injuries or trauma, numbness or loss of sensation, abd pain, back pain

## 2019-11-11 NOTE — ED ADULT NURSE NOTE - NSIMPLEMENTINTERV_GEN_ALL_ED
Implemented All Fall with Harm Risk Interventions:  [place] to call system. Call bell, personal items and telephone within reach. Instruct patient to call for assistance. Room bathroom lighting operational. Non-slip footwear when patient is off stretcher. Physically safe environment: no spills, clutter or unnecessary equipment. Stretcher in lowest position, wheels locked, appropriate side rails in place. Provide visual cue, wrist band, yellow gown, etc. Monitor gait and stability. Monitor for mental status changes and reorient to person, place, and time. Review medications for side effects contributing to fall risk. Reinforce activity limits and safety measures with patient and family. Provide visual clues: red socks.

## 2019-11-11 NOTE — ED PROVIDER NOTE - CLINICAL SUMMARY MEDICAL DECISION MAKING FREE TEXT BOX
Incision and drainage of the abscess, patient to have wound check in 48 hours, return to the ed for worsening sxs, fevers, pt took hydralazine two hours ago on BP medication asymptomatic at this time continue with medication, pt explained d/c instructions

## 2019-11-12 PROBLEM — I25.10 ATHEROSCLEROTIC HEART DISEASE OF NATIVE CORONARY ARTERY WITHOUT ANGINA PECTORIS: Chronic | Status: ACTIVE | Noted: 2019-03-16

## 2020-01-01 NOTE — PROGRESS NOTE ADULT - ASSESSMENT
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 Hospital course complicated by encephalopathy; possibly secondary to disseminated zoster vs adverse reaction from gabapentin. Patient underwent LP 10/11 which was + for HZV.     1. Herpes Zoster encephalitis  - off contact and airborne isolation  - patient with vesicular lesion on posterior neck extending into hairline and left side of scalp: healed.     Avoid PICC,    2. Encephalopathy - Improved  - Repeat CT  head 10/10 and 10/18 without any acute changes  - IR guided LP done 10/11  - CSF PCR positive for VZ  - IV Acyclovir x 2 weeks (renally dosed)    3. Hypertensive urgency - resolved  - BP currently in good control  - Continue home medications and adjust as needed  - Maintain Euvolemia,     4. DMT2 . A1c 6.5 %,       8. CAD  - asymptomatic, On  Statin, BB , ASA and Plavix Statement Selected

## 2020-03-29 ENCOUNTER — INPATIENT (INPATIENT)
Facility: HOSPITAL | Age: 65
LOS: 4 days | Discharge: HOME | DRG: 177 | End: 2020-04-03
Attending: HOSPITALIST | Admitting: HOSPITALIST
Payer: COMMERCIAL

## 2020-03-29 VITALS
DIASTOLIC BLOOD PRESSURE: 70 MMHG | SYSTOLIC BLOOD PRESSURE: 200 MMHG | RESPIRATION RATE: 18 BRPM | HEART RATE: 86 BPM | OXYGEN SATURATION: 98 % | TEMPERATURE: 100 F

## 2020-03-29 DIAGNOSIS — Z90.49 ACQUIRED ABSENCE OF OTHER SPECIFIED PARTS OF DIGESTIVE TRACT: Chronic | ICD-10-CM

## 2020-03-29 DIAGNOSIS — I77.0 ARTERIOVENOUS FISTULA, ACQUIRED: Chronic | ICD-10-CM

## 2020-03-29 DIAGNOSIS — J81.0 ACUTE PULMONARY EDEMA: ICD-10-CM

## 2020-03-29 LAB
ALBUMIN SERPL ELPH-MCNC: 4.1 G/DL — SIGNIFICANT CHANGE UP (ref 3.3–5.2)
ALP SERPL-CCNC: 96 U/L — SIGNIFICANT CHANGE UP (ref 40–120)
ALT FLD-CCNC: 12 U/L — SIGNIFICANT CHANGE UP
ANION GAP SERPL CALC-SCNC: 25 MMOL/L — HIGH (ref 5–17)
AST SERPL-CCNC: 38 U/L — HIGH
BASOPHILS # BLD AUTO: 0.02 K/UL — SIGNIFICANT CHANGE UP (ref 0–0.2)
BASOPHILS NFR BLD AUTO: 0.2 % — SIGNIFICANT CHANGE UP (ref 0–2)
BILIRUB SERPL-MCNC: 0.4 MG/DL — SIGNIFICANT CHANGE UP (ref 0.4–2)
BUN SERPL-MCNC: 40 MG/DL — HIGH (ref 8–20)
CALCIUM SERPL-MCNC: 10.6 MG/DL — HIGH (ref 8.6–10.2)
CHLORIDE SERPL-SCNC: 86 MMOL/L — LOW (ref 98–107)
CO2 SERPL-SCNC: 19 MMOL/L — LOW (ref 22–29)
CREAT SERPL-MCNC: 5.75 MG/DL — HIGH (ref 0.5–1.3)
EOSINOPHIL # BLD AUTO: 0 K/UL — SIGNIFICANT CHANGE UP (ref 0–0.5)
EOSINOPHIL NFR BLD AUTO: 0 % — SIGNIFICANT CHANGE UP (ref 0–6)
GLUCOSE BLDC GLUCOMTR-MCNC: 127 MG/DL — HIGH (ref 70–99)
GLUCOSE SERPL-MCNC: 142 MG/DL — HIGH (ref 70–99)
HCT VFR BLD CALC: 33.4 % — LOW (ref 34.5–45)
HGB BLD-MCNC: 10.4 G/DL — LOW (ref 11.5–15.5)
IMM GRANULOCYTES NFR BLD AUTO: 0.9 % — SIGNIFICANT CHANGE UP (ref 0–1.5)
LYMPHOCYTES # BLD AUTO: 1.09 K/UL — SIGNIFICANT CHANGE UP (ref 1–3.3)
LYMPHOCYTES # BLD AUTO: 10.6 % — LOW (ref 13–44)
MCHC RBC-ENTMCNC: 28.8 PG — SIGNIFICANT CHANGE UP (ref 27–34)
MCHC RBC-ENTMCNC: 31.1 GM/DL — LOW (ref 32–36)
MCV RBC AUTO: 92.5 FL — SIGNIFICANT CHANGE UP (ref 80–100)
MONOCYTES # BLD AUTO: 0.67 K/UL — SIGNIFICANT CHANGE UP (ref 0–0.9)
MONOCYTES NFR BLD AUTO: 6.5 % — SIGNIFICANT CHANGE UP (ref 2–14)
NEUTROPHILS # BLD AUTO: 8.46 K/UL — HIGH (ref 1.8–7.4)
NEUTROPHILS NFR BLD AUTO: 81.8 % — HIGH (ref 43–77)
NT-PROBNP SERPL-SCNC: HIGH PG/ML (ref 0–300)
PLATELET # BLD AUTO: 322 K/UL — SIGNIFICANT CHANGE UP (ref 150–400)
POTASSIUM SERPL-MCNC: 4.9 MMOL/L — SIGNIFICANT CHANGE UP (ref 3.5–5.3)
POTASSIUM SERPL-SCNC: 4.9 MMOL/L — SIGNIFICANT CHANGE UP (ref 3.5–5.3)
PROCALCITONIN SERPL-MCNC: 3.2 NG/ML — HIGH (ref 0.02–0.1)
PROT SERPL-MCNC: 8.4 G/DL — SIGNIFICANT CHANGE UP (ref 6.6–8.7)
RAPID RVP RESULT: SIGNIFICANT CHANGE UP
RBC # BLD: 3.61 M/UL — LOW (ref 3.8–5.2)
RBC # FLD: 14.6 % — HIGH (ref 10.3–14.5)
SARS-COV-2 RNA SPEC QL NAA+PROBE: DETECTED
SODIUM SERPL-SCNC: 130 MMOL/L — LOW (ref 135–145)
TROPONIN T SERPL-MCNC: 0.06 NG/ML — SIGNIFICANT CHANGE UP (ref 0–0.06)
WBC # BLD: 10.33 K/UL — SIGNIFICANT CHANGE UP (ref 3.8–10.5)
WBC # FLD AUTO: 10.33 K/UL — SIGNIFICANT CHANGE UP (ref 3.8–10.5)

## 2020-03-29 PROCEDURE — 99233 SBSQ HOSP IP/OBS HIGH 50: CPT

## 2020-03-29 PROCEDURE — 93010 ELECTROCARDIOGRAM REPORT: CPT

## 2020-03-29 PROCEDURE — 99291 CRITICAL CARE FIRST HOUR: CPT

## 2020-03-29 PROCEDURE — 71045 X-RAY EXAM CHEST 1 VIEW: CPT | Mod: 26

## 2020-03-29 RX ORDER — CLOPIDOGREL BISULFATE 75 MG/1
75 TABLET, FILM COATED ORAL DAILY
Refills: 0 | Status: DISCONTINUED | OUTPATIENT
Start: 2020-03-29 | End: 2020-04-03

## 2020-03-29 RX ORDER — ACETAMINOPHEN 500 MG
650 TABLET ORAL ONCE
Refills: 0 | Status: DISCONTINUED | OUTPATIENT
Start: 2020-03-29 | End: 2020-03-29

## 2020-03-29 RX ORDER — CARVEDILOL PHOSPHATE 80 MG/1
12.5 CAPSULE, EXTENDED RELEASE ORAL ONCE
Refills: 0 | Status: DISCONTINUED | OUTPATIENT
Start: 2020-03-29 | End: 2020-03-29

## 2020-03-29 RX ORDER — AMLODIPINE BESYLATE 2.5 MG/1
10 TABLET ORAL DAILY
Refills: 0 | Status: DISCONTINUED | OUTPATIENT
Start: 2020-03-29 | End: 2020-04-03

## 2020-03-29 RX ORDER — HEPARIN SODIUM 5000 [USP'U]/ML
5000 INJECTION INTRAVENOUS; SUBCUTANEOUS EVERY 12 HOURS
Refills: 0 | Status: DISCONTINUED | OUTPATIENT
Start: 2020-03-29 | End: 2020-04-03

## 2020-03-29 RX ORDER — HYDRALAZINE HCL 50 MG
100 TABLET ORAL EVERY 12 HOURS
Refills: 0 | Status: DISCONTINUED | OUTPATIENT
Start: 2020-03-29 | End: 2020-04-03

## 2020-03-29 RX ORDER — ACETAMINOPHEN 500 MG
975 TABLET ORAL ONCE
Refills: 0 | Status: COMPLETED | OUTPATIENT
Start: 2020-03-29 | End: 2020-03-29

## 2020-03-29 RX ORDER — ONDANSETRON 8 MG/1
4 TABLET, FILM COATED ORAL ONCE
Refills: 0 | Status: COMPLETED | OUTPATIENT
Start: 2020-03-29 | End: 2020-03-29

## 2020-03-29 RX ORDER — CHLORHEXIDINE GLUCONATE 213 G/1000ML
1 SOLUTION TOPICAL
Refills: 0 | Status: DISCONTINUED | OUTPATIENT
Start: 2020-03-29 | End: 2020-04-03

## 2020-03-29 RX ORDER — ACETAMINOPHEN 500 MG
650 TABLET ORAL ONCE
Refills: 0 | Status: COMPLETED | OUTPATIENT
Start: 2020-03-29 | End: 2020-03-29

## 2020-03-29 RX ORDER — HYDRALAZINE HCL 50 MG
10 TABLET ORAL EVERY 4 HOURS
Refills: 0 | Status: DISCONTINUED | OUTPATIENT
Start: 2020-03-29 | End: 2020-04-03

## 2020-03-29 RX ORDER — HYDRALAZINE HCL 50 MG
100 TABLET ORAL ONCE
Refills: 0 | Status: DISCONTINUED | OUTPATIENT
Start: 2020-03-29 | End: 2020-03-29

## 2020-03-29 RX ORDER — NITROGLYCERIN 6.5 MG
5 CAPSULE, EXTENDED RELEASE ORAL
Qty: 50 | Refills: 0 | Status: DISCONTINUED | OUTPATIENT
Start: 2020-03-29 | End: 2020-03-29

## 2020-03-29 RX ORDER — AMLODIPINE BESYLATE 2.5 MG/1
10 TABLET ORAL ONCE
Refills: 0 | Status: DISCONTINUED | OUTPATIENT
Start: 2020-03-29 | End: 2020-03-29

## 2020-03-29 RX ORDER — ASPIRIN/CALCIUM CARB/MAGNESIUM 324 MG
81 TABLET ORAL DAILY
Refills: 0 | Status: DISCONTINUED | OUTPATIENT
Start: 2020-03-29 | End: 2020-04-03

## 2020-03-29 RX ORDER — SIMVASTATIN 20 MG/1
20 TABLET, FILM COATED ORAL AT BEDTIME
Refills: 0 | Status: DISCONTINUED | OUTPATIENT
Start: 2020-03-29 | End: 2020-04-03

## 2020-03-29 RX ADMIN — SIMVASTATIN 20 MILLIGRAM(S): 20 TABLET, FILM COATED ORAL at 22:05

## 2020-03-29 RX ADMIN — ONDANSETRON 4 MILLIGRAM(S): 8 TABLET, FILM COATED ORAL at 10:54

## 2020-03-29 RX ADMIN — Medication 0.2 MILLIGRAM(S): at 10:28

## 2020-03-29 RX ADMIN — Medication 975 MILLIGRAM(S): at 10:12

## 2020-03-29 RX ADMIN — Medication 0.2 MILLIGRAM(S): at 18:21

## 2020-03-29 RX ADMIN — CHLORHEXIDINE GLUCONATE 1 APPLICATION(S): 213 SOLUTION TOPICAL at 21:58

## 2020-03-29 RX ADMIN — Medication 81 MILLIGRAM(S): at 21:58

## 2020-03-29 RX ADMIN — Medication 650 MILLIGRAM(S): at 20:13

## 2020-03-29 RX ADMIN — AMLODIPINE BESYLATE 10 MILLIGRAM(S): 2.5 TABLET ORAL at 10:27

## 2020-03-29 RX ADMIN — Medication 1.5 MICROGRAM(S)/MIN: at 06:51

## 2020-03-29 RX ADMIN — CLOPIDOGREL BISULFATE 75 MILLIGRAM(S): 75 TABLET, FILM COATED ORAL at 22:06

## 2020-03-29 RX ADMIN — Medication 100 MILLIGRAM(S): at 18:22

## 2020-03-29 RX ADMIN — Medication 100 MILLIGRAM(S): at 10:28

## 2020-03-29 RX ADMIN — Medication 975 MILLIGRAM(S): at 06:32

## 2020-03-29 RX ADMIN — HEPARIN SODIUM 5000 UNIT(S): 5000 INJECTION INTRAVENOUS; SUBCUTANEOUS at 19:04

## 2020-03-29 NOTE — H&P ADULT - ASSESSMENT
1: Acute hypoxic respiratory  2: Acute pulmonary edema  3: HTN emergency   4: ESRD on HD: possibly missed HD  5: COVID-19 r/o     Patient seen and examined   Isolation: airborne and droplet for now eith contact     Plan was to admit the patient to micu early this morning by me but managed nitro drip 1: Acute hypoxic respiratory  2: Acute pulmonary edema  3: HTN emergency   4: ESRD on HD: possibly missed HD  5: COVID-19 r/o     Patient seen and examined   Isolation: airborne and droplet for now eith contact     Plan was to admit the patient to micu early this morning by me but managed nitro drip in ED and gave oral antihypertensives like amlodipine 10 mg daILY, HYDRALAZINE 100 BID AND CLONIDINE .2 BID WITH prn HYDRALAZINE WITH PLAN FOR EMERGENT hd  REMAINED on NC 2L with RR improved with better BP control admittyed earlier this AM to ICU and now being transferred out to tele with  status     I have personally provided 65  minutes of critical care time excluding time spent on seperate procedures

## 2020-03-29 NOTE — ED PROVIDER NOTE - CARDIAC, MLM
73 y/o female with symptomatic anemia, recurrent need for PRBC transfusions, Hx of Diastolic CHF, CKD-4, HTN, HLD, s/p BioAVR, CAD s/p CABG, thrombocytopenia sec to cirrhosis, hep c not yet treated, class 2 pulm htn w/ chronic resp failure w/ hypoxia, DM-2, Hypothyroid         #acute on chronic fe deficiency anemia, symptomatic. stable    -hx of extensive w/u in past for anemia, unremarkable, seen by gi + heme as outpt    -sp 4u complicated by acute on chronic hfpef    -goal hgb per outpt providers >/= 8. aslt h/h yesterday 9.0. no further bleeding    -ppi bid, avoid nsaid. resume ASA    -gi eval appreciated, s/p repeat egd/colonoscopy:     ~ Gastric mass of unknown significance,? gastritis, Esophageal varices, banded.  Colon polyp.     ~ [facility].    ~ PPI BID    ~ Tolerating diet    -fe studies w/o current deficiency    -close outpt fu w/ heme + gi, pending heme fu, consult recalled 8/12, Dr Valdivia group    - patient wants to leave. will send OP    - patient to f/u with Dr. Howell in 1-2 weeks OP, PCP in 1 week for repeat H/h        Constipation: laxatives ordered avoid constipation. drink upto 1L or otherwise instructed by cardio.  metamucil. avoid straining        #acute on chronic hfpef. resolved initially, recurrent, resolved again    -sec to transfusions    -sb cardio eval appreciated, lasix changed to po, strict i/o, daily wt    -avoid ivf if not absolutely needed, fluid restrict, inc lasix if additional transfusions needed    - DC home with PO lasix and cardio eval in 1-2 weeks        #class 2 pulm htn complicated by chronic resp failure w/ hypoxia.    -stable, baseline 2-3L nc    -ct chest discussed w/ radiology, pt w/ RUL nodule, 2-3mo fu rec, also w/ lingular PNA vs atelectasis, aside from cough no other clinical sign of pna, will hold off on abx, monitor clinically, IS, neg rvp, supportive care for cough + associated rib pain. currently denied any rib pain. cough baseline    -pt aware of nodules, follows w/ pulm for it    -nebs prn    -pulm eval appreciated        #ckd4. stable    -baseline cr 2.4-2.7, NO ney        #dm2 on long term insulin complicated by asymptomatic hyperglycemia. stable    -a1c 5.3, controlled    -iss + fs achs, lantus bid for better daytime control (resumed)     - DC with PO meds        #htn. : c/w BB< hydralazine. f/u PCP in 1 week         #hypernatremia. resolved        #hypophosphatemia. resolved        #dvt ppx. no pharm agents, scd        #dispo. PT eval appreciated--> LIVIA. patient refused. seen by PT today and suggested to home with Home PT. fall precautions advised.             #outpt fu. pmd, gi, heme, pulm, repeat ct chest 2-3mo w/ iv contrast if poss, cardio                                    Vital Signs Last 24 Hrs    T(C): 36.9 (15 Aug 2019 08:41), Max: 37.1 (14 Aug 2019 16:23)    T(F): 98.4 (15 Aug 2019 08:41), Max: 98.7 (14 Aug 2019 16:23)    HR: 69 (15 Aug 2019 10:12) (64 - 82)    BP: 152/72 (15 Aug 2019 08:41) (150/74 - 168/73)    BP(mean): --    RR: 19 (15 Aug 2019 08:41) (18 - 19)    SpO2: 94% (15 Aug 2019 10:12) (94% - 97%)        General: Elderly female sitting up in bed comfortably. No acute distress    HEENT: PERRLA. EOMI. Clear conjunctivae. Moist mucus membrane. Throat with mild congestion.     Neck: Supple.      Chest: CTA bilaterally - no wheezing, rales or rhonchi.     Heart: Normal S1 & S2. RRR.     Abdomen: Soft. Non-tender. Non-distended. + BS    Ext: No pedal edema. No calf tenderness     Neuro: Active and alert. No focal deficit. No speech disorder    Skin: Warm and Dry    Psychiatry: Normal mood and affect        Pt expresses understanding and agreement with plan of discharge and follow up as outlined above. Patient has had the opportunity to ask questions and all questions have been answered, Patient is in no acute distress at discharge and vital signs are stable. Patient is advised to return to the ED immediately with any worsening or worrisome symptoms. Normal rate, regular rhythm.  Heart sounds S1, S2.  No murmurs, rubs or gallops. 71 y/o female with symptomatic anemia, recurrent need for PRBC transfusions, Hx of Diastolic CHF, CKD-4, HTN, HLD, s/p BioAVR, CAD s/p CABG, thrombocytopenia sec to cirrhosis, hep c not yet treated, class 2 pulm htn w/ chronic resp failure w/ hypoxia, DM-2, Hypothyroid         #acute on chronic fe deficiency anemia, symptomatic. stable    -hx of extensive w/u in past for anemia, unremarkable, seen by gi + heme as outpt    -sp 4u complicated by acute on chronic hfpef    -goal hgb per outpt providers >/= 8. aslt h/h yesterday 9.0. no further bleeding    -ppi bid, avoid nsaid. resume ASA    -gi eval appreciated, s/p repeat egd/colonoscopy:     ~ Gastric mass of unknown significance,? gastritis, Esophageal varices, banded.  Colon polyp.     ~ [facility].    ~ PPI BID    ~ Tolerating diet    -fe studies w/o current deficiency    -close outpt fu w/ heme + gi, pending heme fu, consult recalled 8/12, Dr Valdivia group    - patient wants to leave. will send OP    - patient to f/u with Dr. Howell in 1-2 weeks OP, PCP in 1 week for repeat H/h        Constipation: laxatives ordered avoid constipation. drink upto 1L or otherwise instructed by cardio.  metamucil. avoid straining        #acute on chronic hfpef. resolved initially, recurrent, resolved again    -sec to transfusions    -sb cardio eval appreciated, lasix changed to po, strict i/o, daily wt    -avoid ivf if not absolutely needed, fluid restrict, inc lasix if additional transfusions needed    - DC home with PO lasix and cardio eval in 1-2 weeks        #class 2 pulm htn complicated by chronic resp failure w/ hypoxia.    -stable, baseline 2-3L nc    -ct chest discussed w/ radiology, pt w/ RUL nodule, 2-3mo fu rec, also w/ lingular PNA vs atelectasis, aside from cough no other clinical sign of pna, will hold off on abx, monitor clinically, IS, neg rvp, supportive care for cough + associated rib pain. currently denied any rib pain. cough baseline    -pt aware of nodules, follows w/ pulm for it    -nebs prn    -pulm eval appreciated        #ckd4. stable    -baseline cr 2.4-2.7, NO ney        #dm2 on long term insulin complicated by asymptomatic hyperglycemia. stable    -a1c 5.3, controlled    -iss + fs achs, lantus bid for better daytime control (resumed)     - DC with PO meds        #htn. : c/w BB< hydralazine. f/u PCP in 1 week         #hypernatremia. resolved        #hypophosphatemia. resolved        #dvt ppx. no pharm agents, scd        #dispo. PT eval appreciated--> LIVIA. patient refused. seen by PT today and suggested to home with Home PT. fall precautions advised.             #outpt fu. pmd, gi, heme, pulm, repeat ct chest 2-3mo w/ iv contrast if poss, cardio            Vital Signs Last 24 Hrs    T(C): 36.9 (15 Aug 2019 08:41), Max: 37.1 (14 Aug 2019 16:23)    T(F): 98.4 (15 Aug 2019 08:41), Max: 98.7 (14 Aug 2019 16:23)    HR: 69 (15 Aug 2019 10:12) (64 - 82)    BP: 152/72 (15 Aug 2019 08:41) (150/74 - 168/73)    BP(mean): --    RR: 19 (15 Aug 2019 08:41) (18 - 19)    SpO2: 94% (15 Aug 2019 10:12) (94% - 97%)        General: Elderly female sitting up in bed comfortably. No acute distress    HEENT: PERRLA. EOMI. Clear conjunctivae. Moist mucus membrane. Throat with mild congestion.     Neck: Supple.      Chest: CTA bilaterally - no wheezing, rales or rhonchi.     Heart: Normal S1 & S2. RRR.     Abdomen: Soft. Non-tender. Non-distended. + BS    Ext: No pedal edema. No calf tenderness     Neuro: Active and alert. No focal deficit. No speech disorder    Skin: Warm and Dry    Psychiatry: Normal mood and affect        Pt expresses understanding and agreement with plan of discharge and follow up as outlined above. Patient has had the opportunity to ask questions and all questions have been answered, Patient is in no acute distress at discharge and vital signs are stable. Patient is advised to return to the ED immediately with any worsening or worrisome symptoms.

## 2020-03-29 NOTE — ED PROVIDER NOTE - CARE PLAN
Principal Discharge DX:	Acute pulmonary edema  Secondary Diagnosis:	Acute respiratory failure with hypoxia

## 2020-03-29 NOTE — ED PROVIDER NOTE - OBJECTIVE STATEMENT
Pt is a 64y F with PMH of CAD/ hypothyroidism/HTN/DM/renal disease on dialysis BIBA for SOB/ HA/ cough since yesterday. Pt states after dialysis yesterday she began to feel SOB. She had dialysis every Tuesday/ Thursday/ and Saturday. Pt on 3L nc from ambulance and sating at 98%. She denies fever/nausea/vomiting/diarrhea/abd pain/chest pain/ LE edema. Pt denies any sick contacts. Pt is a 64y F with PMH of CAD/ hypothyroidism/HTN/DM/renal disease on hemodialysis BIBA for SOB/ HA/ body aches/ cough since yesterday. Pt states after dialysis yesterday she began to feel SOB. She had dialysis every Tuesday/ Thursday/ and Saturday. Pt on 3L nc from ambulance and sating at 98%. Her BP is elevated to 200/70 and pt states she has not taken her BP medication today. She denies fever/nausea/vomiting/diarrhea/abd pain/chest pain/ LE edema. Pt denies any sick contacts. Pt is a 64y F with PMH of CAD/ hypothyroidism/HTN/DM/renal disease on hemodialysis BIBA for SOB/ HA/ body aches/ cough since yesterday. Pt states after dialysis yesterday she began to feel SOB. She has dialysis every Tuesday/ Thursday/ and Saturday. Pt on 3L nc from ambulance and sating at 98%. Her BP is elevated to 200/70 and pt states she has not taken her BP medication today. She denies fever/nausea/vomiting/diarrhea/abd pain/chest pain/ LE edema. Pt denies any sick contacts.   -Odin.

## 2020-03-29 NOTE — H&P ADULT - NSHPPHYSICALEXAM_GEN_ALL_CORE
ICU Vital Signs Last 24 Hrs  T(C): 37.7 (29 Mar 2020 03:58), Max: 37.7 (29 Mar 2020 03:58)  T(F): 99.8 (29 Mar 2020 03:58), Max: 99.8 (29 Mar 2020 03:58)  HR: 83 (29 Mar 2020 08:30) (83 - 93)  BP: 145/60 (29 Mar 2020 08:30) (145/60 - 221/88)  BP(mean): 90 (29 Mar 2020 08:30) (90 - 125)  ABP: --  ABP(mean): --  RR: 30 (29 Mar 2020 08:30) (18 - 50)  SpO2: 96% (29 Mar 2020 08:30) (92% - 98%)  General: in resp distress using acc muscle of breathing   Neuro: AAO*3, No motor, sensory, or cranial nerve deficit  HEENT: Pupils equal, reactive to light, Oral mucosa moist; JVD+  PULM: AE equal b/l and rhonchi ++   CVS: Regular rhythm and controlled rate, no murmurs, rubs, or gallops  ABD: Soft, nondistended, nontender, normoactive bowel sounds, no CVA tenderness  EXT: No b/l LE edema, nontender with pedal pulse palpable, varicose vns + b/l  SKIN: Warm and well perfused, no acute rashes

## 2020-03-29 NOTE — PROGRESS NOTE ADULT - SUBJECTIVE AND OBJECTIVE BOX
KIM LEE  ----------------------------------------  The patient was seen and evaluated for hypertensive urgency. Reports feeling cold.    Vital Signs Last 24 Hrs  T(C): 36.5 (29 Mar 2020 12:30), Max: 37.7 (29 Mar 2020 03:58)  T(F): 97.7 (29 Mar 2020 12:30), Max: 99.8 (29 Mar 2020 03:58)  HR: 86 (29 Mar 2020 12:30) (83 - 93)  BP: 96/48 (29 Mar 2020 12:30) (96/48 - 221/88)  BP(mean): 90 (29 Mar 2020 08:30) (90 - 125)  RR: 21 (29 Mar 2020 12:30) (18 - 50)  SpO2: 100% (29 Mar 2020 12:30) (92% - 100%)    CAPILLARY BLOOD GLUCOSE  POCT Blood Glucose.: 127 mg/dL (29 Mar 2020 13:51)    PHYSICAL EXAMINATION:  ----------------------------------------  General appearance: Appears anxious, Awake, Alert  HEENT: Normocephalic, Atraumatic, Conjunctiva clear, EOMI  Neck: Supple, No JVD, No tenderness  Lungs: Breath sound equal bilaterally, No wheezes, Positive rales  Cardiovascular: S1S2, Regular rhythm  Abdomen: Soft, Nontender, Nondistended, No guarding/rebound, Positive bowel sounds  Extremities: No clubbing, No cyanosis, No edema, No calf tenderness  Neuro: Strength equal bilaterally, No tremors  Psychiatric: Appropriate mood, Normal affect    LABORATORY STUDIES:  ----------------------------------------             10.4   10.33 )-----------( 322      ( 29 Mar 2020 06:38 )             33.4     03-29    130<L>  |  86<L>  |  40.0<H>  ----------------------------<  142<H>  4.9   |  19.0<L>  |  5.75<H>    Ca    10.6<H>      29 Mar 2020 06:38    TPro  8.4  /  Alb  4.1  /  TBili  0.4  /  DBili  x   /  AST  38<H>  /  ALT  12  /  AlkPhos  96  03-29    LIVER FUNCTIONS - ( 29 Mar 2020 06:38 )  Alb: 4.1 g/dL / Pro: 8.4 g/dL / ALK PHOS: 96 U/L / ALT: 12 U/L / AST: 38 U/L / GGT: x           CARDIAC MARKERS ( 29 Mar 2020 06:38 )  x     / 0.06 ng/mL / x     / x     / x        MEDICATIONS  (STANDING):  amLODIPine   Tablet 10 milliGRAM(s) Oral daily  chlorhexidine 4% Liquid 1 Application(s) Topical <User Schedule>  cloNIDine 0.2 milliGRAM(s) Oral every 12 hours  heparin  Injectable 5000 Unit(s) SubCutaneous every 12 hours  hydrALAZINE 100 milliGRAM(s) Oral every 12 hours    MEDICATIONS  (PRN):  hydrALAZINE Injectable 10 milliGRAM(s) IV Push every 4 hours PRN SBP> 180      ASSESSMENT / PLAN:  ----------------------------------------  64F with a history of ESRD, hypertension, hypothyroidism, coronary artery disease with CABG who presented with dyspnea, cough, and body aches. She was found to have uncontrolled hypertension (221/88) with chest Xray noting bilateral increased interstitial markings and patchy nodularity. The patient was admitted to the intensive care unit with initiation of a nitroglycerin infusion.    Hypertensive urgency - Blood pressure improved. Nitroglycerin infusion was discontinued and the patient continued on oral antihypertensive medications. On amlodipine, clonidine, and hydralazine. Continue on telemetry monitoring.    Acute pulmonary edema - For hemodialysis Supplemental oxygen as needed. Xray noted bilateral infiltrates, COVID-19 results to be followed.    ESRD - Nephrology consultation noted. Underwent hemodialysis today but noted episodes of hypotension during the procedure.    Coronary artery disease - To resume aspirin, clopidogrel, and simvastatin    Hypothyroidism - On levothyroxine.

## 2020-03-29 NOTE — H&P ADULT - NSHPSOCIALHISTORY_GEN_ALL_CORE
denied any recent travel; ? exposure to COVID as poor historian; no alcohol tobacco or substance abuse

## 2020-03-29 NOTE — CONSULT NOTE ADULT - SUBJECTIVE AND OBJECTIVE BOX
Patient is a 64y old  Female who presents with a chief complaint of  SOB without fever.    HPI:   CRF 5 due to DM 2 presents with sob. Pt has op HD MWF she states but could not wait until  tomorrow. Past hx as below.    PAST MEDICAL & SURGICAL HISTORY:  3-vessel CAD: s/p CABG  Hypothyroidism, unspecified type  Hemodialysis patient  Renal failure  Hypertension  Diabetes mellitus  A-V fistula  S/P cholecystectomy      FAMILY HISTORY:  Family history of diabetes mellitus  NC    Social History:Non smoker    MEDICATIONS  (STANDING):  chlorhexidine 4% Liquid 1 Application(s) Topical <User Schedule>  heparin  Injectable 5000 Unit(s) SubCutaneous every 12 hours  nitroglycerin  Infusion 5 MICROgram(s)/Min (1.5 mL/Hr) IV Continuous <Continuous>    MEDICATIONS  (PRN):   Meds reviewed    Allergies    No Known Allergies        REVIEW OF SYSTEMS:    CONSTITUTIONAL:  sob, weight gain, feels weak  EYES: No eye pain, visual disturbances, or discharge  ENMT:  No difficulty hearing, tinnitus, vertigo; No sinus or throat pain  NECK: No pain or stiffness  BREASTS: No pain, masses, or nipple discharge  RESPIRATORY: sob  CARDIOVASCULAR: No chest pain, palpitations, dizziness,   GASTROINTESTINAL: No abdominal or epigastric pain. No nausea, vomiting, or hematemesis; No diarrhea or constipation. + Scar, obesity  GENITOURINARY: No dysuria, frequency, hematuria, or incontinence  NEUROLOGICAL: No headaches, memory loss, loss of strength, numbness, or tremors  SKIN: Neg  LYMPH NODES: No enlarged glands  ENDOCRINE: No heat or cold intolerance; No hair loss  MUSCULOSKELETAL: OA  PSYCHIATRIC: No depression, anxiety, mood swings, or difficulty sleeping  HEME/LYMPH: No easy bruising, or bleeding gums  ALLERY AND IMMUNOLOGIC: No hives or eczema      Vital Signs Last 24 Hrs  T(C): 37.7 (29 Mar 2020 03:58), Max: 37.7 (29 Mar 2020 03:58)  T(F): 99.8 (29 Mar 2020 03:58), Max: 99.8 (29 Mar 2020 03:58)  HR: 83 (29 Mar 2020 08:30) (83 - 93)  BP: 145/60 (29 Mar 2020 08:30) (145/60 - 221/88)  BP(mean): 90 (29 Mar 2020 08:30) (90 - 125)  RR: 30 (29 Mar 2020 08:30) (18 - 50)  SpO2: 96% (29 Mar 2020 08:30) (92% - 98%)       PHYSICAL EXAM:    GENERAL: appears acutely and chronically ill, oriented.  HEAD:  Atraumatic, Normocephalic  EYES: EOMI, PERRLA, conjunctiva and sclera clear  ENMT: No tonsillar erythema, exudates, or enlargement; Moist mucous membranes  NECK: Supple, neck  veins full  NERVOUS SYSTEM:  Alert & Oriented X3, Good concentration; Motor Strength wnl upper and lower extremities;  CHEST/LUNG: Bilateral rales  HEART: Regular rate and rhythm; sternal scar, no rub  ABDOMEN: Soft, Nontender, Nondistended; Bowel sounds present, obese  EXTREMITIES:  Diffuse muscle wasting; left arm access with bruit  LYMPH: No lymphadenopathy noted  SKIN: No rashes or lesions, pale      LABS:                        10.4   10.33 )-----------( 322      ( 29 Mar 2020 06:38 )             33.4     03-29    130<L>  |  86<L>  |  40.0<H>  ----------------------------<  142<H>  4.9   |  19.0<L>  |  5.75<H>    Ca    10.6<H>      29 Mar 2020 06:38    TPro  8.4  /  Alb  4.1  /  TBili  0.4  /  DBili  x   /  AST  38<H>  /  ALT  12  /  AlkPhos  96  03-29                RADIOLOGY & ADDITIONAL TESTS:

## 2020-03-29 NOTE — H&P ADULT - HISTORY OF PRESENT ILLNESS
Patient is a 64y old  Female who presents with a chief complaint of     HPI/BRIEF HOSPITAL COURSE: ***    Events last 24 hours: ***    PAST MEDICAL & SURGICAL HISTORY:  3-vessel CAD: s/p CABG  Hypothyroidism, unspecified type  Hemodialysis patient: tues thursday saturday  Renal failure  Hypertension  Diabetes mellitus  A-V fistula  S/P cholecystectomy      Review of Systems:  CONSTITUTIONAL: No fever, chills, or fatigue  EYES: No eye pain, visual disturbances, or discharge  ENMT:  No difficulty hearing, tinnitus, vertigo; No sinus or throat pain  NECK: No pain or stiffness  RESPIRATORY: No cough, wheezing, chills or hemoptysis; No shortness of breath  CARDIOVASCULAR: No chest pain, palpitations, dizziness, or leg swelling  GASTROINTESTINAL: No abdominal or epigastric pain. No nausea, vomiting, or hematemesis; No diarrhea or constipation. No melena or hematochezia.  GENITOURINARY: No dysuria, frequency, hematuria, or incontinence  NEUROLOGICAL: No headaches, memory loss, loss of strength, numbness, or tremors  SKIN: No itching, burning, rashes, or lesions   MUSCULOSKELETAL: No joint pain or swelling; No muscle, back, or extremity pain  PSYCHIATRIC: No depression, anxiety, mood swings, or difficulty sleeping        Physical Examination:    ICU Vital Signs Last 24 Hrs  T(C): 37.7 (29 Mar 2020 03:58), Max: 37.7 (29 Mar 2020 03:58)  T(F): 99.8 (29 Mar 2020 03:58), Max: 99.8 (29 Mar 2020 03:58)  HR: 83 (29 Mar 2020 08:30) (83 - 93)  BP: 145/60 (29 Mar 2020 08:30) (145/60 - 221/88)  BP(mean): 90 (29 Mar 2020 08:30) (90 - 125)  ABP: --  ABP(mean): --  RR: 30 (29 Mar 2020 08:30) (18 - 50)  SpO2: 96% (29 Mar 2020 08:30) (92% - 98%)      General: No acute distress.      Neuro: AAO*3, No motor, sensory, or cranial nerve deficit    HEENT: Pupils equal, reactive to light, Oral mucosa    PULM: Clear to auscultation bilaterally, no significant adventitious breath sounds     CVS: Regular rhythm and controlled rate, no murmurs, rubs, or gallops    ABD: Soft, nondistended, nontender, normoactive bowel sounds, no CVA tenderness    EXT: No b/l LE edema, nontender with pedal pulse palpable     SKIN: Warm and well perfused, no acute rashes Patient is a 64y old  Female who presents with a chief complaint of     HPI/BRIEF HOSPITAL COURSE:    64y F with PMH of CAD/ hypothyroidism/HTN/DM/renal disease on hemodialysis BIBA for SOB/ HA/ body aches/ cough since yesterday. Pt states after dialysis yesterday she began to feel SOB. She has dialysis every Tuesday/ Thursday/ and Saturday. Pt on 3L nc from ambulance and sating at 98%. Her BP is elevated to 200/70 and pt states she has not taken her BP medication today.   Non-sp cough and congestion and hence got nasopharyngeal swab in ED-> MICU called      PAST MEDICAL & SURGICAL HISTORY:  3-vessel CAD: s/p CABG  Hypothyroidism, unspecified type  Hemodialysis patient: tues thursday saturday  Renal failure  Hypertension  Diabetes mellitus  A-V fistula  S/P cholecystectomy

## 2020-03-29 NOTE — ED PROVIDER NOTE - PROGRESS NOTE DETAILS
pt noted increased respiratory distress and attending called to bedside. Nitro infusion started and pt improving. Call put out for emergent HD by Dr. Moe. Dr. Fiore will see pt shortly in ED. FAUSTINOU consulted.

## 2020-03-29 NOTE — ED PROVIDER NOTE - CLINICAL SUMMARY MEDICAL DECISION MAKING FREE TEXT BOX
Pt is a 64y F with PMH of CAD HTN hypothyroidism DM ESRD on HD presenting with SOB (on 3L nc via ems) since yesterday after dialysis. She also complains of flu like symptoms cough/body aches/ HA. She has not taken her BP medication today. Will give meds and check labs/cxr/ekg/covid/rvp/bnp. Pt is a 64y F with PMH of CAD HTN hypothyroidism DM ESRD on HD presenting with SOB since yesterday (on 3L nc via ems) after dialysis. She also complains of flu like symptoms cough/body aches/ HA. She has not taken her BP medication today. Will give meds and check labs/cxr/ekg/covid/rvp/bnp.

## 2020-03-29 NOTE — ED ADULT TRIAGE NOTE - CHIEF COMPLAINT QUOTE
Pt BIBA from home for SOB s/p dialysis yesterday.   Pt denies fevers/chills recent sick contacts. 98% on 3l nc.

## 2020-03-29 NOTE — ED PROVIDER NOTE - ATTENDING CONTRIBUTION TO CARE
I, Emil Moe, performed a face to face bedside interview with this patient regarding history of present illness, review of symptoms and relevant past medical, social and family history.  I completed an independent physical examination. I have communicated the patient’s plan of care and disposition with the ACP.  64 year old female with PMh HTN, DM and ESRD on HD presents with 1 day of cough, congestion and SOB. Sx are constant, wors with movement. Denies fevers, chest pain, abd pain  Gen: NAD, well appearing  CV: RRR  Pul: b/l rales to upper lung fields  Abd: Soft, non-distended, non-tender  Neuro: no focal deficits  Pt grossly fluid overloaded, markedly hypertensive, started on notro gtt and emergent dialysis arranged

## 2020-03-30 LAB
ANION GAP SERPL CALC-SCNC: 17 MMOL/L — SIGNIFICANT CHANGE UP (ref 5–17)
BUN SERPL-MCNC: 35 MG/DL — HIGH (ref 8–20)
CALCIUM SERPL-MCNC: 10.1 MG/DL — SIGNIFICANT CHANGE UP (ref 8.6–10.2)
CHLORIDE SERPL-SCNC: 91 MMOL/L — LOW (ref 98–107)
CO2 SERPL-SCNC: 26 MMOL/L — SIGNIFICANT CHANGE UP (ref 22–29)
CREAT SERPL-MCNC: 4.87 MG/DL — HIGH (ref 0.5–1.3)
GLUCOSE SERPL-MCNC: 151 MG/DL — HIGH (ref 70–99)
HBV CORE AB SER-ACNC: SIGNIFICANT CHANGE UP
HBV SURFACE AB SER-ACNC: 8.2 MIU/ML — LOW
HBV SURFACE AG SER-ACNC: SIGNIFICANT CHANGE UP
HCT VFR BLD CALC: 29.2 % — LOW (ref 34.5–45)
HCV AB S/CO SERPL IA: 0.13 S/CO — SIGNIFICANT CHANGE UP (ref 0–0.99)
HCV AB SERPL-IMP: SIGNIFICANT CHANGE UP
HGB BLD-MCNC: 8.9 G/DL — LOW (ref 11.5–15.5)
MCHC RBC-ENTMCNC: 28.5 PG — SIGNIFICANT CHANGE UP (ref 27–34)
MCHC RBC-ENTMCNC: 30.5 GM/DL — LOW (ref 32–36)
MCV RBC AUTO: 93.6 FL — SIGNIFICANT CHANGE UP (ref 80–100)
PLATELET # BLD AUTO: 310 K/UL — SIGNIFICANT CHANGE UP (ref 150–400)
POTASSIUM SERPL-MCNC: 4.6 MMOL/L — SIGNIFICANT CHANGE UP (ref 3.5–5.3)
POTASSIUM SERPL-SCNC: 4.6 MMOL/L — SIGNIFICANT CHANGE UP (ref 3.5–5.3)
RBC # BLD: 3.12 M/UL — LOW (ref 3.8–5.2)
RBC # FLD: 14.9 % — HIGH (ref 10.3–14.5)
SODIUM SERPL-SCNC: 134 MMOL/L — LOW (ref 135–145)
WBC # BLD: 9.12 K/UL — SIGNIFICANT CHANGE UP (ref 3.8–10.5)
WBC # FLD AUTO: 9.12 K/UL — SIGNIFICANT CHANGE UP (ref 3.8–10.5)

## 2020-03-30 PROCEDURE — 99222 1ST HOSP IP/OBS MODERATE 55: CPT

## 2020-03-30 PROCEDURE — 99233 SBSQ HOSP IP/OBS HIGH 50: CPT

## 2020-03-30 RX ORDER — HYDROXYCHLOROQUINE SULFATE 200 MG
400 TABLET ORAL EVERY 12 HOURS
Refills: 0 | Status: COMPLETED | OUTPATIENT
Start: 2020-03-30 | End: 2020-03-30

## 2020-03-30 RX ORDER — HYDROXYCHLOROQUINE SULFATE 200 MG
200 TABLET ORAL EVERY 12 HOURS
Refills: 0 | Status: DISCONTINUED | OUTPATIENT
Start: 2020-03-31 | End: 2020-04-03

## 2020-03-30 RX ORDER — ACETAMINOPHEN 500 MG
650 TABLET ORAL EVERY 6 HOURS
Refills: 0 | Status: DISCONTINUED | OUTPATIENT
Start: 2020-03-30 | End: 2020-04-03

## 2020-03-30 RX ORDER — HYDROXYCHLOROQUINE SULFATE 200 MG
TABLET ORAL
Refills: 0 | Status: DISCONTINUED | OUTPATIENT
Start: 2020-03-30 | End: 2020-04-03

## 2020-03-30 RX ADMIN — Medication 0.2 MILLIGRAM(S): at 05:02

## 2020-03-30 RX ADMIN — Medication 0.2 MILLIGRAM(S): at 19:06

## 2020-03-30 RX ADMIN — SIMVASTATIN 20 MILLIGRAM(S): 20 TABLET, FILM COATED ORAL at 21:40

## 2020-03-30 RX ADMIN — Medication 100 MILLIGRAM(S): at 19:06

## 2020-03-30 RX ADMIN — Medication 650 MILLIGRAM(S): at 16:33

## 2020-03-30 RX ADMIN — AMLODIPINE BESYLATE 10 MILLIGRAM(S): 2.5 TABLET ORAL at 05:02

## 2020-03-30 RX ADMIN — Medication 100 MILLIGRAM(S): at 05:03

## 2020-03-30 RX ADMIN — HEPARIN SODIUM 5000 UNIT(S): 5000 INJECTION INTRAVENOUS; SUBCUTANEOUS at 05:03

## 2020-03-30 RX ADMIN — Medication 10 MILLIGRAM(S): at 16:34

## 2020-03-30 RX ADMIN — Medication 400 MILLIGRAM(S): at 12:04

## 2020-03-30 RX ADMIN — Medication 400 MILLIGRAM(S): at 21:40

## 2020-03-30 RX ADMIN — CHLORHEXIDINE GLUCONATE 1 APPLICATION(S): 213 SOLUTION TOPICAL at 06:22

## 2020-03-30 RX ADMIN — HEPARIN SODIUM 5000 UNIT(S): 5000 INJECTION INTRAVENOUS; SUBCUTANEOUS at 19:06

## 2020-03-30 RX ADMIN — CLOPIDOGREL BISULFATE 75 MILLIGRAM(S): 75 TABLET, FILM COATED ORAL at 12:03

## 2020-03-30 RX ADMIN — Medication 81 MILLIGRAM(S): at 12:03

## 2020-03-30 NOTE — PROGRESS NOTE ADULT - SUBJECTIVE AND OBJECTIVE BOX
Patient is a 64y old  Female who presents with a chief complaint of Resp failure (30 Mar 2020 10:32)    Patient seen and examined at bedside.     ALLERGIES:  No Known Allergies    MEDICATIONS  (STANDING):  amLODIPine   Tablet 10 milliGRAM(s) Oral daily  aspirin enteric coated 81 milliGRAM(s) Oral daily  chlorhexidine 4% Liquid 1 Application(s) Topical <User Schedule>  cloNIDine 0.2 milliGRAM(s) Oral every 12 hours  clopidogrel Tablet 75 milliGRAM(s) Oral daily  heparin  Injectable 5000 Unit(s) SubCutaneous every 12 hours  hydrALAZINE 100 milliGRAM(s) Oral every 12 hours  hydroxychloroquine   Oral   hydroxychloroquine 400 milliGRAM(s) Oral every 12 hours  simvastatin 20 milliGRAM(s) Oral at bedtime    MEDICATIONS  (PRN):  hydrALAZINE Injectable 10 milliGRAM(s) IV Push every 4 hours PRN SBP> 180    Vital Signs Last 24 Hrs  T(F): 102.1 (30 Mar 2020 10:39), Max: 103.3 (29 Mar 2020 17:18)  HR: 78 (30 Mar 2020 10:39) (78 - 95)  BP: 158/68 (30 Mar 2020 10:39) (143/56 - 162/65)  RR: 20 (30 Mar 2020 05:01) (20 - 20)  SpO2: 96% (30 Mar 2020 05:01) (96% - 99%)  I&O's Summary    PHYSICAL EXAM:  General: NAD, Alert; obese  ENT: MMM, no thrush  Neck: Supple, No JVD  Lungs: decreased breath sounds b/l bases   Cardio: +s1/s2, No pitting edema  Abdomen: Soft, Nontender, Nondistended; Bowel sounds present  Extremities: No calf tenderness    LABS:                        8.9    9.12  )-----------( 310      ( 30 Mar 2020 08:16 )             29.2     03-30    134  |  91  |  35.0  ----------------------------<  151  4.6   |  26.0  |  4.87    Ca    10.1      30 Mar 2020 08:16    TPro  8.4  /  Alb  4.1  /  TBili  0.4  /  DBili  x   /  AST  38  /  ALT  12  /  AlkPhos  96  03-29    eGFR if Non African American: 9 mL/min/1.73M2 (03-30-20 @ 08:16)  eGFR if African American: 10 mL/min/1.73M2 (03-30-20 @ 08:16)    Procalcitonin, Serum: 3.20 ng/mL (03-29-20 @ 06:39)    CARDIAC MARKERS ( 29 Mar 2020 06:38 )  x     / 0.06 ng/mL / x     / x     / x        RADIOLOGY & ADDITIONAL TESTS:  < from: Xray Chest 1 View-PORTABLE IMMEDIATE (03.29.20 @ 05:57) >  Impression:   Bilateral increased interstitial markings and patchy nodularity right greater than left may represent multifocal pneumonia versus pulmonary edema  < end of copied text >

## 2020-03-30 NOTE — PROGRESS NOTE ADULT - ASSESSMENT
ESRD on HD - TTS schedule  COVID-19+    HD access: [facility]    Patient dialyzed yesterday - 3 hrs, 1 L fluid removal  Hypotension with dialysis, hold antihypertensive medications before dialysis on T/T/S ESRD on HD - TTS schedule  COVID-19+    HD access: [facility]    Patient dialyzed yesterday - 3 hrs, 1 L fluid removal  Hypotension with dialysis, hold antihypertensive medications before dialysis on T/T/S    Anemia  Hold CARLOS- COVID + ESRD on HD - TTS schedule  COVID-19+    HD access: [facility]    Patient dialyzed yesterday - 3 hrs, 1 L fluid removal  Hypotension with dialysis, hold anti-hypertensive medications before dialysis on T/T/S    Anemia  Hold CARLOS at this time- COVID + ESRD on HD - TTS schedule  COVID-19+    HD access: [facility]    Patient dialyzed yesterday - 3 hrs, 1 L fluid removal  Hypotension with dialysis, hold anti-hypertensive medications before dialysis on T/T/S

## 2020-03-30 NOTE — PROGRESS NOTE ADULT - SUBJECTIVE AND OBJECTIVE BOX
[facility] DIVISION OF KIDNEY DISEASES AND HYPERTENSION -- FOLLOW UP NOTE  --------------------------------------------------------------------------------  Chief Complaint: ESRD on HD    24 hour events/subjective:  HD yesterday  As per bedside RN, patient afebrile today    PAST HISTORY  --------------------------------------------------------------------------------  No significant changes to PMH, PSH, FHx, SHx, unless otherwise noted    ALLERGIES & MEDICATIONS  --------------------------------------------------------------------------------  Allergies  No Known Allergies    Standing Inpatient Medications  amLODIPine   Tablet 10 milliGRAM(s) Oral daily  aspirin enteric coated 81 milliGRAM(s) Oral daily  chlorhexidine 4% Liquid 1 Application(s) Topical <User Schedule>  cloNIDine 0.2 milliGRAM(s) Oral every 12 hours  clopidogrel Tablet 75 milliGRAM(s) Oral daily  heparin  Injectable 5000 Unit(s) SubCutaneous every 12 hours  hydrALAZINE 100 milliGRAM(s) Oral every 12 hours  simvastatin 20 milliGRAM(s) Oral at bedtime    PRN Inpatient Medications  hydrALAZINE Injectable 10 milliGRAM(s) IV Push every 4 hours PRN      REVIEW OF SYSTEMS  --------------------------------------------------------------------------------  [facility]    VITALS/PHYSICAL EXAM  --------------------------------------------------------------------------------  T(C): 37.2 (03-30-20 @ 05:01), Max: 39.6 (03-29-20 @ 17:18)  HR: 88 (03-30-20 @ 05:01) (83 - 95)  BP: 162/65 (03-30-20 @ 05:01) (96/48 - 162/65)  RR: 20 (03-30-20 @ 05:01) (20 - 21)  SpO2: 96% (03-30-20 @ 05:01) (96% - 100%)    Physical Exam:  Due to the nature of this patient's COVID-19 isolation status, no bedside physical exam was done to limit spread of infection. Examination highlights were provided by bedside nurse. Objective data were reviewed in detail.    LABS/STUDIES  --------------------------------------------------------------------------------              8.9    9.12  >-----------<  310      [03-30-20 @ 08:16]              29.2     134  |  91  |  35.0  ----------------------------<  151      [03-30-20 @ 08:16]  4.6   |  26.0  |  4.87        Ca     10.1     [03-30-20 @ 08:16]    TPro  8.4  /  Alb  4.1  /  TBili  0.4  /  DBili  x   /  AST  38  /  ALT  12  /  AlkPhos  96  [03-29-20 @ 06:38]      Troponin 0.06      [03-29-20 @ 06:38]    Creatinine Trend:  SCr 4.87 [03-30 @ 08:16]  SCr 5.75 [03-29 @ 06:38]      HbA1c 6.5      [10-05-19 @ 13:33]  TSH 1.64      [10-12-19 @ 10:08] [facility] DIVISION OF KIDNEY DISEASES AND HYPERTENSION -- FOLLOW UP NOTE  --------------------------------------------------------------------------------  Chief Complaint: ESRD on HD    24 hour events/subjective:  HD yesterday  As per bedside RN, patient afebrile today    PAST HISTORY  --------------------------------------------------------------------------------  No significant changes to PMH, PSH, FHx, SHx, unless otherwise noted    ALLERGIES & MEDICATIONS  --------------------------------------------------------------------------------  Allergies  No Known Allergies    Standing Inpatient Medications  amLODIPine   Tablet 10 milliGRAM(s) Oral daily  aspirin enteric coated 81 milliGRAM(s) Oral daily  chlorhexidine 4% Liquid 1 Application(s) Topical <User Schedule>  cloNIDine 0.2 milliGRAM(s) Oral every 12 hours  clopidogrel Tablet 75 milliGRAM(s) Oral daily  heparin  Injectable 5000 Unit(s) SubCutaneous every 12 hours  hydrALAZINE 100 milliGRAM(s) Oral every 12 hours  simvastatin 20 milliGRAM(s) Oral at bedtime    PRN Inpatient Medications  hydrALAZINE Injectable 10 milliGRAM(s) IV Push every 4 hours PRN      REVIEW OF SYSTEMS  --------------------------------------------------------------------------------  [facility]    VITALS/PHYSICAL EXAM  --------------------------------------------------------------------------------  T(C): 37.2 (03-30-20 @ 05:01), Max: 39.6 (03-29-20 @ 17:18)  HR: 88 (03-30-20 @ 05:01) (83 - 95)  BP: 162/65 (03-30-20 @ 05:01) (96/48 - 162/65)  RR: 20 (03-30-20 @ 05:01) (20 - 21)  SpO2: 96% (03-30-20 @ 05:01) (96% - 100%)    Physical Exam:  Due to the nature of this patient's COVID-19 isolation status, no bedside physical exam was done to limit spread of infection. Examination highlights were provided by bedside nurse. Objective data were reviewed in detail.    LABS/STUDIES  --------------------------------------------------------------------------------              8.9    9.12  >-----------<  310      [03-30-20 @ 08:16]              29.2     134  |  91  |  35.0  ----------------------------<  151      [03-30-20 @ 08:16]  4.6   |  26.0  |  4.87        Ca     10.1     [03-30-20 @ 08:16]    TPro  8.4  /  Alb  4.1  /  TBili  0.4  /  DBili  x   /  AST  38  /  ALT  12  /  AlkPhos  96  [03-29-20 @ 06:38]      Troponin 0.06      [03-29-20 @ 06:38]    Creatinine Trend:  SCr 4.87 [03-30 @ 08:16]  SCr 5.75 [03-29 @ 06:38]    HbA1c 6.5      [10-05-19 @ 13:33]  TSH 1.64      [10-12-19 @ 10:08] [facility] DIVISION OF KIDNEY DISEASES AND HYPERTENSION -- FOLLOW UP NOTE  --------------------------------------------------------------------------------  Chief Complaint: ESRD on HD    24 hour events/subjective:  HD yesterday  As per bedside RN, patient febrile    PAST HISTORY  --------------------------------------------------------------------------------  No significant changes to PMH, PSH, FHx, SHx, unless otherwise noted    ALLERGIES & MEDICATIONS  --------------------------------------------------------------------------------  Allergies  No Known Allergies    Standing Inpatient Medications  amLODIPine   Tablet 10 milliGRAM(s) Oral daily  aspirin enteric coated 81 milliGRAM(s) Oral daily  chlorhexidine 4% Liquid 1 Application(s) Topical <User Schedule>  cloNIDine 0.2 milliGRAM(s) Oral every 12 hours  clopidogrel Tablet 75 milliGRAM(s) Oral daily  heparin  Injectable 5000 Unit(s) SubCutaneous every 12 hours  hydrALAZINE 100 milliGRAM(s) Oral every 12 hours  simvastatin 20 milliGRAM(s) Oral at bedtime    PRN Inpatient Medications  hydrALAZINE Injectable 10 milliGRAM(s) IV Push every 4 hours PRN      REVIEW OF SYSTEMS  --------------------------------------------------------------------------------  [facility]    VITALS/PHYSICAL EXAM  --------------------------------------------------------------------------------  T(C): 37.2 (03-30-20 @ 05:01), Max: 39.6 (03-29-20 @ 17:18)  HR: 88 (03-30-20 @ 05:01) (83 - 95)  BP: 162/65 (03-30-20 @ 05:01) (96/48 - 162/65)  RR: 20 (03-30-20 @ 05:01) (20 - 21)  SpO2: 96% (03-30-20 @ 05:01) (96% - 100%)    Physical Exam:  Due to the nature of this patient's COVID-19 isolation status, no bedside physical exam was done to limit spread of infection. Examination highlights were provided by bedside nurse. Objective data were reviewed in detail.    LABS/STUDIES  --------------------------------------------------------------------------------              8.9    9.12  >-----------<  310      [03-30-20 @ 08:16]              29.2     134  |  91  |  35.0  ----------------------------<  151      [03-30-20 @ 08:16]  4.6   |  26.0  |  4.87        Ca     10.1     [03-30-20 @ 08:16]    TPro  8.4  /  Alb  4.1  /  TBili  0.4  /  DBili  x   /  AST  38  /  ALT  12  /  AlkPhos  96  [03-29-20 @ 06:38]      Troponin 0.06      [03-29-20 @ 06:38]    Creatinine Trend:  SCr 4.87 [03-30 @ 08:16]  SCr 5.75 [03-29 @ 06:38]    HbA1c 6.5      [10-05-19 @ 13:33]  TSH 1.64      [10-12-19 @ 10:08]

## 2020-03-30 NOTE — CONSULT NOTE ADULT - SUBJECTIVE AND OBJECTIVE BOX
[facility] DIVISION OF KIDNEY DISEASES AND HYPERTENSION -- INITIAL CONSULT NOTE  --------------------------------------------------------------------------------  HPI: 64y F with PMH of CAD/ hypothyroidism/HTN/DM/renal disease on hemodialysis  presents for SOB/ HA/ body aches/ cough since yesterday. Pt states after dialysis yesterday she began to feel SOB. She has dialysis every Tuesday/ Thursday/ and Saturday.  Pt on 3L nc from ambulance and sating at 98%. Her BP is elevated to 200/70 and pt states she has not taken her BP medication today.    Non-sp cough and congestion and hence got nasopharyngeal swab in ED-> MICU called    As per bedside RN, patient's fever decreased today - curently afebrile.     PAST HISTORY  --------------------------------------------------------------------------------  PAST MEDICAL & SURGICAL HISTORY:  3-vessel CAD: s/p CABG  Hypothyroidism, unspecified type  Hemodialysis patient: tues thursday saturday  Renal failure  Hypertension  Diabetes mellitus  A-V fistula  S/P cholecystectomy    FAMILY HISTORY:  Family history of diabetes mellitus    PAST SOCIAL HISTORY:  Denied any recent travel; ? exposure to COVID as poor historian; no alcohol tobacco or substance abuse\    ALLERGIES & MEDICATIONS  --------------------------------------------------------------------------------  Allergies  No Known Allergies    Standing Inpatient Medications  amLODIPine Tablet 10 milliGRAM(s) Oral daily  aspirin enteric coated 81 milliGRAM(s) Oral daily  chlorhexidine 4% Liquid 1 Application(s) Topical <User Schedule>  cloNIDine 0.2 milliGRAM(s) Oral every 12 hours  clopidogrel Tablet 75 milliGRAM(s) Oral daily  heparin  Injectable 5000 Unit(s) SubCutaneous every 12 hours  hydrALAZINE 100 milliGRAM(s) Oral every 12 hours  simvastatin 20 milliGRAM(s) Oral at bedtime    PRN Inpatient Medications  hydrALAZINE Injectable 10 milliGRAM(s) IV Push every 4 hours PRN      REVIEW OF SYSTEMS  --------------------------------------------------------------------------------  [place]    VITALS/PHYSICAL EXAM  --------------------------------------------------------------------------------  T(C): 37.2 (03-30-20 @ 05:01), Max: 39.6 (03-29-20 @ 17:18)  HR: 88 (03-30-20 @ 05:01) (83 - 95)  BP: 162/65 (03-30-20 @ 05:01) (96/48 - 162/65)  RR: 20 (03-30-20 @ 05:01) (20 - 21)  SpO2: 96% (03-30-20 @ 05:01) (96% - 100%)    Physical Exam:  Due to the nature of this patient's COVID-19 isolation status, no bedside physical exam was done to limit spread of infection. Examination highlights were provided by bedside nurse. Objective data were reviewed in detail.    LABS/STUDIES  --------------------------------------------------------------------------------              8.9    9.12  >-----------<  310      [03-30-20 @ 08:16]              29.2     134  |  91  |  35.0  ----------------------------<  151      [03-30-20 @ 08:16]  4.6   |  26.0  |  4.87        Ca     10.1     [03-30-20 @ 08:16]    TPro  8.4  /  Alb  4.1  /  TBili  0.4  /  DBili  x   /  AST  38  /  ALT  12  /  AlkPhos  96  [03-29-20 @ 06:38]    Troponin 0.06      [03-29-20 @ 06:38]    Creatinine Trend:  SCr 4.87 [03-30 @ 08:16]  SCr 5.75 [03-29 @ 06:38]      HbA1c 6.5      [10-05-19 @ 13:33]  TSH 1.64      [10-12-19 @ 10:08]    HBsAb Nonreact      [12-12-15 @ 18:07]  HBsAg Nonreact      [12-12-15 @ 18:07]  HBcAb Nonreact      [12-12-15 @ 18:07]  HCV 0.09, Nonreact      [03-19-19 @ 19:06]

## 2020-03-30 NOTE — PROGRESS NOTE ADULT - ASSESSMENT
64F with a history of ESRD, hypertension, hypothyroidism, coronary artery disease with CABG who presented with dyspnea, cough, and body aches. She was found to have uncontrolled hypertension (221/88) with chest Xray noting bilateral increased interstitial markings and patchy nodularity. The patient was admitted to the intensive care unit with initiation of a nitroglycerin infusion.    spoke to Martha patient [phone] hospital course to date reviewed. all questions answered     #acute hypoxic respiratory failure  - 2/2 viral pna 2/2 covid 19  - isolation precautions  - monitor vitals  - hydroxychloroquine  - ID consult appreciated  - imaging as above   - tylenol for fever     #HTN  - monitor blood pressure  - s/p nitro infusion given previous urgency  - amlodipine, clonidine, hydralyzine prn    #ESRD  - HD via nephro  - nephrology consult appreciated     #Coronary artery disease   - aspirin, clopidogrel    #HLD  -statin    #Hypothyroidism   - levothyroxine    #DVT prophylaxis  - heparin SC

## 2020-03-30 NOTE — CONSULT NOTE ADULT - ASSESSMENT
ESRD on HD - TTS schedule    HD access: [facility]    Patient dialyzed yesterday - 3 hrs, 1 L fluid removal

## 2020-03-30 NOTE — CONSULT NOTE ADULT - SUBJECTIVE AND OBJECTIVE BOX
[facility]  INFECTIOUS DISEASES AND INTERNAL MEDICINE at [place]  =======================================================  Carlo Esparza MD  Diplomates American Board of Internal Medicine and Infectious Diseases  Telephone [phone]  Fax            [phone]  =======================================================    [facility]-[id number]  KIM LEE   HPI: This 64y F with PMH of CAD, hypothyroidism,  HTN,  DM,   END stage renal disease on hemodialysis BIBA for SOB/ HA/ body aches/ cough since 3/28. Pt states after dialysis yesterday she began to feel SOB. She has dialysis every Tuesday/ Thursday/ and Saturday. Pt on 3L nc from ambulance and sating at 98%. Her BP is elevated to 200/70 and pt states she has not taken her BP medication today.   Non-sp cough and congestion and hence got nasopharyngeal swab in ED-> MICU called.    her COVID-19 testing is positive here.   CXR shows:   Bilateral increased interstitial markings and patchy nodularity right greater than left may represent multifocal pneumonia versus pulmonary edema    We are called to evaluate patient.    Patient still with a lot of cough, and using nasal cannula.     I have personally reviewed the labs and data; pertinent labs and data are listed in this note; please see below.   =======================================================  Past Medical & Surgical Hx:  =====================  PAST MEDICAL & SURGICAL HISTORY:  3-vessel CAD: s/p CABG  Hypothyroidism, unspecified type  Hemodialysis patient: tues thursday saturday  Renal failure  Hypertension  Diabetes mellitus  A-V fistula  S/P cholecystectomy    Problem List:  ==========  HEALTH ISSUES - PROBLEM Dx:        Social Hx:  =======  no toxic habits currently    FAMILY HISTORY:  Family history of diabetes mellitus  no significant family history of immunosuppressive disorders in mother or father   =======================================================  REVIEW OF SYSTEMS:  CONSTITUTIONAL:  No Fever or chills  HEENT:  No diplopia or blurred vision.  No earache, sore throat or runny nose.  CARDIOVASCULAR:  No pressure, squeezing, strangling, tightness, heaviness or aching about the chest, neck, axilla or epigastrium.  RESPIRATORY:  POSITIVE cough, shortness of breath  GASTROINTESTINAL:  No nausea, vomiting or diarrhea.  GENITOURINARY:  No dysuria, frequency or urgency. No Blood in urine  MUSCULOSKELETAL:  no joint aches, no muscle pain  SKIN:  No change in skin, hair or nails.  NEUROLOGIC:  No Headaches, seizures or weakness.  PSYCHIATRIC:  No disorder of thought or mood.  ENDOCRINE:  No heat or cold intolerance  HEMATOLOGICAL:  No easy bruising or bleeding.   =======================================================  Allergies  No Known Allergies    Other medications:  amLODIPine   Tablet 10 milliGRAM(s) Oral daily  aspirin enteric coated 81 milliGRAM(s) Oral daily  chlorhexidine 4% Liquid 1 Application(s) Topical <User Schedule>  cloNIDine 0.2 milliGRAM(s) Oral every 12 hours  clopidogrel Tablet 75 milliGRAM(s) Oral daily  heparin  Injectable 5000 Unit(s) SubCutaneous every 12 hours  hydrALAZINE 100 milliGRAM(s) Oral every 12 hours  simvastatin 20 milliGRAM(s) Oral at bedtime       ======================================================  Physical Exam:  ============  T(F): 98.9 (30 Mar 2020 05:01), Max: 103.3 (29 Mar 2020 17:18)  HR: 88 (30 Mar 2020 05:01)  BP: 162/65 (30 Mar 2020 05:01)  RR: 20 (30 Mar 2020 05:01)  SpO2: 96% (30 Mar 2020 05:01) (96% - 100%)  temp max in last 48H T(F): , Max: 103.3 (03-29-20 @ 17:18)    General:  No acute distress.  Eye: Pupils are equal, round and reactive to light, Extraocular movements are intact, Normal conjunctiva.  HENT: Normocephalic, Oral mucosa is moist, No pharyngeal erythema, No sinus tenderness.  Neck: Supple, No lymphadenopathy.  Respiratory: Lungs with POOR air entry at bases.   Cardiovascular: Normal rate, Regular rhythm,   Gastrointestinal: Soft, Non-tender, Non-distended, Normal bowel sounds.  Genitourinary: No costovertebral angle tenderness.  Lymphatics: No lymphadenopathy neck,   Musculoskeletal: Normal range of motion, Normal strength.  Integumentary: No rash.  Neurologic: Alert, Oriented, No focal deficits, Cranial Nerves II-XII are grossly intact.  Psychiatric: Appropriate mood & affect.    =======================================================  Labs:                        8.9    9.12  )-----------( 310      ( 30 Mar 2020 08:16 )             29.2       WBC Count: 9.12 K/uL (03-30-20 @ 08:16)  WBC Count: 10.33 K/uL (03-29-20 @ 06:38)      03-30    134<L>  |  91<L>  |  35.0<H>  ----------------------------<  151<H>  4.6   |  26.0  |  4.87<H>    Ca    10.1      30 Mar 2020 08:16    TPro  8.4  /  Alb  4.1  /  TBili  0.4  /  DBili  x   /  AST  38<H>  /  ALT  12  /  AlkPhos  96  03-29      Creatinine, Serum: 4.87 mg/dL (03-30-20 @ 08:16)  Creatinine, Serum: 5.75 mg/dL (03-29-20 @ 06:38)         COVID-19 PCR . (03.29.20 @ 16:26)    COVID-19 PCR: Detected: All “detected” results on this new test are considered presumptively  positive results, are clinically actionable, and specimens will be  forwarded to [facility] for confirmation testing.  Another report (corrected report) will only be issued if discordant  results occur.  This test has been validated by [employer] to be accurate;  though it has not been FDA cleared/approved by the usual pathway.  As with all laboratory tests, results should be correlated with clinical  findings.    < from: Xray Chest 1 View-PORTABLE IMMEDIATE (03.29.20 @ 05:57) >   EXAM:  XR CHEST PORTABLE IMMED 1V                          PROCEDURE DATE:  03/29/2020          INTERPRETATION:  Exam Date: 3/29/2020 5:57 AM    History: Shortness of breath    Technique: Single frontal portable view of the chest with comparison to  10/8/2019    Findings:    Right-sided pacemaker. Prior sternotomy. Heart is enlarged. Bilateral increased interstitial markings and patchy nodularity right greater than left may represent multifocal pneumonia versus pulmonary edema. Degenerative changes of the visualized osseous structures.        Impression:     Bilateral increased interstitial markings and patchy nodularity right greater than left may represent multifocal pneumonia versus pulmonary edema      JORGE ALFONSO M.D., ATTENDING RADIOLOGIST  This document has been electronically signed. Mar 29 2020 10:37AM              < end of copied text >

## 2020-03-30 NOTE — CONSULT NOTE ADULT - ASSESSMENT
This 64y F with PMH of CAD, hypothyroidism,  HTN,  DM,   END stage renal disease on hemodialysis BIBA for SOB/ HA/ body aches/ cough since 3/28. Pt states after dialysis yesterday she began to feel SOB. She has dialysis every Tuesday/ Thursday/ and Saturday. Pt on 3L nc from ambulance and sating at 98%. Her BP is elevated to 200/70 and pt states she has not taken her BP medication today.   Non-sp cough and congestion and hence got nasopharyngeal swab in ED-> MICU called.    her COVID-19 testing is positive here.   CXR shows:   Bilateral increased interstitial markings and patchy nodularity right greater than left may represent multifocal pneumonia versus pulmonary edema    We are called to evaluate patient.    Patient still with a lot of cough, and using nasal cannula.       Impression:  Confirmed COVID-19 infection  groundglass opacities of lungs   cough  fevers  shortness of breath    Plan:  - start Hydroxychloroquine 400mg PO BID x 2 doses, then 200mg PO BID x 4 more days.     - continue oxygen support  - follow up all outstanding cultures  - trend temperature and WBC curve  - repeat cultures from blood and all sources if febrile.     Will follow with you.

## 2020-03-31 PROCEDURE — 90937 HEMODIALYSIS REPEATED EVAL: CPT

## 2020-03-31 PROCEDURE — 99233 SBSQ HOSP IP/OBS HIGH 50: CPT

## 2020-03-31 RX ORDER — BENZOCAINE 10 %
1 GEL (GRAM) MUCOUS MEMBRANE THREE TIMES A DAY
Refills: 0 | Status: DISCONTINUED | OUTPATIENT
Start: 2020-03-31 | End: 2020-04-03

## 2020-03-31 RX ORDER — ALBUTEROL 90 UG/1
2 AEROSOL, METERED ORAL EVERY 6 HOURS
Refills: 0 | Status: DISCONTINUED | OUTPATIENT
Start: 2020-03-31 | End: 2020-04-03

## 2020-03-31 RX ORDER — BENZOCAINE 10 %
1 GEL (GRAM) MUCOUS MEMBRANE THREE TIMES A DAY
Refills: 0 | Status: DISCONTINUED | OUTPATIENT
Start: 2020-03-31 | End: 2020-03-31

## 2020-03-31 RX ADMIN — SIMVASTATIN 20 MILLIGRAM(S): 20 TABLET, FILM COATED ORAL at 21:51

## 2020-03-31 RX ADMIN — Medication 81 MILLIGRAM(S): at 14:56

## 2020-03-31 RX ADMIN — CLOPIDOGREL BISULFATE 75 MILLIGRAM(S): 75 TABLET, FILM COATED ORAL at 14:56

## 2020-03-31 RX ADMIN — CHLORHEXIDINE GLUCONATE 1 APPLICATION(S): 213 SOLUTION TOPICAL at 05:26

## 2020-03-31 RX ADMIN — HEPARIN SODIUM 5000 UNIT(S): 5000 INJECTION INTRAVENOUS; SUBCUTANEOUS at 05:26

## 2020-03-31 RX ADMIN — ALBUTEROL 2 PUFF(S): 90 AEROSOL, METERED ORAL at 15:44

## 2020-03-31 RX ADMIN — Medication 0.2 MILLIGRAM(S): at 14:56

## 2020-03-31 RX ADMIN — Medication 1 MILLIGRAM(S): at 15:50

## 2020-03-31 RX ADMIN — Medication 1 SPRAY(S): at 21:52

## 2020-03-31 RX ADMIN — Medication 200 MILLIGRAM(S): at 21:51

## 2020-03-31 RX ADMIN — Medication 1 SPRAY(S): at 15:44

## 2020-03-31 RX ADMIN — HEPARIN SODIUM 5000 UNIT(S): 5000 INJECTION INTRAVENOUS; SUBCUTANEOUS at 16:06

## 2020-03-31 RX ADMIN — Medication 100 MILLIGRAM(S): at 14:56

## 2020-03-31 RX ADMIN — Medication 200 MILLIGRAM(S): at 14:56

## 2020-03-31 NOTE — PROGRESS NOTE ADULT - ASSESSMENT
Xray Chest 1 View-PORTABLE IMMEDIATE (03.29.20 @ 05:57)     Impression:     Bilateral increased interstitial markings and patchy nodularity right greater than left may represent multifocal pneumonia versus pulmonary edema                      64F with a history of ESRD, hypertension, hypothyroidism, coronary artery disease with CABG who presented with dyspnea, cough, and body aches. She was found to have uncontrolled hypertension (221/88) with chest Xray noting bilateral increased interstitial markings and patchy nodularity. The patient was admitted to the intensive care unit with initiation of a nitroglycerin infusion.    #acute hypoxic respiratory failure, Covid-19 Pneumonia,                    (   > +Usual  Pathology : ATN, CRRT : Scalf Heparin, Monitor Tg., Avoid Propofol , Possible PLEX - If indicated  )    - isolation precautions  - monitor vitals  - hydroxychloroquine    #HTN  - monitor blood pressure  - s/p NTG  infusion given previous urgency  - amlodipine, clonidine, hydralyzine prn    #ESRD  - HD ,    #Coronary artery disease   - aspirin, clopidogrel    #Hypothyroidism   - levothyroxine

## 2020-03-31 NOTE — PROGRESS NOTE ADULT - ASSESSMENT
64F with a history of ESRD, hypertension, hypothyroidism, coronary artery disease with CABG who presented with dyspnea, cough, and body aches. She was found to have uncontrolled hypertension (221/88) with chest Xray noting bilateral increased interstitial markings and patchy nodularity. The patient was admitted to the intensive care unit with initiation of a nitroglycerin infusion.    spoke to Martha patient [phone] hospital course to date reviewed. all questions answered - advised hd today and o2 lowered today will monitor and adjust o2 as necessary    #acute hypoxic respiratory failure  - 2/2 viral pna 2/2 covid 19  - isolation precautions  - monitor vitals  - hydroxychloroquine  - ID consult appreciated  - imaging as above   - tylenol for fever     #HTN  - monitor blood pressure  - s/p nitro infusion given previous urgency  - amlodipine, clonidine, hydralyzine prn    #ESRD  - HD via nephro  - nephrology consult appreciated     #Coronary artery disease   - aspirin, clopidogrel    #HLD  -statin    #Hypothyroidism   - levothyroxine    #DVT prophylaxis  - heparin SC

## 2020-03-31 NOTE — CHART NOTE - NSCHARTNOTEFT_GEN_A_CORE
called by rn for patient with difficulty breathing    patient seen and eval bedside in dialysis  patient not struggling for air however gets anxiety at times.    vitals reviewed   po2 100% on nc 2L  resp 18    physical exam   gen- nad alert  cv =s1/s2  pulm cta b/l     plan  - start albuterol mdi prn for sob  - stop o2 given 100% po2    d/w RN Staff called by rn for patient with difficulty breathing    patient seen and eval bedside in dialysis  patient not struggling for air however gets anxiety at times.    vitals reviewed   po2 100% on nc 2L  resp 18    physical exam   gen- nad alert  cv =s1/s2  pulm +wheezing b/l    plan  - start albuterol mdi prn for wheezing  - continue nc 2liters  d/w RN Staff

## 2020-03-31 NOTE — CHART NOTE - NSCHARTNOTEFT_GEN_A_CORE
Called by RN because RR around 30. While at beside with Maur[place] speaking colleague, Pt appears anxious and states that she is feeling better than she did yesterday. Only complains of throat soreness. No chest pain, difficulty breathing or wheezing, abdominal pain, or any other complaints.     VSS  T(F): 99, HR: 84, BP: 159/77, RR: 28, SpO2: 98% 2L NC    PHYSICAL EXAM:  GENERAL:  lying in bed comfortably, tachypneic   HEAD:  Atraumatic   EYES: EOMI, PERRL, conjunctiva and sclera clear  ENT: Moist mucous membranes  NECK: Supple, No JVD  CHEST/LUNG: Clear to auscultation bilaterally; No rales, rhonchi, wheezing, or rubs, mild inc work of breathing, speaking in full sentences   HEART: Regular rate and rhythm; No murmurs, rubs, or gallops  ABDOMEN: Bowel sounds present; Soft, Nontender, Nondistended   EXTREMITIES:  2+ Peripheral Pulses, brisk capillary refill. No clubbing, cyanosis, or edema  NERVOUS SYSTEM:  Alert & Oriented X3, speech clear. Answers questions appropriately. No deficits   MSK: FROM x 4 extremities   SKIN: No rashes or lesions    A/P: 64F with a history of ESRD, hypertension, hypothyroidism, coronary artery disease with CABG admitted for COVID infection with viral pna now more anxious, O2 sat >97% on 2 L NC and hemodynamically stable   -clinically improving   -d/w attending, may give Ativan x1 for anxiety  -hurricaine spray prn added. Requests not given PO medications until she has lessened throat pain  -condition improved  -continue to observe

## 2020-03-31 NOTE — PROGRESS NOTE ADULT - SUBJECTIVE AND OBJECTIVE BOX
Patient is a 64y old  Female who presents with a chief complaint of Resp failure (30 Mar 2020 14:05)      Patient seen and examined at bedside.      ALLERGIES:  No Known Allergies    MEDICATIONS  (STANDING):  amLODIPine   Tablet 10 milliGRAM(s) Oral daily  aspirin enteric coated 81 milliGRAM(s) Oral daily  chlorhexidine 4% Liquid 1 Application(s) Topical <User Schedule>  cloNIDine 0.2 milliGRAM(s) Oral every 12 hours  clopidogrel Tablet 75 milliGRAM(s) Oral daily  heparin  Injectable 5000 Unit(s) SubCutaneous every 12 hours  hydrALAZINE 100 milliGRAM(s) Oral every 12 hours  hydroxychloroquine   Oral   hydroxychloroquine 200 milliGRAM(s) Oral every 12 hours  simvastatin 20 milliGRAM(s) Oral at bedtime    MEDICATIONS  (PRN):  acetaminophen   Tablet .. 650 milliGRAM(s) Oral every 6 hours PRN Temp greater or equal to 38C (100.4F), Mild Pain (1 - 3)  hydrALAZINE Injectable 10 milliGRAM(s) IV Push every 4 hours PRN SBP> 180    Vital Signs Last 24 Hrs  T(F): 99.2 (31 Mar 2020 05:31), Max: 102.1 (30 Mar 2020 10:39)  HR: 76 (31 Mar 2020 05:31) (70 - 78)  BP: 154/67 (31 Mar 2020 05:31) (154/67 - 179/54)  RR: 20 (31 Mar 2020 05:31) (20 - 24)  SpO2: 97% (31 Mar 2020 05:31) (97% - 97%)  I&O's Summary    PHYSICAL EXAM:  General: NAD, Alert; obese  ENT: MMM, no thrush  Neck: Supple, No JVD  Lungs: decreased breath sounds b/l bases   Cardio: +s1/s2, No pitting edema  Abdomen: Soft, Nontender, Nondistended; Bowel sounds present  Extremities: No calf tenderness      LABS:                        8.9    9.12  )-----------( 310      ( 30 Mar 2020 08:16 )             29.2     03-30    134  |  91  |  35.0  ----------------------------<  151  4.6   |  26.0  |  4.87    Ca    10.1      30 Mar 2020 08:16    TPro  8.4  /  Alb  4.1  /  TBili  0.4  /  DBili  x   /  AST  38  /  ALT  12  /  AlkPhos  96  03-29    eGFR if Non African American: 9 mL/min/1.73M2 (03-30-20 @ 08:16)  eGFR if African American: 10 mL/min/1.73M2 (03-30-20 @ 08:16)    Procalcitonin, Serum: 3.20 ng/mL (03-29-20 @ 06:39)    CARDIAC MARKERS ( 29 Mar 2020 06:38 )  x     / 0.06 ng/mL / x     / x     / x        Culutes  Culture - Blood (collected 29 Mar 2020 06:37)  Source: .Blood  Preliminary Report (31 Mar 2020 07:01):    No growth at 48 hours    RADIOLOGY & ADDITIONAL TESTS:  < from: Xray Chest 1 View-PORTABLE IMMEDIATE (03.29.20 @ 05:57) >  Impression:   Bilateral increased interstitial markings and patchy nodularity right greater than left may represent multifocal pneumonia versus pulmonary edema  < end of copied text >

## 2020-03-31 NOTE — PROGRESS NOTE ADULT - SUBJECTIVE AND OBJECTIVE BOX
[facility] DIVISION OF KIDNEY DISEASES AND HYPERTENSION -- HEMODIALYSIS NOTE  --------------------------------------------------------------------------------  Chief Complaint: ESRD/Ongoing hemodialysis requirement    24 hour events/subjective:    PAST HISTORY  --------------------------------------------------------------------------------  No significant changes to PMH, PSH, FHx, SHx, unless otherwise noted    ALLERGIES & MEDICATIONS  --------------------------------------------------------------------------------  Allergies    No Known Allergies,    Standing Inpatient Medications  amLODIPine   Tablet 10 milliGRAM(s) Oral daily  aspirin enteric coated 81 milliGRAM(s) Oral daily  chlorhexidine 4% Liquid 1 Application(s) Topical <User Schedule>  cloNIDine 0.2 milliGRAM(s) Oral every 12 hours  clopidogrel Tablet 75 milliGRAM(s) Oral daily  heparin  Injectable 5000 Unit(s) SubCutaneous every 12 hours  hydrALAZINE 100 milliGRAM(s) Oral every 12 hours  hydroxychloroquine   Oral   hydroxychloroquine 200 milliGRAM(s) Oral every 12 hours  simvastatin 20 milliGRAM(s) Oral at bedtime    PRN Inpatient Medications  acetaminophen   Tablet .. 650 milliGRAM(s) Oral every 6 hours PRN  hydrALAZINE Injectable 10 milliGRAM(s) IV Push every 4 hours PRN    REVIEW OF SYSTEMS  --------------------------------------------------------------------------------  Gen: No weight changes, fatigue, fevers/chills, weakness  Skin: No rashes  Head/Eyes/Ears/Mouth: No headache; Normal hearing; Normal vision w/o blurriness; No sinus pain/discomfort, sore throat  Respiratory: No dyspnea, cough, wheezing, hemoptysis  CV: No chest pain, PND, orthopnea  GI: No abdominal pain, diarrhea, constipation, nausea, vomiting, melena, hematochezia  : No increased frequency, dysuria, hematuria, nocturia  MSK: No joint pain/swelling; no back pain; no edema  Neuro: No dizziness/lightheadedness, weakness, seizures, numbness, tingling  Heme: No easy bruising or bleeding  Endo: No heat/cold intolerance  Psych: No significant nervousness, anxiety, stress, depression    All other systems were reviewed and are negative, except as noted.    VITALS/PHYSICAL EXAM  --------------------------------------------------------------------------------  T(C): 37.3 (03-31-20 @ 05:31), Max: 38.9 (03-30-20 @ 10:39)  HR: 76 (03-31-20 @ 05:31) (70 - 78)  BP: 154/67 (03-31-20 @ 05:31) (154/67 - 179/54)  RR: 20 (03-31-20 @ 05:31) (20 - 24)  SpO2: 97% (03-31-20 @ 05:31) (97% - 97%)  Wt(kg): --  Height (cm): 154.2 (03-30-20 @ 12:08)  Weight (kg): 93.2 (03-30-20 @ 12:08)  BMI (kg/m2): 39.2 (03-30-20 @ 12:08)  BSA (m2): 1.9 (03-30-20 @ 12:08)      Physical Exam:  	Gen: NAD, well-appearing  	HEENT: PERRL, supple neck, clear oropharynx  	Pulm: CTA B/L  	CV: RRR, S1S2; no rub  	Back: No spinal or CVA tenderness; no sacral edema  	Abd: +BS, soft, nontender/nondistended  	: No suprapubic tenderness  	UE: Warm, FROM, no clubbing, intact strength; no edema; no asterixis  	LE: Warm, FROM, no clubbing, intact strength; no edema  	Neuro: No focal deficits, intact gait  	Psych: Normal affect and mood  	Skin: Warm, without rashes  	Vascular access:    LABS/STUDIES  --------------------------------------------------------------------------------               8.9    9.12  >-----------<  310      [03-30-20 @ 08:16]              29.2     134  |  91  |  35.0  ----------------------------<  151      [03-30-20 @ 08:16]  4.6   |  26.0  |  4.87        Ca     10.1     [03-30-20 @ 08:16]    HbA1c 6.5 %     [10-05-19 @ 13:33]  TSH 1.64      [10-12-19 @ 10:08]    HBsAb 8.2      [03-30-20 @ 10:09]  HBsAg Nonreact      [03-30-20 @ 10:09]  HBcAb Nonreact      [03-30-20 @ 10:09]  HCV 0.13, Nonreact      [03-30-20 @ 10:09]    Patient was seen and evaluated on dialysis.   Patient is tolerating the procedure well.   T(C): 37.3 (03-31-20 @ 05:31), Max: 38.9 (03-30-20 @ 10:39)  HR: 76 (03-31-20 @ 05:31) (70 - 78)  BP: 154/67 (03-31-20 @ 05:31) (154/67 - 179/54)  Continue dialysis: TIW  Dialyzer:  Revaclear 300        QB: 450 ml.,        QD: 500ml.,  Goal UF  1.5 L  over  3.5  Hours

## 2020-04-01 PROCEDURE — 99232 SBSQ HOSP IP/OBS MODERATE 35: CPT

## 2020-04-01 PROCEDURE — 99233 SBSQ HOSP IP/OBS HIGH 50: CPT

## 2020-04-01 RX ORDER — ERYTHROPOIETIN 10000 [IU]/ML
12000 INJECTION, SOLUTION INTRAVENOUS; SUBCUTANEOUS
Refills: 0 | Status: DISCONTINUED | OUTPATIENT
Start: 2020-04-01 | End: 2020-04-01

## 2020-04-01 RX ORDER — ERYTHROPOIETIN 10000 [IU]/ML
12000 INJECTION, SOLUTION INTRAVENOUS; SUBCUTANEOUS
Refills: 0 | Status: DISCONTINUED | OUTPATIENT
Start: 2020-04-01 | End: 2020-04-03

## 2020-04-01 RX ORDER — ALPRAZOLAM 0.25 MG
0.5 TABLET ORAL EVERY 8 HOURS
Refills: 0 | Status: DISCONTINUED | OUTPATIENT
Start: 2020-04-01 | End: 2020-04-03

## 2020-04-01 RX ADMIN — CHLORHEXIDINE GLUCONATE 1 APPLICATION(S): 213 SOLUTION TOPICAL at 05:23

## 2020-04-01 RX ADMIN — HEPARIN SODIUM 5000 UNIT(S): 5000 INJECTION INTRAVENOUS; SUBCUTANEOUS at 05:22

## 2020-04-01 RX ADMIN — Medication 200 MILLIGRAM(S): at 23:50

## 2020-04-01 RX ADMIN — Medication 200 MILLIGRAM(S): at 08:29

## 2020-04-01 RX ADMIN — Medication 0.5 MILLIGRAM(S): at 23:50

## 2020-04-01 RX ADMIN — Medication 650 MILLIGRAM(S): at 08:30

## 2020-04-01 RX ADMIN — Medication 81 MILLIGRAM(S): at 08:31

## 2020-04-01 RX ADMIN — Medication 0.2 MILLIGRAM(S): at 17:49

## 2020-04-01 RX ADMIN — Medication 0.5 MILLIGRAM(S): at 10:35

## 2020-04-01 RX ADMIN — CLOPIDOGREL BISULFATE 75 MILLIGRAM(S): 75 TABLET, FILM COATED ORAL at 08:31

## 2020-04-01 RX ADMIN — Medication 100 MILLIGRAM(S): at 05:22

## 2020-04-01 RX ADMIN — AMLODIPINE BESYLATE 10 MILLIGRAM(S): 2.5 TABLET ORAL at 05:26

## 2020-04-01 RX ADMIN — ALBUTEROL 2 PUFF(S): 90 AEROSOL, METERED ORAL at 08:37

## 2020-04-01 RX ADMIN — Medication 100 MILLIGRAM(S): at 17:49

## 2020-04-01 RX ADMIN — SIMVASTATIN 20 MILLIGRAM(S): 20 TABLET, FILM COATED ORAL at 23:50

## 2020-04-01 RX ADMIN — Medication 0.2 MILLIGRAM(S): at 05:23

## 2020-04-01 RX ADMIN — HEPARIN SODIUM 5000 UNIT(S): 5000 INJECTION INTRAVENOUS; SUBCUTANEOUS at 17:49

## 2020-04-01 NOTE — PROGRESS NOTE ADULT - ASSESSMENT
64F with a history of ESRD, hypertension, hypothyroidism, coronary artery disease with CABG who presented with dyspnea, cough, and body aches. She was found to have uncontrolled hypertension (221/88) with chest Xray noting bilateral increased interstitial markings and patchy nodularity. The patient was admitted to the intensive care unit with initiation of a nitroglycerin infusion.    spoke to Martha patient [phone] hospital course to date reviewed. all questions answered - advised added anxiety medications today to which she agreed    #acute hypoxic respiratory failure  - 2/2 viral pna 2/2 covid 19  - isolation precautions  - monitor vitals  - hydroxychloroquine  - ID consult appreciated  - imaging as above   - tylenol for fever     #anxiety  - ativan q12h prn  - xanax 0.5mg tid     #HTN  - monitor blood pressure  - s/p nitro infusion given previous urgency  - amlodipine, clonidine, hydralyzine prn    #ESRD  - HD via nephro  - nephrology consult appreciated     #Coronary artery disease   - aspirin, clopidogrel    #HLD  -statin    #Hypothyroidism   - levothyroxine    #DVT prophylaxis  - heparin SC

## 2020-04-01 NOTE — PROGRESS NOTE ADULT - SUBJECTIVE AND OBJECTIVE BOX
[facility] DIVISION OF KIDNEY DISEASES AND HYPERTENSION -- FOLLOW UP NOTE  --------------------------------------------------------------------------------  Chief Complaint: ESRD on HD    24 hour events/subjective:        PAST HISTORY  --------------------------------------------------------------------------------  No significant changes to PMH, PSH, FHx, SHx, unless otherwise noted    ALLERGIES & MEDICATIONS  --------------------------------------------------------------------------------  Allergies  No Known Allergies    Standing Inpatient Medications  ALPRAZolam 0.5 milliGRAM(s) Oral every 8 hours  amLODIPine   Tablet 10 milliGRAM(s) Oral daily  aspirin enteric coated 81 milliGRAM(s) Oral daily  chlorhexidine 4% Liquid 1 Application(s) Topical <User Schedule>  cloNIDine 0.2 milliGRAM(s) Oral every 12 hours  clopidogrel Tablet 75 milliGRAM(s) Oral daily  heparin  Injectable 5000 Unit(s) SubCutaneous every 12 hours  hydrALAZINE 100 milliGRAM(s) Oral every 12 hours  hydroxychloroquine   Oral   hydroxychloroquine 200 milliGRAM(s) Oral every 12 hours  simvastatin 20 milliGRAM(s) Oral at bedtime    PRN Inpatient Medications  acetaminophen   Tablet .. 650 milliGRAM(s) Oral every 6 hours PRN  ALBUTerol    90 MICROgram(s) HFA Inhaler 2 Puff(s) Inhalation every 6 hours PRN  benzocaine 20% Spray 1 Spray(s) Topical three times a day PRN  hydrALAZINE Injectable 10 milliGRAM(s) IV Push every 4 hours PRN  LORazepam   Injectable 1 milliGRAM(s) IV Push two times a day PRN      REVIEW OF SYSTEMS  --------------------------------------------------------------------------------  [place]    VITALS/PHYSICAL EXAM  --------------------------------------------------------------------------------  T(C): 36.7 (04-01-20 @ 09:16), Max: 37.2 (03-31-20 @ 14:45)  HR: 82 (04-01-20 @ 09:16) (79 - 84)  BP: 118/65 (04-01-20 @ 09:16) (118/65 - 159/77)  RR: 18 (04-01-20 @ 09:16) (18 - 28)  SpO2: 97% (04-01-20 @ 09:16) (94% - 98%)      03-31-20 @ 07:01  -  04-01-20 @ 07:00  --------------------------------------------------------  IN: 0 mL / OUT: 2000 mL / NET: -2000 mL      Physical Exam:  Due to the nature of this patient's COVID-19 isolation status, no bedside physical exam was done to limit spread of infection. Examination highlights were provided by bedside nurse. Objective data were reviewed in detail.      LABS/STUDIES  --------------------------------------------------------------------------------                Creatinine Trend:  SCr 4.87 [03-30 @ 08:16]  SCr 5.75 [03-29 @ 06:38]        HbA1c 6.5      [10-05-19 @ 13:33]  TSH 1.64      [10-12-19 @ 10:08]    HBsAb 8.2      [03-30-20 @ 10:09]  HBsAg Nonreact      [03-30-20 @ 10:09]  HBcAb Nonreact      [03-30-20 @ 10:09]  HCV 0.13, Nonreact      [03-30-20 @ 10:09] [facility] DIVISION OF KIDNEY DISEASES AND HYPERTENSION -- FOLLOW UP NOTE  --------------------------------------------------------------------------------  Chief Complaint: ESRD/Ongoing hemodialysis requirement    24 hour events/subjective:  Hemodialysis today      PAST HISTORY  --------------------------------------------------------------------------------  No significant changes to PMH, PSH, FHx, SHx, unless otherwise noted    ALLERGIES & MEDICATIONS  --------------------------------------------------------------------------------  Allergies  No Known Allergies    Standing Inpatient Medications  ALPRAZolam 0.5 milliGRAM(s) Oral every 8 hours  amLODIPine   Tablet 10 milliGRAM(s) Oral daily  aspirin enteric coated 81 milliGRAM(s) Oral daily  chlorhexidine 4% Liquid 1 Application(s) Topical <User Schedule>  cloNIDine 0.2 milliGRAM(s) Oral every 12 hours  clopidogrel Tablet 75 milliGRAM(s) Oral daily  heparin  Injectable 5000 Unit(s) SubCutaneous every 12 hours  hydrALAZINE 100 milliGRAM(s) Oral every 12 hours  hydroxychloroquine   Oral   hydroxychloroquine 200 milliGRAM(s) Oral every 12 hours  simvastatin 20 milliGRAM(s) Oral at bedtime    PRN Inpatient Medications  acetaminophen   Tablet .. 650 milliGRAM(s) Oral every 6 hours PRN  ALBUTerol    90 MICROgram(s) HFA Inhaler 2 Puff(s) Inhalation every 6 hours PRN  benzocaine 20% Spray 1 Spray(s) Topical three times a day PRN  hydrALAZINE Injectable 10 milliGRAM(s) IV Push every 4 hours PRN  LORazepam   Injectable 1 milliGRAM(s) IV Push two times a day PRN      REVIEW OF SYSTEMS  --------------------------------------------------------------------------------  [place]    VITALS/PHYSICAL EXAM  --------------------------------------------------------------------------------  T(C): 36.7 (04-01-20 @ 09:16), Max: 37.2 (03-31-20 @ 14:45)  HR: 82 (04-01-20 @ 09:16) (79 - 84)  BP: 118/65 (04-01-20 @ 09:16) (118/65 - 159/77)  RR: 18 (04-01-20 @ 09:16) (18 - 28)  SpO2: 97% (04-01-20 @ 09:16) (94% - 98%)    03-31-20 @ 07:01  -  04-01-20 @ 07:00  --------------------------------------------------------  IN: 0 mL / OUT: 2000 mL / NET: -2000 mL    Physical Exam:  Due to the nature of this patient's COVID-19 isolation status, no bedside physical exam was done to limit spread of infection. Examination highlights were provided by bedside nurse. Objective data were reviewed in detail.    LABS/STUDIES  --------------------------------------------------------------------------------  Creatinine Trend:  SCr 4.87 [03-30 @ 08:16]  SCr 5.75 [03-29 @ 06:38]    HbA1c 6.5      [10-05-19 @ 13:33]  TSH 1.64      [10-12-19 @ 10:08]    HBsAb 8.2      [03-30-20 @ 10:09]  HBsAg Nonreact      [03-30-20 @ 10:09]  HBcAb Nonreact      [03-30-20 @ 10:09]  HCV 0.13, Nonreact      [03-30-20 @ 10:09] [facility] DIVISION OF KIDNEY DISEASES AND HYPERTENSION -- FOLLOW UP NOTE  --------------------------------------------------------------------------------  Chief Complaint: ESRD/Ongoing hemodialysis requirement    24 hour events/subjective:      PAST HISTORY  --------------------------------------------------------------------------------  No significant changes to PMH, PSH, FHx, SHx, unless otherwise noted    ALLERGIES & MEDICATIONS  --------------------------------------------------------------------------------  Allergies  No Known Allergies    Standing Inpatient Medications  ALPRAZolam 0.5 milliGRAM(s) Oral every 8 hours  amLODIPine   Tablet 10 milliGRAM(s) Oral daily  aspirin enteric coated 81 milliGRAM(s) Oral daily  chlorhexidine 4% Liquid 1 Application(s) Topical <User Schedule>  cloNIDine 0.2 milliGRAM(s) Oral every 12 hours  clopidogrel Tablet 75 milliGRAM(s) Oral daily  heparin  Injectable 5000 Unit(s) SubCutaneous every 12 hours  hydrALAZINE 100 milliGRAM(s) Oral every 12 hours  hydroxychloroquine   Oral   hydroxychloroquine 200 milliGRAM(s) Oral every 12 hours  simvastatin 20 milliGRAM(s) Oral at bedtime    PRN Inpatient Medications  acetaminophen   Tablet .. 650 milliGRAM(s) Oral every 6 hours PRN  ALBUTerol    90 MICROgram(s) HFA Inhaler 2 Puff(s) Inhalation every 6 hours PRN  benzocaine 20% Spray 1 Spray(s) Topical three times a day PRN  hydrALAZINE Injectable 10 milliGRAM(s) IV Push every 4 hours PRN  LORazepam   Injectable 1 milliGRAM(s) IV Push two times a day PRN      REVIEW OF SYSTEMS  --------------------------------------------------------------------------------  [place]    VITALS/PHYSICAL EXAM  --------------------------------------------------------------------------------  T(C): 36.7 (04-01-20 @ 09:16), Max: 37.2 (03-31-20 @ 14:45)  HR: 82 (04-01-20 @ 09:16) (79 - 84)  BP: 118/65 (04-01-20 @ 09:16) (118/65 - 159/77)  RR: 18 (04-01-20 @ 09:16) (18 - 28)  SpO2: 97% (04-01-20 @ 09:16) (94% - 98%)    03-31-20 @ 07:01  -  04-01-20 @ 07:00  --------------------------------------------------------  IN: 0 mL / OUT: 2000 mL / NET: -2000 mL    Physical Exam:  Due to the nature of this patient's COVID-19 isolation status, no bedside physical exam was done to limit spread of infection. Examination highlights were provided by bedside nurse. Objective data were reviewed in detail.    LABS/STUDIES  --------------------------------------------------------------------------------  Creatinine Trend:  SCr 4.87 [03-30 @ 08:16]  SCr 5.75 [03-29 @ 06:38]    HbA1c 6.5      [10-05-19 @ 13:33]  TSH 1.64      [10-12-19 @ 10:08]    HBsAb 8.2      [03-30-20 @ 10:09]  HBsAg Nonreact      [03-30-20 @ 10:09]  HBcAb Nonreact      [03-30-20 @ 10:09]  HCV 0.13, Nonreact      [03-30-20 @ 10:09]

## 2020-04-01 NOTE — PROGRESS NOTE ADULT - SUBJECTIVE AND OBJECTIVE BOX
Patient is a 64y old  Female who presents with a chief complaint of Resp failure (31 Mar 2020 09:26)    Patient seen and examined at bedside.     ALLERGIES:  No Known Allergies    MEDICATIONS  (STANDING):  ALPRAZolam 0.5 milliGRAM(s) Oral every 8 hours  amLODIPine   Tablet 10 milliGRAM(s) Oral daily  aspirin enteric coated 81 milliGRAM(s) Oral daily  chlorhexidine 4% Liquid 1 Application(s) Topical <User Schedule>  cloNIDine 0.2 milliGRAM(s) Oral every 12 hours  clopidogrel Tablet 75 milliGRAM(s) Oral daily  heparin  Injectable 5000 Unit(s) SubCutaneous every 12 hours  hydrALAZINE 100 milliGRAM(s) Oral every 12 hours  hydroxychloroquine   Oral   hydroxychloroquine 200 milliGRAM(s) Oral every 12 hours  simvastatin 20 milliGRAM(s) Oral at bedtime    MEDICATIONS  (PRN):  acetaminophen   Tablet .. 650 milliGRAM(s) Oral every 6 hours PRN Temp greater or equal to 38C (100.4F), Mild Pain (1 - 3)  ALBUTerol    90 MICROgram(s) HFA Inhaler 2 Puff(s) Inhalation every 6 hours PRN Shortness of Breath and/or Wheezing  benzocaine 20% Spray 1 Spray(s) Topical three times a day PRN oropharyngeal pain  hydrALAZINE Injectable 10 milliGRAM(s) IV Push every 4 hours PRN SBP> 180  LORazepam   Injectable 1 milliGRAM(s) IV Push two times a day PRN Anxiety    Vital Signs Last 24 Hrs  T(F): 98.1 (01 Apr 2020 09:16), Max: 99 (31 Mar 2020 14:45)  HR: 82 (01 Apr 2020 09:16) (79 - 94)  BP: 118/65 (01 Apr 2020 09:16) (118/65 - 159/77)  RR: 18 (01 Apr 2020 09:16) (18 - 28)  SpO2: 97% (01 Apr 2020 09:16) (94% - 98%)  I&O's Summary    31 Mar 2020 07:01  -  01 Apr 2020 07:00  --------------------------------------------------------  IN: 0 mL / OUT: 2000 mL / NET: -2000 mL    PHYSICAL EXAM:  General: NAD, Alert; obese  ENT: MMM, no thrush  Neck: Supple, No JVD  Lungs: decreased breath sounds b/l bases   Cardio: +s1/s2, No pitting edema  Abdomen: Soft, Nontender, Nondistended; Bowel sounds present  Extremities: No calf tenderness    LABS:                        8.9    9.12  )-----------( 310      ( 30 Mar 2020 08:16 )             29.2     03-30    134  |  91  |  35.0  ----------------------------<  151  4.6   |  26.0  |  4.87    Ca    10.1      30 Mar 2020 08:16    eGFR if Non African American: 9 mL/min/1.73M2 (03-30-20 @ 08:16)  eGFR if African American: 10 mL/min/1.73M2 (03-30-20 @ 08:16)    Cultures  Culture - Blood (collected 29 Mar 2020 06:37)  Source: .Blood  Preliminary Report (31 Mar 2020 07:01):    No growth at 48 hours    RADIOLOGY & ADDITIONAL TESTS:  < from: Xray Chest 1 View-PORTABLE IMMEDIATE (03.29.20 @ 05:57) >  Impression:   Bilateral increased interstitial markings and patchy nodularity right greater than left may represent multifocal pneumonia versus pulmonary edema  < end of copied text >

## 2020-04-01 NOTE — DIETITIAN INITIAL EVALUATION ADULT. - PERTINENT MEDS FT
MEDICATIONS  (STANDING):  ALPRAZolam 0.5 milliGRAM(s) Oral every 8 hours  amLODIPine   Tablet 10 milliGRAM(s) Oral daily  aspirin enteric coated 81 milliGRAM(s) Oral daily  chlorhexidine 4% Liquid 1 Application(s) Topical <User Schedule>  cloNIDine 0.2 milliGRAM(s) Oral every 12 hours  clopidogrel Tablet 75 milliGRAM(s) Oral daily  heparin  Injectable 5000 Unit(s) SubCutaneous every 12 hours  hydrALAZINE 100 milliGRAM(s) Oral every 12 hours  hydroxychloroquine   Oral   hydroxychloroquine 200 milliGRAM(s) Oral every 12 hours  simvastatin 20 milliGRAM(s) Oral at bedtime    MEDICATIONS  (PRN):  acetaminophen   Tablet .. 650 milliGRAM(s) Oral every 6 hours PRN Temp greater or equal to 38C (100.4F), Mild Pain (1 - 3)  ALBUTerol    90 MICROgram(s) HFA Inhaler 2 Puff(s) Inhalation every 6 hours PRN Shortness of Breath and/or Wheezing  benzocaine 20% Spray 1 Spray(s) Topical three times a day PRN oropharyngeal pain  hydrALAZINE Injectable 10 milliGRAM(s) IV Push every 4 hours PRN SBP> 180  LORazepam   Injectable 1 milliGRAM(s) IV Push two times a day PRN Anxiety

## 2020-04-01 NOTE — PROGRESS NOTE ADULT - ASSESSMENT
ESRD on HD- TTS schedule  COVID-19+ ESRD on HD- TTS schedule  COVID-19+    HD access: LUE AVF    Dialysis today    Anemia  CARLOS with HD    Blood pressure controlled    Will follow ESRD on HD- TTS schedule  COVID-19+    HD access: LUE AVF    Dialysis yesterday - 3hrs, 2L UF    Anemia  CARLOS with HD tomorrow    Blood pressure controlled    Will follow ESRD on HD- TTS schedule  COVID-19+    HD access: LUE AVF    Dialysis yesterday - 3hrs, 2L UF    Anemia  CARLOS with HD tomorrow    Blood pressure controlled    Will follow    I was Physically Present for the key portions of the Evaluation & management ( E/M ) Service provided.    I agree with the above History  , Physical examination & Treatment Plans,    I have Reviewed , Modified or appended where appropriate,

## 2020-04-01 NOTE — CONSULT NOTE ADULT - ASSESSMENT
ESRD on HD- TTS schedule  COVID-19+    Dialysis consent*    Creatinine Trend:  SCr 4.87 [03-30 @ 08:16]  SCr 5.75 [03-29 @ 06:38]    Anemia

## 2020-04-01 NOTE — DIETITIAN INITIAL EVALUATION ADULT. - OTHER INFO
Cardiology Progress Note - Lawson Griffin 74 y o  male MRN: [id number]    Unit/Bed#: [facility] 931-01 Encounter: [id number]    Assessment and plan  1  Non STEMI type 2 in the setting of sepsis  2  Preop for transmetatarsal amputation  3  Right foot gangrene  4  Hypertension  5  Diabetes  6  Coronary artery disease as evidenced by heavy coronary calcifications on CT  7  Chronic diastolic heart failure  Recommendations: The patient is high risk for surgery however given the severity of his infection is needs to be done  Recommend proceeding with procedure  Continue current cardiac medications  I had a long talk with the patient and the family  He does have coronary artery disease as evidenced by heavy coronary calcifications on a CT scan from 2016  Continue aspirin, statin, beta-blocker  No anginal symptoms  Hopefully he could have a nerve block instead of general anesthesia will review with surgery  Subjective:    No significant events overnight  Denies chest pain, shortness of breath, palpitations  ROS    Objective:   Vitals: Blood pressure 156/72, pulse 79, temperature 97 9 °F (36 6 °C), temperature source Oral, resp  rate 18, height 5' 8" (1 727 m), weight 62 9 kg (138 lb 10 7 oz), SpO2 98 %  , Body mass index is 21 08 kg/m² , Orthostatic Blood Pressures    Flowsheet Row Most Recent Value   Blood Pressure  156/72 filed at 02/12/2018 0808   Patient Position - Orthostatic VS  Lying filed at 02/12/2018 5753         Systolic (00BHZ), MICHAEL:412 , Min:120 , VCW:882     Diastolic (46XSF), MMZ:41, Min:64, Max:84      Intake/Output Summary (Last 24 hours) at 02/12/18 0959  Last data filed at 02/12/18 3703   Gross per 24 hour   Intake           370 54 ml   Output             2955 ml   Net         -2584 46 ml     Weight (last 2 days)     None            Telemetry Review: No significant arrhythmias seen on telemetry review  EKG personally reviewed by Russell Snow DO       Physical Exam   Constitutional: He is oriented to person, place, and time  He appears well-nourished  No distress  HENT:   Head: Atraumatic  Eyes: Conjunctivae are normal  Pupils are equal, round, and reactive to light  Neck: Neck supple  Cardiovascular: Normal rate and regular rhythm  Exam reveals no friction rub  Murmur heard  Pulmonary/Chest: Effort normal and breath sounds normal  No respiratory distress  He has no wheezes  He has no rales  Abdominal: Bowel sounds are normal  He exhibits no distension  There is no tenderness  There is no rebound  Musculoskeletal: Normal range of motion  He exhibits no edema  Neurological: He is alert and oriented to person, place, and time  No cranial nerve deficit  Skin: Skin is warm and dry  Rash noted  There is erythema  Nursing note and vitals reviewed          Laboratory Results:    Results from last 7 days  Lab Units 02/10/18  1250 02/10/18  0409 02/09/18  0614   TROPONIN I ng/mL 5 18* 5 84* 4 94*       CBC with diff:   Results from last 7 days  Lab Units 02/11/18  0503 02/10/18  0409 02/09/18  8009 02/08/18  0418 02/08/18  0246 02/07/18  1002 02/07/18  0949 02/07/18  0656 02/06/18  1829 02/06/18  0508 02/05/18  1714   WBC Thousand/uL 11 46* 13 01* 12 71* 25 79*  --  27 38*  --  23 74* 20 61* 18 86* 23 06*   HEMOGLOBIN g/dL 11 6* 10 8* 10 2* 10 9*  --  11 2*  --  11 6* 11 1* 10 9* 12 2   I STAT HEMOGLOBIN g/dl  --   --   --   --  9 2*  --  11 6*  --   --   --   --    HEMATOCRIT % 34 1* 30 9* 29 6* 31 9*  --  34 2*  --  34 0* 32 3* 31 1* 34 8*   MCV fL 90 89 89 91  --  93  --  90 91 87 89   PLATELETS Thousands/uL 350 359 323 298  --  364  --  299 282 306 293   MCH pg 30 6 30 9 30 7 31 1  --  30 4  --  30 8 31 2 30 6 31 2   MCHC g/dL 34 0 35 0 34 5 34 2  --  32 7  --  34 1 34 4 35 0 35 1   RDW % 13 4 13 0 13 1 13 0  --  13 0  --  12 8 12 6 12 2 12 4   MPV fL 10 1 10 1 10 7 10 4  --  10 8  --  10 3 10 1 10 6 10 5   NRBC AUTO /100 WBCs  --  1 0 0  --  0  --   --  0 0 0 CMP:  Results from last 7 days  Lab Units 02/11/18  0503 02/10/18  1311 02/10/18  0409 02/09/18  4650 02/08/18  0418 02/08/18  0246 02/07/18  1501 02/07/18  1002  02/06/18  1829  02/05/18  1714   SODIUM mmol/L 141 140 142 141 140  --  138 136  < > 135*  < > 125*   POTASSIUM mmol/L 3 3* 3 7 2 7* 3 4* 3 7  --  3 2* 4 0  < > 3 8  < > 3 5   CHLORIDE mmol/L 99* 100 101 107 110*  --  108 102  < > 101  < > 90*   CO2 mmol/L 35* 27 35* 25 23  --  18* 18*  < > 28  < > 28   ANION GAP mmol/L 7 13 6 9 7  --  12 16*  < > 6  < > 7   BUN mg/dL 29* 26* 24 22 27*  --  27* 25  < > 19  < > 27*   CREATININE mg/dL 0 79 0 91 0 88 0 66 0 78  --  0 96 0 95  < > 0 84  < > 1 01   GLUCOSE RANDOM mg/dL 89 148* 70 126 178*  --  201* 326*  < > 163*  < > 282*   GLUCOSE, ISTAT mg/dl  --   --   --   --   --  203*  --   --   < >  --   --   --    CALCIUM mg/dL 8 1* 8 1* 8 0* 7 8* 7 7*  --  7 4* 8 1*  < > 8 1*  < > 9 2   AST U/L  --   --  96* 183* 438*  --   --  79*  --  76*  --  76*   ALT U/L  --   --  87* 125* 160*  --   --  26  --  27  --  31   ALK PHOS U/L  --   --  112 107 134*  --   --  122*  --  110  --  131*   TOTAL PROTEIN g/dL  --   --  6 9 6 4 7 2  --   --  7 6  --  7 1  --  8 1   BILIRUBIN TOTAL mg/dL  --   --  0 39 0 82 0 50  --   --  0 84  --  0 79  --  0 82   EGFR ml/min/1 73sq m 92 86 88 99 93  --  81 82  < > 90  < > 76   < > = values in this interval not displayed        BMP:  Results from last 7 days  Lab Units 02/11/18  0503 02/10/18  1311 02/10/18  0409 02/09/18  0614 02/08/18  0418  02/07/18  1501 02/07/18  1002   SODIUM mmol/L 141 140 142 141 140  --  138 136   POTASSIUM mmol/L 3 3* 3 7 2 7* 3 4* 3 7  --  3 2* 4 0   CHLORIDE mmol/L 99* 100 101 107 110*  --  108 102   CO2 mmol/L 35* 27 35* 25 23  --  18* 18*   BUN mg/dL 29* 26* 24 22 27*  --  27* 25   CREATININE mg/dL 0 79 0 91 0 88 0 66 0 78  --  0 96 0 95   GLUCOSE RANDOM mg/dL 89 148* 70 126 178*  --  201* 326*   GLUCOSE, ISTAT   --   --   --   --   --   < >  --   -- CALCIUM mg/dL 8 1* 8 1* 8 0* 7 8* 7 7*  --  7 4* 8 1*   < > = values in this interval not displayed  BNP: No results for input(s): BNP in the last 72 hours  Magnesium:   Results from last 7 days  Lab Units 18  0503 18  0614 18  0418 18  1501 18  1002   MAGNESIUM mg/dL 2 0 2 1 2 2 2 4 2 5       Coags:   Results from last 7 days  Lab Units 18  0503 02/10/18  0823 02/10/18  0205 18  1737 18  0614 18  0755 18  1714   PTT seconds 72* 70* 85* 93*  --  37* 38*   INR   --   --   --   --  1 85* 1 64* 1 30*       TSH:        Hemoglobin A1C   Results from last 7 days  Lab Units 18  2122   HEMOGLOBIN A1C % 9 7*       Lipid Profile:   Results from last 7 days  Lab Units 18  0614   CHOLESTEROL mg/dL 83   TRIGLYCERIDES mg/dL 59   HDL mg/dL 21*       Cardiac testing:   Results for orders placed during the hospital encounter of 18   Echo complete with contrast if indicated    Narrative [facility] [address] [place] [address]  [phone]    Transthoracic Echocardiogram  2D, M-mode, Doppler, and Color Doppler    Study date:  2018    Patient: Cindy Bosworth  MR number: [id number]  Account number: [de-identified]  : 1951  Age: 74 years  Gender: Male  Status: Inpatient  Location: Bedside  Height: 68 in  Weight: 133 lb  BP: 161/ 80 mmHg    Indications: Dyspnea    Diagnoses: R06 00 - Dyspnea, unspecified    Sonographer:  SAM Taylor, RDCS  Primary Physician:  Yefri Silva MD  Referring Physician:  Maryse Chung DO  Group:  [id number]  Cardiology Fellow:  Marino Madison MD  Interpreting Physician:  Azalia Harper MD    SUMMARY    PROCEDURE INFORMATION:  This was a technically difficult study  LEFT VENTRICLE:  Systolic function was normal  Ejection fraction was estimated to be 55 %    Although no diagnostic regional wall motion abnormality was identified, this possibility cannot be completely excluded on the basis of this study  LEFT ATRIUM:  The atrium was mildly dilated  MITRAL VALVE:  There was moderate regurgitation  Regurgitation grade was 2+ on a scale of 0 to 4+  AORTIC VALVE:  There was trace regurgitation  PULMONIC VALVE:  There was trace regurgitation  PERICARDIUM:  There was a left pleural effusion  HISTORY: PRIOR HISTORY: Altered mental status; Sepsis; Diabetes Mellitus type II; Hypertension    PROCEDURE: The procedure was performed at the bedside  This was a routine study  The transthoracic approach was used  The study included complete 2D imaging, M-mode, complete spectral Doppler, and color Doppler  The heart rate was 94 bpm,  at the start of the study  Images were obtained from the parasternal, apical, subcostal, and suprasternal notch acoustic windows  Echocardiographic views were limited due to restricted patient mobility, poor acoustic window availability,  and lung interference  This was a technically difficult study  LEFT VENTRICLE: Size was normal  Systolic function was normal  Ejection fraction was estimated to be 55 %  Although no diagnostic regional wall motion abnormality was identified, this possibility cannot be completely excluded on the basis  of this study  Wall thickness was normal  There was no evidence of concentric hypertrophy  DOPPLER: Due to tachycardia, there was fusion of early and atrial contributions to ventricular filling  The study was not technically sufficient to  allow evaluation of LV diastolic function  RIGHT VENTRICLE: The size was normal  Systolic function was normal  Wall thickness was normal     LEFT ATRIUM: The atrium was mildly dilated  RIGHT ATRIUM: Size was normal     MITRAL VALVE: Valve structure was normal  There was normal leaflet separation  DOPPLER: The transmitral velocity was within the normal range  There was no evidence for stenosis   There was moderate regurgitation  Regurgitation grade was 2+  on a scale of 0 to 4+  AORTIC VALVE: The valve was trileaflet  Leaflets exhibited normal cuspal separation and sclerosis  DOPPLER: Transaortic velocity was within the normal range  There was no evidence for stenosis  There was trace regurgitation  TRICUSPID VALVE: The valve structure was normal  There was normal leaflet separation  DOPPLER: The transtricuspid velocity was within the normal range  There was no evidence for stenosis  There was no regurgitation  PULMONIC VALVE: Leaflets exhibited normal thickness, no calcification, and normal cuspal separation  DOPPLER: The transpulmonic velocity was within the normal range  There was trace regurgitation  PERICARDIUM: There was no pericardial effusion  There was a left pleural effusion  The pericardium was normal in appearance  AORTA: The root exhibited normal size  SYSTEM MEASUREMENT TABLES    2D  %FS: 22 23 %  Ao Diam: 3 27 cm  EDV(Teich): 78 14 ml  EF Biplane: 47 97 %  EF(Teich): 45 23 %  ESV(Teich): 42 8 ml  IVSd: 1 01 cm  LA Area: 17 83 cm2  LA Diam: 3 91 cm  LVEDV MOD A2C: 80 32 ml  LVEDV MOD A4C: 73 97 ml  LVEDV MOD BP: 78 31 ml  LVEF MOD A2C: 47 09 %  LVEF MOD A4C: 51 51 %  LVESV MOD A2C: 42 5 ml  LVESV MOD A4C: 35 87 ml  LVESV MOD BP: 40 75 ml  LVIDd: 4 19 cm  LVIDs: 3 26 cm  LVLd A2C: 7 06 cm  LVLd A4C: 7 36 cm  LVLs A2C: 6 35 cm  LVLs A4C: 6 66 cm  LVPWd: 0 95 cm  RA Area: 10 35 cm2  RVIDd: 3 82 cm  SV MOD A2C: 37 82 ml  SV MOD A4C: 38 1 ml  SV(Teich): 35 34 ml    MM  TAPSE: 1 85 cm    PW  E': 0 11 m/s    [employer] Accredited Echocardiography Laboratory    Prepared and electronically signed by    Esperanza Rachel MD  Signed 08-Feb-2018 17:12:05       No results found for this or any previous visit  No results found for this or any previous visit  No results found for this or any previous visit      Meds/Allergies   all current active meds have been reviewed  Prescriptions Prior to Admission   Medication    Alpha-Lipoic Acid 600 MG CAPS    aspirin 81 MG tablet    Blood Glucose Monitoring Suppl (ONE TOUCH ULTRA MINI) w/Device KIT    cephalexin (KEFLEX) 500 mg capsule    gabapentin (NEURONTIN) 300 mg capsule    glipiZIDE-metFORMIN (METAGLIP) 5-500 MG per tablet    glucose blood (ACCU-CHEK ACTIVE STRIPS) test strip    losartan-hydrochlorothiazide (HYZAAR) 100-25 MG per tablet    metoprolol tartrate (LOPRESSOR) 25 mg tablet    metoprolol tartrate (LOPRESSOR) 50 mg tablet    pregabalin (LYRICA) 75 mg capsule    TRADJENTA 5 MG TABS          Assessment:  Principal Problem:    Sepsis (HCC)  Active Problems:    Hypertension    Gangrene of toe of right foot ([facility])    Diabetes type 2 with atherosclerosis of arteries of extremities ([facility])    Cellulitis of right foot    Acute metabolic encephalopathy    Abnormal CT of the head    Elevated brain natriuretic peptide (BNP) level    Acute respiratory failure (HCC)    Transaminitis    Hypophosphatemia    Popliteal artery stenosis, right (HCC)    Great toe amputation status, right (HCC)    Coagulopathy (HCC)    Meningioma (HCC)    Hypokalemia    Acute congestive heart failure ([facility])    Moderate mitral regurgitation    Gangrenous toe ([facility]) 64y F with PMH of CAD/ hypothyroidism/HTN/DM/renal disease on hemodialysis BIBA for SOB/ HA/ body aches/ cough since yesterday. Pt states after dialysis yesterday she began to feel SOB. She has dialysis every Tuesday/ Thursday/ and Saturday. Pt on 3L nc from ambulance and sating at 98%. Her BP is elevated to 200/70 and pt states she has not taken her BP medication today.   Unable to interview pt at this time. Aware of previous admission in October 2019. Per previous admission, pt UBW is 220#. No edema noted at this time. Per EMR, pt had 50% intake. Will continue to monitor PO intake.

## 2020-04-01 NOTE — DIETITIAN INITIAL EVALUATION ADULT. - ADD RECOMMEND
Recommend Nepro to optimize PO intake and provide an additional 425kcal and 19.1g protein per serving. Recommend Nephro-heather daily. Monitor PO intake. Monitor labs. Monitor weight trends.

## 2020-04-01 NOTE — CONSULT NOTE ADULT - SUBJECTIVE AND OBJECTIVE BOX
[facility] DIVISION OF KIDNEY DISEASES AND HYPERTENSION -- INITIAL CONSULT NOTE  --------------------------------------------------------------------------------  HPI: Admit 03/29/20  64y F with PMH of CAD/ hypothyroidism/HTN/DM/renal disease on hemodialysis BIBA for SOB/ HA/ body aches/ cough since yesterday. Pt states after dialysis yesterday she began to feel SOB.   She has dialysis every Tuesday/ Thursday/ and Saturday. Pt on 3L nc from ambulance and sating at 98%. Her BP is elevated to 200/70 and pt states she has not taken her BP medication today.   Non-sp cough and congestion and hence got nasopharyngeal swab in ED-> MICU called    PAST HISTORY  --------------------------------------------------------------------------------  PAST MEDICAL & SURGICAL HISTORY:  3-vessel CAD: s/p CABG  Hypothyroidism, unspecified type  Hemodialysis patient: tues thursday saturday  Renal failure  Hypertension  Diabetes mellitus  A-V fistula  S/P cholecystectomy    FAMILY HISTORY:  Family history of diabetes mellitus    PAST SOCIAL HISTORY:   denied any recent travel; ? exposure to COVID as poor historian; no alcohol tobacco or substance abuse    ALLERGIES & MEDICATIONS  --------------------------------------------------------------------------------  Allergies  No Known Allergies    Standing Inpatient Medications  ALPRAZolam 0.5 milliGRAM(s) Oral every 8 hours  amLODIPine   Tablet 10 milliGRAM(s) Oral daily  aspirin enteric coated 81 milliGRAM(s) Oral daily  chlorhexidine 4% Liquid 1 Application(s) Topical <User Schedule>  cloNIDine 0.2 milliGRAM(s) Oral every 12 hours  clopidogrel Tablet 75 milliGRAM(s) Oral daily  heparin  Injectable 5000 Unit(s) SubCutaneous every 12 hours  hydrALAZINE 100 milliGRAM(s) Oral every 12 hours  hydroxychloroquine   Oral   hydroxychloroquine 200 milliGRAM(s) Oral every 12 hours  simvastatin 20 milliGRAM(s) Oral at bedtime    PRN Inpatient Medications  acetaminophen   Tablet .. 650 milliGRAM(s) Oral every 6 hours PRN  ALBUTerol    90 MICROgram(s) HFA Inhaler 2 Puff(s) Inhalation every 6 hours PRN  benzocaine 20% Spray 1 Spray(s) Topical three times a day PRN  hydrALAZINE Injectable 10 milliGRAM(s) IV Push every 4 hours PRN  LORazepam   Injectable 1 milliGRAM(s) IV Push two times a day PRN      REVIEW OF SYSTEMS  --------------------------------------------------------------------------------  [place]    VITALS/PHYSICAL EXAM  --------------------------------------------------------------------------------  T(C): 36.7 (04-01-20 @ 09:16), Max: 37.2 (03-31-20 @ 14:45)  HR: 82 (04-01-20 @ 09:16) (79 - 84)  BP: 118/65 (04-01-20 @ 09:16) (118/65 - 159/77)  RR: 18 (04-01-20 @ 09:16) (18 - 28)  SpO2: 97% (04-01-20 @ 09:16) (94% - 98%)    03-31-20 @ 07:01  -  04-01-20 @ 07:00  --------------------------------------------------------  IN: 0 mL / OUT: 2000 mL / NET: -2000 mL    Physical Exam:  Due to the nature of this patient's COVID-19 isolation status, no bedside physical exam was done to limit spread of infection.  Objective data were reviewed in detail.    LABS/STUDIES  --------------------------------------------------------------------------------  Creatinine Trend:  SCr 4.87 [03-30 @ 08:16]  SCr 5.75 [03-29 @ 06:38]    HbA1c 6.5      [10-05-19 @ 13:33]  TSH 1.64      [10-12-19 @ 10:08]    HBsAb 8.2      [03-30-20 @ 10:09]  HBsAb Nonreact      [12-12-15 @ 18:07]  HBsAg Nonreact      [03-30-20 @ 10:09]  HBcAb Nonreact      [03-30-20 @ 10:09]  HCV 0.13, Nonreact      [03-30-20 @ 10:09]

## 2020-04-01 NOTE — PROGRESS NOTE ADULT - ASSESSMENT
64y F with PMH of CAD, hypothyroidism,  HTN,  DM,   END stage renal disease on hemodialysis BIBA for SOB/ HA/ body aches/ cough since 3/28. Pt states after dialysis yesterday she began to feel SOB. She has dialysis every Tuesday/ Thursday/ and Saturday. Pt on 3L nc from ambulance and sating at 98%. Her BP is elevated to 200/70 and pt states she has not taken her BP medication today.   Non-sp cough and congestion and hence got nasopharyngeal swab in ED-> MICU called.    her COVID-19 testing is positive here.   CXR shows:   Bilateral increased interstitial markings and patchy nodularity right greater than left may represent multifocal pneumonia versus pulmonary edema    We are called to evaluate patient.    Patient still with a lot of cough, and using nasal cannula.       Impression:  Confirmed COVID-19 infection  groundglass opacities of lungs   cough  fevers  shortness of breath  CKD    Plan:  - c/w Hydroxychloroquine 400mg PO BID x 2 doses, then 200mg PO BID x 4 more days.     - continue oxygen support  - follow up all outstanding cultures  - trend temperature and WBC curve  - repeat cultures from blood and all sources if febrile.     Please call with questions.

## 2020-04-01 NOTE — PROGRESS NOTE ADULT - SUBJECTIVE AND OBJECTIVE BOX
[facility]  INFECTIOUS DISEASES AND INTERNAL MEDICINE at [place]  =======================================================  Carlo Esparza MD  Diplomates American Board of Internal Medicine and Infectious Diseases  Tel: [phone]      Fax: [phone]  =======================================================    KIM LEE [id number]    Follow up: covid 19  currently off O2    Allergies:  No Known Allergies      Medications:  acetaminophen   Tablet .. 650 milliGRAM(s) Oral every 6 hours PRN  ALBUTerol    90 MICROgram(s) HFA Inhaler 2 Puff(s) Inhalation every 6 hours PRN  ALPRAZolam 0.5 milliGRAM(s) Oral every 8 hours  amLODIPine   Tablet 10 milliGRAM(s) Oral daily  aspirin enteric coated 81 milliGRAM(s) Oral daily  benzocaine 20% Spray 1 Spray(s) Topical three times a day PRN  chlorhexidine 4% Liquid 1 Application(s) Topical <User Schedule>  cloNIDine 0.2 milliGRAM(s) Oral every 12 hours  clopidogrel Tablet 75 milliGRAM(s) Oral daily  heparin  Injectable 5000 Unit(s) SubCutaneous every 12 hours  hydrALAZINE 100 milliGRAM(s) Oral every 12 hours  hydrALAZINE Injectable 10 milliGRAM(s) IV Push every 4 hours PRN  hydroxychloroquine   Oral   hydroxychloroquine 200 milliGRAM(s) Oral every 12 hours  LORazepam   Injectable 1 milliGRAM(s) IV Push two times a day PRN  simvastatin 20 milliGRAM(s) Oral at bedtime            REVIEW OF SYSTEMS:  CONSTITUTIONAL:  No Fever or chills  HEENT:   No diplopia or blurred vision.  No earache, sore throat or runny nose.  CARDIOVASCULAR:  No pressure, squeezing, strangling, tightness, heaviness or aching about the chest, neck, axilla or epigastrium.  RESPIRATORY:  No cough, shortness of breath  GASTROINTESTINAL:  No nausea, vomiting or diarrhea.  GENITOURINARY:  No dysuria, frequency or urgency. No Blood in urine  MUSCULOSKELETAL:  no joint aches, no muscle pain  SKIN:  No change in skin, hair or nails.  NEUROLOGIC:  No Headaches, seizures or weakness.  PSYCHIATRIC:  No disorder of thought or mood.  ENDOCRINE:  No heat or cold intolerance  HEMATOLOGICAL:  No easy bruising or bleeding.            Physical Exam:  ICU Vital Signs Last 24 Hrs  T(C): 36.7 (01 Apr 2020 09:16), Max: 37.2 (31 Mar 2020 14:45)  T(F): 98.1 (01 Apr 2020 09:16), Max: 99 (31 Mar 2020 14:45)  HR: 82 (01 Apr 2020 09:16) (79 - 84)  BP: 118/65 (01 Apr 2020 09:16) (118/65 - 159/77)  BP(mean): --  ABP: --  ABP(mean): --  RR: 18 (01 Apr 2020 09:16) (18 - 28)  SpO2: 97% (01 Apr 2020 09:16) (94% - 98%)      GEN: NAD, pleasant  HEENT: normocephalic and atraumatic. EOMI. PERRL.  Anicteric   NECK: Supple.   LUNGS: Clear to auscultation.  HEART: Regular rate and rhythm without murmur.  ABDOMEN: Soft, nontender, and nondistended.  Positive bowel sounds.    : No CVA tenderness  EXTREMITIES: Without any edema.  MSK: no joint swelling  NEUROLOGIC: Cranial nerves II through XII are grossly intact. No focal deficits  PSYCHIATRIC: anxious  SKIN: No Rash      Labs:    < from: Xray Chest 1 View-PORTABLE IMMEDIATE (03.29.20 @ 05:57) >    Impression:     Bilateral increased interstitial markings and patchy nodularity right greater than left may represent multifocal pneumonia versus pulmonary edema    < end of copied text >

## 2020-04-02 LAB
ALBUMIN SERPL ELPH-MCNC: 3.6 G/DL — SIGNIFICANT CHANGE UP (ref 3.3–5.2)
ALP SERPL-CCNC: 82 U/L — SIGNIFICANT CHANGE UP (ref 40–120)
ALT FLD-CCNC: 18 U/L — SIGNIFICANT CHANGE UP
ANION GAP SERPL CALC-SCNC: 24 MMOL/L — HIGH (ref 5–17)
AST SERPL-CCNC: 41 U/L — HIGH
BILIRUB SERPL-MCNC: 0.4 MG/DL — SIGNIFICANT CHANGE UP (ref 0.4–2)
BUN SERPL-MCNC: 62 MG/DL — HIGH (ref 8–20)
CALCIUM SERPL-MCNC: 10.5 MG/DL — HIGH (ref 8.6–10.2)
CHLORIDE SERPL-SCNC: 91 MMOL/L — LOW (ref 98–107)
CO2 SERPL-SCNC: 22 MMOL/L — SIGNIFICANT CHANGE UP (ref 22–29)
CREAT SERPL-MCNC: 5.52 MG/DL — HIGH (ref 0.5–1.3)
CRP SERPL-MCNC: 26.44 MG/DL — HIGH (ref 0–0.4)
FERRITIN SERPL-MCNC: 6214 NG/ML — HIGH (ref 15–150)
GLUCOSE SERPL-MCNC: 115 MG/DL — HIGH (ref 70–99)
HCT VFR BLD CALC: 29.1 % — LOW (ref 34.5–45)
HGB BLD-MCNC: 8.9 G/DL — LOW (ref 11.5–15.5)
LDH SERPL L TO P-CCNC: 299 U/L — HIGH (ref 98–192)
MCHC RBC-ENTMCNC: 28.7 PG — SIGNIFICANT CHANGE UP (ref 27–34)
MCHC RBC-ENTMCNC: 30.6 GM/DL — LOW (ref 32–36)
MCV RBC AUTO: 93.9 FL — SIGNIFICANT CHANGE UP (ref 80–100)
PLATELET # BLD AUTO: 501 K/UL — HIGH (ref 150–400)
POTASSIUM SERPL-MCNC: 4.4 MMOL/L — SIGNIFICANT CHANGE UP (ref 3.5–5.3)
POTASSIUM SERPL-SCNC: 4.4 MMOL/L — SIGNIFICANT CHANGE UP (ref 3.5–5.3)
PROT SERPL-MCNC: 8 G/DL — SIGNIFICANT CHANGE UP (ref 6.6–8.7)
RBC # BLD: 3.1 M/UL — LOW (ref 3.8–5.2)
RBC # FLD: 14.7 % — HIGH (ref 10.3–14.5)
SODIUM SERPL-SCNC: 137 MMOL/L — SIGNIFICANT CHANGE UP (ref 135–145)
WBC # BLD: 8.71 K/UL — SIGNIFICANT CHANGE UP (ref 3.8–10.5)
WBC # FLD AUTO: 8.71 K/UL — SIGNIFICANT CHANGE UP (ref 3.8–10.5)

## 2020-04-02 PROCEDURE — 90937 HEMODIALYSIS REPEATED EVAL: CPT

## 2020-04-02 PROCEDURE — 99233 SBSQ HOSP IP/OBS HIGH 50: CPT

## 2020-04-02 RX ADMIN — CLOPIDOGREL BISULFATE 75 MILLIGRAM(S): 75 TABLET, FILM COATED ORAL at 11:17

## 2020-04-02 RX ADMIN — Medication 81 MILLIGRAM(S): at 11:17

## 2020-04-02 RX ADMIN — Medication 100 MILLIGRAM(S): at 06:00

## 2020-04-02 RX ADMIN — Medication 200 MILLIGRAM(S): at 22:45

## 2020-04-02 RX ADMIN — HEPARIN SODIUM 5000 UNIT(S): 5000 INJECTION INTRAVENOUS; SUBCUTANEOUS at 17:33

## 2020-04-02 RX ADMIN — Medication 0.2 MILLIGRAM(S): at 05:59

## 2020-04-02 RX ADMIN — Medication 200 MILLIGRAM(S): at 11:18

## 2020-04-02 RX ADMIN — SIMVASTATIN 20 MILLIGRAM(S): 20 TABLET, FILM COATED ORAL at 22:45

## 2020-04-02 RX ADMIN — CHLORHEXIDINE GLUCONATE 1 APPLICATION(S): 213 SOLUTION TOPICAL at 04:51

## 2020-04-02 RX ADMIN — Medication 0.5 MILLIGRAM(S): at 22:45

## 2020-04-02 RX ADMIN — ERYTHROPOIETIN 9000 UNIT(S): 10000 INJECTION, SOLUTION INTRAVENOUS; SUBCUTANEOUS at 14:22

## 2020-04-02 RX ADMIN — Medication 0.5 MILLIGRAM(S): at 05:59

## 2020-04-02 RX ADMIN — HEPARIN SODIUM 5000 UNIT(S): 5000 INJECTION INTRAVENOUS; SUBCUTANEOUS at 06:00

## 2020-04-02 RX ADMIN — AMLODIPINE BESYLATE 10 MILLIGRAM(S): 2.5 TABLET ORAL at 05:59

## 2020-04-02 RX ADMIN — Medication 650 MILLIGRAM(S): at 14:05

## 2020-04-02 NOTE — PROGRESS NOTE ADULT - SUBJECTIVE AND OBJECTIVE BOX
Patient is a 64y old  Female who presents with a chief complaint of Resp failure (02 Apr 2020 12:17)      Patient seen and examined at bedside. d/c o2 as patient tolerating room air. possible d/c in am if remains stable. daughter stating everyone at home had covid previously. stating they will take care of her at home. spoke to daughter [phone]. all questions answered.     ALLERGIES:  No Known Allergies    MEDICATIONS  (STANDING):  ALPRAZolam 0.5 milliGRAM(s) Oral every 8 hours  amLODIPine   Tablet 10 milliGRAM(s) Oral daily  aspirin enteric coated 81 milliGRAM(s) Oral daily  chlorhexidine 4% Liquid 1 Application(s) Topical <User Schedule>  cloNIDine 0.2 milliGRAM(s) Oral every 12 hours  clopidogrel Tablet 75 milliGRAM(s) Oral daily  epoetin jack Injectable 27463 Unit(s) IV Push <User Schedule>  heparin  Injectable 5000 Unit(s) SubCutaneous every 12 hours  hydrALAZINE 100 milliGRAM(s) Oral every 12 hours  hydroxychloroquine   Oral   hydroxychloroquine 200 milliGRAM(s) Oral every 12 hours  simvastatin 20 milliGRAM(s) Oral at bedtime    MEDICATIONS  (PRN):  acetaminophen   Tablet .. 650 milliGRAM(s) Oral every 6 hours PRN Temp greater or equal to 38C (100.4F), Mild Pain (1 - 3)  ALBUTerol    90 MICROgram(s) HFA Inhaler 2 Puff(s) Inhalation every 6 hours PRN Shortness of Breath and/or Wheezing  benzocaine 20% Spray 1 Spray(s) Topical three times a day PRN oropharyngeal pain  hydrALAZINE Injectable 10 milliGRAM(s) IV Push every 4 hours PRN SBP> 180  LORazepam   Injectable 1 milliGRAM(s) IV Push two times a day PRN Anxiety    Vital Signs Last 24 Hrs  T(F): 97.3 (02 Apr 2020 12:14), Max: 98.5 (02 Apr 2020 07:35)  HR: 91 (02 Apr 2020 07:35) (73 - 91)  BP: 116/65 (02 Apr 2020 07:35) (116/65 - 145/70)  RR: 20 (02 Apr 2020 07:35) (18 - 26)  SpO2: 100% (02 Apr 2020 07:35) (91% - 100%)  I&O's Summary    PHYSICAL EXAM:  General: NAD, Alert; obese  ENT: MMM, no thrush  Neck: Supple, No JVD  Lungs: decreased breath sounds b/l bases   Cardio: +s1/s2, No pitting edema  Abdomen: Soft, Nontender, Nondistended; Bowel sounds present  Extremities: No calf tenderness    LABS:    Cultures  Culture - Blood (collected 29 Mar 2020 06:37)  Source: .Blood  Preliminary Report (31 Mar 2020 07:01):    No growth at 48 hours      RADIOLOGY & ADDITIONAL TESTS:  RADIOLOGY & ADDITIONAL TESTS:  < from: Xray Chest 1 View-PORTABLE IMMEDIATE (03.29.20 @ 05:57) >  Impression:   Bilateral increased interstitial markings and patchy nodularity right greater than left may represent multifocal pneumonia versus pulmonary edema  < end of copied text >

## 2020-04-02 NOTE — PROGRESS NOTE ADULT - SUBJECTIVE AND OBJECTIVE BOX
[facility] DIVISION OF KIDNEY DISEASES AND HYPERTENSION -- FOLLOW UP NOTE  --------------------------------------------------------------------------------  Chief Complaint:    24 hour events/subjective:        PAST HISTORY  --------------------------------------------------------------------------------  No significant changes to PMH, PSH, FHx, SHx, unless otherwise noted    ALLERGIES & MEDICATIONS  --------------------------------------------------------------------------------  Allergies    No Known Allergies    Intolerances      Standing Inpatient Medications  ALPRAZolam 0.5 milliGRAM(s) Oral every 8 hours  amLODIPine   Tablet 10 milliGRAM(s) Oral daily  aspirin enteric coated 81 milliGRAM(s) Oral daily  chlorhexidine 4% Liquid 1 Application(s) Topical <User Schedule>  cloNIDine 0.2 milliGRAM(s) Oral every 12 hours  clopidogrel Tablet 75 milliGRAM(s) Oral daily  epoetin jack Injectable 01519 Unit(s) IV Push <User Schedule>  heparin  Injectable 5000 Unit(s) SubCutaneous every 12 hours  hydrALAZINE 100 milliGRAM(s) Oral every 12 hours  hydroxychloroquine   Oral   hydroxychloroquine 200 milliGRAM(s) Oral every 12 hours  simvastatin 20 milliGRAM(s) Oral at bedtime    PRN Inpatient Medications  acetaminophen   Tablet .. 650 milliGRAM(s) Oral every 6 hours PRN  ALBUTerol    90 MICROgram(s) HFA Inhaler 2 Puff(s) Inhalation every 6 hours PRN  benzocaine 20% Spray 1 Spray(s) Topical three times a day PRN  hydrALAZINE Injectable 10 milliGRAM(s) IV Push every 4 hours PRN  LORazepam   Injectable 1 milliGRAM(s) IV Push two times a day PRN      REVIEW OF SYSTEMS  --------------------------------------------------------------------------------  Gen: No weight changes, fatigue, fevers/chills, weakness  Skin: No rashes  Head/Eyes/Ears/Mouth: No headache; Normal hearing; Normal vision w/o blurriness; No sinus pain/discomfort, sore throat  Respiratory: No dyspnea, cough, wheezing, hemoptysis  CV: No chest pain, PND, orthopnea  GI: No abdominal pain, diarrhea, constipation, nausea, vomiting, melena, hematochezia  : No increased frequency, dysuria, hematuria, nocturia  MSK: No joint pain/swelling; no back pain; no edema  Neuro: No dizziness/lightheadedness, weakness, seizures, numbness, tingling  Heme: No easy bruising or bleeding  Endo: No heat/cold intolerance  Psych: No significant nervousness, anxiety, stress, depression    All other systems were reviewed and are negative, except as noted.    VITALS/PHYSICAL EXAM  --------------------------------------------------------------------------------  T(C): 36.9 (04-02-20 @ 07:35), Max: 36.9 (04-02-20 @ 07:35)  HR: 91 (04-02-20 @ 07:35) (73 - 91)  BP: 116/65 (04-02-20 @ 07:35) (116/65 - 145/70)  RR: 20 (04-02-20 @ 07:35) (18 - 26)  SpO2: 95% (04-02-20 @ 05:13) (91% - 95%)  Physical Exam:  	Gen: NAD, well-appearing  	HEENT: PERRL, supple neck, clear oropharynx  	Pulm: CTA B/L  	CV: RRR, S1S2; no rub  	Back: No spinal or CVA tenderness; no sacral edema  	Abd: +BS, soft, nontender/nondistended  	: No suprapubic tenderness  	UE: Warm, FROM, no clubbing, intact strength; no edema; no asterixis  	LE: Warm, FROM, no clubbing, intact strength; no edema  	Neuro: No focal deficits, intact gait  	Psych: Normal affect and mood  	Skin: Warm, without rashes  	Vascular access:    LABS/STUDIES  --------------------------------------------------------------------------------  Creatinine Trend:  SCr 4.87 [03-30 @ 08:16]  SCr 5.75 [03-29 @ 06:38]    HbA1c 6.5      [10-05-19 @ 13:33]  TSH 1.64      [10-12-19 @ 10:08]    HBsAb 8.2      [03-30-20 @ 10:09]  HBsAg Nonreact      [03-30-20 @ 10:09]  HBcAb Nonreact      [03-30-20 @ 10:09]  HCV 0.13, Nonreact      [03-30-20 @ 10:09] [facility] DIVISION OF KIDNEY DISEASES AND HYPERTENSION -- FOLLOW UP NOTE  --------------------------------------------------------------------------------  Chief Complaint:  ESRD/Ongoing hemodialysis requirement    24 hour events/subjective:  HD today      PAST HISTORY  --------------------------------------------------------------------------------  No significant changes to PMH, PSH, FHx, SHx, unless otherwise noted    ALLERGIES & MEDICATIONS  --------------------------------------------------------------------------------  Allergies  No Known Allergies    Standing Inpatient Medications  ALPRAZolam 0.5 milliGRAM(s) Oral every 8 hours  amLODIPine   Tablet 10 milliGRAM(s) Oral daily  aspirin enteric coated 81 milliGRAM(s) Oral daily  chlorhexidine 4% Liquid 1 Application(s) Topical <User Schedule>  cloNIDine 0.2 milliGRAM(s) Oral every 12 hours  clopidogrel Tablet 75 milliGRAM(s) Oral daily  epoetin jack Injectable 91968 Unit(s) IV Push <User Schedule>  heparin  Injectable 5000 Unit(s) SubCutaneous every 12 hours  hydrALAZINE 100 milliGRAM(s) Oral every 12 hours  hydroxychloroquine   Oral   hydroxychloroquine 200 milliGRAM(s) Oral every 12 hours  simvastatin 20 milliGRAM(s) Oral at bedtime    PRN Inpatient Medications  acetaminophen   Tablet .. 650 milliGRAM(s) Oral every 6 hours PRN  ALBUTerol    90 MICROgram(s) HFA Inhaler 2 Puff(s) Inhalation every 6 hours PRN  benzocaine 20% Spray 1 Spray(s) Topical three times a day PRN  hydrALAZINE Injectable 10 milliGRAM(s) IV Push every 4 hours PRN  LORazepam   Injectable 1 milliGRAM(s) IV Push two times a day PRN      REVIEW OF SYSTEMS  --------------------------------------------------------------------------------  [place]    VITALS/PHYSICAL EXAM  --------------------------------------------------------------------------------  T(C): 36.9 (04-02-20 @ 07:35), Max: 36.9 (04-02-20 @ 07:35)  HR: 91 (04-02-20 @ 07:35) (73 - 91)  BP: 116/65 (04-02-20 @ 07:35) (116/65 - 145/70)  RR: 20 (04-02-20 @ 07:35) (18 - 26)  SpO2: 95% (04-02-20 @ 05:13) (91% - 95%)    Physical Exam:  Due to the nature of this patient's COVID-19 isolation status, no bedside physical exam was done to limit spread of infection. Objective data were reviewed in detail.    LABS/STUDIES  --------------------------------------------------------------------------------  Creatinine Trend:  SCr 4.87 [03-30 @ 08:16]  SCr 5.75 [03-29 @ 06:38]    HbA1c 6.5      [10-05-19 @ 13:33]  TSH 1.64      [10-12-19 @ 10:08]    HBsAb 8.2      [03-30-20 @ 10:09]  HBsAg Nonreact      [03-30-20 @ 10:09]  HBcAb Nonreact      [03-30-20 @ 10:09]  HCV 0.13, Nonreact      [03-30-20 @ 10:09]

## 2020-04-02 NOTE — PROGRESS NOTE ADULT - ASSESSMENT
ESRD on HD- TTS schedule  COVID-19+    HD today via AVF    Anemia  CARLOS with HD    Blood pressure controlled

## 2020-04-02 NOTE — PROGRESS NOTE ADULT - ASSESSMENT
64F with a history of ESRD, hypertension, hypothyroidism, coronary artery disease with CABG who presented with dyspnea, cough, and body aches. She was found to have uncontrolled hypertension (221/88) with chest Xray noting bilateral increased interstitial markings and patchy nodularity. The patient was admitted to the intensive care unit with initiation of a nitroglycerin infusion.    #acute hypoxic respiratory failure  - 2/2 viral pna 2/2 covid 19  - isolation precautions  - monitor vitals  - hydroxychloroquine  - ID consult appreciated  - imaging as above   - tylenol for fever     #anxiety  - ativan q12h prn  - xanax 0.5mg tid     #HTN  - monitor blood pressure  - s/p nitro infusion given previous urgency  - amlodipine, clonidine, hydralyzine prn    #ESRD  - HD via nephro  - nephrology consult appreciated     #Coronary artery disease   - aspirin, clopidogrel    #HLD  -statin    #Hypothyroidism   - levothyroxine    #DVT prophylaxis  - heparin SC

## 2020-04-02 NOTE — CHART NOTE - NSCHARTNOTEFT_GEN_A_CORE
called by rn for low bp    Vital Signs Last 24 Hrs  T(C): 36.6 (02 Apr 2020 16:30), Max: 36.9 (02 Apr 2020 07:35)  T(F): 97.9 (02 Apr 2020 16:30), Max: 98.5 (02 Apr 2020 07:35)  HR: 78 (02 Apr 2020 16:30) (76 - 91)  BP: 93/50 (02 Apr 2020 16:30) (93/50 - 136/77)  BP(mean): --  RR: 18 (02 Apr 2020 16:30) (18 - 26)  SpO2: 96% (02 Apr 2020 16:30) (92% - 100%)    patient s/p dialysis   hold clonidine and hydralazine  continue to monitor

## 2020-04-03 ENCOUNTER — TRANSCRIPTION ENCOUNTER (OUTPATIENT)
Age: 65
End: 2020-04-03

## 2020-04-03 VITALS
SYSTOLIC BLOOD PRESSURE: 149 MMHG | RESPIRATION RATE: 20 BRPM | DIASTOLIC BLOOD PRESSURE: 74 MMHG | TEMPERATURE: 98 F | OXYGEN SATURATION: 90 % | HEART RATE: 83 BPM

## 2020-04-03 DIAGNOSIS — N18.6 END STAGE RENAL DISEASE: ICD-10-CM

## 2020-04-03 LAB
CULTURE RESULTS: SIGNIFICANT CHANGE UP
SPECIMEN SOURCE: SIGNIFICANT CHANGE UP

## 2020-04-03 PROCEDURE — 87798 DETECT AGENT NOS DNA AMP: CPT

## 2020-04-03 PROCEDURE — 87635 SARS-COV-2 COVID-19 AMP PRB: CPT

## 2020-04-03 PROCEDURE — 82962 GLUCOSE BLOOD TEST: CPT

## 2020-04-03 PROCEDURE — 87040 BLOOD CULTURE FOR BACTERIA: CPT

## 2020-04-03 PROCEDURE — 86706 HEP B SURFACE ANTIBODY: CPT

## 2020-04-03 PROCEDURE — 85027 COMPLETE CBC AUTOMATED: CPT

## 2020-04-03 PROCEDURE — 99261: CPT

## 2020-04-03 PROCEDURE — 80048 BASIC METABOLIC PNL TOTAL CA: CPT

## 2020-04-03 PROCEDURE — 99239 HOSP IP/OBS DSCHRG MGMT >30: CPT

## 2020-04-03 PROCEDURE — 90937 HEMODIALYSIS REPEATED EVAL: CPT

## 2020-04-03 PROCEDURE — 99291 CRITICAL CARE FIRST HOUR: CPT | Mod: 25

## 2020-04-03 PROCEDURE — 86803 HEPATITIS C AB TEST: CPT

## 2020-04-03 PROCEDURE — 83615 LACTATE (LD) (LDH) ENZYME: CPT

## 2020-04-03 PROCEDURE — 80053 COMPREHEN METABOLIC PANEL: CPT

## 2020-04-03 PROCEDURE — 87486 CHLMYD PNEUM DNA AMP PROBE: CPT

## 2020-04-03 PROCEDURE — 87581 M.PNEUMON DNA AMP PROBE: CPT

## 2020-04-03 PROCEDURE — T1013: CPT

## 2020-04-03 PROCEDURE — 93005 ELECTROCARDIOGRAM TRACING: CPT

## 2020-04-03 PROCEDURE — 96374 THER/PROPH/DIAG INJ IV PUSH: CPT

## 2020-04-03 PROCEDURE — 86140 C-REACTIVE PROTEIN: CPT

## 2020-04-03 PROCEDURE — 87340 HEPATITIS B SURFACE AG IA: CPT

## 2020-04-03 PROCEDURE — 84145 PROCALCITONIN (PCT): CPT

## 2020-04-03 PROCEDURE — 71045 X-RAY EXAM CHEST 1 VIEW: CPT

## 2020-04-03 PROCEDURE — 82728 ASSAY OF FERRITIN: CPT

## 2020-04-03 PROCEDURE — 94640 AIRWAY INHALATION TREATMENT: CPT

## 2020-04-03 PROCEDURE — 83880 ASSAY OF NATRIURETIC PEPTIDE: CPT

## 2020-04-03 PROCEDURE — 84484 ASSAY OF TROPONIN QUANT: CPT

## 2020-04-03 PROCEDURE — 86704 HEP B CORE ANTIBODY TOTAL: CPT

## 2020-04-03 PROCEDURE — 87633 RESP VIRUS 12-25 TARGETS: CPT

## 2020-04-03 PROCEDURE — 36415 COLL VENOUS BLD VENIPUNCTURE: CPT

## 2020-04-03 RX ORDER — ALPRAZOLAM 0.25 MG
1 TABLET ORAL
Qty: 15 | Refills: 0
Start: 2020-04-03 | End: 2020-04-07

## 2020-04-03 RX ORDER — ERYTHROPOIETIN 10000 [IU]/ML
12000 INJECTION, SOLUTION INTRAVENOUS; SUBCUTANEOUS
Qty: 0 | Refills: 0 | DISCHARGE
Start: 2020-04-03

## 2020-04-03 RX ORDER — CALCIUM ACETATE 667 MG
3 TABLET ORAL
Qty: 0 | Refills: 0 | DISCHARGE

## 2020-04-03 RX ORDER — ALBUTEROL 90 UG/1
2 AEROSOL, METERED ORAL
Qty: 1 | Refills: 0
Start: 2020-04-03 | End: 2020-04-17

## 2020-04-03 RX ORDER — HYDROXYCHLOROQUINE SULFATE 200 MG
1 TABLET ORAL
Qty: 2 | Refills: 0
Start: 2020-04-03 | End: 2020-04-03

## 2020-04-03 RX ORDER — CARVEDILOL PHOSPHATE 80 MG/1
1 CAPSULE, EXTENDED RELEASE ORAL
Qty: 0 | Refills: 0 | DISCHARGE

## 2020-04-03 RX ADMIN — Medication 0.5 MILLIGRAM(S): at 04:42

## 2020-04-03 RX ADMIN — Medication 1 MILLIGRAM(S): at 01:38

## 2020-04-03 RX ADMIN — Medication 100 MILLIGRAM(S): at 18:57

## 2020-04-03 RX ADMIN — CHLORHEXIDINE GLUCONATE 1 APPLICATION(S): 213 SOLUTION TOPICAL at 04:44

## 2020-04-03 RX ADMIN — AMLODIPINE BESYLATE 10 MILLIGRAM(S): 2.5 TABLET ORAL at 04:43

## 2020-04-03 RX ADMIN — Medication 0.5 MILLIGRAM(S): at 13:12

## 2020-04-03 RX ADMIN — HEPARIN SODIUM 5000 UNIT(S): 5000 INJECTION INTRAVENOUS; SUBCUTANEOUS at 18:57

## 2020-04-03 RX ADMIN — Medication 200 MILLIGRAM(S): at 13:13

## 2020-04-03 RX ADMIN — CLOPIDOGREL BISULFATE 75 MILLIGRAM(S): 75 TABLET, FILM COATED ORAL at 13:13

## 2020-04-03 RX ADMIN — Medication 0.2 MILLIGRAM(S): at 04:42

## 2020-04-03 RX ADMIN — HEPARIN SODIUM 5000 UNIT(S): 5000 INJECTION INTRAVENOUS; SUBCUTANEOUS at 04:43

## 2020-04-03 RX ADMIN — Medication 100 MILLIGRAM(S): at 04:43

## 2020-04-03 RX ADMIN — Medication 81 MILLIGRAM(S): at 13:12

## 2020-04-03 RX ADMIN — Medication 0.2 MILLIGRAM(S): at 18:57

## 2020-04-03 NOTE — DISCHARGE NOTE PROVIDER - CARE PROVIDER_API CALL
Manuelito Foire)  Internal Medicine; Nephrology  [address] [place] [address] [id number]  Phone: [phone]  Fax: [phone]  Follow Up Time:

## 2020-04-03 NOTE — DISCHARGE NOTE PROVIDER - NSDCMRMEDTOKEN_GEN_ALL_CORE_FT
acetaminophen 325 mg oral tablet: 2 tab(s) orally every 4 hours, As needed, Mild Pain (1 - 3)  acetaminophen 325 mg oral tablet: 3 tab(s) orally every 6 hours, As needed, Moderate Pain (4 - 6)  alendronate 70 mg oral tablet: 1 tab(s) orally once a week  amLODIPine 10 mg oral tablet: 1 tab(s) orally once a day  aspirin 81 mg oral tablet, chewable: 1 tab(s) orally once a day  calcium acetate 667 mg oral tablet: 3 tab(s) orally 3 times a day  calcium carbonate 500 mg (200 mg elemental calcium) oral tablet, chewable: 1 tab(s) orally 3 times a day, As needed, Dyspepsia  Cleocin HCl 300 mg oral capsule: 1 cap(s) orally 3 times a day   cloNIDine 0.2 mg oral tablet: 1 tab(s) orally every 8 hours  clopidogrel 75 mg oral tablet: 1 tab(s) orally once a day  Coreg 12.5 mg oral tablet: 1 tab(s) orally 2 times a day  cyclobenzaprine 5 mg oral tablet: 1 tab(s) orally 3 times a day, As needed, Muscle Spasm  docusate sodium 100 mg oral capsule: 1 cap(s) orally 3 times a day  folic acid 1 mg oral tablet: 1 tab(s) orally once a day  gabapentin 100 mg oral capsule: 1 cap(s) orally once a day (at bedtime)   hydrALAZINE 100 mg oral tablet: 1 tab(s) orally 2 times a day  levothyroxine 75 mcg (0.075 mg) oral tablet: 1 tab(s) orally once a day  Nephplex Rx oral tablet: 1 tab(s) orally once a day  nystatin 100,000 units/mL oral suspension: 5 milliliter(s) orally 2 times a day  oxyCODONE 10 mg oral tablet: 1 tab(s) orally every 6 hours, As Needed -Severe Pain (7 - 10) MDD:4  pantoprazole 40 mg oral delayed release tablet: 1 tab(s) orally once a day (before a meal)  polyethylene glycol 3350 oral powder for reconstitution: 17 gram(s) orally once a day, As needed, Constipation  psyllium 3.4 g/7 g oral powder for reconstitution: 1 dose(s) orally 2 times a day  Reglan 5 mg oral tablet: 1 tab(s) orally 3 times a day, As Needed  simethicone 80 mg oral tablet, chewable: 1 tab(s) orally 3 times a day  simvastatin 20 mg oral tablet: 1 tab(s) orally once a day (at bedtime)  Triphrocaps oral capsule: 1 cap(s) orally once a day acetaminophen 325 mg oral tablet: 2 tab(s) orally every 6 hours, As Needed - 3)  albuterol 90 mcg/inh inhalation aerosol: 2 puff(s) inhaled every 6 hours, As needed, Shortness of Breath and/or Wheezing  alendronate 70 mg oral tablet: 1 tab(s) orally once a week  ALPRAZolam 0.5 mg oral tablet: 1 tab(s) orally every 8 hours, As Needed MDD:MDD 3 anxiety  amLODIPine 10 mg oral tablet: 1 tab(s) orally once a day  aspirin 81 mg oral tablet, chewable: 1 tab(s) orally once a day  cloNIDine 0.2 mg oral tablet: 1 tab(s) orally every 12 hours  clopidogrel 75 mg oral tablet: 1 tab(s) orally once a day  epoetin jack: 83054 unit(s) intravenous 3 times a week with hemodialysis   hydrALAZINE 100 mg oral tablet: 1 tab(s) orally 2 times a day  hydroxychloroquine 200 mg oral tablet: 1 tab(s) orally 2 times a day   levothyroxine 75 mcg (0.075 mg) oral tablet: 1 tab(s) orally once a day  simvastatin 20 mg oral tablet: 1 tab(s) orally once a day (at bedtime) acetaminophen 325 mg oral tablet: 2 tab(s) orally every 6 hours, As Needed - 3)  albuterol 90 mcg/inh inhalation aerosol: 2 puff(s) inhaled every 6 hours, As needed, Shortness of Breath and/or Wheezing  alendronate 70 mg oral tablet: 1 tab(s) orally once a week  ALPRAZolam 0.5 mg oral tablet: 1 tab(s) orally every 8 hours, As Needed MDD:MDD 3 anxiety  amLODIPine 10 mg oral tablet: 1 tab(s) orally once a day  aspirin 81 mg oral tablet, chewable: 1 tab(s) orally once a day  cloNIDine 0.2 mg oral tablet: 1 tab(s) orally every 12 hours  clopidogrel 75 mg oral tablet: 1 tab(s) orally once a day  epoetin jack: 20153 unit(s) intravenous 3 times a week with hemodialysis   hydrALAZINE 100 mg oral tablet: 1 tab(s) orally 2 times a day  hydroxychloroquine 200 mg oral tablet: 1 tab(s) orally 2 times a day   levothyroxine 75 mcg (0.075 mg) oral tablet: 1 tab(s) orally once a day  simvastatin 20 mg oral tablet: 1 tab(s) orally once a day (at bedtime)

## 2020-04-03 NOTE — DISCHARGE NOTE PROVIDER - NSDCCPCAREPLAN_GEN_ALL_CORE_FT
PRINCIPAL DISCHARGE DIAGNOSIS  Diagnosis: Acute respiratory failure with hypoxemia  Assessment and Plan of Treatment: Due to COVID  No longer requiring supplemental oxygen  It has been determined that you no longer need hospitalization and can recover while remaining in self-quarantine at home for 14 days. You should follow the prevention steps below until a healthcare provider or [facility] says you can return to your normal activities.  Please remain in quarantine until then. You should restrict activities outside your home except for getting medical care.  Do not go to work, school or public areas.  Avoid using public transportation.  Separate yourself from other people and animals in your home.  Cover your coughs and sneezes.  Clean your hands often. Avoid sharing personal household items. Clean all high touch surfaces. Monitor your symptoms, if you have a medical emergency call 911.        SECONDARY DISCHARGE DIAGNOSES  Diagnosis: Hypertensive urgency  Assessment and Plan of Treatment: Continue current oral medications  low sodium diet  maintain HD schedule

## 2020-04-03 NOTE — PROGRESS NOTE ADULT - SUBJECTIVE AND OBJECTIVE BOX
[facility] DIVISION OF KIDNEY DISEASES AND HYPERTENSION -- FOLLOW UP NOTE  --------------------------------------------------------------------------------  Chief Complaint: ESRD/Ongoing hemodialysis requirement    24 hour events/subjective:  Discharge today      PAST HISTORY  --------------------------------------------------------------------------------  No significant changes to PMH, PSH, FHx, SHx, unless otherwise noted    ALLERGIES & MEDICATIONS  --------------------------------------------------------------------------------  Allergies  No Known Allergies    Standing Inpatient Medications  ALPRAZolam 0.5 milliGRAM(s) Oral every 8 hours  amLODIPine   Tablet 10 milliGRAM(s) Oral daily  aspirin enteric coated 81 milliGRAM(s) Oral daily  chlorhexidine 4% Liquid 1 Application(s) Topical <User Schedule>  cloNIDine 0.2 milliGRAM(s) Oral every 12 hours  clopidogrel Tablet 75 milliGRAM(s) Oral daily  epoetin jack Injectable 53704 Unit(s) IV Push <User Schedule>  heparin  Injectable 5000 Unit(s) SubCutaneous every 12 hours  hydrALAZINE 100 milliGRAM(s) Oral every 12 hours  hydroxychloroquine   Oral   hydroxychloroquine 200 milliGRAM(s) Oral every 12 hours  simvastatin 20 milliGRAM(s) Oral at bedtime    PRN Inpatient Medications  acetaminophen   Tablet .. 650 milliGRAM(s) Oral every 6 hours PRN  ALBUTerol    90 MICROgram(s) HFA Inhaler 2 Puff(s) Inhalation every 6 hours PRN  benzocaine 20% Spray 1 Spray(s) Topical three times a day PRN  hydrALAZINE Injectable 10 milliGRAM(s) IV Push every 4 hours PRN  LORazepam   Injectable 1 milliGRAM(s) IV Push two times a day PRN      REVIEW OF SYSTEMS  --------------------------------------------------------------------------------  [place]    VITALS/PHYSICAL EXAM  --------------------------------------------------------------------------------  T(C): 36.9 (04-03-20 @ 08:00), Max: 36.9 (04-03-20 @ 08:00)  HR: 75 (04-03-20 @ 08:00) (75 - 95)  BP: 119/69 (04-03-20 @ 08:00) (93/50 - 151/66)  RR: 26 (04-03-20 @ 08:00) (18 - 26)  SpO2: 92% (04-03-20 @ 08:00) (92% - 99%)    04-02-20 @ 07:01  -  04-03-20 @ 07:00  --------------------------------------------------------  IN: 0 mL / OUT: 900 mL / NET: -900 mL    Physical Exam:  Due to the nature of this patient's COVID-19 isolation status, no bedside physical exam was done to limit spread of infection. Objective data were reviewed in detail.      LABS/STUDIES  --------------------------------------------------------------------------------              8.9    8.71  >-----------<  501      [04-02-20 @ 22:09]              29.1     137  |  91  |  62.0  ----------------------------<  115      [04-02-20 @ 22:09]  4.4   |  22.0  |  5.52        Ca     10.5     [04-02-20 @ 22:09]    TPro  8.0  /  Alb  3.6  /  TBili  0.4  /  DBili  x   /  AST  41  /  ALT  18  /  AlkPhos  82  [04-02-20 @ 22:09]            [04-02-20 @ 22:09]    Creatinine Trend:  SCr 5.52 [04-02 @ 22:09]  SCr 4.87 [03-30 @ 08:16]  SCr 5.75 [03-29 @ 06:38]        Ferritin 6214      [04-02-20 @ 22:09]  HbA1c 6.5      [10-05-19 @ 13:33]  TSH 1.64      [10-12-19 @ 10:08]    HBsAb 8.2      [03-30-20 @ 10:09]  HBsAg Nonreact      [03-30-20 @ 10:09]  HBcAb Nonreact      [03-30-20 @ 10:09]  HCV 0.13, Nonreact      [03-30-20 @ 10:09]

## 2020-04-03 NOTE — DISCHARGE NOTE NURSING/CASE MANAGEMENT/SOCIAL WORK - PATIENT PORTAL LINK FT
You can access the FollowMyHealth Patient Portal offered by [facility] by registering at the following website: [email or web address]. By joining [facility]’s FollowMyHealth portal, you will also be able to view your health information using other applications (apps) compatible with our system.

## 2020-04-03 NOTE — DISCHARGE NOTE PROVIDER - NSDCPNSUBOBJ_GEN_ALL_CORE
Patient is a 64y old  Female who presents with a chief complaint of Resp failure (03 Apr 2020 09:20)            Patient seen and examined at bedside. No overnight events reported.         ALLERGIES:    No Known Allergies        MEDICATIONS  (STANDING):    ALPRAZolam 0.5 milliGRAM(s) Oral every 8 hours    amLODIPine   Tablet 10 milliGRAM(s) Oral daily    aspirin enteric coated 81 milliGRAM(s) Oral daily    chlorhexidine 4% Liquid 1 Application(s) Topical <User Schedule>    cloNIDine 0.2 milliGRAM(s) Oral every 12 hours    clopidogrel Tablet 75 milliGRAM(s) Oral daily    epoetin jack Injectable 05053 Unit(s) IV Push <User Schedule>    heparin  Injectable 5000 Unit(s) SubCutaneous every 12 hours    hydrALAZINE 100 milliGRAM(s) Oral every 12 hours    hydroxychloroquine   Oral     hydroxychloroquine 200 milliGRAM(s) Oral every 12 hours    simvastatin 20 milliGRAM(s) Oral at bedtime        MEDICATIONS  (PRN):    acetaminophen   Tablet .. 650 milliGRAM(s) Oral every 6 hours PRN Temp greater or equal to 38C (100.4F), Mild Pain (1 - 3)    ALBUTerol    90 MICROgram(s) HFA Inhaler 2 Puff(s) Inhalation every 6 hours PRN Shortness of Breath and/or Wheezing    benzocaine 20% Spray 1 Spray(s) Topical three times a day PRN oropharyngeal pain    hydrALAZINE Injectable 10 milliGRAM(s) IV Push every 4 hours PRN SBP> 180    LORazepam   Injectable 1 milliGRAM(s) IV Push two times a day PRN Anxiety        Vital Signs Last 24 Hrs    T(F): 98.1 (03 Apr 2020 00:17), Max: 98.2 (02 Apr 2020 14:35)    HR: 95 (03 Apr 2020 04:45) (78 - 95)    BP: 151/66 (03 Apr 2020 04:45) (93/50 - 151/66)    RR: 19 (03 Apr 2020 00:17) (18 - 19)    SpO2: 96% (03 Apr 2020 00:17) (92% - 99%)    I&O's Summary        02 Apr 2020 07:01  -  03 Apr 2020 07:00    --------------------------------------------------------    IN: 0 mL / OUT: 900 mL / NET: -900 mL            PHYSICAL EXAM:    General: NAD, A/O x 3    ENT: MMM, no thrush    Neck: Supple, No JVD    Lungs: cta b/l no wheezing     Cardio: +S1/s2    Abdomen: Soft, Nontender, Nondistended; Bowel sounds present    Extremities: No calf tenderness        LABS:                            8.9      8.71  )-----------( 501      ( 02 Apr 2020 22:09 )               29.1         04-02        137  |  91  |  62.0    ----------------------------<  115    4.4   |  22.0  |  5.52        Ca    10.5      02 Apr 2020 22:09        TPro  8.0  /  Alb  3.6  /  TBili  0.4  /  DBili  x   /  AST  41  /  ALT  18  /  AlkPhos  82  04-02                eGFR if Non African American: 7 mL/min/1.73M2 (04-02-20 @ 22:09)    eGFR if : 9 mL/min/1.73M2 (04-02-20 @ 22:09)        Cultures    Culture - Blood (collected 29 Mar 2020 06:37)    Source: .Blood    Final Report (03 Apr 2020 07:00):      No growth at 5 days.            RADIOLOGY & ADDITIONAL TESTS:    < from: Xray Chest 1 View-PORTABLE IMMEDIATE (03.29.20 @ 05:57) >    Impression:     Bilateral increased interstitial markings and patchy nodularity right greater than left may represent multifocal pneumonia versus pulmonary edema    < end of copied text >        spoke to patient daughter [phone]- advised patient stable for d/c today. all questions answered        Assessment and Plan    64F with a history of ESRD, hypertension, hypothyroidism, coronary artery disease with CABG who presented with dyspnea, cough, and body aches. She was found to have uncontrolled hypertension (221/88) with chest Xray noting bilateral increased interstitial markings and patchy nodularity. The patient was admitted to the intensive care unit with initiation of a nitroglycerin infusion.        #acute hypoxic respiratory failure    - 2/2 viral pna 2/2 covid 19    - isolation precautions    - hydroxychloroquine    - ID consult appreciated    - tylenol for fever         #anxiety    - ativan q12h prn    - xanax 0.5mg tid         #HTN    - monitor blood pressure    - s/p nitro infusion given previous urgency    - amlodipine, clonidine, hydralyzine prn        #ESRD    - HD via nephro    - nephrology consult appreciated         #Coronary artery disease     - aspirin, clopidogrel        #HLD    -statin        #Hypothyroidism     - levothyroxine        #DVT prophylaxis    - heparin SC

## 2020-04-03 NOTE — DISCHARGE NOTE PROVIDER - HOSPITAL COURSE
64F with a history of ESRD, hypertension, hypothyroidism, coronary artery disease with CABG who presented with dyspnea, cough, and body aches. She was found to have uncontrolled hypertension (221/88) with chest Xray noting bilateral increased interstitial markings and patchy nodularity. The patient was admitted to the intensive care unit with initiation of a nitroglycerin infusion. +COVID. Treated with  hydroxychloroquine, to complete course as outpatient. Nitro  infusion discontinued, BP controlled on oral RX. The patient underwent HD as per schedule. The patient no longer requires supplemental oxygen. The patient is medically stable for discharge.     Vital Signs Last 24 Hrs    T(C): 36.7 (03 Apr 2020 00:17), Max: 36.8 (02 Apr 2020 14:35)    T(F): 98.1 (03 Apr 2020 00:17), Max: 98.2 (02 Apr 2020 14:35)    HR: 95 (03 Apr 2020 04:45) (78 - 95)    BP: 151/66 (03 Apr 2020 04:45) (93/50 - 151/66)    BP(mean): --    RR: 19 (03 Apr 2020 00:17) (18 - 19)    SpO2: 96% (03 Apr 2020 00:17) (92% - 99%)        PHYSICAL EXAM:    General: NAD, Alert; obese    ENT: MMM, no thrush    Neck: Supple, No JVD    Lungs: decreased breath sounds b/l bases     Cardio: +s1/s2, No pitting edema    Abdomen: Soft, Nontender, Nondistended; Bowel sounds present    Extremities: No calf tenderness 64F with a history of ESRD, hypertension, hypothyroidism, coronary artery disease with CABG who presented with dyspnea, cough, and body aches. She was found to have uncontrolled hypertension (221/88) with chest Xray noting bilateral increased interstitial markings and patchy nodularity. The patient was admitted to the intensive care unit with initiation of a nitroglycerin infusion. +COVID. Treated with  hydroxychloroquine, to complete course as outpatient. Nitro  infusion discontinued, BP controlled on oral RX. The patient underwent HD as per schedule. The patient no longer requires supplemental oxygen. The patient is medically stable for discharge. 64F with a history of ESRD, hypertension, hypothyroidism, coronary artery disease with CABG who presented with dyspnea, cough, and body aches. She was found to have uncontrolled hypertension (221/88) with chest Xray noting bilateral increased interstitial markings and patchy nodularity. The patient was admitted to the intensive care unit with initiation of a nitroglycerin infusion. +COVID. Treated with  hydroxychloroquine, to complete course as outpatient. Nitro  infusion discontinued, BP controlled on oral RX. The patient underwent HD as per schedule. The patient no longer requires supplemental oxygen. The patient is medically stable for discharge.         Time spent on patients discharge 32 minutes

## 2020-04-03 NOTE — PROGRESS NOTE ADULT - REASON FOR ADMISSION
Resp failure

## 2020-04-05 NOTE — CHART NOTE - NSCHARTNOTEFT_GEN_A_CORE
called and spoke to patient son [phone] Ori after receiving message to call back    Stating mom lethargic that prior to sovietization and improving daily. advised if neuro changes can come back to [facility] for eval. he is stating mom is asking more for food and water. states she is makig urine and stool. states more awake daily. questioned progression of covid and recovery term- to which I stated not predictable. he stated mom breathing normal and taking medications with apple sauce    all questions answered

## 2020-07-01 NOTE — BRIEF OPERATIVE NOTE - POST-OP DX
Coronary artery disease of native artery of native heart with stable angina pectoris  11/13/2018    Active  Connor Charles 30-Jun-2020 19:40

## 2020-10-27 NOTE — PROGRESS NOTE ADULT - SUBJECTIVE AND OBJECTIVE BOX
Patient seen at bedside  Pt A &O x3  Lungs CTA  S1S2 no MRG  denies CP or SOB at this time  Mallampati II  ASA 3      Plan  [facility] all other ROS negative except as per HPI

## 2021-03-03 ENCOUNTER — INPATIENT (INPATIENT)
Facility: HOSPITAL | Age: 66
LOS: 8 days | Discharge: HOME | DRG: 377 | End: 2021-03-12
Attending: INTERNAL MEDICINE | Admitting: HOSPITALIST
Payer: COMMERCIAL

## 2021-03-03 VITALS
TEMPERATURE: 98 F | HEART RATE: 74 BPM | DIASTOLIC BLOOD PRESSURE: 63 MMHG | RESPIRATION RATE: 18 BRPM | SYSTOLIC BLOOD PRESSURE: 142 MMHG | WEIGHT: 229.94 LBS | OXYGEN SATURATION: 99 % | HEIGHT: 60.7 IN

## 2021-03-03 DIAGNOSIS — I77.0 ARTERIOVENOUS FISTULA, ACQUIRED: Chronic | ICD-10-CM

## 2021-03-03 DIAGNOSIS — Z90.49 ACQUIRED ABSENCE OF OTHER SPECIFIED PARTS OF DIGESTIVE TRACT: Chronic | ICD-10-CM

## 2021-03-03 LAB
ALBUMIN SERPL ELPH-MCNC: 4.1 G/DL — SIGNIFICANT CHANGE UP (ref 3.3–5.2)
ALP SERPL-CCNC: 136 U/L — HIGH (ref 40–120)
ALT FLD-CCNC: 19 U/L — SIGNIFICANT CHANGE UP
ANION GAP SERPL CALC-SCNC: 17 MMOL/L — SIGNIFICANT CHANGE UP (ref 5–17)
AST SERPL-CCNC: 29 U/L — SIGNIFICANT CHANGE UP
BASOPHILS # BLD AUTO: 0.08 K/UL — SIGNIFICANT CHANGE UP (ref 0–0.2)
BASOPHILS NFR BLD AUTO: 0.6 % — SIGNIFICANT CHANGE UP (ref 0–2)
BILIRUB SERPL-MCNC: 0.3 MG/DL — LOW (ref 0.4–2)
BUN SERPL-MCNC: 61 MG/DL — HIGH (ref 8–20)
CALCIUM SERPL-MCNC: 9.6 MG/DL — SIGNIFICANT CHANGE UP (ref 8.6–10.2)
CHLORIDE SERPL-SCNC: 94 MMOL/L — LOW (ref 98–107)
CO2 SERPL-SCNC: 22 MMOL/L — SIGNIFICANT CHANGE UP (ref 22–29)
CREAT SERPL-MCNC: 4.19 MG/DL — HIGH (ref 0.5–1.3)
EOSINOPHIL # BLD AUTO: 0.05 K/UL — SIGNIFICANT CHANGE UP (ref 0–0.5)
EOSINOPHIL NFR BLD AUTO: 0.4 % — SIGNIFICANT CHANGE UP (ref 0–6)
GLUCOSE SERPL-MCNC: 239 MG/DL — HIGH (ref 70–99)
HCT VFR BLD CALC: 27.5 % — LOW (ref 34.5–45)
HGB BLD-MCNC: 8.3 G/DL — LOW (ref 11.5–15.5)
IMM GRANULOCYTES NFR BLD AUTO: 0.5 % — SIGNIFICANT CHANGE UP (ref 0–1.5)
LIDOCAIN IGE QN: 33 U/L — SIGNIFICANT CHANGE UP (ref 22–51)
LYMPHOCYTES # BLD AUTO: 1.45 K/UL — SIGNIFICANT CHANGE UP (ref 1–3.3)
LYMPHOCYTES # BLD AUTO: 10.9 % — LOW (ref 13–44)
MCHC RBC-ENTMCNC: 29.3 PG — SIGNIFICANT CHANGE UP (ref 27–34)
MCHC RBC-ENTMCNC: 30.2 GM/DL — LOW (ref 32–36)
MCV RBC AUTO: 97.2 FL — SIGNIFICANT CHANGE UP (ref 80–100)
MONOCYTES # BLD AUTO: 0.74 K/UL — SIGNIFICANT CHANGE UP (ref 0–0.9)
MONOCYTES NFR BLD AUTO: 5.6 % — SIGNIFICANT CHANGE UP (ref 2–14)
NEUTROPHILS # BLD AUTO: 10.92 K/UL — HIGH (ref 1.8–7.4)
NEUTROPHILS NFR BLD AUTO: 82 % — HIGH (ref 43–77)
OB PNL STL: POSITIVE
PLATELET # BLD AUTO: 239 K/UL — SIGNIFICANT CHANGE UP (ref 150–400)
POTASSIUM SERPL-MCNC: 5.3 MMOL/L — SIGNIFICANT CHANGE UP (ref 3.5–5.3)
POTASSIUM SERPL-SCNC: 5.3 MMOL/L — SIGNIFICANT CHANGE UP (ref 3.5–5.3)
PROT SERPL-MCNC: 6.8 G/DL — SIGNIFICANT CHANGE UP (ref 6.6–8.7)
RBC # BLD: 2.83 M/UL — LOW (ref 3.8–5.2)
RBC # FLD: 14.9 % — HIGH (ref 10.3–14.5)
SODIUM SERPL-SCNC: 133 MMOL/L — LOW (ref 135–145)
WBC # BLD: 13.31 K/UL — HIGH (ref 3.8–10.5)
WBC # FLD AUTO: 13.31 K/UL — HIGH (ref 3.8–10.5)

## 2021-03-03 PROCEDURE — G1004: CPT

## 2021-03-03 PROCEDURE — 99285 EMERGENCY DEPT VISIT HI MDM: CPT

## 2021-03-03 PROCEDURE — 99223 1ST HOSP IP/OBS HIGH 75: CPT

## 2021-03-03 PROCEDURE — 74176 CT ABD & PELVIS W/O CONTRAST: CPT | Mod: 26,MG

## 2021-03-03 PROCEDURE — 93010 ELECTROCARDIOGRAM REPORT: CPT

## 2021-03-03 RX ORDER — SUCRALFATE 1 G
1 TABLET ORAL ONCE
Refills: 0 | Status: COMPLETED | OUTPATIENT
Start: 2021-03-03 | End: 2021-03-03

## 2021-03-03 RX ORDER — PANTOPRAZOLE SODIUM 20 MG/1
40 TABLET, DELAYED RELEASE ORAL ONCE
Refills: 0 | Status: COMPLETED | OUTPATIENT
Start: 2021-03-03 | End: 2021-03-03

## 2021-03-03 RX ORDER — MORPHINE SULFATE 50 MG/1
4 CAPSULE, EXTENDED RELEASE ORAL ONCE
Refills: 0 | Status: DISCONTINUED | OUTPATIENT
Start: 2021-03-03 | End: 2021-03-03

## 2021-03-03 RX ADMIN — Medication 1 GRAM(S): at 21:05

## 2021-03-03 RX ADMIN — PANTOPRAZOLE SODIUM 40 MILLIGRAM(S): 20 TABLET, DELAYED RELEASE ORAL at 20:47

## 2021-03-03 RX ADMIN — MORPHINE SULFATE 4 MILLIGRAM(S): 50 CAPSULE, EXTENDED RELEASE ORAL at 23:00

## 2021-03-03 RX ADMIN — MORPHINE SULFATE 4 MILLIGRAM(S): 50 CAPSULE, EXTENDED RELEASE ORAL at 22:25

## 2021-03-03 NOTE — ED ADULT NURSE NOTE - OBJECTIVE STATEMENT
Pt reports being at dialysis and developed abdominal pain. As per pt's daughter pt's dialysis was cut short d/t low bp. pt states she suffers from gastritis and was given some pills by md to take. Pt pointing to epigastric area. Pt in NAD. pt educated on plan of care, pt able to successfully teach back plan of care to RN, RN will continue to reeducate pt during hospital stay. Pt reports being at dialysis and developed abdominal pain. As per pt's daughter pt's dialysis was cut short d/t low bp. pt states she suffers from gastritis and was given some pills by md to take. Pt pointing to epigastric area. Fistula noted to left arm. Pt in NAD. pt educated on plan of care, pt able to successfully teach back plan of care to RN, RN will continue to reeducate pt during hospital stay.

## 2021-03-03 NOTE — ED ADULT NURSE NOTE - CAS TRG GEN SKIN COLOR
ED General





- General


Chief Complaint: Cough


Stated Complaint: COUGH


Time Seen by Provider: 02/15/19 12:07


Primary Care Provider: 


JOANNE HENDERSON DO [NO LOCAL MD] - Follow up as needed


Mode of Arrival: Ambulatory


Notes: 





24-year-old otherwise healthy male with autism presents to the emergency 

department for cough, "acting weird ", nasal congestion, reduced appetite  

Patient is nonverbal.  Mom states patient has had chills and subjective fevers. 

Mom denies nausea, vomiting, diarrhea, states patient does not cough at baseline

history of.  Mom states patient has not had a bowel movement in a couple of 

days.  No other complaints.


TRAVEL OUTSIDE OF THE [place] IN LAST 30 DAYS: No





- Related Data


Allergies/Adverse Reactions: 


                                        





No Known Allergies Allergy (Verified 02/15/19 11:43)


   











Past Medical History





- General


Information source: Parent





- Social History


Smoking Status: Never Smoker


Chew tobacco use (# tins/day): No


Frequency of alcohol use: None


Drug Abuse: None


Family History: None


Patient has suicidal ideation: No


Patient has homicidal ideation: No


Neurological Medical History: Reports: Hx Seizures


Renal/ Medical History: Denies: Hx Peritoneal Dialysis


Traumatic Medical History: Reports: Hx Traumatic Brain Injury


Past Surgical History: Reports: Hx Orthopedic Surgery - right ankle





- Immunizations


Hx Diphtheria, Pertussis, Tetanus Vaccination: Yes





Review of Systems





- Review of Systems


Constitutional: See HPI


EENT: See HPI


Cardiovascular: No symptoms reported


Respiratory: See HPI


Gastrointestinal: See HPI


Genitourinary: No symptoms reported


Male Genitourinary: No symptoms reported


Musculoskeletal: No symptoms reported


Skin: No symptoms reported


Hematologic/Lymphatic: No symptoms reported


Neurological/Psychological: No symptoms reported





Physical Exam





- Vital signs


Vitals: 


                                        











Temp Pulse Resp BP Pulse Ox


 


 99.0 F   114 H  18   99/64 L  97 


 


 02/15/19 11:48  02/15/19 11:48  02/15/19 11:48  02/15/19 11:48  02/15/19 11:48














- Notes


Notes: 





PHYSICAL EXAMINATION:





Reviewed vital signs and charting by RN





GENERAL: Alert, interacts well. No acute distress.


HEAD: Normocephalic, atraumatic.


EYES: Pupils equal, round. Extraocular movements intact.


ENT: Oral mucosa moist, tongue midline.  No tonsillar hypertrophy or erythema.


NECK: Full range of motion. Supple. Trachea midline.


LUNGS: Clear to auscultation bilaterally, no wheezes, rales, or rhonchi. No 

respiratory distress.


HEART: Regular rate and rhythm. No murmur


ABDOMEN: soft, non-tender. + distension. Bowel sounds present in all 4 

quadrants. no McBurney's point tenderness, no Gandhi sign.


EXTREMITIES: Moves all 4 extremities spontaneously. No edema,  No cyanosis.


BACK: no cervical, thoracic, lumbar midline tenderness. No saddle anesthesia, 

normal distal neurovascular exam. 


NEUROLOGICAL: Alert.  Nonverbal at baseline. 


PSYCH: Normal affect, normal mood.


SKIN: Warm, dry, normal turgor. No rashes or lesions noted.





Course





- Re-evaluation


Re-evalutation: 





02/15/19 14:29


Well-appearing 24-year-old male with autism presents for cold symptoms.  Rapid 

influenza was negative, rapid strep negative, chest x-ray was negative for 

consolidation or concern for pneumonia.  Chest x-ray did show large amount of 

air in the stomach and proximal duodenum.  Plan to get a KUB to assess rest of 

intestines and colon.


02/15/19 15:32


KUB showed constipation but no obstruction.  Will instruct mom to give patient 

MiraLAX.  Patient otherwise stable and well-appearing.  Will discharge home.





- Vital Signs


Vital signs: 


                                        











Temp Pulse Resp BP Pulse Ox


 


 99.0 F   114 H  18   99/64 L  97 


 


 02/15/19 11:48  02/15/19 11:48  02/15/19 11:48  02/15/19 11:48  02/15/19 11:48














Discharge





- Discharge


Clinical Impression: 


Constipation


Qualifiers:


 Constipation type: unspecified constipation type Qualified Code(s): K59.00 - 

Constipation, unspecified





Condition: Good


Disposition: HOME, SELF-CARE


Instructions:  Constipation ([facility])


Additional Instructions: 


You should take 8 caps of MiraLAX and placed in 1 liter of fluid.  Provide your 

child with one half the solution and if they do not have a bowel movement within

4 hours given the other half.  After your child's constipation is resolved keep 

them on 1 capful daily.  Please follow-up with your child's primary care doctor 

return immediately if your child develops persistent vomiting, becomes 

lethargic, has worsening abdominal pain, develops a fever greater than 101, or 

has any other symptoms that are concerning to you.


Referrals: 


JOANNE HENDERSON,  [NO LOCAL MD] - Follow up as needed
ED Medical Screen (RME)





- General


Chief Complaint: Cough


Stated Complaint: COUGH


Time Seen by Provider: 02/15/19 12:07


Primary Care Provider: 


JOANNE HENDERSON DO [Primary Care Provider] - Follow up as needed


Mode of Arrival: Ambulatory


Information source: Parent


Notes: 





This is a 24-year-old man with autism who is brought into the emergency room 

because of cough, congestion, subjective fevers.


TRAVEL OUTSIDE OF THE [place] IN LAST 30 DAYS: No





- Related Data


Allergies/Adverse Reactions: 


                                        





No Known Allergies Allergy (Verified 02/15/19 11:43)


   











Past Medical History


Neurological Medical History: Reports: Hx Seizures


Renal/ Medical History: Denies: Hx Peritoneal Dialysis


Traumatic Medical History: Reports: Hx Traumatic Brain Injury


Past Surgical History: Reports: Hx Orthopedic Surgery - right ankle





- Immunizations


Hx Diphtheria, Pertussis, Tetanus Vaccination: Yes





Physical Exam





- Vital signs


Vitals: 





                                        











Temp Pulse Resp BP Pulse Ox


 


 99.0 F   114 H  18   99/64 L  97 


 


 02/15/19 11:48  02/15/19 11:48  02/15/19 11:48  02/15/19 11:48  02/15/19 11:48














Course





- Vital Signs


Vital signs: 





                                        











Temp Pulse Resp BP Pulse Ox


 


 99.0 F   114 H  18   99/64 L  97 


 


 02/15/19 11:48  02/15/19 11:48  02/15/19 11:48  02/15/19 11:48  02/15/19 11:48














Doctor's Discharge





- Discharge


Referrals: 


JOANNE HENDERSON DO [Primary Care Provider] - Follow up as needed
Normal for race
Normal for race

## 2021-03-03 NOTE — ED PROVIDER NOTE - CLINICAL SUMMARY MEDICAL DECISION MAKING FREE TEXT BOX
66 yo F p/w epigastric pain x 4 days, unable to finish dialysis. will get ekg, blood work, protonix and carafate. 64 yo F p/w epigastric pain x 4 days, unable to finish dialysis. will get ekg, blood work, protonix and carafate. CT abdomen.

## 2021-03-03 NOTE — ED ADULT NURSE NOTE - CHIEF COMPLAINT QUOTE
dgtr states her mom is a dialysis pt and her BP is going low, haven't given her meds for 2 days, 142/63  c/o abd pain, not eating, dgtr stated she didn't finish the treatment
dgtr states her mom is a dialysis pt and her BP is going low, haven't given her meds for 2 days, 142/63  c/o abd pain, not eating, dgtr stated she didn't finish the treatment

## 2021-03-03 NOTE — ED PROVIDER NOTE - NS ED ROS FT
Review of Systems  •	CONSTITUTIONAL - no  fever, no diaphoresis, no weight change  •	SKIN - no rash  •	HEMATOLOGIC - no bleeding, no bruising  •	EYES - no eye pain, no blurred vision  •	ENT - no change in hearing, no pain  •	RESPIRATORY - no shortness of breath, no cough  •	CARDIAC - no chest pain, no palpitations  •	GI - (+)  abd pain, no nausea, no vomiting, no diarrhea, no constipation, no bleeding  •	GENITO-URINARY - no discharge, no dysuria; no hematuria,   •	ENDO - no polydipsia, no polyuria, no heat/no cold intolerance  •	MUSCULOSKELETAL - no joint pain, no swelling, no redness  •	NEUROLOGIC - no weakness, no headache, no anesthesia, no paresthesias  •	PSYCH - no anxiety, non suicidal, non homicidal, no hallucination, no depression

## 2021-03-03 NOTE — ED PROVIDER NOTE - PROGRESS NOTE DETAILS
EKG with ischemic changes, trop and CK added. patient report black stool. occult feces done. chaperone by Juany WAGONER, brown stool. patient signed out to Dr. Zimmer pending trop and CT abdomen result. Pt with elevated trop in setting of CKD, pt also guaiac positive, medicine team will further manage hold on ac

## 2021-03-03 NOTE — ED PROVIDER NOTE - CRITERIA ADMIT PEDS PT
August 29, 2017      Shaan Toledo, FNP  [address]  [address]  [place] [address]           [facility]  [address]  [place] [address]  Phone: [phone]          Patient: Juliette Seo   MR Number: [id number]   YOB: 1947   Date of Visit: 8/29/2017       Dear Shaan Toledo:    Thank you for referring Juliette Seo to me for evaluation. Attached you will find relevant portions of my assessment and plan of care.    If you have questions, please do not hesitate to call me. I look forward to following Juliette Seo along with you.    Sincerely,    Sarmad Wesley Jr., MD    Enclosure  CC:  No Recipients    If you would like to receive this communication electronically, please contact [email or web address] or [phone] to request more information on [employer] access.    For providers and/or their staff who would like to refer a patient to [facility], please contact us through our one-stop-shop provider referral line, [facility], at [phone].    If you feel you have received this communication in error or would no longer like to receive these types of communications, please e-mail [email or web address]        No

## 2021-03-03 NOTE — ED PROVIDER NOTE - OBJECTIVE STATEMENT
64 yo F hx of HTN, CKD on HD, DM, open heart surgery, pacemaker, gastric ulcer p.w diffuse epigastic abdominal pain x 4 days. no nause or vomting. patient was at dialysis and couldn't finish dialysis due to abdominal pain. Daughter report that patient has not been getting her BP meds because her pressure was running low from dialysis.    I spoke to daughter jose. Patient is on hydralazine 100 mg BID, Eliquist, Folic acid, carvildolol 12.5 BID, gabapentin 100 mg, asa 81, simistatin 20 mg, levothryoixin 40 mg , amilodipine 5 mg PO, pantorazol 40 mg, clonidine, insulin     nephrology: Dr. Dickinson

## 2021-03-03 NOTE — ED PROVIDER NOTE - CARE PLAN
Principal Discharge DX:	Abdominal pain   Principal Discharge DX:	Anemia  Secondary Diagnosis:	GIB (gastrointestinal bleeding)  Secondary Diagnosis:	Elevated troponin  Secondary Diagnosis:	Abdominal pain  Secondary Diagnosis:	Renal failure

## 2021-03-03 NOTE — ED ADULT NURSE NOTE - NSIMPLEMENTINTERV_GEN_ALL_ED
Implemented All Universal Safety Interventions:  [place] to call system. Call bell, personal items and telephone within reach. Instruct patient to call for assistance. Room bathroom lighting operational. Non-slip footwear when patient is off stretcher. Physically safe environment: no spills, clutter or unnecessary equipment. Stretcher in lowest position, wheels locked, appropriate side rails in place.
Implemented All Universal Safety Interventions:  [place] to call system. Call bell, personal items and telephone within reach. Instruct patient to call for assistance. Room bathroom lighting operational. Non-slip footwear when patient is off stretcher. Physically safe environment: no spills, clutter or unnecessary equipment. Stretcher in lowest position, wheels locked, appropriate side rails in place.

## 2021-03-03 NOTE — ED ADULT NURSE NOTE - OBJECTIVE STATEMENT
pt reports having a headache for about 1 week. pt denies any injury. Pt denies taking any meds pta. pt sleeping in stretcher.

## 2021-03-03 NOTE — ED PROVIDER NOTE - PHYSICAL EXAMINATION
VITAL SIGNS: I have reviewed nursing notes and confirm.  CONSTITUTIONAL: Well-developed; well-nourished; in no acute distress.  SKIN: Skin exam is warm and dry, no acute rash.  HEAD: Normocephalic; atraumatic.  EYES: PERRL, EOM intact; conjunctiva and sclera clear.  ENT: No nasal discharge; airway clear. Throat clear.  NECK: Supple; non tender.    CARD: S1, S2 normal; Regular rate and rhythm.  RESP: No wheezes,  no rales or rhonchi.   ABD:  soft; non-distended (+) epigatic tenderness   EXT: Normal ROM. No clubbing, cyanosis or edema.(+)  left arm fistula   NEURO: Alert, oriented. Grossly unremarkable. No focal deficits. no facial droop, moves all extremities,  normal gait   PSYCH: Cooperative, appropriate.

## 2021-03-03 NOTE — ED ADULT NURSE NOTE - CHIEF COMPLAINT
The patient is a 65y Female complaining of abdominal pain.
The patient is a 65y Female complaining of

## 2021-03-03 NOTE — ED ADULT TRIAGE NOTE - CHIEF COMPLAINT QUOTE
dgtr states her mom is a dialysis pt and her BP is going low, haven't given her meds for 2 days, 142/63  c/o abd pain, not eating, dgtr stated she didn't finish the treatment

## 2021-03-03 NOTE — ED ADULT NURSE NOTE - NS ED NURSE LEVEL OF CONSCIOUSNESS ORIENTATION
Oriented - self; Oriented - place; Oriented - time
Oriented - self; Oriented - place; Oriented - time

## 2021-03-04 DIAGNOSIS — R10.13 EPIGASTRIC PAIN: ICD-10-CM

## 2021-03-04 DIAGNOSIS — Z99.2 DEPENDENCE ON RENAL DIALYSIS: ICD-10-CM

## 2021-03-04 DIAGNOSIS — R19.5 OTHER FECAL ABNORMALITIES: ICD-10-CM

## 2021-03-04 DIAGNOSIS — Z95.0 PRESENCE OF CARDIAC PACEMAKER: Chronic | ICD-10-CM

## 2021-03-04 DIAGNOSIS — D64.9 ANEMIA, UNSPECIFIED: ICD-10-CM

## 2021-03-04 LAB
ANION GAP SERPL CALC-SCNC: 17 MMOL/L — SIGNIFICANT CHANGE UP (ref 5–17)
ANION GAP SERPL CALC-SCNC: 19 MMOL/L — HIGH (ref 5–17)
BLD GP AB SCN SERPL QL: SIGNIFICANT CHANGE UP
BUN SERPL-MCNC: 61 MG/DL — HIGH (ref 8–20)
BUN SERPL-MCNC: 93 MG/DL — HIGH (ref 8–20)
CALCIUM SERPL-MCNC: 9.2 MG/DL — SIGNIFICANT CHANGE UP (ref 8.6–10.2)
CALCIUM SERPL-MCNC: 9.6 MG/DL — SIGNIFICANT CHANGE UP (ref 8.6–10.2)
CHLORIDE SERPL-SCNC: 91 MMOL/L — LOW (ref 98–107)
CHLORIDE SERPL-SCNC: 96 MMOL/L — LOW (ref 98–107)
CK SERPL-CCNC: 36 U/L — SIGNIFICANT CHANGE UP (ref 25–170)
CO2 SERPL-SCNC: 21 MMOL/L — LOW (ref 22–29)
CO2 SERPL-SCNC: 21 MMOL/L — LOW (ref 22–29)
CREAT SERPL-MCNC: 2.97 MG/DL — HIGH (ref 0.5–1.3)
CREAT SERPL-MCNC: 4.84 MG/DL — HIGH (ref 0.5–1.3)
GLUCOSE BLDC GLUCOMTR-MCNC: 211 MG/DL — HIGH (ref 70–99)
GLUCOSE BLDC GLUCOMTR-MCNC: 243 MG/DL — HIGH (ref 70–99)
GLUCOSE SERPL-MCNC: 201 MG/DL — HIGH (ref 70–99)
GLUCOSE SERPL-MCNC: 235 MG/DL — HIGH (ref 70–99)
HCT VFR BLD CALC: 22.9 % — LOW (ref 34.5–45)
HCT VFR BLD CALC: 28.8 % — LOW (ref 34.5–45)
HGB BLD-MCNC: 6.9 G/DL — CRITICAL LOW (ref 11.5–15.5)
HGB BLD-MCNC: 9 G/DL — LOW (ref 11.5–15.5)
INR BLD: 1.36 RATIO — HIGH (ref 0.88–1.16)
MAGNESIUM SERPL-MCNC: 3.2 MG/DL — HIGH (ref 1.6–2.6)
MAGNESIUM SERPL-MCNC: 4.1 MG/DL — HIGH (ref 1.8–2.6)
MCHC RBC-ENTMCNC: 29.2 PG — SIGNIFICANT CHANGE UP (ref 27–34)
MCHC RBC-ENTMCNC: 29.5 PG — SIGNIFICANT CHANGE UP (ref 27–34)
MCHC RBC-ENTMCNC: 30.1 GM/DL — LOW (ref 32–36)
MCHC RBC-ENTMCNC: 31.3 GM/DL — LOW (ref 32–36)
MCV RBC AUTO: 94.4 FL — SIGNIFICANT CHANGE UP (ref 80–100)
MCV RBC AUTO: 97 FL — SIGNIFICANT CHANGE UP (ref 80–100)
PLATELET # BLD AUTO: 194 K/UL — SIGNIFICANT CHANGE UP (ref 150–400)
PLATELET # BLD AUTO: 233 K/UL — SIGNIFICANT CHANGE UP (ref 150–400)
POTASSIUM SERPL-MCNC: 5.1 MMOL/L — SIGNIFICANT CHANGE UP (ref 3.5–5.3)
POTASSIUM SERPL-MCNC: 6.8 MMOL/L — CRITICAL HIGH (ref 3.5–5.3)
POTASSIUM SERPL-SCNC: 5.1 MMOL/L — SIGNIFICANT CHANGE UP (ref 3.5–5.3)
POTASSIUM SERPL-SCNC: 6.8 MMOL/L — CRITICAL HIGH (ref 3.5–5.3)
PROTHROM AB SERPL-ACNC: 15.5 SEC — HIGH (ref 10.6–13.6)
RBC # BLD: 2.36 M/UL — LOW (ref 3.8–5.2)
RBC # BLD: 3.05 M/UL — LOW (ref 3.8–5.2)
RBC # FLD: 15.1 % — HIGH (ref 10.3–14.5)
RBC # FLD: 15.3 % — HIGH (ref 10.3–14.5)
SARS-COV-2 IGG SERPL QL IA: POSITIVE
SARS-COV-2 IGM SERPL IA-ACNC: 77.2 INDEX — HIGH
SARS-COV-2 RNA SPEC QL NAA+PROBE: SIGNIFICANT CHANGE UP
SODIUM SERPL-SCNC: 131 MMOL/L — LOW (ref 135–145)
SODIUM SERPL-SCNC: 134 MMOL/L — LOW (ref 135–145)
TROPONIN T SERPL-MCNC: 0.11 NG/ML — HIGH (ref 0–0.06)
WBC # BLD: 11.67 K/UL — HIGH (ref 3.8–10.5)
WBC # BLD: 14.36 K/UL — HIGH (ref 3.8–10.5)
WBC # FLD AUTO: 11.67 K/UL — HIGH (ref 3.8–10.5)
WBC # FLD AUTO: 14.36 K/UL — HIGH (ref 3.8–10.5)

## 2021-03-04 PROCEDURE — 99233 SBSQ HOSP IP/OBS HIGH 50: CPT

## 2021-03-04 PROCEDURE — 99223 1ST HOSP IP/OBS HIGH 75: CPT

## 2021-03-04 PROCEDURE — 93010 ELECTROCARDIOGRAM REPORT: CPT

## 2021-03-04 RX ORDER — ALENDRONATE SODIUM 70 MG/1
1 TABLET ORAL
Qty: 0 | Refills: 0 | DISCHARGE

## 2021-03-04 RX ORDER — SIMVASTATIN 20 MG/1
20 TABLET, FILM COATED ORAL AT BEDTIME
Refills: 0 | Status: DISCONTINUED | OUTPATIENT
Start: 2021-03-04 | End: 2021-03-12

## 2021-03-04 RX ORDER — CARVEDILOL PHOSPHATE 80 MG/1
12.5 CAPSULE, EXTENDED RELEASE ORAL EVERY 12 HOURS
Refills: 0 | Status: DISCONTINUED | OUTPATIENT
Start: 2021-03-04 | End: 2021-03-05

## 2021-03-04 RX ORDER — LEVOTHYROXINE SODIUM 125 MCG
75 TABLET ORAL DAILY
Refills: 0 | Status: DISCONTINUED | OUTPATIENT
Start: 2021-03-04 | End: 2021-03-12

## 2021-03-04 RX ORDER — HYDROMORPHONE HYDROCHLORIDE 2 MG/ML
0.5 INJECTION INTRAMUSCULAR; INTRAVENOUS; SUBCUTANEOUS EVERY 6 HOURS
Refills: 0 | Status: DISCONTINUED | OUTPATIENT
Start: 2021-03-04 | End: 2021-03-08

## 2021-03-04 RX ORDER — SODIUM ZIRCONIUM CYCLOSILICATE 10 G/10G
10 POWDER, FOR SUSPENSION ORAL ONCE
Refills: 0 | Status: DISCONTINUED | OUTPATIENT
Start: 2021-03-04 | End: 2021-03-04

## 2021-03-04 RX ORDER — PANTOPRAZOLE SODIUM 20 MG/1
1 TABLET, DELAYED RELEASE ORAL
Qty: 0 | Refills: 0 | DISCHARGE

## 2021-03-04 RX ORDER — GABAPENTIN 400 MG/1
0 CAPSULE ORAL
Qty: 0 | Refills: 0 | DISCHARGE

## 2021-03-04 RX ORDER — ERYTHROPOIETIN 10000 [IU]/ML
10000 INJECTION, SOLUTION INTRAVENOUS; SUBCUTANEOUS ONCE
Refills: 0 | Status: COMPLETED | OUTPATIENT
Start: 2021-03-04 | End: 2021-03-04

## 2021-03-04 RX ORDER — GABAPENTIN 400 MG/1
100 CAPSULE ORAL DAILY
Refills: 0 | Status: DISCONTINUED | OUTPATIENT
Start: 2021-03-04 | End: 2021-03-12

## 2021-03-04 RX ORDER — APIXABAN 2.5 MG/1
1 TABLET, FILM COATED ORAL
Qty: 0 | Refills: 0 | DISCHARGE

## 2021-03-04 RX ORDER — CARVEDILOL PHOSPHATE 80 MG/1
1 CAPSULE, EXTENDED RELEASE ORAL
Qty: 0 | Refills: 0 | DISCHARGE

## 2021-03-04 RX ORDER — PANTOPRAZOLE SODIUM 20 MG/1
40 TABLET, DELAYED RELEASE ORAL
Refills: 0 | Status: DISCONTINUED | OUTPATIENT
Start: 2021-03-04 | End: 2021-03-09

## 2021-03-04 RX ORDER — MIDODRINE HYDROCHLORIDE 2.5 MG/1
10 TABLET ORAL ONCE
Refills: 0 | Status: COMPLETED | OUTPATIENT
Start: 2021-03-04 | End: 2021-03-04

## 2021-03-04 RX ADMIN — ERYTHROPOIETIN 10000 UNIT(S): 10000 INJECTION, SOLUTION INTRAVENOUS; SUBCUTANEOUS at 13:45

## 2021-03-04 RX ADMIN — HYDROMORPHONE HYDROCHLORIDE 0.5 MILLIGRAM(S): 2 INJECTION INTRAMUSCULAR; INTRAVENOUS; SUBCUTANEOUS at 06:13

## 2021-03-04 RX ADMIN — MIDODRINE HYDROCHLORIDE 10 MILLIGRAM(S): 2.5 TABLET ORAL at 16:08

## 2021-03-04 RX ADMIN — CARVEDILOL PHOSPHATE 12.5 MILLIGRAM(S): 80 CAPSULE, EXTENDED RELEASE ORAL at 05:23

## 2021-03-04 RX ADMIN — Medication 0.2 MILLIGRAM(S): at 05:23

## 2021-03-04 RX ADMIN — Medication 75 MICROGRAM(S): at 05:23

## 2021-03-04 RX ADMIN — HYDROMORPHONE HYDROCHLORIDE 0.5 MILLIGRAM(S): 2 INJECTION INTRAMUSCULAR; INTRAVENOUS; SUBCUTANEOUS at 09:00

## 2021-03-04 RX ADMIN — CARVEDILOL PHOSPHATE 12.5 MILLIGRAM(S): 80 CAPSULE, EXTENDED RELEASE ORAL at 17:32

## 2021-03-04 RX ADMIN — SIMVASTATIN 20 MILLIGRAM(S): 20 TABLET, FILM COATED ORAL at 21:26

## 2021-03-04 RX ADMIN — GABAPENTIN 100 MILLIGRAM(S): 400 CAPSULE ORAL at 14:11

## 2021-03-04 RX ADMIN — Medication 1 TABLET(S): at 14:11

## 2021-03-04 RX ADMIN — PANTOPRAZOLE SODIUM 40 MILLIGRAM(S): 20 TABLET, DELAYED RELEASE ORAL at 03:42

## 2021-03-04 RX ADMIN — HYDROMORPHONE HYDROCHLORIDE 0.5 MILLIGRAM(S): 2 INJECTION INTRAMUSCULAR; INTRAVENOUS; SUBCUTANEOUS at 08:33

## 2021-03-04 RX ADMIN — HYDROMORPHONE HYDROCHLORIDE 0.5 MILLIGRAM(S): 2 INJECTION INTRAMUSCULAR; INTRAVENOUS; SUBCUTANEOUS at 03:41

## 2021-03-04 RX ADMIN — PANTOPRAZOLE SODIUM 40 MILLIGRAM(S): 20 TABLET, DELAYED RELEASE ORAL at 17:33

## 2021-03-04 RX ADMIN — Medication 0.2 MILLIGRAM(S): at 17:33

## 2021-03-04 NOTE — H&P ADULT - NSICDXPASTMEDICALHX_GEN_ALL_CORE_FT
PAST MEDICAL HISTORY:  3-vessel CAD s/p CABG    Diabetes mellitus     Hemodialysis patient tues thursday saturday    Hypertension     Hypothyroidism, unspecified type     Renal failure      PAST MEDICAL HISTORY:  3-vessel CAD s/p CABG    Afib     Diabetes mellitus     H/O sick sinus syndrome s/p PPM (2018)    Hemodialysis patient     Hypertension     Hypothyroidism, unspecified type     Renal failure

## 2021-03-04 NOTE — H&P ADULT - NSHPLABSRESULTS_GEN_ALL_CORE
8.3    13.31 )-----------( 239      ( 03 Mar 2021 21:02 )             27.5         03-03    133<L>  |  94<L>  |  61.0<H>  ----------------------------<  239<H>  5.3   |  22.0  |  4.19<H>    Ca    9.6      03 Mar 2021 21:02    TPro  6.8  /  Alb  4.1  /  TBili  0.3<L>  /  DBili  x   /  AST  29  /  ALT  19  /  AlkPhos  136<H>  03-03

## 2021-03-04 NOTE — H&P ADULT - ASSESSMENT
65yoF hx ESRD on HD (M/W/F), CAD s/p CABG, combined systolic and diastolic CHF (EF 40-45%), pAfib, sick sinus syndrome s/p PPM (2018), shingles complicated by neuropathic pain, hypothyroidism, HTN presenting with epigastric pain and melena, found to have worsening anemia and +FOBT    Abdominal pain with melena, concern for UGIB  -Concern for gastric pathology such as gastritis or PUD  -IV protonix 40mg BID  -Holding ASA and Eliquis  -Telemetry monitoring  -Pt reporting severe epigastric pain, low dose IV dilaudid for pain control (deferred use of morphine due to ESRD)  -NPO until GI evaluation  -GI consulted  -Cardiology ([facility]) consulted for pre-op risk stratification     Normocytic anemia  -Multifactorial from anemia of chronic disease, slightly worse than baseline of mid 8 to 9 due to active blood loss  -Trend CBC q12hr in setting of GIB bleeding  -Maintain active type and screen  -Would need consent for transfusion if Hgb <7    ESRD on HD (M/W/F)   -Last HD session on March 3/3, only received 2.5 out of 3hr, was incomplete due to abdominal pain  -Nephrology consulted to coordinate in-house HD    Elevated troponin  -Troponin 0.08, likely related to renal insufficiency  -EKG shows a-paced rhythm, TWI in lead I, V4 to V6, ST-T changes in V4 to V6 appear new from prior EKG  -Given epigastric pain in ESRD pt, EKG changes, will trend cardiac enzymes    Hx Afib with sick sinus syndrome s/p PPM  -Holding Eliquis due to GIB  -Carvedilol resumed    Hx HTN  -Per daughter, pt hasn’t received any BP meds in 2 days prior to admission  -Given active GIB and normotensive on admission, will gradually resume meds with hold parameters  -Carvedilol and clonidine resumed  -Continue to hold amlodipine and hydralazine, resume as clinically indicated    Hx CAD s/p CABG  -Holding ASA due to GIB    Hx shingles complicated by neuropathic pain  -Gabapentin 100mg resumed    Hx hypothyroidism  -Synthroid resumed    Prophylactic measure  -Eliquis on hold due to GIB

## 2021-03-04 NOTE — CONSULT NOTE ADULT - ASSESSMENT
ESRD: HERBERTH Webb  - will need further HD today for hyperkalemia    Anemia: + CKD + GI loss  - transfuse PRBCs today  - add CARLOS at dialysis  - check Fe stores  - suggest GI evaluation  - follow H/H    RO:   - low phos diet   - check serum phos

## 2021-03-04 NOTE — H&P ADULT - NSICDXPASTSURGICALHX_GEN_ALL_CORE_FT
PAST SURGICAL HISTORY:  A-V fistula     S/P cholecystectomy      PAST SURGICAL HISTORY:  A-V fistula     S/P cholecystectomy     S/P placement of cardiac pacemaker

## 2021-03-04 NOTE — H&P ADULT - HISTORY OF PRESENT ILLNESS
65yoF hx ESRD on HD (M/W/F), CAD s/p CABG, pAfib, sick sinus syndrome s/p PPM (2018), shingles, complicated by neuropathic pain, hypothyroidism, HTN, DM, no longer on meds, presenting with epigastric pain and melena.  Collateral hx obtained from daughter Martha via phone. Pt reports several days of sharp epigastric pain, multiple episodes of dark stools associated with generalized weakness and some episodic lightheadedness.  Pt went to her HD session yesterday evening, received 2.5hr out of 3hr session before it was stopped due to pt having pain.  Pt was brought to [facility] where she was found to have Hgb 8.3, baseline around mid-8 to 9 range and +FOBT.  Daughter states that she had been holding all of pt BP meds for the past 2 days due to what she thought was low BPs (SBP 110s). Pt reports previous diagnosis of ‘gastritis’ from an outside hospital a few years ago and also states that she had an endoscopy although she states that the endoscopy was normal.  Daughter unable to recall any gastritis diagnosis or abnormal result endoscopy.  During interview, pt reporting severe epigastric pain. 65yoF hx ESRD on HD (M/W/F), CAD s/p CABG, combined systolic and diastolic CHF (EF 40-45%), pAfib, sick sinus syndrome s/p PPM (2018), shingles complicated by neuropathic pain, hypothyroidism, HTN, presenting with epigastric pain and melena.  Collateral hx obtained from daughter Martha via phone. Pt reports several days of sharp epigastric pain, multiple episodes of dark stools associated with generalized weakness and some episodic lightheadedness.  Pt went to her HD session yesterday evening, received 2.5hr out of 3hr session before it was stopped due to pt having significant abdominal pain.  Pt was brought to [facility] where she was found to have Hgb 8.3, baseline around mid-8 to 9 range and +FOBT.  Daughter states that she had been holding all of pt BP meds for the past 2 days due to what she thought was low BPs (SBP 110s). Pt reports previous diagnosis of ‘gastritis’ from an outside hospital a few years ago and also states that she had an endoscopy although she states that the endoscopy was normal.  Daughter unable to recall any gastritis diagnosis or abnormal result endoscopy.  During interview, pt reporting severe epigastric pain.

## 2021-03-04 NOTE — H&P ADULT - NSHPPHYSICALEXAM_GEN_ALL_CORE
Vital Signs Last 24 Hrs  T(C): 36.7 (04 Mar 2021 03:18), Max: 36.8 (03 Mar 2021 18:56)  T(F): 98 (04 Mar 2021 03:18), Max: 98.2 (03 Mar 2021 18:56)  HR: 69 (04 Mar 2021 03:18) (69 - 74)  BP: 132/64 (04 Mar 2021 03:18) (132/64 - 142/63)  BP(mean): --  RR: 18 (04 Mar 2021 03:18) (18 - 18)  SpO2: 99% (04 Mar 2021 03:18) (99% - 99%)    GENERAL:  Elderly obese female, appears uncomfortable due to pain  EYES:  Clear conjunctiva, extraocular movement intact  ENT: Moist mucous membranes  RESP:  Non-labored breathing pattern, bibasilar crackles otherwise lung fields clear  CV: Regular rate and rhythm, no murmurs appreciated, no lower extremity edema  GI: Soft, non-tender, non-distended  NEURO: Awake, alert, conversant, UE strength 4/5, LE strength 3/5 (due to poor effort)  PSYCH: Calm, cooperative  SKIN: No rash or lesions, warm and dry

## 2021-03-04 NOTE — CONSULT NOTE ADULT - PROBLEM SELECTOR RECOMMENDATION 9
With dark stools. Will need EGD but pt. needs to be optimized and renal failure with hyperkalemia needs to Rxed and corrected. Will need cardiac clearance. Keep NPO. IV Pantoprazole 40 mg. IVP every 12 hours.

## 2021-03-05 LAB
ANION GAP SERPL CALC-SCNC: 20 MMOL/L — HIGH (ref 5–17)
BUN SERPL-MCNC: 85 MG/DL — HIGH (ref 8–20)
CALCIUM SERPL-MCNC: 9.9 MG/DL — SIGNIFICANT CHANGE UP (ref 8.6–10.2)
CHLORIDE SERPL-SCNC: 95 MMOL/L — LOW (ref 98–107)
CO2 SERPL-SCNC: 22 MMOL/L — SIGNIFICANT CHANGE UP (ref 22–29)
CREAT SERPL-MCNC: 4.17 MG/DL — HIGH (ref 0.5–1.3)
FERRITIN SERPL-MCNC: 1146 NG/ML — HIGH (ref 15–150)
GLUCOSE SERPL-MCNC: 192 MG/DL — HIGH (ref 70–99)
HCT VFR BLD CALC: 26.6 % — LOW (ref 34.5–45)
HGB BLD-MCNC: 8.3 G/DL — LOW (ref 11.5–15.5)
IRON SATN MFR SERPL: 139 UG/DL — SIGNIFICANT CHANGE UP (ref 37–145)
IRON SATN MFR SERPL: 64 % — HIGH (ref 14–50)
MCHC RBC-ENTMCNC: 29.6 PG — SIGNIFICANT CHANGE UP (ref 27–34)
MCHC RBC-ENTMCNC: 31.2 GM/DL — LOW (ref 32–36)
MCV RBC AUTO: 95 FL — SIGNIFICANT CHANGE UP (ref 80–100)
PHOSPHATE SERPL-MCNC: 4.6 MG/DL — SIGNIFICANT CHANGE UP (ref 2.4–4.7)
PLATELET # BLD AUTO: 218 K/UL — SIGNIFICANT CHANGE UP (ref 150–400)
POTASSIUM SERPL-MCNC: 5.6 MMOL/L — HIGH (ref 3.5–5.3)
POTASSIUM SERPL-MCNC: 5.8 MMOL/L — HIGH (ref 3.5–5.3)
POTASSIUM SERPL-SCNC: 5.6 MMOL/L — HIGH (ref 3.5–5.3)
POTASSIUM SERPL-SCNC: 5.8 MMOL/L — HIGH (ref 3.5–5.3)
RBC # BLD: 2.8 M/UL — LOW (ref 3.8–5.2)
RBC # FLD: 16.1 % — HIGH (ref 10.3–14.5)
SODIUM SERPL-SCNC: 137 MMOL/L — SIGNIFICANT CHANGE UP (ref 135–145)
TIBC SERPL-MCNC: 219 UG/DL — LOW (ref 220–430)
TRANSFERRIN SERPL-MCNC: 153 MG/DL — LOW (ref 192–382)
WBC # BLD: 13.71 K/UL — HIGH (ref 3.8–10.5)
WBC # FLD AUTO: 13.71 K/UL — HIGH (ref 3.8–10.5)

## 2021-03-05 PROCEDURE — 99233 SBSQ HOSP IP/OBS HIGH 50: CPT

## 2021-03-05 RX ORDER — SODIUM ZIRCONIUM CYCLOSILICATE 10 G/10G
10 POWDER, FOR SUSPENSION ORAL DAILY
Refills: 0 | Status: COMPLETED | OUTPATIENT
Start: 2021-03-05 | End: 2021-03-06

## 2021-03-05 RX ADMIN — PANTOPRAZOLE SODIUM 40 MILLIGRAM(S): 20 TABLET, DELAYED RELEASE ORAL at 18:21

## 2021-03-05 RX ADMIN — Medication 75 MICROGRAM(S): at 06:02

## 2021-03-05 RX ADMIN — GABAPENTIN 100 MILLIGRAM(S): 400 CAPSULE ORAL at 17:05

## 2021-03-05 RX ADMIN — PANTOPRAZOLE SODIUM 40 MILLIGRAM(S): 20 TABLET, DELAYED RELEASE ORAL at 06:02

## 2021-03-05 RX ADMIN — HYDROMORPHONE HYDROCHLORIDE 0.5 MILLIGRAM(S): 2 INJECTION INTRAMUSCULAR; INTRAVENOUS; SUBCUTANEOUS at 21:21

## 2021-03-05 RX ADMIN — SODIUM ZIRCONIUM CYCLOSILICATE 10 GRAM(S): 10 POWDER, FOR SUSPENSION ORAL at 13:47

## 2021-03-05 RX ADMIN — Medication 1 TABLET(S): at 17:05

## 2021-03-05 RX ADMIN — Medication 0.1 MILLIGRAM(S): at 17:05

## 2021-03-05 RX ADMIN — HYDROMORPHONE HYDROCHLORIDE 0.5 MILLIGRAM(S): 2 INJECTION INTRAMUSCULAR; INTRAVENOUS; SUBCUTANEOUS at 21:36

## 2021-03-05 RX ADMIN — SIMVASTATIN 20 MILLIGRAM(S): 20 TABLET, FILM COATED ORAL at 21:22

## 2021-03-05 NOTE — PROGRESS NOTE ADULT - SUBJECTIVE AND OBJECTIVE BOX
NEPHROLOGY INTERVAL HPI/OVERNIGHT EVENTS:    No new events.    MEDICATIONS  (STANDING):  carvedilol 12.5 milliGRAM(s) Oral every 12 hours  cloNIDine 0.2 milliGRAM(s) Oral every 12 hours  gabapentin 100 milliGRAM(s) Oral daily  levothyroxine 75 MICROGram(s) Oral daily  multivitamin 1 Tablet(s) Oral daily  pantoprazole  Injectable 40 milliGRAM(s) IV Push two times a day  simvastatin 20 milliGRAM(s) Oral at bedtime  sodium zirconium cyclosilicate 10 Gram(s) Oral daily    MEDICATIONS  (PRN):  HYDROmorphone  Injectable 0.5 milliGRAM(s) IV Push every 6 hours PRN Severe Pain (7 - 10)      Allergies    No Known Allergies        Vital Signs Last 24 Hrs  T(C): 36.7 (05 Mar 2021 07:53), Max: 37.2 (05 Mar 2021 00:17)  T(F): 98 (05 Mar 2021 07:53), Max: 98.9 (05 Mar 2021 00:17)  HR: 70 (05 Mar 2021 07:53) (53 - 73)  BP: 94/44 (05 Mar 2021 07:53) (94/44 - 112/56)  BP(mean): --  RR: 14 (05 Mar 2021 07:53) (14 - 18)  SpO2: 96% (05 Mar 2021 07:53) (96% - 100%)      PHYSICAL EXAM:    GENERAL: Alert in bed, not sob  HEAD:    EYES: wnl  ENMT:   NECK: veins not full  NERVOUS SYSTEM: alert, verbal   CHEST/LUNG: no 02, decreased bs bases  HEART: Sternal scar, no rub  ABDOMEN: obese not tender  EXTREMITIES: Left upper arm access with bruit   LYMPH:   SKIN: No rash    LABS:                        8.3    13.71 )-----------( 218      ( 05 Mar 2021 07:01 )             26.6     03-05    x   |  x   |  x   ----------------------------<  x   5.8<H>   |  x   |  x     Ca    9.9      05 Mar 2021 07:01  Phos  4.6     03-05  Mg     3.2     03-04    TPro  6.8  /  Alb  4.1  /  TBili  0.3<L>  /  DBili  x   /  AST  29  /  ALT  19  /  AlkPhos  136<H>  03-03    PT/INR - ( 04 Mar 2021 18:41 )   PT: 15.5 sec;   INR: 1.36 ratio             Phosphorus Level, Serum: 4.6 mg/dL (03-05 @ 07:01)  Magnesium, Serum: 3.2 mg/dL (03-04 @ 16:10)          RADIOLOGY & ADDITIONAL TESTS:

## 2021-03-05 NOTE — PROGRESS NOTE ADULT - SUBJECTIVE AND OBJECTIVE BOX
INTERVAL HPI/OVERNIGHT EVENTS:Fu for abdominal pain. Was scheduled for EGD today but it was cancelled due to hyperkalemia. Now rescheduled for monday. No other complaints.    MEDICATIONS  (STANDING):  cloNIDine 0.1 milliGRAM(s) Oral every 12 hours  gabapentin 100 milliGRAM(s) Oral daily  levothyroxine 75 MICROGram(s) Oral daily  multivitamin 1 Tablet(s) Oral daily  pantoprazole  Injectable 40 milliGRAM(s) IV Push two times a day  simvastatin 20 milliGRAM(s) Oral at bedtime  sodium zirconium cyclosilicate 10 Gram(s) Oral daily    MEDICATIONS  (PRN):  HYDROmorphone  Injectable 0.5 milliGRAM(s) IV Push every 6 hours PRN Severe Pain (7 - 10)      Allergies    No Known Allergies    Intolerances        Vital Signs Last 24 Hrs  T(C): 36.7 (05 Mar 2021 16:34), Max: 37.2 (05 Mar 2021 00:17)  T(F): 98 (05 Mar 2021 16:34), Max: 98.9 (05 Mar 2021 00:17)  HR: 76 (05 Mar 2021 16:34) (70 - 76)  BP: 115/71 (05 Mar 2021 16:34) (94/44 - 115/71)  BP(mean): --  RR: 18 (05 Mar 2021 16:34) (14 - 18)  SpO2: 98% (05 Mar 2021 16:34) (96% - 100%)    LABS:                        8.3    13.71 )-----------( 218      ( 05 Mar 2021 07:01 )             26.6     03-05    x   |  x   |  x   ----------------------------<  x   5.8<H>   |  x   |  x     Ca    9.9      05 Mar 2021 07:01  Phos  4.6     03-05  Mg     3.2     03-04    TPro  6.8  /  Alb  4.1  /  TBili  0.3<L>  /  DBili  x   /  AST  29  /  ALT  19  /  AlkPhos  136<H>  03-03    PT/INR - ( 04 Mar 2021 18:41 )   PT: 15.5 sec;   INR: 1.36 ratio               RADIOLOGY & ADDITIONAL TESTS:

## 2021-03-05 NOTE — PROGRESS NOTE ADULT - SUBJECTIVE AND OBJECTIVE BOX
[facility], [facility]                                                    [address] , [address], [place] [address]                                                         PHONE: [phone]    FAX: [phone] [address], [address], [place] [address]                                                 PHONE: [phone]    FAX: [phone]  *******************************************************************************    Overnight events/Subjective Assessment: comfortable in the bed with no new cardiac c/o.       No Known Allergies    MEDICATIONS  (STANDING):  carvedilol 12.5 milliGRAM(s) Oral every 12 hours  cloNIDine 0.2 milliGRAM(s) Oral every 12 hours  gabapentin 100 milliGRAM(s) Oral daily  levothyroxine 75 MICROGram(s) Oral daily  multivitamin 1 Tablet(s) Oral daily  pantoprazole  Injectable 40 milliGRAM(s) IV Push two times a day  simvastatin 20 milliGRAM(s) Oral at bedtime    MEDICATIONS  (PRN):  HYDROmorphone  Injectable 0.5 milliGRAM(s) IV Push every 6 hours PRN Severe Pain (7 - 10)      Vital Signs Last 24 Hrs  T(C): 36.7 (05 Mar 2021 07:53), Max: 37.2 (05 Mar 2021 00:17)  T(F): 98 (05 Mar 2021 07:53), Max: 98.9 (05 Mar 2021 00:17)  HR: 70 (05 Mar 2021 07:53) (53 - 73)  BP: 94/44 (05 Mar 2021 07:53) (94/44 - 117/58)  BP(mean): --  RR: 14 (05 Mar 2021 07:53) (14 - 18)  SpO2: 96% (05 Mar 2021 07:53) (96% - 100%)    I&O's Detail    04 Mar 2021 07:01  -  05 Mar 2021 07:00  --------------------------------------------------------  IN:  Total IN: 0 mL    OUT:    Other (mL): 0 mL  Total OUT: 0 mL    Total NET: 0 mL        I&O's Summary    04 Mar 2021 07:01  -  05 Mar 2021 07:00  --------------------------------------------------------  IN: 0 mL / OUT: 0 mL / NET: 0 mL            PHYSICAL EXAM:  General: Appears well developed, well nourished, no acute distress  HEENT: Head: normocephalic, atraumatic  Eyes: Pupils equal and reactive  Neck: Supple, no carotid bruit, no JVD, no HJR  CARDIOVASCULAR: Normal S1 and S2, no murmur, rub, or gallop  LUNGS: Clear to auscultation bilaterally, no rales, rhonchi or wheeze  ABDOMEN: Soft, nontender, non-distended, positive bowel sounds, no mass or bruit  EXTREMITIES: No edema, distal pulses WNL  SKIN: Warm and dry with normal turgor  NEURO: Alert & oriented x 3, grossly intact  PSYCH: normal mood and affect        LABS:                        8.3    13.71 )-----------( 218      ( 05 Mar 2021 07:01 )             26.6     03-05    137  |  95<L>  |  85.0<H>  ----------------------------<  192<H>  5.6<H>   |  22.0  |  4.17<H>    Ca    9.9      05 Mar 2021 07:01  Phos  4.6     03-05  Mg     3.2     03-04    TPro  6.8  /  Alb  4.1  /  TBili  0.3<L>  /  DBili  x   /  AST  29  /  ALT  19  /  AlkPhos  136<H>  03-03    CARDIAC MARKERS ( 04 Mar 2021 07:12 )  x     / 0.11 ng/mL / 36 U/L / x     / x      CARDIAC MARKERS ( 03 Mar 2021 21:02 )  x     / 0.08 ng/mL / 43 U/L / x     / x          PT/INR - ( 04 Mar 2021 18:41 )   PT: 15.5 sec;   INR: 1.36 ratio           serum  Lipids:         RADIOLOGY & ADDITIONAL STUDIES:    ECG:   < from: 12 Lead ECG (03.04.21 @ 10:09) >  Diagnosis Line Atrial-paced rhythm  Non-specific intra-ventricular conductionblock  T wave abnormality, consider inferior ischemia  T wave abnormality, consider anterolateral ischemia        ECHO:   < from: TTE Echo Limited or F/U (11.16.18 @ 05:58) >  PHYSICIAN INTERPRETATION:  Pericardium: There is no evidence of pericardial effusion. There is no   evidence of cardiac tamponade.  Tricuspid Valve: Mild-moderate tricuspid regurgitation is visualized.   Estimated pulmonary artery systolic pressure is 38.3 mmHg assuming a   right atrial pressure of 3 mmHg, which is consistent with borderline   pulmonary hypertension.       Summary:   1. STAT echo to assess for pericardial fluid post-op.   2. There is no evidence of pericardial effusion.   3. Mild-moderate tricuspid regurgitation.   4. Estimated pulmonary artery systolic pressure is 38.3 mmHg assuming a   right atrial pressureof 3 mmHg, which is consistent with borderline   pulmonary hypertension.    CAD s/p CABG, combined systolic and diastolic CHF (EF 40-45%), pAfib, sick sinus syndrome s/p PPM (2018), shingles complicated by neuropathic pain, hypothyroidism, HTN,     On tele A paced/V sensed in 70s     ASSESSMENT AND PLAN:  In summary, KIM LEE is a 65y Female with past medical history significant for CAD/CABG 3v, combined systolic and diastolic CHF (EF 40-45%), pAfib, sick sinus syndrome s/p PPM (2018), shingles complicated by neuropathic pain, hypothyroidism, HTN, DM, ESRD on HD, p/w epigastric pain, guaiac pos stool.  NON -SPECIFIC Troponin    - Monitor on telemetry  - Trend troponins x3  - Repeat EKG  - Echocardiogram  - Mild troponin increase is non-specific in the setting of CRI on HD.  Patient is currently chest pain free with no acute EKG changes.  Continue medical treatment for now pending the results of the above.   - No clinical signs of overt CHF, ischemia   - Rhythm/hemodynamics stable = continue current doses for now and titrate PRN  - GI eval in the progress   - No cardiac contraindication to proceed with proposed procedure   - Please resume ASA as soon as she is cleared by GI with hx CAD - to be restarted by medicine team  - Post procedure ECG  - please call if any questions about above    We will follow with you.  Thank you for allowing me to participate in the care of your patient.

## 2021-03-05 NOTE — PROGRESS NOTE ADULT - SUBJECTIVE AND OBJECTIVE BOX
seen for anemia, esrd    no acute complaints  denies gi /pulm or cv complaints  ros negative     MEDICATIONS  (STANDING):  carvedilol 12.5 milliGRAM(s) Oral every 12 hours  cloNIDine 0.2 milliGRAM(s) Oral every 12 hours  gabapentin 100 milliGRAM(s) Oral daily  levothyroxine 75 MICROGram(s) Oral daily  multivitamin 1 Tablet(s) Oral daily  pantoprazole  Injectable 40 milliGRAM(s) IV Push two times a day  simvastatin 20 milliGRAM(s) Oral at bedtime  sodium zirconium cyclosilicate 10 Gram(s) Oral daily    MEDICATIONS  (PRN):  HYDROmorphone  Injectable 0.5 milliGRAM(s) IV Push every 6 hours PRN Severe Pain (7 - 10)      Allergies    No Known Allergies    Vital Signs Last 24 Hrs  T(C): 36.7 (05 Mar 2021 07:53), Max: 37.2 (05 Mar 2021 00:17)  T(F): 98 (05 Mar 2021 07:53), Max: 98.9 (05 Mar 2021 00:17)  HR: 70 (05 Mar 2021 07:53) (70 - 73)  BP: 94/44 (05 Mar 2021 07:53) (94/44 - 112/56)  BP(mean): --  RR: 14 (05 Mar 2021 07:53) (14 - 18)  SpO2: 96% (05 Mar 2021 07:53) (96% - 100%)    PHYSICAL EXAM:    GENERAL: NAD  CHEST/LUNG: Clear to percussion bilaterally  HEART: Regular rate and rhythm; S1 S2  ABDOMEN: Soft, Nontender,; Bowel sounds present  EXTREMITIES:  no edema   NERVOUS SYSTEM:  Alert & Oriented X3, ; Motor Strength 5/5 B/L upper and lower extremities    gen weakness    LABS:                        8.3    13.71 )-----------( 218      ( 05 Mar 2021 07:01 )             26.6     03-05    x   |  x   |  x   ----------------------------<  x   5.8<H>   |  x   |  x     Ca    9.9      05 Mar 2021 07:01  Phos  4.6     03-05  Mg     3.2     03-04    TPro  6.8  /  Alb  4.1  /  TBili  0.3<L>  /  DBili  x   /  AST  29  /  ALT  19  /  AlkPhos  136<H>  03-03    PT/INR - ( 04 Mar 2021 18:41 )   PT: 15.5 sec;   INR: 1.36 ratio               CAPILLARY BLOOD GLUCOSE      POCT Blood Glucose.: 211 mg/dL (04 Mar 2021 15:49)        RADIOLOGY & ADDITIONAL TESTS:

## 2021-03-06 LAB
ANION GAP SERPL CALC-SCNC: 19 MMOL/L — HIGH (ref 5–17)
BUN SERPL-MCNC: 105 MG/DL — HIGH (ref 8–20)
CALCIUM SERPL-MCNC: 9.1 MG/DL — SIGNIFICANT CHANGE UP (ref 8.6–10.2)
CHLORIDE SERPL-SCNC: 90 MMOL/L — LOW (ref 98–107)
CO2 SERPL-SCNC: 22 MMOL/L — SIGNIFICANT CHANGE UP (ref 22–29)
CREAT SERPL-MCNC: 5.78 MG/DL — HIGH (ref 0.5–1.3)
GLUCOSE SERPL-MCNC: 213 MG/DL — HIGH (ref 70–99)
HCT VFR BLD CALC: 27.3 % — LOW (ref 34.5–45)
HGB BLD-MCNC: 8.2 G/DL — LOW (ref 11.5–15.5)
MCHC RBC-ENTMCNC: 30 GM/DL — LOW (ref 32–36)
MCHC RBC-ENTMCNC: 30 PG — SIGNIFICANT CHANGE UP (ref 27–34)
MCV RBC AUTO: 100 FL — SIGNIFICANT CHANGE UP (ref 80–100)
PLATELET # BLD AUTO: 200 K/UL — SIGNIFICANT CHANGE UP (ref 150–400)
POTASSIUM SERPL-MCNC: 5.3 MMOL/L — SIGNIFICANT CHANGE UP (ref 3.5–5.3)
POTASSIUM SERPL-SCNC: 5.3 MMOL/L — SIGNIFICANT CHANGE UP (ref 3.5–5.3)
RBC # BLD: 2.73 M/UL — LOW (ref 3.8–5.2)
RBC # FLD: 16.5 % — HIGH (ref 10.3–14.5)
SARS-COV-2 RNA SPEC QL NAA+PROBE: SIGNIFICANT CHANGE UP
SODIUM SERPL-SCNC: 131 MMOL/L — LOW (ref 135–145)
WBC # BLD: 9.98 K/UL — SIGNIFICANT CHANGE UP (ref 3.8–10.5)
WBC # FLD AUTO: 9.98 K/UL — SIGNIFICANT CHANGE UP (ref 3.8–10.5)

## 2021-03-06 PROCEDURE — 99233 SBSQ HOSP IP/OBS HIGH 50: CPT

## 2021-03-06 PROCEDURE — 99232 SBSQ HOSP IP/OBS MODERATE 35: CPT

## 2021-03-06 RX ORDER — MIDODRINE HYDROCHLORIDE 2.5 MG/1
10 TABLET ORAL ONCE
Refills: 0 | Status: COMPLETED | OUTPATIENT
Start: 2021-03-06 | End: 2021-03-06

## 2021-03-06 RX ORDER — ERYTHROPOIETIN 10000 [IU]/ML
10000 INJECTION, SOLUTION INTRAVENOUS; SUBCUTANEOUS
Refills: 0 | Status: DISCONTINUED | OUTPATIENT
Start: 2021-03-06 | End: 2021-03-09

## 2021-03-06 RX ORDER — ACETAMINOPHEN 500 MG
650 TABLET ORAL EVERY 6 HOURS
Refills: 0 | Status: DISCONTINUED | OUTPATIENT
Start: 2021-03-06 | End: 2021-03-12

## 2021-03-06 RX ADMIN — Medication 650 MILLIGRAM(S): at 18:04

## 2021-03-06 RX ADMIN — ERYTHROPOIETIN 10000 UNIT(S): 10000 INJECTION, SOLUTION INTRAVENOUS; SUBCUTANEOUS at 12:17

## 2021-03-06 RX ADMIN — SODIUM ZIRCONIUM CYCLOSILICATE 10 GRAM(S): 10 POWDER, FOR SUSPENSION ORAL at 11:46

## 2021-03-06 RX ADMIN — PANTOPRAZOLE SODIUM 40 MILLIGRAM(S): 20 TABLET, DELAYED RELEASE ORAL at 18:01

## 2021-03-06 RX ADMIN — PANTOPRAZOLE SODIUM 40 MILLIGRAM(S): 20 TABLET, DELAYED RELEASE ORAL at 06:07

## 2021-03-06 RX ADMIN — Medication 1 TABLET(S): at 11:46

## 2021-03-06 RX ADMIN — MIDODRINE HYDROCHLORIDE 10 MILLIGRAM(S): 2.5 TABLET ORAL at 06:07

## 2021-03-06 RX ADMIN — Medication 75 MICROGRAM(S): at 06:07

## 2021-03-06 RX ADMIN — GABAPENTIN 100 MILLIGRAM(S): 400 CAPSULE ORAL at 11:46

## 2021-03-06 RX ADMIN — Medication 0.1 MILLIGRAM(S): at 18:01

## 2021-03-06 RX ADMIN — SIMVASTATIN 20 MILLIGRAM(S): 20 TABLET, FILM COATED ORAL at 21:02

## 2021-03-06 NOTE — PROGRESS NOTE ADULT - SUBJECTIVE AND OBJECTIVE BOX
INTERVAL HPI/OVERNIGHT EVENTS:Follow-up performed for epigastric discomfort and occult blood in stool.  EGD was canceled yesterday due to hyperkalemia.  Patient is to have dialysis today.  No other complaints.  No bowel movement.  Epigastric discomfort is better.    MEDICATIONS  (STANDING):  cloNIDine 0.1 milliGRAM(s) Oral every 12 hours  epoetin jack-epbx (RETACRIT) Injectable 83780 Unit(s) IV Push <User Schedule>  gabapentin 100 milliGRAM(s) Oral daily  levothyroxine 75 MICROGram(s) Oral daily  multivitamin 1 Tablet(s) Oral daily  pantoprazole  Injectable 40 milliGRAM(s) IV Push two times a day  simvastatin 20 milliGRAM(s) Oral at bedtime    MEDICATIONS  (PRN):  HYDROmorphone  Injectable 0.5 milliGRAM(s) IV Push every 6 hours PRN Severe Pain (7 - 10)      Allergies    No Known Allergies    Intolerances        Vital Signs Last 24 Hrs  T(C): 36.5 (06 Mar 2021 08:59), Max: 36.9 (06 Mar 2021 05:12)  T(F): 97.7 (06 Mar 2021 08:59), Max: 98.5 (06 Mar 2021 05:12)  HR: 71 (06 Mar 2021 08:54) (69 - 76)  BP: 106/71 (06 Mar 2021 08:54) (76/39 - 115/71)  BP(mean): --  RR: 18 (06 Mar 2021 08:54) (18 - 18)  SpO2: 99% (06 Mar 2021 08:54) (98% - 99%)    LABS:                        8.2    9.98  )-----------( 200      ( 06 Mar 2021 08:28 )             27.3     03-05    x   |  x   |  x   ----------------------------<  x   5.8<H>   |  x   |  x     Ca    9.9      05 Mar 2021 07:01  Phos  4.6     03-05  Mg     3.2     03-04      PT/INR - ( 04 Mar 2021 18:41 )   PT: 15.5 sec;   INR: 1.36 ratio               RADIOLOGY & ADDITIONAL TESTS:

## 2021-03-06 NOTE — CONSULT NOTE ADULT - REASON FOR ADMISSION
Abdominal pain, melena, concern for UGIB

## 2021-03-06 NOTE — CONSULT NOTE ADULT - SUBJECTIVE AND OBJECTIVE BOX
[facility], [facility]                                                    [address], [address], [place] [address]                                                         PHONE: [phone]    FAX: [phone] [address], [address], [place] [address]                                                 PHONE: [phone]    FAX: [phone]  *******************************************************************************    Reason for Consult: Elevated troponin    HPI:  KIM LEE is a 65y Female    PAST MEDICAL & SURGICAL HISTORY:  Afib    H/O sick sinus syndrome  s/p PPM (2018)    3-vessel CAD  s/p CABG    Hypothyroidism, unspecified type    Hemodialysis patient    Renal failure    Hypertension    Diabetes mellitus    S/P placement of cardiac pacemaker    A-V fistula    S/P cholecystectomy        No Known Allergies      MEDICATIONS  (STANDING):  carvedilol 12.5 milliGRAM(s) Oral every 12 hours  cloNIDine 0.2 milliGRAM(s) Oral every 12 hours  gabapentin 100 milliGRAM(s) Oral daily  levothyroxine 75 MICROGram(s) Oral daily  multivitamin 1 Tablet(s) Oral daily  pantoprazole  Injectable 40 milliGRAM(s) IV Push two times a day  simvastatin 20 milliGRAM(s) Oral at bedtime    MEDICATIONS  (PRN):  HYDROmorphone  Injectable 0.5 milliGRAM(s) IV Push every 6 hours PRN Severe Pain (7 - 10)      Social History: no active tobacco / EtOH / IVDA    Family History: Family history of diabetes mellitus        ROS: As noted above, otherwise unremarkable.    Vital Signs Last 24 Hrs  T(C): 36.5 (04 Mar 2021 05:17), Max: 36.8 (03 Mar 2021 18:56)  T(F): 97.7 (04 Mar 2021 05:17), Max: 98.2 (03 Mar 2021 18:56)  HR: 75 (04 Mar 2021 05:17) (69 - 75)  BP: 117/74 (04 Mar 2021 05:17) (117/74 - 142/63)  BP(mean): --  RR: 18 (04 Mar 2021 05:17) (18 - 18)  SpO2: 100% (04 Mar 2021 05:17) (99% - 100%)    I&O's Detail    I&O's Summary          PHYSICAL EXAM:  General: Appears well developed, well nourished, no acute distress  HEENT: Head: normocephalic, atraumatic  Eyes: Pupils equal and reactive  Neck: Supple, no carotid bruit, no JVD, no HJR  CARDIOVASCULAR: Normal S1 and S2, no murmur, rub, or gallop  LUNGS: Clear to auscultation bilaterally, no rales, rhonchi or wheeze  ABDOMEN: Soft, nontender, non-distended, positive bowel sounds, no mass or bruit  EXTREMITIES: No edema, distal pulses WNL  SKIN: Warm and dry with normal turgor  NEURO: Alert & oriented x 3, grossly intact  PSYCH: normal mood and affect    LABS:                        8.3    13.31 )-----------( 239      ( 03 Mar 2021 21:02 )             27.5     03-03    133<L>  |  94<L>  |  61.0<H>  ----------------------------<  239<H>  5.3   |  22.0  |  4.19<H>    Ca    9.6      03 Mar 2021 21:02    TPro  6.8  /  Alb  4.1  /  TBili  0.3<L>  /  DBili  x   /  AST  29  /  ALT  19  /  AlkPhos  136<H>  03-03    CARDIAC MARKERS ( 03 Mar 2021 21:02 )  x     / 0.08 ng/mL / 43 U/L / x     / x              RADIOLOGY & ADDITIONAL STUDIES:    ECG: likely sinus diffuse ST depressions    ECHO:    STRESS TEST:    CARDIAC CATHETERIZATION:    Assessment and Plan:  In summary, KIM LEE is a 65y Female with past medical history significant for CAD/CABG 3v, HTN, DM, ESRD on HD, PPM, p/w epigastric pain, guaiac pos stool.  NON -SPECIFIC Troponin    - Monitor on telemetry  - Trend troponins x3  - Repeat EKG  - Echocardiogram  - Mild troponin increase is non-specific.  Patient is currently chest pain free with no acute EKG changes.  Continue medical treatment for now pending the results of the above.   - Rhythm/hemodynamics stable = continue current doses for now and titrate PRN  - GI eval  - pt poor historian (via ) will review office records  - please cont asa with hx CAD - to be restarted by medicine team  - will check old ECG  - please call if any questions about above    We will follow with you.  Thank you for allowing me to participate in the care of your patient.      Sincerely,    Kane Rivas, DO
HPI: 64yo HF PMH + ESRD on HD (M/W/F; [facility]), CAD s/p CABG, CHF (EF 40-45%), Afib, SSS s/p PPM (2018), hypothyroidism, HTN who presented to the ED with abd pains and weakness. According to pt's daughter (Martha) the patient had dialysis yesterday but could not tolerate and the treatment was terminated prematurely.  In ED found to have significant anemia and evidence of GI blood loss.       PAST MEDICAL & SURGICAL HISTORY:  COVID+ March, 2020    Afib    H/O sick sinus syndrome  s/p PPM (2018)    3-vessel CAD  s/p CABG    Hypothyroidism, unspecified type    Hemodialysis patient    Renal failure    Hypertension    Diabetes mellitus    S/P placement of cardiac pacemaker    A-V fistula    S/P cholecystectomy        FAMILY HISTORY:  Family history of diabetes mellitus        Social History:  No tobacco; no etoh nor drugs    MEDICATIONS  (STANDING):  carvedilol 12.5 milliGRAM(s) Oral every 12 hours  cloNIDine 0.2 milliGRAM(s) Oral every 12 hours  gabapentin 100 milliGRAM(s) Oral daily  levothyroxine 75 MICROGram(s) Oral daily  multivitamin 1 Tablet(s) Oral daily  pantoprazole  Injectable 40 milliGRAM(s) IV Push two times a day  simvastatin 20 milliGRAM(s) Oral at bedtime    MEDICATIONS  (PRN):  HYDROmorphone  Injectable 0.5 milliGRAM(s) IV Push every 6 hours PRN Severe Pain (7 - 10)      Allergies    No Known Allergies    Intolerances        REVIEW OF SYSTEMS:    CONSTITUTIONAL: No fever, weight loss, + fatigue + weakness  EYES: No eye pain, visual disturbances, or discharge  ENMT:  No difficulty hearing, tinnitus, vertigo; No sinus or throat pain  NECK: No pain or stiffness  RESPIRATORY: No cough, wheezing, chills or hemoptysis; + shortness of breath  CARDIOVASCULAR: No chest pain, palpitations, dizziness, or leg swelling  GASTROINTESTINAL: + epigastric pain. No nausea, vomiting, or hematemesis; No diarrhea or constipation. + melena  NEUROLOGICAL: No headaches, memory loss, loss of strength, numbness, or tremors  SKIN: No itching, burning, rashes, or lesions   MUSCULOSKELETAL: No joint pain or swelling; No muscle, back, or extremity pain  PSYCHIATRIC: No depression, anxiety, mood swings, or difficulty sleeping      Vital Signs Last 24 Hrs  T(C): 36.6 (04 Mar 2021 07:32), Max: 36.8 (03 Mar 2021 18:56)  T(F): 97.8 (04 Mar 2021 07:32), Max: 98.2 (03 Mar 2021 18:56)  HR: 70 (04 Mar 2021 07:32) (69 - 75)  BP: 111/69 (04 Mar 2021 07:32) (111/69 - 142/63)  BP(mean): --  RR: 18 (04 Mar 2021 07:32) (18 - 18)  SpO2: 99% (04 Mar 2021 07:32) (99% - 100%)    PHYSICAL EXAM:    GENERAL: Appears chronically ill and debilitated  HEAD:  Atraumatic, Normocephalic  EYES: Conjunctiva and sclera clear  ENMT: Dry mucous membranes  NECK: Supple, No JVD  NERVOUS SYSTEM:  Alert & Oriented X3, intact and symmetric  CHEST/LUNG: Clear but diminished BS  HEART: No rub  ABDOMEN: Soft, Nontender, Nondistended; BS+  EXTREMITIES: + LE edema; L AVF patent  SKIN: No rashes or lesions      LABS:                        6.9    11.67 )-----------( 233      ( 04 Mar 2021 07:05 )             22.9     03-04    134<L>  |  96<L>  |  93.0<H>  ----------------------------<  235<H>  6.8<HH>   |  21.0<L>  |  4.84<H>    Ca    9.6      04 Mar 2021 07:05  Mg     4.1     03-04    TPro  6.8  /  Alb  4.1  /  TBili  0.3<L>  /  DBili  x   /  AST  29  /  ALT  19  /  AlkPhos  136<H>  03-03        Magnesium, Serum: 4.1 mg/dL (03-04 @ 07:05)      RADIOLOGY & ADDITIONAL TESTS:  < from: CT Abdomen and Pelvis No Cont (03.03.21 @ 22:09) >   EXAM:  CT ABDOMEN AND PELVIS                          PROCEDURE DATE:  03/03/2021          INTERPRETATION:  CLINICAL INFORMATION: Epigastric pain    COMPARISON: None.    PROCEDURE:  CT of the Abdomen and Pelvis was performed without intravenous contrast.  Intravenous contrast: None.  Oral contrast: None.  Sagittal and coronal reformats were performed.    FINDINGS:  LOWER CHEST: Cardiomegaly.    LIVER: Within normal limits.  BILE DUCTS: Normal caliber.  GALLBLADDER: Cholecystectomy clips.  SPLEEN: Within normal limits.  PANCREAS: Within normal limits.  ADRENALS: 6.7 x 7.2 x 5.4 cm sized heterogeneous soft tissue density containing fat attenuation in the left suprarenal region (3-44).  KIDNEYS/URETERS: Atrophic kidneys.    BLADDER: Within normal limits.  REPRODUCTIVE ORGANS: Calcified fibroids. Otherwise unremarkable.    BOWEL: No bowel obstruction. Appendix is normal.  PERITONEUM: No ascites.  VESSELS: Extensive atherosclerotic calcification.  RETROPERITONEUM/LYMPH NODES: No lymphadenopathy.  ABDOMINAL WALL: Within normal limits.  BONES: Advanced degenerative change.    IMPRESSION:  Cardiomegaly.  Fat-containing left suprarenal mass. Possibilities will include adrenal myelolipoma or retroperitoneal lipoma/liposarcoma.  Atrophic kidneys.  Advanced atherosclerotic disease.    < end of copied text >  
HPI:  65yoF hx ESRD on HD (M/W/F), CAD s/p CABG, combined systolic and diastolic CHF (EF 40-45%), pAfib, sick sinus syndrome s/p PPM (2018), shingles complicated by neuropathic pain, hypothyroidism, HTN, presenting with epigastric pain and melena.  Collateral hx obtained from daughter Martha via phone. Pt reports several days of sharp epigastric pain, multiple episodes of dark stools associated with generalized weakness and some episodic lightheadedness.  Pt went to her HD session yesterday evening, received 2.5hr out of 3hr session before it was stopped due to pt having significant abdominal pain.  Pt was brought to [facility] where she was found to have Hgb 8.3, baseline around mid-8 to 9 range and +FOBT.  Daughter states that she had been holding all of pt BP meds for the past 2 days due to what she thought was low BPs (SBP 110s). Pt reports previous diagnosis of ‘gastritis’ from an outside hospital a few years ago and also states that she had an endoscopy although she states that the endoscopy was normal.  Daughter unable to recall any gastritis diagnosis or abnormal result endoscopy.  During interview, pt reporting severe epigastric pain. (04 Mar 2021 03:25)    CT imaging revealed a left adrenal mass.  No abd or flank pain.  No urologic complaints    PAST MEDICAL & SURGICAL HISTORY:  Afib    H/O sick sinus syndrome  s/p PPM (2018)    3-vessel CAD  s/p CABG    Hypothyroidism, unspecified type    Hemodialysis patient    Renal failure    Hypertension    Diabetes mellitus    S/P placement of cardiac pacemaker    A-V fistula    S/P cholecystectomy        REVIEW OF SYSTEMS:       Reviewed 10 system ros and all esle is negative except for above  MEDICATIONS  (STANDING):  cloNIDine 0.1 milliGRAM(s) Oral every 12 hours  epoetin jack-epbx (RETACRIT) Injectable 10897 Unit(s) IV Push <User Schedule>  gabapentin 100 milliGRAM(s) Oral daily  levothyroxine 75 MICROGram(s) Oral daily  multivitamin 1 Tablet(s) Oral daily  pantoprazole  Injectable 40 milliGRAM(s) IV Push two times a day  simvastatin 20 milliGRAM(s) Oral at bedtime    MEDICATIONS  (PRN):  acetaminophen   Tablet .. 650 milliGRAM(s) Oral every 6 hours PRN Temp greater or equal to 38C (100.4F), Mild Pain (1 - 3), Moderate Pain (4 - 6)  HYDROmorphone  Injectable 0.5 milliGRAM(s) IV Push every 6 hours PRN Severe Pain (7 - 10)      Allergies    No Known Allergies    Intolerances        SOCIAL HISTORY:    FAMILY HISTORY:  Family history of diabetes mellitus        Vital Signs Last 24 Hrs  T(C): 36.7 (06 Mar 2021 16:14), Max: 36.9 (06 Mar 2021 05:12)  T(F): 98.1 (06 Mar 2021 16:14), Max: 98.5 (06 Mar 2021 05:12)  HR: 65 (06 Mar 2021 16:14) (65 - 71)  BP: 135/78 (06 Mar 2021 16:14) (76/39 - 135/78)  BP(mean): --  RR: 18 (06 Mar 2021 16:14) (18 - 18)  SpO2: 95% (06 Mar 2021 16:14) (95% - 100%)    PHYSICAL EXAM:    General: Well developed; well nourished; in no acute distress  Head: Normocephalic; atraumatic  Respiratory: No wheezes, rales or rhonchi  Cardiovascular: Regular rate and rhythm. S1 and S2 Normal; No murmurs, gallops or rubs  Gastrointestinal: Soft non-tender non-distended; Normal bowel sounds; No hepatosplenomegaly  Genitourinary: No costovertebral angle tenderness.  Urinary bladder is clinically not distended  Extremities: Normal range of motion, No clubbing, cyanosis or edema  Vascular: Peripheral pulses palpable 2+ bilaterally  Neurological: Alert and oriented x4  Skin: Warm and dry. No acute rash  Musculoskeletal: Normal gait, tone, without deformities  Psychiatric: Cooperative and appropriate      LABS:                        8.2    9.98  )-----------( 200      ( 06 Mar 2021 08:28 )             27.3     03-06    131<L>  |  90<L>  |  105.0<H>  ----------------------------<  213<H>  5.3   |  22.0  |  5.78<H>    Ca    9.1      06 Mar 2021 14:55  Phos  4.6     03-05      PT/INR - ( 04 Mar 2021 18:41 )   PT: 15.5 sec;   INR: 1.36 ratio               RADIOLOGY & ADDITIONAL STUDIES:    CT- Cardiomegaly.  Fat-containing left suprarenal mass. Possibilities will include adrenal myelolipoma or retroperitoneal lipoma/liposarcoma.  Atrophic kidneys.  Advanced atherosclerotic disease.    
Patient is a 65y old  Female who presents with a chief complaint of Abdominal pain, melena, concern for UGIB (04 Mar 2021 08:30)      HPI:  65yoF hx ESRD on HD (M/W/F), CAD s/p CABG, combined systolic and diastolic CHF (EF 40-45%), pAfib, sick sinus syndrome s/p PPM (2018), shingles complicated by neuropathic pain, hypothyroidism, HTN, presenting with epigastric pain and melena.  Collateral hx obtained from daughter Martha via phone. Pt reports several days of sharp epigastric pain, multiple episodes of dark stools associated with generalized weakness and some episodic lightheadedness.  Pt went to her HD session yesterday evening, received 2.5hr out of 3hr session before it was stopped due to pt having significant abdominal pain.  Pt was [facility] where she was found to have Hgb 8.3, baseline around mid-8 to 9 range and +FOBT.  Daughter states that she had been holding all of pt BP meds for the past 2 days due to what she thought was low BPs (SBP 110s). Pt reports previous diagnosis of ‘gastritis’ from an outside hospital a few years ago and also states that she had an endoscopy although she states that the endoscopy was normal.  Daughter unable to recall any gastritis diagnosis or abnormal result endoscopy.  During interview, pt reporting severe epigastric pain. (04 Mar 2021 03:25). Pt receives HD M/W/F. CT here shows left suprarenal lipomatous lesion but was o/w unremarkable.      REVIEW OF SYSTEMS:  Constitutional: No fever, weight loss or fatigue  ENMT:  No difficulty hearing, tinnitus, vertigo; No sinus or throat pain  Respiratory: No cough, wheezing, chills or hemoptysis  Cardiovascular: No chest pain, palpitations, dizziness or leg swelling  Gastrointestinal: As per HPI.  Skin: No itching, burning, rashes or lesions   Musculoskeletal: No joint pain or swelling; No muscle, back or extremity pain  Patient has no cardiopulmonary, peripheral vascular, musculoskeletal, dermatological, neurological, gynecological or psychological symptoms or complaints at this time.    PAST MEDICAL & SURGICAL HISTORY:  Afib    H/O sick sinus syndrome  s/p PPM (2018)    3-vessel CAD  s/p CABG    Hypothyroidism, unspecified type    Hemodialysis patient    Renal failure ESRD on HD M/W/F    Hypertension    Diabetes mellitus    S/P placement of cardiac pacemaker    A-V fistula    S/P cholecystectomy        FAMILY HISTORY:  Family history of diabetes mellitus        SOCIAL HISTORY:  Smoking Status: [ ] Current, [ ] Former, [x ] Never  Pack Years: N/A. No ETOH or drug abuse history.    MEDICATIONS:  MEDICATIONS  (STANDING):  carvedilol 12.5 milliGRAM(s) Oral every 12 hours  cloNIDine 0.2 milliGRAM(s) Oral every 12 hours  epoetin jack-epbx (RETACRIT) Injectable 16886 Unit(s) IV Push once  gabapentin 100 milliGRAM(s) Oral daily  levothyroxine 75 MICROGram(s) Oral daily  multivitamin 1 Tablet(s) Oral daily  pantoprazole  Injectable 40 milliGRAM(s) IV Push two times a day  simvastatin 20 milliGRAM(s) Oral at bedtime    MEDICATIONS  (PRN):  HYDROmorphone  Injectable 0.5 milliGRAM(s) IV Push every 6 hours PRN Severe Pain (7 - 10)      Allergies    No Known Allergies    Intolerances        Vital Signs Last 24 Hrs  T(C): 36.6 (04 Mar 2021 07:32), Max: 36.8 (03 Mar 2021 18:56)  T(F): 97.8 (04 Mar 2021 07:32), Max: 98.2 (03 Mar 2021 18:56)  HR: 70 (04 Mar 2021 07:32) (69 - 75)  BP: 111/69 (04 Mar 2021 07:32) (111/69 - 142/63)  BP(mean): --  RR: 18 (04 Mar 2021 07:32) (18 - 18)  SpO2: 99% (04 Mar 2021 07:32) (99% - 100%)        PHYSICAL EXAM:    General: Well developed; well nourished; in no acute distress  HEENT: MMM, conjunctiva pink and sclera anicteric.  Lungs: Clear bilaterally.  Cor: RRR S1, S2 only  Gastrointestinal: Abdomen: Soft, non-tender non-distended; Normal bowel sounds; No rebound or guarding or HSM.  ANGELIKA: Not done. Where pt. located in ER.  Extremities: Normal range of motion, No clubbing, cyanosis or edema  Neurological: Alert and oriented x3  Skin: Warm and dry. No obvious rash      LABS:                        6.9    11.67 )-----------( 233      ( 04 Mar 2021 07:05 )             22.9     03-04    134<L>  |  96<L>  |  93.0<H>  ----------------------------<  235<H>  6.8<HH>   |  21.0<L>  |  4.84<H>    Ca    9.6      04 Mar 2021 07:05  Mg     4.1     03-04    TPro  6.8  /  Alb  4.1  /  TBili  0.3<L>  /  DBili  x   /  AST  29  /  ALT  19  /  AlkPhos  136<H>  03-03          RADIOLOGY & ADDITIONAL STUDIES:

## 2021-03-06 NOTE — PROGRESS NOTE ADULT - SUBJECTIVE AND OBJECTIVE BOX
NEPHROLOGY INTERVAL HPI/OVERNIGHT EVENTS:    No new events.    MEDICATIONS  (STANDING):  carvedilol 12.5 milliGRAM(s) Oral every 12 hours  cloNIDine 0.2 milliGRAM(s) Oral every 12 hours  gabapentin 100 milliGRAM(s) Oral daily  levothyroxine 75 MICROGram(s) Oral daily  multivitamin 1 Tablet(s) Oral daily  pantoprazole  Injectable 40 milliGRAM(s) IV Push two times a day  simvastatin 20 milliGRAM(s) Oral at bedtime  sodium zirconium cyclosilicate 10 Gram(s) Oral daily    MEDICATIONS  (PRN):  HYDROmorphone  Injectable 0.5 milliGRAM(s) IV Push every 6 hours PRN Severe Pain (7 - 10)      Allergies    No Known Allergies        Vital Signs Last 24 Hrs  T(C): 36.4 (06 Mar 2021 11:40), Max: 36.9 (06 Mar 2021 05:12)  T(F): 97.6 (06 Mar 2021 11:40), Max: 98.5 (06 Mar 2021 05:12)  HR: 68 (06 Mar 2021 11:40) (68 - 76)  BP: 134/85 (06 Mar 2021 11:40) (76/39 - 134/85)  BP(mean): --  RR: 18 (06 Mar 2021 11:40) (18 - 18)  SpO2: 100% (06 Mar 2021 11:40) (98% - 100%)  T(C): 36.7 (05 Mar 2021 07:53), Max: 37.2 (05 Mar 2021 00:17)  T(F): 98 (05 Mar 2021 07:53), Max: 98.9 (05 Mar 2021 00:17)  HR: 70 (05 Mar 2021 07:53) (53 - 73)  BP: 94/44 (05 Mar 2021 07:53) (94/44 - 112/56)      PHYSICAL EXAM:    GENERAL: Alert in bed, not sob  HEAD:    EYES: wnl  ENMT:   NECK: veins not full  NERVOUS SYSTEM: alert, verbal   CHEST/LUNG: no 02, decreased bs bases  HEART: Sternal scar, no rub  ABDOMEN: obese not tender  EXTREMITIES: Left upper arm access with bruit   LYMPH:   SKIN: No rash    LABS:                        8.3    13.71 )-----------( 218      ( 05 Mar 2021 07:01 )             26.6     03-05    x   |  x   |  x   ----------------------------<  x   5.8<H>   |  x   |  x     Ca    9.9      05 Mar 2021 07:01  Phos  4.6     03-05  Mg     3.2     03-04    TPro  6.8  /  Alb  4.1  /  TBili  0.3<L>  /  DBili  x   /  AST  29  /  ALT  19  /  AlkPhos  136<H>  03-03    PT/INR - ( 04 Mar 2021 18:41 )   PT: 15.5 sec;   INR: 1.36 ratio             Phosphorus Level, Serum: 4.6 mg/dL (03-05 @ 07:01)  Magnesium, Serum: 3.2 mg/dL (03-04 @ 16:10)          RADIOLOGY & ADDITIONAL TESTS:

## 2021-03-06 NOTE — PROGRESS NOTE ADULT - SUBJECTIVE AND OBJECTIVE BOX
seen for anemia, esrd    no acute complaints. mild LLQ pain  ros otherwise negative     MEDICATIONS  (STANDING):  cloNIDine 0.1 milliGRAM(s) Oral every 12 hours  epoetin jack-epbx (RETACRIT) Injectable 94029 Unit(s) IV Push <User Schedule>  gabapentin 100 milliGRAM(s) Oral daily  levothyroxine 75 MICROGram(s) Oral daily  multivitamin 1 Tablet(s) Oral daily  pantoprazole  Injectable 40 milliGRAM(s) IV Push two times a day  simvastatin 20 milliGRAM(s) Oral at bedtime    MEDICATIONS  (PRN):  HYDROmorphone  Injectable 0.5 milliGRAM(s) IV Push every 6 hours PRN Severe Pain (7 - 10)      Allergies    No Known Allergies    Vital Signs Last 24 Hrs  T(C): 36.5 (06 Mar 2021 08:59), Max: 36.9 (06 Mar 2021 05:12)  T(F): 97.7 (06 Mar 2021 08:59), Max: 98.5 (06 Mar 2021 05:12)  HR: 71 (06 Mar 2021 08:54) (69 - 76)  BP: 106/71 (06 Mar 2021 08:54) (76/39 - 115/71)  BP(mean): --  RR: 18 (06 Mar 2021 08:54) (18 - 18)  SpO2: 99% (06 Mar 2021 08:54) (98% - 99%)    PHYSICAL EXAM:    GENERAL: NAD  CHEST/LUNG: Clear to percussion bilaterally  HEART: Regular rate and rhythm; S1 S2;  ABDOMEN: Soft, Nontender,  Bowel sounds present  EXTREMITIES: no edema   NERVOUS SYSTEM:  Alert & Oriented X3, nonfocal    LABS:                        8.2    9.98  )-----------( 200      ( 06 Mar 2021 08:28 )             27.3     03-05    x   |  x   |  x   ----------------------------<  x   5.8<H>   |  x   |  x     Ca    9.9      05 Mar 2021 07:01  Phos  4.6     03-05  Mg     3.2     03-04      PT/INR - ( 04 Mar 2021 18:41 )   PT: 15.5 sec;   INR: 1.36 ratio               CAPILLARY BLOOD GLUCOSE            RADIOLOGY & ADDITIONAL TESTS:

## 2021-03-06 NOTE — CONSULT NOTE ADULT - ASSESSMENT
She has a large adrenal mass with a significant fat component.    She needs a triple phase CT scan for further characterization    This mass is an incidental finding and is likely not related to the current clinical scenario    will follow    Will ultimately recommend that she follows up with Dr. James Burnett who operates at [facility], [place]

## 2021-03-07 LAB
ANION GAP SERPL CALC-SCNC: 19 MMOL/L — HIGH (ref 5–17)
BUN SERPL-MCNC: 61 MG/DL — HIGH (ref 8–20)
CALCIUM SERPL-MCNC: 9.3 MG/DL — SIGNIFICANT CHANGE UP (ref 8.6–10.2)
CHLORIDE SERPL-SCNC: 93 MMOL/L — LOW (ref 98–107)
CO2 SERPL-SCNC: 22 MMOL/L — SIGNIFICANT CHANGE UP (ref 22–29)
CREAT SERPL-MCNC: 4.47 MG/DL — HIGH (ref 0.5–1.3)
GLUCOSE SERPL-MCNC: 192 MG/DL — HIGH (ref 70–99)
HCT VFR BLD CALC: 28.4 % — LOW (ref 34.5–45)
HGB BLD-MCNC: 8.5 G/DL — LOW (ref 11.5–15.5)
MCHC RBC-ENTMCNC: 29.8 PG — SIGNIFICANT CHANGE UP (ref 27–34)
MCHC RBC-ENTMCNC: 29.9 GM/DL — LOW (ref 32–36)
MCV RBC AUTO: 99.6 FL — SIGNIFICANT CHANGE UP (ref 80–100)
PLATELET # BLD AUTO: 227 K/UL — SIGNIFICANT CHANGE UP (ref 150–400)
POTASSIUM SERPL-MCNC: 5 MMOL/L — SIGNIFICANT CHANGE UP (ref 3.5–5.3)
POTASSIUM SERPL-SCNC: 5 MMOL/L — SIGNIFICANT CHANGE UP (ref 3.5–5.3)
RBC # BLD: 2.85 M/UL — LOW (ref 3.8–5.2)
RBC # FLD: 16.9 % — HIGH (ref 10.3–14.5)
SODIUM SERPL-SCNC: 134 MMOL/L — LOW (ref 135–145)
WBC # BLD: 10.7 K/UL — HIGH (ref 3.8–10.5)
WBC # FLD AUTO: 10.7 K/UL — HIGH (ref 3.8–10.5)

## 2021-03-07 PROCEDURE — 99232 SBSQ HOSP IP/OBS MODERATE 35: CPT

## 2021-03-07 PROCEDURE — 99233 SBSQ HOSP IP/OBS HIGH 50: CPT

## 2021-03-07 RX ORDER — ONDANSETRON 8 MG/1
4 TABLET, FILM COATED ORAL EVERY 6 HOURS
Refills: 0 | Status: DISCONTINUED | OUTPATIENT
Start: 2021-03-07 | End: 2021-03-12

## 2021-03-07 RX ORDER — MULTIVIT WITH MIN/MFOLATE/K2 340-15/3 G
1 POWDER (GRAM) ORAL ONCE
Refills: 0 | Status: DISCONTINUED | OUTPATIENT
Start: 2021-03-07 | End: 2021-03-08

## 2021-03-07 RX ADMIN — Medication 0.1 MILLIGRAM(S): at 06:07

## 2021-03-07 RX ADMIN — HYDROMORPHONE HYDROCHLORIDE 0.5 MILLIGRAM(S): 2 INJECTION INTRAMUSCULAR; INTRAVENOUS; SUBCUTANEOUS at 20:18

## 2021-03-07 RX ADMIN — ONDANSETRON 4 MILLIGRAM(S): 8 TABLET, FILM COATED ORAL at 13:34

## 2021-03-07 RX ADMIN — HYDROMORPHONE HYDROCHLORIDE 0.5 MILLIGRAM(S): 2 INJECTION INTRAMUSCULAR; INTRAVENOUS; SUBCUTANEOUS at 20:33

## 2021-03-07 RX ADMIN — Medication 0.1 MILLIGRAM(S): at 17:06

## 2021-03-07 RX ADMIN — PANTOPRAZOLE SODIUM 40 MILLIGRAM(S): 20 TABLET, DELAYED RELEASE ORAL at 06:06

## 2021-03-07 RX ADMIN — PANTOPRAZOLE SODIUM 40 MILLIGRAM(S): 20 TABLET, DELAYED RELEASE ORAL at 17:06

## 2021-03-07 RX ADMIN — HYDROMORPHONE HYDROCHLORIDE 0.5 MILLIGRAM(S): 2 INJECTION INTRAMUSCULAR; INTRAVENOUS; SUBCUTANEOUS at 02:37

## 2021-03-07 RX ADMIN — Medication 75 MICROGRAM(S): at 06:06

## 2021-03-07 RX ADMIN — SIMVASTATIN 20 MILLIGRAM(S): 20 TABLET, FILM COATED ORAL at 21:43

## 2021-03-07 RX ADMIN — ONDANSETRON 4 MILLIGRAM(S): 8 TABLET, FILM COATED ORAL at 20:15

## 2021-03-07 NOTE — PROGRESS NOTE ADULT - SUBJECTIVE AND OBJECTIVE BOX
seen for anemia, esrd    complaining of nausea/constipation  ros otherwise negative     MEDICATIONS  (STANDING):  cloNIDine 0.1 milliGRAM(s) Oral every 12 hours  epoetin jack-epbx (RETACRIT) Injectable 93500 Unit(s) IV Push <User Schedule>  gabapentin 100 milliGRAM(s) Oral daily  levothyroxine 75 MICROGram(s) Oral daily  magnesium citrate Oral Solution 1 Bottle Oral once  multivitamin 1 Tablet(s) Oral daily  pantoprazole  Injectable 40 milliGRAM(s) IV Push two times a day  simvastatin 20 milliGRAM(s) Oral at bedtime    MEDICATIONS  (PRN):  acetaminophen   Tablet .. 650 milliGRAM(s) Oral every 6 hours PRN Temp greater or equal to 38C (100.4F), Mild Pain (1 - 3), Moderate Pain (4 - 6)  bisacodyl Suppository 10 milliGRAM(s) Rectal daily PRN Constipation  HYDROmorphone  Injectable 0.5 milliGRAM(s) IV Push every 6 hours PRN Severe Pain (7 - 10)  ondansetron Injectable 4 milliGRAM(s) IV Push every 6 hours PRN Nausea and/or Vomiting      Allergies    No Known Allergies    Intolerances    Vital Signs Last 24 Hrs  T(C): 36.9 (07 Mar 2021 07:47), Max: 36.9 (07 Mar 2021 07:47)  T(F): 98.4 (07 Mar 2021 07:47), Max: 98.4 (07 Mar 2021 07:47)  HR: 72 (07 Mar 2021 07:47) (65 - 80)  BP: 150/72 (07 Mar 2021 07:47) (103/67 - 150/72)  BP(mean): --  RR: 18 (07 Mar 2021 07:47) (18 - 18)  SpO2: 98% (07 Mar 2021 07:47) (95% - 98%)    PHYSICAL EXAM:    GENERAL: NAD  CHEST/LUNG: Clear to percussion bilaterally  HEART: Regular rate and rhythm; S1 S2  ABDOMEN: Soft, Nontender, Nondistended; Bowel sounds present  EXTREMITIES:  no edema   NERVOUS SYSTEM:  Alert & Oriented X3 nonfocal    LABS:                        8.5    10.70 )-----------( 227      ( 07 Mar 2021 08:46 )             28.4     03-07    134<L>  |  93<L>  |  61.0<H>  ----------------------------<  192<H>  5.0   |  22.0  |  4.47<H>    Ca    9.3      07 Mar 2021 08:44            CAPILLARY BLOOD GLUCOSE            RADIOLOGY & ADDITIONAL TESTS:

## 2021-03-07 NOTE — PROGRESS NOTE ADULT - SUBJECTIVE AND OBJECTIVE BOX
INTERVAL HPI/OVERNIGHT EVENTS:Follow-up performed for history of epigastric pain and fecal occult blood positive.  EGD was postponed due to hyperkalemia.  Patient got dialyzed yesterday.    MEDICATIONS  (STANDING):  cloNIDine 0.1 milliGRAM(s) Oral every 12 hours  epoetin jack-epbx (RETACRIT) Injectable 89229 Unit(s) IV Push <User Schedule>  gabapentin 100 milliGRAM(s) Oral daily  levothyroxine 75 MICROGram(s) Oral daily  magnesium citrate Oral Solution 1 Bottle Oral once  multivitamin 1 Tablet(s) Oral daily  pantoprazole  Injectable 40 milliGRAM(s) IV Push two times a day  simvastatin 20 milliGRAM(s) Oral at bedtime    MEDICATIONS  (PRN):  acetaminophen   Tablet .. 650 milliGRAM(s) Oral every 6 hours PRN Temp greater or equal to 38C (100.4F), Mild Pain (1 - 3), Moderate Pain (4 - 6)  bisacodyl Suppository 10 milliGRAM(s) Rectal daily PRN Constipation  HYDROmorphone  Injectable 0.5 milliGRAM(s) IV Push every 6 hours PRN Severe Pain (7 - 10)  ondansetron Injectable 4 milliGRAM(s) IV Push every 6 hours PRN Nausea and/or Vomiting      Allergies    No Known Allergies    Intolerances        Vital Signs Last 24 Hrs  T(C): 36.9 (07 Mar 2021 07:47), Max: 36.9 (07 Mar 2021 07:47)  T(F): 98.4 (07 Mar 2021 07:47), Max: 98.4 (07 Mar 2021 07:47)  HR: 72 (07 Mar 2021 07:47) (65 - 80)  BP: 150/72 (07 Mar 2021 07:47) (103/67 - 150/72)  BP(mean): --  RR: 18 (07 Mar 2021 07:47) (18 - 18)  SpO2: 98% (07 Mar 2021 07:47) (95% - 98%)    LABS:                        8.5    10.70 )-----------( 227      ( 07 Mar 2021 08:46 )             28.4     03-07    134<L>  |  93<L>  |  61.0<H>  ----------------------------<  192<H>  5.0   |  22.0  |  4.47<H>    Ca    9.3      07 Mar 2021 08:44            RADIOLOGY & ADDITIONAL TESTS:

## 2021-03-07 NOTE — PROGRESS NOTE ADULT - SUBJECTIVE AND OBJECTIVE BOX
INTERVAL HPI/OVERNIGHT EVENTS:  Resting comfortably    MEDICATIONS  (STANDING):  cloNIDine 0.1 milliGRAM(s) Oral every 12 hours  epoetin jack-epbx (RETACRIT) Injectable 04666 Unit(s) IV Push <User Schedule>  gabapentin 100 milliGRAM(s) Oral daily  levothyroxine 75 MICROGram(s) Oral daily  magnesium citrate Oral Solution 1 Bottle Oral once  multivitamin 1 Tablet(s) Oral daily  pantoprazole  Injectable 40 milliGRAM(s) IV Push two times a day  simvastatin 20 milliGRAM(s) Oral at bedtime    MEDICATIONS  (PRN):  acetaminophen   Tablet .. 650 milliGRAM(s) Oral every 6 hours PRN Temp greater or equal to 38C (100.4F), Mild Pain (1 - 3), Moderate Pain (4 - 6)  bisacodyl Suppository 10 milliGRAM(s) Rectal daily PRN Constipation  HYDROmorphone  Injectable 0.5 milliGRAM(s) IV Push every 6 hours PRN Severe Pain (7 - 10)  ondansetron Injectable 4 milliGRAM(s) IV Push every 6 hours PRN Nausea and/or Vomiting      Allergies    No Known Allergies    Intolerances        Vital Signs Last 24 Hrs  T(C): 36.3 (07 Mar 2021 16:01), Max: 36.9 (07 Mar 2021 07:47)  T(F): 97.3 (07 Mar 2021 16:01), Max: 98.4 (07 Mar 2021 07:47)  HR: 71 (07 Mar 2021 16:01) (69 - 80)  BP: 148/74 (07 Mar 2021 16:01) (103/67 - 150/72)  BP(mean): --  RR: 18 (07 Mar 2021 16:01) (18 - 18)  SpO2: 100% (07 Mar 2021 16:01) (96% - 100%)     ON PE:  General: alert and awake  Abdomen: soft, nt, nd, bs+  :  No flank tenderness    LABS:                        8.5    10.70 )-----------( 227      ( 07 Mar 2021 08:46 )             28.4     03-07    134<L>  |  93<L>  |  61.0<H>  ----------------------------<  192<H>  5.0   |  22.0  |  4.47<H>    Ca    9.3      07 Mar 2021 08:44            RADIOLOGY & ADDITIONAL TESTS:

## 2021-03-08 ENCOUNTER — TRANSCRIPTION ENCOUNTER (OUTPATIENT)
Age: 66
End: 2021-03-08

## 2021-03-08 LAB
ANION GAP SERPL CALC-SCNC: 19 MMOL/L — HIGH (ref 5–17)
BUN SERPL-MCNC: 72 MG/DL — HIGH (ref 8–20)
CALCIUM SERPL-MCNC: 9.7 MG/DL — SIGNIFICANT CHANGE UP (ref 8.6–10.2)
CHLORIDE SERPL-SCNC: 91 MMOL/L — LOW (ref 98–107)
CO2 SERPL-SCNC: 25 MMOL/L — SIGNIFICANT CHANGE UP (ref 22–29)
CREAT SERPL-MCNC: 6.07 MG/DL — HIGH (ref 0.5–1.3)
GLUCOSE SERPL-MCNC: 183 MG/DL — HIGH (ref 70–99)
HCT VFR BLD CALC: 27.8 % — LOW (ref 34.5–45)
HGB BLD-MCNC: 8.4 G/DL — LOW (ref 11.5–15.5)
MCHC RBC-ENTMCNC: 30.1 PG — SIGNIFICANT CHANGE UP (ref 27–34)
MCHC RBC-ENTMCNC: 30.2 GM/DL — LOW (ref 32–36)
MCV RBC AUTO: 99.6 FL — SIGNIFICANT CHANGE UP (ref 80–100)
PLATELET # BLD AUTO: 248 K/UL — SIGNIFICANT CHANGE UP (ref 150–400)
POTASSIUM SERPL-MCNC: 5.1 MMOL/L — SIGNIFICANT CHANGE UP (ref 3.5–5.3)
POTASSIUM SERPL-SCNC: 5.1 MMOL/L — SIGNIFICANT CHANGE UP (ref 3.5–5.3)
RBC # BLD: 2.79 M/UL — LOW (ref 3.8–5.2)
RBC # FLD: 17.2 % — HIGH (ref 10.3–14.5)
SODIUM SERPL-SCNC: 135 MMOL/L — SIGNIFICANT CHANGE UP (ref 135–145)
WBC # BLD: 9 K/UL — SIGNIFICANT CHANGE UP (ref 3.8–10.5)
WBC # FLD AUTO: 9 K/UL — SIGNIFICANT CHANGE UP (ref 3.8–10.5)

## 2021-03-08 PROCEDURE — 99233 SBSQ HOSP IP/OBS HIGH 50: CPT

## 2021-03-08 PROCEDURE — 93306 TTE W/DOPPLER COMPLETE: CPT | Mod: 26

## 2021-03-08 RX ORDER — MULTIVIT WITH MIN/MFOLATE/K2 340-15/3 G
1 POWDER (GRAM) ORAL ONCE
Refills: 0 | Status: COMPLETED | OUTPATIENT
Start: 2021-03-08 | End: 2021-03-08

## 2021-03-08 RX ADMIN — ERYTHROPOIETIN 10000 UNIT(S): 10000 INJECTION, SOLUTION INTRAVENOUS; SUBCUTANEOUS at 17:29

## 2021-03-08 RX ADMIN — Medication 1 TABLET(S): at 08:14

## 2021-03-08 RX ADMIN — HYDROMORPHONE HYDROCHLORIDE 0.5 MILLIGRAM(S): 2 INJECTION INTRAMUSCULAR; INTRAVENOUS; SUBCUTANEOUS at 20:12

## 2021-03-08 RX ADMIN — SIMVASTATIN 20 MILLIGRAM(S): 20 TABLET, FILM COATED ORAL at 20:12

## 2021-03-08 RX ADMIN — PANTOPRAZOLE SODIUM 40 MILLIGRAM(S): 20 TABLET, DELAYED RELEASE ORAL at 06:11

## 2021-03-08 RX ADMIN — Medication 75 MICROGRAM(S): at 06:12

## 2021-03-08 RX ADMIN — ONDANSETRON 4 MILLIGRAM(S): 8 TABLET, FILM COATED ORAL at 20:09

## 2021-03-08 RX ADMIN — GABAPENTIN 100 MILLIGRAM(S): 400 CAPSULE ORAL at 08:14

## 2021-03-08 RX ADMIN — ONDANSETRON 4 MILLIGRAM(S): 8 TABLET, FILM COATED ORAL at 14:17

## 2021-03-08 RX ADMIN — Medication 1 BOTTLE: at 12:37

## 2021-03-08 RX ADMIN — Medication 0.1 MILLIGRAM(S): at 06:12

## 2021-03-08 RX ADMIN — HYDROMORPHONE HYDROCHLORIDE 0.5 MILLIGRAM(S): 2 INJECTION INTRAMUSCULAR; INTRAVENOUS; SUBCUTANEOUS at 20:27

## 2021-03-08 RX ADMIN — ONDANSETRON 4 MILLIGRAM(S): 8 TABLET, FILM COATED ORAL at 08:14

## 2021-03-08 NOTE — PROGRESS NOTE ADULT - SUBJECTIVE AND OBJECTIVE BOX
Patient is a 65y old  Female who presents with a chief complaint of Abdominal pain, melena, concern for UGIB (08 Mar 2021 07:57)      HPI:  65yoF hx ESRD on HD (M/W/F), CAD s/p CABG, combined systolic and diastolic CHF (EF 40-45%), pAfib, sick sinus syndrome s/p PPM (2018), shingles complicated by neuropathic pain, hypothyroidism, HTN.       Patient with no further melena ( NP Yeni speaks Thai and saw pt with me ). No abdominal pain. Hgb stable . No further  epigastric pain        REVIEW OF SYSTEMS:  Constitutional: No fever, weight loss or fatigue  ENMT:  No difficulty hearing, tinnitus, vertigo; No sinus or throat pain  Respiratory: No cough, wheezing, chills or hemoptysis  Cardiovascular: No chest pain, palpitations, dizziness or leg swelling  Gastrointestinal: No abdominal or epigastric pain. No nausea, vomiting or hematemesis; No diarrhea or constipation. No melena or hematochezia.  Skin: No itching, burning, rashes or lesions   Musculoskeletal: No joint pain or swelling; No muscle, back or extremity pain    PAST MEDICAL & SURGICAL HISTORY:  Afib    H/O sick sinus syndrome  s/p PPM (2018)    3-vessel CAD  s/p CABG    Hypothyroidism, unspecified type    Hemodialysis patient    Renal failure    Hypertension    Diabetes mellitus    S/P placement of cardiac pacemaker    A-V fistula    S/P cholecystectomy        FAMILY HISTORY:  Family history of diabetes mellitus        SOCIAL HISTORY:  Smoking Status: [ ] Current, [ ] Former, [ ] Never  Pack Years:  [  ] EtOH-no  [  ] IVDA    MEDICATIONS:  MEDICATIONS  (STANDING):  cloNIDine 0.1 milliGRAM(s) Oral every 12 hours  epoetin jack-epbx (RETACRIT) Injectable 87553 Unit(s) IV Push <User Schedule>  gabapentin 100 milliGRAM(s) Oral daily  levothyroxine 75 MICROGram(s) Oral daily  magnesium citrate Oral Solution 1 Bottle Oral once  multivitamin 1 Tablet(s) Oral daily  pantoprazole  Injectable 40 milliGRAM(s) IV Push two times a day  simvastatin 20 milliGRAM(s) Oral at bedtime    MEDICATIONS  (PRN):  acetaminophen   Tablet .. 650 milliGRAM(s) Oral every 6 hours PRN Temp greater or equal to 38C (100.4F), Mild Pain (1 - 3), Moderate Pain (4 - 6)  bisacodyl Suppository 10 milliGRAM(s) Rectal daily PRN Constipation  HYDROmorphone  Injectable 0.5 milliGRAM(s) IV Push every 6 hours PRN Severe Pain (7 - 10)  ondansetron Injectable 4 milliGRAM(s) IV Push every 6 hours PRN Nausea and/or Vomiting      Allergies    No Known Allergies    Intolerances        Vital Signs Last 24 Hrs  T(C): 37 (08 Mar 2021 07:44), Max: 37 (08 Mar 2021 07:44)  T(F): 98.6 (08 Mar 2021 07:44), Max: 98.6 (08 Mar 2021 07:44)  HR: 76 (08 Mar 2021 07:44) (69 - 76)  BP: 139/80 (08 Mar 2021 07:44) (139/80 - 162/91)  BP(mean): --  RR: 14 (08 Mar 2021 07:44) (14 - 18)  SpO2: 96% (08 Mar 2021 07:44) (96% - 100%)    03-07 @ 07:01  -  03-08 @ 07:00  --------------------------------------------------------  IN: 0 mL / OUT: 200 mL / NET: -200 mL          PHYSICAL EXAM:    General: Well developed; well nourished; in no acute distress  HEENT: MMM, conjunctiva and sclera clear  H- RRR  L- CTA   Gastrointestinal: Soft, non-tender non-distended; Normal bowel sounds; No rebound or guarding  Extremities: Normal range of motion, No clubbing, cyanosis or edema  Neurological: Alert and oriented x3  Skin: Warm and dry. No obvious rash      LABS:                        8.4    9.00  )-----------( 248      ( 08 Mar 2021 05:53 )             27.8     08 Mar 2021 05:53    135    |  91     |  72.0   ----------------------------<  183    5.1     |  25.0   |  6.07     Ca    9.7        08 Mar 2021 05:53                RADIOLOGY & ADDITIONAL STUDIES:     < from: CT Abdomen and Pelvis No Cont (03.03.21 @ 22:09) >  IMPRESSION:  Cardiomegaly.  Fat-containing left suprarenal mass. Possibilities will include adrenal myelolipoma or retroperitoneal lipoma/liposarcoma.  Atrophic kidneys.  Advanced atherosclerotic disease.    < end of copied text >

## 2021-03-08 NOTE — PROGRESS NOTE ADULT - SUBJECTIVE AND OBJECTIVE BOX
seen for anemia, ESRD    seen with bedside   +nausea, +constipation  ros otherwise negative     MEDICATIONS  (STANDING):  cloNIDine 0.1 milliGRAM(s) Oral every 12 hours  epoetin jack-epbx (RETACRIT) Injectable 73667 Unit(s) IV Push <User Schedule>  gabapentin 100 milliGRAM(s) Oral daily  levothyroxine 75 MICROGram(s) Oral daily  multivitamin 1 Tablet(s) Oral daily  pantoprazole  Injectable 40 milliGRAM(s) IV Push two times a day  simvastatin 20 milliGRAM(s) Oral at bedtime    MEDICATIONS  (PRN):  acetaminophen   Tablet .. 650 milliGRAM(s) Oral every 6 hours PRN Temp greater or equal to 38C (100.4F), Mild Pain (1 - 3), Moderate Pain (4 - 6)  bisacodyl Suppository 10 milliGRAM(s) Rectal daily PRN Constipation  HYDROmorphone  Injectable 0.5 milliGRAM(s) IV Push every 6 hours PRN Severe Pain (7 - 10)  ondansetron Injectable 4 milliGRAM(s) IV Push every 6 hours PRN Nausea and/or Vomiting      Allergies    No Known Allergies      Vital Signs Last 24 Hrs  T(C): 37 (08 Mar 2021 07:44), Max: 37 (08 Mar 2021 07:44)  T(F): 98.6 (08 Mar 2021 07:44), Max: 98.6 (08 Mar 2021 07:44)  HR: 76 (08 Mar 2021 07:44) (69 - 76)  BP: 139/80 (08 Mar 2021 07:44) (139/80 - 162/91)  BP(mean): --  RR: 14 (08 Mar 2021 07:44) (14 - 18)  SpO2: 96% (08 Mar 2021 07:44) (96% - 100%)    PHYSICAL EXAM:    GENERAL: NAD  CHEST/LUNG: Clear to percussion bilaterally  HEART: Regular rate and rhythm; S1 S2  ABDOMEN: Soft, Nontender,  Bowel sounds present  EXTREMITIES:  no edema   NERVOUS SYSTEM:  Alert & Oriented X3,  Motor Strength 5/5 B/L upper and lower extremities    LABS:                        8.4    9.00  )-----------( 248      ( 08 Mar 2021 05:53 )             27.8     03-08    135  |  91<L>  |  72.0<H>  ----------------------------<  183<H>  5.1   |  25.0  |  6.07<H>    Ca    9.7      08 Mar 2021 05:53            CAPILLARY BLOOD GLUCOSE            RADIOLOGY & ADDITIONAL TESTS:

## 2021-03-08 NOTE — DIETITIAN INITIAL EVALUATION ADULT. - OTHER INFO
65yoF hx ESRD on HD (M/W/F), CAD s/p CABG, combined systolic and diastolic CHF (EF 40-45%), pAfib, sick sinus syndrome s/p PPM (2018), shingles complicated by neuropathic pain, hypothyroidism, HTN presenting with epigastric pain and melena, found to have worsening anemia and +FOBT. Pt off unit during assessment. RN reported pt with fair po intake- with nausea frequently. Phos and K+ WNL. RD to follow up to obtain hx as feasible.

## 2021-03-08 NOTE — DIETITIAN INITIAL EVALUATION ADULT. - PERTINENT LABORATORY DATA
03-08 Na135 mmol/L Glu 183 mg/dL<H> K+ 5.1 mmol/L Cr  6.07 mg/dL<H> BUN 72.0 mg/dL<H> Phos n/a   Alb n/a   PAB n/a

## 2021-03-08 NOTE — PROGRESS NOTE ADULT - SUBJECTIVE AND OBJECTIVE BOX
NEPHROLOGY INTERVAL HPI/OVERNIGHT EVENTS:    Complains of upper abdominal pain.    MEDICATIONS  (STANDING):  carvedilol 12.5 milliGRAM(s) Oral every 12 hours  cloNIDine 0.2 milliGRAM(s) Oral every 12 hours  gabapentin 100 milliGRAM(s) Oral daily  levothyroxine 75 MICROGram(s) Oral daily  multivitamin 1 Tablet(s) Oral daily  pantoprazole  Injectable 40 milliGRAM(s) IV Push two times a day  simvastatin 20 milliGRAM(s) Oral at bedtime  sodium zirconium cyclosilicate 10 Gram(s) Oral daily    MEDICATIONS  (PRN):  HYDROmorphone  Injectable 0.5 milliGRAM(s) IV Push every 6 hours PRN Severe Pain (7 - 10)      Allergies    No Known Allergies        Vital Signs Last 24 Hrs  T(C): 37 (08 Mar 2021 07:44), Max: 37 (08 Mar 2021 07:44)  T(F): 98.6 (08 Mar 2021 07:44), Max: 98.6 (08 Mar 2021 07:44)  HR: 76 (08 Mar 2021 07:44) (69 - 76)  BP: 139/80 (08 Mar 2021 07:44) (139/80 - 162/91)  BP(mean): --  RR: 14 (08 Mar 2021 07:44) (14 - 18)  SpO2: 96% (08 Mar 2021 07:44) (96% - 100%)  T(C): 36.9 (07 Mar 2021 07:47), Max: 36.9 (07 Mar 2021 07:47)  T(F): 98.4 (07 Mar 2021 07:47), Max: 98.4 (07 Mar 2021 07:47)  HR: 72 (07 Mar 2021 07:47) (65 - 72)  BP: 150/72 (07 Mar 2021 07:47) (106/71 - 150/72)        PHYSICAL EXAM:    GENERAL: Alert in bed eating  HEAD:    EYES: wnl  ENMT:   NECK: veins not full  NERVOUS SYSTEM: alert, verbal   CHEST/LUNG: no 02, decreased bs bases  HEART: Sternal scar, no rub, 1/6 murmur same  ABDOMEN: obese not tender, distended  EXTREMITIES: Left upper arm access with bruit   LYMPH:   SKIN: No rash    LABS:    03-06    131<L>  |  90<L>  |  105.0<H>  ----------------------------<  213<H>  5.3   |  22.0  |  5.78<H>    Ca    9.1      06 Mar 2021 14:55                            8.3    13.71 )-----------( 218      ( 05 Mar 2021 07:01 )             26.6     03-05    x   |  x   |  x   ----------------------------<  x   5.8<H>   |  x   |  x     Ca    9.9      05 Mar 2021 07:01  Phos  4.6     03-05  Mg     3.2     03-04    TPro  6.8  /  Alb  4.1  /  TBili  0.3<L>  /  DBili  x   /  AST  29  /  ALT  19  /  AlkPhos  136<H>  03-03    PT/INR - ( 04 Mar 2021 18:41 )   PT: 15.5 sec;   INR: 1.36 ratio             Phosphorus Level, Serum: 4.6 mg/dL (03-05 @ 07:01)  Magnesium, Serum: 3.2 mg/dL (03-04 @ 16:10)          RADIOLOGY & ADDITIONAL TESTS:

## 2021-03-09 ENCOUNTER — RESULT REVIEW (OUTPATIENT)
Age: 66
End: 2021-03-09

## 2021-03-09 LAB
ANION GAP SERPL CALC-SCNC: 17 MMOL/L — SIGNIFICANT CHANGE UP (ref 5–17)
BLD GP AB SCN SERPL QL: SIGNIFICANT CHANGE UP
BUN SERPL-MCNC: 42 MG/DL — HIGH (ref 8–20)
CALCIUM SERPL-MCNC: 9.5 MG/DL — SIGNIFICANT CHANGE UP (ref 8.6–10.2)
CHLORIDE SERPL-SCNC: 94 MMOL/L — LOW (ref 98–107)
CO2 SERPL-SCNC: 24 MMOL/L — SIGNIFICANT CHANGE UP (ref 22–29)
CREAT SERPL-MCNC: 5.12 MG/DL — HIGH (ref 0.5–1.3)
GLUCOSE BLDC GLUCOMTR-MCNC: 169 MG/DL — HIGH (ref 70–99)
GLUCOSE SERPL-MCNC: 169 MG/DL — HIGH (ref 70–99)
HCT VFR BLD CALC: 28.6 % — LOW (ref 34.5–45)
HGB BLD-MCNC: 8.7 G/DL — LOW (ref 11.5–15.5)
INR BLD: 1.15 RATIO — SIGNIFICANT CHANGE UP (ref 0.88–1.16)
MCHC RBC-ENTMCNC: 30.4 GM/DL — LOW (ref 32–36)
MCHC RBC-ENTMCNC: 30.4 PG — SIGNIFICANT CHANGE UP (ref 27–34)
MCV RBC AUTO: 100 FL — SIGNIFICANT CHANGE UP (ref 80–100)
PLATELET # BLD AUTO: 238 K/UL — SIGNIFICANT CHANGE UP (ref 150–400)
POTASSIUM SERPL-MCNC: 4.7 MMOL/L — SIGNIFICANT CHANGE UP (ref 3.5–5.3)
POTASSIUM SERPL-SCNC: 4.7 MMOL/L — SIGNIFICANT CHANGE UP (ref 3.5–5.3)
PROTHROM AB SERPL-ACNC: 13.2 SEC — SIGNIFICANT CHANGE UP (ref 10.6–13.6)
RBC # BLD: 2.86 M/UL — LOW (ref 3.8–5.2)
RBC # FLD: 17.6 % — HIGH (ref 10.3–14.5)
SODIUM SERPL-SCNC: 135 MMOL/L — SIGNIFICANT CHANGE UP (ref 135–145)
WBC # BLD: 9.59 K/UL — SIGNIFICANT CHANGE UP (ref 3.8–10.5)
WBC # FLD AUTO: 9.59 K/UL — SIGNIFICANT CHANGE UP (ref 3.8–10.5)

## 2021-03-09 PROCEDURE — 99233 SBSQ HOSP IP/OBS HIGH 50: CPT

## 2021-03-09 PROCEDURE — 43239 EGD BIOPSY SINGLE/MULTIPLE: CPT

## 2021-03-09 PROCEDURE — 88342 IMHCHEM/IMCYTCHM 1ST ANTB: CPT | Mod: 26

## 2021-03-09 PROCEDURE — 88305 TISSUE EXAM BY PATHOLOGIST: CPT | Mod: 26

## 2021-03-09 RX ORDER — CARVEDILOL PHOSPHATE 80 MG/1
12.5 CAPSULE, EXTENDED RELEASE ORAL EVERY 12 HOURS
Refills: 0 | Status: DISCONTINUED | OUTPATIENT
Start: 2021-03-09 | End: 2021-03-12

## 2021-03-09 RX ORDER — CARVEDILOL PHOSPHATE 80 MG/1
6.25 CAPSULE, EXTENDED RELEASE ORAL EVERY 12 HOURS
Refills: 0 | Status: DISCONTINUED | OUTPATIENT
Start: 2021-03-09 | End: 2021-03-09

## 2021-03-09 RX ORDER — ERYTHROPOIETIN 10000 [IU]/ML
10000 INJECTION, SOLUTION INTRAVENOUS; SUBCUTANEOUS
Refills: 0 | Status: DISCONTINUED | OUTPATIENT
Start: 2021-03-09 | End: 2021-03-12

## 2021-03-09 RX ADMIN — Medication 0.1 MILLIGRAM(S): at 06:28

## 2021-03-09 RX ADMIN — Medication 75 MICROGRAM(S): at 06:27

## 2021-03-09 RX ADMIN — Medication 0.1 MILLIGRAM(S): at 18:21

## 2021-03-09 RX ADMIN — Medication 650 MILLIGRAM(S): at 23:30

## 2021-03-09 RX ADMIN — Medication 650 MILLIGRAM(S): at 22:24

## 2021-03-09 RX ADMIN — GABAPENTIN 100 MILLIGRAM(S): 400 CAPSULE ORAL at 18:21

## 2021-03-09 RX ADMIN — PANTOPRAZOLE SODIUM 40 MILLIGRAM(S): 20 TABLET, DELAYED RELEASE ORAL at 06:28

## 2021-03-09 RX ADMIN — Medication 1 TABLET(S): at 18:21

## 2021-03-09 RX ADMIN — SIMVASTATIN 20 MILLIGRAM(S): 20 TABLET, FILM COATED ORAL at 22:25

## 2021-03-09 RX ADMIN — CARVEDILOL PHOSPHATE 12.5 MILLIGRAM(S): 80 CAPSULE, EXTENDED RELEASE ORAL at 18:43

## 2021-03-09 NOTE — PROGRESS NOTE ADULT - SUBJECTIVE AND OBJECTIVE BOX
seen for anemia, esrd    no acute complaints  ros negative  post EGD     MEDICATIONS  (STANDING):  cloNIDine 0.1 milliGRAM(s) Oral every 12 hours  epoetin jack-epbx (RETACRIT) Injectable 60781 Unit(s) IV Push <User Schedule>  gabapentin 100 milliGRAM(s) Oral daily  levothyroxine 75 MICROGram(s) Oral daily  multivitamin 1 Tablet(s) Oral daily  pantoprazole  Injectable 40 milliGRAM(s) IV Push two times a day  simvastatin 20 milliGRAM(s) Oral at bedtime    MEDICATIONS  (PRN):  acetaminophen   Tablet .. 650 milliGRAM(s) Oral every 6 hours PRN Temp greater or equal to 38C (100.4F), Mild Pain (1 - 3), Moderate Pain (4 - 6)  bisacodyl Suppository 10 milliGRAM(s) Rectal daily PRN Constipation  HYDROmorphone  Injectable 0.5 milliGRAM(s) IV Push every 6 hours PRN Severe Pain (7 - 10)  ondansetron Injectable 4 milliGRAM(s) IV Push every 6 hours PRN Nausea and/or Vomiting      Allergies    No Known Allergies      Vital Signs Last 24 Hrs  T(C): 36.9 (09 Mar 2021 11:10), Max: 36.9 (08 Mar 2021 16:01)  T(F): 98.4 (09 Mar 2021 11:10), Max: 98.5 (08 Mar 2021 16:01)  HR: 70 (09 Mar 2021 11:10) (70 - 80)  BP: 152/77 (09 Mar 2021 11:10) (134/76 - 156/76)  BP(mean): --  RR: 16 (09 Mar 2021 11:10) (16 - 16)  SpO2: 99% (09 Mar 2021 11:10) (96% - 99%)    PHYSICAL EXAM:    GENERAL: NAD  CHEST/LUNG: Clear to percussion bilaterall  HEART: Regular rate and rhythm; S1 S2  ABDOMEN: Soft, Bowel sounds present  EXTREMITIES: no edema   NERVOUS SYSTEM:  Alert & Oriented X3, Motor Strength 5/5 B/L upper and lower extremities      LABS:                        8.7    9.59  )-----------( 238      ( 09 Mar 2021 11:45 )             28.6     03-09    135  |  94<L>  |  42.0<H>  ----------------------------<  169<H>  4.7   |  24.0  |  5.12<H>    Ca    9.5      09 Mar 2021 11:44      PT/INR - ( 09 Mar 2021 11:44 )   PT: 13.2 sec;   INR: 1.15 ratio               CAPILLARY BLOOD GLUCOSE      POCT Blood Glucose.: 169 mg/dL (09 Mar 2021 09:09)        RADIOLOGY & ADDITIONAL TESTS:

## 2021-03-09 NOTE — PROGRESS NOTE ADULT - SUBJECTIVE AND OBJECTIVE BOX
[facility]                                                    [address], [address], [place] [address]                                                         PHONE: [phone]    FAX: [phone] [address], [address], [place] [address]                                                 PHONE: [phone]    FAX: [phone]  *******************************************************************************    Overnight events/Subjective Assessment:  No new cardiovascular issues.  No chest pain, SOB or palpitations.  + Mild epigastric & mid abdominal pain.    No Known Allergies    MEDICATIONS  (STANDING):  carvedilol 6.25 milliGRAM(s) Oral every 12 hours  cloNIDine 0.1 milliGRAM(s) Oral every 12 hours  epoetin jack-epbx (RETACRIT) Injectable 49239 Unit(s) IV Push <User Schedule>  gabapentin 100 milliGRAM(s) Oral daily  levothyroxine 75 MICROGram(s) Oral daily  multivitamin 1 Tablet(s) Oral daily  simvastatin 20 milliGRAM(s) Oral at bedtime    MEDICATIONS  (PRN):  acetaminophen   Tablet .. 650 milliGRAM(s) Oral every 6 hours PRN Temp greater or equal to 38C (100.4F), Mild Pain (1 - 3), Moderate Pain (4 - 6)  bisacodyl Suppository 10 milliGRAM(s) Rectal daily PRN Constipation  HYDROmorphone  Injectable 0.5 milliGRAM(s) IV Push every 6 hours PRN Severe Pain (7 - 10)  ondansetron Injectable 4 milliGRAM(s) IV Push every 6 hours PRN Nausea and/or Vomiting      Vital Signs Last 24 Hrs  T(C): 37.1 (09 Mar 2021 15:47), Max: 37.1 (09 Mar 2021 15:47)  T(F): 98.7 (09 Mar 2021 15:47), Max: 98.7 (09 Mar 2021 15:47)  HR: 72 (09 Mar 2021 15:47) (70 - 80)  BP: 149/80 (09 Mar 2021 15:47) (134/76 - 156/76)  BP(mean): --  RR: 18 (09 Mar 2021 15:47) (16 - 18)  SpO2: 99% (09 Mar 2021 15:47) (96% - 99%)    I&O's Detail    08 Mar 2021 07:01  -  09 Mar 2021 07:00  --------------------------------------------------------  IN:  Total IN: 0 mL    OUT:    Other (mL): 1000 mL  Total OUT: 1000 mL    Total NET: -1000 mL        I&O's Summary    08 Mar 2021 07:01  -  09 Mar 2021 07:00  --------------------------------------------------------  IN: 0 mL / OUT: 1000 mL / NET: -1000 mL            PHYSICAL EXAM:  General: Appears well developed, well nourished, no acute distress  HEENT: Head: normocephalic, atraumatic  Eyes: Pupils equal and reactive  Neck: Supple, no carotid bruit, no JVD, no HJR  CARDIOVASCULAR: Normal S1 and S2, II/VI syst M > LLSB  LUNGS: Clear to auscultation bilaterally, no rales, rhonchi or wheeze  ABDOMEN: Soft, nontender, non-distended, positive bowel sounds, no mass or bruit  EXTREMITIES: No edema, distal pulses WNL  SKIN: Warm and dry with normal turgor  NEURO: Alert & oriented x 3, grossly intact  PSYCH: normal mood and affect    LABS:                        8.7    9.59  )-----------( 238      ( 09 Mar 2021 11:45 )             28.6     03-09    135  |  94<L>  |  42.0<H>  ----------------------------<  169<H>  4.7   |  24.0  |  5.12<H>    Ca    9.5      09 Mar 2021 11:44          PT/INR - ( 09 Mar 2021 11:44 )   PT: 13.2 sec;   INR: 1.15 ratio           serum  Lipids:         RADIOLOGY & ADDITIONAL STUDIES:    TELEMETRY: none    ECHO (3/8/21):  PHYSICIAN INTERPRETATION:  Left Ventricle: The left ventricular internal cavity size is normal.  Left ventricular ejection fraction, by visual estimation, is 20 to 25%. Severely decreased segmental left ventricular systolic function. Spectral Doppler shows restrictive pattern of left ventricular myocardial filling (Grade III diastolic dysfunction).  LV Wall Scoring:  The entire lateral wall, entire inferior wall, and basal inferoseptal segment  are akinetic. The mid inferoseptal segment is hypokinetic.  Right Ventricle: Normal right ventricular size and function. The right ventricular size is normal. RV systolic function is mildly reduced.  Left Atrium: Severely enlarged left atrium.  Right Atrium: Normal right atrial size.  Pericardium: There is no evidence of pericardial effusion.  Mitral Valve: The mitral valve leaflets are tethered which is due to reduced systolic function and elevated LVDP. Moderate to severe mitral valve regurgitation is seen.  Tricuspid Valve: Severe tricuspid regurgitation is visualized. Estimated pulmonary artery systolic pressure is 63.5 mmHg assuming a right atrial pressure of 8 mmHg, which is consistent with severe pulmonary hypertension.  Aortic Valve: The aortic valve is trileaflet. Sclerotic aortic valve with normal opening. Trivial aortic valve regurgitation is seen.  Pulmonic Valve: Structurally normal pulmonic valve, with normal leaflet excursion. Mild pulmonic valve regurgitation.  Aorta: The aortic root is normal in size and structure.  Venous: The inferior vena cava is 2.3 cm. The inferior vena cava was dilated, with respiratory size variation less than 50%.  In comparison to the previous echocardiogram(s): Prior examinations are available and were reviewed for comparison purposes. Compared to the prior TTE study from 11/7/18, LV systolic function/EF now severely reduced with severe segmental wall abnormalities.  Summary:   1. Technically difficult study.   2. Left ventricular ejection fraction, by visual estimation, is 20 to 25%.   3. Severely decreased segmental left ventricular systolic function.   4. Entire lateral wall, entire inferior wall, basal inferoseptal segment, and mid inferoseptal segment are akinetic as described above.   5. Spectral Doppler shows restrictive pattern of left ventricular myocardial filling (Grade III diastolic dysfunction).   6. Normal RV size with mildly reduced RV systolic function.   7. Estimated pulmonary artery systolic pressure is 70 mmHg assuming a right atrial pressure of 15 mmHg, which is consistent with severe pulmonary hypertension.   8. Severely enlarged left atrium.   9. Moderate to severe mitral valve regurgitation.  10. The mitral valve leaflets are tethered which is due to reduced systolic function and elevated LVDP.  11. Severe tricuspid regurgitation.  12. Sclerotic aortic valve with normal opening.  13. Mild pulmonic valve regurgitation.  14. There is no evidence of pericardial effusion.  15. Compared to the prior TTE study from 11/7/18, LV systolic function/EF now severely reduced with severe segmental wall abnormalities.      ASSESSMENT AND PLAN:  In summary, KIM LEE is a 65F a/w abdominal pain & melena, GIB, anemia, s/p negative EGD 3/9/21, for colonoscopy 3/11, mild troponin increase (peak 0.11), h/o CAD/CABG, ICM (EF 20-25%), chronic combined systolic/diastolic CHF, moderate-severe MR, severe TR, severe pulmonary HTN, SSS s/p PPM '18, PAF (Eliquis held), ESRD/HD, HTN, HL, obesity, hypothyroidism  - Mild troponin increase is non-specific in the setting of ESRD on HD, no chest pain or acute ischemic EKG changes.  Continue medical management of known severe CAD for now.  Outpatient ischemic evaluation can be performed for further risk stratification.  - No active chest pain, evidence of ischemia, CHF or unstable arrhythmia and therefore no absolute cardiac contraindication to the planned low risk GI procedure (colonoscopy 3/11/21) and this may be performed as scheduled.  Patient tolerated EGD 3/9/21 without complication.  - Echocardiogram 3/8/21 demonstrated severely decreased LV fxn (EF 20-25%); the entire lateral wall, entire inferior wall, and basal inferoseptal segment are akinetic; the mid inferoseptal segment is hypokinetic; grade III diastolic dysfxn, severe LAE, normal RV size with mildly reduced fxn, trace AR, MV leaflets tethered due to reduced systolic function & elevated LVEDP, moderate-severe MR, mild IL, severe TR, severe pulmonary HTN (RVSP 63.5 mmHg), TDS; compared to the prior TTE study from 11/7/18, LV systolic function/EF now severely reduced with severe segmental wall abnormalities.  Will repeat as an outpatient.  - Rhythm stable, BP has fluctuated during admission but is now increased.  Resume Coreg 12.5 bid and continue current chronic dose of Clonidine 0.1 bid for now and titrate PRN.  Can resume Amlodipine or Hydralazine in the future if necessary.  - ASA held for now given GIB/anemia.  Please resume ASA as soon as she is cleared by GI given h/o CAD, to be restarted by medicine team.  Risks, benefits & alternatives discussed.  - Simvastatin 20 daily in place  - Patient has a h/o chronic AF with an increased [id number] risk score maintained on oral AC with Eliquis as an outpatient.  AC currently held given GIB/anemia.  Plan to eventual resume when cleared by GI.  Risks, benefits & alternatives discussed.  - Monitor Hb closely  - HD per nephrology, patient currently clinically euvolemic    Kane Valadez MD

## 2021-03-09 NOTE — PROGRESS NOTE ADULT - SUBJECTIVE AND OBJECTIVE BOX
NEPHROLOGY INTERVAL HPI/OVERNIGHT EVENTS:    Examined  EGD  Arousable lethargic  Afebrile    MEDICATIONS  (STANDING):  carvedilol 6.25 milliGRAM(s) Oral every 12 hours  cloNIDine 0.1 milliGRAM(s) Oral every 12 hours  epoetin jack-epbx (RETACRIT) Injectable 48714 Unit(s) IV Push <User Schedule>  gabapentin 100 milliGRAM(s) Oral daily  levothyroxine 75 MICROGram(s) Oral daily  multivitamin 1 Tablet(s) Oral daily  simvastatin 20 milliGRAM(s) Oral at bedtime    MEDICATIONS  (PRN):  acetaminophen   Tablet .. 650 milliGRAM(s) Oral every 6 hours PRN Temp greater or equal to 38C (100.4F), Mild Pain (1 - 3), Moderate Pain (4 - 6)  bisacodyl Suppository 10 milliGRAM(s) Rectal daily PRN Constipation  HYDROmorphone  Injectable 0.5 milliGRAM(s) IV Push every 6 hours PRN Severe Pain (7 - 10)  ondansetron Injectable 4 milliGRAM(s) IV Push every 6 hours PRN Nausea and/or Vomiting      Allergies    No Known Allergies    Intolerances        Vital Signs Last 24 Hrs  T(C): 36.9 (09 Mar 2021 11:10), Max: 36.9 (08 Mar 2021 16:01)  T(F): 98.4 (09 Mar 2021 11:10), Max: 98.5 (08 Mar 2021 16:01)  HR: 70 (09 Mar 2021 11:10) (70 - 80)  BP: 152/77 (09 Mar 2021 11:10) (134/76 - 156/76)  BP(mean): --  RR: 16 (09 Mar 2021 11:10) (16 - 16)  SpO2: 99% (09 Mar 2021 11:10) (96% - 99%)  Daily     Daily     PHYSICAL EXAM:    GENERAL: NAD  HEAD:  NCAT  EYES: EOMI  NECK: Supple  NERVOUS SYSTEM:  Arousable lethargic  CHEST/LUNG: Clear to percussion bilaterally  HEART: Regular rate and rhythm  ABDOMEN: Soft, Nontender, Nondistended; +BS  EXTREMITIES: Trace LE edema    LABS:                        8.7    9.59  )-----------( 238      ( 09 Mar 2021 11:45 )             28.6     03-09    135  |  94<L>  |  42.0<H>  ----------------------------<  169<H>  4.7   |  24.0  |  5.12<H>    Ca    9.5      09 Mar 2021 11:44      PT/INR - ( 09 Mar 2021 11:44 )   PT: 13.2 sec;   INR: 1.15 ratio                     RADIOLOGY & ADDITIONAL TESTS:

## 2021-03-10 ENCOUNTER — TRANSCRIPTION ENCOUNTER (OUTPATIENT)
Age: 66
End: 2021-03-10

## 2021-03-10 PROCEDURE — 99232 SBSQ HOSP IP/OBS MODERATE 35: CPT

## 2021-03-10 RX ORDER — SOD SULF/SODIUM/NAHCO3/KCL/PEG
1000 SOLUTION, RECONSTITUTED, ORAL ORAL EVERY 4 HOURS
Refills: 0 | Status: COMPLETED | OUTPATIENT
Start: 2021-03-10 | End: 2021-03-10

## 2021-03-10 RX ADMIN — Medication 75 MICROGRAM(S): at 05:42

## 2021-03-10 RX ADMIN — SIMVASTATIN 20 MILLIGRAM(S): 20 TABLET, FILM COATED ORAL at 22:29

## 2021-03-10 RX ADMIN — GABAPENTIN 100 MILLIGRAM(S): 400 CAPSULE ORAL at 18:00

## 2021-03-10 RX ADMIN — Medication 1 TABLET(S): at 17:55

## 2021-03-10 RX ADMIN — Medication 650 MILLIGRAM(S): at 08:19

## 2021-03-10 RX ADMIN — ERYTHROPOIETIN 10000 UNIT(S): 10000 INJECTION, SOLUTION INTRAVENOUS; SUBCUTANEOUS at 10:01

## 2021-03-10 RX ADMIN — Medication 1000 MILLILITER(S): at 18:03

## 2021-03-10 RX ADMIN — Medication 0.1 MILLIGRAM(S): at 05:42

## 2021-03-10 RX ADMIN — Medication 650 MILLIGRAM(S): at 17:56

## 2021-03-10 RX ADMIN — CARVEDILOL PHOSPHATE 12.5 MILLIGRAM(S): 80 CAPSULE, EXTENDED RELEASE ORAL at 05:42

## 2021-03-10 RX ADMIN — Medication 1000 MILLILITER(S): at 22:29

## 2021-03-10 RX ADMIN — CARVEDILOL PHOSPHATE 12.5 MILLIGRAM(S): 80 CAPSULE, EXTENDED RELEASE ORAL at 17:54

## 2021-03-10 RX ADMIN — Medication 0.1 MILLIGRAM(S): at 17:54

## 2021-03-10 RX ADMIN — Medication 650 MILLIGRAM(S): at 09:14

## 2021-03-10 NOTE — PROGRESS NOTE ADULT - SUBJECTIVE AND OBJECTIVE BOX
[facility]                                                    [address] [facility], [address], [place] [address]                                                         PHONE: [phone]    FAX: [phone] [address] [facility], [address], [place] [address]                                                 PHONE: [phone]    FAX: [phone]  *******************************************************************************    Overnight events/Subjective Assessment:  No new cardiovascular issues.  No chest pain, SOB or palpitations.  + Mild epigastric & mid abdominal pain.    No Known Allergies    MEDICATIONS  (STANDING):  carvedilol 12.5 milliGRAM(s) Oral every 12 hours  cloNIDine 0.1 milliGRAM(s) Oral every 12 hours  epoetin jack-epbx (RETACRIT) Injectable 41105 Unit(s) IV Push <User Schedule>  gabapentin 100 milliGRAM(s) Oral daily  levothyroxine 75 MICROGram(s) Oral daily  multivitamin 1 Tablet(s) Oral daily  simvastatin 20 milliGRAM(s) Oral at bedtime    MEDICATIONS  (PRN):  acetaminophen   Tablet .. 650 milliGRAM(s) Oral every 6 hours PRN Temp greater or equal to 38C (100.4F), Mild Pain (1 - 3), Moderate Pain (4 - 6)  bisacodyl Suppository 10 milliGRAM(s) Rectal daily PRN Constipation  HYDROmorphone  Injectable 0.5 milliGRAM(s) IV Push every 6 hours PRN Severe Pain (7 - 10)  ondansetron Injectable 4 milliGRAM(s) IV Push every 6 hours PRN Nausea and/or Vomiting      Vital Signs Last 24 Hrs  T(C): 36.4 (10 Mar 2021 04:37), Max: 37.1 (09 Mar 2021 15:47)  T(F): 97.6 (10 Mar 2021 04:37), Max: 98.7 (09 Mar 2021 15:47)  HR: 70 (10 Mar 2021 04:37) (70 - 72)  BP: 133/76 (10 Mar 2021 04:37) (133/76 - 152/77)  BP(mean): --  RR: 17 (10 Mar 2021 04:37) (16 - 18)  SpO2: 100% (10 Mar 2021 04:37) (99% - 100%)    I&O's Detail    I&O's Summary          PHYSICAL EXAM:  General: Appears well developed, well nourished, no acute distress  HEENT: Head: normocephalic, atraumatic  Eyes: Pupils equal and reactive  Neck: Supple, no carotid bruit, no JVD, no HJR  CARDIOVASCULAR: Normal S1 and S2, II/VI syst M > LLSB  LUNGS: Clear to auscultation bilaterally, no rales, rhonchi or wheeze  ABDOMEN: Soft, nontender, non-distended, positive bowel sounds, no mass or bruit  EXTREMITIES: No edema, distal pulses WNL  SKIN: Warm and dry with normal turgor  NEURO: Alert & oriented x 3, grossly intact  PSYCH: normal mood and affect        LABS:                        8.7    9.59  )-----------( 238      ( 09 Mar 2021 11:45 )             28.6     03-09    135  |  94<L>  |  42.0<H>  ----------------------------<  169<H>  4.7   |  24.0  |  5.12<H>    Ca    9.5      09 Mar 2021 11:44          PT/INR - ( 09 Mar 2021 11:44 )   PT: 13.2 sec;   INR: 1.15 ratio           serum  Lipids:         RADIOLOGY & ADDITIONAL STUDIES:    TELEMETRY: none    ECHO (3/8/21):  PHYSICIAN INTERPRETATION:  Left Ventricle: The left ventricular internal cavity size is normal.  Left ventricular ejection fraction, by visual estimation, is 20 to 25%. Severely decreased segmental left ventricular systolic function. Spectral Doppler shows restrictive pattern of left ventricular myocardial filling (Grade III diastolic dysfunction).  LV Wall Scoring:  The entire lateral wall, entire inferior wall, and basal inferoseptal segment  are akinetic. The mid inferoseptal segment is hypokinetic.  Right Ventricle: Normal right ventricular size and function. The right ventricular size is normal. RV systolic function is mildly reduced.  Left Atrium: Severely enlarged left atrium.  Right Atrium: Normal right atrial size.  Pericardium: There is no evidence of pericardial effusion.  Mitral Valve: The mitral valve leaflets are tethered which is due to reduced systolic function and elevated LVDP. Moderate to severe mitral valve regurgitation is seen.  Tricuspid Valve: Severe tricuspid regurgitation is visualized. Estimated pulmonary artery systolic pressure is 63.5 mmHg assuming a right atrial pressure of 8 mmHg, which is consistent with severe pulmonary hypertension.  Aortic Valve: The aortic valve is trileaflet. Sclerotic aortic valve with normal opening. Trivial aortic valve regurgitation is seen.  Pulmonic Valve: Structurally normal pulmonic valve, with normal leaflet excursion. Mild pulmonic valve regurgitation.  Aorta: The aortic root is normal in size and structure.  Venous: The inferior vena cava is 2.3 cm. The inferior vena cava was dilated, with respiratory size variation less than 50%.  In comparison to the previous echocardiogram(s): Prior examinations are available and were reviewed for comparison purposes. Compared to the prior TTE study from 11/7/18, LV systolic function/EF now severely reduced with severe segmental wall abnormalities.  Summary:   1. Technically difficult study.   2. Left ventricular ejection fraction, by visual estimation, is 20 to 25%.   3. Severely decreased segmental left ventricular systolic function.   4. Entire lateral wall, entire inferior wall, basal inferoseptal segment, and mid inferoseptal segment are akinetic as described above.   5. Spectral Doppler shows restrictive pattern of left ventricular myocardial filling (Grade III diastolic dysfunction).   6. Normal RV size with mildly reduced RV systolic function.   7. Estimated pulmonary artery systolic pressure is 70 mmHg assuming a right atrial pressure of 15 mmHg, which is consistent with severe pulmonary hypertension.   8. Severely enlarged left atrium.   9. Moderate to severe mitral valve regurgitation.  10. The mitral valve leaflets are tethered which is due to reduced systolic function and elevated LVDP.  11. Severe tricuspid regurgitation.  12. Sclerotic aortic valve with normal opening.  13. Mild pulmonic valve regurgitation.  14. There is no evidence of pericardial effusion.  15. Compared to the prior TTE study from 11/7/18, LV systolic function/EF now severely reduced with severe segmental wall abnormalities.      ASSESSMENT AND PLAN:  In summary, KIM LEE is a 65F a/w abdominal pain & melena, GIB, anemia, s/p negative EGD 3/9/21, for colonoscopy 3/11, mild troponin increase (peak 0.11), h/o CAD/CABG, ICM (EF 20-25%), chronic combined systolic/diastolic CHF, moderate-severe MR, severe TR, severe pulmonary HTN, SSS s/p PPM '18, PAF (Eliquis held), ESRD/HD, HTN, HL, obesity, hypothyroidism  - Mild troponin increase is non-specific in the setting of ESRD on HD, no chest pain or acute ischemic EKG changes.  Continue medical management of known severe CAD for now.  Outpatient ischemic evaluation can be performed for further risk stratification.  - No active chest pain, evidence of ischemia, CHF or unstable arrhythmia and therefore no absolute cardiac contraindication to the planned low risk GI procedure (colonoscopy 3/11/21) and this may be performed as scheduled.  Patient tolerated EGD 3/9/21 without complication.  - Echocardiogram 3/8/21 demonstrated severely decreased LV fxn (EF 20-25%); the entire lateral wall, entire inferior wall, and basal inferoseptal segment are akinetic; the mid inferoseptal segment is hypokinetic; grade III diastolic dysfxn, severe LAE, normal RV size with mildly reduced fxn, trace AR, MV leaflets tethered due to reduced systolic function & elevated LVEDP, moderate-severe MR, mild MS, severe TR, severe pulmonary HTN (RVSP 63.5 mmHg), TDS; compared to the prior TTE study from 11/7/18, LV systolic function/EF now severely reduced with severe segmental wall abnormalities.  Will repeat as an outpatient.  - HTN.  Rhythm stable, BP has fluctuated during admission but is now stable after medication adjusted.  Continue current doses of Coreg 12.5 bid & Clonidine 0.1 bid for now and titrate PRN.  Can resume Amlodipine or Hydralazine in the future if necessary.  - ASA held for now given GIB/anemia.  Please resume ASA as soon as she is cleared by GI given h/o CAD, to be restarted by medicine team.  Risks, benefits & alternatives discussed.  - HL.  Simvastatin 20 daily in place  - AF.  Patient has a h/o chronic AF with an increased [id number] risk score maintained on oral AC with Eliquis as an outpatient.  AC currently held given GIB/anemia.  Plan to eventual resume when cleared by GI.  Risks, benefits & alternatives discussed.  - Anemia.  Monitor Hb closely  - ESRD.  HD per nephrology, patient currently clinically euvolemic    Kane Valadez MD

## 2021-03-10 NOTE — PROGRESS NOTE ADULT - SUBJECTIVE AND OBJECTIVE BOX
seen for anemia, esrd    no acute complaints  ros negative  gen weakness    bedside      MEDICATIONS  (STANDING):  carvedilol 12.5 milliGRAM(s) Oral every 12 hours  cloNIDine 0.1 milliGRAM(s) Oral every 12 hours  epoetin jack-epbx (RETACRIT) Injectable 20490 Unit(s) IV Push <User Schedule>  gabapentin 100 milliGRAM(s) Oral daily  levothyroxine 75 MICROGram(s) Oral daily  multivitamin 1 Tablet(s) Oral daily  simvastatin 20 milliGRAM(s) Oral at bedtime    MEDICATIONS  (PRN):  acetaminophen   Tablet .. 650 milliGRAM(s) Oral every 6 hours PRN Temp greater or equal to 38C (100.4F), Mild Pain (1 - 3), Moderate Pain (4 - 6)  bisacodyl Suppository 10 milliGRAM(s) Rectal daily PRN Constipation  HYDROmorphone  Injectable 0.5 milliGRAM(s) IV Push every 6 hours PRN Severe Pain (7 - 10)  ondansetron Injectable 4 milliGRAM(s) IV Push every 6 hours PRN Nausea and/or Vomiting      Allergies    No Known Allergies      Vital Signs Last 24 Hrs  T(C): 36.9 (10 Mar 2021 11:00), Max: 37.1 (09 Mar 2021 15:47)  T(F): 98.4 (10 Mar 2021 11:00), Max: 98.7 (09 Mar 2021 15:47)  HR: 72 (10 Mar 2021 11:00) (70 - 72)  BP: 125/58 (10 Mar 2021 11:00) (125/58 - 149/80)  BP(mean): --  RR: 17 (10 Mar 2021 11:00) (17 - 18)  SpO2: 99% (10 Mar 2021 11:00) (99% - 100%)    PHYSICAL EXAM:    GENERAL: NAD  CHEST/LUNG: Clear to percussion bilaterally  HEART: Regular rate and rhythm; S1 S2  ABDOMEN: Soft, Nontender, Bowel sounds present  EXTREMITIES:  no edema   NERVOUS SYSTEM:  Alert & Oriented X3, Motor Strength 5/5 B/L upper and lower extremities      LABS:                        8.7    9.59  )-----------( 238      ( 09 Mar 2021 11:45 )             28.6     03-09    135  |  94<L>  |  42.0<H>  ----------------------------<  169<H>  4.7   |  24.0  |  5.12<H>    Ca    9.5      09 Mar 2021 11:44      PT/INR - ( 09 Mar 2021 11:44 )   PT: 13.2 sec;   INR: 1.15 ratio               CAPILLARY BLOOD GLUCOSE            RADIOLOGY & ADDITIONAL TESTS:

## 2021-03-10 NOTE — PROGRESS NOTE ADULT - PROVIDER SPECIALTY LIST ADULT
Detail Level: Generalized Patient Specific Counseling (Will Not Stick From Patient To Patient): dermotic oil BID prn flare Detail Level: Detailed Detail Level: Simple Patient Specific Counseling: Advised to discuss blepharoplasty with oculoplastic surgeon. Gastroenterology

## 2021-03-10 NOTE — PROGRESS NOTE ADULT - SUBJECTIVE AND OBJECTIVE BOX
INTERVAL HPI/OVERNIGHT EVENTS:FU for anemia. Received HD today. Doing ok. Scheduled for colonoscopy tomorrow.     MEDICATIONS  (STANDING):  carvedilol 12.5 milliGRAM(s) Oral every 12 hours  cloNIDine 0.1 milliGRAM(s) Oral every 12 hours  epoetin jack-epbx (RETACRIT) Injectable 61279 Unit(s) IV Push <User Schedule>  gabapentin 100 milliGRAM(s) Oral daily  levothyroxine 75 MICROGram(s) Oral daily  multivitamin 1 Tablet(s) Oral daily  polyethylene glycol/electrolyte Solution 1000 milliLiter(s) Oral every 4 hours  simvastatin 20 milliGRAM(s) Oral at bedtime    MEDICATIONS  (PRN):  acetaminophen   Tablet .. 650 milliGRAM(s) Oral every 6 hours PRN Temp greater or equal to 38C (100.4F), Mild Pain (1 - 3), Moderate Pain (4 - 6)  bisacodyl Suppository 10 milliGRAM(s) Rectal daily PRN Constipation  HYDROmorphone  Injectable 0.5 milliGRAM(s) IV Push every 6 hours PRN Severe Pain (7 - 10)  ondansetron Injectable 4 milliGRAM(s) IV Push every 6 hours PRN Nausea and/or Vomiting      Allergies    No Known Allergies    Intolerances        Vital Signs Last 24 Hrs  T(C): 36.9 (10 Mar 2021 11:00), Max: 36.9 (10 Mar 2021 11:00)  T(F): 98.4 (10 Mar 2021 11:00), Max: 98.4 (10 Mar 2021 11:00)  HR: 72 (10 Mar 2021 11:00) (70 - 72)  BP: 125/58 (10 Mar 2021 11:00) (125/58 - 133/76)  BP(mean): --  RR: 17 (10 Mar 2021 11:00) (17 - 18)  SpO2: 99% (10 Mar 2021 11:00) (99% - 100%)    LABS:                        8.7    9.59  )-----------( 238      ( 09 Mar 2021 11:45 )             28.6     03-09    135  |  94<L>  |  42.0<H>  ----------------------------<  169<H>  4.7   |  24.0  |  5.12<H>    Ca    9.5      09 Mar 2021 11:44      PT/INR - ( 09 Mar 2021 11:44 )   PT: 13.2 sec;   INR: 1.15 ratio               RADIOLOGY & ADDITIONAL TESTS:

## 2021-03-10 NOTE — PROGRESS NOTE ADULT - SUBJECTIVE AND OBJECTIVE BOX
[facility]                                                    [address], [place] [address]                                                         PHONE: [phone]    FAX: [phone] [address] [facility], [address], [place] [address]                                                 PHONE: [phone]    FAX: [phone]  *******************************************************************************    Overnight events/Subjective Assessment:    INTERPRETATION OF TELEMETRY (personally reviewed):    No Known Allergies    MEDICATIONS  (STANDING):  carvedilol 12.5 milliGRAM(s) Oral every 12 hours  cloNIDine 0.1 milliGRAM(s) Oral every 12 hours  epoetin jack-epbx (RETACRIT) Injectable 67914 Unit(s) IV Push <User Schedule>  gabapentin 100 milliGRAM(s) Oral daily  levothyroxine 75 MICROGram(s) Oral daily  multivitamin 1 Tablet(s) Oral daily  polyethylene glycol/electrolyte Solution 1000 milliLiter(s) Oral every 4 hours  simvastatin 20 milliGRAM(s) Oral at bedtime    MEDICATIONS  (PRN):  acetaminophen   Tablet .. 650 milliGRAM(s) Oral every 6 hours PRN Temp greater or equal to 38C (100.4F), Mild Pain (1 - 3), Moderate Pain (4 - 6)  bisacodyl Suppository 10 milliGRAM(s) Rectal daily PRN Constipation  HYDROmorphone  Injectable 0.5 milliGRAM(s) IV Push every 6 hours PRN Severe Pain (7 - 10)  ondansetron Injectable 4 milliGRAM(s) IV Push every 6 hours PRN Nausea and/or Vomiting      Vital Signs Last 24 Hrs  T(C): 37.2 (10 Mar 2021 16:45), Max: 37.2 (10 Mar 2021 16:45)  T(F): 99 (10 Mar 2021 16:45), Max: 99 (10 Mar 2021 16:45)  HR: 70 (10 Mar 2021 16:45) (70 - 72)  BP: 134/74 (10 Mar 2021 16:45) (125/58 - 134/74)  BP(mean): --  RR: 17 (10 Mar 2021 16:45) (17 - 18)  SpO2: 100% (10 Mar 2021 16:45) (99% - 100%)    I&O's Detail    10 Mar 2021 07:01  -  10 Mar 2021 19:08  --------------------------------------------------------  IN:    Other (mL): 800 mL  Total IN: 800 mL    OUT:    Other (mL): 1800 mL  Total OUT: 1800 mL    Total NET: -1000 mL        I&O's Summary    10 Mar 2021 07:01  -  10 Mar 2021 19:08  --------------------------------------------------------  IN: 800 mL / OUT: 1800 mL / NET: -1000 mL            PHYSICAL EXAM:  General: Appears well developed, well nourished, no acute distress  HEENT: Head: normocephalic, atraumatic  Eyes: Pupils equal and reactive  Neck: Supple, no carotid bruit, no JVD, no HJR  CARDIOVASCULAR: Normal S1 and S2, no murmur, rub, or gallop  LUNGS: Clear to auscultation bilaterally, no rales, rhonchi or wheeze  ABDOMEN: Soft, nontender, non-distended, positive bowel sounds, no mass or bruit  EXTREMITIES: No edema, distal pulses WNL  SKIN: Warm and dry with normal turgor  NEURO: Alert & oriented x 3, grossly intact  PSYCH: normal mood and affect        LABS:                        8.7    9.59  )-----------( 238      ( 09 Mar 2021 11:45 )             28.6     03-09    135  |  94<L>  |  42.0<H>  ----------------------------<  169<H>  4.7   |  24.0  |  5.12<H>    Ca    9.5      09 Mar 2021 11:44          PT/INR - ( 09 Mar 2021 11:44 )   PT: 13.2 sec;   INR: 1.15 ratio           serum  Lipids:         RADIOLOGY & ADDITIONAL STUDIES:    ECG:    ECHO:    STRESS TEST:    CARDIAC CATHETERIZATION:    ASSESSMENT AND PLAN:  In summary, KIM LEE is a 65y Female with past medical history significant for mild trop , cad, cm, paf , esrd p/w abd pain/gi bleed s/p recent neg GI w/u  - AC on hold  - monitor cbc  - may proceed with additional GI w/u as needed.      Kane Rivas, DO                                                               [facility]                                                    [address], [place] [address]                                                         PHONE: [phone]    FAX: [phone] [address] [facility], [address], [place] [address]                                                 PHONE: [phone]    FAX: [phone]  *******************************************************************************    Overnight events/Subjective Assessment:    INTERPRETATION OF TELEMETRY (personally reviewed):    No Known Allergies    MEDICATIONS  (STANDING):  carvedilol 12.5 milliGRAM(s) Oral every 12 hours  cloNIDine 0.1 milliGRAM(s) Oral every 12 hours  epoetin jack-epbx (RETACRIT) Injectable 75358 Unit(s) IV Push <User Schedule>  gabapentin 100 milliGRAM(s) Oral daily  levothyroxine 75 MICROGram(s) Oral daily  multivitamin 1 Tablet(s) Oral daily  polyethylene glycol/electrolyte Solution 1000 milliLiter(s) Oral every 4 hours  simvastatin 20 milliGRAM(s) Oral at bedtime    MEDICATIONS  (PRN):  acetaminophen   Tablet .. 650 milliGRAM(s) Oral every 6 hours PRN Temp greater or equal to 38C (100.4F), Mild Pain (1 - 3), Moderate Pain (4 - 6)  bisacodyl Suppository 10 milliGRAM(s) Rectal daily PRN Constipation  HYDROmorphone  Injectable 0.5 milliGRAM(s) IV Push every 6 hours PRN Severe Pain (7 - 10)  ondansetron Injectable 4 milliGRAM(s) IV Push every 6 hours PRN Nausea and/or Vomiting      Vital Signs Last 24 Hrs  T(C): 37.2 (10 Mar 2021 16:45), Max: 37.2 (10 Mar 2021 16:45)  T(F): 99 (10 Mar 2021 16:45), Max: 99 (10 Mar 2021 16:45)  HR: 70 (10 Mar 2021 16:45) (70 - 72)  BP: 134/74 (10 Mar 2021 16:45) (125/58 - 134/74)  BP(mean): --  RR: 17 (10 Mar 2021 16:45) (17 - 18)  SpO2: 100% (10 Mar 2021 16:45) (99% - 100%)    I&O's Detail    10 Mar 2021 07:01  -  10 Mar 2021 19:08  --------------------------------------------------------  IN:    Other (mL): 800 mL  Total IN: 800 mL    OUT:    Other (mL): 1800 mL  Total OUT: 1800 mL    Total NET: -1000 mL        I&O's Summary    10 Mar 2021 07:01  -  10 Mar 2021 19:08  --------------------------------------------------------  IN: 800 mL / OUT: 1800 mL / NET: -1000 mL            PHYSICAL EXAM:  General: Appears well developed, well nourished, no acute distress  HEENT: Head: normocephalic, atraumatic  Eyes: Pupils equal and reactive  Neck: Supple, no carotid bruit, no JVD, no HJR  CARDIOVASCULAR: Normal S1 and S2, no murmur, rub, or gallop  LUNGS: Clear to auscultation bilaterally, no rales, rhonchi or wheeze  ABDOMEN: Soft, nontender, non-distended, positive bowel sounds, no mass or bruit  EXTREMITIES: No edema, distal pulses WNL  SKIN: Warm and dry with normal turgor  NEURO: Alert & oriented x 3, grossly intact  PSYCH: normal mood and affect        LABS:                        8.7    9.59  )-----------( 238      ( 09 Mar 2021 11:45 )             28.6     03-09    135  |  94<L>  |  42.0<H>  ----------------------------<  169<H>  4.7   |  24.0  |  5.12<H>    Ca    9.5      09 Mar 2021 11:44          PT/INR - ( 09 Mar 2021 11:44 )   PT: 13.2 sec;   INR: 1.15 ratio           serum  Lipids:         RADIOLOGY & ADDITIONAL STUDIES:    ECG:    ECHO:    STRESS TEST:    CARDIAC CATHETERIZATION:    ASSESSMENT AND PLAN:  In summary, KIM LEE is a 65y Female with past medical history significant for mild trop , cad, cm, paf , esrd p/w abd pain/gi bleed s/p recent neg GI w/u  - AC on hold  - monitor cbc  - may proceed with additional GI w/u as needed (for colonoscopy today)  - laying flat no cv complaints      Kane Rivas, DO

## 2021-03-10 NOTE — PROGRESS NOTE ADULT - SUBJECTIVE AND OBJECTIVE BOX
NEPHROLOGY INTERVAL HPI/OVERNIGHT EVENTS:    no new events.    MEDICATIONS  (STANDING):  carvedilol 12.5 milliGRAM(s) Oral every 12 hours  cloNIDine 0.2 milliGRAM(s) Oral every 12 hours  gabapentin 100 milliGRAM(s) Oral daily  levothyroxine 75 MICROGram(s) Oral daily  multivitamin 1 Tablet(s) Oral daily  pantoprazole  Injectable 40 milliGRAM(s) IV Push two times a day  simvastatin 20 milliGRAM(s) Oral at bedtime  sodium zirconium cyclosilicate 10 Gram(s) Oral daily    MEDICATIONS  (PRN):  HYDROmorphone  Injectable 0.5 milliGRAM(s) IV Push every 6 hours PRN Severe Pain (7 - 10)      Allergies    No Known Allergies      Vital Signs Last 24 Hrs  T(C): 36.5 (10 Mar 2021 07:50), Max: 37.1 (09 Mar 2021 15:47)  T(F): 97.7 (10 Mar 2021 07:50), Max: 98.7 (09 Mar 2021 15:47)  HR: 70 (10 Mar 2021 07:50) (70 - 72)  BP: 125/63 (10 Mar 2021 07:50) (125/63 - 152/77)  BP(mean): --  RR: 18 (10 Mar 2021 07:50) (16 - 18)  SpO2: 100% (10 Mar 2021 07:50) (99% - 100%)  T(C): 37 (08 Mar 2021 07:44), Max: 37 (08 Mar 2021 07:44)  T(F): 98.6 (08 Mar 2021 07:44), Max: 98.6 (08 Mar 2021 07:44)  HR: 76 (08 Mar 2021 07:44) (69 - 76)  BP: 139/80 (08 Mar 2021 07:44) (139/80 - 162/91)          PHYSICAL EXAM:    GENERAL: Alert in dialysis eating  HEAD:    EYES: wnl  ENMT:   NECK: veins not full  NERVOUS SYSTEM: alert, verbal   CHEST/LUNG: no 02, decreased bs bases  HEART: Sternal scar, no rub, 1/6 murmur same  ABDOMEN: obese not tender, distended  EXTREMITIES: Left upper arm access with bruit   LYMPH:   SKIN: No rash    LABS:  03-09    135  |  94<L>  |  42.0<H>  ----------------------------<  169<H>  4.7   |  24.0  |  5.12<H>    Ca    9.5      09 Mar 2021 11:44        03-06    131<L>  |  90<L>  |  105.0<H>  ----------------------------<  213<H>  5.3   |  22.0  |  5.78<H>    Ca    9.1      06 Mar 2021 14:55                            8.3    13.71 )-----------( 218      ( 05 Mar 2021 07:01 )             26.6     03-05    x   |  x   |  x   ----------------------------<  x   5.8<H>   |  x   |  x     Ca    9.9      05 Mar 2021 07:01  Phos  4.6     03-05  Mg     3.2     03-04    TPro  6.8  /  Alb  4.1  /  TBili  0.3<L>  /  DBili  x   /  AST  29  /  ALT  19  /  AlkPhos  136<H>  03-03    PT/INR - ( 04 Mar 2021 18:41 )   PT: 15.5 sec;   INR: 1.36 ratio             Phosphorus Level, Serum: 4.6 mg/dL (03-05 @ 07:01)  Magnesium, Serum: 3.2 mg/dL (03-04 @ 16:10)          RADIOLOGY & ADDITIONAL TESTS:

## 2021-03-11 ENCOUNTER — RESULT REVIEW (OUTPATIENT)
Age: 66
End: 2021-03-11

## 2021-03-11 LAB
ANION GAP SERPL CALC-SCNC: 12 MMOL/L — SIGNIFICANT CHANGE UP (ref 5–17)
BUN SERPL-MCNC: 28 MG/DL — HIGH (ref 8–20)
CALCIUM SERPL-MCNC: 9.1 MG/DL — SIGNIFICANT CHANGE UP (ref 8.6–10.2)
CHLORIDE SERPL-SCNC: 98 MMOL/L — SIGNIFICANT CHANGE UP (ref 98–107)
CO2 SERPL-SCNC: 29 MMOL/L — SIGNIFICANT CHANGE UP (ref 22–29)
CREAT SERPL-MCNC: 5.01 MG/DL — HIGH (ref 0.5–1.3)
GLUCOSE BLDC GLUCOMTR-MCNC: 154 MG/DL — HIGH (ref 70–99)
GLUCOSE SERPL-MCNC: 131 MG/DL — HIGH (ref 70–99)
HCT VFR BLD CALC: 24.9 % — LOW (ref 34.5–45)
HGB BLD-MCNC: 7.5 G/DL — LOW (ref 11.5–15.5)
MCHC RBC-ENTMCNC: 30.1 GM/DL — LOW (ref 32–36)
MCHC RBC-ENTMCNC: 30.2 PG — SIGNIFICANT CHANGE UP (ref 27–34)
MCV RBC AUTO: 100.4 FL — HIGH (ref 80–100)
PLATELET # BLD AUTO: 215 K/UL — SIGNIFICANT CHANGE UP (ref 150–400)
POTASSIUM SERPL-MCNC: 3.9 MMOL/L — SIGNIFICANT CHANGE UP (ref 3.5–5.3)
POTASSIUM SERPL-SCNC: 3.9 MMOL/L — SIGNIFICANT CHANGE UP (ref 3.5–5.3)
RBC # BLD: 2.48 M/UL — LOW (ref 3.8–5.2)
RBC # FLD: 17.7 % — HIGH (ref 10.3–14.5)
SARS-COV-2 RNA SPEC QL NAA+PROBE: SIGNIFICANT CHANGE UP
SODIUM SERPL-SCNC: 139 MMOL/L — SIGNIFICANT CHANGE UP (ref 135–145)
SURGICAL PATHOLOGY STUDY: SIGNIFICANT CHANGE UP
WBC # BLD: 6.21 K/UL — SIGNIFICANT CHANGE UP (ref 3.8–10.5)
WBC # FLD AUTO: 6.21 K/UL — SIGNIFICANT CHANGE UP (ref 3.8–10.5)

## 2021-03-11 PROCEDURE — 99232 SBSQ HOSP IP/OBS MODERATE 35: CPT

## 2021-03-11 PROCEDURE — 45385 COLONOSCOPY W/LESION REMOVAL: CPT

## 2021-03-11 PROCEDURE — 88305 TISSUE EXAM BY PATHOLOGIST: CPT | Mod: 26

## 2021-03-11 RX ADMIN — Medication 650 MILLIGRAM(S): at 23:30

## 2021-03-11 RX ADMIN — Medication 650 MILLIGRAM(S): at 21:38

## 2021-03-11 RX ADMIN — GABAPENTIN 100 MILLIGRAM(S): 400 CAPSULE ORAL at 11:04

## 2021-03-11 RX ADMIN — SIMVASTATIN 20 MILLIGRAM(S): 20 TABLET, FILM COATED ORAL at 21:38

## 2021-03-11 RX ADMIN — CARVEDILOL PHOSPHATE 12.5 MILLIGRAM(S): 80 CAPSULE, EXTENDED RELEASE ORAL at 05:52

## 2021-03-11 RX ADMIN — Medication 75 MICROGRAM(S): at 05:52

## 2021-03-11 RX ADMIN — Medication 0.1 MILLIGRAM(S): at 17:16

## 2021-03-11 RX ADMIN — Medication 1 TABLET(S): at 11:04

## 2021-03-11 RX ADMIN — CARVEDILOL PHOSPHATE 12.5 MILLIGRAM(S): 80 CAPSULE, EXTENDED RELEASE ORAL at 17:16

## 2021-03-11 RX ADMIN — Medication 0.1 MILLIGRAM(S): at 05:52

## 2021-03-11 NOTE — BRIEF OPERATIVE NOTE - OPERATION/FINDINGS
Normal esophagus;  Z intact at 40 cm.  No HH, EE, BE. Varices or EOE.    Stomach:   Normal;   Duodenum:  Normal.
3-4mm polyp opposite the ileocecal valve removed with the jumbo forceps. Small to moderate sized internal hemorrhoids

## 2021-03-11 NOTE — PROGRESS NOTE ADULT - SUBJECTIVE AND OBJECTIVE BOX
seen for anemia, esrd    bedside  utilized  complained of gi upset from golytely  ros otherwise negative     MEDICATIONS  (STANDING):  carvedilol 12.5 milliGRAM(s) Oral every 12 hours  cloNIDine 0.1 milliGRAM(s) Oral every 12 hours  epoetin jack-epbx (RETACRIT) Injectable 18751 Unit(s) IV Push <User Schedule>  gabapentin 100 milliGRAM(s) Oral daily  levothyroxine 75 MICROGram(s) Oral daily  multivitamin 1 Tablet(s) Oral daily  simvastatin 20 milliGRAM(s) Oral at bedtime    MEDICATIONS  (PRN):  acetaminophen   Tablet .. 650 milliGRAM(s) Oral every 6 hours PRN Temp greater or equal to 38C (100.4F), Mild Pain (1 - 3), Moderate Pain (4 - 6)  bisacodyl Suppository 10 milliGRAM(s) Rectal daily PRN Constipation  HYDROmorphone  Injectable 0.5 milliGRAM(s) IV Push every 6 hours PRN Severe Pain (7 - 10)  ondansetron Injectable 4 milliGRAM(s) IV Push every 6 hours PRN Nausea and/or Vomiting      Allergies    No Known Allergies      Vital Signs Last 24 Hrs  T(C): 36.5 (11 Mar 2021 10:01), Max: 37.2 (10 Mar 2021 16:45)  T(F): 97.7 (11 Mar 2021 10:01), Max: 99 (10 Mar 2021 16:45)  HR: 70 (11 Mar 2021 10:01) (70 - 70)  BP: 122/67 (11 Mar 2021 10:01) (122/67 - 149/79)  BP(mean): --  RR: 18 (11 Mar 2021 10:01) (17 - 18)  SpO2: 98% (11 Mar 2021 10:01) (97% - 100%)    PHYSICAL EXAM:    GENERAL: NAD  CHEST/LUNG: Clear to percussion bilaterally  HEART: Regular rate and rhythm; S1 S2  ABDOMEN: Soft, Nontender,  Bowel sounds present  EXTREMITIES: no edema   NERVOUS SYSTEM:  Alert & Oriented X3, Motor Strength 5/5 B/L upper and lower extremities  PSYCH: normal mood, appropriate response.    LABS:                        7.5    6.21  )-----------( 215      ( 11 Mar 2021 09:23 )             24.9     03-11    139  |  98  |  28.0<H>  ----------------------------<  131<H>  3.9   |  29.0  |  5.01<H>    Ca    9.1      11 Mar 2021 09:23            CAPILLARY BLOOD GLUCOSE            RADIOLOGY & ADDITIONAL TESTS:

## 2021-03-11 NOTE — PROGRESS NOTE ADULT - SUBJECTIVE AND OBJECTIVE BOX
NEPHROLOGY INTERVAL HPI/OVERNIGHT EVENTS:    No new events.    MEDICATIONS  (STANDING):  carvedilol 12.5 milliGRAM(s) Oral every 12 hours  cloNIDine 0.2 milliGRAM(s) Oral every 12 hours  gabapentin 100 milliGRAM(s) Oral daily  levothyroxine 75 MICROGram(s) Oral daily  multivitamin 1 Tablet(s) Oral daily  pantoprazole  Injectable 40 milliGRAM(s) IV Push two times a day  simvastatin 20 milliGRAM(s) Oral at bedtime  sodium zirconium cyclosilicate 10 Gram(s) Oral daily    MEDICATIONS  (PRN):  HYDROmorphone  Injectable 0.5 milliGRAM(s) IV Push every 6 hours PRN Severe Pain (7 - 10)      Allergies    No Known Allergies      Vital Signs Last 24 Hrs  T(C): 36.5 (11 Mar 2021 10:01), Max: 37.2 (10 Mar 2021 16:45)  T(F): 97.7 (11 Mar 2021 10:01), Max: 99 (10 Mar 2021 16:45)  HR: 70 (11 Mar 2021 10:01) (70 - 70)  BP: 122/67 (11 Mar 2021 10:01) (122/67 - 149/79)  BP(mean): --  RR: 18 (11 Mar 2021 10:01) (17 - 18)  SpO2: 98% (11 Mar 2021 10:01) (97% - 100%)  T(C): 36.5 (10 Mar 2021 07:50), Max: 37.1 (09 Mar 2021 15:47)  T(F): 97.7 (10 Mar 2021 07:50), Max: 98.7 (09 Mar 2021 15:47)  HR: 70 (10 Mar 2021 07:50) (70 - 72)  BP: 125/63 (10 Mar 2021 07:50) (125/63 - 152/77)            PHYSICAL EXAM:    GENERAL: comfortable in bed  HEAD:    EYES: wnl  ENMT:   NECK: veins not full  NERVOUS SYSTEM: alert, verbal   CHEST/LUNG: no 02, decreased bs bases  HEART: Sternal scar, no rub, 1/6 murmur same  ABDOMEN: obese not tender, distended  EXTREMITIES: Left upper arm access with bruit   LYMPH:   SKIN: No rash    LABS:    03-11    139  |  98  |  28.0<H>  ----------------------------<  131<H>  3.9   |  29.0  |  5.01<H>    Ca    9.1      11 Mar 2021 09:23      03-09    135  |  94<L>  |  42.0<H>  ----------------------------<  169<H>  4.7   |  24.0  |  5.12<H>    Ca    9.5      09 Mar 2021 11:44        03-06    131<L>  |  90<L>  |  105.0<H>  ----------------------------<  213<H>  5.3   |  22.0  |  5.78<H>    Ca    9.1      06 Mar 2021 14:55                            8.3    13.71 )-----------( 218      ( 05 Mar 2021 07:01 )             26.6     03-05    x   |  x   |  x   ----------------------------<  x   5.8<H>   |  x   |  x     Ca    9.9      05 Mar 2021 07:01  Phos  4.6     03-05  Mg     3.2     03-04    TPro  6.8  /  Alb  4.1  /  TBili  0.3<L>  /  DBili  x   /  AST  29  /  ALT  19  /  AlkPhos  136<H>  03-03    PT/INR - ( 04 Mar 2021 18:41 )   PT: 15.5 sec;   INR: 1.36 ratio             Phosphorus Level, Serum: 4.6 mg/dL (03-05 @ 07:01)  Magnesium, Serum: 3.2 mg/dL (03-04 @ 16:10)          RADIOLOGY & ADDITIONAL TESTS:

## 2021-03-11 NOTE — CHART NOTE - NSCHARTNOTEFT_GEN_A_CORE
Asked by RN for clarification on Diet.  Pt s/p colonoscopy this pm, pt was given dinner after, tolerated it.  Pt denies abdominal pain.  Renal diet reordered.
Patient noted with Hypotension BP 80/42, HR 69, RR18, T 98.5, sPO2 99% RA. Patient asymptomatic, denies any complaint. No leukocytosis on latest labs. Patient is a 65yoF hx ESRD on HD (M/W/F), CAD s/p CABG, combined systolic and diastolic CHF (EF 40-45%), pAfib, sick sinus syndrome s/p PPM (2018), shingles complicated by neuropathic pain, hypothyroidism, HTN admitted with positive FBOT. Unclear etiology of persistent hypotension. Midodrine 10mg PO x1, CBC to trend H/H given GIB

## 2021-03-11 NOTE — BRIEF OPERATIVE NOTE - NSICDXBRIEFPOSTOP_GEN_ALL_CORE_FT
POST-OP DIAGNOSIS:  Normal tissue 09-Mar-2021 09:53:53  Adebayo Yepez  
POST-OP DIAGNOSIS:  Internal hemorrhoids 11-Mar-2021 15:21:03  Tho Stone  Colon polyp 11-Mar-2021 15:20:54  Tho Stone

## 2021-03-11 NOTE — BRIEF OPERATIVE NOTE - NSICDXBRIEFPROCEDURE_GEN_ALL_CORE_FT
PROCEDURES:  EGD, with biopsy 09-Mar-2021 09:53:01  Adebayo Yepez  
PROCEDURES:  Colonoscopy, with polypectomy or neoplasm removal using forceps 11-Mar-2021 15:20:05  Tho Stone

## 2021-03-11 NOTE — BRIEF OPERATIVE NOTE - NSICDXBRIEFPREOP_GEN_ALL_CORE_FT
PRE-OP DIAGNOSIS:  Occult blood in stools 11-Mar-2021 15:20:35  Tho Stone  Anemia 11-Mar-2021 15:20:24  Tho Stone  
PRE-OP DIAGNOSIS:  Blood loss anemia 09-Mar-2021 09:53:34  Adebayo Yepez  Complaint of melena 09-Mar-2021 09:53:16  Adebayo Yepez

## 2021-03-11 NOTE — BRIEF OPERATIVE NOTE - COMMENTS
no further GI w/u needed at thistime
Tolerated well.  OK to resume DASH RENAL CHO restricted diet.  Will plan for colonoscopy on Thursday,  HD tomorrow.

## 2021-03-12 ENCOUNTER — TRANSCRIPTION ENCOUNTER (OUTPATIENT)
Age: 66
End: 2021-03-12

## 2021-03-12 VITALS
OXYGEN SATURATION: 98 % | RESPIRATION RATE: 18 BRPM | HEART RATE: 72 BPM | SYSTOLIC BLOOD PRESSURE: 141 MMHG | TEMPERATURE: 98 F | DIASTOLIC BLOOD PRESSURE: 66 MMHG

## 2021-03-12 DIAGNOSIS — D64.89 OTHER SPECIFIED ANEMIAS: ICD-10-CM

## 2021-03-12 LAB
HCT VFR BLD CALC: 26.1 % — LOW (ref 34.5–45)
HGB BLD-MCNC: 7.9 G/DL — LOW (ref 11.5–15.5)
MCHC RBC-ENTMCNC: 30.3 GM/DL — LOW (ref 32–36)
MCHC RBC-ENTMCNC: 30.5 PG — SIGNIFICANT CHANGE UP (ref 27–34)
MCV RBC AUTO: 100.8 FL — HIGH (ref 80–100)
PLATELET # BLD AUTO: 224 K/UL — SIGNIFICANT CHANGE UP (ref 150–400)
RBC # BLD: 2.59 M/UL — LOW (ref 3.8–5.2)
RBC # FLD: 17.5 % — HIGH (ref 10.3–14.5)
WBC # BLD: 6.24 K/UL — SIGNIFICANT CHANGE UP (ref 3.8–10.5)
WBC # FLD AUTO: 6.24 K/UL — SIGNIFICANT CHANGE UP (ref 3.8–10.5)

## 2021-03-12 PROCEDURE — 99261: CPT

## 2021-03-12 PROCEDURE — 83550 IRON BINDING TEST: CPT

## 2021-03-12 PROCEDURE — 0031A: CPT

## 2021-03-12 PROCEDURE — 86901 BLOOD TYPING SEROLOGIC RH(D): CPT

## 2021-03-12 PROCEDURE — 99285 EMERGENCY DEPT VISIT HI MDM: CPT | Mod: 25

## 2021-03-12 PROCEDURE — 85025 COMPLETE CBC W/AUTO DIFF WBC: CPT

## 2021-03-12 PROCEDURE — 80048 BASIC METABOLIC PNL TOTAL CA: CPT

## 2021-03-12 PROCEDURE — 82272 OCCULT BLD FECES 1-3 TESTS: CPT

## 2021-03-12 PROCEDURE — 99232 SBSQ HOSP IP/OBS MODERATE 35: CPT

## 2021-03-12 PROCEDURE — 93306 TTE W/DOPPLER COMPLETE: CPT

## 2021-03-12 PROCEDURE — 85610 PROTHROMBIN TIME: CPT

## 2021-03-12 PROCEDURE — 83540 ASSAY OF IRON: CPT

## 2021-03-12 PROCEDURE — 84100 ASSAY OF PHOSPHORUS: CPT

## 2021-03-12 PROCEDURE — 84132 ASSAY OF SERUM POTASSIUM: CPT

## 2021-03-12 PROCEDURE — 86900 BLOOD TYPING SEROLOGIC ABO: CPT

## 2021-03-12 PROCEDURE — U0003: CPT

## 2021-03-12 PROCEDURE — 99239 HOSP IP/OBS DSCHRG MGMT >30: CPT

## 2021-03-12 PROCEDURE — 82962 GLUCOSE BLOOD TEST: CPT

## 2021-03-12 PROCEDURE — 84466 ASSAY OF TRANSFERRIN: CPT

## 2021-03-12 PROCEDURE — 80053 COMPREHEN METABOLIC PANEL: CPT

## 2021-03-12 PROCEDURE — 86923 COMPATIBILITY TEST ELECTRIC: CPT

## 2021-03-12 PROCEDURE — 96374 THER/PROPH/DIAG INJ IV PUSH: CPT

## 2021-03-12 PROCEDURE — 88342 IMHCHEM/IMCYTCHM 1ST ANTB: CPT

## 2021-03-12 PROCEDURE — 96375 TX/PRO/DX INJ NEW DRUG ADDON: CPT

## 2021-03-12 PROCEDURE — 36430 TRANSFUSION BLD/BLD COMPNT: CPT

## 2021-03-12 PROCEDURE — 82728 ASSAY OF FERRITIN: CPT

## 2021-03-12 PROCEDURE — 86769 SARS-COV-2 COVID-19 ANTIBODY: CPT

## 2021-03-12 PROCEDURE — 82550 ASSAY OF CK (CPK): CPT

## 2021-03-12 PROCEDURE — 36415 COLL VENOUS BLD VENIPUNCTURE: CPT

## 2021-03-12 PROCEDURE — 85027 COMPLETE CBC AUTOMATED: CPT

## 2021-03-12 PROCEDURE — 93005 ELECTROCARDIOGRAM TRACING: CPT

## 2021-03-12 PROCEDURE — 84484 ASSAY OF TROPONIN QUANT: CPT

## 2021-03-12 PROCEDURE — 86850 RBC ANTIBODY SCREEN: CPT

## 2021-03-12 PROCEDURE — P9016: CPT

## 2021-03-12 PROCEDURE — U0005: CPT

## 2021-03-12 PROCEDURE — 83690 ASSAY OF LIPASE: CPT

## 2021-03-12 PROCEDURE — 74176 CT ABD & PELVIS W/O CONTRAST: CPT

## 2021-03-12 PROCEDURE — 83735 ASSAY OF MAGNESIUM: CPT

## 2021-03-12 PROCEDURE — 88305 TISSUE EXAM BY PATHOLOGIST: CPT

## 2021-03-12 RX ORDER — LEVOTHYROXINE SODIUM 125 MCG
1 TABLET ORAL
Qty: 0 | Refills: 0 | DISCHARGE
Start: 2021-03-12

## 2021-03-12 RX ORDER — SENNA PLUS 8.6 MG/1
2 TABLET ORAL
Qty: 28 | Refills: 0
Start: 2021-03-12 | End: 2021-03-25

## 2021-03-12 RX ORDER — HYDRALAZINE HCL 50 MG
1 TABLET ORAL
Qty: 0 | Refills: 0 | DISCHARGE

## 2021-03-12 RX ORDER — JNJ-78436735 50000000000 [PFU]/.5ML
0.5 SUSPENSION INTRAMUSCULAR ONCE
Refills: 0 | Status: COMPLETED | OUTPATIENT
Start: 2021-03-12 | End: 2021-03-12

## 2021-03-12 RX ORDER — SIMVASTATIN 20 MG/1
1 TABLET, FILM COATED ORAL
Qty: 0 | Refills: 0 | DISCHARGE
Start: 2021-03-12

## 2021-03-12 RX ORDER — ACETAMINOPHEN 500 MG
2 TABLET ORAL
Qty: 0 | Refills: 0 | DISCHARGE
Start: 2021-03-12

## 2021-03-12 RX ORDER — AMLODIPINE BESYLATE 2.5 MG/1
1 TABLET ORAL
Qty: 0 | Refills: 0 | DISCHARGE

## 2021-03-12 RX ADMIN — Medication 75 MICROGRAM(S): at 06:28

## 2021-03-12 RX ADMIN — Medication 650 MILLIGRAM(S): at 18:06

## 2021-03-12 RX ADMIN — Medication 1 TABLET(S): at 11:36

## 2021-03-12 RX ADMIN — Medication 0.1 MILLIGRAM(S): at 18:41

## 2021-03-12 RX ADMIN — JNJ-78436735 0.5 MILLILITER(S): 50000000000 SUSPENSION INTRAMUSCULAR at 14:01

## 2021-03-12 RX ADMIN — ERYTHROPOIETIN 10000 UNIT(S): 10000 INJECTION, SOLUTION INTRAVENOUS; SUBCUTANEOUS at 16:20

## 2021-03-12 RX ADMIN — GABAPENTIN 100 MILLIGRAM(S): 400 CAPSULE ORAL at 11:36

## 2021-03-12 RX ADMIN — CARVEDILOL PHOSPHATE 12.5 MILLIGRAM(S): 80 CAPSULE, EXTENDED RELEASE ORAL at 18:41

## 2021-03-12 NOTE — DISCHARGE NOTE NURSING/CASE MANAGEMENT/SOCIAL WORK - PATIENT PORTAL LINK FT
You can access the FollowMyHealth Patient Portal offered by [facility] by registering at the following website: [email or web address]. By joining [facility]’s FollowMyHealth portal, you will also be able to view your health information using other applications (apps) compatible with our system.

## 2021-03-12 NOTE — DISCHARGE NOTE PROVIDER - NSDCCONDITION_GEN_ALL_CORE
----- Message from Jose Abel6 sent at 1/14/2020  8:30 AM EST -----  Regarding: dr greer/ telephone  General Message/Vendor Calls    Caller's first and last name: Nathan Mendoza (CHILD)      Reason for call: she has questions before the pt's next appt       Callback required yes/no and why: yes      Best contact number(s): [phone]      Details to clarify the request:      Jose Abel7 Stable

## 2021-03-12 NOTE — PROGRESS NOTE ADULT - SUBJECTIVE AND OBJECTIVE BOX
NEPHROLOGY INTERVAL HPI/OVERNIGHT EVENTS:    Examined  No distress    MEDICATIONS  (STANDING):  carvedilol 12.5 milliGRAM(s) Oral every 12 hours  cloNIDine 0.1 milliGRAM(s) Oral every 12 hours  epoetin jack-epbx (RETACRIT) Injectable 18245 Unit(s) IV Push <User Schedule>  gabapentin 100 milliGRAM(s) Oral daily  levothyroxine 75 MICROGram(s) Oral daily  multivitamin 1 Tablet(s) Oral daily  simvastatin 20 milliGRAM(s) Oral at bedtime    MEDICATIONS  (PRN):  acetaminophen   Tablet .. 650 milliGRAM(s) Oral every 6 hours PRN Temp greater or equal to 38C (100.4F), Mild Pain (1 - 3), Moderate Pain (4 - 6)  bisacodyl Suppository 10 milliGRAM(s) Rectal daily PRN Constipation  ondansetron Injectable 4 milliGRAM(s) IV Push every 6 hours PRN Nausea and/or Vomiting      Allergies    No Known Allergies    Intolerances        Vital Signs Last 24 Hrs  T(C): 36.9 (12 Mar 2021 11:45), Max: 36.9 (12 Mar 2021 11:45)  T(F): 98.5 (12 Mar 2021 11:45), Max: 98.5 (12 Mar 2021 11:45)  HR: 71 (12 Mar 2021 11:45) (70 - 78)  BP: 136/76 (12 Mar 2021 11:45) (134/72 - 136/76)  BP(mean): --  RR: 18 (12 Mar 2021 11:45) (16 - 18)  SpO2: 100% (12 Mar 2021 11:45) (96% - 100%)  Daily     Daily     PHYSICAL EXAM:  GENERAL: NAD  HEAD:  NCAT  EYES: EOMI  NECK: Supple  NERVOUS SYSTEM:  Arousable lethargic  CHEST/LUNG: Clear to percussion bilaterally  HEART: Regular rate and rhythm  ABDOMEN: Soft, Nontender, Nondistended; +BS  EXTREMITIES: Trace LE edema    LABS:                        7.9    6.24  )-----------( 224      ( 12 Mar 2021 07:08 )             26.1     03-11    139  |  98  |  28.0<H>  ----------------------------<  131<H>  3.9   |  29.0  |  5.01<H>    Ca    9.1      11 Mar 2021 09:23                  RADIOLOGY & ADDITIONAL TESTS:

## 2021-03-12 NOTE — DISCHARGE NOTE PROVIDER - CARE PROVIDERS DIRECT ADDRESSES
,DirectAddress_Unknown,[email or web address],DirectAddress_Unknown ,[email or web address],[email or web address],DirectAddress_Unknown,[email or web address]

## 2021-03-12 NOTE — DISCHARGE NOTE PROVIDER - NSDCMRMEDTOKEN_GEN_ALL_CORE_FT
acetaminophen 325 mg oral tablet: 2 tab(s) orally every 6 hours, As needed, Temp greater or equal to 38C (100.4F), Mild Pain (1 - 3), Moderate Pain (4 - 6)  aspirin 81 mg oral tablet, chewable: 1 tab(s) orally once a day  carvedilol 12.5 mg oral tablet: 1 tab(s) orally 2 times a day  cloNIDine 0.2 mg oral tablet: 1 tab(s) orally every 12 hours  Eliquis 5 mg oral tablet: 1 tab(s) orally 2 times a day  gabapentin 100 mg oral tablet: orally once a day  levothyroxine 75 mcg (0.075 mg) oral tablet: 1 tab(s) orally once a day  Nephro-García oral tablet: 1 tab(s) orally once a day  pantoprazole 40 mg oral delayed release tablet: 1 tab(s) orally once a day  senna 8.6 mg oral tablet: 2 tab(s) orally once a day (at bedtime), As Needed   simvastatin 20 mg oral tablet: 1 tab(s) orally once a day (at bedtime)

## 2021-03-12 NOTE — DISCHARGE NOTE PROVIDER - CARE PROVIDER_API CALL
Kane Valadez)  Cardiology; Internal Medicine  [address]  [place] [address]  Phone: [phone]  Fax: [phone]  Follow Up Time:     Tho Stone)  Gastroenterology; Internal Medicine  [address], [address]  [place] [address]  Phone: [phone]  Fax: [phone]  Follow Up Time:     Smith Arzate (DO)  Internal Medicine  [address], [place] [address]  Phone: [phone]  Fax: [phone]  Follow Up Time:    Kane Valadez)  Cardiology; Internal Medicine  [address]  [place] [address]  Phone: [phone]  Fax: [phone]  Follow Up Time:     Tho Stone)  Gastroenterology; Internal Medicine  [address], [address]  [place] [address]  Phone: [phone]  Fax: [phone]  Follow Up Time:     Smith Arzate (DO)  Internal Medicine  [address], [place] [address]  Phone: [phone]  Fax: [phone]  Follow Up Time:     James Burnett)  Urology  [address] [place] [address]  Phone: [phone]  Fax: [phone]  Follow Up Time:

## 2021-03-12 NOTE — DISCHARGE NOTE PROVIDER - HOSPITAL COURSE
65yoF hx ESRD on HD (M/W/F), CAD s/p CABG, combined systolic and diastolic CHF (EF 40-45%), pAfib, sick sinus syndrome s/p PPM (2018), shingles complicated by neuropathic pain, hypothyroidism, HTN presenting with epigastric pain and melena, found to have worsening anemia and +FOBT.      Patient treated for symptomatic anemia with PRBC. aspirin and eliquis held. evaluated by GI, underwent EGD without pathology and colonoscopy with 3-4mm poly opposite ileocecal valve, removed ith forceps along with small to mod sized internal hemorrhoids. Noted to have troponin leak and new onset low EF, cardiology recommending medical management and outpatient repeat echo.  hypotension improved post transfusion and adjustment in bp meds.  Noted to have left suprarenal mass but MRI abdomen from 2013 with 5.7 cm left adrenal myelolipoma and radiologist did not recommend further workup.   anemia stable, likely needs higher dose of epogen at HD     d/w GI, will restart aspirin in 2 days and eliquis in 3-4 days if outpatient hemoglobin remains stable.      updated daughter over the phone.  stable for discharge home. dc planning 36 minutes.

## 2021-03-12 NOTE — DISCHARGE NOTE PROVIDER - NSDCCPCAREPLAN_GEN_ALL_CORE_FT
PRINCIPAL DISCHARGE DIAGNOSIS  Diagnosis: Anemia  Assessment and Plan of Treatment: stable.  RESTART ASPIRIN IN 2 DAYS (3/14) AND ELIQUIS IN 7 DAYS 3/19  REPEAT BLOOD TESTS WITHIN 1 WEEK with your primary care doctor or at dialysis   follow up with Gastroenterology in 1 week for pathology report      SECONDARY DISCHARGE DIAGNOSES  Diagnosis: Cardiomyopathy  Assessment and Plan of Treatment: follow up with cardiology within 1 week for further management    Diagnosis: Hypertension  Assessment and Plan of Treatment: continue carvedilol and clonidine. stop hydralazine and amlodipine unless blood pressure gets high.    Diagnosis: ESRD on dialysis  Assessment and Plan of Treatment: continue dialysis     PRINCIPAL DISCHARGE DIAGNOSIS  Diagnosis: Anemia  Assessment and Plan of Treatment: stable.  RESTART ASPIRIN IN 2 DAYS (3/14) AND ELIQUIS IN 7 DAYS 3/19  REPEAT BLOOD TESTS WITHIN 1 WEEK with your primary care doctor or at dialysis   follow up with Gastroenterology in 1 week for pathology report      SECONDARY DISCHARGE DIAGNOSES  Diagnosis: Adrenal myelolipoma  Assessment and Plan of Treatment: follow up with urology    Diagnosis: Cardiomyopathy  Assessment and Plan of Treatment: follow up with cardiology within 1 week for further management    Diagnosis: Hypertension  Assessment and Plan of Treatment: continue carvedilol and clonidine. stop hydralazine and amlodipine unless blood pressure gets high.    Diagnosis: ESRD on dialysis  Assessment and Plan of Treatment: continue dialysis

## 2021-03-12 NOTE — DISCHARGE NOTE PROVIDER - PROVIDER TOKENS
PROVIDER:[TOKEN:[[id number]]],PROVIDER:[TOKEN:[[id number]]],PROVIDER:[TOKEN:[[id number]]] PROVIDER:[TOKEN:[[id number]]],PROVIDER:[TOKEN:[[id number]]],PROVIDER:[TOKEN:[[id number]]],PROVIDER:[TOKEN:[[id number]]]

## 2021-03-12 NOTE — PROGRESS NOTE ADULT - PROBLEM SELECTOR PLAN 1
Now s/p colonoscopy which revealed a polyp opposite the ileocecal valve removed with the jumbo forceps, also small to moderate sized internal hemorrhoids.  No further GI inpatient evaluation required at this time.   Please call in 1 week for pathology results. Now s/p colonoscopy which revealed a polyp opposite the ileocecal valve removed with the jumbo forceps, also small to moderate sized internal hemorrhoids.  Asa 81 mg oral for next to days and then patient may reassume Eliquis in the next 7 days.  No further GI inpatient evaluation required at this time.   Please call in 1 week for pathology results.

## 2021-03-12 NOTE — PROGRESS NOTE ADULT - SUBJECTIVE AND OBJECTIVE BOX
Chief Complaint: This is a 65y old woman patient being seen in follow-up consultation for abdominal pain, melena.    HPI / 24H events:  Patient seeing and evaluated at bedside, reporting no complains, no overnight events. Now s/p colonoscopy which revealed a 3-4 mm polyp opposite to ileocecal valve which was removed with jumbo forceps, also small to moderate sized internal hemorrhoids noted. Hemoglobin remains stable. Awaiting HD today. Denies nausea, vomiting, abdominal pain, chest pain, shortness of breath, hematemesis, hematochezia, melena.  ROS: A 14-point review of systems was reviewed and was otherwise negative save what was reported in the HPI.    PAST MEDICAL/SURGICAL HISTORY:  Afib    H/O sick sinus syndrome  s/p PPM (2018)    3-vessel CAD  s/p CABG    Hypothyroidism, unspecified type    Hemodialysis patient    Renal failure    Hypertension    Diabetes mellitus    S/P placement of cardiac pacemaker    A-V fistula    S/P cholecystectomy      MEDICATIONS  (STANDING):  carvedilol 12.5 milliGRAM(s) Oral every 12 hours  cloNIDine 0.1 milliGRAM(s) Oral every 12 hours  epoetin jack-epbx (RETACRIT) Injectable 62696 Unit(s) IV Push <User Schedule>  gabapentin 100 milliGRAM(s) Oral daily  levothyroxine 75 MICROGram(s) Oral daily  multivitamin 1 Tablet(s) Oral daily  simvastatin 20 milliGRAM(s) Oral at bedtime    MEDICATIONS  (PRN):  acetaminophen   Tablet .. 650 milliGRAM(s) Oral every 6 hours PRN Temp greater or equal to 38C (100.4F), Mild Pain (1 - 3), Moderate Pain (4 - 6)  bisacodyl Suppository 10 milliGRAM(s) Rectal daily PRN Constipation  ondansetron Injectable 4 milliGRAM(s) IV Push every 6 hours PRN Nausea and/or Vomiting    No Known Allergies    T(C): 36.3 (03-12-21 @ 05:56), Max: 36.3 (03-12-21 @ 05:56)  HR: 70 (03-12-21 @ 05:56) (70 - 78)  BP: 135/73 (03-12-21 @ 05:56) (134/72 - 135/73)  RR: 18 (03-12-21 @ 05:56) (16 - 18)  SpO2: 99% (03-12-21 @ 05:56) (96% - 99%)    I&O's Summary    PHYSICAL EXAM:  Constitutional: Morbidly obese  woman, in no apparent distress  Eyes: Sclerae anicteric, conjunctivae normal  ENMT: Mucus membranes moist, no oropharyngeal thrush noted  Respiratory: Breathing nonlabored; clear to auscultation  Cardiovascular: Regular rate and rhythm  Gastrointestinal: Soft, nontender, nondistended, normoactive bowel sounds.  Extremities: No clubbing, cyanosis or edema  Neurological: Alert and oriented to person, place and time; no asterixis  Skin: No jaundice  Musculoskeletal: No significant peripheral atrophy  Psychiatric: Affect and mood appropriate                   7.9    6.24  )-----------( 224      ( 03-12 @ 07:08 )             26.1                7.5    6.21  )-----------( 215      ( 03-11 @ 09:23 )             24.9                8.7    9.59  )-----------( 238      ( 03-09 @ 11:45 )             28.6       03-11    139  |  98  |  28.0<H>  ----------------------------<  131<H>  3.9   |  29.0  |  5.01<H>    Ca    9.1      11 Mar 2021 09:23    IMAGING: I personally reviewed the CT of abdomen and pelvis, and I agree with the radiologist's interpretation as described below:  FINDINGS:  LOWER CHEST: Cardiomegaly.    LIVER: Within normal limits.  BILE DUCTS: Normal caliber.  GALLBLADDER: Cholecystectomy clips.  SPLEEN: Within normal limits.  PANCREAS: Within normal limits.  ADRENALS: 6.7 x 7.2 x 5.4 cm sized heterogeneous soft tissue density containing fat attenuation in the left suprarenal region (3-44).  KIDNEYS/URETERS: Atrophic kidneys.    BLADDER: Within normal limits.  REPRODUCTIVE ORGANS: Calcified fibroids. Otherwise unremarkable.    BOWEL: No bowel obstruction. Appendix is normal.  PERITONEUM: No ascites.  VESSELS: Extensive atherosclerotic calcification.  RETROPERITONEUM/LYMPH NODES: No lymphadenopathy.  ABDOMINAL WALL: Within normal limits.  BONES: Advanced degenerative change.    IMPRESSION:  Cardiomegaly.  Fat-containing left suprarenal mass. Possibilities will include adrenal myelolipoma or retroperitoneal lipoma/liposarcoma.  Atrophic kidneys.  Advanced atherosclerotic disease.

## 2021-03-12 NOTE — PROGRESS NOTE ADULT - ASSESSMENT
65yoF hx ESRD on HD (M/W/F), CAD s/p CABG, combined systolic and diastolic CHF (EF 40-45%), pAfib, sick sinus syndrome s/p PPM (2018), shingles complicated by neuropathic pain, hypothyroidism, HTN presenting with epigastric pain and melena, found to have worsening anemia and +FOBT    anemia:     s/p  EGD 3/9--normal     colonoscopy on 3/11     hold aspirin and eliquis.    hyperkalemia: resolved lokelma x2, s/p HD     ESRD on HD: renal following.    hypotension: hold home coreg 12.5 mg BID    decrease clonidine to 0.1 mg BID and restart coreg 6.25BID     elevated troponin: poor clearance.    cardiology recalled as echo with new EF 25%, severe pHTN and severe TR/MR     left suprarenal mass: urology following.    reviewed and discussed with radiology--- MRI abdomen 2013 with 5.7 left adrenal myelolipoma     no need for repeat imaging.    Hx Afib with sick sinus syndrome s/p PPM  -Holding Eliquis due to GIB    Hx HTN  -Per daughter, pt hasn’t received any BP meds in 2 days prior to admission  -Given active GIB and normotensive on admission, will gradually resume meds with hold parameters  -Carvedilol and clonidine resumed  -Continue to hold amlodipine and hydralazine, resume as clinically indicated    Hx CAD s/p CABG  -Holding ASA due to GIB    Hx shingles complicated by neuropathic pain  -Gabapentin     Hx hypothyroidism  -Synthroid     Prophylactic measure  -SCD    updated patient at bedside with daughter on the phone acting as .      
65yoF w h/oCAD s/p CABG, combined systolic and diastolic CHF (EF 40-45%), pAfib, sick sinus syndrome s/p PPM (2018),   shingles complicated by neuropathic pain, hypothyroidism, HTN presenting with epigastric pain and melena, found to have worsening anemia and +FOBT  now s/p EGD    ESRD on HD MWF schedule  Will arrange HD today  Anemia GIB  GI eval w/u noted  Adequate iron stores  Will cont CARLOS w HD    Will follow
65yoF w h/oCAD s/p CABG, combined systolic and diastolic CHF (EF 40-45%), pAfib, sick sinus syndrome s/p PPM (2018),   shingles complicated by neuropathic pain, hypothyroidism, HTN presenting with epigastric pain and melena, found to have worsening anemia and +FOBT  now s/p EGD    ESRD on HD MWF schedule  Will arrange HD today  Anemia GIB  GI eval w/u noted  Adequate iron stores  Will cont CARLOS w HD    Will follow  
DM 2 with CRF 5, Anemia, abdominal pain
Incidental left adrenal mass    1.  CT triple phase when feasible  2.  will sign off  3.  FU with me in the outpatient setting  4.  FU Dr. James Burnett in the outpatient setting
65yoF hx ESRD on HD (M/W/F), CAD s/p CABG, combined systolic and diastolic CHF (EF 40-45%), pAfib, sick sinus syndrome s/p PPM (2018), shingles complicated by neuropathic pain, hypothyroidism, HTN presenting with epigastric pain and melena, found to have worsening anemia and +FOBT    anemia:     s/p  EGD 3/9--normal     colonoscopy on 3/11     hold aspirin and eliquis.    h/h labile, c/w epogen.  may need transfusion in am with HD if hg<8      ESRD on HD: renal following.    hyperkalemia: resolved lokelma x2, s/p HD     hypotension: c/w home coreg 12.5 mg BID    decrease clonidine to 0.1 mg BID    elevated troponin: poor clearance.    cardiology recalled as echo with new EF 25%, severe pHTN and severe TR/MR      cardiology recommending outpatient follow up    left suprarenal mass: urology following.    reviewed and discussed with radiology--- MRI abdomen 2013 with 5.7 left adrenal myelolipoma     no need for repeat imaging.    Hx Afib with sick sinus syndrome s/p PPM  -Holding Eliquis due to GIB    Hx HTN  -Per daughter, pt hasn’t received any BP meds in 2 days prior to admission  -Given active GIB and normotensive on admission, will gradually resume meds with hold parameters  -Carvedilol and clonidine resumed  -Continue to hold amlodipine and hydralazine, resume as clinically indicated    Hx CAD s/p CABG  -Holding ASA due to GIB    Hx shingles complicated by neuropathic pain  -Gabapentin     Hx hypothyroidism  -Synthroid     Prophylactic measure  -SCD      updated patient.   dc tomorrow after HD if h/h stable and negative GI workup.          
65yoF hx ESRD on HD (M/W/F), CAD s/p CABG, combined systolic and diastolic CHF (EF 40-45%), pAfib, sick sinus syndrome s/p PPM (2018), shingles complicated by neuropathic pain, hypothyroidism, HTN presenting with epigastric pain and melena, found to have worsening anemia and +FOBT    anemia: planned for EGD 3/9    c/w PPI    hold aspirin and eliquis.    hyperkalemia: resolved lokelma x2, s/p HD     ESRD on HD: renal following.    hypotension: dc coreg 12.5 mg BID ,decrease clonidine to 0.1 mg BID    elevated troponin: poor clearance.    cardiology following.    left suprarenal mass: urology following.    reviewed and discussed with radiology--- MRI abdomen 2013 with 5.7 left adrenal myelolipoma     no need for repeat imaging.    Hx Afib with sick sinus syndrome s/p PPM  -Holding Eliquis due to GIB    Hx HTN  -Per daughter, pt hasn’t received any BP meds in 2 days prior to admission  -Given active GIB and normotensive on admission, will gradually resume meds with hold parameters  -Carvedilol and clonidine resumed  -Continue to hold amlodipine and hydralazine, resume as clinically indicated    Hx CAD s/p CABG  -Holding ASA due to GIB    Hx shingles complicated by neuropathic pain  -Gabapentin     Hx hypothyroidism  -Synthroid     Prophylactic measure  -SCD    left message for daughter.       
65yoF hx ESRD on HD (M/W/F), CAD s/p CABG, combined systolic and diastolic CHF (EF 40-45%), pAfib, sick sinus syndrome s/p PPM (2018), shingles complicated by neuropathic pain, hypothyroidism, HTN presenting with epigastric pain and melena, found to have worsening anemia and +FOBT    anemia: planned for EGD monday due to persistent hyperkalemia     c/w PPI    hold aspirin and eliquis.    hyperkalemia: lokelma x2, HD in am    trend.    ESRD on HD: renal following.    hypotension: dc coreg 12.5 mg BID ,decrease clonidine to 0.1 mg BID    elevated troponin: poor clearance.    cardiology following.      Hx Afib with sick sinus syndrome s/p PPM  -Holding Eliquis due to GIB    Hx HTN  -Per daughter, pt hasn’t received any BP meds in 2 days prior to admission  -Given active GIB and normotensive on admission, will gradually resume meds with hold parameters  -Carvedilol and clonidine resumed  -Continue to hold amlodipine and hydralazine, resume as clinically indicated    Hx CAD s/p CABG  -Holding ASA due to GIB    Hx shingles complicated by neuropathic pain  -Gabapentin     Hx hypothyroidism  -Synthroid     Prophylactic measure  -SCD
65yoF hx ESRD on HD (M/W/F), CAD s/p CABG, combined systolic and diastolic CHF (EF 40-45%), pAfib, sick sinus syndrome s/p PPM (2018), shingles complicated by neuropathic pain, hypothyroidism, HTN presenting with epigastric pain and melena, found to have worsening anemia and +FOBT    anemia: planned for EGD monday due to persistent hyperkalemia     c/w PPI    hold aspirin and eliquis.    hyperkalemia: lokelma x2, HD today    trend.    ESRD on HD: renal following.    hypotension: dc coreg 12.5 mg BID ,decrease clonidine to 0.1 mg BID    elevated troponin: poor clearance.    cardiology following.    left suprarenal mass: urology eval    CT abdomen with adrenal protocol        Hx Afib with sick sinus syndrome s/p PPM  -Holding Eliquis due to GIB    Hx HTN  -Per daughter, pt hasn’t received any BP meds in 2 days prior to admission  -Given active GIB and normotensive on admission, will gradually resume meds with hold parameters  -Carvedilol and clonidine resumed  -Continue to hold amlodipine and hydralazine, resume as clinically indicated    Hx CAD s/p CABG  -Holding ASA due to GIB    Hx shingles complicated by neuropathic pain  -Gabapentin     Hx hypothyroidism  -Synthroid     Prophylactic measure  -SCD    updated daughter over the phone. 
Anemia with gi bleed, CRF 5.
DM 2 with CRF 5, Anemia.
DM 2 with CRF 5, Anemia.
65yoF hx ESRD on HD (M/W/F), CAD s/p CABG, combined systolic and diastolic CHF (EF 40-45%), pAfib, sick sinus syndrome s/p PPM (2018), shingles complicated by neuropathic pain, hypothyroidism, HTN presenting with epigastric pain and melena, found to have worsening anemia and +FOBT    anemia: planned for EGD monday due to persistent hyperkalemia     c/w PPI    hold aspirin and eliquis.    hyperkalemia: lokelma x2, s/p HD     ESRD on HD: renal following.    hypotension: dc coreg 12.5 mg BID ,decrease clonidine to 0.1 mg BID    elevated troponin: poor clearance.    cardiology following.    left suprarenal mass: urology following.    CT abdomen with adrenal protocol        Hx Afib with sick sinus syndrome s/p PPM  -Holding Eliquis due to GIB    Hx HTN  -Per daughter, pt hasn’t received any BP meds in 2 days prior to admission  -Given active GIB and normotensive on admission, will gradually resume meds with hold parameters  -Carvedilol and clonidine resumed  -Continue to hold amlodipine and hydralazine, resume as clinically indicated    Hx CAD s/p CABG  -Holding ASA due to GIB    Hx shingles complicated by neuropathic pain  -Gabapentin     Hx hypothyroidism  -Synthroid     Prophylactic measure  -SCD    
DM 2 with CRF 5, Anemia, constipation.
65yoF hx ESRD on HD (M/W/F), CAD s/p CABG, combined systolic and diastolic CHF (EF 40-45%), pAfib, sick sinus syndrome s/p PPM (2018), shingles complicated by neuropathic pain, hypothyroidism, HTN presenting with epigastric pain and melena, found to have worsening anemia and +FOBT    anemia:     s/p  EGD 3/9--normal     colonoscopy on 3/11     hold aspirin and eliquis.    hyperkalemia: resolved lokelma x2, s/p HD     ESRD on HD: renal following.    hypotension: hold home coreg 12.5 mg BID    decrease clonidine to 0.1 mg BID and restart coreg 6.25BID     elevated troponin: poor clearance.    cardiology recalled as echo with new EF 25%, severe pHTN and severe TR/MR      cardiology recommending outpatient follow up    left suprarenal mass: urology following.    reviewed and discussed with radiology--- MRI abdomen 2013 with 5.7 left adrenal myelolipoma     no need for repeat imaging.    Hx Afib with sick sinus syndrome s/p PPM  -Holding Eliquis due to GIB    Hx HTN  -Per daughter, pt hasn’t received any BP meds in 2 days prior to admission  -Given active GIB and normotensive on admission, will gradually resume meds with hold parameters  -Carvedilol and clonidine resumed  -Continue to hold amlodipine and hydralazine, resume as clinically indicated    Hx CAD s/p CABG  -Holding ASA due to GIB    Hx shingles complicated by neuropathic pain  -Gabapentin     Hx hypothyroidism  -Synthroid     Prophylactic measure  -SCD    updated patient  updated daughter over the phone.      
Anemia with gi bleed, CRF 5, Hyperkalemia.
Patient with end-stage renal disease, hyperkalemia, anemia, FOBT positive, is being scheduled for EGD. N.p.o. after midnight.  Further recommendations after the procedure.
Epigastric pain and occult blood in stool: Patient is doing okay.  EGD is planned for Monday.  Will reorder the COVID-19 swab.  Check electrolytes again as per the primary team.  
Epigastric pain and occult blood in stools: PPI therapy. EGD on monday. Cardiology evaluation appreciated. Will follow up.
Anemia: Doing ok so far. EGD inconclusive. Colonoscopy scheduled for tomorrow. No new complaints. Bowel prep ordered.

## 2021-03-12 NOTE — DISCHARGE NOTE NURSING/CASE MANAGEMENT/SOCIAL WORK - NSDCVIVACCINE_GEN_ALL_CORE_FT
Severe acute respiratory syndrome coronavirus 2 (SARS-CoV-2) (Coronavirus disease [COVID-19]) vaccine , 2021/3/12 14:01 , Jacqui Morrissey (RN)  Influenza , 2018/9/26 21:14 , Breonna Donaldson (RN)

## 2021-03-12 NOTE — PROGRESS NOTE ADULT - SUBJECTIVE AND OBJECTIVE BOX
[facility], [facility]                                                    [address] , [address], [place] [address]                                                         PHONE: [phone]    FAX: [phone] [address], [address], [place] [address]                                                 PHONE: [phone]    FAX: [phone]  *******************************************************************************    Overnight events/Subjective Assessment:  No new cardiovascular issues.  No chest pain, SOB or palpitations.  Abdominal pain resolved.    No Known Allergies    MEDICATIONS  (STANDING):  carvedilol 12.5 milliGRAM(s) Oral every 12 hours  cloNIDine 0.1 milliGRAM(s) Oral every 12 hours  epoetin jack-epbx (RETACRIT) Injectable 35614 Unit(s) IV Push <User Schedule>  gabapentin 100 milliGRAM(s) Oral daily  levothyroxine 75 MICROGram(s) Oral daily  multivitamin 1 Tablet(s) Oral daily  simvastatin 20 milliGRAM(s) Oral at bedtime    MEDICATIONS  (PRN):  acetaminophen   Tablet .. 650 milliGRAM(s) Oral every 6 hours PRN Temp greater or equal to 38C (100.4F), Mild Pain (1 - 3), Moderate Pain (4 - 6)  bisacodyl Suppository 10 milliGRAM(s) Rectal daily PRN Constipation  ondansetron Injectable 4 milliGRAM(s) IV Push every 6 hours PRN Nausea and/or Vomiting      Vital Signs Last 24 Hrs  T(C): 36.3 (12 Mar 2021 05:56), Max: 36.5 (11 Mar 2021 10:01)  T(F): 97.3 (12 Mar 2021 05:56), Max: 97.7 (11 Mar 2021 10:01)  HR: 70 (12 Mar 2021 05:56) (70 - 78)  BP: 135/73 (12 Mar 2021 05:56) (122/67 - 135/73)  BP(mean): --  RR: 18 (12 Mar 2021 05:56) (16 - 18)  SpO2: 99% (12 Mar 2021 05:56) (96% - 99%)    I&O's Detail    I&O's Summary          PHYSICAL EXAM:  General: Appears well developed, well nourished, no acute distress  HEENT: Head: normocephalic, atraumatic  Eyes: Pupils equal and reactive  Neck: Supple, no carotid bruit, no JVD, no HJR  CARDIOVASCULAR: Normal S1 and S2, II/VI syst M > LLSB  LUNGS: Clear to auscultation bilaterally, no rales, rhonchi or wheeze  ABDOMEN: Soft, nontender, non-distended, positive bowel sounds, no mass or bruit  EXTREMITIES: No edema, distal pulses WNL  SKIN: Warm and dry with normal turgor  NEURO: Alert & oriented x 3, grossly intact  PSYCH: normal mood and affect        LABS:                        7.5    6.21  )-----------( 215      ( 11 Mar 2021 09:23 )             24.9     03-11    139  |  98  |  28.0<H>  ----------------------------<  131<H>  3.9   |  29.0  |  5.01<H>    Ca    9.1      11 Mar 2021 09:23            serum  Lipids:         RADIOLOGY & ADDITIONAL STUDIES:    TELEMETRY: none    ECHO (3/8/21):  PHYSICIAN INTERPRETATION:  Left Ventricle: The left ventricular internal cavity size is normal.  Left ventricular ejection fraction, by visual estimation, is 20 to 25%. Severely decreased segmental left ventricular systolic function. Spectral Doppler shows restrictive pattern of left ventricular myocardial filling (Grade III diastolic dysfunction).  LV Wall Scoring:  The entire lateral wall, entire inferior wall, and basal inferoseptal segment  are akinetic. The mid inferoseptal segment is hypokinetic.  Right Ventricle: Normal right ventricular size and function. The right ventricular size is normal. RV systolic function is mildly reduced.  Left Atrium: Severely enlarged left atrium.  Right Atrium: Normal right atrial size.  Pericardium: There is no evidence of pericardial effusion.  Mitral Valve: The mitral valve leaflets are tethered which is due to reduced systolic function and elevated LVDP. Moderate to severe mitral valve regurgitation is seen.  Tricuspid Valve: Severe tricuspid regurgitation is visualized. Estimated pulmonary artery systolic pressure is 63.5 mmHg assuming a right atrial pressure of 8 mmHg, which is consistent with severe pulmonary hypertension.  Aortic Valve: The aortic valve is trileaflet. Sclerotic aortic valve with normal opening. Trivial aortic valve regurgitation is seen.  Pulmonic Valve: Structurally normal pulmonic valve, with normal leaflet excursion. Mild pulmonic valve regurgitation.  Aorta: The aortic root is normal in size and structure.  Venous: The inferior vena cava is 2.3 cm. The inferior vena cava was dilated, with respiratory size variation less than 50%.  In comparison to the previous echocardiogram(s): Prior examinations are available and were reviewed for comparison purposes. Compared to the prior TTE study from 11/7/18, LV systolic function/EF now severely reduced with severe segmental wall abnormalities.  Summary:   1. Technically difficult study.   2. Left ventricular ejection fraction, by visual estimation, is 20 to 25%.   3. Severely decreased segmental left ventricular systolic function.   4. Entire lateral wall, entire inferior wall, basal inferoseptal segment, and mid inferoseptal segment are akinetic as described above.   5. Spectral Doppler shows restrictive pattern of left ventricular myocardial filling (Grade III diastolic dysfunction).   6. Normal RV size with mildly reduced RV systolic function.   7. Estimated pulmonary artery systolic pressure is 70 mmHg assuming a right atrial pressure of 15 mmHg, which is consistent with severe pulmonary hypertension.   8. Severely enlarged left atrium.   9. Moderate to severe mitral valve regurgitation.  10. The mitral valve leaflets are tethered which is due to reduced systolic function and elevated LVDP.  11. Severe tricuspid regurgitation.  12. Sclerotic aortic valve with normal opening.  13. Mild pulmonic valve regurgitation.  14. There is no evidence of pericardial effusion.  15. Compared to the prior TTE study from 11/7/18, LV systolic function/EF now severely reduced with severe segmental wall abnormalities.      ASSESSMENT AND PLAN:  In summary, KIM LEE is a 65F a/w abdominal pain & melena, GIB, anemia, s/p negative EGD 3/9/21, s/p colonoscopy & polypectomy 3/11/21, mild troponin increase (peak 0.11), h/o CAD/CABG, ICM (EF 20-25%), chronic combined systolic/diastolic CHF, moderate-severe MR, severe TR, severe pulmonary HTN, SSS s/p PPM '18, PAF (Eliquis held), ESRD/HD, HTN, HL, obesity, hypothyroidism  - Mild troponin increase is non-specific in the setting of ESRD on HD, no chest pain or acute ischemic EKG changes.  Continue medical management of known severe CAD for now.  Outpatient ischemic evaluation can be performed for further risk stratification.  - No active chest pain, evidence of ischemia, CHF or unstable arrhythmia and therefore no absolute cardiac contraindication to any further procedures and this may be performed as clinically indicated.  Patient tolerated EGD & colonoscopy without complication.  - Echocardiogram 3/8/21 demonstrated severely decreased LV fxn (EF 20-25%); the entire lateral wall, entire inferior wall, and basal inferoseptal segment are akinetic; the mid inferoseptal segment is hypokinetic; grade III diastolic dysfxn, severe LAE, normal RV size with mildly reduced fxn, trace AR, MV leaflets tethered due to reduced systolic function & elevated LVEDP, moderate-severe MR, mild ME, severe TR, severe pulmonary HTN (RVSP 63.5 mmHg), TDS; compared to the prior TTE study from 11/7/18, LV systolic function/EF now severely reduced with severe segmental wall abnormalities.  Will repeat as an outpatient.  - HTN.  Rhythm stable, BP currently well controlled.  Continue current doses of Coreg 12.5 bid & Clonidine 0.1 bid for now and titrate PRN.  Can resume Amlodipine or Hydralazine in the future if necessary.  - ASA currently being held given GIB/anemia.  Please resume ASA as soon as she is cleared by GI given h/o CAD, to be restarted by medicine team.  Can likely resume today.  Risks, benefits & alternatives have been discussed.  - HL.  Simvastatin 20 daily in place  - AF.  Patient has a h/o chronic AF with an increased [id number] risk score maintained on oral AC with Eliquis as an outpatient.  AC currently held given GIB/anemia.  Plan to eventual resume when cleared by GI (? as an outpatient).  Risks, benefits & alternatives have been discussed.  - Anemia.  Monitor Hb closely  - ESRD.  HD per nephrology, patient currently clinically euvolemic  - Will sign off for now and follow PRN.  Please call with any cardiovascular questions/issues.  Patient should follow-up at [facility] within 1 week after discharge.    Kane Valadez MD

## 2021-03-12 NOTE — PROGRESS NOTE ADULT - REASON FOR ADMISSION
Abdominal pain, melena, concern for UGIB

## 2021-03-15 LAB — SURGICAL PATHOLOGY STUDY: SIGNIFICANT CHANGE UP

## 2021-05-17 NOTE — H&P ADULT - EYES
The patient is Stable - Low risk of patient condition declining or worsening    Shift Goals  Clinical Goals: Patient NPO at 0000 and no Lovenox for surgery tomorrow.   Patient Goals: rest, surgery, go home    Progress made toward(s) clinical / shift goals:  Patient getting rest, administered antibiotics as scheduled, patient will be NPO at 0000.     Patient is not progressing towards the following goals:       detailed exam conjunctiva clear

## 2021-12-01 PROCEDURE — G9005: CPT

## 2021-12-15 NOTE — PROCEDURE NOTE - NSPROCDETAILS_GEN_ALL_CORE
Advised pt that hold & call ultrasound was scheduled today for 530p per BM. Pt has instructions.  Pt verbalized an understanding & agrees w/ plan sterile technique, catheter placed/ultrasound guidance/lumen(s) aspirated and flushed/sterile dressing applied/guidewire recovered

## 2022-03-07 NOTE — PROGRESS NOTE ADULT - ATTENDING COMMENTS
Bottle mag. Citrate Adrenal mass - MRI or repeat CAT in 3 months. My doctor sent me to have my blood work  for my c/section tomorrow Bottle mag. Citrate Adrenal mass - CAT with dye ordered.

## 2022-07-29 NOTE — CONSULT NOTE ADULT - REASON FOR ADMISSION
Deteriorating patient status - Patient was clinically upgraded due to deteriorating patient status.
Large right blood pleural effusion
pleural effusion

## 2022-11-14 NOTE — DISCHARGE NOTE ADULT - NS DC ANGIO PCI YN
Spoke to patient and relayed results and recommendations. Patient verbalized understanding and has no further questions or concerns.    Medications no

## 2022-12-21 NOTE — DISCHARGE NOTE ADULT - DISCHARGE DATE
Impression: S/P 25 g PPV/EL/C3F8 OD - . Retinal detachment with single break, right eye  H33.011. Plan: Patient instructed to gradually resume normal activities. The steroid drop should be tapered off and the antibiotic should be discontinued now. The patient was instructed to call the office for any increase in pain, redness, discharge, floaters, or decrease in central or peripheral vision. 

RTC: 1 month POS SI
26-Sep-2018

## 2022-12-26 NOTE — PATIENT PROFILE ADULT - BRADEN SCORE
My Asthma Action Plan    Name: Bean Garcia   YOB: 1989  Date: 12/26/2022   My doctor: Helen Mccord MD   My clinic: [facility]        My Control Medicine: Flovent 110 mcg 1 puff twice daily.  My Rescue Medicine: Albuterol (Proair/Ventolin/Proventil HFA) 2-4 puffs EVERY 4 HOURS as needed. Use a spacer if recommended by your provider.   My Asthma Severity:   Mild Persistent  Know your asthma triggers:                GREEN ZONE   Good Control    I feel good    No cough or wheeze    Can work, sleep and play without asthma symptoms       Take your asthma control medicine every day.     1. If exercise triggers your asthma, take your rescue medication    15 minutes before exercise or sports, and    During exercise if you have asthma symptoms  2. Spacer to use with inhaler: If you have a spacer, make sure to use it with your inhaler             YELLOW ZONE Getting Worse  I have ANY of these:    I do not feel good    Cough or wheeze    Chest feels tight    Wake up at night   1. Keep taking your Green Zone medications  2. Start taking your rescue medicine:    every 20 minutes for up to 1 hour. Then every 4 hours for 24-48 hours.  3. If you stay in the Yellow Zone for more than 12-24 hours, contact your doctor.  4. If you do not return to the Green Zone in 12-24 hours or you get worse, start taking your oral steroid medicine if prescribed by your provider.           RED ZONE Medical Alert - Get Help  I have ANY of these:    I feel awful    Medicine is not helping    Breathing getting harder    Trouble walking or talking    Nose opens wide to breathe       1. Take your rescue medicine NOW  2. If your provider has prescribed an oral steroid medicine, start taking it NOW  3. Call your doctor NOW  4. If you are still in the Red Zone after 20 minutes and you have not reached your doctor:    Take your rescue medicine again and    Call 911 or go to the emergency room right away    See  your regular doctor within 2 weeks of an Emergency Room or Urgent Care visit for follow-up treatment.          Annual Reminders:  Meet with Asthma Educator,  Flu Shot in the Fall, consider Pneumonia Vaccination for patients with asthma (aged 19 and older).    Pharmacy: [id number] - [place], [place] - [address]    Electronically signed by Helen Mccord MD   Date: 12/26/22                      Asthma Triggers  How To Control Things That Make Your Asthma Worse    Triggers are things that make your asthma worse.  Look at the list below to help you find your triggers and what you can do about them.  You can help prevent asthma flare-ups by staying away from your triggers.      Trigger                                                          What you can do   Cigarette Smoke  Tobacco smoke can make asthma worse. Do not allow smoking in your home, car or around you.  Be sure no one smokes at a child s day care or school.  If you smoke, ask your health care provider for ways to help you quit.  Ask family members to quit too.  Ask your health care provider for a referral to Quit Plan to help you quit smoking, or call 5-848-823-PLAN.     Colds, Flu, Bronchitis  These are common triggers of asthma. Wash your hands often.  Don t touch your eyes, nose or mouth.  Get a flu shot every year.     Dust Mites  These are tiny bugs that live in cloth or carpet. They are too small to see. Wash sheets and blankets in hot water every week.   Encase pillows and mattress in dust mite proof covers.  Avoid having carpet if you can. If you have carpet, vacuum weekly.   Use a dust mask and HEPA vacuum.   Pollen and Outdoor Mold  Some people are allergic to trees, grass, or weed pollen, or molds. Try to keep your windows closed.  Limit time out doors when pollen count is high.   Ask you health care provider about taking medicine during allergy season.     Animal Dander  Some people are allergic to skin flakes,  urine or saliva from pets with fur or feathers. Keep pets with fur or feathers out of your home.    If you can t keep the pet outdoors, then keep the pet out of your bedroom.  Keep the bedroom door closed.  Keep pets off cloth furniture and away from stuffed toys.     Mice, Rats, and Cockroaches   Some people are allergic to the waste from these pests.   Cover food and garbage.  Clean up spills and food crumbs.  Store grease in the refrigerator.   Keep food out of the bedroom.   Indoor Mold  This can be a trigger if your home has high moisture. Fix leaking faucets, pipes, or other sources of water.   Clean moldy surfaces.  Dehumidify basement if it is damp and smelly.   Smoke, Strong Odors, and Sprays  These can reduce air quality. Stay away from strong odors and sprays, such as perfume, powder, hair spray, paints, smoke incense, paint, cleaning products, candles and new carpet.   Exercise or Sports  Some people with asthma have this trigger. Be active!  Ask your doctor about taking medicine before sports or exercise to prevent symptoms.    Warm up for 5-10 minutes before and after sports or exercise.     Other Triggers of Asthma  Cold air:  Cover your nose and mouth with a scarf.  Sometimes laughing or crying can be a trigger.  Some medicines and food can trigger asthma.        20

## 2023-01-01 NOTE — DIETITIAN INITIAL EVALUATION ADULT. - SIGNS/SYMPTOMS
as evidenced by positive COVID-19, ESRD Normal vision: sees adequately in most situations; can see medication labels, newsprint

## 2023-03-27 NOTE — ED ADULT NURSE REASSESSMENT NOTE - NS ED NURSE REASSESS COMMENT FT1
ICU MD at bedside for eval, pt denies pain, offers no complaints at this time, pt with B/L rales and tachypneic, tolerating NC well, pt repositioned in bed to facilitate breathing, showing NSR on monitor, awaiting admit bed, will continue to monitor for safety and comfort
Pt taken off tridal drip as per MD Cavazos, pt medicated with PO medications as per order, diet ordered and called to nutrition for pt, awaiting dialysis and admit bed, pt showing NSR on monitor, denies pain, c/o nausea, MD informed and pt to be medicated as per MD orders, will continue to monitor for safety and comfort
Pt transported to dialysis and spoke with ICU MD/PA and JESSICA order being placed by providers, ANM and dialysis RN made aware
Upon assessment of pt, pt found to be tachypneic at 50.  O2 sat decreased to 91% on 3L. Pt placed on 6L NC. MD Moe contacted and at bedside. PT started on Tridil GTT. PTs VSS improving. Pt pending emerg dialysis. CTM
PAST MEDICAL HISTORY:  No pertinent past medical history

## 2024-01-02 NOTE — ED PROVIDER NOTE - BIRTH SEX
Initiate Treatment: Aquacel-AG 1.2 %-3.5\" X 6\" bandage\\nApply a bandage and leave on for a few days before replacing. Plan: Referred to wound center at [facility], contact information given. Detail Level: Zone Render In Strict Bullet Format?: No Female

## 2024-05-03 NOTE — H&P ADULT - NEGATIVE CARDIOVASCULAR SYMPTOMS
[Time Spent: ___ minutes] : I have spent [unfilled] minutes of time on the encounter.
no palpitations/no chest pain

## 2024-07-02 NOTE — PROVIDER CONTACT NOTE (CRITICAL VALUE NOTIFICATION) - NS PROVIDER READ BACK
Patient called in again requesting refill be sent.   
Patient is out of medication and waiting for us to send refill in.  
yes
yes

## 2024-11-03 NOTE — PROGRESS NOTE ADULT - ASSESSMENT
ESRD on HD- TTS schedule  COVID-19+    Anemia  CARLOS with HD    Patient being discharged today    Will sign-off at this time ESRD on HD- TTS schedule  COVID-19+    Anemia  CARLOS with HD    Patient being discharged today    Will sign-off at this time    I was Physically Present for the key portions of the Evaluation & management ( E/M ) Service provided.    I agree with the above History  , Physical examination & Treatment Plans,    I have Reviewed , Modified or appended where appropriate, Yes
